# Patient Record
Sex: FEMALE | Race: BLACK OR AFRICAN AMERICAN | Employment: FULL TIME | URBAN - METROPOLITAN AREA
[De-identification: names, ages, dates, MRNs, and addresses within clinical notes are randomized per-mention and may not be internally consistent; named-entity substitution may affect disease eponyms.]

---

## 2017-01-06 ENCOUNTER — OFFICE VISIT (OUTPATIENT)
Dept: NEUROLOGY | Age: 27
End: 2017-01-06

## 2017-01-06 VITALS
OXYGEN SATURATION: 98 % | WEIGHT: 293 LBS | DIASTOLIC BLOOD PRESSURE: 80 MMHG | HEIGHT: 63 IN | HEART RATE: 94 BPM | BODY MASS INDEX: 51.91 KG/M2 | SYSTOLIC BLOOD PRESSURE: 120 MMHG

## 2017-01-06 DIAGNOSIS — R20.0 BILATERAL HAND NUMBNESS: Primary | ICD-10-CM

## 2017-01-06 DIAGNOSIS — Z72.0 TOBACCO USE: ICD-10-CM

## 2017-01-06 DIAGNOSIS — G43.009 MIGRAINE WITHOUT AURA AND WITHOUT STATUS MIGRAINOSUS, NOT INTRACTABLE: ICD-10-CM

## 2017-01-06 NOTE — LETTER
1/6/2017 9:40 AM 
 
Patient:    Dee Dee Covarrubias YOB: 1990 Date of Visit:    1/6/2017 Dear Kaye Anderson MD 
 
Thank you for referring Ms. Dee Dee Covarrubias to me for evaluation/treatment. Below are the relevant portions of my assessment and plan of care. NEUROLOGY NEW PATIENT CONSULATION  
 
REFERRED BY: 
Kaye Anderson MD 
 
01/06/17 Chief Complaint Patient presents with  New Patient Migraines 1-2  a month HISTORY OF PRESENT ILLNESS Dee Dee Covarrubias is a 27 y.o. female who presented to the neurology office for management of numbness and tingling in the hands and headaches. Regarding the numbness in the hand, it started around 9 months ago and it is bilateral.  The medial 2 fingers are involved in the right hand and all the fingers are involved in the left hand. It is gradually progressive and it has worsened in the last 6 months. She has been using splints at night for the last 3 weeks and that has not helped her. She does also have neck pain with radiation to the right shoulder. Her symptoms are off and on and it is more when she is working and improves when her hands and not moving. The numbness and tingling lasts for minutes but the pain around her wrist can be for a few days. She denies any weakness. Regarding the headaches, she has been having migraines for a long time and her headaches start of fifth pain in the left eye and left side of the face and it is 10 out of 10 in severity, associated with photophobia, phonophobia and nausea but she denies any vomiting. The headache can last from 3 days to week. It is throbbing in character. She takes Fioricet with codeine and that helps her. She takes it 1-2 tablets 1-2 times a month. Risk Factors for headaches Smoking: Half pack a day Coffee: Denies cups/day Tea: 0 cups/day Soda: 2 cans/day Water: 4 glasses/day Sleeps at 9 AM and wakes up at 2 PM to 3 PM. Medications tried Imitrex Current Outpatient Prescriptions Medication Sig  
 omeprazole (PRILOSEC) 40 mg capsule Take 1 Cap by mouth daily.  glipiZIDE (GLUCOTROL) 5 mg tablet Take 2 Tabs by mouth two (2) times a day.  SITagliptin-metFORMIN (JANUMET) 50-1,000 mg per tablet Take 1 Tab by mouth two (2) times daily (with meals).  gabapentin (NEURONTIN) 300 mg capsule Take 1 Cap by mouth three (3) times daily.  nortriptyline (PAMELOR) 25 mg capsule Take 1 Cap by mouth nightly.  metoprolol tartrate (LOPRESSOR) 100 mg IR tablet Take 1 Tab by mouth two (2) times a day.  spironolactone (ALDACTONE) 25 mg tablet Take 1 Tab by mouth two (2) times a day. Indications: ALDOSTERONISM, Hypertension  butalbital-acetaminophen-caffeine (FIORICET) -40 mg per tablet Take 0.5-1 Tabs by mouth every six (6) hours as needed for Headache. Max Daily Amount: 4 Tabs.  glucose blood VI test strips (ONETOUCH VERIO) strip Check sugars two times daily. E11.9  Blood-Glucose Meter (ONETOUCH VERIO IQ METER) misc Test blood sugar once daily  codeine-butalbital-aspirin-caffeine (BUTALBITAL COMPOUND-CODEINE) 63--40 mg per capsule Take 1 Cap by mouth every eight (8) hours as needed for Pain or Headache. Max Daily Amount: 3 Caps.  cetirizine (ZYRTEC) 10 mg tablet Take  by mouth daily.  glucose blood VI test strips (ONETOUCH VERIO) strip Check your blood sugar twice daily  albuterol (PROVENTIL HFA, VENTOLIN HFA) 90 mcg/actuation inhaler Take 1 Puff by inhalation every four (4) hours as needed for Wheezing.  albuterol (ACCUNEB) 1.25 mg/3 mL nebulizer solution Take 3 mL by inhalation every six (6) hours as needed for Wheezing.  acetaminophen (TYLENOL EXTRA STRENGTH) 500 mg tablet Take 1 Tab by mouth every six (6) hours as needed for Pain. No current facility-administered medications for this visit. Allergies Allergen Reactions  Latex Itching Past Medical History Diagnosis Date  Asthma  Craig hum 1/7/2015  Carpal tunnel syndrome of right wrist 12/20/2016  Diabetes ([facility] [address]  GERD (gastroesophageal reflux disease) 8/13/2014  
 HTN (hypertension) 7/25/2011  Hyperaldosteronism ([facility] 10/19/2015  Other ill-defined conditions(799.89) migraines Past Surgical History Procedure Laterality Date  Pr removal of tonsils,<9 y/o Family History Problem Relation Age of Onset  Arthritis-rheumatoid Mother  Hypertension Mother  Diabetes Mother Type II  
 Psychiatric Disorder Father  Drug Abuse Father  Cancer Other   
  prostate  Hypertension Sister  Thyroid Disease Sister \"I think\"  Attention Deficit Hyperactivity Disorder Brother  Hypertension Sister Social History Substance Use Topics  Smoking status: Current Every Day Smoker Packs/day: 0.50 Years: 5.00  Smokeless tobacco: Never Used  Alcohol use 0.0 oz/week 1 Standard drinks or equivalent per week Comment: socially, Monthly or less REVIEW OF SYSTEMS:  
A ten system review of constitutional, cardiovascular, respiratory, musculoskeletal, endocrine, skin, SHEENT, genitourinary, psychiatric and neurologic systems was obtained and is unremarkable with the exception of the following: Anxiety, fatigue, frequent headaches, joint pain, shortness of breath, snoring and weight change EXAMINATION:  
Visit Vitals  /80  Pulse 94  
 Ht 5' 3\" (1.6 m)  Wt (!) 357 lb 3.2 oz (162 kg)  LMP 12/15/2016  SpO2 98%  BMI 63.28 kg/m2 General:  
General appearance: Pt is in no acute distress Distal pulses are preserved Fundoscopic Exam: Attempted Neurological Examination:  
Mental Status: AAO x3. Speech is fluent. Follows commands, has normal fund of knowledge, attention, short term recall, comprehension and insight. Cranial Nerves: Visual fields are full.  PERRL, Extraocular movements are full. Facial sensation intact V1- V3. Facial movement intact, symmetric. Hearing intact to conversation. Palate elevates symmetrically. Shoulder shrug symmetric. Tongue midline. Motor: Strength is 5/5 in all 4 ext. No atrophy. Tone: Normal 
 
Sensation: Decreased pinprick sensation in the medial 2 fingers of the right hand and the lateral 3 fingers on the left hand Reflexes: DTRs trace throughout. Coordination/Cerebellar: Intact to finger-nose-finger Gait: Romberg is negative and casual gait is normal.  
 
Skin: No significant bruising or lacerations. Laboratory review:  
Results for orders placed or performed in visit on 12/20/16 MICROALBUMIN, UR, RAND W/ MICROALBUMIN/CREA RATIO Result Value Ref Range Creatinine, urine 100.2 Not Estab. mg/dL Microalbumin, urine 18.1 Not Estab. ug/mL Microalb/Creat ratio (ug/mg creat.) 18.1 0.0 - 30.0 mg/g creat CBC W/O DIFF Result Value Ref Range WBC 8.9 3.4 - 10.8 x10E3/uL  
 RBC 4.31 3.77 - 5.28 x10E6/uL HGB 11.6 11.1 - 15.9 g/dL HCT 37.6 34.0 - 46.6 % MCV 87 79 - 97 fL  
 MCH 26.9 26.6 - 33.0 pg  
 MCHC 30.9 (L) 31.5 - 35.7 g/dL  
 RDW 14.3 12.3 - 15.4 % PLATELET 566 (H) 432 - 379 x10E3/uL METABOLIC PANEL, COMPREHENSIVE Result Value Ref Range Glucose 352 (H) 65 - 99 mg/dL BUN 9 6 - 20 mg/dL Creatinine 0.64 0.57 - 1.00 mg/dL GFR est non- >59 mL/min/1.73 GFR est  >59 mL/min/1.73  
 BUN/Creatinine ratio 14 8 - 20 Sodium 137 134 - 144 mmol/L Potassium 5.3 (H) 3.5 - 5.2 mmol/L Chloride 97 96 - 106 mmol/L  
 CO2 25 18 - 29 mmol/L Calcium 9.5 8.7 - 10.2 mg/dL Protein, total 6.5 6.0 - 8.5 g/dL Albumin 3.8 3.5 - 5.5 g/dL GLOBULIN, TOTAL 2.7 1.5 - 4.5 g/dL A-G Ratio 1.4 1.1 - 2.5 Bilirubin, total 0.2 0.0 - 1.2 mg/dL Alk. phosphatase 82 39 - 117 IU/L  
 AST 25 0 - 40 IU/L  
 ALT 30 0 - 32 IU/L  
AMB POC HEMOGLOBIN A1C Result Value Ref Range Hemoglobin A1c (POC) 9.3 % Laboratory review: 
12/20/2016 Glucose 352, potassium 5.3, platelet 553, HVT5Y 9.3 Imaging review: 
7/27/2014 CT soft tissue neck with contrast  
No evidence of neck mass, abscess or lymphadenopathy Documentation review: 
The patient was seen by the primary care physician on 12/20/2016. The patient does have uncontrolled diabetes and the patient's last HbA1c was 8. 5. The patient is complaining of left wrist pain. She does a lot of typing and she does have a family history of carpal tunnel syndrome. The brace is not helping her at night. She is presently on amitriptyline at night. Assessment/Plan:  
Indy Galindo is a 27 y.o. female who presented to the neurology office for management of headaches and numbness in her hands. On my examination she does have decreased pinprick sensation in the medial 2 fingers of the right hand and the lateral 3 fingers of the left hand. I do suspect left ulnar neuropathy at the elbow and a right carpal tunnel syndrome. I will be performing an EMG/nerve conduction study to evaluate that further. Regarding her headaches, she does have migraines without aura and she can continue to take Fioricet as needed. If her headaches are getting worse she would need preventative therapy and in her case Topamax might be a good option. I have asked her to quit smoking and counseled her regarding that. Also, she should increase her hydration to 6-8 glasses of water today. The patient was counseled on the dangers of tobacco use, and was advised to quit. Reviewed strategies to maximize success, including removing cigarettes and smoking materials from environment and support of family/friends. >10 min counseling provided. Follow-up after the EMG/nerve conduction study 1. Bilateral hand numbness 
 
- EMG LIMITED; Future 2. Migraine without aura and without status migrainosus, not intractable 3. Tobacco use Thank you for allowing me to participate in the care of Ms. Neeru Pennington. Please feel free to contact me if you have any questions. Josette Kunz MD 
Neurologist and Clinical Neurophysiologist 
 
CC: Frances Jones MD 
Fax: [phone] This note will not be viewable in [address] If you have questions, please do not hesitate to call me. I look forward to following Ms. Neeru Pennington along with you. Sincerely, Josette Kunz MD

## 2017-01-06 NOTE — MR AVS SNAPSHOT
Visit Information Date & Time Provider Department Dept. Phone Encounter #  
 1/6/2017  9:00 AM Dwayne Enriquez MD Neurology Clinic at [facility] [phone] [id number] Your Appointments 1/17/2017  3:50 PM  
Follow Up with Jose Cruz Rodriguez MD  
[facility] [address]) Appt Note: f/u    dm                 3 months; f/u Diabetes [address] [id number] [address] [place] [address] Road  
  
    
 3/20/2017  3:30 PM  
ROUTINE CARE with Maurice Pérez MD  
[facility] OFFICE-ANNEX ([address]) Appt Note: 3 mnth fu  
 [address] [place] [address] [id number] [phone] [phone] [phone] Upcoming Health Maintenance Date Due  
 EYE EXAM RETINAL OR DILATED Q1 1/7/2016 PAP AKA CERVICAL CYTOLOGY 2/12/2017 LIPID PANEL Q1 3/16/2017 HEMOGLOBIN A1C Q6M 6/20/2017 FOOT EXAM Q1 9/26/2017 MICROALBUMIN Q1 12/20/2017 DTaP/Tdap/Td series (2 - Td) 8/27/2022 Allergies as of 1/6/2017  Review Complete On: 1/6/2017 By: Dwayne Enriquez MD  
  
 Severity Noted Reaction Type Reactions Latex  08/27/2014   Side Effect Itching Current Immunizations  Reviewed on 9/27/2013 Name Date Influenza Vaccine (Quad) PF 12/20/2016, 10/19/2015  8:35 AM  
 Influenza Vaccine PF 9/27/2013 Pneumococcal Polysaccharide (PPSV-23) 9/27/2013 TDAP Vaccine 8/27/2012 Not reviewed this visit You Were Diagnosed With   
  
 Codes Comments Bilateral hand numbness    -  Primary ICD-10-CM: R20.0 ICD-9-CM: 649. 0 Vitals BP Pulse Height(growth percentile) Weight(growth percentile) LMP SpO2  
 120/80 94 5' 3\" (1.6 m) (!) 357 lb 3.2 oz (162 kg) 12/15/2016 98% BMI OB Status Smoking Status 63.28 kg/m2 Having regular periods Current Every Day Smoker BMI and BSA Data Body Mass Index Body Surface Area  
 63.28 kg/m 2 2.68 m 2 Preferred Pharmacy Pharmacy Name Phone [id number]- [place], [place] - [address] [phone] Your Updated Medication List  
  
   
This list is accurate as of: 1/6/17  9:27 AM.  Always use your most recent med list.  
  
  
  
  
 acetaminophen 500 mg tablet Commonly known as:  Jr Aidee Wagner Se Take 1 Tab by mouth every six (6) hours as needed for Pain. * albuterol 1.25 mg/3 mL Nebu Commonly known as:  Veria Chandni Take 3 mL by inhalation every six (6) hours as needed for Wheezing. * albuterol 90 mcg/actuation inhaler Commonly known as:  PROVENTIL HFA, VENTOLIN HFA, PROAIR HFA Take 1 Puff by inhalation every four (4) hours as needed for Wheezing. Blood-Glucose Meter Misc Commonly known as:  Rc Cornejo IQ METER Test blood sugar once daily  
  
 butalbital-acetaminophen-caffeine -40 mg per tablet Commonly known as:  [employer] Take 0.5-1 Tabs by mouth every six (6) hours as needed for Headache. Max Daily Amount: 4 Tabs. codeine-butalbital-aspirin-caffeine 00--40 mg per capsule Commonly known as:  BUTALBITAL COMPOUND-CODEINE Take 1 Cap by mouth every eight (8) hours as needed for Pain or Headache. Max Daily Amount: 3 Caps.  
  
 gabapentin 300 mg capsule Commonly known as:  NEURONTIN Take 1 Cap by mouth three (3) times daily. glipiZIDE 5 mg tablet Commonly known as:  Ovid De Soto Take 2 Tabs by mouth two (2) times a day. * glucose blood VI test strips strip Commonly known as:  Rc Cornejo Check your blood sugar twice daily * glucose blood VI test strips strip Commonly known as:  Rc Cornejo Check sugars two times daily. E11.9  
  
 metoprolol tartrate 100 mg IR tablet Commonly known as:  LOPRESSOR  
 Take 1 Tab by mouth two (2) times a day. nortriptyline 25 mg capsule Commonly known as:  PAMELOR Take 1 Cap by mouth nightly. omeprazole 40 mg capsule Commonly known as:  PRILOSEC Take 1 Cap by mouth daily. SITagliptin-metFORMIN 50-1,000 mg per tablet Commonly known as:  Kolby Karely Take 1 Tab by mouth two (2) times daily (with meals). spironolactone 25 mg tablet Commonly known as:  ALDACTONE Take 1 Tab by mouth two (2) times a day. Indications: ALDOSTERONISM, Hypertension ZyrTEC 10 mg tablet Generic drug:  cetirizine Take  by mouth daily. * Notice: This list has 4 medication(s) that are the same as other medications prescribed for you. Read the directions carefully, and ask your doctor or other care provider to review them with you. To-Do List   
 01/06/2017 Neurology:  EMG LIMITED Patient Instructions 1. Emg/ncs 2. Fu after emg/ncs A Healthy Lifestyle: Care Instructions Your Care Instructions A healthy lifestyle can help you feel good, stay at a healthy weight, and have plenty of energy for both work and play. A healthy lifestyle is something you can share with your whole family. A healthy lifestyle also can lower your risk for serious health problems, such as high blood pressure, heart disease, and diabetes. You can follow a few steps listed below to improve your health and the health of your family. Follow-up care is a key part of your treatment and safety. Be sure to make and go to all appointments, and call your doctor if you are having problems. Its also a good idea to know your test results and keep a list of the medicines you take. How can you care for yourself at home? · Do not eat too much sugar, fat, or fast foods. You can still have dessert and treats now and then. The goal is moderation. · Start small to improve your eating habits.  Pay attention to portion sizes, drink less juice and soda pop, and eat more fruits and vegetables. ¨ Eat a healthy amount of food. A 3-ounce serving of meat, for example, is about the size of a deck of cards. Fill the rest of your plate with vegetables and whole grains. ¨ Limit the amount of soda and sports drinks you have every day. Drink more water when you are thirsty. ¨ Eat at least 5 servings of fruits and vegetables every day. It may seem like a lot, but it is not hard to reach this goal. A serving or helping is 1 piece of fruit, 1 cup of vegetables, or 2 cups of leafy, raw vegetables. Have an apple or some carrot sticks as an afternoon snack instead of a candy bar. Try to have fruits and/or vegetables at every meal. 
· Make exercise part of your daily routine. You may want to start with simple activities, such as walking, bicycling, or slow swimming. Try to be active 30 to 60 minutes every day. You do not need to do all 30 to 60 minutes all at once. For example, you can exercise 3 times a day for 10 or 20 minutes. Moderate exercise is safe for most people, but it is always a good idea to talk to your doctor before starting an exercise program. 
· Keep moving. Sheridan Bun the lawn, work in the garden, or "[employer]. Take the stairs instead of the elevator at work. · If you smoke, quit. People who smoke have an increased risk for heart attack, stroke, cancer, and other lung illnesses. Quitting is hard, but there are ways to boost your chance of quitting tobacco for good. ¨ Use nicotine gum, patches, or lozenges. ¨ Ask your doctor about stop-smoking programs and medicines. ¨ Keep trying. In addition to reducing your risk of diseases in the future, you will notice some benefits soon after you stop using tobacco. If you have shortness of breath or asthma symptoms, they will likely get better within a few weeks after you quit. · Limit how much alcohol you drink.  Moderate amounts of alcohol (up to 2 drinks a day for men, 1 drink a day for women) are okay. But drinking too much can lead to liver problems, high blood pressure, and other health problems. Family health If you have a family, there are many things you can do together to improve your health. · Eat meals together as a family as often as possible. · Eat healthy foods. This includes fruits, vegetables, lean meats and dairy, and whole grains. · Include your family in your fitness plan. Most people think of activities such as jogging or tennis as the way to fitness, but there are many ways you and your family can be more active. Anything that makes you breathe hard and gets your heart pumping is exercise. Here are some tips: 
¨ Walk to do errands or to take your child to school or the bus. ¨ Go for a family bike ride after dinner instead of watching TV. Where can you learn more? Go to http://donavonCortiliastacy.info/. Enter K078 in the search box to learn more about \"A Healthy Lifestyle: Care Instructions. \" Current as of: July 26, 2016 Content Version: 11.1 © 8137-1133 [employer]. Care instructions adapted under license by [employer] (which disclaims liability or warranty for this information). If you have questions about a medical condition or this instruction, always ask your healthcare professional. [address] any warranty or liability for your use of this information. A Healthy Lifestyle: Care Instructions Your Care Instructions A healthy lifestyle can help you feel good, stay at a healthy weight, and have plenty of energy for both work and play. A healthy lifestyle is something you can share with your whole family. A healthy lifestyle also can lower your risk for serious health problems, such as high blood pressure, heart disease, and diabetes. You can follow a few steps listed below to improve your health and the health of your family. Follow-up care is a key part of your treatment and safety. Be sure to make and go to all appointments, and call your doctor if you are having problems. Its also a good idea to know your test results and keep a list of the medicines you take. How can you care for yourself at home? · Do not eat too much sugar, fat, or fast foods. You can still have dessert and treats now and then. The goal is moderation. · Start small to improve your eating habits. Pay attention to portion sizes, drink less juice and soda pop, and eat more fruits and vegetables. ¨ Eat a healthy amount of food. A 3-ounce serving of meat, for example, is about the size of a deck of cards. Fill the rest of your plate with vegetables and whole grains. ¨ Limit the amount of soda and sports drinks you have every day. Drink more water when you are thirsty. ¨ Eat at least 5 servings of fruits and vegetables every day. It may seem like a lot, but it is not hard to reach this goal. A serving or helping is 1 piece of fruit, 1 cup of vegetables, or 2 cups of leafy, raw vegetables. Have an apple or some carrot sticks as an afternoon snack instead of a candy bar. Try to have fruits and/or vegetables at every meal. 
· Make exercise part of your daily routine. You may want to start with simple activities, such as walking, bicycling, or slow swimming. Try to be active 30 to 60 minutes every day. You do not need to do all 30 to 60 minutes all at once. For example, you can exercise 3 times a day for 10 or 20 minutes. Moderate exercise is safe for most people, but it is always a good idea to talk to your doctor before starting an exercise program. 
· Keep moving. Saurabh Goltz the lawn, work in the garden, or [employer]. Take the stairs instead of the elevator at work. · If you smoke, quit. People who smoke have an increased risk for heart attack, stroke, cancer, and other lung illnesses.  Quitting is hard, but there are ways to boost your chance of quitting tobacco for good. ¨ Use nicotine gum, patches, or lozenges. ¨ Ask your doctor about stop-smoking programs and medicines. ¨ Keep trying. In addition to reducing your risk of diseases in the future, you will notice some benefits soon after you stop using tobacco. If you have shortness of breath or asthma symptoms, they will likely get better within a few weeks after you quit. · Limit how much alcohol you drink. Moderate amounts of alcohol (up to 2 drinks a day for men, 1 drink a day for women) are okay. But drinking too much can lead to liver problems, high blood pressure, and other health problems. Family health If you have a family, there are many things you can do together to improve your health. · Eat meals together as a family as often as possible. · Eat healthy foods. This includes fruits, vegetables, lean meats and dairy, and whole grains. · Include your family in your fitness plan. Most people think of activities such as jogging or tennis as the way to fitness, but there are many ways you and your family can be more active. Anything that makes you breathe hard and gets your heart pumping is exercise. Here are some tips: 
¨ Walk to do errands or to take your child to school or the bus. ¨ Go for a family bike ride after dinner instead of watching TV. Where can you learn more? Go to http://donavon-stacy.info/. Enter W141 in the search box to learn more about \"A Healthy Lifestyle: Care Instructions. \" Current as of: July 26, 2016 Content Version: 11.1 © 0835-7144 [employer]. Care instructions adapted under license by [employer] (which disclaims liability or warranty for this information). If you have questions about a medical condition or this instruction, always ask your healthcare professional. [place] [address] any warranty or liability for your use of this information. Introducing [facility]! Dear Jeremiah Cartagena: Thank you for requesting a [employer] account. Our records indicate that you have previously registered for a [employer] account but its currently inactive. Please call our [employer] support line at [phone]. Additional Information If you have questions, please visit the Frequently Asked Questions section of the [employer] website at [email or web address] Remember, [employer] is NOT to be used for urgent needs. For medical emergencies, dial 911. Now available from your iPhone and Android! Please provide this summary of care documentation to your next provider. Your primary care clinician is listed as Kelia Palomino. If you have any questions after today's visit, please call [phone].

## 2017-01-06 NOTE — PROGRESS NOTES
NEUROLOGY NEW PATIENT CONSULATION     REFERRED BY:  Felicia George MD    01/06/17    Chief Complaint   Patient presents with    New Patient     Migraines 1-2  a month       HISTORY OF PRESENT ILLNESS  Sean Mobley is a 27 y.o. female who presented to the neurology office for management of numbness and tingling in the hands and headaches. Regarding the numbness in the hand, it started around 9 months ago and it is bilateral.  The medial 2 fingers are involved in the right hand and all the fingers are involved in the left hand. It is gradually progressive and it has worsened in the last 6 months. She has been using splints at night for the last 3 weeks and that has not helped her. She does also have neck pain with radiation to the right shoulder. Her symptoms are off and on and it is more when she is working and improves when her hands and not moving. The numbness and tingling lasts for minutes but the pain around her wrist can be for a few days. She denies any weakness. Regarding the headaches, she has been having migraines for a long time and her headaches start of fifth pain in the left eye and left side of the face and it is 10 out of 10 in severity, associated with photophobia, phonophobia and nausea but she denies any vomiting. The headache can last from 3 days to week. It is throbbing in character. She takes Fioricet with codeine and that helps her. She takes it 1-2 tablets 1-2 times a month. Risk Factors for headaches  Smoking: Half pack a day  Coffee: Denies cups/day  Tea: 0 cups/day  Soda: 2 cans/day  Water: 4 glasses/day  Sleeps at 9 AM and wakes up at 2 PM to 3 PM.     Medications tried  Imitrex          Current Outpatient Prescriptions   Medication Sig    omeprazole (PRILOSEC) 40 mg capsule Take 1 Cap by mouth daily.  glipiZIDE (GLUCOTROL) 5 mg tablet Take 2 Tabs by mouth two (2) times a day.     SITagliptin-metFORMIN (JANUMET) 50-1,000 mg per tablet Take 1 Tab by mouth two (2) times daily (with meals).  gabapentin (NEURONTIN) 300 mg capsule Take 1 Cap by mouth three (3) times daily.  nortriptyline (PAMELOR) 25 mg capsule Take 1 Cap by mouth nightly.  metoprolol tartrate (LOPRESSOR) 100 mg IR tablet Take 1 Tab by mouth two (2) times a day.  spironolactone (ALDACTONE) 25 mg tablet Take 1 Tab by mouth two (2) times a day. Indications: ALDOSTERONISM, Hypertension    butalbital-acetaminophen-caffeine (FIORICET) -40 mg per tablet Take 0.5-1 Tabs by mouth every six (6) hours as needed for Headache. Max Daily Amount: 4 Tabs.  glucose blood VI test strips (ONETOUCH VERIO) strip Check sugars two times daily. E11.9    Blood-Glucose Meter (ONETOUCH VERIO IQ METER) misc Test blood sugar once daily    codeine-butalbital-aspirin-caffeine (BUTALBITAL COMPOUND-CODEINE) 92--40 mg per capsule Take 1 Cap by mouth every eight (8) hours as needed for Pain or Headache. Max Daily Amount: 3 Caps.  cetirizine (ZYRTEC) 10 mg tablet Take  by mouth daily.  glucose blood VI test strips (ONETOUCH VERIO) strip Check your blood sugar twice daily    albuterol (PROVENTIL HFA, VENTOLIN HFA) 90 mcg/actuation inhaler Take 1 Puff by inhalation every four (4) hours as needed for Wheezing.  albuterol (ACCUNEB) 1.25 mg/3 mL nebulizer solution Take 3 mL by inhalation every six (6) hours as needed for Wheezing.  acetaminophen (TYLENOL EXTRA STRENGTH) 500 mg tablet Take 1 Tab by mouth every six (6) hours as needed for Pain. No current facility-administered medications for this visit.       Allergies   Allergen Reactions    Latex Itching     Past Medical History   Diagnosis Date    Asthma     [place] hump 1/7/2015    Carpal tunnel syndrome of right wrist 12/20/2016    Diabetes ([facility]     GERD (gastroesophageal reflux disease) 8/13/2014    HTN (hypertension) 7/25/2011    Hyperaldosteronism ([facility] [address] 10/19/2015    Other ill-defined conditions(799.89)      migraines     Past Surgical History   Procedure Laterality Date    Pr removal of tonsils,<9 y/o       Family History   Problem Relation Age of Onset   [facility] Arthritis-rheumatoid Mother     Hypertension Mother     Diabetes Mother      Type II    Psychiatric Disorder Father     Drug Abuse Father     Cancer Other      prostate    Hypertension Sister     Thyroid Disease Sister      \"I think\"    Attention Deficit Hyperactivity Disorder Brother     Hypertension Sister      Social History   Substance Use Topics    Smoking status: Current Every Day Smoker     Packs/day: 0.50     Years: 5.00    Smokeless tobacco: Never Used    Alcohol use 0.0 oz/week     1 Standard drinks or equivalent per week      Comment: socially, Monthly or less       REVIEW OF SYSTEMS:   A ten system review of constitutional, cardiovascular, respiratory, musculoskeletal, endocrine, skin, SHEENT, genitourinary, psychiatric and neurologic systems was obtained and is unremarkable with the exception of the following: Anxiety, fatigue, frequent headaches, joint pain, shortness of breath, snoring and weight change     EXAMINATION:   Visit Vitals    /80    Pulse 94    Ht 5' 3\" (1.6 m)    Wt (!) 357 lb 3.2 oz (162 kg)    LMP 12/15/2016    SpO2 98%    BMI 63.28 kg/m2        General:   General appearance: Pt is in no acute distress   Distal pulses are preserved  Fundoscopic Exam: Attempted    Neurological Examination:   Mental Status: AAO x3. Speech is fluent. Follows commands, has normal fund of knowledge, attention, short term recall, comprehension and insight. Cranial Nerves: Visual fields are full. PERRL, Extraocular movements are full. Facial sensation intact V1- V3. Facial movement intact, symmetric. Hearing intact to conversation. Palate elevates symmetrically. Shoulder shrug symmetric. Tongue midline. Motor: Strength is 5/5 in all 4 ext. No atrophy.      Tone: Normal    Sensation: Decreased pinprick sensation in the medial 2 fingers of the right hand and the lateral 3 fingers on the left hand    Reflexes: DTRs trace throughout. Coordination/Cerebellar: Intact to finger-nose-finger     Gait: Romberg is negative and casual gait is normal.     Skin: No significant bruising or lacerations. Laboratory review:   Results for orders placed or performed in visit on 12/20/16   MICROALBUMIN, UR, RAND W/ MICROALBUMIN/CREA RATIO   Result Value Ref Range    Creatinine, urine 100.2 Not Estab. mg/dL    Microalbumin, urine 18.1 Not Estab. ug/mL    Microalb/Creat ratio (ug/mg creat.) 18.1 0.0 - 30.0 mg/g creat   CBC W/O DIFF   Result Value Ref Range    WBC 8.9 3.4 - 10.8 x10E3/uL    RBC 4.31 3.77 - 5.28 x10E6/uL    HGB 11.6 11.1 - 15.9 g/dL    HCT 37.6 34.0 - 46.6 %    MCV 87 79 - 97 fL    MCH 26.9 26.6 - 33.0 pg    MCHC 30.9 (L) 31.5 - 35.7 g/dL    RDW 14.3 12.3 - 15.4 %    PLATELET 331 (H) 341 - 379 H94H3/NW   METABOLIC PANEL, COMPREHENSIVE   Result Value Ref Range    Glucose 352 (H) 65 - 99 mg/dL    BUN 9 6 - 20 mg/dL    Creatinine 0.64 0.57 - 1.00 mg/dL    GFR est non- >59 mL/min/1.73    GFR est  >59 mL/min/1.73    BUN/Creatinine ratio 14 8 - 20    Sodium 137 134 - 144 mmol/L    Potassium 5.3 (H) 3.5 - 5.2 mmol/L    Chloride 97 96 - 106 mmol/L    CO2 25 18 - 29 mmol/L    Calcium 9.5 8.7 - 10.2 mg/dL    Protein, total 6.5 6.0 - 8.5 g/dL    Albumin 3.8 3.5 - 5.5 g/dL    GLOBULIN, TOTAL 2.7 1.5 - 4.5 g/dL    A-G Ratio 1.4 1.1 - 2.5    Bilirubin, total 0.2 0.0 - 1.2 mg/dL    Alk. phosphatase 82 39 - 117 IU/L    AST 25 0 - 40 IU/L    ALT 30 0 - 32 IU/L   AMB POC HEMOGLOBIN A1C   Result Value Ref Range    Hemoglobin A1c (POC) 9.3 %     Laboratory review:  12/20/2016  Glucose 352, potassium 5.3, platelet 542, YII7X 9.3    Imaging review:  7/27/2014  CT soft tissue neck with contrast   No evidence of neck mass, abscess or lymphadenopathy    Documentation review:  The patient was seen by the primary care physician on 12/20/2016.   The patient does have uncontrolled diabetes and the patient's last HbA1c was 8. 5. The patient is complaining of left wrist pain. She does a lot of typing and she does have a family history of carpal tunnel syndrome. The brace is not helping her at night. She is presently on amitriptyline at night. Assessment/Plan:   Sheri Caldwell is a 27 y.o. female who presented to the neurology office for management of headaches and numbness in her hands. On my examination she does have decreased pinprick sensation in the medial 2 fingers of the right hand and the lateral 3 fingers of the left hand. I do suspect left ulnar neuropathy at the elbow and a right carpal tunnel syndrome. I will be performing an EMG/nerve conduction study to evaluate that further. Regarding her headaches, she does have migraines without aura and she can continue to take Fioricet as needed. If her headaches are getting worse she would need preventative therapy and in her case Topamax might be a good option. I have asked her to quit smoking and counseled her regarding that. Also, she should increase her hydration to 6-8 glasses of water today. The patient was counseled on the dangers of tobacco use, and was advised to quit. Reviewed strategies to maximize success, including removing cigarettes and smoking materials from environment and support of family/friends. >10 min counseling provided. Follow-up after the EMG/nerve conduction study    1. Bilateral hand numbness    - EMG LIMITED; Future    2. Migraine without aura and without status migrainosus, not intractable      3. Tobacco use      Thank you for allowing me to participate in the care of Ms. Neeru Pennington. Please feel free to contact me if you have any questions. Josette Kunz MD  Neurologist and Clinical Neurophysiologist    CC: Frances Jones MD  Fax: [phone]    This note will not be viewable in [address]

## 2017-01-06 NOTE — PATIENT INSTRUCTIONS
1. Emg/ncs  2. Fu after emg/ncs    A Healthy Lifestyle: Care Instructions  Your Care Instructions  A healthy lifestyle can help you feel good, stay at a healthy weight, and have plenty of energy for both work and play. A healthy lifestyle is something you can share with your whole family. A healthy lifestyle also can lower your risk for serious health problems, such as high blood pressure, heart disease, and diabetes. You can follow a few steps listed below to improve your health and the health of your family. Follow-up care is a key part of your treatment and safety. Be sure to make and go to all appointments, and call your doctor if you are having problems. Its also a good idea to know your test results and keep a list of the medicines you take. How can you care for yourself at home? · Do not eat too much sugar, fat, or fast foods. You can still have dessert and treats now and then. The goal is moderation. · Start small to improve your eating habits. Pay attention to portion sizes, drink less juice and soda pop, and eat more fruits and vegetables. ¨ Eat a healthy amount of food. A 3-ounce serving of meat, for example, is about the size of a deck of cards. Fill the rest of your plate with vegetables and whole grains. ¨ Limit the amount of soda and sports drinks you have every day. Drink more water when you are thirsty. ¨ Eat at least 5 servings of fruits and vegetables every day. It may seem like a lot, but it is not hard to reach this goal. A serving or helping is 1 piece of fruit, 1 cup of vegetables, or 2 cups of leafy, raw vegetables. Have an apple or some carrot sticks as an afternoon snack instead of a candy bar. Try to have fruits and/or vegetables at every meal.  · Make exercise part of your daily routine. You may want to start with simple activities, such as walking, bicycling, or slow swimming. Try to be active 30 to 60 minutes every day. You do not need to do all 30 to 60 minutes all at once. For example, you can exercise 3 times a day for 10 or 20 minutes. Moderate exercise is safe for most people, but it is always a good idea to talk to your doctor before starting an exercise program.  · Keep moving. Abhi Boothee the lawn, work in the garden, or [employer]. Take the stairs instead of the elevator at work. · If you smoke, quit. People who smoke have an increased risk for heart attack, stroke, cancer, and other lung illnesses. Quitting is hard, but there are ways to boost your chance of quitting tobacco for good. ¨ Use nicotine gum, patches, or lozenges. ¨ Ask your doctor about stop-smoking programs and medicines. ¨ Keep trying. In addition to reducing your risk of diseases in the future, you will notice some benefits soon after you stop using tobacco. If you have shortness of breath or asthma symptoms, they will likely get better within a few weeks after you quit. · Limit how much alcohol you drink. Moderate amounts of alcohol (up to 2 drinks a day for men, 1 drink a day for women) are okay. But drinking too much can lead to liver problems, high blood pressure, and other health problems. Family health  If you have a family, there are many things you can do together to improve your health. · Eat meals together as a family as often as possible. · Eat healthy foods. This includes fruits, vegetables, lean meats and dairy, and whole grains. · Include your family in your fitness plan. Most people think of activities such as jogging or tennis as the way to fitness, but there are many ways you and your family can be more active. Anything that makes you breathe hard and gets your heart pumping is exercise. Here are some tips:  ¨ Walk to do errands or to take your child to school or the bus. ¨ Go for a family bike ride after dinner instead of watching TV. Where can you learn more? Go to http://donavon-stacy.info/.   Enter D442 in the search box to learn more about \"A Healthy Lifestyle: Care Instructions. \"  Current as of: July 26, 2016  Content Version: 11.1  © 9031-8160 [employer]. Care instructions adapted under license by [employer] (which disclaims liability or warranty for this information). If you have questions about a medical condition or this instruction, always ask your healthcare professional. [address] any warranty or liability for your use of this information. A Healthy Lifestyle: Care Instructions  Your Care Instructions  A healthy lifestyle can help you feel good, stay at a healthy weight, and have plenty of energy for both work and play. A healthy lifestyle is something you can share with your whole family. A healthy lifestyle also can lower your risk for serious health problems, such as high blood pressure, heart disease, and diabetes. You can follow a few steps listed below to improve your health and the health of your family. Follow-up care is a key part of your treatment and safety. Be sure to make and go to all appointments, and call your doctor if you are having problems. Its also a good idea to know your test results and keep a list of the medicines you take. How can you care for yourself at home? · Do not eat too much sugar, fat, or fast foods. You can still have dessert and treats now and then. The goal is moderation. · Start small to improve your eating habits. Pay attention to portion sizes, drink less juice and soda pop, and eat more fruits and vegetables. ¨ Eat a healthy amount of food. A 3-ounce serving of meat, for example, is about the size of a deck of cards. Fill the rest of your plate with vegetables and whole grains. ¨ Limit the amount of soda and sports drinks you have every day. Drink more water when you are thirsty. ¨ Eat at least 5 servings of fruits and vegetables every day.  It may seem like a lot, but it is not hard to reach this goal. A serving or helping is 1 piece of fruit, 1 cup of vegetables, or 2 cups of leafy, raw vegetables. Have an apple or some carrot sticks as an afternoon snack instead of a candy bar. Try to have fruits and/or vegetables at every meal.  · Make exercise part of your daily routine. You may want to start with simple activities, such as walking, bicycling, or slow swimming. Try to be active 30 to 60 minutes every day. You do not need to do all 30 to 60 minutes all at once. For example, you can exercise 3 times a day for 10 or 20 minutes. Moderate exercise is safe for most people, but it is always a good idea to talk to your doctor before starting an exercise program.  · Keep moving. Sam Punter the lawn, work in the garden, or "[employer]. Take the stairs instead of the elevator at work. · If you smoke, quit. People who smoke have an increased risk for heart attack, stroke, cancer, and other lung illnesses. Quitting is hard, but there are ways to boost your chance of quitting tobacco for good. ¨ Use nicotine gum, patches, or lozenges. ¨ Ask your doctor about stop-smoking programs and medicines. ¨ Keep trying. In addition to reducing your risk of diseases in the future, you will notice some benefits soon after you stop using tobacco. If you have shortness of breath or asthma symptoms, they will likely get better within a few weeks after you quit. · Limit how much alcohol you drink. Moderate amounts of alcohol (up to 2 drinks a day for men, 1 drink a day for women) are okay. But drinking too much can lead to liver problems, high blood pressure, and other health problems. Family health  If you have a family, there are many things you can do together to improve your health. · Eat meals together as a family as often as possible. · Eat healthy foods. This includes fruits, vegetables, lean meats and dairy, and whole grains. · Include your family in your fitness plan.  Most people think of activities such as jogging or tennis as the way to fitness, but there are many ways you and your family can be more active. Anything that makes you breathe hard and gets your heart pumping is exercise. Here are some tips:  ¨ Walk to do errands or to take your child to school or the bus. ¨ Go for a family bike ride after dinner instead of watching TV. Where can you learn more? Go to http://donavon-stacy.info/. Enter Q623 in the search box to learn more about \"A Healthy Lifestyle: Care Instructions. \"  Current as of: July 26, 2016  Content Version: 11.1  © 1922-2507 [employer]. Care instructions adapted under license by [employer] (which disclaims liability or warranty for this information). If you have questions about a medical condition or this instruction, always ask your healthcare professional. [address] any warranty or liability for your use of this information.

## 2017-02-27 DIAGNOSIS — E26.9 HYPERALDOSTERONISM (HCC): ICD-10-CM

## 2017-03-01 RX ORDER — SPIRONOLACTONE 25 MG/1
25 TABLET ORAL 2 TIMES DAILY
Qty: 60 TAB | Refills: 1 | Status: SHIPPED | OUTPATIENT
Start: 2017-03-01 | End: 2017-08-17 | Stop reason: SDUPTHER

## 2017-03-16 ENCOUNTER — OFFICE VISIT (OUTPATIENT)
Dept: NEUROLOGY | Age: 27
End: 2017-03-16

## 2017-03-16 ENCOUNTER — DOCUMENTATION ONLY (OUTPATIENT)
Dept: NEUROLOGY | Age: 27
End: 2017-03-16

## 2017-03-16 VITALS — HEART RATE: 83 BPM | OXYGEN SATURATION: 97 % | SYSTOLIC BLOOD PRESSURE: 143 MMHG | DIASTOLIC BLOOD PRESSURE: 98 MMHG

## 2017-03-16 DIAGNOSIS — G56.21 ULNAR NEUROPATHY AT ELBOW, RIGHT: ICD-10-CM

## 2017-03-16 DIAGNOSIS — E11.9 DIABETES MELLITUS WITHOUT COMPLICATION (HCC): ICD-10-CM

## 2017-03-16 DIAGNOSIS — E11.9 TYPE 2 DIABETES MELLITUS WITHOUT COMPLICATION, WITHOUT LONG-TERM CURRENT USE OF INSULIN (HCC): ICD-10-CM

## 2017-03-16 DIAGNOSIS — G43.009 MIGRAINE WITHOUT AURA AND WITHOUT STATUS MIGRAINOSUS, NOT INTRACTABLE: Primary | ICD-10-CM

## 2017-03-16 DIAGNOSIS — R20.2 PARESTHESIA: ICD-10-CM

## 2017-03-16 DIAGNOSIS — Z72.0 TOBACCO USE: ICD-10-CM

## 2017-03-16 DIAGNOSIS — G56.01 CARPAL TUNNEL SYNDROME OF RIGHT WRIST: ICD-10-CM

## 2017-03-16 DIAGNOSIS — Z23 ENCOUNTER FOR IMMUNIZATION: ICD-10-CM

## 2017-03-16 RX ORDER — GABAPENTIN 300 MG/1
300 CAPSULE ORAL 3 TIMES DAILY
Qty: 90 CAP | Refills: 2 | Status: SHIPPED | OUTPATIENT
Start: 2017-03-16 | End: 2017-09-11 | Stop reason: SDUPTHER

## 2017-03-16 NOTE — PROCEDURES
[facility] at [address] [facility] [address] [facility], [address] [facility]  Tel [phone]     Fax [phone]  Electrodiagnostic Study Report    Patient: Kenneth Pemberton  Age: 27   [facility]#: [id number]  Ref Phys: Bobby Patricia   Sex: Female  Physician: Rondell Runner, MD   Height: ' \"      : 1990  DOS: 3/16/2017       Reason for study:  Kenneth Pemberton 27 y.o. female presents with bilateral hand numbness. Queryt CTS and right ulnar neuropathy.     Anti - Sensory Nerve Conduction Studies     Stim Site NR Peak (ms) P-T Amp (µV) Site1 Site2 Delta-P (ms) Dist (cm) Wilner (m/s)   Left Median Anti Sensory (2nd Digit)  33.8°C   Wrist    3.1 38.1 Wrist 2nd Digit 3.1 14.0 45   Right Median Anti Sensory (2nd Digit)  32.9°C   Wrist    3.3 26.4 Wrist 2nd Digit 3.3 14.0 42   Left Radial Anti Sensory (Base 1st Digit)  34.2°C   Wrist    1.9 23.1 Wrist Base 1st Digit 1.9 0.0    Right Radial Anti Sensory (Base 1st Digit)  33.9°C   Wrist    1.9 44.9 Wrist Base 1st Digit 1.9 0.0    Left Ulnar Anti Sensory (5th Digit)  33.3°C   Wrist    2.4 16.0 Wrist 5th Digit 2.4 14.0 58   Right Ulnar Anti Sensory (5th Digit)  32.5°C   Wrist    2.4 32.3 Wrist 5th Digit 2.4 14.0 58     Ortho - Sensory Nerve Conduction Studies     Stim Site NR Peak (ms) P-T Amp (µV) Site1 Site2 Delta-P (ms) Dist (cm) Wilner (m/s)   Right Ulnar Ortho Sensory (Wrist)   5th Digit    0.7 148.5 5th Digit Wrist 0.7 0.0      Sensory Comparison Studies     Stim Site NR Peak (ms) P-T Amp (µV) Site1 Site2 Delta-P (ms)   Left Median/Ulnar Palm Comparison (Wrist - 8cm)  33.2°C   Median Palm    2.1 51.5 Median Palm Ulnar Palm 0.3   Ulnar Palm    1.8 16.3      Right Median/Ulnar Palm Comparison (Wrist - 8cm)  33.7°C   Median Palm    2.1 79.5 Median Palm Ulnar Palm 0.3   Ulnar Palm    1.8 18.4                                 Motor Nerve Conduction Studies     Stim Site NR Onset (ms) O-P Amp (mV) Site1 Site2 Delta-0 (ms) Dist (cm) Wilner (m/s)   Left Median Motor (Abd Poll Brev)  32.5°C   Wrist    3.1 10.7 Elbow Wrist 4.4 24.0 55T   Elbow    7.5 10.2        Right Median Motor (Abd Poll Brev)  31.6°C   Wrist    3.0 11.5 Elbow Wrist 4.8 25.0 54T   Elbow    7.8 10.8        Left Ulnar Motor (Abd Dig Minimi)  33.3°C   Wrist    2.5 10.4 B Elbow Wrist 3.9 20.0 51   B Elbow    6.4 10.0 A Elbow B Elbow 2.0 10.0 50   A Elbow    8.4 8.9        Right Ulnar Motor (Abd Dig Minimi)   Wrist    2.3 10.1 B Elbow Wrist 3.8 20.5 54   B Elbow    6.1 9.6 A Elbow B Elbow 1.6 10.0 62   A Elbow    7.7 8.9 Wrist A Elbow 4.7 0.0    Right Ulnar Motor Run #2 (FDI)   Wrist    3.3 9.2 B Elbow Wrist 3.7 19.5 53   B Elbow    7.0 7.7 A Elbow B Elbow 1.9 10.0 53   A Elbow    8.9 6.9                       Electromyography     Side Muscle Nerve Root Ins Act Fibs Psw Amp Dur Poly Recrt Comment   Right  Right  Right  Right  Right 1stDorInt  Pronator teres  Biceps  Triceps  Deltoid      Nml  Normal   Normal   Normal   Normal  Nml  Nml  Nml  Nml  Nml Nml  Nml  Nml  Nml  Nml Nml  Nml  Nml  Nml  Nml Nml  Nml  Nml  Nml  Nml 0  0  0  0  0 Nml  Nml  Nml  Nml  Nml        Summary:  Nerve conduction studies of the bilateral upper extremities were unremarkable. Needle electrode examination of the right upper extremity was unremarkable. Impression: This is a normal study. There is no electrophysiological evidence of an ulnar neuropathy at the elbow or a median neuropathy at the wrist on either side. There is no electrophysiological evidence of right cervical radiculopathy.         __________________  Ldyia Uribe M.D.

## 2017-03-16 NOTE — MR AVS SNAPSHOT
Visit Information Date & Time Provider Department Dept. Phone Encounter #  
 3/16/2017  8:00 AM Eugenio Mendoza MD [facility] [phone] [id number] Your Appointments 3/20/2017  3:30 PM  
ROUTINE CARE with Debbie Pleitez MD  
[address] [facility] [facility] OFF[facility] ([address]) Appt Note: 3 mnth fu  
 [address] [place] [phone] [phone] [phone] Upcoming Health Maintenance Date Due  
 EYE EXAM RETINAL OR DILATED Q1 1/7/2016 PAP AKA CERVICAL CYTOLOGY 2/12/2017 LIPID PANEL Q1 3/16/2017 HEMOGLOBIN A1C Q6M 6/20/2017 FOOT EXAM Q1 9/26/2017 MICROALBUMIN Q1 12/20/2017 DTaP/Tdap/Td series (2 - Td) 8/27/2022 Allergies as of 3/16/2017  Review Complete On: 3/16/2017 By: Eugenio Mendoza MD  
  
 Severity Noted Reaction Type Reactions Latex  08/27/2014   Side Effect Itching Current Immunizations  Reviewed on 9/27/2013 Name Date Influenza Vaccine (Quad) PF 12/20/2016, 10/19/2015  8:35 AM  
 Influenza Vaccine PF 9/27/2013 Pneumococcal Polysaccharide (PPSV-23) 9/27/2013 TDAP Vaccine 8/27/2012 Not reviewed this visit You Were Diagnosed With   
  
 Codes Comments Migraine without aura and without status migrainosus, not intractable    -  Primary ICD-10-CM: G43.009 ICD-9-CM: 346.10 Ulnar neuropathy at elbow, right     ICD-10-CM: G56.21 ICD-9-CM: 354.2 Tobacco use     ICD-10-CM: Z72.0 ICD-9-CM: 305.1 Paresthesia     ICD-10-CM: R20.2 ICD-9-CM: 695. 0 Vitals OB Status Smoking Status Having regular periods Current Every Day Smoker Preferred Pharmacy Pharmacy Name Phone [id number], [place] - [address] [phone] Your Updated Medication List  
  
   
 This list is accurate as of: 3/16/17  9:39 AM.  Always use your most recent med list.  
  
  
  
  
 acetaminophen 500 mg tablet Commonly known as:  Lawrence Ancelmo Jak Jr Aidee Fernández Take 1 Tab by mouth every six (6) hours as needed for Pain. * albuterol 1.25 mg/3 mL Nebu Commonly known as:  Russell William Take 3 mL by inhalation every six (6) hours as needed for Wheezing. * albuterol 90 mcg/actuation inhaler Commonly known as:  PROVENTIL HFA, VENTOLIN HFA, PROAIR HFA Take 1 Puff by inhalation every four (4) hours as needed for Wheezing. Blood-Glucose Meter Misc Commonly known as:  Johnson Brooks IQ METER Test blood sugar once daily  
  
 butalbital-acetaminophen-caffeine -40 mg per tablet Commonly known as:  [employer] Take 0.5-1 Tabs by mouth every six (6) hours as needed for Headache. Max Daily Amount: 4 Tabs. codeine-butalbital-aspirin-caffeine 34--40 mg per capsule Commonly known as:  BUTALBITAL COMPOUND-CODEINE Take 1 Cap by mouth every eight (8) hours as needed for Pain or Headache. Max Daily Amount: 3 Caps.  
  
 gabapentin 300 mg capsule Commonly known as:  NEURONTIN Take 1 Cap by mouth three (3) times daily. glipiZIDE 5 mg tablet Commonly known as:  Kiana Petri Take 2 Tabs by mouth two (2) times a day. * glucose blood VI test strips strip Commonly known as:  Johnson Brooks Check your blood sugar twice daily * glucose blood VI test strips strip Commonly known as:  Johnson Brooks Check sugars two times daily. E11.9  
  
 metoprolol tartrate 100 mg IR tablet Commonly known as:  LOPRESSOR Take 1 Tab by mouth two (2) times a day. nortriptyline 25 mg capsule Commonly known as:  PAMELOR Take 1 Cap by mouth nightly. omeprazole 40 mg capsule Commonly known as:  PRILOSEC Take 1 Cap by mouth daily. SITagliptin-metFORMIN 50-1,000 mg per tablet Commonly known as:  Gilson Huertas  
 Take 1 Tab by mouth two (2) times daily (with meals). spironolactone 25 mg tablet Commonly known as:  ALDACTONE Take 1 Tab by mouth two (2) times a day. Indications: ALDOSTERONISM, hypertension ZyrTEC 10 mg tablet Generic drug:  cetirizine Take  by mouth daily. * Notice: This list has 4 medication(s) that are the same as other medications prescribed for you. Read the directions carefully, and ask your doctor or other care provider to review them with you. Patient Instructions 1. MRI of the brain and cervical spine 2. Right ulnar elbow splint 3. Follow-up in 3 months Introducing [facility]! Dear Alison Kline: Thank you for requesting a [employer] account. Our records indicate that you have previously registered for a [employer] account but its currently inactive. Please call our [employer] support line at [phone]. Additional Information If you have questions, please visit the Frequently Asked Questions section of the [employer] website at [email or web address] Remember, [employer] is NOT to be used for urgent needs. For medical emergencies, dial 911. Now available from your iPhone and Android! Please provide this summary of care documentation to your next provider. Your primary care clinician is listed as Joe Cornejo. If you have any questions after today's visit, please call [phone].

## 2017-03-16 NOTE — PROGRESS NOTES
Faxed to Janae Hawley progress note,referral for Ulnar Elbow splint-right,Insurance Image, Patient Regristration

## 2017-03-16 NOTE — PROGRESS NOTES
Neurology follow-up note     REFERRED BY:  Kaye Anderson MD    03/16/17    Chief Complaint   Patient presents with    Procedure       Subjective  Dee Dee Satish is a 27 y.o. female who presented to the neurology office for management of numbness and tingling in the hands and headaches. Regarding the numbness in the hand, it started around mid May 2016 and it is bilateral.  The medial 2 fingers are involved in the right hand and all the fingers are involved in the left hand. It is gradually progressive and it has worsened in the last 6 months. She has been using splints at night for the last 3 weeks and that has not helped her. She does also have neck pain with radiation to the right shoulder. Her symptoms are off and on and it is more when she is working and improves when her hands and not moving. The numbness and tingling lasts for minutes but the pain around her wrist can be for a few days. She denies any weakness. Since the last visit, she has been having intermittent numbness in her right hand. EMG/nerve conduction study has been normal.    Regarding the headaches, she has been having migraines for a long time and her headaches start of pain in the left eye and left side of the face and it is 10 out of 10 in severity, associated with photophobia, phonophobia and nausea but she denies any vomiting. The headache can last from 3 days to week. It is throbbing in character. She takes Fioricet with codeine and that helps her. She takes it 1-2 tablets 1-2 times a month. Since the last visit, the headaches are stable. Risk Factors for headaches  Smoking: Half pack a day  Coffee: Denies cups/day  Tea: 0 cups/day  Soda: 2 cans/day  Water: 4 glasses/day  Sleeps at 9 AM and wakes up at 2 PM to 3 PM.     Medications tried  Imitrex    Current Outpatient Prescriptions   Medication Sig    spironolactone (ALDACTONE) 25 mg tablet Take 1 Tab by mouth two (2) times a day.  Indications: ALDOSTERONISM, hypertension  omeprazole (PRILOSEC) 40 mg capsule Take 1 Cap by mouth daily.  glipiZIDE (GLUCOTROL) 5 mg tablet Take 2 Tabs by mouth two (2) times a day.  SITagliptin-metFORMIN (JANUMET) 50-1,000 mg per tablet Take 1 Tab by mouth two (2) times daily (with meals).  gabapentin (NEURONTIN) 300 mg capsule Take 1 Cap by mouth three (3) times daily.  nortriptyline (PAMELOR) 25 mg capsule Take 1 Cap by mouth nightly.  metoprolol tartrate (LOPRESSOR) 100 mg IR tablet Take 1 Tab by mouth two (2) times a day.  butalbital-acetaminophen-caffeine (FIORICET) -40 mg per tablet Take 0.5-1 Tabs by mouth every six (6) hours as needed for Headache. Max Daily Amount: 4 Tabs.  glucose blood VI test strips (ONETOUCH VERIO) strip Check sugars two times daily. E11.9    Blood-Glucose Meter (ONETOUCH VERIO IQ METER) misc Test blood sugar once daily    codeine-butalbital-aspirin-caffeine (BUTALBITAL COMPOUND-CODEINE) 67--40 mg per capsule Take 1 Cap by mouth every eight (8) hours as needed for Pain or Headache. Max Daily Amount: 3 Caps.  cetirizine (ZYRTEC) 10 mg tablet Take  by mouth daily.  glucose blood VI test strips (ONETOUCH VERIO) strip Check your blood sugar twice daily    albuterol (PROVENTIL HFA, VENTOLIN HFA) 90 mcg/actuation inhaler Take 1 Puff by inhalation every four (4) hours as needed for Wheezing.  albuterol (ACCUNEB) 1.25 mg/3 mL nebulizer solution Take 3 mL by inhalation every six (6) hours as needed for Wheezing.  acetaminophen (TYLENOL EXTRA STRENGTH) 500 mg tablet Take 1 Tab by mouth every six (6) hours as needed for Pain. No current facility-administered medications for this visit.       REVIEW OF SYSTEMS:   A ten system review of constitutional, cardiovascular, respiratory, musculoskeletal, endocrine, skin, SHEENT, genitourinary, psychiatric and neurologic systems was obtained and is unremarkable except as stated in HPI     EXAMINATION:   There were no vitals taken for this visit. General:   General appearance: Pt is in no acute distress   Distal pulses are preserved    Neurological Examination:   Mental Status: AAO x3. Speech is fluent. Follows commands, has normal fund of knowledge, attention, short term recall, comprehension and insight. Cranial Nerves: Visual fields are full. PERRL, Extraocular movements are full. Facial sensation intact V1- V3. Facial movement intact, symmetric. Hearing intact to conversation. Palate elevates symmetrically. Shoulder shrug symmetric. Tongue midline. Motor: Strength is 5/5 in all 4 ext. No atrophy. Tone: Normal    Sensation: Decreased pinprick sensation in the medial 2 fingers of the right hand and the lateral 3 fingers on the left hand    Coordination/Cerebellar: Intact to finger-nose-finger     Skin: No significant bruising or lacerations.     Laboratory review:   Results for orders placed or performed in visit on 12/20/16   MICROALBUMIN, UR, RAND W/ MICROALBUMIN/CREA RATIO   Result Value Ref Range    Creatinine, urine 100.2 Not Estab. mg/dL    Microalbumin, urine 18.1 Not Estab. ug/mL    Microalb/Creat ratio (ug/mg creat.) 18.1 0.0 - 30.0 mg/g creat   CBC W/O DIFF   Result Value Ref Range    WBC 8.9 3.4 - 10.8 x10E3/uL    RBC 4.31 3.77 - 5.28 x10E6/uL    HGB 11.6 11.1 - 15.9 g/dL    HCT 37.6 34.0 - 46.6 %    MCV 87 79 - 97 fL    MCH 26.9 26.6 - 33.0 pg    MCHC 30.9 (L) 31.5 - 35.7 g/dL    RDW 14.3 12.3 - 15.4 %    PLATELET 519 (H) 081 - 379 T37P0/WN   METABOLIC PANEL, COMPREHENSIVE   Result Value Ref Range    Glucose 352 (H) 65 - 99 mg/dL    BUN 9 6 - 20 mg/dL    Creatinine 0.64 0.57 - 1.00 mg/dL    GFR est non- >59 mL/min/1.73    GFR est  >59 mL/min/1.73    BUN/Creatinine ratio 14 8 - 20    Sodium 137 134 - 144 mmol/L    Potassium 5.3 (H) 3.5 - 5.2 mmol/L    Chloride 97 96 - 106 mmol/L    CO2 25 18 - 29 mmol/L    Calcium 9.5 8.7 - 10.2 mg/dL    Protein, total 6.5 6.0 - 8.5 g/dL    Albumin 3.8 3.5 - 5.5 g/dL    GLOBULIN, TOTAL 2.7 1.5 - 4.5 g/dL    A-G Ratio 1.4 1.1 - 2.5    Bilirubin, total 0.2 0.0 - 1.2 mg/dL    Alk. phosphatase 82 39 - 117 IU/L    AST (SGOT) 25 0 - 40 IU/L    ALT (SGPT) 30 0 - 32 IU/L   AMB POC HEMOGLOBIN A1C   Result Value Ref Range    Hemoglobin A1c (POC) 9.3 %     Laboratory review:  12/20/2016  Glucose 352, potassium 5.3, platelet 848, KIO9Y 9.3    Imaging review:  7/27/2014  CT soft tissue neck with contrast   No evidence of neck mass, abscess or lymphadenopathy    3/16/2017  EMG/nerve conduction study  Nerve conduction studies of the bilateral upper extremities were unremarkable. Needle electrode examination of the right upper extremity was unremarkable. This is a normal study. There is no electrophysiological evidence of median neuropathy at the wrist or and ulnar neuropathy at the elbow on either side. There is no electrophysiological evidence of right cervical radiculopathy. Documentation review:  The patient was seen by the primary care physician on 12/20/2016. The patient does have uncontrolled diabetes and the patient's last HbA1c was 8. 5. The patient is complaining of left wrist pain. She does a lot of typing and she does have a family history of carpal tunnel syndrome. The brace is not helping her at night. She is presently on amitriptyline at night. Assessment/Plan:   Sean Mobley is a 27 y.o. female who presented to the neurology office for management of headaches and numbness in her hands. On my examination she does have decreased pinprick sensation in the medial 2 fingers of the right hand and the lateral 3 fingers of the left hand. There is still a possibility of a right ulnar neuropathy at the elbow despite the EMG/nerve conduction study is normal since the sensitivity of an EMG/nerve conduction studies anywhere from 20-70%. I do plan to write a prescription for a right ulnar splint. I am going to refill her gabapentin as that is helping her for the paresthesia.     Since the symptoms are unexplained at this time, I will be getting MRI of the brain and cervical spine to rule out multiple sclerosis. Regarding her headaches, she does have migraines without aura and she can continue to take Fioricet as needed. If her headaches are getting worse she would need preventative therapy and in her case Topamax might be a good option. Also, she should increase her hydration to 6-8 glasses of water today. Thank you for allowing me to participate in the care of Ms. Duke Estrella. Please feel free to contact me if you have any questions. Rupal Harrison MD  Neurologist and Clinical Neurophysiologist    CC: Yaneth Grossman MD  Fax: [phone]    This note will not be viewable in [address]

## 2017-03-16 NOTE — LETTER
3/16/2017 9:52 AM 
 
Patient:    Danny Short YOB: 1990 Date of Visit:    3/16/2017 Dear Joe Cornjeo MD 
 
Thank you for referring Ms. Danny Short to me for evaluation/treatment. Below are the relevant portions of my assessment and plan of care. Neurology follow-up note REFERRED BY: 
Joe Cornejo MD 
 
03/16/17 Chief Complaint Patient presents with  Procedure Subjective Danny Short is a 27 y.o. female who presented to the neurology office for management of numbness and tingling in the hands and headaches. Regarding the numbness in the hand, it started around mid May 2016 and it is bilateral.  The medial 2 fingers are involved in the right hand and all the fingers are involved in the left hand. It is gradually progressive and it has worsened in the last 6 months. She has been using splints at night for the last 3 weeks and that has not helped her. She does also have neck pain with radiation to the right shoulder. Her symptoms are off and on and it is more when she is working and improves when her hands and not moving. The numbness and tingling lasts for minutes but the pain around her wrist can be for a few days. She denies any weakness. Since the last visit, she has been having intermittent numbness in her right hand. EMG/nerve conduction study has been normal. 
 
Regarding the headaches, she has been having migraines for a long time and her headaches start of pain in the left eye and left side of the face and it is 10 out of 10 in severity, associated with photophobia, phonophobia and nausea but she denies any vomiting. The headache can last from 3 days to week. It is throbbing in character. She takes Fioricet with codeine and that helps her. She takes it 1-2 tablets 1-2 times a month. Since the last visit, the headaches are stable. Risk Factors for headaches Smoking: Half pack a day Coffee: Denies cups/day Tea: 0 cups/day Soda: 2 cans/day Water: 4 glasses/day Sleeps at 9 AM and wakes up at 2 PM to 3 PM. Medications tried Imitrex Current Outpatient Prescriptions Medication Sig  
 spironolactone (ALDACTONE) 25 mg tablet Take 1 Tab by mouth two (2) times a day. Indications: ALDOSTERONISM, hypertension  omeprazole (PRILOSEC) 40 mg capsule Take 1 Cap by mouth daily.  glipiZIDE (GLUCOTROL) 5 mg tablet Take 2 Tabs by mouth two (2) times a day.  SITagliptin-metFORMIN (JANUMET) 50-1,000 mg per tablet Take 1 Tab by mouth two (2) times daily (with meals).  gabapentin (NEURONTIN) 300 mg capsule Take 1 Cap by mouth three (3) times daily.  nortriptyline (PAMELOR) 25 mg capsule Take 1 Cap by mouth nightly.  metoprolol tartrate (LOPRESSOR) 100 mg IR tablet Take 1 Tab by mouth two (2) times a day.  butalbital-acetaminophen-caffeine (FIORICET) -40 mg per tablet Take 0.5-1 Tabs by mouth every six (6) hours as needed for Headache. Max Daily Amount: 4 Tabs.  glucose blood VI test strips (ONETOUCH VERIO) strip Check sugars two times daily. E11.9  Blood-Glucose Meter (ONETOUCH VERIO IQ METER) misc Test blood sugar once daily  codeine-butalbital-aspirin-caffeine (BUTALBITAL COMPOUND-CODEINE) 58--40 mg per capsule Take 1 Cap by mouth every eight (8) hours as needed for Pain or Headache. Max Daily Amount: 3 Caps.  cetirizine (ZYRTEC) 10 mg tablet Take  by mouth daily.  glucose blood VI test strips (ONETOUCH VERIO) strip Check your blood sugar twice daily  albuterol (PROVENTIL HFA, VENTOLIN HFA) 90 mcg/actuation inhaler Take 1 Puff by inhalation every four (4) hours as needed for Wheezing.  albuterol (ACCUNEB) 1.25 mg/3 mL nebulizer solution Take 3 mL by inhalation every six (6) hours as needed for Wheezing.  acetaminophen (TYLENOL EXTRA STRENGTH) 500 mg tablet Take 1 Tab by mouth every six (6) hours as needed for Pain. No current facility-administered medications for this visit.    
 
REVIEW OF SYSTEMS:  
 A ten system review of constitutional, cardiovascular, respiratory, musculoskeletal, endocrine, skin, SHEENT, genitourinary, psychiatric and neurologic systems was obtained and is unremarkable except as stated in HPI EXAMINATION:  
There were no vitals taken for this visit. General:  
General appearance: Pt is in no acute distress Distal pulses are preserved Neurological Examination:  
Mental Status: AAO x3. Speech is fluent. Follows commands, has normal fund of knowledge, attention, short term recall, comprehension and insight. Cranial Nerves: Visual fields are full. PERRL, Extraocular movements are full. Facial sensation intact V1- V3. Facial movement intact, symmetric. Hearing intact to conversation. Palate elevates symmetrically. Shoulder shrug symmetric. Tongue midline. Motor: Strength is 5/5 in all 4 ext. No atrophy. Tone: Normal 
 
Sensation: Decreased pinprick sensation in the medial 2 fingers of the right hand and the lateral 3 fingers on the left hand Coordination/Cerebellar: Intact to finger-nose-finger Skin: No significant bruising or lacerations. Laboratory review:  
Results for orders placed or performed in visit on 12/20/16 MICROALBUMIN, UR, RAND W/ MICROALBUMIN/CREA RATIO Result Value Ref Range Creatinine, urine 100.2 Not Estab. mg/dL Microalbumin, urine 18.1 Not Estab. ug/mL Microalb/Creat ratio (ug/mg creat.) 18.1 0.0 - 30.0 mg/g creat CBC W/O DIFF Result Value Ref Range WBC 8.9 3.4 - 10.8 x10E3/uL  
 RBC 4.31 3.77 - 5.28 x10E6/uL HGB 11.6 11.1 - 15.9 g/dL HCT 37.6 34.0 - 46.6 % MCV 87 79 - 97 fL  
 MCH 26.9 26.6 - 33.0 pg  
 MCHC 30.9 (L) 31.5 - 35.7 g/dL  
 RDW 14.3 12.3 - 15.4 % PLATELET 701 (H) 198 - 379 x10E3/uL METABOLIC PANEL, COMPREHENSIVE Result Value Ref Range Glucose 352 (H) 65 - 99 mg/dL BUN 9 6 - 20 mg/dL Creatinine 0.64 0.57 - 1.00 mg/dL  GFR est non- >59 mL/min/1.73  
 GFR est  >59 mL/min/1.73  
 BUN/Creatinine ratio 14 8 - 20 Sodium 137 134 - 144 mmol/L Potassium 5.3 (H) 3.5 - 5.2 mmol/L Chloride 97 96 - 106 mmol/L  
 CO2 25 18 - 29 mmol/L Calcium 9.5 8.7 - 10.2 mg/dL Protein, total 6.5 6.0 - 8.5 g/dL Albumin 3.8 3.5 - 5.5 g/dL GLOBULIN, TOTAL 2.7 1.5 - 4.5 g/dL A-G Ratio 1.4 1.1 - 2.5 Bilirubin, total 0.2 0.0 - 1.2 mg/dL Alk. phosphatase 82 39 - 117 IU/L  
 AST (SGOT) 25 0 - 40 IU/L  
 ALT (SGPT) 30 0 - 32 IU/L  
AMB POC HEMOGLOBIN A1C Result Value Ref Range Hemoglobin A1c (POC) 9.3 % Laboratory review: 
12/20/2016 Glucose 352, potassium 5.3, platelet 263, YUO0R 9.3 Imaging review: 
7/27/2014 CT soft tissue neck with contrast  
No evidence of neck mass, abscess or lymphadenopathy 3/16/2017 EMG/nerve conduction study Nerve conduction studies of the bilateral upper extremities were unremarkable. Needle electrode examination of the right upper extremity was unremarkable. This is a normal study. There is no electrophysiological evidence of median neuropathy at the wrist or and ulnar neuropathy at the elbow on either side. There is no electrophysiological evidence of right cervical radiculopathy. Documentation review: 
The patient was seen by the primary care physician on 12/20/2016. The patient does have uncontrolled diabetes and the patient's last HbA1c was 8. 5. The patient is complaining of left wrist pain. She does a lot of typing and she does have a family history of carpal tunnel syndrome. The brace is not helping her at night. She is presently on amitriptyline at night. Assessment/Plan:  
Sravan Jackson is a 27 y.o. female who presented to the neurology office for management of headaches and numbness in her hands. On my examination she does have decreased pinprick sensation in the medial 2 fingers of the right hand and the lateral 3 fingers of the left hand.   There is still a possibility of a right ulnar neuropathy at the elbow despite the EMG/nerve conduction study is normal since the sensitivity of an EMG/nerve conduction studies anywhere from 20-70%. I do plan to write a prescription for a right ulnar splint. I am going to refill her gabapentin as that is helping her for the paresthesia. Since the symptoms are unexplained at this time, I will be getting MRI of the brain and cervical spine to rule out multiple sclerosis. Regarding her headaches, she does have migraines without aura and she can continue to take Fioricet as needed. If her headaches are getting worse she would need preventative therapy and in her case Topamax might be a good option. Also, she should increase her hydration to 6-8 glasses of water today. Thank you for allowing me to participate in the care of Ms. Neeru Pennington. Please feel free to contact me if you have any questions. Josette Kunz MD 
Neurologist and Clinical Neurophysiologist 
 
CC: Frances Jones MD 
Fax: [phone] This note will not be viewable in [address] If you have questions, please do not hesitate to call me. I look forward to following Ms. Neeru Pennington along with you. Sincerely, Josette Kunz MD 
  
 
[facility] at [address] [place] [address] [address] [place] [address] Tel [phone]     Fax [phone] Electrodiagnostic Study Report Patient: Sheri Caldwell  Age: 27 UV#: [id number]  Ref Phys: Sandro Mary Sex: Female  Physician: Chrystal Palencia MD  
Height: ' \"     
: 1990  DOS: 3/16/2017 Reason for study: 
Sheri Caldwell 27 y.o. female presents with bilateral hand numbness. Queryt CTS and right ulnar neuropathy. Anti - Sensory Nerve Conduction Studies Stim Site NR Peak (ms) P-T Amp (µV) Site1 Site2 Delta-P (ms) Dist (cm) Wilner (m/s) Left Median Anti Sensory (2nd Digit)  33.8°C  
 Wrist    3.1 38.1 Wrist 2nd Digit 3.1 14.0 45 Right Median Anti Sensory (2nd Digit)  32.9°C Wrist    3.3 26.4 Wrist 2nd Digit 3.3 14.0 42 Left Radial Anti Sensory (Base 1st Digit)  34.2°C Wrist    1.9 23.1 Wrist Base 1st Digit 1.9 0.0 Right Radial Anti Sensory (Base 1st Digit)  33.9°C Wrist    1.9 44.9 Wrist Base 1st Digit 1.9 0.0 Left Ulnar Anti Sensory (5th Digit)  33.3°C Wrist    2.4 16.0 Wrist 5th Digit 2.4 14.0 58 Right Ulnar Anti Sensory (5th Digit)  32.5°C Wrist    2.4 32.3 Wrist 5th Digit 2.4 14.0 58 Ortho - Sensory Nerve Conduction Studies Stim Site NR Peak (ms) P-T Amp (µV) Site1 Site2 Delta-P (ms) Dist (cm) Wilner (m/s) Right Ulnar Ortho Sensory (Wrist) 5th Digit    0.7 148.5 5th Digit Wrist 0.7 0.0 Sensory Comparison Studies Stim Site NR Peak (ms) P-T Amp (µV) Site1 Site2 Delta-P (ms) Left Median/Ulnar Palm Comparison (Wrist - 8cm)  33.2°C Median Palm    2.1 51.5 Median Palm Ulnar Palm 0.3 Ulnar Palm    1.8 16.3 Right Median/Ulnar Palm Comparison (Wrist - 8cm)  33.7°C Median Palm    2.1 79.5 Median Palm Ulnar Palm 0.3 Ulnar Palm    1.8 18.4 Motor Nerve Conduction Studies Stim Site NR Onset (ms) O-P Amp (mV) Site1 Site2 Delta-0 (ms) Dist (cm) Wilner (m/s) Left Median Motor (Abd Poll Brev)  32.5°C Wrist    3.1 10.7 Elbow Wrist 4.4 24.0 55T Elbow    7.5 10.2 Right Median Motor (Abd Poll Brev)  31.6°C Wrist    3.0 11.5 Elbow Wrist 4.8 25.0 54T Elbow    7.8 10.8 Left Ulnar Motor (Abd Dig Minimi)  33.3°C Wrist    2.5 10.4 B Elbow Wrist 3.9 20.0 51 B Elbow    6.4 10.0 A Elbow B Elbow 2.0 10.0 50 A Elbow    8.4 8.9 Right Ulnar Motor (Abd Dig Minimi) Wrist    2.3 10.1 B Elbow Wrist 3.8 20.5 54 B Elbow    6.1 9.6 A Elbow B Elbow 1.6 10.0 62 A Elbow    7.7 8.9 Wrist A Elbow 4.7 0.0 Right Ulnar Motor Run #2 (FDI) Wrist    3.3 9.2 B Elbow Wrist 3.7 19.5 53 B Elbow    7.0 7.7 A Elbow B Elbow 1.9 10.0 53 A Elbow    8.9 6.9 Electromyography Side Muscle Nerve Root Ins Act Fibs Psw Amp Dur Poly Recrt Comment Right Right Right Right Right 1stDorInt Pronator teres Biceps Triceps Deltoid      Nml 
Normal  
Normal  Nml 
Nml 
Nml Nml 
Nml 
Nml Nml 
Nml 
Nml Nml 
Nml 
Nml 0 
0 
0 Nml Nml 
Nml 
 
 
Summary: 
Nerve conduction studies of the bilateral upper extremities were unremarkable. Needle electrode examination of the right upper extremity was unremarkable. Impression: This is a normal study. There is no electrophysiological evidence of an ulnar neuropathy at the elbow or a median neuropathy at the wrist on either side. There is no electrophysiological evidence of right cervical radiculopathy. __________________ Mercy Roy M.D.

## 2017-03-20 ENCOUNTER — OFFICE VISIT (OUTPATIENT)
Dept: FAMILY MEDICINE CLINIC | Age: 27
End: 2017-03-20

## 2017-03-20 VITALS
BODY MASS INDEX: 51.91 KG/M2 | DIASTOLIC BLOOD PRESSURE: 76 MMHG | SYSTOLIC BLOOD PRESSURE: 143 MMHG | TEMPERATURE: 97.7 F | HEART RATE: 103 BPM | RESPIRATION RATE: 14 BRPM | WEIGHT: 293 LBS | OXYGEN SATURATION: 96 % | HEIGHT: 63 IN

## 2017-03-20 DIAGNOSIS — G47.30 SLEEP APNEA IN ADULT: ICD-10-CM

## 2017-03-20 DIAGNOSIS — R00.0 INCREASED PULSE RATE: ICD-10-CM

## 2017-03-20 DIAGNOSIS — E66.01 MORBID OBESITY, UNSPECIFIED OBESITY TYPE (HCC): ICD-10-CM

## 2017-03-20 DIAGNOSIS — E11.9 DIABETES MELLITUS WITHOUT COMPLICATION (HCC): Primary | ICD-10-CM

## 2017-03-20 LAB
BILIRUB UR QL STRIP: NEGATIVE
GLUCOSE UR-MCNC: NEGATIVE MG/DL
KETONES P FAST UR STRIP-MCNC: NEGATIVE MG/DL
PH UR STRIP: 5.5 [PH] (ref 4.6–8)
PROT UR QL STRIP: NORMAL MG/DL
SP GR UR STRIP: 1.03 (ref 1–1.03)
UA UROBILINOGEN AMB POC: NORMAL (ref 0.2–1)
URINALYSIS CLARITY POC: CLEAR
URINALYSIS COLOR POC: YELLOW
URINE BLOOD POC: NEGATIVE
URINE LEUKOCYTES POC: NEGATIVE
URINE NITRITES POC: NEGATIVE

## 2017-03-20 RX ORDER — AMITRIPTYLINE HYDROCHLORIDE 25 MG/1
TABLET, FILM COATED ORAL
Refills: 3 | COMMUNITY
Start: 2016-12-11 | End: 2017-06-19

## 2017-03-20 NOTE — PROGRESS NOTES
HISTORY OF PRESENT ILLNESS  Saige Santiago is a 27 y.o. female. HPI   DMtype II with history of tobacco abuse considering slowing down    Compliant w/ meds, having no diabetic diet, and not doing much of daily exercise, nohome glucose monitoring no Rf needed for today. Denies any tingling sensation, polyurea and polydipsia, last a1c was not at target >9 %%    . Last podiatry visit 2016   And last eye exam was 2016  Last urine microalbumin 2016 and was normal  . Feeling better since the last visit. Obesity  Specific concerns today for my pt is the wt concerning, the patient states that no self induced vomiting, and no binge eating,  has been thinking about the use of any laxative use for a better wt, pt also states that different available diets has been tried, does some daily exercises, tries to avoid fast food as much as possible. In addition, the patient states that eating too much is not the case and the portion are very well controlled being on the heavy side for a few yrs, very concerned about the huge wt, gives every body a bad body image,and finally stating that the best is to loose as much wt as possible. Patient states she has been snoring more with daily tiredness, she was told that she may need CPAP machine she has not used it for many years      Current Outpatient Prescriptions   Medication Sig Dispense Refill    Arm Brace misc Right ulnar splint for ulnar neuropathy at the elbow. 1 Each 2    gabapentin (NEURONTIN) 300 mg capsule Take 1 Cap by mouth three (3) times daily. 90 Cap 2    spironolactone (ALDACTONE) 25 mg tablet Take 1 Tab by mouth two (2) times a day. Indications: ALDOSTERONISM, hypertension 60 Tab 1    omeprazole (PRILOSEC) 40 mg capsule Take 1 Cap by mouth daily. 30 Cap 3    glipiZIDE (GLUCOTROL) 5 mg tablet Take 2 Tabs by mouth two (2) times a day. 120 Tab 3    SITagliptin-metFORMIN (JANUMET) 50-1,000 mg per tablet Take 1 Tab by mouth two (2) times daily (with meals).  60 Tab 6  nortriptyline (PAMELOR) 25 mg capsule Take 1 Cap by mouth nightly. 30 Cap 3    metoprolol tartrate (LOPRESSOR) 100 mg IR tablet Take 1 Tab by mouth two (2) times a day. 60 Tab 6    butalbital-acetaminophen-caffeine (FIORICET) -40 mg per tablet Take 0.5-1 Tabs by mouth every six (6) hours as needed for Headache. Max Daily Amount: 4 Tabs. 12 Tab 0    glucose blood VI test strips (ONETOUCH VERIO) strip Check sugars two times daily. E11.9 100 Strip 6    Blood-Glucose Meter (ONETOUCH VERIO IQ METER) misc Test blood sugar once daily 1 Each 3    codeine-butalbital-aspirin-caffeine (BUTALBITAL COMPOUND-CODEINE) 68--40 mg per capsule Take 1 Cap by mouth every eight (8) hours as needed for Pain or Headache. Max Daily Amount: 3 Caps. 60 Cap 0    albuterol (PROVENTIL HFA, VENTOLIN HFA) 90 mcg/actuation inhaler Take 1 Puff by inhalation every four (4) hours as needed for Wheezing. 1 Inhaler 1    albuterol (ACCUNEB) 1.25 mg/3 mL nebulizer solution Take 3 mL by inhalation every six (6) hours as needed for Wheezing. 1 Bottle 6    acetaminophen (TYLENOL EXTRA STRENGTH) 500 mg tablet Take 1 Tab by mouth every six (6) hours as needed for Pain. 30 Tab 1    amitriptyline (ELAVIL) 25 mg tablet TAKE 1 TAB BY MOUTH NIGHTLY. INDICATIONS: MIGRAINE PREVENTION  3    cetirizine (ZYRTEC) 10 mg tablet Take  by mouth daily.        Allergies   Allergen Reactions    Latex Itching     Past Medical History:   Diagnosis Date    Asthma     [place] hump 1/7/2015    Carpal tunnel syndrome of right wrist 12/20/2016    Diabetes ([facility]     GERD (gastroesophageal reflux disease) 8/13/2014    HTN (hypertension) 7/25/2011    Hyperaldosteronism ([facility] 10/19/2015    Other ill-defined conditions(799.89)     migraines     Past Surgical History:   Procedure Laterality Date    REMOVAL OF TONSILS,<12 [de-identified]       Family History   Problem Relation Age of Onset   [facility] Arthritis-rheumatoid Mother     Hypertension Mother     Diabetes Mother Type II    Psychiatric Disorder Father     Drug Abuse Father     Cancer Other      prostate    Hypertension Sister     Thyroid Disease Sister      \"I think\"    Attention Deficit Hyperactivity Disorder Brother     Hypertension Sister      Social History   Substance Use Topics    Smoking status: Current Every Day Smoker     Packs/day: 0.50     Years: 5.00    Smokeless tobacco: Never Used    Alcohol use 0.0 oz/week     1 Standard drinks or equivalent per week      Comment: socially, Monthly or less      Lab Results  Component Value Date/Time   WBC 8.9 12/20/2016 03:50 PM   HGB 11.6 12/20/2016 03:50 PM   HCT 37.6 12/20/2016 03:50 PM   PLATELET 978 07/48/3637 03:50 PM   MCV 87 12/20/2016 03:50 PM       Lab Results  Component Value Date/Time   Hemoglobin A1c 8.4 09/26/2016 12:00 AM   Hemoglobin A1c 8.6 08/13/2014 12:55 PM   Hemoglobin A1c 6.7 01/28/2014 08:44 AM   Glucose 352 12/20/2016 03:50 PM   Glucose (POC) 116 05/28/2016 08:34 PM   Microalb/Creat ratio (ug/mg creat.) 18.1 12/20/2016 03:50 PM   LDL, calculated 106 03/16/2016 11:16 AM   Creatinine 0.64 12/20/2016 03:50 PM      Lab Results  Component Value Date/Time   Cholesterol, total 163 03/16/2016 11:16 AM   HDL Cholesterol 32 03/16/2016 11:16 AM   LDL, calculated 106 03/16/2016 11:16 AM   Triglyceride 126 03/16/2016 11:16 AM       Lab Results  Component Value Date/Time   GFR est  12/20/2016 03:50 PM   GFR est non- 12/20/2016 03:50 PM   Creatinine 0.64 12/20/2016 03:50 PM   BUN 9 12/20/2016 03:50 PM   Sodium 137 12/20/2016 03:50 PM   Potassium 5.3 12/20/2016 03:50 PM   Chloride 97 12/20/2016 03:50 PM   CO2 25 12/20/2016 03:50 PM         Review of Systems   Constitutional: Negative for chills and fever. HENT: Negative for ear pain and nosebleeds. Eyes: Negative for blurred vision, pain and discharge. Respiratory: Negative for shortness of breath. Cardiovascular: Negative for chest pain and leg swelling.    Gastrointestinal: Negative for constipation, diarrhea, nausea and vomiting. Genitourinary: Negative for frequency. Musculoskeletal: Negative for joint pain. Skin: Negative for itching and rash. Neurological: Negative for headaches. Psychiatric/Behavioral: Negative for depression. The patient is not nervous/anxious. Physical Exam   Constitutional: She is oriented to person, place, and time. She appears well-developed and well-nourished. HENT:   Head: Normocephalic and atraumatic. Eyes: Conjunctivae and EOM are normal.   Neck: Normal range of motion. Neck supple. Cardiovascular: Normal rate, regular rhythm and normal heart sounds. No murmur heard. Pulmonary/Chest: Effort normal and breath sounds normal.   Abdominal: Soft. Bowel sounds are normal. She exhibits no distension. Musculoskeletal: Normal range of motion. She exhibits no edema. Neurological: She is alert and oriented to person, place, and time. Skin: No erythema. Psychiatric: Her behavior is normal.   Nursing note and vitals reviewed. ASSESSMENT and PLAN  Nancy was seen today for hypertension and diabetes.     Diagnoses and all orders for this visit:    Diabetes mellitus without complication ([facility]  -     REFERRAL TO OPTOMETRY  -     CBC W/O DIFF  -     AMB POC URINALYSIS DIP STICK AUTO W/O MICRO  -     TSH 3RD GENERATION  -     METABOLIC PANEL, COMPREHENSIVE  -     HEMOGLOBIN A1C WITH EAG  -     REFERRAL TO BARIATRIC SURGERY  -     REFERRAL TO SLEEP STUDIES  -     LIPID PANEL    Morbid obesity, unspecified obesity type ([facility]  -     CBC W/O DIFF  -     AMB POC URINALYSIS DIP STICK AUTO W/O MICRO  -     TSH 3RD GENERATION  -     METABOLIC PANEL, COMPREHENSIVE  -     HEMOGLOBIN A1C WITH EAG  -     REFERRAL TO BARIATRIC SURGERY  -     REFERRAL TO SLEEP STUDIES  -     LIPID PANEL    Increased pulse rate  -     CBC W/O DIFF  -     AMB POC URINALYSIS DIP STICK AUTO W/O MICRO  -     TSH 3RD GENERATION  -     METABOLIC PANEL, COMPREHENSIVE  -     LIPID PANEL    Sleep apnea in adult  -     CBC W/O DIFF  -     AMB POC URINALYSIS DIP STICK AUTO W/O MICRO  -     TSH 3RD GENERATION  -     METABOLIC PANEL, COMPREHENSIVE  -     REFERRAL TO SLEEP STUDIES    Other orders  -     Cancel: AMB POC HEMOGLOBIN A1C    Normal test results except for sugar level into the UNcontrolled diabetic state please be compliant with the following steps for improving diabetic care and outcome for further care to prevent further devastating complications of a uncontrolled diabetic state: increase Diabetic Education by more readings, have a great diabetic diet , low cholesterol diet, weight control and daily exercise 20- 30 min most days of the week, home glucose monitoring if possible, and daily foot care and yearly foot  Doctor  and  annual eye examinations at Ophthalmologist,  will cont with your average sugar reading check every 3months.   Pt agreed w/ recommendations

## 2017-03-20 NOTE — MR AVS SNAPSHOT
Visit Information Date & Time Provider Department Dept. Phone Encounter #  
 3/20/2017  3:30 PM Maikel Howard MD [facility] [phone] [id number] Follow-up Instructions Return in about 3 months (around 6/20/2017), or if symptoms worsen or fail to improve. Your Appointments 6/19/2017 10:40 AM  
Follow Up with Chanel Jimenez MD  
[facility] Appt Note: FU,Migraines, [address] [facility], 
[address] [place] [address], [address] [address]  
[address] , [address] [place] [address] Avenue, [address] [facility] [facility] [address] [address] Upcoming Health Maintenance Date Due  
 EYE EXAM RETINAL OR DILATED Q1 1/7/2016 PAP AKA CERVICAL CYTOLOGY 2/12/2017 LIPID PANEL Q1 3/16/2017 HEMOGLOBIN A1C Q6M 6/20/2017 FOOT EXAM Q1 9/26/2017 MICROALBUMIN Q1 12/20/2017 DTaP/Tdap/Td series (2 - Td) 8/27/2022 Allergies as of 3/20/2017  Review Complete On: 3/20/2017 By: Vitaliy Villaseñor LPN Severity Noted Reaction Type Reactions Latex  08/27/2014   Side Effect Itching Current Immunizations  Reviewed on 3/20/2017 Name Date Influenza Vaccine (Quad) PF 12/20/2016, 10/19/2015  8:35 AM  
 Influenza Vaccine PF 9/27/2013 Pneumococcal Polysaccharide (PPSV-23) 9/27/2013 TDAP Vaccine 8/27/2012 Reviewed by Maikel Howard MD on 3/20/2017 at  4:04 PM  
 Reviewed by Maikel Howard MD on 3/20/2017 at  4:04 PM  
 Reviewed by Maikel Howard MD on 3/20/2017 at  4:04 PM  
You Were Diagnosed With   
  
 Codes Comments Diabetes mellitus without complication ([facility]    -  Primary ICD-10-CM: E11.9 ICD-9-CM: 250.00 Morbid obesity, unspecified obesity type ([facility]     ICD-10-CM: E66.01 
ICD-9-CM: 278.01 Vitals BP Pulse Temp Resp Height(growth percentile) Weight(growth percentile)  143/76 (BP 1 Location: Left arm, BP Patient Position: At rest) (!) 103 97.7 °F (36.5 °C) (Oral) 14 5' 3\" (1.6 m) (!) 369 lb (167.4 kg) LMP SpO2 BMI OB Status Smoking Status 03/13/2017 96% 65.37 kg/m2 Having regular periods Current Every Day Smoker Vitals History BMI and BSA Data Body Mass Index Body Surface Area  
 65.37 kg/m 2 2.73 m 2 Preferred Pharmacy Pharmacy Name Phone [id number]- [place], [place] - [address] [phone] Your Updated Medication List  
  
   
This list is accurate as of: 3/20/17  4:06 PM.  Always use your most recent med list.  
  
  
  
  
 acetaminophen 500 mg tablet Commonly known as:  Jr Aidee Wagner Se Take 1 Tab by mouth every six (6) hours as needed for Pain. * albuterol 1.25 mg/3 mL Nebu Commonly known as:  Michalene Alma Take 3 mL by inhalation every six (6) hours as needed for Wheezing. * albuterol 90 mcg/actuation inhaler Commonly known as:  PROVENTIL HFA, VENTOLIN HFA, PROAIR HFA Take 1 Puff by inhalation every four (4) hours as needed for Wheezing. amitriptyline 25 mg tablet Commonly known as:  ELAVIL TAKE 1 TAB BY MOUTH NIGHTLY. INDICATIONS: MIGRAINE PREVENTION Arm Brace [facility] Right ulnar splint for ulnar neuropathy at the elbow. Blood-Glucose Meter Misc Commonly known as:  Ladarius Nieto IQ METER Test blood sugar once daily  
  
 butalbital-acetaminophen-caffeine -40 mg per tablet Commonly known as:  [employer] Take 0.5-1 Tabs by mouth every six (6) hours as needed for Headache. Max Daily Amount: 4 Tabs. codeine-butalbital-aspirin-caffeine 49--40 mg per capsule Commonly known as:  BUTALBITAL COMPOUND-CODEINE Take 1 Cap by mouth every eight (8) hours as needed for Pain or Headache. Max Daily Amount: 3 Caps.  
  
 gabapentin 300 mg capsule Commonly known as:  NEURONTIN Take 1 Cap by mouth three (3) times daily. glipiZIDE 5 mg tablet Commonly known as:  Saige Martinez Take 2 Tabs by mouth two (2) times a day. glucose blood VI test strips strip Commonly known as:  Georgia Romtony Check sugars two times daily. E11.9  
  
 metoprolol tartrate 100 mg IR tablet Commonly known as:  LOPRESSOR Take 1 Tab by mouth two (2) times a day. nortriptyline 25 mg capsule Commonly known as:  PAMELOR Take 1 Cap by mouth nightly. omeprazole 40 mg capsule Commonly known as:  PRILOSEC Take 1 Cap by mouth daily. SITagliptin-metFORMIN 50-1,000 mg per tablet Commonly known as:  Kolby Karely Take 1 Tab by mouth two (2) times daily (with meals). spironolactone 25 mg tablet Commonly known as:  ALDACTONE Take 1 Tab by mouth two (2) times a day. Indications: ALDOSTERONISM, hypertension ZyrTEC 10 mg tablet Generic drug:  cetirizine Take  by mouth daily. * Notice: This list has 2 medication(s) that are the same as other medications prescribed for you. Read the directions carefully, and ask your doctor or other care provider to review them with you. We Performed the Following AMB POC URINALYSIS DIP STICK AUTO W/O MICRO [27140 CPT(R)] CBC W/O DIFF [72288 CPT(R)] HEMOGLOBIN A1C WITH EAG [70621 CPT(R)] LIPID PANEL [97370 CPT(R)] METABOLIC PANEL, COMPREHENSIVE [01535 CPT(R)] REFERRAL TO BARIATRIC SURGERY [[id number] Custom] Comments:  
 Please evaluate patient for BMI 65.37 REFERRAL TO OPTOMETRY [id number] Custom] Comments:  
 Please evaluate patient for dm. REFERRAL TO SLEEP STUDIES [REF99 Custom] Comments:  
 Please evaluate patient for hx of sleep apnea on no CPAP  
 TSH 3RD GENERATION [85143 CPT(R)] Follow-up Instructions Return in about 3 months (around 6/20/2017), or if symptoms worsen or fail to improve. To-Do List   
 03/23/2017 7:45 PM  
  Appointment with [facility] [place] [facility] 2 at [facility] Department ([phone]) 1.  Please bring a list or a bag of your current medications to your appointment 2. Please be sure to remove ALL hair clips, pins, extensions, etc., prior to arriving for your MRI procedure. 3. Bring any non [facility] films or CDs pertaining to the area being imaged with you on the day of appointment. 4. A written order with a valid diagnosis and Physicians  signature is required for all scheduled tests. 5. Check in at registration 30min before your appointment time unless you were instructed to do otherwise.  
  
 03/23/2017 8:30 PM  
  Appointment with [facility] MRI 2 at [facility] MRI Department ([phone]) 1. Please bring a list or a bag of your current medications to your appointment 2. Please be sure to remove ALL hair clips, pins, extensions, etc., prior to arriving for your MRI procedure. 3. Bring any non [facility] films or CDs pertaining to the area being imaged with you on the day of appointment. 4. A written order with a valid diagnosis and Physicians  signature is required for all scheduled tests. 5. Check in at registration 30min before your appointment time unless you were instructed to do otherwise. Referral Information Referral ID Referred By Referred To  
  
 [id number] CANDIDA TAMEZ MD   
   [address]   
   Joan Liconaer, [address] [place] [address] Street Phone: [phone] Fax: [phone] Visits Status Start Date End Date 1 New Request 3/20/17 3/20/18 If your referral has a status of pending review or denied, additional information will be sent to support the outcome of this decision. Referral ID Referred By Referred To  
 [id number] Suzie Parra Not Available Visits Status Start Date End Date 1 New Request 3/20/17 3/20/18 If your referral has a status of pending review or denied, additional information will be sent to support the outcome of this decision. Referral ID Referred By Referred To  
 [id number] Suzie Parra Not Available Visits Status Start Date End Date 1 New Request 3/20/17 3/20/18 If your referral has a status of pending review or denied, additional information will be sent to support the outcome of this decision. Introducing [facility] SERVICES! Dear Lyndon Sees: Thank you for requesting a EDAN account. Our records indicate that you have previously registered for a EDAN account but its currently inactive. Please call our EDAN support line at [phone]. Additional Information If you have questions, please visit the Frequently Asked Questions section of the EDAN website at [email or web address] Remember, EDAN is NOT to be used for urgent needs. For medical emergencies, dial 911. Now available from your iPhone and Android! Please provide this summary of care documentation to your next provider. Your primary care clinician is listed as Janelle Alert. If you have any questions after today's visit, please call [phone].

## 2017-03-20 NOTE — PROGRESS NOTES
Kristel Tineo        Name and  verified        Chief Complaint   Patient presents with    Hypertension     3 month f/u    Diabetes     3 month f/u         Patient stated last PAP exam last year.

## 2017-03-21 LAB
ALBUMIN SERPL-MCNC: 3.7 G/DL (ref 3.5–5.5)
ALBUMIN/GLOB SERPL: 1.3 {RATIO} (ref 1.2–2.2)
ALP SERPL-CCNC: 89 IU/L (ref 39–117)
ALT SERPL-CCNC: 35 IU/L (ref 0–32)
AST SERPL-CCNC: 23 IU/L (ref 0–40)
BILIRUB SERPL-MCNC: 0.3 MG/DL (ref 0–1.2)
BUN SERPL-MCNC: 10 MG/DL (ref 6–20)
BUN/CREAT SERPL: 17 (ref 8–20)
CALCIUM SERPL-MCNC: 9.5 MG/DL (ref 8.7–10.2)
CHLORIDE SERPL-SCNC: 95 MMOL/L (ref 96–106)
CHOLEST SERPL-MCNC: 172 MG/DL (ref 100–199)
CO2 SERPL-SCNC: 24 MMOL/L (ref 18–29)
CREAT SERPL-MCNC: 0.6 MG/DL (ref 0.57–1)
ERYTHROCYTE [DISTWIDTH] IN BLOOD BY AUTOMATED COUNT: 14.4 % (ref 12.3–15.4)
EST. AVERAGE GLUCOSE BLD GHB EST-MCNC: 266 MG/DL
GLOBULIN SER CALC-MCNC: 2.8 G/DL (ref 1.5–4.5)
GLUCOSE SERPL-MCNC: 289 MG/DL (ref 65–99)
HBA1C MFR BLD: 10.9 % (ref 4.8–5.6)
HCT VFR BLD AUTO: 39.7 % (ref 34–46.6)
HDLC SERPL-MCNC: 30 MG/DL
HGB BLD-MCNC: 12.2 G/DL (ref 11.1–15.9)
LDLC SERPL CALC-MCNC: 96 MG/DL (ref 0–99)
MCH RBC QN AUTO: 26.6 PG (ref 26.6–33)
MCHC RBC AUTO-ENTMCNC: 30.7 G/DL (ref 31.5–35.7)
MCV RBC AUTO: 87 FL (ref 79–97)
PLATELET # BLD AUTO: 374 X10E3/UL (ref 150–379)
POTASSIUM SERPL-SCNC: 4.7 MMOL/L (ref 3.5–5.2)
PROT SERPL-MCNC: 6.5 G/DL (ref 6–8.5)
RBC # BLD AUTO: 4.58 X10E6/UL (ref 3.77–5.28)
SODIUM SERPL-SCNC: 135 MMOL/L (ref 134–144)
TRIGL SERPL-MCNC: 230 MG/DL (ref 0–149)
TSH SERPL DL<=0.005 MIU/L-ACNC: 1.81 UIU/ML (ref 0.45–4.5)
VLDLC SERPL CALC-MCNC: 46 MG/DL (ref 5–40)
WBC # BLD AUTO: 10.3 X10E3/UL (ref 3.4–10.8)

## 2017-04-04 ENCOUNTER — DOCUMENTATION ONLY (OUTPATIENT)
Dept: NEUROLOGY | Age: 27
End: 2017-04-04

## 2017-04-14 ENCOUNTER — HOSPITAL ENCOUNTER (OUTPATIENT)
Dept: MRI IMAGING | Age: 27
Discharge: HOME | End: 2017-04-14
Attending: PSYCHIATRY & NEUROLOGY
Payer: COMMERCIAL

## 2017-04-14 VITALS — WEIGHT: 293 LBS | BODY MASS INDEX: 63.77 KG/M2

## 2017-04-14 DIAGNOSIS — R20.2 PARESTHESIA: ICD-10-CM

## 2017-04-14 PROCEDURE — 72141 MRI NECK SPINE W/O DYE: CPT

## 2017-04-14 PROCEDURE — 70551 MRI BRAIN STEM W/O DYE: CPT

## 2017-04-24 ENCOUNTER — OFFICE VISIT (OUTPATIENT)
Dept: SLEEP MEDICINE | Age: 27
End: 2017-04-24

## 2017-04-24 VITALS
DIASTOLIC BLOOD PRESSURE: 83 MMHG | HEART RATE: 109 BPM | SYSTOLIC BLOOD PRESSURE: 136 MMHG | BODY MASS INDEX: 51.91 KG/M2 | WEIGHT: 293 LBS | TEMPERATURE: 98.7 F | OXYGEN SATURATION: 95 % | HEIGHT: 63 IN

## 2017-04-24 DIAGNOSIS — E66.01 OBESITY, MORBID, BMI 50 OR HIGHER (HCC): ICD-10-CM

## 2017-04-24 DIAGNOSIS — G47.33 OSA (OBSTRUCTIVE SLEEP APNEA): Primary | ICD-10-CM

## 2017-04-24 NOTE — PROGRESS NOTES
[address] [facility] [place], [address]  Tel.  [phone]  Fax. [address] [facility] [address]  [place], [address] [facility]  Tel.  [phone]  Fax. [phone] [address] [address] Mary Beth Khanna   Tel.  [phone]  Fax. [phone]     S>Nancy Amador January is a 27 y.o. female seen for a positive airway pressure follow-up. She reports maximal problems using the device. The following problems are identified:    Drowsiness yes Problems exhaling yes   Snoring no Forget to put on no   Mask Comfortable no Can't fall asleep no   Dry Mouth yes Mask falls off yes   Air Leaking yes Frequent awakenings yes         She admits that her sleep has not changed. Therapy Apnea Index averaged over PAP use: 0.6 /hr which reflects improved sleep breathing condition. Allergies   Allergen Reactions    Latex Itching       She has a current medication list which includes the following prescription(s): gabapentin, spironolactone, omeprazole, glipizide, sitagliptin-metformin, nortriptyline, metoprolol tartrate, amitriptyline, arm brace, butalbital-acetaminophen-caffeine, glucose blood vi test strips, blood-glucose meter, codeine-butalbital-aspirin-caffeine, cetirizine, albuterol, albuterol, and acetaminophen. .      She  has a past medical history of Asthma; [place] hump (1/7/2015); Carpal tunnel syndrome of right wrist (12/20/2016); Diabetes ([facility] GERD (gastroesophageal reflux disease) (8/13/2014); HTN (hypertension) (7/25/2011); Hyperaldosteronism ([facility] (10/19/2015); and Other ill-defined conditions. [place] Sleepiness Score: 11   and Modified F.O.S.Q. Score Total / 2: 13   which reflect worsened sleep quality over therapy time. O>    Visit Vitals    /83    Pulse (!) 109    Temp 98.7 °F (37.1 °C)    Ht 5' 3\" (1.6 m)    Wt (!) 364 lb (165.1 kg)    SpO2 95%    BMI 64.48 kg/m2    Neck circ.  in \"inches\": 19      General:   Alert, oriented, not in distress   Neck:   No JVD Chest/Lungs:  symetrical lung expansion , no accessory muscle use    Extremities:  no obvious rashes , negative edema    Neuro:  No focal deficits ; No obvious tremor    Psych:  Normal affect ,  Normal countenance ;           A>    ICD-10-CM ICD-9-CM    1. PETE (obstructive sleep apnea) G47.33 327.23    2. Obesity, morbid, BMI 50 or higher ([facility] E66.01 278.01      AHI = 13 (2011). On CPAP :  9 cmH2O. Compliant:      no    Therapeutic Response:  Negative    P>    Adherence to PAP therapy sub-optimal.  We'll request Sleep Educator assessment to improve adherence to PAP therapy - see tech notes    * Follow-up Disposition:  Return in about 6 weeks (around 6/5/2017) for CPAP Clinic. * She was asked to contact our office for any problems regarding PAP therapy. *Counseling was provided regarding the importance of regular PAP use and on proper sleep hygiene and safe driving. * Re-enforced proper and regular cleaning for the device. I have reviewed/discussed the above normal BMI with the patient. I have recommended the following interventions: dietary management education, guidance, and counseling . Barbara Lopez Thank you for allowing us to participate in your patient's medical care. Anastasia Brown M.D.  (electronically signed)  Diplomate in Sleep Medicine, [facility]    Office visit exceeded 25 minutes with counseling and direction of care taking up more than 50% of the allotted time.

## 2017-04-24 NOTE — PROGRESS NOTES
[address] [facility]. [place], [facility]  Tel.  [phone]  Fax. 100 [facility] 60  [place], [address] [facility]  Tel.  [phone]  Fax. [phone] [address] [facility] Mary Beth Khanna   Tel.  [phone]  Fax. [phone]       S>Nancy Mahajan is a 27 y.o. female seen for for a PAP desensitization session prior to Auto titration therapy. · Physician orders were reviewed. · Mask evaluation was performed. She was likewise informed on the use of her mask. · A DreamWear mask was fit and the patient demonstrated adequate comfort. · CPAP was initiated at [facility]. Patient was comfortable at this pressure. · The patient demonstrated good understanding of the positive airway pressure device. · Changed the pressure per DRs order. Changed from [address] [place] [address] to [facility].  · Patient will call us if she has any questions or concerns and will follow up with us in 6 weeks. O>    Visit Vitals    /83    Pulse (!) 109    Temp 98.7 °F (37.1 °C)    Ht 5' 3\" (1.6 m)    Wt (!) 364 lb (165.1 kg)    SpO2 95%    BMI 64.48 kg/m2    Neck circ. in \"inches\": 19      A>  1. PETE (obstructive sleep apnea)    2. Obesity, morbid, BMI 50 or higher (HCC)        P>    General information regarding operations and maintenance of the device was provided. She was provided information on sleep apnea including coresponding risk factors and the importance of proper treatment. Follow-up appointment was made with the physician to evaluate Auto titration results. She was asked to contact our office for any problems regarding her PAP therapy.

## 2017-04-24 NOTE — PATIENT INSTRUCTIONS
[address] [facility] [place], [address]  Tel.  [phone]  Fax. 100 [facility] 60  [place], [address] [facility]  Tel.  [phone]  Fax. [phone] [address]  Tel.  [phone]  Fax. [phone]     Learning About CPAP for Sleep Apnea  What is CPAP? CPAP is a small machine that you use at home every night while you sleep. It increases air pressure in your throat to keep your airway open. When you have sleep apnea, this can help you sleep better so you feel much better. CPAP stands for \"continuous positive airway pressure. \"  The CPAP machine will have one of the following:  · A mask that covers your nose and mouth  · Prongs that fit into your nose  · A mask that covers your nose only, the most common type. This type is called NCPAP. The N stands for \"nasal.\"  Why is it done? CPAP is usually the best treatment for obstructive sleep apnea. It is the first treatment choice and the most widely used. Your doctor may suggest CPAP if you have:  · Moderate to severe sleep apnea. · Sleep apnea and coronary artery disease (CAD) or heart failure. How does it help? · CPAP can help you have more normal sleep, so you feel less sleepy and more alert during the daytime. · CPAP may help keep heart failure or other heart problems from getting worse. · NCPAP may help lower your blood pressure. · If you use CPAP, your bed partner may also sleep better because you are not snoring or restless. What are the side effects? Some people who use CPAP have:  · A dry or stuffy nose and a sore throat. · Irritated skin on the face. · Sore eyes. · Bloating. If you have any of these problems, work with your doctor to fix them. Here are some things you can try:  · Be sure the mask or nasal prongs fit well. · See if your doctor can adjust the pressure of your CPAP. · If your nose is dry, try a humidifier.   · If your nose is runny or stuffy, try decongestant medicine or a steroid nasal spray. If these things do not help, you might try a different type of machine. Some machines have air pressure that adjusts on its own. Others have air pressures that are different when you breathe in than when you breathe out. This may reduce discomfort caused by too much pressure in your nose. Where can you learn more? Go to [employer]  Enter [employer] in the search box to learn more about \"Learning About CPAP for Sleep Apnea. \"   © 4379-9149 Healthwise, Incorporated. Care instructions adapted under license by [employer] (which disclaims liability or warranty for this information). This care instruction is for use with your licensed healthcare professional. If you have questions about a medical condition or this instruction, always ask your healthcare professional. [address] any warranty or liability for your use of this information. Content Version: 2.5.57636; Last Revised: January 11, 2010  PROPER SLEEP HYGIENE    What to avoid  · Do not have drinks with caffeine, such as coffee or black tea, for 8 hours before bed. · Do not smoke or use other types of tobacco near bedtime. Nicotine is a stimulant and can keep you awake. · Avoid drinking alcohol late in the evening, because it can cause you to wake in the middle of the night. · Do not eat a big meal close to bedtime. If you are hungry, eat a light snack. · Do not drink a lot of water close to bedtime, because the need to urinate may wake you up during the night. · Do not read or watch TV in bed. Use the bed only for sleeping and sexual activity. What to try  · Go to bed at the same time every night, and wake up at the same time every morning. Do not take naps during the day. · Keep your bedroom quiet, dark, and cool. · Get regular exercise, but not within 3 to 4 hours of your bedtime. .  · Sleep on a comfortable pillow and mattress.   · If watching the clock makes you anxious, turn it facing away from you so you cannot see the time. · If you worry when you lie down, start a worry book. Well before bedtime, write down your worries, and then set the book and your concerns aside. · Try meditation or other relaxation techniques before you go to bed. · If you cannot fall asleep, get up and go to another room until you feel sleepy. Do something relaxing. Repeat your bedtime routine before you go to bed again. · Make your house quiet and calm about an hour before bedtime. Turn down the lights, turn off the TV, log off the computer, and turn down the volume on music. This can help you relax after a busy day. Drowsy Driving: The [facility] cites drowsiness as a causing factor in more than 010,356 police reported crashes annually, resulting in 76,000 injuries and 1,500 deaths. Other surveys suggest 55% of people polled have driven while drowsy in the past year, 23% had fallen asleep but not crashed, 3% crashed, and 2% had and accident due to drowsy driving. Who is at risk? Young Drivers: One study of drowsy driving accidents states that 55% of the drivers were under 25 years. Of those, 75% were male. Shift Workers and Travelers: People who work overnight or travel across time zones frequently are at higher risk of experiencing Circadian Rhythm Disorders. They are trying to work and function when their body is programed to sleep. Sleep Deprived: Lack of sleep has a serious impact on your ability to pay attention or focus on a task. Consistently getting less than the average of 8 hours your body needs creates partial or cumulative sleep deprivation. Untreated Sleep Disorders: Sleep Apnea, Narcolepsy, R.L.S., and other sleep disorders (untreated) prevent a person from getting enough restful sleep. This leads to excessive daytime sleepiness and increases the risk for drowsy driving accidents by up to 7 times.   Medications / Alcohol: Even over the counter medications can cause drowsiness. Medications that impair a drivers attention should have a warning label. Alcohol naturally makes you sleepy and on its own can cause accidents. Combined with excessive drowsiness its effects are amplified. Signs of Drowsy Driving:   * You don't remember driving the last few miles   * You may drift out of your carmenza   * You are unable to focus and your thoughts wander   * You may yawn more often than normal   * You have difficulty keeping your eyes open / nodding off   * Missing traffic signs, speeding, or tailgating  Prevention-   Good sleep hygiene, lifestyle and behavioral choices have the most impact on drowsy driving. There is no substitute for sleep and the average person requires 8 hours nightly. If you find yourself driving drowsy, stop and sleep. Consider the sleep hygiene tips provided during your visit as well. Medication Refill Policy: Refills for all medications require 1 week advance notice. Please have your pharmacy fax a refill request. We are unable to fax, or call in \"controled substance\" medications and you will need to pick these prescriptions up from our office. [employer] Activation    Thank you for requesting access to [employer]. Please follow the instructions below to securely access and download your online medical record. [employer] allows you to send messages to your doctor, view your test results, renew your prescriptions, schedule appointments, and more. How Do I Sign Up? 1. In your internet browser, go to [email or web address]. 2. Click on the First Time User? Click Here link in the Sign In box. You will see the New Member Sign Up page. 3. Enter your [employer] Access Code exactly as it appears below. You will not need to use this code after youve completed the sign-up process. If you do not sign up before the expiration date, you must request a new code. [employer] Access Code:  Activation code not generated  Current [employer] Status: Active (This is the date your [employer] access code will )    4. Enter the last four digits of your Social Security Number (xxxx) and Date of Birth (mm/dd/yyyy) as indicated and click Submit. You will be taken to the next sign-up page. 5. Create a [employer] ID. This will be your [employer] login ID and cannot be changed, so think of one that is secure and easy to remember. 6. Create a [employer] password. You can change your password at any time. 7. Enter your Password Reset Question and Answer. This can be used at a later time if you forget your password. 8. Enter your e-mail address. You will receive e-mail notification when new information is available in [address] 9. Click Sign Up. You can now view and download portions of your medical record. 10. Click the Download Summary menu link to download a portable copy of your medical information. Additional Information    If you have questions, please call [phone]. Remember, [employer] is NOT to be used for urgent needs. For medical emergencies, dial 911. Starting a Weight Loss Plan: After Your Visit  Your Care Instructions  If you are thinking about losing weight, it can be hard to know where to start. Your doctor can help you set up a weight loss plan that best meets your needs. You may want to take a class on nutrition or exercise, or join a weight loss support group. If you have questions about how to make changes to your eating or exercise habits, ask your doctor about seeing a registered dietitian or an exercise specialist.  It can be a big challenge to lose weight. But you do not have to make huge changes at once. Make small changes, and stick with them. When those changes become habit, add a few more changes. If you do not think you are ready to make changes right now, try to pick a date in the future.  Make an appointment to see your doctor to discuss whether the time is right for you to start a plan.  Follow-up care is a key part of your treatment and safety. Be sure to make and go to all appointments, and call your doctor if you are having problems. It's also a good idea to know your test results and keep a list of the medicines you take. How can you care for yourself at home? · Set realistic goals. Many people expect to lose much more weight than is likely. A weight loss of 5% to 10% of your body weight may be enough to improve your health. · Get family and friends involved to provide support. Talk to them about why you are trying to lose weight, and ask them to help. They can help by participating in exercise and having meals with you, even if they may be eating something different. · Find what works best for you. If you do not have time or do not like to cook, a program that offers meal replacement bars or shakes may be better for you. Or if you like to prepare meals, finding a plan that includes daily menus and recipes may be best.  · Ask your doctor about other health professionals who can help you achieve your weight loss goals. ¨ A dietitian can help you make healthy changes in your diet. ¨ An exercise specialist or  can help you develop a safe and effective exercise program.  ¨ A counselor or psychiatrist can help you cope with issues such as depression, anxiety, or family problems that can make it hard to focus on weight loss. · Consider joining a support group for people who are trying to lose weight. Your doctor can suggest groups in your area. Where can you learn more? Go to [employer]  Enter U357 in the search box to learn more about \"Starting a Weight Loss Plan: After Your Visit. \"   © 9711-3895 Healthwise, Incorporated. Care instructions adapted under license by [facility] (which disclaims liability or warranty for this information).  This care instruction is for use with your licensed healthcare professional. If you have questions about a medical condition or this instruction, always ask your healthcare professional. [address] any warranty or liability for your use of this information.   Content Version: 9.8.18186; Last Revised: September 1, 2009

## 2017-06-14 DIAGNOSIS — G56.01 CARPAL TUNNEL SYNDROME OF RIGHT WRIST: ICD-10-CM

## 2017-06-14 DIAGNOSIS — E11.9 DIABETES MELLITUS WITHOUT COMPLICATION (HCC): ICD-10-CM

## 2017-06-14 DIAGNOSIS — Z23 ENCOUNTER FOR IMMUNIZATION: ICD-10-CM

## 2017-06-14 DIAGNOSIS — E11.9 TYPE 2 DIABETES MELLITUS WITHOUT COMPLICATION, WITHOUT LONG-TERM CURRENT USE OF INSULIN (HCC): ICD-10-CM

## 2017-06-15 RX ORDER — NORTRIPTYLINE HYDROCHLORIDE 25 MG/1
25 CAPSULE ORAL
Qty: 30 CAP | Refills: 3 | Status: SHIPPED | OUTPATIENT
Start: 2017-06-15 | End: 2017-06-19 | Stop reason: SDUPTHER

## 2017-06-19 ENCOUNTER — OFFICE VISIT (OUTPATIENT)
Dept: NEUROLOGY | Age: 27
End: 2017-06-19

## 2017-06-19 VITALS
WEIGHT: 293 LBS | HEIGHT: 63 IN | BODY MASS INDEX: 51.91 KG/M2 | SYSTOLIC BLOOD PRESSURE: 144 MMHG | HEART RATE: 113 BPM | DIASTOLIC BLOOD PRESSURE: 80 MMHG

## 2017-06-19 DIAGNOSIS — G56.21 ULNAR NEUROPATHY OF RIGHT UPPER EXTREMITY: ICD-10-CM

## 2017-06-19 DIAGNOSIS — G43.009 MIGRAINE WITHOUT AURA AND WITHOUT STATUS MIGRAINOSUS, NOT INTRACTABLE: Primary | ICD-10-CM

## 2017-06-19 DIAGNOSIS — G47.33 OSA (OBSTRUCTIVE SLEEP APNEA): ICD-10-CM

## 2017-06-19 DIAGNOSIS — Z72.0 TOBACCO USE: ICD-10-CM

## 2017-06-19 DIAGNOSIS — G56.01 CARPAL TUNNEL SYNDROME OF RIGHT WRIST: ICD-10-CM

## 2017-06-19 RX ORDER — NORTRIPTYLINE HYDROCHLORIDE 50 MG/1
50 CAPSULE ORAL
Qty: 30 CAP | Refills: 2 | Status: SHIPPED | OUTPATIENT
Start: 2017-06-19 | End: 2017-09-28 | Stop reason: SDUPTHER

## 2017-06-19 NOTE — PROGRESS NOTES
Neurology follow-up note     REFERRED BY:  Wendy Duff MD    06/19/17    Chief Complaint   Patient presents with    Follow-up     migraines better       Subjective  Jennifer Crowe is a 32 y.o. female who presented to the neurology office for management of numbness and tingling in the hands and headaches. Regarding the numbness in the hand, it started around mid May 2016 and it is bilateral.  The medial 2 fingers are involved in the right hand and all the fingers are involved in the left hand. It is gradually progressive and it has worsened in the last 6 months. She does also have neck pain with radiation to the right shoulder. Her symptoms are off and on and it is more when she is working and improves when her hands and not moving. The numbness and tingling lasts for minutes but the pain around her wrist can be for a few days. She denies any weakness. EMG/nerve conduction study has been normal.  She is taking gabapentin 300 mg p.o. twice daily and that has helped her. She uses right elbow splint intermittently. Regarding the headaches, she has been having migraines for a long time and her headaches start of pain in the left eye and left side of the face and it is 10 out of 10 in severity, associated with photophobia, phonophobia and nausea but she denies any vomiting. The headache can last from 3 days to week. It is throbbing in character. She takes Fioricet with codeine and that helps her. She takes it 1-2 tablets 1-2 times a month. Since the last visit, the headaches are stable.   She has had 3 headaches in the last 2 weeks    She does also have obstructive sleep apnea and is trying to use CPAP regularly    Risk Factors for headaches  Smoking: Half pack a day  Coffee: Denies cups/day  Tea: 0 cups/day  Soda: 2 cans/day  Water: 4 glasses/day  Sleeps at 9 AM and wakes up at 2 PM to 3 PM.     Medications tried  Imitrex  Amitriptyline  Nortriptyline    Current Outpatient Prescriptions   Medication Sig    nortriptyline (PAMELOR) 50 mg capsule Take 1 Cap by mouth nightly.  Arm Brace misc Right ulnar splint for ulnar neuropathy at the elbow.  gabapentin (NEURONTIN) 300 mg capsule Take 1 Cap by mouth three (3) times daily.  spironolactone (ALDACTONE) 25 mg tablet Take 1 Tab by mouth two (2) times a day. Indications: ALDOSTERONISM, hypertension    omeprazole (PRILOSEC) 40 mg capsule Take 1 Cap by mouth daily.  glipiZIDE (GLUCOTROL) 5 mg tablet Take 2 Tabs by mouth two (2) times a day.  SITagliptin-metFORMIN (JANUMET) 50-1,000 mg per tablet Take 1 Tab by mouth two (2) times daily (with meals).  metoprolol tartrate (LOPRESSOR) 100 mg IR tablet Take 1 Tab by mouth two (2) times a day.  butalbital-acetaminophen-caffeine (FIORICET) -40 mg per tablet Take 0.5-1 Tabs by mouth every six (6) hours as needed for Headache. Max Daily Amount: 4 Tabs.  glucose blood VI test strips (ONETOUCH VERIO) strip Check sugars two times daily. E11.9    Blood-Glucose Meter (ONETOUCH VERIO IQ METER) misc Test blood sugar once daily    codeine-butalbital-aspirin-caffeine (BUTALBITAL COMPOUND-CODEINE) 13--40 mg per capsule Take 1 Cap by mouth every eight (8) hours as needed for Pain or Headache. Max Daily Amount: 3 Caps.  cetirizine (ZYRTEC) 10 mg tablet Take  by mouth daily.  albuterol (PROVENTIL HFA, VENTOLIN HFA) 90 mcg/actuation inhaler Take 1 Puff by inhalation every four (4) hours as needed for Wheezing.  albuterol (ACCUNEB) 1.25 mg/3 mL nebulizer solution Take 3 mL by inhalation every six (6) hours as needed for Wheezing.  acetaminophen (TYLENOL EXTRA STRENGTH) 500 mg tablet Take 1 Tab by mouth every six (6) hours as needed for Pain. No current facility-administered medications for this visit.       REVIEW OF SYSTEMS:   A ten system review of constitutional, cardiovascular, respiratory, musculoskeletal, endocrine, skin, SHEENT, genitourinary, psychiatric and neurologic systems was obtained and is unremarkable except as stated in HPI     EXAMINATION:   Visit Vitals    /80    Pulse (!) 113    Ht 5' 3\" (1.6 m)    Wt (!) 357 lb 6.4 oz (162.1 kg)    LMP 06/17/2017    BMI 63.31 kg/m2        General:   General appearance: Pt is in no acute distress   Distal pulses are preserved    Neurological Examination:   Mental Status: AAO x3. Speech is fluent. Follows commands, has normal fund of knowledge, attention, short term recall, comprehension and insight. Cranial Nerves: Visual fields are full. PERRL, Extraocular movements are full. Facial sensation intact V1- V3. Facial movement intact, symmetric. Hearing intact to conversation. Palate elevates symmetrically. Shoulder shrug symmetric. Tongue midline. Motor: Strength is 5/5 in all 4 ext. No atrophy. Tone: Normal    Sensation: Decreased pinprick sensation in the medial 2 fingers of the right hand and the lateral 3 fingers on the left hand    Coordination/Cerebellar: Intact to finger-nose-finger     Skin: No significant bruising or lacerations.     Laboratory review:   Results for orders placed or performed in visit on 03/20/17   CBC W/O DIFF   Result Value Ref Range    WBC 10.3 3.4 - 10.8 x10E3/uL    RBC 4.58 3.77 - 5.28 x10E6/uL    HGB 12.2 11.1 - 15.9 g/dL    HCT 39.7 34.0 - 46.6 %    MCV 87 79 - 97 fL    MCH 26.6 26.6 - 33.0 pg    MCHC 30.7 (L) 31.5 - 35.7 g/dL    RDW 14.4 12.3 - 15.4 %    PLATELET 421 545 - 620 x10E3/uL   TSH 3RD GENERATION   Result Value Ref Range    TSH 1.810 0.450 - 1.605 uIU/mL   METABOLIC PANEL, COMPREHENSIVE   Result Value Ref Range    Glucose 289 (H) 65 - 99 mg/dL    BUN 10 6 - 20 mg/dL    Creatinine 0.60 0.57 - 1.00 mg/dL    GFR est non- >59 mL/min/1.73    GFR est  >59 mL/min/1.73    BUN/Creatinine ratio 17 8 - 20    Sodium 135 134 - 144 mmol/L    Potassium 4.7 3.5 - 5.2 mmol/L    Chloride 95 (L) 96 - 106 mmol/L    CO2 24 18 - 29 mmol/L    Calcium 9.5 8.7 - 10.2 mg/dL    Protein, total 6.5 6.0 - 8.5 g/dL    Albumin 3.7 3.5 - 5.5 g/dL    GLOBULIN, TOTAL 2.8 1.5 - 4.5 g/dL    A-G Ratio 1.3 1.2 - 2.2    Bilirubin, total 0.3 0.0 - 1.2 mg/dL    Alk. phosphatase 89 39 - 117 IU/L    AST (SGOT) 23 0 - 40 IU/L    ALT (SGPT) 35 (H) 0 - 32 IU/L   HEMOGLOBIN A1C WITH EAG   Result Value Ref Range    Hemoglobin A1c 10.9 (H) 4.8 - 5.6 %    Estimated average glucose 266 mg/dL   LIPID PANEL   Result Value Ref Range    Cholesterol, total 172 100 - 199 mg/dL    Triglyceride 230 (H) 0 - 149 mg/dL    HDL Cholesterol 30 (L) >39 mg/dL    VLDL, calculated 46 (H) 5 - 40 mg/dL    LDL, calculated 96 0 - 99 mg/dL   AMB POC URINALYSIS DIP STICK AUTO W/O MICRO   Result Value Ref Range    Color (UA POC) Yellow     Clarity (UA POC) Clear     Glucose (UA POC) Negative Negative    Bilirubin (UA POC) Negative Negative    Ketones (UA POC) Negative Negative    Specific gravity (UA POC) 1.030 1.001 - 1.035    Blood (UA POC) Negative Negative    pH (UA POC) 5.5 4.6 - 8.0    Protein (UA POC) 1+ Negative mg/dL    Urobilinogen (UA POC) 0.2 mg/dL 0.2 - 1    Nitrites (UA POC) Negative Negative    Leukocyte esterase (UA POC) Negative Negative     Laboratory review:  12/20/2016  Glucose 352, potassium 5.3, platelet 372, UFM1Q 9.3    Imaging review:  7/27/2014  CT soft tissue neck with contrast   No evidence of neck mass, abscess or lymphadenopathy    3/16/2017  EMG/nerve conduction study  Nerve conduction studies of the bilateral upper extremities were unremarkable. Needle electrode examination of the right upper extremity was unremarkable. This is a normal study. There is no electrophysiological evidence of median neuropathy at the wrist or and ulnar neuropathy at the elbow on either side. There is no electrophysiological evidence of right cervical radiculopathy.     4/14/2017  MRI of the brain without contrast  Normal    MRI cervical spine  Straightening of the usual cervical lordosis but otherwise normal study  I reviewed the images    Documentation review:  I reviewed the notes from Dr. Seth Santiago. The patient does have obstructive sleep apnea. AHI is 13. The patient is not compliant with CPAP and therapeutic response is negative. Recommend her to be compliant with CPAP and will request sleep educator assessment to improve adherence to CPAP. Assessment/Plan:   Angela Marlow is a 32 y.o. female who presented to the neurology office for management of migraines and clinically does have right ulnar neuropathy and left carpal tunnel syndrome. She does also have obstructive sleep apnea and does smoke. Geraldine Halt Her EMG/nerve conduction study has been normal.    She states that the gabapentin has helped her with the paresthesia in her hands. We will continue 300 mg p.o. twice daily. For her headaches she is taking nortriptyline 25 mg p.o. nightly and that helps her and she has had 3 headaches in the last 2 weeks. I do plan to increase nortriptyline to 50 mg p.o. nightly. She does also have obstructive sleep apnea and that may be contributing to her migraines. She is noncompliant with her CPAP at this time. I gave her within 3 minutes counseling regarding smoking cessation. ICD-10-CM ICD-9-CM    1. Migraine without aura and without status migrainosus, not intractable G43.009 346.10 Diclofenac Potassium (CAMBIA) 50 mg pwpk   2. Carpal tunnel syndrome of right wrist G56.01 354.0 nortriptyline (PAMELOR) 50 mg capsule   3. Ulnar neuropathy of right upper extremity G56.21 354.2    4. Tobacco use Z72.0 305.1    5. PETE (obstructive sleep apnea) G47.33 327.23        Thank you for allowing me to participate in the care of Ms. Jason Asencio. Please feel free to contact me if you have any questions. Karina Lara MD  Neurologist and Clinical Neurophysiologist    CC: King Holden MD  Fax: [phone]    This note will not be viewable in [address]

## 2017-06-19 NOTE — LETTER
6/19/2017 11:33 AM 
 
Patient:    Neri Rivera YOB: 1990 Date of Visit:    6/19/2017 Dear Luther Jay MD 
 
Thank you for referring Ms. Neri Rivera to me for evaluation/treatment. Below are the relevant portions of my assessment and plan of care. Neurology follow-up note REFERRED BY: 
Luther Jay MD 
 
06/19/17 Chief Complaint Patient presents with  Follow-up  
  migraines better Subjective Neri Rivera is a 32 y.o. female who presented to the neurology office for management of numbness and tingling in the hands and headaches. Regarding the numbness in the hand, it started around mid May 2016 and it is bilateral.  The medial 2 fingers are involved in the right hand and all the fingers are involved in the left hand. It is gradually progressive and it has worsened in the last 6 months. She does also have neck pain with radiation to the right shoulder. Her symptoms are off and on and it is more when she is working and improves when her hands and not moving. The numbness and tingling lasts for minutes but the pain around her wrist can be for a few days. She denies any weakness. EMG/nerve conduction study has been normal.  She is taking gabapentin 300 mg p.o. twice daily and that has helped her. She uses right elbow splint intermittently. Regarding the headaches, she has been having migraines for a long time and her headaches start of pain in the left eye and left side of the face and it is 10 out of 10 in severity, associated with photophobia, phonophobia and nausea but she denies any vomiting. The headache can last from 3 days to week. It is throbbing in character. She takes Fioricet with codeine and that helps her. She takes it 1-2 tablets 1-2 times a month. Since the last visit, the headaches are stable. She has had 3 headaches in the last 2 weeks She does also have obstructive sleep apnea and is trying to use CPAP regularly Risk Factors for headaches Smoking: Half pack a day Coffee: Denies cups/day Tea: 0 cups/day Soda: 2 cans/day Water: 4 glasses/day Sleeps at 9 AM and wakes up at 2 PM to 3 PM. Medications tried Imitrex Amitriptyline Nortriptyline Current Outpatient Prescriptions Medication Sig  
 nortriptyline (PAMELOR) 50 mg capsule Take 1 Cap by mouth nightly.  Arm Brace misc Right ulnar splint for ulnar neuropathy at the elbow.  gabapentin (NEURONTIN) 300 mg capsule Take 1 Cap by mouth three (3) times daily.  spironolactone (ALDACTONE) 25 mg tablet Take 1 Tab by mouth two (2) times a day. Indications: ALDOSTERONISM, hypertension  omeprazole (PRILOSEC) 40 mg capsule Take 1 Cap by mouth daily.  glipiZIDE (GLUCOTROL) 5 mg tablet Take 2 Tabs by mouth two (2) times a day.  SITagliptin-metFORMIN (JANUMET) 50-1,000 mg per tablet Take 1 Tab by mouth two (2) times daily (with meals).  metoprolol tartrate (LOPRESSOR) 100 mg IR tablet Take 1 Tab by mouth two (2) times a day.  butalbital-acetaminophen-caffeine (FIORICET) -40 mg per tablet Take 0.5-1 Tabs by mouth every six (6) hours as needed for Headache. Max Daily Amount: 4 Tabs.  glucose blood VI test strips (ONETOUCH VERIO) strip Check sugars two times daily. E11.9  Blood-Glucose Meter (ONETOUCH VERIO IQ METER) misc Test blood sugar once daily  codeine-butalbital-aspirin-caffeine (BUTALBITAL COMPOUND-CODEINE) 52--40 mg per capsule Take 1 Cap by mouth every eight (8) hours as needed for Pain or Headache. Max Daily Amount: 3 Caps.  cetirizine (ZYRTEC) 10 mg tablet Take  by mouth daily.  albuterol (PROVENTIL HFA, VENTOLIN HFA) 90 mcg/actuation inhaler Take 1 Puff by inhalation every four (4) hours as needed for Wheezing.  albuterol (ACCUNEB) 1.25 mg/3 mL nebulizer solution Take 3 mL by inhalation every six (6) hours as needed for Wheezing.   
 acetaminophen (TYLENOL EXTRA STRENGTH) 500 mg tablet Take 1 Tab by mouth every six (6) hours as needed for Pain. No current facility-administered medications for this visit. REVIEW OF SYSTEMS:  
A ten system review of constitutional, cardiovascular, respiratory, musculoskeletal, endocrine, skin, SHEENT, genitourinary, psychiatric and neurologic systems was obtained and is unremarkable except as stated in HPI EXAMINATION:  
Visit Vitals  /80  Pulse (!) 113  Ht 5' 3\" (1.6 m)  Wt (!) 357 lb 6.4 oz (162.1 kg)  LMP 06/17/2017  BMI 63.31 kg/m2 General:  
General appearance: Pt is in no acute distress Distal pulses are preserved Neurological Examination:  
Mental Status: AAO x3. Speech is fluent. Follows commands, has normal fund of knowledge, attention, short term recall, comprehension and insight. Cranial Nerves: Visual fields are full. PERRL, Extraocular movements are full. Facial sensation intact V1- V3. Facial movement intact, symmetric. Hearing intact to conversation. Palate elevates symmetrically. Shoulder shrug symmetric. Tongue midline. Motor: Strength is 5/5 in all 4 ext. No atrophy. Tone: Normal 
 
Sensation: Decreased pinprick sensation in the medial 2 fingers of the right hand and the lateral 3 fingers on the left hand Coordination/Cerebellar: Intact to finger-nose-finger Skin: No significant bruising or lacerations. Laboratory review:  
Results for orders placed or performed in visit on 03/20/17 CBC W/O DIFF Result Value Ref Range WBC 10.3 3.4 - 10.8 x10E3/uL  
 RBC 4.58 3.77 - 5.28 x10E6/uL HGB 12.2 11.1 - 15.9 g/dL HCT 39.7 34.0 - 46.6 % MCV 87 79 - 97 fL  
 MCH 26.6 26.6 - 33.0 pg  
 MCHC 30.7 (L) 31.5 - 35.7 g/dL  
 RDW 14.4 12.3 - 15.4 % PLATELET 419 385 - 033 x10E3/uL TSH 3RD GENERATION Result Value Ref Range TSH 1.810 0.450 - 4.500 uIU/mL METABOLIC PANEL, COMPREHENSIVE Result Value Ref Range Glucose 289 (H) 65 - 99 mg/dL BUN 10 6 - 20 mg/dL Creatinine 0.60 0.57 - 1.00 mg/dL GFR est non- >59 mL/min/1.73 GFR est  >59 mL/min/1.73  
 BUN/Creatinine ratio 17 8 - 20 Sodium 135 134 - 144 mmol/L Potassium 4.7 3.5 - 5.2 mmol/L Chloride 95 (L) 96 - 106 mmol/L  
 CO2 24 18 - 29 mmol/L Calcium 9.5 8.7 - 10.2 mg/dL Protein, total 6.5 6.0 - 8.5 g/dL Albumin 3.7 3.5 - 5.5 g/dL GLOBULIN, TOTAL 2.8 1.5 - 4.5 g/dL A-G Ratio 1.3 1.2 - 2.2 Bilirubin, total 0.3 0.0 - 1.2 mg/dL Alk. phosphatase 89 39 - 117 IU/L  
 AST (SGOT) 23 0 - 40 IU/L  
 ALT (SGPT) 35 (H) 0 - 32 IU/L HEMOGLOBIN A1C WITH EAG Result Value Ref Range Hemoglobin A1c 10.9 (H) 4.8 - 5.6 % Estimated average glucose 266 mg/dL LIPID PANEL Result Value Ref Range Cholesterol, total 172 100 - 199 mg/dL Triglyceride 230 (H) 0 - 149 mg/dL HDL Cholesterol 30 (L) >39 mg/dL VLDL, calculated 46 (H) 5 - 40 mg/dL LDL, calculated 96 0 - 99 mg/dL AMB POC URINALYSIS DIP STICK AUTO W/O MICRO Result Value Ref Range Color (UA POC) Yellow Clarity (UA POC) Clear Glucose (UA POC) Negative Negative Bilirubin (UA POC) Negative Negative Ketones (UA POC) Negative Negative Specific gravity (UA POC) 1.030 1.001 - 1.035 Blood (UA POC) Negative Negative pH (UA POC) 5.5 4.6 - 8.0 Protein (UA POC) 1+ Negative mg/dL Urobilinogen (UA POC) 0.2 mg/dL 0.2 - 1 Nitrites (UA POC) Negative Negative Leukocyte esterase (UA POC) Negative Negative Laboratory review: 
12/20/2016 Glucose 352, potassium 5.3, platelet 012, FSU8D 9.3 Imaging review: 
7/27/2014 CT soft tissue neck with contrast  
No evidence of neck mass, abscess or lymphadenopathy 3/16/2017 EMG/nerve conduction study Nerve conduction studies of the bilateral upper extremities were unremarkable. Needle electrode examination of the right upper extremity was unremarkable. This is a normal study.   There is no electrophysiological evidence of median neuropathy at the wrist or and ulnar neuropathy at the elbow on either side. There is no electrophysiological evidence of right cervical radiculopathy. 4/14/2017 MRI of the brain without contrast 
Normal 
 
MRI cervical spine Straightening of the usual cervical lordosis but otherwise normal study I reviewed the images Documentation review: I reviewed the notes from Dr. Ruben Macias. The patient does have obstructive sleep apnea. AHI is 13. The patient is not compliant with CPAP and therapeutic response is negative. Recommend her to be compliant with CPAP and will request sleep educator assessment to improve adherence to CPAP. Assessment/Plan:  
Eduardo Serrano is a 32 y.o. female who presented to the neurology office for management of migraines and clinically does have right ulnar neuropathy and left carpal tunnel syndrome. She does also have obstructive sleep apnea and does smoke. Umair Conn Her EMG/nerve conduction study has been normal. 
 
She states that the gabapentin has helped her with the paresthesia in her hands. We will continue 300 mg p.o. twice daily. For her headaches she is taking nortriptyline 25 mg p.o. nightly and that helps her and she has had 3 headaches in the last 2 weeks. I do plan to increase nortriptyline to 50 mg p.o. nightly. She does also have obstructive sleep apnea and that may be contributing to her migraines. She is noncompliant with her CPAP at this time. I gave her within 3 minutes counseling regarding smoking cessation. ICD-10-CM ICD-9-CM 1. Migraine without aura and without status migrainosus, not intractable G43.009 346.10 Diclofenac Potassium (CAMBIA) 50 mg pwpk 2. Carpal tunnel syndrome of right wrist G56.01 354.0 nortriptyline (PAMELOR) 50 mg capsule 3. Ulnar neuropathy of right upper extremity G56.21 354.2 4. Tobacco use Z72.0 305.1 5. PETE (obstructive sleep apnea) G47.33 327.23   
 
 
 Thank you for allowing me to participate in the care of Ms. Gomez. Please feel free to contact me if you have any questions. Thania Salas MD 
Neurologist and Clinical Neurophysiologist 
 
CC: Lizeth Calle MD 
Fax: [phone] This note will not be viewable in [address] If you have questions, please do not hesitate to call me. I look forward to following Ms. Gomez along with you. Sincerely, Thania Salas MD

## 2017-06-19 NOTE — PATIENT INSTRUCTIONS
1.  Increase nortriptyline to 50 mg p.o. nightly  2. Follow-up in 3 months     A Healthy Lifestyle: Care Instructions  Your Care Instructions  A healthy lifestyle can help you feel good, stay at a healthy weight, and have plenty of energy for both work and play. A healthy lifestyle is something you can share with your whole family. A healthy lifestyle also can lower your risk for serious health problems, such as high blood pressure, heart disease, and diabetes. You can follow a few steps listed below to improve your health and the health of your family. Follow-up care is a key part of your treatment and safety. Be sure to make and go to all appointments, and call your doctor if you are having problems. Its also a good idea to know your test results and keep a list of the medicines you take. How can you care for yourself at home? · Do not eat too much sugar, fat, or fast foods. You can still have dessert and treats now and then. The goal is moderation. · Start small to improve your eating habits. Pay attention to portion sizes, drink less juice and soda pop, and eat more fruits and vegetables. ¨ Eat a healthy amount of food. A 3-ounce serving of meat, for example, is about the size of a deck of cards. Fill the rest of your plate with vegetables and whole grains. ¨ Limit the amount of soda and sports drinks you have every day. Drink more water when you are thirsty. ¨ Eat at least 5 servings of fruits and vegetables every day. It may seem like a lot, but it is not hard to reach this goal. A serving or helping is 1 piece of fruit, 1 cup of vegetables, or 2 cups of leafy, raw vegetables. Have an apple or some carrot sticks as an afternoon snack instead of a candy bar. Try to have fruits and/or vegetables at every meal.  · Make exercise part of your daily routine. You may want to start with simple activities, such as walking, bicycling, or slow swimming. Try to be active 30 to 60 minutes every day.  You do not need to do all 30 to 60 minutes all at once. For example, you can exercise 3 times a day for 10 or 20 minutes. Moderate exercise is safe for most people, but it is always a good idea to talk to your doctor before starting an exercise program.  · Keep moving. [place]arlene [place] the lawn, work in the garden, or [employer]. Take the stairs instead of the elevator at work. · If you smoke, quit. People who smoke have an increased risk for heart attack, stroke, cancer, and other lung illnesses. Quitting is hard, but there are ways to boost your chance of quitting tobacco for good. ¨ Use nicotine gum, patches, or lozenges. ¨ Ask your doctor about stop-smoking programs and medicines. ¨ Keep trying. In addition to reducing your risk of diseases in the future, you will notice some benefits soon after you stop using tobacco. If you have shortness of breath or asthma symptoms, they will likely get better within a few weeks after you quit. · Limit how much alcohol you drink. Moderate amounts of alcohol (up to 2 drinks a day for men, 1 drink a day for women) are okay. But drinking too much can lead to liver problems, high blood pressure, and other health problems. Family health  If you have a family, there are many things you can do together to improve your health. · Eat meals together as a family as often as possible. · Eat healthy foods. This includes fruits, vegetables, lean meats and dairy, and whole grains. · Include your family in your fitness plan. Most people think of activities such as jogging or tennis as the way to fitness, but there are many ways you and your family can be more active. Anything that makes you breathe hard and gets your heart pumping is exercise. Here are some tips:  ¨ Walk to do errands or to take your child to school or the bus. ¨ Go for a family bike ride after dinner instead of watching TV. Where can you learn more? Go to http://donavon-stacy.info/.   Enter R359 in the search box to learn more about \"A Healthy Lifestyle: Care Instructions. \"  Current as of: July 26, 2016  Content Version: 11.2  © 3207-5522 [employer]. Care instructions adapted under license by [employer] (which disclaims liability or warranty for this information). If you have questions about a medical condition or this instruction, always ask your healthcare professional. [address] any warranty or liability for your use of this information.

## 2017-06-19 NOTE — MR AVS SNAPSHOT
Visit Information Date & Time Provider Department Dept. Phone Encounter #  
 6/19/2017 10:40 AM Fay Cornejo MD [facility] [phone] [id number] Your Appointments 6/20/2017  3:00 PM  
ROUTINE CARE with Troy Reyes MD  
[facility] ([facility] Appt Note: 3 mnth fu  
 [address] [facility] [address] [id number] [phone] [id number] [facility] [phone] 7/17/2017  2:00 PM  
Any with Abimael Giraldo MD  
[address] [place] [address] [facility] Appt Note: 6 week f/up- bring machine; 6 week f/up- bring machine [address] [facility], [address] [phone] [address] [place] [address], [address] [phone] Upcoming Health Maintenance Date Due  
 EYE EXAM RETINAL OR DILATED Q1 1/7/2016 PAP AKA CERVICAL CYTOLOGY 2/12/2017 INFLUENZA AGE 9 TO ADULT 8/1/2017 HEMOGLOBIN A1C Q6M 9/20/2017 FOOT EXAM Q1 9/26/2017 MICROALBUMIN Q1 12/20/2017 LIPID PANEL Q1 3/20/2018 DTaP/Tdap/Td series (2 - Td) 8/27/2022 Allergies as of 6/19/2017  Review Complete On: 6/19/2017 By: Oscar Allen Severity Noted Reaction Type Reactions Latex  08/27/2014   Side Effect Itching Current Immunizations  Reviewed on 3/20/2017 Name Date Influenza Vaccine (Quad) PF 12/20/2016, 10/19/2015  8:35 AM  
 Influenza Vaccine PF 9/27/2013 Pneumococcal Polysaccharide (PPSV-23) 9/27/2013 TDAP Vaccine 8/27/2012 Not reviewed this visit You Were Diagnosed With   
  
 Codes Comments Carpal tunnel syndrome of right wrist     ICD-10-CM: G56.01 
ICD-9-CM: 354.0 Type 2 diabetes mellitus without complication, without long-term current use of insulin (HCC)     ICD-10-CM: E11.9 ICD-9-CM: 250.00 Diabetes mellitus without complication ([address]     ASF-86-BQ: E11.9 ICD-9-CM: 250.00 Encounter for immunization     ICD-10-CM: N57 ICD-9-CM: V03.89 Vitals BP Pulse Height(growth percentile) Weight(growth percentile) LMP BMI  
 144/80 (!) 113 5' 3\" (1.6 m) (!) 357 lb 6.4 oz (162.1 kg) 06/17/2017 63.31 kg/m2 OB Status Smoking Status Having regular periods Current Every Day Smoker BMI and BSA Data Body Mass Index Body Surface Area  
 63.31 kg/m 2 2.68 m 2 Preferred Pharmacy Pharmacy Name Phone [id number] [place], [place] - [address] [phone] Your Updated Medication List  
  
   
This list is accurate as of: 6/19/17 11:22 AM.  Always use your most recent med list.  
  
  
  
  
 acetaminophen 500 mg tablet Commonly known as:  80 Jr Aidee Clayton Se Take 1 Tab by mouth every six (6) hours as needed for Pain. * albuterol 1.25 mg/3 mL Nebu Commonly known as:  Britni Mandes Take 3 mL by inhalation every six (6) hours as needed for Wheezing. * albuterol 90 mcg/actuation inhaler Commonly known as:  PROVENTIL HFA, VENTOLIN HFA, PROAIR HFA Take 1 Puff by inhalation every four (4) hours as needed for Wheezing. Arm Brace [facility] Right ulnar splint for ulnar neuropathy at the elbow. Blood-Glucose Meter Misc Commonly known as:  Tino Stable IQ METER Test blood sugar once daily  
  
 butalbital-acetaminophen-caffeine -40 mg per tablet Commonly known as:  [employer] Take 0.5-1 Tabs by mouth every six (6) hours as needed for Headache. Max Daily Amount: 4 Tabs. codeine-butalbital-aspirin-caffeine 00--40 mg per capsule Commonly known as:  BUTALBITAL COMPOUND-CODEINE Take 1 Cap by mouth every eight (8) hours as needed for Pain or Headache. Max Daily Amount: 3 Caps.  
  
 gabapentin 300 mg capsule Commonly known as:  NEURONTIN Take 1 Cap by mouth three (3) times daily. glipiZIDE 5 mg tablet Commonly known as:  Tyler Nimeshwesley Take 2 Tabs by mouth two (2) times a day. glucose blood VI test strips strip Commonly known as:  Cris Malhotra Check sugars two times daily. E11.9  
  
 metoprolol tartrate 100 mg IR tablet Commonly known as:  LOPRESSOR Take 1 Tab by mouth two (2) times a day. nortriptyline 50 mg capsule Commonly known as:  PAMELOR Take 1 Cap by mouth nightly. omeprazole 40 mg capsule Commonly known as:  PRILOSEC Take 1 Cap by mouth daily. SITagliptin-metFORMIN 50-1,000 mg per tablet Commonly known as:  Martinez Mola Take 1 Tab by mouth two (2) times daily (with meals). spironolactone 25 mg tablet Commonly known as:  ALDACTONE Take 1 Tab by mouth two (2) times a day. Indications: ALDOSTERONISM, hypertension ZyrTEC 10 mg tablet Generic drug:  cetirizine Take  by mouth daily. * Notice: This list has 2 medication(s) that are the same as other medications prescribed for you. Read the directions carefully, and ask your doctor or other care provider to review them with you. Prescriptions Sent to Pharmacy Refills  
 nortriptyline (PAMELOR) 50 mg capsule 2 Sig: Take 1 Cap by mouth nightly. Class: Normal  
 Pharmacy: [facility]/pharmacy [id number] [facility] [address]  #: [phone] Route: Oral  
  
Patient Instructions 1. Increase nortriptyline to 50 mg p.o. nightly 2. Follow-up in 3 months A Healthy Lifestyle: Care Instructions Your Care Instructions A healthy lifestyle can help you feel good, stay at a healthy weight, and have plenty of energy for both work and play. A healthy lifestyle is something you can share with your whole family. A healthy lifestyle also can lower your risk for serious health problems, such as high blood pressure, heart disease, and diabetes. You can follow a few steps listed below to improve your health and the health of your family. Follow-up care is a key part of your treatment and safety. Be sure to make and go to all appointments, and call your doctor if you are having problems. Its also a good idea to know your test results and keep a list of the medicines you take. How can you care for yourself at home? · Do not eat too much sugar, fat, or fast foods. You can still have dessert and treats now and then. The goal is moderation. · Start small to improve your eating habits. Pay attention to portion sizes, drink less juice and soda pop, and eat more fruits and vegetables. ¨ Eat a healthy amount of food. A 3-ounce serving of meat, for example, is about the size of a deck of cards. Fill the rest of your plate with vegetables and whole grains. ¨ Limit the amount of soda and sports drinks you have every day. Drink more water when you are thirsty. ¨ Eat at least 5 servings of fruits and vegetables every day. It may seem like a lot, but it is not hard to reach this goal. A serving or helping is 1 piece of fruit, 1 cup of vegetables, or 2 cups of leafy, raw vegetables. Have an apple or some carrot sticks as an afternoon snack instead of a candy bar. Try to have fruits and/or vegetables at every meal. 
· Make exercise part of your daily routine. You may want to start with simple activities, such as walking, bicycling, or slow swimming. Try to be active 30 to 60 minutes every day. You do not need to do all 30 to 60 minutes all at once. For example, you can exercise 3 times a day for 10 or 20 minutes. Moderate exercise is safe for most people, but it is always a good idea to talk to your doctor before starting an exercise program. 
· Keep moving. Tracey Gravely the lawn, work in the garden, or Handseeing Information. Take the stairs instead of the elevator at work. · If you smoke, quit. People who smoke have an increased risk for heart attack, stroke, cancer, and other lung illnesses.  Quitting is hard, but there are ways to boost your chance of quitting tobacco for good. ¨ Use nicotine gum, patches, or lozenges. ¨ Ask your doctor about stop-smoking programs and medicines. ¨ Keep trying. In addition to reducing your risk of diseases in the future, you will notice some benefits soon after you stop using tobacco. If you have shortness of breath or asthma symptoms, they will likely get better within a few weeks after you quit. · Limit how much alcohol you drink. Moderate amounts of alcohol (up to 2 drinks a day for men, 1 drink a day for women) are okay. But drinking too much can lead to liver problems, high blood pressure, and other health problems. Family health If you have a family, there are many things you can do together to improve your health. · Eat meals together as a family as often as possible. · Eat healthy foods. This includes fruits, vegetables, lean meats and dairy, and whole grains. · Include your family in your fitness plan. Most people think of activities such as jogging or tennis as the way to fitness, but there are many ways you and your family can be more active. Anything that makes you breathe hard and gets your heart pumping is exercise. Here are some tips: 
¨ Walk to do errands or to take your child to school or the bus. ¨ Go for a family bike ride after dinner instead of watching TV. Where can you learn more? Go to http://donavon-stacy.info/. Enter E147 in the search box to learn more about \"A Healthy Lifestyle: Care Instructions. \" Current as of: July 26, 2016 Content Version: 11.2 © 9966-1516 [employer]. Care instructions adapted under license by [employer] (which disclaims liability or warranty for this information). If you have questions about a medical condition or this instruction, always ask your healthcare professional. [place] [address] any warranty or liability for your use of this information. Introducing hospitals & HEALTH SERVICES! Dear Mack Hernandez: Thank you for requesting a [employer] account. Our records indicate that you have previously registered for a [employer] account but its currently inactive. Please call our [employer] support line at [phone]. Additional Information If you have questions, please visit the Frequently Asked Questions section of the [employer] website at [email or web address] Remember, [employer] is NOT to be used for urgent needs. For medical emergencies, dial 911. Now available from your iPhone and Android! Please provide this summary of care documentation to your next provider. Your primary care clinician is listed as Dior Loss. If you have any questions after today's visit, please call [phone].

## 2017-06-20 ENCOUNTER — OFFICE VISIT (OUTPATIENT)
Dept: FAMILY MEDICINE CLINIC | Age: 27
End: 2017-06-20

## 2017-06-20 VITALS
HEART RATE: 94 BPM | RESPIRATION RATE: 14 BRPM | TEMPERATURE: 97.3 F | WEIGHT: 293 LBS | OXYGEN SATURATION: 94 % | SYSTOLIC BLOOD PRESSURE: 113 MMHG | DIASTOLIC BLOOD PRESSURE: 62 MMHG | BODY MASS INDEX: 51.91 KG/M2 | HEIGHT: 63 IN

## 2017-06-20 DIAGNOSIS — E11.9 DIABETES MELLITUS WITHOUT COMPLICATION (HCC): Primary | ICD-10-CM

## 2017-06-20 LAB — HBA1C MFR BLD HPLC: 11.2 %

## 2017-06-20 NOTE — PROGRESS NOTES
HISTORY OF PRESENT ILLNESS  Danielito Franklin is a 32 y.o. female. HPI   DMtype II    Compliant w/ meds, currently on Janumet bid and glipizide 10mg bid >1y,, she is having diabetic diet, and not doing much of daily exercise,  pt does not obtains home glucose monitoring  + Rf needed for today. Pt today denies any tingling sensation, polyurea and polydipsia,   last a1c was not at target 10.5% and today is at 11.2%    . Last podiatry visit 2016   And last eye exam was 2016  Last urine microalbumin 2016 and was normal    Feeling better since the last visit. Current Outpatient Prescriptions   Medication Sig Dispense Refill    nortriptyline (PAMELOR) 50 mg capsule Take 1 Cap by mouth nightly. 30 Cap 2    Diclofenac Potassium (CAMBIA) 50 mg pwpk Take 1 Package by mouth daily as needed. 5 Packet 0    Arm Brace misc Right ulnar splint for ulnar neuropathy at the elbow. 1 Each 2    gabapentin (NEURONTIN) 300 mg capsule Take 1 Cap by mouth three (3) times daily. 90 Cap 2    spironolactone (ALDACTONE) 25 mg tablet Take 1 Tab by mouth two (2) times a day. Indications: ALDOSTERONISM, hypertension 60 Tab 1    omeprazole (PRILOSEC) 40 mg capsule Take 1 Cap by mouth daily. 30 Cap 3    glipiZIDE (GLUCOTROL) 5 mg tablet Take 2 Tabs by mouth two (2) times a day. 120 Tab 3    SITagliptin-metFORMIN (JANUMET) 50-1,000 mg per tablet Take 1 Tab by mouth two (2) times daily (with meals). 60 Tab 6    metoprolol tartrate (LOPRESSOR) 100 mg IR tablet Take 1 Tab by mouth two (2) times a day. 60 Tab 6    butalbital-acetaminophen-caffeine (FIORICET) -40 mg per tablet Take 0.5-1 Tabs by mouth every six (6) hours as needed for Headache. Max Daily Amount: 4 Tabs. 12 Tab 0    glucose blood VI test strips (ONETOUCH VERIO) strip Check sugars two times daily.  E11.9 100 Strip 6    Blood-Glucose Meter (ONETOUCH VERIO IQ METER) misc Test blood sugar once daily 1 Each 3    codeine-butalbital-aspirin-caffeine (BUTALBITAL COMPOUND-CODEINE) 42--40 mg per capsule Take 1 Cap by mouth every eight (8) hours as needed for Pain or Headache. Max Daily Amount: 3 Caps. 60 Cap 0    cetirizine (ZYRTEC) 10 mg tablet Take  by mouth daily.  albuterol (PROVENTIL HFA, VENTOLIN HFA) 90 mcg/actuation inhaler Take 1 Puff by inhalation every four (4) hours as needed for Wheezing. 1 Inhaler 1    albuterol (ACCUNEB) 1.25 mg/3 mL nebulizer solution Take 3 mL by inhalation every six (6) hours as needed for Wheezing. 1 Bottle 6    acetaminophen (TYLENOL EXTRA STRENGTH) 500 mg tablet Take 1 Tab by mouth every six (6) hours as needed for Pain.  30 Tab 1     Allergies   Allergen Reactions    Latex Itching     Past Medical History:   Diagnosis Date    Asthma     Gooding hump 1/7/2015    Carpal tunnel syndrome of right wrist 12/20/2016    Diabetes ([address]     GERD (gastroesophageal reflux disease) 8/13/2014    HTN (hypertension) 7/25/2011    Hyperaldosteronism ([address] 10/19/2015    Other ill-defined conditions     migraines     Past Surgical History:   Procedure Laterality Date    REMOVAL OF TONSILS,<11 Y/O       Family History   Problem Relation Age of Onset   [address] [place] [address] Arthritis-rheumatoid Mother     Hypertension Mother     Diabetes Mother      Type II    Psychiatric Disorder Father     Drug Abuse Father     Cancer Other      prostate    Hypertension Sister     Thyroid Disease Sister      \"I think\"    Attention Deficit Hyperactivity Disorder Brother     Hypertension Sister      Social History   Substance Use Topics    Smoking status: Current Every Day Smoker     Packs/day: 0.50     Years: 5.00    Smokeless tobacco: Never Used    Alcohol use 0.0 oz/week     1 Standard drinks or equivalent per week      Comment: socially, Monthly or less      Lab Results  Component Value Date/Time   WBC 10.3 03/20/2017 04:08 PM   HGB 12.2 03/20/2017 04:08 PM   HCT 39.7 03/20/2017 04:08 PM   PLATELET 120 38/14/0999 04:08 PM   MCV 87 03/20/2017 04:08 PM Lab Results  Component Value Date/Time   Hemoglobin A1c 10.9 03/20/2017 04:08 PM   Hemoglobin A1c 8.4 09/26/2016 12:00 AM   Hemoglobin A1c 8.6 08/13/2014 12:55 PM   Glucose 289 03/20/2017 04:08 PM   Glucose (POC) 116 05/28/2016 08:34 PM   Microalb/Creat ratio (ug/mg creat.) 18.1 12/20/2016 03:50 PM   LDL, calculated 96 03/20/2017 04:08 PM   Creatinine 0.60 03/20/2017 04:08 PM         Review of Systems   Constitutional: Negative for chills and fever. HENT: Negative for congestion and nosebleeds. Eyes: Negative for blurred vision and pain. Respiratory: Negative for cough, shortness of breath and wheezing. Cardiovascular: Negative for chest pain and leg swelling. Gastrointestinal: Negative for constipation, diarrhea, nausea and vomiting. Genitourinary: Negative for dysuria and frequency. Musculoskeletal: Negative for joint pain and myalgias. Skin: Negative for itching and rash. Neurological: Negative for dizziness, loss of consciousness and headaches. Psychiatric/Behavioral: Negative for depression. The patient is not nervous/anxious and does not have insomnia. Physical Exam   Constitutional: She is oriented to person, place, and time. She appears well-developed and well-nourished. HENT:   Head: Normocephalic and atraumatic. Eyes: Conjunctivae and EOM are normal.   Neck: Normal range of motion. Neck supple. Cardiovascular: Normal rate, regular rhythm and normal heart sounds. No murmur heard. Pulmonary/Chest: Effort normal and breath sounds normal.   Abdominal: Soft. Bowel sounds are normal. She exhibits no distension. Musculoskeletal: Normal range of motion. She exhibits no edema. Neurological: She is alert and oriented to person, place, and time. Skin: No erythema. Psychiatric: Her behavior is normal.   Nursing note and vitals reviewed. ASSESSMENT and PLAN  Nancy was seen today for diabetes and hypertension.     Diagnoses and all orders for this visit:    Diabetes mellitus without complication (HCC)  -     AMB POC HEMOGLOBIN A1C    Other orders  -     Cancel: CBC W/O DIFF  -     Cancel: TSH 3RD GENERATION  -     Cancel: METABOLIC PANEL, COMPREHENSIVE  -     insulin glargine (TOUJEO SOLOSTAR) 300 unit/mL (1.5 mL) inpn; 8 Units by SubCUTAneous route daily. Normal test results except for sugar level into the UNcontrolled diabetic state please be compliant with the following steps for improving diabetic care and outcome for further care to prevent further devastating complications of a uncontrolled diabetic state: increase Diabetic Education by more readings, have a great diabetic diet , low cholesterol diet, weight control and daily exercise 20- 30 min most days of the week, home glucose monitoring if possible, and daily foot care and yearly foot  Doctor  and  annual eye examinations at Ophthalmologist,  will cont with your average sugar reading check every 3months.

## 2017-06-20 NOTE — MR AVS SNAPSHOT
Visit Information Date & Time Provider Department Dept. Phone Encounter #  
 6/20/2017  3:00 PM Sharita Danielle MD [address] [facility] OFFICE-ANNEX [phone] [id number] Follow-up Instructions Return in about 3 months (around 9/20/2017), or if symptoms worsen or fail to improve. Your Appointments 7/17/2017  2:00 PM  
Any with Mandy Sims MD  
[address]) Appt Note: 6 week f/up- bring machine; 6 week f/up- bring machine [address] [facility], [address] [id number] 9[address] [facility], [address] [address]  
  
    
 9/28/2017  8:00 AM  
Follow Up with Priyanka Uribe MD  
[facility] [address] [facility]) Appt Note: Fu,Migraines,CP,$40,adt,6/19/2017  
 [address], [address] [phone]  
[address] [facility], [address] [place] [address], [address] [facility] [address] [phone] Upcoming Health Maintenance Date Due  
 EYE EXAM RETINAL OR DILATED Q1 1/7/2016 PAP AKA CERVICAL CYTOLOGY 2/12/2017 INFLUENZA AGE 9 TO ADULT 8/1/2017 HEMOGLOBIN A1C Q6M 9/20/2017 FOOT EXAM Q1 9/26/2017 MICROALBUMIN Q1 12/20/2017 LIPID PANEL Q1 3/20/2018 DTaP/Tdap/Td series (2 - Td) 8/27/2022 Allergies as of 6/20/2017  Review Complete On: 6/19/2017 By: Priyanka Uribe MD  
  
 Severity Noted Reaction Type Reactions Latex  08/27/2014   Side Effect Itching Current Immunizations  Reviewed on 3/20/2017 Name Date Influenza Vaccine (Quad) PF 12/20/2016, 10/19/2015  8:35 AM  
 Influenza Vaccine PF 9/27/2013 Pneumococcal Polysaccharide (PPSV-23) 9/27/2013 TDAP Vaccine 8/27/2012 Not reviewed this visit You Were Diagnosed With   
  
 Codes Comments Diabetes mellitus without complication [facility]    -  Primary ICD-10-CM: E11.9 ICD-9-CM: 250.00 Vitals BP Pulse Temp Resp Height(growth percentile) Weight(growth percentile) 113/62 (BP 1 Location: Left arm, BP Patient Position: At rest) 94 97.3 °F (36.3 °C) (Oral) 14 5' 3\" (1.6 m) (!) 360 lb 6.4 oz (163.5 kg) LMP SpO2 BMI OB Status Smoking Status 06/17/2017 94% 63.84 kg/m2 Having regular periods Current Every Day Smoker Vitals History BMI and BSA Data Body Mass Index Body Surface Area  
 63.84 kg/m 2 2.7 m 2 Preferred Pharmacy Pharmacy Name Phone [id number] [place], [place] - [address] [phone] Your Updated Medication List  
  
   
This list is accurate as of: 6/20/17  3:47 PM.  Always use your most recent med list.  
  
  
  
  
 acetaminophen 500 mg tablet Commonly known as:  80 Jr Aidee Clayton Se Take 1 Tab by mouth every six (6) hours as needed for Pain. * albuterol 1.25 mg/3 mL Nebu Commonly known as:  Sarahann Shaker Take 3 mL by inhalation every six (6) hours as needed for Wheezing. * albuterol 90 mcg/actuation inhaler Commonly known as:  PROVENTIL HFA, VENTOLIN HFA, PROAIR HFA Take 1 Puff by inhalation every four (4) hours as needed for Wheezing. Arm Brace [facility] Right ulnar splint for ulnar neuropathy at the elbow. Blood-Glucose Meter Misc Commonly known as:  Dawna Routeshaau IQ METER Test blood sugar once daily  
  
 butalbital-acetaminophen-caffeine -40 mg per tablet Commonly known as:  [employer] Take 0.5-1 Tabs by mouth every six (6) hours as needed for Headache. Max Daily Amount: 4 Tabs. codeine-butalbital-aspirin-caffeine 21--40 mg per capsule Commonly known as:  BUTALBITAL COMPOUND-CODEINE Take 1 Cap by mouth every eight (8) hours as needed for Pain or Headache. Max Daily Amount: 3 Caps. Diclofenac Potassium 50 mg Pwpk Commonly known as:  CAMBIA Take 1 Package by mouth daily as needed. gabapentin 300 mg capsule Commonly known as:  NEURONTIN Take 1 Cap by mouth three (3) times daily. glipiZIDE 5 mg tablet Commonly known as:  Stacy Radha Take 2 Tabs by mouth two (2) times a day. glucose blood VI test strips strip Commonly known as:  Ganesh Scott Check sugars two times daily. E11.9  
  
 insulin glargine 300 unit/mL (1.5 mL) Inpn Commonly known as:  TOUJEO SOLOSTAR  
8 Units by SubCUTAneous route daily. metoprolol tartrate 100 mg IR tablet Commonly known as:  LOPRESSOR Take 1 Tab by mouth two (2) times a day. nortriptyline 50 mg capsule Commonly known as:  PAMELOR Take 1 Cap by mouth nightly. omeprazole 40 mg capsule Commonly known as:  PRILOSEC Take 1 Cap by mouth daily. SITagliptin-metFORMIN 50-1,000 mg per tablet Commonly known as:  Darylene Nova Take 1 Tab by mouth two (2) times daily (with meals). spironolactone 25 mg tablet Commonly known as:  ALDACTONE Take 1 Tab by mouth two (2) times a day. Indications: ALDOSTERONISM, hypertension ZyrTEC 10 mg tablet Generic drug:  cetirizine Take  by mouth daily. * Notice: This list has 2 medication(s) that are the same as other medications prescribed for you. Read the directions carefully, and ask your doctor or other care provider to review them with you. Prescriptions Sent to Pharmacy Refills  
 insulin glargine (TOUJEO SOLOSTAR) 300 unit/mL (1.5 mL) inpn 5 Si Units by SubCUTAneous route daily. Class: Normal  
 Pharmacy: [facility]pharmacy [address] [place] [address] [address] Ph #: [phone] Route: SubCUTAneous We Performed the Following AMB POC HEMOGLOBIN A1C [46894 CPT(R)] Follow-up Instructions Return in about 3 months (around 2017), or if symptoms worsen or fail to improve. Patient Instructions Learning About Diabetes Food Guidelines Your Care Instructions Meal planning is important to manage diabetes. It helps keep your blood sugar at a target level (which you set with your doctor). You don't have to eat special foods. You can eat what your family eats, including sweets once in a while. But you do have to pay attention to how often you eat and how much you eat of certain foods. You may want to work with a dietitian or a certified diabetes educator (CDE) to help you plan meals and snacks. A dietitian or CDE can also help you lose weight if that is one of your goals. What should you know about eating carbs? Managing the amount of carbohydrate (carbs) you eat is an important part of healthy meals when you have diabetes. Carbohydrate is found in many foods. · Learn which foods have carbs. And learn the amounts of carbs in different foods. ¨ Bread, cereal, pasta, and rice have about 15 grams of carbs in a serving. A serving is 1 slice of bread (1 ounce), ½ cup of cooked cereal, or 1/3 cup of cooked pasta or rice. ¨ Fruits have 15 grams of carbs in a serving. A serving is 1 small fresh fruit, such as an apple or orange; ½ of a banana; ½ cup of cooked or canned fruit; ½ cup of fruit juice; 1 cup of melon or raspberries; or 2 tablespoons of dried fruit. ¨ Milk and no-sugar-added yogurt have 15 grams of carbs in a serving. A serving is 1 cup of milk or 2/3 cup of no-sugar-added yogurt. ¨ Starchy vegetables have 15 grams of carbs in a serving. A serving is ½ cup of mashed potatoes or sweet potato; 1 cup winter squash; ½ of a small baked potato; ½ cup of cooked beans; or ½ cup cooked corn or green peas. · Learn how much carbs to eat each day and at each meal. A dietitian or CDE can teach you how to keep track of the amount of carbs you eat. This is called carbohydrate counting. · If you are not sure how to count carbohydrate grams, use the Plate Method to plan meals.  It is a good, quick way to make sure that you have a balanced meal. It also helps you spread carbs throughout the day. ¨ Divide your plate by types of foods. Put non-starchy vegetables on half the plate, meat or other protein food on one-quarter of the plate, and a grain or starchy vegetable in the final quarter of the plate. To this you can add a small piece of fruit and 1 cup of milk or yogurt, depending on how many carbs you are supposed to eat at a meal. 
· Try to eat about the same amount of carbs at each meal. Do not \"save up\" your daily allowance of carbs to eat at one meal. 
· Proteins have very little or no carbs per serving. Examples of proteins are beef, chicken, turkey, fish, eggs, tofu, cheese, cottage cheese, and peanut butter. A serving size of meat is 3 ounces, which is about the size of a deck of cards. Examples of meat substitute serving sizes (equal to 1 ounce of meat) are 1/4 cup of cottage cheese, 1 egg, 1 tablespoon of peanut butter, and ½ cup of tofu. How can you eat out and still eat healthy? · Learn to estimate the serving sizes of foods that have carbohydrate. If you measure food at home, it will be easier to estimate the amount in a serving of restaurant food. · If the meal you order has too much carbohydrate (such as potatoes, corn, or baked beans), ask to have a low-carbohydrate food instead. Ask for a salad or green vegetables. · If you use insulin, check your blood sugar before and after eating out to help you plan how much to eat in the future. · If you eat more carbohydrate at a meal than you had planned, take a walk or do other exercise. This will help lower your blood sugar. What else should you know? · Limit saturated fat, such as the fat from meat and dairy products. This is a healthy choice because people who have diabetes are at higher risk of heart disease. So choose lean cuts of meat and nonfat or low-fat dairy products. Use olive or canola oil instead of butter or shortening when cooking. · Don't skip meals. Your blood sugar may drop too low if you skip meals and take insulin or certain medicines for diabetes. · Check with your doctor before you drink alcohol. Alcohol can cause your blood sugar to drop too low. Alcohol can also cause a bad reaction if you take certain diabetes medicines. Follow-up care is a key part of your treatment and safety. Be sure to make and go to all appointments, and call your doctor if you are having problems. It's also a good idea to know your test results and keep a list of the medicines you take. Where can you learn more? Go to http://donavon-stacy.info/. Enter U561 in the search box to learn more about \"Learning About Diabetes Food Guidelines. \" Current as of: March 13, 2017 Content Version: 11.3 © 0326-0344 [employer]. Care instructions adapted under license by [facility] (which disclaims liability or warranty for this information). If you have questions about a medical condition or this instruction, always ask your healthcare professional. [place] [address] any warranty or liability for your use of this information. Learning About Benefits From Quitting Smoking How does quitting smoking make you healthier? If you're thinking about quitting smoking, you may have a few reasons to be smoke-free. Your health may be one of them. · When you quit smoking, you lower your risks for cancer, lung disease, heart attack, stroke, blood vessel disease, and blindness from macular degeneration. · When you're smoke-free, you get sick less often, and you heal faster. You are less likely to get colds, flu, bronchitis, and pneumonia. · As a nonsmoker, you may find that your mood is better and you are less stressed. When and how will you feel healthier? Quitting has real health benefits that start from day 1 of being smoke-free.  And the longer you stay smoke-free, the healthier you get and the better you feel. The first hours · After just 20 minutes, your blood pressure and heart rate go down. That means there's less stress on your heart and blood vessels. · Within 12 hours, the level of carbon monoxide in your blood drops back to normal. That makes room for more oxygen. With more oxygen in your body, you may notice that you have more energy than when you smoked. After 2 weeks · Your lungs start to work better. · Your risk of heart attack starts to drop. After 1 month · When your lungs are clear, you cough less and breathe deeper, so it's easier to be active. · Your sense of taste and smell return. That means you can enjoy food more than you have since you started smoking. Over the years · After 1 year, your risk of heart disease is half what it would be if you kept smoking. · After 5 years, your risk of stroke starts to shrink. Within a few years after that, it's about the same as if you'd never smoked. · After 10 years, your risk of dying from lung cancer is cut by about half. And your risk for many other types of cancer is lower too. How would quitting help others in your life? When you quit smoking, you improve the health of everyone who now breathes in your smoke. · Their heart, lung, and cancer risks drop, much like yours. · They are sick less. For babies and small children, living smoke-free means they're less likely to have ear infections, pneumonia, and bronchitis. · If you're a woman who is or will be pregnant someday, quitting smoking means a healthier . · Children who are close to you are less likely to become adult smokers. Where can you learn more? Go to http://donavon-stacy.info/. Enter [id number] [id number] 72 11 in the search box to learn more about \"Learning About Benefits From Quitting Smoking. \" Current as of: 2017 Content Version: 11.3 © 7239-6451 [employer].  Care instructions adapted under license by [address] (which disclaims liability or warranty for this information). If you have questions about a medical condition or this instruction, always ask your healthcare professional. [address] any warranty or liability for your use of this information. Introducing [facility]! Dear Vahe Faulkner: Thank you for requesting a [employer] account. Our records indicate that you have previously registered for a [employer] account but its currently inactive. Please call our [employer] support line at [phone]. Additional Information If you have questions, please visit the Frequently Asked Questions section of the [employer] website at [email or web address] Remember, [employer] is NOT to be used for urgent needs. For medical emergencies, dial 911. Now available from your iPhone and Android! Please provide this summary of care documentation to your next provider. Your primary care clinician is listed as Leonor Szymanski. If you have any questions after today's visit, please call [phone].

## 2017-06-20 NOTE — PROGRESS NOTES
Vanessa Lackey          Name and  verified        Chief Complaint   Patient presents with    Diabetes     3 month f/u    Hypertension     3 month f/u         Health Maintenance reviewed-discussed with patient. Patient educated on the parts of a diabetes care plan  1. Meal plan  2. Plan for staying active  3. Diabetes medicine plan  4. Plan for checking blood sugars with goals as ordered per Dr Lexii Subramanian  5. Schedule for diabetic follow up appointments as recommended by provider      Patient received diabetic handout for education.

## 2017-06-20 NOTE — PATIENT INSTRUCTIONS
Learning About Diabetes Food Guidelines  Your Care Instructions  Meal planning is important to manage diabetes. It helps keep your blood sugar at a target level (which you set with your doctor). You don't have to eat special foods. You can eat what your family eats, including sweets once in a while. But you do have to pay attention to how often you eat and how much you eat of certain foods. You may want to work with a dietitian or a certified diabetes educator (CDE) to help you plan meals and snacks. A dietitian or CDE can also help you lose weight if that is one of your goals. What should you know about eating carbs? Managing the amount of carbohydrate (carbs) you eat is an important part of healthy meals when you have diabetes. Carbohydrate is found in many foods. · Learn which foods have carbs. And learn the amounts of carbs in different foods. ¨ Bread, cereal, pasta, and rice have about 15 grams of carbs in a serving. A serving is 1 slice of bread (1 ounce), ½ cup of cooked cereal, or 1/3 cup of cooked pasta or rice. ¨ Fruits have 15 grams of carbs in a serving. A serving is 1 small fresh fruit, such as an apple or orange; ½ of a banana; ½ cup of cooked or canned fruit; ½ cup of fruit juice; 1 cup of melon or raspberries; or 2 tablespoons of dried fruit. ¨ Milk and no-sugar-added yogurt have 15 grams of carbs in a serving. A serving is 1 cup of milk or 2/3 cup of no-sugar-added yogurt. ¨ Starchy vegetables have 15 grams of carbs in a serving. A serving is ½ cup of mashed potatoes or sweet potato; 1 cup winter squash; ½ of a small baked potato; ½ cup of cooked beans; or ½ cup cooked corn or green peas. · Learn how much carbs to eat each day and at each meal. A dietitian or CDE can teach you how to keep track of the amount of carbs you eat. This is called carbohydrate counting. · If you are not sure how to count carbohydrate grams, use the Plate Method to plan meals.  It is a good, quick way to make sure that you have a balanced meal. It also helps you spread carbs throughout the day. ¨ Divide your plate by types of foods. Put non-starchy vegetables on half the plate, meat or other protein food on one-quarter of the plate, and a grain or starchy vegetable in the final quarter of the plate. To this you can add a small piece of fruit and 1 cup of milk or yogurt, depending on how many carbs you are supposed to eat at a meal.  · Try to eat about the same amount of carbs at each meal. Do not \"save up\" your daily allowance of carbs to eat at one meal.  · Proteins have very little or no carbs per serving. Examples of proteins are beef, chicken, turkey, fish, eggs, tofu, cheese, cottage cheese, and peanut butter. A serving size of meat is 3 ounces, which is about the size of a deck of cards. Examples of meat substitute serving sizes (equal to 1 ounce of meat) are 1/4 cup of cottage cheese, 1 egg, 1 tablespoon of peanut butter, and ½ cup of tofu. How can you eat out and still eat healthy? · Learn to estimate the serving sizes of foods that have carbohydrate. If you measure food at home, it will be easier to estimate the amount in a serving of restaurant food. · If the meal you order has too much carbohydrate (such as potatoes, corn, or baked beans), ask to have a low-carbohydrate food instead. Ask for a salad or green vegetables. · If you use insulin, check your blood sugar before and after eating out to help you plan how much to eat in the future. · If you eat more carbohydrate at a meal than you had planned, take a walk or do other exercise. This will help lower your blood sugar. What else should you know? · Limit saturated fat, such as the fat from meat and dairy products. This is a healthy choice because people who have diabetes are at higher risk of heart disease. So choose lean cuts of meat and nonfat or low-fat dairy products. Use olive or canola oil instead of butter or shortening when cooking.   · Don't skip meals. Your blood sugar may drop too low if you skip meals and take insulin or certain medicines for diabetes. · Check with your doctor before you drink alcohol. Alcohol can cause your blood sugar to drop too low. Alcohol can also cause a bad reaction if you take certain diabetes medicines. Follow-up care is a key part of your treatment and safety. Be sure to make and go to all appointments, and call your doctor if you are having problems. It's also a good idea to know your test results and keep a list of the medicines you take. Where can you learn more? Go to http://donavonShopearstacy.info/. Enter B297 in the search box to learn more about \"Learning About Diabetes Food Guidelines. \"  Current as of: March 13, 2017  Content Version: 11.3  © 6430-4523 [employer]. Care instructions adapted under license by [employer] (which disclaims liability or warranty for this information). If you have questions about a medical condition or this instruction, always ask your healthcare professional. [place] [address] any warranty or liability for your use of this information. Learning About Benefits From Quitting Smoking  How does quitting smoking make you healthier? If you're thinking about quitting smoking, you may have a few reasons to be smoke-free. Your health may be one of them. · When you quit smoking, you lower your risks for cancer, lung disease, heart attack, stroke, blood vessel disease, and blindness from macular degeneration. · When you're smoke-free, you get sick less often, and you heal faster. You are less likely to get colds, flu, bronchitis, and pneumonia. · As a nonsmoker, you may find that your mood is better and you are less stressed. When and how will you feel healthier? Quitting has real health benefits that start from day 1 of being smoke-free. And the longer you stay smoke-free, the healthier you get and the better you feel.   The first hours  · After just 20 minutes, your blood pressure and heart rate go down. That means there's less stress on your heart and blood vessels. · Within 12 hours, the level of carbon monoxide in your blood drops back to normal. That makes room for more oxygen. With more oxygen in your body, you may notice that you have more energy than when you smoked. After 2 weeks  · Your lungs start to work better. · Your risk of heart attack starts to drop. After 1 month  · When your lungs are clear, you cough less and breathe deeper, so it's easier to be active. · Your sense of taste and smell return. That means you can enjoy food more than you have since you started smoking. Over the years  · After 1 year, your risk of heart disease is half what it would be if you kept smoking. · After 5 years, your risk of stroke starts to shrink. Within a few years after that, it's about the same as if you'd never smoked. · After 10 years, your risk of dying from lung cancer is cut by about half. And your risk for many other types of cancer is lower too. How would quitting help others in your life? When you quit smoking, you improve the health of everyone who now breathes in your smoke. · Their heart, lung, and cancer risks drop, much like yours. · They are sick less. For babies and small children, living smoke-free means they're less likely to have ear infections, pneumonia, and bronchitis. · If you're a woman who is or will be pregnant someday, quitting smoking means a healthier . · Children who are close to you are less likely to become adult smokers. Where can you learn more? Go to http://donavon-stacy.info/. Enter 052 [id number] 72 11 in the search box to learn more about \"Learning About Benefits From Quitting Smoking. \"  Current as of: 2017  Content Version: 11.3  © 8933-9717 [employer].  Care instructions adapted under license by [employer] (which disclaims liability or warranty for this information). If you have questions about a medical condition or this instruction, always ask your healthcare professional. [place] [address] any warranty or liability for your use of this information.

## 2017-08-16 DIAGNOSIS — E26.9 HYPERALDOSTERONISM (HCC): ICD-10-CM

## 2017-08-17 DIAGNOSIS — E26.9 HYPERALDOSTERONISM (HCC): ICD-10-CM

## 2017-08-17 RX ORDER — SPIRONOLACTONE 25 MG/1
25 TABLET ORAL 2 TIMES DAILY
Qty: 60 TAB | Refills: 0 | Status: SHIPPED | OUTPATIENT
Start: 2017-08-17 | End: 2018-01-25 | Stop reason: SDUPTHER

## 2017-08-17 NOTE — TELEPHONE ENCOUNTER
----- Message from [facility] sent at 8/17/2017 12:48 PM EDT -----  Regarding: Dr. Manjit Ross requesting an update on her RX refill for Spironalactone. [facility] ([place] [facility] and [place]) on file. Best contact is [phone].

## 2017-08-21 RX ORDER — SPIRONOLACTONE 25 MG/1
25 TABLET ORAL 2 TIMES DAILY
Qty: 60 TAB | Refills: 6 | Status: SHIPPED | OUTPATIENT
Start: 2017-08-21 | End: 2018-05-29 | Stop reason: SDUPTHER

## 2017-09-01 ENCOUNTER — OFFICE VISIT (OUTPATIENT)
Dept: SLEEP MEDICINE | Age: 27
End: 2017-09-01

## 2017-09-01 ENCOUNTER — DOCUMENTATION ONLY (OUTPATIENT)
Dept: SLEEP MEDICINE | Age: 27
End: 2017-09-01

## 2017-09-01 VITALS
HEIGHT: 63 IN | TEMPERATURE: 97.7 F | HEART RATE: 114 BPM | DIASTOLIC BLOOD PRESSURE: 87 MMHG | WEIGHT: 293 LBS | SYSTOLIC BLOOD PRESSURE: 145 MMHG | OXYGEN SATURATION: 97 % | BODY MASS INDEX: 51.91 KG/M2

## 2017-09-01 DIAGNOSIS — G47.33 OBSTRUCTIVE SLEEP APNEA (ADULT) (PEDIATRIC): Primary | ICD-10-CM

## 2017-09-01 NOTE — PROGRESS NOTES
[address] [facility] [place], [address]  Tel.  [phone]  Fax. 100 [facility] 60  [place], [address] [facility]  Tel.  [phone]  Fax. [phone] [address] Beth Vill[address]   Tel.  [phone]  Fax. [phone]     S>Nancy Brizuela is a 32 y.o. female seen for a positive airway pressure follow-up. She reports moderate problems using the device. The following problems are identified:    Drowsiness yes Problems exhaling no   Snoring no Forget to put on yes   Mask Comfortable yes Can't fall asleep no   Dry Mouth no Mask falls off yes   Air Leaking no Frequent awakenings no     She just switched to a nasal mask which she likes much better. Download reviewed. Over past 30 days, she has used it only 10 of the days. Usage about 2.5 hours on those nights. She feels that it improves her sleep even using a short time. She admits that her sleep has improved. Therapy Apnea Index averaged over PAP use: 4 /hr which reflects improved sleep breathing condition. She was recently started on insulin. Allergies   Allergen Reactions    Latex Itching       She has a current medication list which includes the following prescription(s): spironolactone, insulin glargine, nortriptyline, gabapentin, glipizide, sitagliptin-metformin, metoprolol tartrate, butalbital-acetaminophen-caffeine, glucose blood vi test strips, blood-glucose meter, cetirizine, albuterol, albuterol, acetaminophen, spironolactone, diclofenac potassium, arm brace, omeprazole, and codeine-butalbital-aspirin-caffeine. .      She  has a past medical history of Asthma; [place] hump (1/7/2015); Carpal tunnel syndrome of right wrist (12/20/2016); Diabetes ([facility] [address] GERD (gastroesophageal reflux disease) (8/13/2014); HTN (hypertension) (7/25/2011); Hyperaldosteronism ([facility] [address] (10/19/2015); and Other ill-defined conditions.     [place] Sleepiness Score: 14   and Modified F.O.S.Q. Score Total / 2: 17.5     O>    Visit Vitals    /87    Pulse (!) 114    Temp 97.7 °F (36.5 °C) (Oral)    Ht 5' 3\" (1.6 m)    Wt (!) 352 lb (159.7 kg)    SpO2 97%    BMI 62.35 kg/m2           General:   Alert, oriented, not in distress   Neck:   No JVD    Chest/Lungs:  symetrical lung expansion , no accessory muscle use    Extremities:  no obvious rashes , negative edema    Neuro:  No focal deficits ; No obvious tremor    Psych:  Normal affect ,  Normal countenance ;           A>    ICD-10-CM ICD-9-CM    1. Obstructive sleep apnea (adult) (pediatric) G47.33 327.23 AMB SUPPLY ORDER   2. Uncontrolled type 2 diabetes mellitus without complication, with long-term current use of insulin ([facility]) E11.65 250.02     Z79.4 V58.67      AHI = 11(2011). On CPAP :  7 cmH2O. Compliant:      no    Therapeutic Response:  Positive    P>      * Follow-up Disposition:  Return in about 3 months (around 12/1/2017). Treatment options for sleep apnea were reviewed. She wishes to continue using PAP. She will try using it nightly for a couple of months and will return in follow-up. If she is struggling to use it after using it nightly, we will consider an attended titration to address barriers to adherence. I have counseled the patient regarding the benefits of weight loss. she will continue on her current pressure settings. * She was asked to contact our office for any problems regarding PAP therapy. *Counseling was provided regarding the importance of regular PAP use and on proper sleep hygiene and safe driving. * Re-enforced proper and regular cleaning for the device. 2. Type II diabetes - she continues on her current regimen. I have reviewed the relationship between sleep disordered breathing as it relates to diabetes.     Yoan Hamilton MD  Diplomate in Sleep Medicine  [facility]

## 2017-09-01 NOTE — PATIENT INSTRUCTIONS
[address] [facility] [place], [address]  Tel.  [phone]  Fax. 100 [facility] 60  [place], [address] [facility]  Tel.  [phone]  Fax. [phone] [facility] [place] [address] [address] Khanna  Tel.  [phone]  Fax. [phone]     Learning About CPAP for Sleep Apnea  What is CPAP? CPAP is a small machine that you use at home every night while you sleep. It increases air pressure in your throat to keep your airway open. When you have sleep apnea, this can help you sleep better so you feel much better. CPAP stands for \"continuous positive airway pressure. \"  The CPAP machine will have one of the following:  · A mask that covers your nose and mouth  · Prongs that fit into your nose  · A mask that covers your nose only, the most common type. This type is called NCPAP. The N stands for \"nasal.\"  Why is it done? CPAP is usually the best treatment for obstructive sleep apnea. It is the first treatment choice and the most widely used. Your doctor may suggest CPAP if you have:  · Moderate to severe sleep apnea. · Sleep apnea and coronary artery disease (CAD) or heart failure. How does it help? · CPAP can help you have more normal sleep, so you feel less sleepy and more alert during the daytime. · CPAP may help keep heart failure or other heart problems from getting worse. · NCPAP may help lower your blood pressure. · If you use CPAP, your bed partner may also sleep better because you are not snoring or restless. What are the side effects? Some people who use CPAP have:  · A dry or stuffy nose and a sore throat. · Irritated skin on the face. · Sore eyes. · Bloating. If you have any of these problems, work with your doctor to fix them. Here are some things you can try:  · Be sure the mask or nasal prongs fit well. · See if your doctor can adjust the pressure of your CPAP. · If your nose is dry, try a humidifier.   · If your nose is runny or stuffy, try decongestant medicine or a steroid nasal spray. If these things do not help, you might try a different type of machine. Some machines have air pressure that adjusts on its own. Others have air pressures that are different when you breathe in than when you breathe out. This may reduce discomfort caused by too much pressure in your nose. Where can you learn more? Go to [employer]  Enter Samantha Kohler in the search box to learn more about \"Learning About CPAP for Sleep Apnea. \"   © 5461-2773 Healthwise, Incorporated. Care instructions adapted under license by [employer] (which disclaims liability or warranty for this information). This care instruction is for use with your licensed healthcare professional. If you have questions about a medical condition or this instruction, always ask your healthcare professional. [address] any warranty or liability for your use of this information. Content Version: 4.3.89930; Last Revised: January 11, 2010  PROPER SLEEP HYGIENE    What to avoid  · Do not have drinks with caffeine, such as coffee or black tea, for 8 hours before bed. · Do not smoke or use other types of tobacco near bedtime. Nicotine is a stimulant and can keep you awake. · Avoid drinking alcohol late in the evening, because it can cause you to wake in the middle of the night. · Do not eat a big meal close to bedtime. If you are hungry, eat a light snack. · Do not drink a lot of water close to bedtime, because the need to urinate may wake you up during the night. · Do not read or watch TV in bed. Use the bed only for sleeping and sexual activity. What to try  · Go to bed at the same time every night, and wake up at the same time every morning. Do not take naps during the day. · Keep your bedroom quiet, dark, and cool. · Get regular exercise, but not within 3 to 4 hours of your bedtime. .  · Sleep on a comfortable pillow and mattress.   · If watching the clock makes you anxious, turn it facing away from you so you cannot see the time. · If you worry when you lie down, start a worry book. Well before bedtime, write down your worries, and then set the book and your concerns aside. · Try meditation or other relaxation techniques before you go to bed. · If you cannot fall asleep, get up and go to another room until you feel sleepy. Do something relaxing. Repeat your bedtime routine before you go to bed again. · Make your house quiet and calm about an hour before bedtime. Turn down the lights, turn off the TV, log off the computer, and turn down the volume on music. This can help you relax after a busy day. Drowsy Driving: The [employer] cites drowsiness as a causing factor in more than 962,318 police reported crashes annually, resulting in 76,000 injuries and 1,500 deaths. Other surveys suggest 55% of people polled have driven while drowsy in the past year, 23% had fallen asleep but not crashed, 3% crashed, and 2% had and accident due to drowsy driving. Who is at risk? Young Drivers: One study of drowsy driving accidents states that 55% of the drivers were under 25 years. Of those, 75% were male. Shift Workers and Travelers: People who work overnight or travel across time zones frequently are at higher risk of experiencing Circadian Rhythm Disorders. They are trying to work and function when their body is programed to sleep. Sleep Deprived: Lack of sleep has a serious impact on your ability to pay attention or focus on a task. Consistently getting less than the average of 8 hours your body needs creates partial or cumulative sleep deprivation. Untreated Sleep Disorders: Sleep Apnea, Narcolepsy, R.L.S., and other sleep disorders (untreated) prevent a person from getting enough restful sleep. This leads to excessive daytime sleepiness and increases the risk for drowsy driving accidents by up to 7 times.   Medications / Alcohol: Even over the counter medications can cause drowsiness. Medications that impair a drivers attention should have a warning label. Alcohol naturally makes you sleepy and on its own can cause accidents. Combined with excessive drowsiness its effects are amplified. Signs of Drowsy Driving:   * You don't remember driving the last few miles   * You may drift out of your carmenza   * You are unable to focus and your thoughts wander   * You may yawn more often than normal   * You have difficulty keeping your eyes open / nodding off   * Missing traffic signs, speeding, or tailgating  Prevention-   Good sleep hygiene, lifestyle and behavioral choices have the most impact on drowsy driving. There is no substitute for sleep and the average person requires 8 hours nightly. If you find yourself driving drowsy, stop and sleep. Consider the sleep hygiene tips provided during your visit as well. Medication Refill Policy: Refills for all medications require 1 week advance notice. Please have your pharmacy fax a refill request. We are unable to fax, or call in \"controled substance\" medications and you will need to pick these prescriptions up from our office. [employer] Activation    Thank you for requesting access to [employer]. Please follow the instructions below to securely access and download your online medical record. [employer] allows you to send messages to your doctor, view your test results, renew your prescriptions, schedule appointments, and more. How Do I Sign Up? 1. In your internet browser, go to [email or web address]. 2. Click on the First Time User? Click Here link in the Sign In box. You will see the New Member Sign Up page. 3. Enter your [employer] Access Code exactly as it appears below. You will not need to use this code after youve completed the sign-up process. If you do not sign up before the expiration date, you must request a new code.     [employer] Access Code: [email or web address]  Expires: 2017 10:11 AM (This is the date your [employer] access code will )    4. Enter the last four digits of your Social Security Number (xxxx) and Date of Birth (mm/dd/yyyy) as indicated and click Submit. You will be taken to the next sign-up page. 5. Create a [employer] ID. This will be your [employer] login ID and cannot be changed, so think of one that is secure and easy to remember. 6. Create a [employer] password. You can change your password at any time. 7. Enter your Password Reset Question and Answer. This can be used at a later time if you forget your password. 8. Enter your e-mail address. You will receive e-mail notification when new information is available in [address] 9. Click Sign Up. You can now view and download portions of your medical record. 10. Click the Download Summary menu link to download a portable copy of your medical information. Additional Information    If you have questions, please call [phone]. Remember, [employer] is NOT to be used for urgent needs. For medical emergencies, dial 911.

## 2017-09-01 NOTE — PROGRESS NOTES
PAP supply order and clinical note were faxed to [facility] on 9/1/17

## 2017-09-11 DIAGNOSIS — R20.2 PARESTHESIA: ICD-10-CM

## 2017-09-11 DIAGNOSIS — G43.009 MIGRAINE WITHOUT AURA AND WITHOUT STATUS MIGRAINOSUS, NOT INTRACTABLE: ICD-10-CM

## 2017-09-11 RX ORDER — GABAPENTIN 300 MG/1
CAPSULE ORAL
Qty: 90 CAP | Refills: 2 | Status: SHIPPED | OUTPATIENT
Start: 2017-09-11 | End: 2018-03-09 | Stop reason: SDUPTHER

## 2017-09-27 DIAGNOSIS — E11.9 TYPE 2 DIABETES MELLITUS WITHOUT COMPLICATION, WITHOUT LONG-TERM CURRENT USE OF INSULIN (HCC): ICD-10-CM

## 2017-09-27 DIAGNOSIS — Z23 ENCOUNTER FOR IMMUNIZATION: ICD-10-CM

## 2017-09-27 DIAGNOSIS — E11.9 DIABETES MELLITUS WITHOUT COMPLICATION (HCC): ICD-10-CM

## 2017-09-27 DIAGNOSIS — I10 ESSENTIAL HYPERTENSION: ICD-10-CM

## 2017-09-27 DIAGNOSIS — G56.01 CARPAL TUNNEL SYNDROME OF RIGHT WRIST: ICD-10-CM

## 2017-09-28 ENCOUNTER — OFFICE VISIT (OUTPATIENT)
Dept: NEUROLOGY | Age: 27
End: 2017-09-28

## 2017-09-28 VITALS
DIASTOLIC BLOOD PRESSURE: 74 MMHG | WEIGHT: 293 LBS | BODY MASS INDEX: 51.91 KG/M2 | HEIGHT: 63 IN | HEART RATE: 101 BPM | SYSTOLIC BLOOD PRESSURE: 132 MMHG | OXYGEN SATURATION: 98 %

## 2017-09-28 DIAGNOSIS — G47.33 OSA (OBSTRUCTIVE SLEEP APNEA): ICD-10-CM

## 2017-09-28 DIAGNOSIS — G43.009 MIGRAINE WITHOUT AURA AND WITHOUT STATUS MIGRAINOSUS, NOT INTRACTABLE: Primary | ICD-10-CM

## 2017-09-28 DIAGNOSIS — G56.01 CARPAL TUNNEL SYNDROME OF RIGHT WRIST: ICD-10-CM

## 2017-09-28 DIAGNOSIS — G56.21 ULNAR NEUROPATHY AT ELBOW, RIGHT: ICD-10-CM

## 2017-09-28 DIAGNOSIS — Z72.0 TOBACCO USE: ICD-10-CM

## 2017-09-28 RX ORDER — NORTRIPTYLINE HYDROCHLORIDE 50 MG/1
50 CAPSULE ORAL
Qty: 90 CAP | Refills: 1 | Status: SHIPPED | OUTPATIENT
Start: 2017-09-28 | End: 2018-01-25 | Stop reason: ALTCHOICE

## 2017-09-28 RX ORDER — METOPROLOL TARTRATE 100 MG/1
100 TABLET ORAL 2 TIMES DAILY
Qty: 60 TAB | Refills: 6 | Status: SHIPPED | OUTPATIENT
Start: 2017-09-28 | End: 2018-05-29 | Stop reason: SDUPTHER

## 2017-09-28 RX ORDER — OMEPRAZOLE 40 MG/1
40 CAPSULE, DELAYED RELEASE ORAL DAILY
Qty: 30 CAP | Refills: 3 | Status: SHIPPED | OUTPATIENT
Start: 2017-09-28 | End: 2018-02-05 | Stop reason: SDUPTHER

## 2017-09-28 NOTE — MR AVS SNAPSHOT
Visit Information Date & Time Provider Department Dept. Phone Encounter #  
 9/28/2017  8:00 AM Dorothea Aguirre MD [facility] at [facility] [phone] [id number] Your Appointments 12/7/2017  9:40 AM  
Any with Rissa Bellamy MD  
[address] [facility] ([address]) Appt Note: 3 month pap f/up [address] [facility] [phone] [address] [facility] [address] [address]  
  
    
 3/28/2018  8:40 AM  
Follow Up with Dorothea Aguirre MD  
Neurology Clinic at [address]) Appt Note: [facility], 
[address], [address] [phone]  
[address], [address], [address] [phone] Upcoming Health Maintenance Date Due  
 EYE EXAM RETINAL OR DILATED Q1 1/7/2016 PAP AKA CERVICAL CYTOLOGY 2/12/2017 INFLUENZA AGE 9 TO ADULT 8/1/2017 FOOT EXAM Q1 9/26/2017 HEMOGLOBIN A1C Q6M 12/20/2017 MICROALBUMIN Q1 12/20/2017 LIPID PANEL Q1 3/20/2018 DTaP/Tdap/Td series (2 - Td) 8/27/2022 Allergies as of 9/28/2017  Review Complete On: 9/28/2017 By: Dorothea Aguirre MD  
  
 Severity Noted Reaction Type Reactions Latex  08/27/2014   Side Effect Itching Current Immunizations  Reviewed on 3/20/2017 Name Date Influenza Vaccine (Quad) PF 12/20/2016, 10/19/2015  8:35 AM  
 Influenza Vaccine PF 9/27/2013 Pneumococcal Polysaccharide (PPSV-23) 9/27/2013 TDAP Vaccine 8/27/2012 Not reviewed this visit You Were Diagnosed With   
  
 Codes Comments Migraine without aura and without status migrainosus, not intractable    -  Primary ICD-10-CM: G43.009 ICD-9-CM: 346.10 Carpal tunnel syndrome of right wrist     ICD-10-CM: G56.01 
ICD-9-CM: 354.0 Tobacco use     ICD-10-CM: Z72.0 ICD-9-CM: 305.1 PETE (obstructive sleep apnea)     ICD-10-CM: G47.33 
ICD-9-CM: 327.23 Ulnar neuropathy at elbow, right     ICD-10-CM: G56.21 ICD-9-CM: 936. 2 Vitals BP Pulse Height(growth percentile) Weight(growth percentile) SpO2 BMI  
 132/74 (!) 101 5' 3\" (1.6 m) 349 lb 12.8 oz (158.7 kg) 98% 61.96 kg/m2 OB Status Smoking Status Having regular periods Current Every Day Smoker BMI and BSA Data Body Mass Index Body Surface Area 61.96 kg/m 2 2.66 m 2 Preferred Pharmacy Pharmacy Name Phone [id number]- [place], [place] - [address] [phone] Your Updated Medication List  
  
   
This list is accurate as of: 9/28/17  8:27 AM.  Always use your most recent med list.  
  
  
  
  
 acetaminophen 500 mg tablet Commonly known as:  Jr Aidee Wagner Se Take 1 Tab by mouth every six (6) hours as needed for Pain. * albuterol 1.25 mg/3 mL Nebu Commonly known as:  Latia Shen Take 3 mL by inhalation every six (6) hours as needed for Wheezing. * albuterol 90 mcg/actuation inhaler Commonly known as:  PROVENTIL HFA, VENTOLIN HFA, PROAIR HFA Take 1 Puff by inhalation every four (4) hours as needed for Wheezing. Arm Brace Mis Right ulnar splint for ulnar neuropathy at the elbow. Blood-Glucose Meter Misc Commonly known as:  Mindy Olguin IQ METER Test blood sugar once daily  
  
 butalbital-acetaminophen-caffeine -40 mg per tablet Commonly known as:  [employer] Take 0.5-1 Tabs by mouth every six (6) hours as needed for Headache. Max Daily Amount: 4 Tabs. codeine-butalbital-aspirin-caffeine 94--40 mg per capsule Commonly known as:  BUTALBITAL COMPOUND-CODEINE Take 1 Cap by mouth every eight (8) hours as needed for Pain or Headache. Max Daily Amount: 3 Caps. Diclofenac Potassium 50 mg Pwpk Commonly known as:  CAMBIA Take 1 Package by mouth daily as needed. gabapentin 300 mg capsule Commonly known as:  NEURONTIN  
TAKE 1 CAP BY MOUTH THREE (3) TIMES DAILY. glipiZIDE 5 mg tablet Commonly known as:  Srinivasa Picking Take 2 Tabs by mouth two (2) times a day. glucose blood VI test strips strip Commonly known as:  Lindsay Guan Check sugars two times daily. E11.9  
  
 insulin glargine 300 unit/mL (1.5 mL) Inpn Commonly known as:  TOUJEO SOLOSTAR  
8 Units by SubCUTAneous route daily. metoprolol tartrate 100 mg IR tablet Commonly known as:  LOPRESSOR Take 1 Tab by mouth two (2) times a day. nortriptyline 50 mg capsule Commonly known as:  PAMELOR Take 1 Cap by mouth nightly for 180 days. omeprazole 40 mg capsule Commonly known as:  PRILOSEC Take 1 Cap by mouth daily. SITagliptin-metFORMIN 50-1,000 mg per tablet Commonly known as:  Leti Cowden Take 1 Tab by mouth two (2) times daily (with meals). * spironolactone 25 mg tablet Commonly known as:  ALDACTONE Take 1 Tab by mouth two (2) times a day. Indications: ALDOSTERONISM, hypertension * spironolactone 25 mg tablet Commonly known as:  ALDACTONE Take 1 Tab by mouth two (2) times a day. Indications: ALDOSTERONISM, hypertension ZyrTEC 10 mg tablet Generic drug:  cetirizine Take  by mouth daily. * Notice: This list has 4 medication(s) that are the same as other medications prescribed for you. Read the directions carefully, and ask your doctor or other care provider to review them with you. Prescriptions Sent to Pharmacy Refills  
 nortriptyline (PAMELOR) 50 mg capsule 1 Sig: Take 1 Cap by mouth nightly for 180 days. Class: Normal  
 Pharmacy: [facility]/pharmacy [address] [address]  #: [phone] Route: Oral  
  
Patient Instructions Follow-up in 6 months A Healthy Lifestyle: Care Instructions Your Care Instructions A healthy lifestyle can help you feel good, stay at a healthy weight, and have plenty of energy for both work and play. A healthy lifestyle is something you can share with your whole family. A healthy lifestyle also can lower your risk for serious health problems, such as high blood pressure, heart disease, and diabetes. You can follow a few steps listed below to improve your health and the health of your family. Follow-up care is a key part of your treatment and safety. Be sure to make and go to all appointments, and call your doctor if you are having problems. Its also a good idea to know your test results and keep a list of the medicines you take. How can you care for yourself at home? · Do not eat too much sugar, fat, or fast foods. You can still have dessert and treats now and then. The goal is moderation. · Start small to improve your eating habits. Pay attention to portion sizes, drink less juice and soda pop, and eat more fruits and vegetables. ¨ Eat a healthy amount of food. A 3-ounce serving of meat, for example, is about the size of a deck of cards. Fill the rest of your plate with vegetables and whole grains. ¨ Limit the amount of soda and sports drinks you have every day. Drink more water when you are thirsty. ¨ Eat at least 5 servings of fruits and vegetables every day. It may seem like a lot, but it is not hard to reach this goal. A serving or helping is 1 piece of fruit, 1 cup of vegetables, or 2 cups of leafy, raw vegetables. Have an apple or some carrot sticks as an afternoon snack instead of a candy bar. Try to have fruits and/or vegetables at every meal. 
· Make exercise part of your daily routine. You may want to start with simple activities, such as walking, bicycling, or slow swimming. Try to be active 30 to 60 minutes every day. You do not need to do all 30 to 60 minutes all at once.  For example, you can exercise 3 times a day for 10 or 20 minutes. Moderate exercise is safe for most people, but it is always a good idea to talk to your doctor before starting an exercise program. 
· Keep moving. Frank Curls the lawn, work in the garden, or [employer]edu. Take the stairs instead of the elevator at work. · If you smoke, quit. People who smoke have an increased risk for heart attack, stroke, cancer, and other lung illnesses. Quitting is hard, but there are ways to boost your chance of quitting tobacco for good. ¨ Use nicotine gum, patches, or lozenges. ¨ Ask your doctor about stop-smoking programs and medicines. ¨ Keep trying. In addition to reducing your risk of diseases in the future, you will notice some benefits soon after you stop using tobacco. If you have shortness of breath or asthma symptoms, they will likely get better within a few weeks after you quit. · Limit how much alcohol you drink. Moderate amounts of alcohol (up to 2 drinks a day for men, 1 drink a day for women) are okay. But drinking too much can lead to liver problems, high blood pressure, and other health problems. Family health If you have a family, there are many things you can do together to improve your health. · Eat meals together as a family as often as possible. · Eat healthy foods. This includes fruits, vegetables, lean meats and dairy, and whole grains. · Include your family in your fitness plan. Most people think of activities such as jogging or tennis as the way to fitness, but there are many ways you and your family can be more active. Anything that makes you breathe hard and gets your heart pumping is exercise. Here are some tips: 
¨ Walk to do errands or to take your child to school or the bus. ¨ Go for a family bike ride after dinner instead of watching TV. Where can you learn more? Go to http://donavon-stacy.info/. Enter W431 in the search box to learn more about \"A Healthy Lifestyle: Care Instructions. \" Current as of: July 26, 2016 Content Version: 11.3 © 7863-7321 [employer]. Care instructions adapted under license by [employer] (which disclaims liability or warranty for this information). If you have questions about a medical condition or this instruction, always ask your healthcare professional. [address] any warranty or liability for your use of this information. Introducing \[facility] [place] [facility]\"" & HEALTH SERVICES! [facility] introduces [employer] patient portal. Now you can access parts of your medical record, email your doctor's office, and request medication refills online. 1. In your internet browser, go to [email or web address] 2. Click on the First Time User? Click Here link in the Sign In box. You will see the New Member Sign Up page. 3. Enter your [employer] Access Code exactly as it appears below. You will not need to use this code after youve completed the sign-up process. If you do not sign up before the expiration date, you must request a new code. · [employer] Access Code: [facility] Expires: 11/30/2017 10:11 AM 
 
4. Enter the last four digits of your Social Security Number (xxxx) and Date of Birth (mm/dd/yyyy) as indicated and click Submit. You will be taken to the next sign-up page. 5. Create a [employer] ID. This will be your [employer] login ID and cannot be changed, so think of one that is secure and easy to remember. 6. Create a [employer] password. You can change your password at any time. 7. Enter your Password Reset Question and Answer. This can be used at a later time if you forget your password. 8. Enter your e-mail address. You will receive e-mail notification when new information is available in [address] 9. Click Sign Up. You can now view and download portions of your medical record. 10. Click the Download Summary menu link to download a portable copy of your medical information. If you have questions, please visit the Frequently Asked Questions section of the [employer] website. Remember, [employer] is NOT to be used for urgent needs. For medical emergencies, dial 911. Now available from your iPhone and Android! Please provide this summary of care documentation to your next provider. Your primary care clinician is listed as Cole Katz. If you have any questions after today's visit, please call [phone].

## 2017-09-28 NOTE — PATIENT INSTRUCTIONS
Follow-up in 6 months     A Healthy Lifestyle: Care Instructions  Your Care Instructions  A healthy lifestyle can help you feel good, stay at a healthy weight, and have plenty of energy for both work and play. A healthy lifestyle is something you can share with your whole family. A healthy lifestyle also can lower your risk for serious health problems, such as high blood pressure, heart disease, and diabetes. You can follow a few steps listed below to improve your health and the health of your family. Follow-up care is a key part of your treatment and safety. Be sure to make and go to all appointments, and call your doctor if you are having problems. Its also a good idea to know your test results and keep a list of the medicines you take. How can you care for yourself at home? · Do not eat too much sugar, fat, or fast foods. You can still have dessert and treats now and then. The goal is moderation. · Start small to improve your eating habits. Pay attention to portion sizes, drink less juice and soda pop, and eat more fruits and vegetables. ¨ Eat a healthy amount of food. A 3-ounce serving of meat, for example, is about the size of a deck of cards. Fill the rest of your plate with vegetables and whole grains. ¨ Limit the amount of soda and sports drinks you have every day. Drink more water when you are thirsty. ¨ Eat at least 5 servings of fruits and vegetables every day. It may seem like a lot, but it is not hard to reach this goal. A serving or helping is 1 piece of fruit, 1 cup of vegetables, or 2 cups of leafy, raw vegetables. Have an apple or some carrot sticks as an afternoon snack instead of a candy bar. Try to have fruits and/or vegetables at every meal.  · Make exercise part of your daily routine. You may want to start with simple activities, such as walking, bicycling, or slow swimming. Try to be active 30 to 60 minutes every day. You do not need to do all 30 to 60 minutes all at once.  For example, you can exercise 3 times a day for 10 or 20 minutes. Moderate exercise is safe for most people, but it is always a good idea to talk to your doctor before starting an exercise program.  · Keep moving. Ru Cronin the lawn, work in the garden, or [employer]. Take the stairs instead of the elevator at work. · If you smoke, quit. People who smoke have an increased risk for heart attack, stroke, cancer, and other lung illnesses. Quitting is hard, but there are ways to boost your chance of quitting tobacco for good. ¨ Use nicotine gum, patches, or lozenges. ¨ Ask your doctor about stop-smoking programs and medicines. ¨ Keep trying. In addition to reducing your risk of diseases in the future, you will notice some benefits soon after you stop using tobacco. If you have shortness of breath or asthma symptoms, they will likely get better within a few weeks after you quit. · Limit how much alcohol you drink. Moderate amounts of alcohol (up to 2 drinks a day for men, 1 drink a day for women) are okay. But drinking too much can lead to liver problems, high blood pressure, and other health problems. Family health  If you have a family, there are many things you can do together to improve your health. · Eat meals together as a family as often as possible. · Eat healthy foods. This includes fruits, vegetables, lean meats and dairy, and whole grains. · Include your family in your fitness plan. Most people think of activities such as jogging or tennis as the way to fitness, but there are many ways you and your family can be more active. Anything that makes you breathe hard and gets your heart pumping is exercise. Here are some tips:  ¨ Walk to do errands or to take your child to school or the bus. ¨ Go for a family bike ride after dinner instead of watching TV. Where can you learn more? Go to http://donavon-stacy.info/.   Enter T318 in the search box to learn more about \"A Healthy Lifestyle: Care Instructions. \"  Current as of: July 26, 2016  Content Version: 11.3  © 8589-9160 [employer]. Care instructions adapted under license by [employer] (which disclaims liability or warranty for this information). If you have questions about a medical condition or this instruction, always ask your healthcare professional. [place] [address] any warranty or liability for your use of this information.

## 2017-09-28 NOTE — PROGRESS NOTES
Neurology follow-up note     REFERRED BY:  Linda Gallego MD    17    Chief Complaint   Patient presents with    Follow-up     No migraines until   Mark Betancourt is a 32 y.o. female who presented to the neurology office for management of numbness and tingling in the hands and headaches. Regarding the numbness in the hand, it started around mid May 2016 and it is bilateral.  The medial 2 fingers are involved in the right hand and all the fingers are involved in the left hand. It is gradually progressive. She does also have neck pain with radiation to the right shoulder. Her symptoms are off and on and it is more when she is working and improves when her hands and not moving. The numbness and tingling lasts for minutes but the pain around her wrist can be for a few days. She denies any weakness. EMG/nerve conduction study has been normal.  She is taking gabapentin 300 mg p.o. twice daily and that has helped her. She uses right elbow splint and bilateral hand splints. Stable. Regarding the headaches, she has been having migraines for a long time and her headaches start of pain in the left eye and left side of the face and it is 10 out of 10 in severity, associated with photophobia, phonophobia and nausea but she denies any vomiting. The headache can last from 3 days to week. It is throbbing in character. Since the last visit, the headaches have improved and she did have 2 headaches in the last 3 months. She does also have obstructive sleep apnea and is using CPAP 50% of the time    Risk Factors for headaches  Smoking: Half pack a day  Coffee: Denies cups/day  Tea: 0 cups/day  Soda: 2 cans/day  Water: 4 glasses/day  Sleeps at 9 AM and wakes up at 2 PM to 3 PM.     Medications tried  Imitrex  Amitriptyline  Nortriptyline    Current Outpatient Prescriptions   Medication Sig    nortriptyline (PAMELOR) 50 mg capsule Take 1 Cap by mouth nightly for 180 days.     gabapentin (NEURONTIN) 300 mg capsule TAKE 1 CAP BY MOUTH THREE (3) TIMES DAILY.  spironolactone (ALDACTONE) 25 mg tablet Take 1 Tab by mouth two (2) times a day. Indications: ALDOSTERONISM, hypertension    spironolactone (ALDACTONE) 25 mg tablet Take 1 Tab by mouth two (2) times a day. Indications: ALDOSTERONISM, hypertension    insulin glargine (TOUJEO SOLOSTAR) 300 unit/mL (1.5 mL) inpn 8 Units by SubCUTAneous route daily.  Diclofenac Potassium (CAMBIA) 50 mg pwpk Take 1 Package by mouth daily as needed.  Arm Brace misc Right ulnar splint for ulnar neuropathy at the elbow.  omeprazole (PRILOSEC) 40 mg capsule Take 1 Cap by mouth daily.  glipiZIDE (GLUCOTROL) 5 mg tablet Take 2 Tabs by mouth two (2) times a day.  SITagliptin-metFORMIN (JANUMET) 50-1,000 mg per tablet Take 1 Tab by mouth two (2) times daily (with meals).  metoprolol tartrate (LOPRESSOR) 100 mg IR tablet Take 1 Tab by mouth two (2) times a day.  butalbital-acetaminophen-caffeine (FIORICET) -40 mg per tablet Take 0.5-1 Tabs by mouth every six (6) hours as needed for Headache. Max Daily Amount: 4 Tabs.  glucose blood VI test strips (ONETOUCH VERIO) strip Check sugars two times daily. E11.9    Blood-Glucose Meter (ONETOUCH VERIO IQ METER) misc Test blood sugar once daily    codeine-butalbital-aspirin-caffeine (BUTALBITAL COMPOUND-CODEINE) 09--40 mg per capsule Take 1 Cap by mouth every eight (8) hours as needed for Pain or Headache. Max Daily Amount: 3 Caps.  cetirizine (ZYRTEC) 10 mg tablet Take  by mouth daily.  albuterol (PROVENTIL HFA, VENTOLIN HFA) 90 mcg/actuation inhaler Take 1 Puff by inhalation every four (4) hours as needed for Wheezing.  albuterol (ACCUNEB) 1.25 mg/3 mL nebulizer solution Take 3 mL by inhalation every six (6) hours as needed for Wheezing.  acetaminophen (TYLENOL EXTRA STRENGTH) 500 mg tablet Take 1 Tab by mouth every six (6) hours as needed for Pain.      No current facility-administered medications for this visit. REVIEW OF SYSTEMS:   A ten system review of constitutional, cardiovascular, respiratory, musculoskeletal, endocrine, skin, SHEENT, genitourinary, psychiatric and neurologic systems was obtained and is unremarkable except as stated in HPI     EXAMINATION:   Visit Vitals    /74    Pulse (!) 101    Ht 5' 3\" (1.6 m)    Wt 349 lb 12.8 oz (158.7 kg)    SpO2 98%    BMI 61.96 kg/m2        General:   General appearance: Pt is in no acute distress   Distal pulses are preserved    Neurological Examination:   Mental Status: AAO x3. Speech is fluent. Follows commands, has normal fund of knowledge, attention, short term recall, comprehension and insight. Cranial Nerves: Visual fields are full. PERRL, Extraocular movements are full. Facial sensation intact V1- V3. Facial movement intact, symmetric. Hearing intact to conversation. Palate elevates symmetrically. Shoulder shrug symmetric. Tongue midline. Motor: Strength is 5/5 in all 4 ext. No atrophy. Tone: Normal    Sensation: Decreased pinprick sensation in the medial 2 fingers of the right hand and the lateral 3 fingers on the left hand    Coordination/Cerebellar: Intact to finger-nose-finger     Skin: No significant bruising or lacerations. Laboratory review:   Results for orders placed or performed in visit on 06/20/17   AMB POC HEMOGLOBIN A1C   Result Value Ref Range    Hemoglobin A1c (POC) 11.2 %     Laboratory review:  12/20/2016  Glucose 352, potassium 5.3, platelet 508, NGV1N 9.3    Imaging review:  7/27/2014  CT soft tissue neck with contrast   No evidence of neck mass, abscess or lymphadenopathy    3/16/2017  EMG/nerve conduction study  Nerve conduction studies of the bilateral upper extremities were unremarkable. Needle electrode examination of the right upper extremity was unremarkable. This is a normal study.   There is no electrophysiological evidence of median neuropathy at the wrist or and ulnar neuropathy at the elbow on either side. There is no electrophysiological evidence of right cervical radiculopathy. 4/14/2017  MRI of the brain without contrast  Normal    MRI cervical spine  Straightening of the usual cervical lordosis but otherwise normal study    Documentation review:  I reviewed the notes from Dr. Jess Weathers from 9/1/2017. The patient just switched to nasal mask which she likes much better. Download was reviewed. Over the past 30 days she has used it only for 10 days. Uses about 2.5 hours on Tuesdays. She states that she will try using it nightly for a couple of months and then return in follow-up. If she is struggling to use it after using it nightly, he will consider an attended titration to address barriers to adherence. Assessment/Plan:   Jia Garnett is a 32 y.o. female who presented to the neurology office for management of migraines and clinically does have right ulnar neuropathy and left carpal tunnel syndrome. She does also have obstructive sleep apnea and does smoke. Her EMG/nerve conduction study has been normal.    She states that the gabapentin has helped her with the paresthesia in her hands. We will continue 300 mg p.o. twice daily. She does also use right elbow splint and bilateral hand splints and will continue with them as well. For her headaches she is taking nortriptyline 50 mg p.o. nightly and that helps her. She has had 2 headaches in the last 3 months and will continue with nortriptyline. She does also have obstructive sleep apnea and that may be contributing to her migraines. She is noncompliant with her CPAP at this time. I gave her within 3 minutes counseling regarding smoking cessation. Follow-up in 6 months      ICD-10-CM ICD-9-CM    1. Migraine without aura and without status migrainosus, not intractable G43.009 346.10 nortriptyline (PAMELOR) 50 mg capsule   2. Carpal tunnel syndrome of right wrist G56.01 354.0    3.  Tobacco use Z72.0 305.1    4. PETE (obstructive sleep apnea) G47.33 327.23    5. Ulnar neuropathy at elbow, right G56.21 354. 2        Thank you for allowing me to participate in the care of Ms. Sasha Hagan. Please feel free to contact me if you have any questions. Loulou Elder MD  Neurologist and Clinical Neurophysiologist    CC: Jed Daniel MD  Fax: [phone]    This note will not be viewable in [address]

## 2017-09-28 NOTE — LETTER
9/28/2017 8:35 AM 
 
Patient:    Donald Hernandez YOB: 1990 Date of Visit:    9/28/2017 Dear Bisi Larsen MD 
 
Thank you for referring Ms. Donald Hernandez to me for evaluation/treatment. Below are the relevant portions of my assessment and plan of care. Neurology follow-up note REFERRED BY: 
Bisi Larsen MD 
 
09/28/17 Chief Complaint Patient presents with  Follow-up No migraines until August Subjective Donald Hernandez is a 32 y.o. female who presented to the neurology office for management of numbness and tingling in the hands and headaches. Regarding the numbness in the hand, it started around mid May 2016 and it is bilateral.  The medial 2 fingers are involved in the right hand and all the fingers are involved in the left hand. It is gradually progressive. She does also have neck pain with radiation to the right shoulder. Her symptoms are off and on and it is more when she is working and improves when her hands and not moving. The numbness and tingling lasts for minutes but the pain around her wrist can be for a few days. She denies any weakness. EMG/nerve conduction study has been normal.  She is taking gabapentin 300 mg p.o. twice daily and that has helped her. She uses right elbow splint and bilateral hand splints. Stable. Regarding the headaches, she has been having migraines for a long time and her headaches start of pain in the left eye and left side of the face and it is 10 out of 10 in severity, associated with photophobia, phonophobia and nausea but she denies any vomiting. The headache can last from 3 days to week. It is throbbing in character. Since the last visit, the headaches have improved and she did have 2 headaches in the last 3 months. She does also have obstructive sleep apnea and is using CPAP 50% of the time Risk Factors for headaches Smoking: Half pack a day Coffee: Denies cups/day Tea: 0 cups/day Soda: 2 cans/day Water: 4 glasses/day Sleeps at 9 AM and wakes up at 2 PM to 3 PM. Medications tried Imitrex Amitriptyline Nortriptyline Current Outpatient Prescriptions Medication Sig  
 nortriptyline (PAMELOR) 50 mg capsule Take 1 Cap by mouth nightly for 180 days.  gabapentin (NEURONTIN) 300 mg capsule TAKE 1 CAP BY MOUTH THREE (3) TIMES DAILY.  spironolactone (ALDACTONE) 25 mg tablet Take 1 Tab by mouth two (2) times a day. Indications: ALDOSTERONISM, hypertension  spironolactone (ALDACTONE) 25 mg tablet Take 1 Tab by mouth two (2) times a day. Indications: ALDOSTERONISM, hypertension  insulin glargine (TOUJEO SOLOSTAR) 300 unit/mL (1.5 mL) inpn 8 Units by SubCUTAneous route daily.  Diclofenac Potassium (CAMBIA) 50 mg pwpk Take 1 Package by mouth daily as needed.  Arm Brace misc Right ulnar splint for ulnar neuropathy at the elbow.  omeprazole (PRILOSEC) 40 mg capsule Take 1 Cap by mouth daily.  glipiZIDE (GLUCOTROL) 5 mg tablet Take 2 Tabs by mouth two (2) times a day.  SITagliptin-metFORMIN (JANUMET) 50-1,000 mg per tablet Take 1 Tab by mouth two (2) times daily (with meals).  metoprolol tartrate (LOPRESSOR) 100 mg IR tablet Take 1 Tab by mouth two (2) times a day.  butalbital-acetaminophen-caffeine (FIORICET) -40 mg per tablet Take 0.5-1 Tabs by mouth every six (6) hours as needed for Headache. Max Daily Amount: 4 Tabs.  glucose blood VI test strips (ONETOUCH VERIO) strip Check sugars two times daily. E11.9  Blood-Glucose Meter (ONETOUCH VERIO IQ METER) misc Test blood sugar once daily  codeine-butalbital-aspirin-caffeine (BUTALBITAL COMPOUND-CODEINE) 36--40 mg per capsule Take 1 Cap by mouth every eight (8) hours as needed for Pain or Headache. Max Daily Amount: 3 Caps.  cetirizine (ZYRTEC) 10 mg tablet Take  by mouth daily.   
 albuterol (PROVENTIL HFA, VENTOLIN HFA) 90 mcg/actuation inhaler Take 1 Puff by inhalation every four (4) hours as needed for Wheezing.  albuterol (ACCUNEB) 1.25 mg/3 mL nebulizer solution Take 3 mL by inhalation every six (6) hours as needed for Wheezing.  acetaminophen (TYLENOL EXTRA STRENGTH) 500 mg tablet Take 1 Tab by mouth every six (6) hours as needed for Pain. No current facility-administered medications for this visit. REVIEW OF SYSTEMS:  
A ten system review of constitutional, cardiovascular, respiratory, musculoskeletal, endocrine, skin, SHEENT, genitourinary, psychiatric and neurologic systems was obtained and is unremarkable except as stated in HPI EXAMINATION:  
Visit Vitals  /74  Pulse (!) 101  Ht 5' 3\" (1.6 m)  Wt 349 lb 12.8 oz (158.7 kg)  SpO2 98%  BMI 61.96 kg/m2 General:  
General appearance: Pt is in no acute distress Distal pulses are preserved Neurological Examination:  
Mental Status: AAO x3. Speech is fluent. Follows commands, has normal fund of knowledge, attention, short term recall, comprehension and insight. Cranial Nerves: Visual fields are full. PERRL, Extraocular movements are full. Facial sensation intact V1- V3. Facial movement intact, symmetric. Hearing intact to conversation. Palate elevates symmetrically. Shoulder shrug symmetric. Tongue midline. Motor: Strength is 5/5 in all 4 ext. No atrophy. Tone: Normal 
 
Sensation: Decreased pinprick sensation in the medial 2 fingers of the right hand and the lateral 3 fingers on the left hand Coordination/Cerebellar: Intact to finger-nose-finger Skin: No significant bruising or lacerations. Laboratory review:  
Results for orders placed or performed in visit on 06/20/17 AMB POC HEMOGLOBIN A1C Result Value Ref Range Hemoglobin A1c (POC) 11.2 % Laboratory review: 
12/20/2016 Glucose 352, potassium 5.3, platelet 141, AIO6S 9.3 Imaging review: 
7/27/2014 CT soft tissue neck with contrast  
 No evidence of neck mass, abscess or lymphadenopathy 3/16/2017 EMG/nerve conduction study Nerve conduction studies of the bilateral upper extremities were unremarkable. Needle electrode examination of the right upper extremity was unremarkable. This is a normal study. There is no electrophysiological evidence of median neuropathy at the wrist or and ulnar neuropathy at the elbow on either side. There is no electrophysiological evidence of right cervical radiculopathy. 4/14/2017 MRI of the brain without contrast 
Normal 
 
MRI cervical spine Straightening of the usual cervical lordosis but otherwise normal study Documentation review: I reviewed the notes from Dr. Bev Pedersen from 9/1/2017. The patient just switched to nasal mask which she likes much better. Download was reviewed. Over the past 30 days she has used it only for 10 days. Uses about 2.5 hours on Tuesdays. She states that she will try using it nightly for a couple of months and then return in follow-up. If she is struggling to use it after using it nightly, he will consider an attended titration to address barriers to adherence. Assessment/Plan:  
Donald Smallwood is a 32 y.o. female who presented to the neurology office for management of migraines and clinically does have right ulnar neuropathy and left carpal tunnel syndrome. She does also have obstructive sleep apnea and does smoke. Her EMG/nerve conduction study has been normal. 
 
She states that the gabapentin has helped her with the paresthesia in her hands. We will continue 300 mg p.o. twice daily. She does also use right elbow splint and bilateral hand splints and will continue with them as well. For her headaches she is taking nortriptyline 50 mg p.o. nightly and that helps her. She has had 2 headaches in the last 3 months and will continue with nortriptyline.  
 
She does also have obstructive sleep apnea and that may be contributing to her migraines. She is noncompliant with her CPAP at this time. I gave her within 3 minutes counseling regarding smoking cessation. Follow-up in 6 months ICD-10-CM ICD-9-CM 1. Migraine without aura and without status migrainosus, not intractable G43.009 346.10 nortriptyline (PAMELOR) 50 mg capsule 2. Carpal tunnel syndrome of right wrist G56.01 354.0 3. Tobacco use Z72.0 305.1 4. PETE (obstructive sleep apnea) G47.33 327.23   
5. Ulnar neuropathy at elbow, right G56.21 354. 2 Thank you for allowing me to participate in the care of Ms. Vanessa Saunders. Please feel free to contact me if you have any questions. Dorothea Aguirre MD 
Neurologist and Clinical Neurophysiologist 
 
CC: Ilir Stover MD 
Fax: [phone] This note will not be viewable in [address] If you have questions, please do not hesitate to call me. I look forward to following Ms. Vanessa Saunders along with you. Sincerely, Dorothea Aguirre MD

## 2017-10-04 ENCOUNTER — HOSPITAL ENCOUNTER (EMERGENCY)
Age: 27
Discharge: HOME | End: 2017-10-04
Attending: FAMILY MEDICINE

## 2017-10-04 VITALS
RESPIRATION RATE: 18 BRPM | WEIGHT: 293 LBS | DIASTOLIC BLOOD PRESSURE: 77 MMHG | SYSTOLIC BLOOD PRESSURE: 134 MMHG | TEMPERATURE: 97.7 F | BODY MASS INDEX: 51.91 KG/M2 | HEIGHT: 63 IN | HEART RATE: 87 BPM | OXYGEN SATURATION: 97 %

## 2017-10-04 DIAGNOSIS — M70.61 TROCHANTERIC BURSITIS, RIGHT HIP: Primary | ICD-10-CM

## 2017-10-04 RX ORDER — METHOCARBAMOL 750 MG/1
750 TABLET, FILM COATED ORAL
Qty: 20 TAB | Refills: 0 | Status: SHIPPED | OUTPATIENT
Start: 2017-10-04 | End: 2018-08-09

## 2017-10-04 RX ORDER — TRAMADOL HYDROCHLORIDE 50 MG/1
50 TABLET ORAL
Qty: 12 TAB | Refills: 0 | Status: SHIPPED
Start: 2017-10-04 | End: 2018-04-25 | Stop reason: ALTCHOICE

## 2017-10-04 RX ORDER — DICLOFENAC SODIUM 75 MG/1
75 TABLET, DELAYED RELEASE ORAL 2 TIMES DAILY
Qty: 30 TAB | Refills: 0 | Status: SHIPPED | OUTPATIENT
Start: 2017-10-04 | End: 2018-08-09

## 2017-10-04 NOTE — DISCHARGE INSTRUCTIONS
Hip Bursitis: Care Instructions  Your Care Instructions    Bursitis is inflammation of the bursa. A bursa is a small sac of fluid that cushions a joint and helps it move easily. A bursa sits between a bone in the hip and the muscles and tendons in the thigh and buttock. Injury or overuse of the hip can cause bursitis. Activities that can lead to bursitis include twisting and rapid joint movement. Bursitis can cause hip pain. Bursitis usually gets better if you avoid the activity that caused it. If pain lasts or gets worse despite home treatment, your doctor may draw fluid from the bursa through a needle. This may relieve your pain and help your doctor know if you have an infection. If so, your doctor will prescribe antibiotics. If you have inflammation only, you may get a corticosteroid shot to reduce swelling and pain. Sometimes surgery is needed to drain or remove the bursa. Follow-up care is a key part of your treatment and safety. Be sure to make and go to all appointments, and call your doctor if you are having problems. It's also a good idea to know your test results and keep a list of the medicines you take. How can you care for yourself at home? · Put ice or a cold pack on your hip for 10 to 20 minutes at a time. Put a thin cloth between the ice and your skin. · After 3 days of using ice, you may use heat on your hip. You can use a hot water bottle, a heating pad set on low, or a warm, moist towel. · Rest your hip. Stop any activities that cause pain. Switch to activities that do not stress your hip. · Take your medicines exactly as prescribed. Call your doctor if you think you are having a problem with your medicine. · Ask your doctor if you can take an over-the-counter pain medicine, such as acetaminophen (Tylenol), ibuprofen (Advil, Motrin), or naproxen (Aleve). Be safe with medicines. Read and follow all instructions on the label.   · To prevent stiffness, gently move the hip joint as much as you can without pain every day. As the pain gets better, keep doing range-of-motion exercises. Ask your doctor for exercises that will make the muscles around the hip joint stronger. Do these as directed. · You can slowly return to the activity that caused the pain, but do it with less effort until you can do it without pain or swelling. Be sure to warm up before and stretch after you do the activity. When should you call for help? Call your doctor now or seek immediate medical care if:  · You have a fever. · You have increased swelling or redness in your hip. · You cannot use your hip, or the pain in your hip gets worse. Watch closely for changes in your health, and be sure to contact your doctor if:  · You have pain for 2 weeks or longer despite home treatment. Where can you learn more? Go to http://donavon-stacy.info/. Enter D871 in the search box to learn more about \"Hip Bursitis: Care Instructions. \"  Current as of: March 21, 2017  Content Version: 11.3  © 5205-5084 [employer]. Care instructions adapted under license by [employer] (which disclaims liability or warranty for this information). If you have questions about a medical condition or this instruction, always ask your healthcare professional. [address] any warranty or liability for your use of this information. Trochanteric Bursitis: Exercises  Your Care Instructions  Here are some examples of typical rehabilitation exercises for your condition. Start each exercise slowly. Ease off the exercise if you start to have pain. Your doctor or physical therapist will tell you when you can start these exercises and which ones will work best for you. How to do the exercises  Hamstring wall stretch    1. Lie on your back in a doorway, with your good leg through the open door. 2. Slide your affected leg up the wall to straighten your knee.  You should feel a gentle stretch down the back of your leg. ¨ Do not arch your back. ¨ Do not bend either knee. ¨ Keep one heel touching the floor and the other heel touching the wall. Do not point your toes. 3. Hold the stretch for at least 1 minute to begin. Then try to lengthen the time you hold the stretch to as long as 6 minutes. 4. Repeat 2 to 4 times. If you do not have a place to do this exercise in a doorway, there is another way to do it:  1. Lie on your back, and bend the knee of your affected leg. 2. Loop a towel under the ball and toes of that foot, and hold the ends of the towel in your hands. 3. Straighten your knee, and slowly pull back on the towel. You should feel a gentle stretch down the back of your leg. 4. Hold the stretch for 15 to 30 seconds. Or even better, hold the stretch for 1 minute if you can. 5. Repeat 2 to 4 times. Straight-leg raises to the outside    1. Lie on your side, with your affected leg on top. 2. Tighten the front thigh muscles of your top leg to keep your knee straight. 3. Keep your hip and your leg straight in line with the rest of your body, and keep your knee pointing forward. Do not drop your hip back. 4. Lift your top leg straight up toward the ceiling, about 12 inches off the floor. Hold for about 6 seconds, then slowly lower your leg. 5. Repeat 8 to 12 times. Clamshell    1. Lie on your side, with your affected leg on top and your head propped on a pillow. Keep your feet and knees together and your knees bent. 2. Raise your top knee, but keep your feet together. Do not let your hips roll back. Your legs should open up like a clamshell. 3. Hold for 6 seconds. 4. Slowly lower your knee back down. Rest for 10 seconds. 5. Repeat 8 to 12 times. Standing quadriceps stretch    1. If you are not steady on your feet, hold on to a chair, counter, or wall. You can also lie on your stomach or your side to do this exercise.   2. Bend the knee of the leg you want to stretch, and reach behind you to grab the front of your foot or ankle with the hand on the same side. For example, if you are stretching your right leg, use your right hand. 3. Keeping your knees next to each other, pull your foot toward your buttock until you feel a gentle stretch across the front of your hip and down the front of your thigh. Your knee should be pointed directly to the ground, and not out to the side. 4. Hold the stretch for 15 to 30 seconds. 5. Repeat 2 to 4 times. Piriformis stretch    1. Lie on your back with your legs straight. 2. Lift your affected leg and bend your knee. With your opposite hand, reach across your body, and then gently pull your knee toward your opposite shoulder. 3. Hold the stretch for 15 to 30 seconds. 4. Repeat 2 to 4 times. Double knee-to-chest    1. Lie on your back with your knees bent and your feet flat on the floor. You can put a small pillow under your head and neck if it is more comfortable. 2. Bring both knees to your chest.  3. Keep your lower back pressed to the floor. Hold for 15 to 30 seconds. 4. Relax, and lower your knees to the starting position. 5. Repeat 2 to 4 times. Follow-up care is a key part of your treatment and safety. Be sure to make and go to all appointments, and call your doctor if you are having problems. It's also a good idea to know your test results and keep a list of the medicines you take. Where can you learn more? Go to http://donavon-stacy.info/. Enter F466 in the search box to learn more about \"Trochanteric Bursitis: Exercises. \"  Current as of: March 21, 2017  Content Version: 11.3  © 5214-5795 [employer]. Care instructions adapted under license by "[employer]" (which disclaims liability or warranty for this information).  If you have questions about a medical condition or this instruction, always ask your healthcare professional. [place] [address] any warranty or liability for your use of this information.

## 2017-10-11 NOTE — UC PROVIDER NOTE
Patient is a 32 y.o. female presenting with hip pain. Hip Injury    This is a new problem. The current episode started yesterday. The problem occurs constantly. The problem has not changed since onset. The pain is present in the left hip. The quality of the pain is described as aching. The pain is at a severity of 9/10. The pain is severe. Associated symptoms include limited range of motion (pain ) and stiffness. The symptoms are aggravated by activity, standing, movement and palpation. She has tried nothing for the symptoms. There has been no history of extremity trauma.         Past Medical History:   Diagnosis Date    Asthma     Cornish hump 1/7/2015    Carpal tunnel syndrome of right wrist 12/20/2016    Diabetes ([facility] [address]     GERD (gastroesophageal reflux disease) 8/13/2014    HTN (hypertension) 7/25/2011    Hyperaldosteronism ([facility] [address] 10/19/2015    Other ill-defined conditions(799.89)     migraines        Past Surgical History:   Procedure Laterality Date    REMOVAL OF TONSILS,<12 [de-identified]           Family History   Problem Relation Age of Onset   Jigna Walsh Arthritis-rheumatoid Mother     Hypertension Mother     Diabetes Mother      Type II    Psychiatric Disorder Father     Drug Abuse Father     Cancer Other      prostate    Hypertension Sister     Thyroid Disease Sister      \"I think\"    Attention Deficit Hyperactivity Disorder Brother     Hypertension Sister         Social History     Social History    Marital status: SINGLE     Spouse name: N/A    Number of children: N/A    Years of education: N/A     Occupational History    customer ser and student      [employer]     Social History Main Topics    Smoking status: Current Every Day Smoker     Packs/day: 0.50     Years: 5.00    Smokeless tobacco: Never Used    Alcohol use 0.0 oz/week     1 Standard drinks or equivalent per week      Comment: socially, Monthly or less    Drug use: No    Sexual activity: Not Currently     Partners: Male     Birth control/ protection: Condom     Other Topics Concern    Not on file     Social History Narrative                ALLERGIES: Latex    Review of Systems   Musculoskeletal: Positive for stiffness. All other systems reviewed and are negative. Vitals:    10/04/17 1858   BP: 134/77   Pulse: 87   Resp: 18   Temp: 97.7 °F (36.5 °C)   SpO2: 97%   Weight: 157.9 kg (348 lb)   Height: 5' 3\" (1.6 m)       Physical Exam   Musculoskeletal:        Left hip: She exhibits decreased range of motion and tenderness (on trochenteruic bursa). She exhibits no bony tenderness and no swelling. Left knee: Normal.        Lumbar back: Normal.   Nursing note and vitals reviewed. MDM     Differential Diagnosis; Clinical Impression; Plan:     CLINICAL IMPRESSION:  Trochanteric bursitis, right hip  (primary encounter diagnosis)      DDX    Plan:    voltarin- robaxin and tramadol as needed. Apply ICe and self exercise    Follow with ortho if not better   Amount and/or Complexity of Data Reviewed:    Review and summarize past medical records:  Yes  Risk of Significant Complications, Morbidity, and/or Mortality:   Presenting problems: Moderate  Diagnostic procedures:  High  Management options:   Moderate  Progress:   Patient progress:  Stable      Procedures

## 2017-10-26 DIAGNOSIS — G56.01 CARPAL TUNNEL SYNDROME OF RIGHT WRIST: ICD-10-CM

## 2017-10-26 DIAGNOSIS — E11.9 TYPE 2 DIABETES MELLITUS WITHOUT COMPLICATION, WITHOUT LONG-TERM CURRENT USE OF INSULIN (HCC): ICD-10-CM

## 2017-10-26 DIAGNOSIS — Z23 ENCOUNTER FOR IMMUNIZATION: ICD-10-CM

## 2017-10-26 DIAGNOSIS — E11.9 DIABETES MELLITUS WITHOUT COMPLICATION (HCC): ICD-10-CM

## 2017-11-16 DIAGNOSIS — Z23 ENCOUNTER FOR IMMUNIZATION: ICD-10-CM

## 2017-11-16 DIAGNOSIS — E11.9 DIABETES MELLITUS WITHOUT COMPLICATION (HCC): ICD-10-CM

## 2017-11-16 DIAGNOSIS — E11.9 TYPE 2 DIABETES MELLITUS WITHOUT COMPLICATION, WITHOUT LONG-TERM CURRENT USE OF INSULIN (HCC): ICD-10-CM

## 2017-11-16 DIAGNOSIS — G56.01 CARPAL TUNNEL SYNDROME OF RIGHT WRIST: ICD-10-CM

## 2017-11-21 RX ORDER — GLIPIZIDE 5 MG/1
TABLET ORAL
Qty: 120 TAB | Refills: 3 | Status: SHIPPED | OUTPATIENT
Start: 2017-11-21 | End: 2018-04-25 | Stop reason: SDUPTHER

## 2018-01-25 ENCOUNTER — OFFICE VISIT (OUTPATIENT)
Dept: FAMILY MEDICINE CLINIC | Age: 28
End: 2018-01-25

## 2018-01-25 VITALS
SYSTOLIC BLOOD PRESSURE: 147 MMHG | HEART RATE: 98 BPM | RESPIRATION RATE: 14 BRPM | DIASTOLIC BLOOD PRESSURE: 74 MMHG | TEMPERATURE: 98.5 F | WEIGHT: 293 LBS | OXYGEN SATURATION: 98 % | BODY MASS INDEX: 51.91 KG/M2 | HEIGHT: 63 IN

## 2018-01-25 DIAGNOSIS — Z79.4 TYPE 2 DIABETES MELLITUS WITHOUT COMPLICATION, WITH LONG-TERM CURRENT USE OF INSULIN (HCC): ICD-10-CM

## 2018-01-25 DIAGNOSIS — I10 ESSENTIAL HYPERTENSION: ICD-10-CM

## 2018-01-25 DIAGNOSIS — E66.01 OBESITY, MORBID (MORE THAN 100 LBS OVER IDEAL WEIGHT OR BMI > 40) (HCC): ICD-10-CM

## 2018-01-25 DIAGNOSIS — E26.9 HYPERALDOSTERONISM (HCC): Primary | ICD-10-CM

## 2018-01-25 DIAGNOSIS — E11.9 TYPE 2 DIABETES MELLITUS WITHOUT COMPLICATION, WITH LONG-TERM CURRENT USE OF INSULIN (HCC): ICD-10-CM

## 2018-01-25 LAB — HBA1C MFR BLD HPLC: 8.3 %

## 2018-01-25 RX ORDER — PHENTERMINE HYDROCHLORIDE 37.5 MG/1
37.5 TABLET ORAL
Qty: 30 TAB | Refills: 0 | Status: SHIPPED | OUTPATIENT
Start: 2018-02-25 | End: 2018-04-25 | Stop reason: ALTCHOICE

## 2018-01-25 RX ORDER — NORETHINDRONE 0.35 MG/1
TABLET ORAL
Refills: 2 | COMMUNITY
Start: 2018-01-18 | End: 2019-09-24

## 2018-01-25 RX ORDER — PHENTERMINE HYDROCHLORIDE 37.5 MG/1
37.5 TABLET ORAL
Qty: 30 TAB | Refills: 0 | Status: SHIPPED | OUTPATIENT
Start: 2018-01-25 | End: 2018-04-25 | Stop reason: ALTCHOICE

## 2018-01-25 RX ORDER — FLUCONAZOLE 150 MG/1
TABLET ORAL
Refills: 0 | COMMUNITY
Start: 2018-01-19 | End: 2018-01-25 | Stop reason: ALTCHOICE

## 2018-01-25 RX ORDER — NORTRIPTYLINE HYDROCHLORIDE 50 MG/1
CAPSULE ORAL
Refills: 1 | COMMUNITY
Start: 2018-01-04 | End: 2018-09-27 | Stop reason: SDUPTHER

## 2018-01-25 RX ORDER — NYSTATIN AND TRIAMCINOLONE ACETONIDE 100000; 1 [USP'U]/G; MG/G
OINTMENT TOPICAL
Refills: 0 | COMMUNITY
Start: 2018-01-05 | End: 2018-08-09

## 2018-01-25 RX ORDER — NITROFURANTOIN 25; 75 MG/1; MG/1
CAPSULE ORAL
Refills: 0 | COMMUNITY
Start: 2018-01-19 | End: 2018-04-25 | Stop reason: ALTCHOICE

## 2018-01-25 RX ORDER — ROSUVASTATIN CALCIUM 20 MG/1
20 TABLET, COATED ORAL
Qty: 30 TAB | Refills: 6 | Status: SHIPPED | OUTPATIENT
Start: 2018-01-25 | End: 2018-04-25 | Stop reason: ALTCHOICE

## 2018-01-25 NOTE — PROGRESS NOTES
Name and  verified        Chief Complaint   Patient presents with    Diabetes         Health Maintenance reviewed-discussed with patient. 1. Have you been to the ER, urgent care clinic since your last visit? Hospitalized since your last visit? No    2. Have you seen or consulted any other health care providers outside of the [address] [place] [address] since your last visit? Include any pap smears or colon screening. No     Patient stated last PAP exam  and last eye exam . Order placed for HGB A1C, per Verbal Order from Dr. Zaynab Fall on 2018 due to DM.

## 2018-01-25 NOTE — MR AVS SNAPSHOT
[facility] [facility] [id number] 
[phone] Patient: Daniella Melendez MRN: [id number] Visit Information Date & Time Provider Department Dept. Phone Encounter #  
 1/25/2018  2:30 PM Yeison Garcia MD [address] [place] [address] OFFICE-ANNEX [phone] [id number] Follow-up Instructions Return in about 3 months (around 4/25/2018), or if symptoms worsen or fail to improve. Follow-up and Disposition History Your Appointments 3/28/2018  8:40 AM  
Follow Up with Vinayak Curtis MD  
[facility]) [facility]: [facility], 
[address] [place] [address], [address] [phone]  
[address] [facility], [address] [place] [address], [address] [phone] Upcoming Health Maintenance Date Due  
 EYE EXAM RETINAL OR DILATED Q1 1/7/2016 PAP AKA CERVICAL CYTOLOGY 2/12/2017 FOOT EXAM Q1 9/26/2017 HEMOGLOBIN A1C Q6M 12/20/2017 MICROALBUMIN Q1 12/20/2017 LIPID PANEL Q1 3/20/2018 DTaP/Tdap/Td series (2 - Td) 8/27/2022 Allergies as of 1/25/2018  Review Complete On: 1/25/2018 By: Bonnie Cabral LPN Severity Noted Reaction Type Reactions Latex  08/27/2014   Side Effect Itching Current Immunizations  Reviewed on 3/20/2017 Name Date Influenza Vaccine (Quad) PF 12/20/2016, 10/19/2015  8:35 AM  
 Influenza Vaccine (Quadrivalent) 1/5/2018 Influenza Vaccine PF 9/27/2013 Pneumococcal Polysaccharide (PPSV-23) 9/27/2013 TDAP Vaccine 8/27/2012 Not reviewed this visit You Were Diagnosed With   
  
 Codes Comments Hyperaldosteronism ([facility] [place] [facility]    -  Primary ICD-10-CM: E26.9 ICD-9-CM: 255.10 Essential hypertension     ICD-10-CM: I10 
ICD-9-CM: 401.9  Obesity, morbid (more than 100 lbs over ideal weight or BMI > 40) (HCC)     ICD-10-CM: E66.01 
 ICD-9-CM: 278.01 Type 2 diabetes mellitus without complication, with long-term current use of insulin (HCC)     ICD-10-CM: E11.9, Z79.4 ICD-9-CM: 250.00, V58.67 Vitals BP Pulse Temp Resp Height(growth percentile) Weight(growth percentile) 147/74 (BP 1 Location: Left arm, BP Patient Position: At rest) 98 98.5 °F (36.9 °C) (Oral) 14 5' 3\" (1.6 m) (!) 361 lb (163.7 kg) LMP SpO2 BMI OB Status Smoking Status 09/30/2017 98% 63.95 kg/m2 Having regular periods Current Every Day Smoker Vitals History BMI and BSA Data Body Mass Index Body Surface Area 63.95 kg/m 2 2.7 m 2 Preferred Pharmacy Pharmacy Name Phone [id number] [place] [facility] [address] [phone] Your Updated Medication List  
  
   
This list is accurate as of: 1/25/18  3:26 PM.  Always use your most recent med list.  
  
  
  
  
 acetaminophen 500 mg tablet Commonly known as:  [address] [employer] ([employer]), [facility] Take 1 Tab by mouth every six (6) hours as needed for Pain. * albuterol 1.25 mg/3 mL Nebu Commonly known as:  Barry Proper Take 3 mL by inhalation every six (6) hours as needed for Wheezing. * albuterol 90 mcg/actuation inhaler Commonly known as:  PROVENTIL HFA, VENTOLIN HFA, PROAIR HFA Take 1 Puff by inhalation every four (4) hours as needed for Wheezing. Arm Brace [facility] Right ulnar splint for ulnar neuropathy at the elbow. Blood-Glucose Meter Misc Commonly known as:  Irma Barley IQ METER Test blood sugar once daily  
  
 butalbital-acetaminophen-caffeine -40 mg per tablet Commonly known as:  [employer] Take 0.5-1 Tabs by mouth every six (6) hours as needed for Headache. Max Daily Amount: 4 Tabs. HUNG 0.35 mg Tab Generic drug:  norethindrone TAKE 1 TABLET BY MOUTH EVERY DAY  
  
 diclofenac EC 75 mg EC tablet Commonly known as:  VOLTAREN Take 1 Tab by mouth two (2) times a day. Diclofenac Potassium 50 mg Pwpk Commonly known as:  CAMBIA Take 1 Package by mouth daily as needed. gabapentin 300 mg capsule Commonly known as:  NEURONTIN  
TAKE 1 CAP BY MOUTH THREE (3) TIMES DAILY. glipiZIDE 5 mg tablet Commonly known as:  GLUCOTROL  
TAKE 2 TABS BY MOUTH TWO (2) TIMES A DAY. glucose blood VI test strips strip Commonly known as:  Lynnette Navjot Check sugars two times daily. E11.9  
  
 insulin glargine 300 unit/mL (1.5 mL) Inpn Commonly known as:  TOUJEO SOLOSTAR  
10 Units by SubCUTAneous route daily. methocarbamol 750 mg tablet Commonly known as:  ROBAXIN Take 1 Tab by mouth two (2) times daily as needed for Pain.  
  
 metoprolol tartrate 100 mg IR tablet Commonly known as:  LOPRESSOR Take 1 Tab by mouth two (2) times a day. nitrofurantoin (macrocrystal-monohydrate) 100 mg capsule Commonly known as:  MACROBID  
TAKE ONE CAPSULE BY MOUTH TWICE A DAY  
  
 nortriptyline 50 mg capsule Commonly known as:  PAMELOR  
TAKE ONE CAPSULE BY MOUTH EVERY NIGHT  
  
 nystatin-triamcinolone 100,000-0.1 unit/gram-% ointment Commonly known as:  MYCOLOG  
APPLY EXTERNALLY TWICE DAILY FOR NO LONGER THAN 2 WEEKS  
  
 omeprazole 40 mg capsule Commonly known as:  PRILOSEC Take 1 Cap by mouth daily. * phentermine 37.5 mg tablet Commonly known as:  ADIPEX-P Take 1 Tab by mouth every morning. Max Daily Amount: 37.5 mg.  
  
 * phentermine 37.5 mg tablet Commonly known as:  ADIPEX-P Take 1 Tab by mouth every morning. Max Daily Amount: 37.5 mg.  
Start taking on:  2/25/2018  
  
 rosuvastatin 20 mg tablet Commonly known as:  CRESTOR Take 1 Tab by mouth nightly. SITagliptin-metFORMIN 50-1,000 mg per tablet Commonly known as:  Isaac Dear Take 1 Tab by mouth two (2) times daily (with meals). spironolactone 25 mg tablet Commonly known as:  ALDACTONE Take 1 Tab by mouth two (2) times a day.  Indications: ALDOSTERONISM, hypertension  
  
 traMADol 50 mg tablet Commonly known as:  ULTRAM  
Take 1 Tab by mouth every eight (8) hours as needed for Pain. Max Daily Amount: 150 mg.  
  
 ZyrTEC 10 mg tablet Generic drug:  cetirizine Take  by mouth daily. * Notice: This list has 4 medication(s) that are the same as other medications prescribed for you. Read the directions carefully, and ask your doctor or other care provider to review them with you. Prescriptions Printed Refills  
 phentermine (ADIPEX-P) 37.5 mg tablet 0 Sig: Take 1 Tab by mouth every morning. Max Daily Amount: 37.5 mg.  
 Class: Print Route: Oral  
 phentermine (ADIPEX-P) 37.5 mg tablet 0 Starting on: 2/25/2018 Sig: Take 1 Tab by mouth every morning. Max Daily Amount: 37.5 mg.  
 Class: Print Route: Oral  
  
Prescriptions Sent to Pharmacy Refills  
 rosuvastatin (CRESTOR) 20 mg tablet 6 Sig: Take 1 Tab by mouth nightly. Class: Normal  
 Pharmacy: [facility]pharmacy [address] [place] [address] [address]  #: [phone] Route: Oral  
  
We Performed the Following AMB POC HEMOGLOBIN A1C [58407 CPT(R)] MICROALBUMIN, UR, RAND W/ MICROALBUMIN/CREA RATIO [id number] CPT(R)] REFERRAL TO PODIATRY [REF90 Custom] Comments:  
 Please evaluate patient for DM. Follow-up Instructions Return in about 3 months (around 4/25/2018), or if symptoms worsen or fail to improve. Referral Information Referral ID Referred By Referred To  
  
 [id number] Nohemy TAMEZ [facility], [address] [place] [address] Phone: [phone] Visits Status Start Date End Date 1 New Request 1/25/18 1/25/19 If your referral has a status of pending review or denied, additional information will be sent to support the outcome of this decision. Patient Instructions Learning About Diabetes Food Guidelines Your Care Instructions Meal planning is important to manage diabetes. It helps keep your blood sugar at a target level (which you set with your doctor). You don't have to eat special foods. You can eat what your family eats, including sweets once in a while. But you do have to pay attention to how often you eat and how much you eat of certain foods. You may want to work with a dietitian or a certified diabetes educator (CDE) to help you plan meals and snacks. A dietitian or CDE can also help you lose weight if that is one of your goals. What should you know about eating carbs? Managing the amount of carbohydrate (carbs) you eat is an important part of healthy meals when you have diabetes. Carbohydrate is found in many foods. · Learn which foods have carbs. And learn the amounts of carbs in different foods. ¨ Bread, cereal, pasta, and rice have about 15 grams of carbs in a serving. A serving is 1 slice of bread (1 ounce), ½ cup of cooked cereal, or 1/3 cup of cooked pasta or rice. ¨ Fruits have 15 grams of carbs in a serving. A serving is 1 small fresh fruit, such as an apple or orange; ½ of a banana; ½ cup of cooked or canned fruit; ½ cup of fruit juice; 1 cup of melon or raspberries; or 2 tablespoons of dried fruit. ¨ Milk and no-sugar-added yogurt have 15 grams of carbs in a serving. A serving is 1 cup of milk or 2/3 cup of no-sugar-added yogurt. ¨ Starchy vegetables have 15 grams of carbs in a serving. A serving is ½ cup of mashed potatoes or sweet potato; 1 cup winter squash; ½ of a small baked potato; ½ cup of cooked beans; or ½ cup cooked corn or green peas. · Learn how much carbs to eat each day and at each meal. A dietitian or CDE can teach you how to keep track of the amount of carbs you eat. This is called carbohydrate counting. · If you are not sure how to count carbohydrate grams, use the Plate Method to plan meals.  It is a good, quick way to make sure that you have a balanced meal. It also helps you spread carbs throughout the day. ¨ Divide your plate by types of foods. Put non-starchy vegetables on half the plate, meat or other protein food on one-quarter of the plate, and a grain or starchy vegetable in the final quarter of the plate. To this you can add a small piece of fruit and 1 cup of milk or yogurt, depending on how many carbs you are supposed to eat at a meal. 
· Try to eat about the same amount of carbs at each meal. Do not \"save up\" your daily allowance of carbs to eat at one meal. 
· Proteins have very little or no carbs per serving. Examples of proteins are beef, chicken, turkey, fish, eggs, tofu, cheese, cottage cheese, and peanut butter. A serving size of meat is 3 ounces, which is about the size of a deck of cards. Examples of meat substitute serving sizes (equal to 1 ounce of meat) are 1/4 cup of cottage cheese, 1 egg, 1 tablespoon of peanut butter, and ½ cup of tofu. How can you eat out and still eat healthy? · Learn to estimate the serving sizes of foods that have carbohydrate. If you measure food at home, it will be easier to estimate the amount in a serving of restaurant food. · If the meal you order has too much carbohydrate (such as potatoes, corn, or baked beans), ask to have a low-carbohydrate food instead. Ask for a salad or green vegetables. · If you use insulin, check your blood sugar before and after eating out to help you plan how much to eat in the future. · If you eat more carbohydrate at a meal than you had planned, take a walk or do other exercise. This will help lower your blood sugar. What else should you know? · Limit saturated fat, such as the fat from meat and dairy products. This is a healthy choice because people who have diabetes are at higher risk of heart disease. So choose lean cuts of meat and nonfat or low-fat dairy products. Use olive or canola oil instead of butter or shortening when cooking. · Don't skip meals. Your blood sugar may drop too low if you skip meals and take insulin or certain medicines for diabetes. · Check with your doctor before you drink alcohol. Alcohol can cause your blood sugar to drop too low. Alcohol can also cause a bad reaction if you take certain diabetes medicines. Follow-up care is a key part of your treatment and safety. Be sure to make and go to all appointments, and call your doctor if you are having problems. It's also a good idea to know your test results and keep a list of the medicines you take. Where can you learn more? Go to http://donavon-stacy.info/. Enter Y046 in the search box to learn more about \"Learning About Diabetes Food Guidelines. \" Current as of: March 13, 2017 Content Version: 11.4 © 2832-7439 Sk[employer]heet. Care instructions adapted under license by [employer] (which disclaims liability or warranty for this information). If you have questions about a medical condition or this instruction, always ask your healthcare professional. [place] [address] any warranty or liability for your use of this information. Learning About Stopping Smoking Before Surgery How does smoking affect surgery risks? After a surgery, your body puts all of its energy into healing. Smoking makes this harder. It cuts the amount of oxygen available to your body to heal. And smoking makes infection and complications more of a risk. How does being smoke-free help you recover from surgery? When you quit smoking before surgery, you lower your risk of these things after surgery: 
· Heart problems · Breathing problems and pneumonia · Wound infection · Healing problems With every day, week, or month that you've been smoke-free before surgery, you improve your chances of having a healthy recovery. Quitting also improves your health.  Your risk of things like heart attack and stroke start to go down as soon as you quit. And the longer you're smoke-free, the lower your risk of things like cancer and lung disease. When you're smoke-free, you get sick less often. You are less likely to get colds, flu, bronchitis, and pneumonia. How can you be smoke-free before and after surgery? Your doctor will help you set up the plan that best meets your needs. There are medicines that may help. You may want to attend a smoking cessation program to help you quit smoking. When you choose a program, look for one that has proven success. Ask your doctor for ideas. Ask your doctor if you can try medicine. You will greatly increase your chances of success if you take medicine along with getting counseling or joining a cessation program. 
Here are some other things you can do: · Ask your family, friends, and coworkers for support. You have a better chance of quitting if you have help and support. · Join a support group, such as Nicotine Anonymous, for people who are trying to quit smoking. Or use an online program, such as www.smokefree. gov or the American Lung Association's [place] from Smoking program (www.lungusa. org). · Set a quit date. Pick your date carefully so that it is not right in the middle of a big deadline or stressful time. After you quit, don't even take a puff. Get rid of all ashtrays and lighters after your last cigarette. Clean your house, car, and clothes so that they don't smell like smoke. · Learn how to be a nonsmoker. Think about ways you can avoid those things that make you reach for a cigarette. ¨ Avoid situations that put you at greatest risk for smoking. For some people, it's hard to have a drink with friends without smoking. Other people might skip a coffee break with coworkers who smoke. ¨ Change your daily routine. Take a different route to work, or eat a meal in a different place. · Cut down on stress.  Calm yourself or release tension by doing an activity you enjoy, such as reading a book, taking a hot bath, or gardening. · Some people find hypnosis, acupuncture, and massage helpful for ending the smoking habit. · Eat a healthy diet and get regular exercise. Having healthy habits will help your body move past its craving for nicotine. · Be prepared to keep trying. Most people don't succeed the first few times they try to quit. Don't get mad at yourself if you smoke again. Make a list of things you learned. Then think about when you want to try again, such as next week, next month, or next year. Where can you learn more? Go to http://donavonI-DISPOstacy.info/. Enter K873 in the search box to learn more about \"Learning About Stopping Smoking Before Surgery. \" Current as of: March 20, 2017 Content Version: 11.4 © 6144-9670 IMASTE. Care instructions adapted under license by [employer] (which disclaims liability or warranty for this information). If you have questions about a medical condition or this instruction, always ask your healthcare professional. [address] any warranty or liability for your use of this information. Learning About Stopping Smoking Before Surgery How does smoking affect surgery risks? After a surgery, your body puts all of its energy into healing. Smoking makes this harder. It cuts the amount of oxygen available to your body to heal. And smoking makes infection and complications more of a risk. How does being smoke-free help you recover from surgery? When you quit smoking before surgery, you lower your risk of these things after surgery: 
· Heart problems · Breathing problems and pneumonia · Wound infection · Healing problems With every day, week, or month that you've been smoke-free before surgery, you improve your chances of having a healthy recovery. Quitting also improves your health.  Your risk of things like heart attack and stroke start to go down as soon as you quit. And the longer you're smoke-free, the lower your risk of things like cancer and lung disease. When you're smoke-free, you get sick less often. You are less likely to get colds, flu, bronchitis, and pneumonia. How can you be smoke-free before and after surgery? Your doctor will help you set up the plan that best meets your needs. There are medicines that may help. You may want to attend a smoking cessation program to help you quit smoking. When you choose a program, look for one that has proven success. Ask your doctor for ideas. Ask your doctor if you can try medicine. You will greatly increase your chances of success if you take medicine along with getting counseling or joining a cessation program. 
Here are some other things you can do: · Ask your family, friends, and coworkers for support. You have a better chance of quitting if you have help and support. · Join a support group, such as Nicotine Anonymous, for people who are trying to quit smoking. Or use an online program, such as www.smokefree. gov or the American Lung Association's [place] from Smoking program (www.lungusa. org). · Set a quit date. Pick your date carefully so that it is not right in the middle of a big deadline or stressful time. After you quit, don't even take a puff. Get rid of all ashtrays and lighters after your last cigarette. Clean your house, car, and clothes so that they don't smell like smoke. · Learn how to be a nonsmoker. Think about ways you can avoid those things that make you reach for a cigarette. ¨ Avoid situations that put you at greatest risk for smoking. For some people, it's hard to have a drink with friends without smoking. Other people might skip a coffee break with coworkers who smoke. ¨ Change your daily routine. Take a different route to work, or eat a meal in a different place. · Cut down on stress.  Calm yourself or release tension by doing an activity you enjoy, such as reading a book, taking a hot bath, or gardening. · Some people find hypnosis, acupuncture, and massage helpful for ending the smoking habit. · Eat a healthy diet and get regular exercise. Having healthy habits will help your body move past its craving for nicotine. · Be prepared to keep trying. Most people don't succeed the first few times they try to quit. Don't get mad at yourself if you smoke again. Make a list of things you learned. Then think about when you want to try again, such as next week, next month, or next year. Where can you learn more? Go to http://donavonGlobal Protein Solutionsstacy.info/. Enter K873 in the search box to learn more about \"Learning About Stopping Smoking Before Surgery. \" Current as of: March 20, 2017 Content Version: 11.4 © 9303-0128 agri.capital. Care instructions adapted under license by [employer] (which disclaims liability or warranty for this information). If you have questions about a medical condition or this instruction, always ask your healthcare professional. [address] any warranty or liability for your use of this information. Learning About Diabetes Food Guidelines Your Care Instructions Meal planning is important to manage diabetes. It helps keep your blood sugar at a target level (which you set with your doctor). You don't have to eat special foods. You can eat what your family eats, including sweets once in a while. But you do have to pay attention to how often you eat and how much you eat of certain foods. You may want to work with a dietitian or a certified diabetes educator (CDE) to help you plan meals and snacks. A dietitian or CDE can also help you lose weight if that is one of your goals. What should you know about eating carbs? Managing the amount of carbohydrate (carbs) you eat is an important part of healthy meals when you have diabetes.  Carbohydrate is found in many foods. 
· Learn which foods have carbs. And learn the amounts of carbs in different foods. ¨ Bread, cereal, pasta, and rice have about 15 grams of carbs in a serving. A serving is 1 slice of bread (1 ounce), ½ cup of cooked cereal, or 1/3 cup of cooked pasta or rice. ¨ Fruits have 15 grams of carbs in a serving. A serving is 1 small fresh fruit, such as an apple or orange; ½ of a banana; ½ cup of cooked or canned fruit; ½ cup of fruit juice; 1 cup of melon or raspberries; or 2 tablespoons of dried fruit. ¨ Milk and no-sugar-added yogurt have 15 grams of carbs in a serving. A serving is 1 cup of milk or 2/3 cup of no-sugar-added yogurt. ¨ Starchy vegetables have 15 grams of carbs in a serving. A serving is ½ cup of mashed potatoes or sweet potato; 1 cup winter squash; ½ of a small baked potato; ½ cup of cooked beans; or ½ cup cooked corn or green peas. · Learn how much carbs to eat each day and at each meal. A dietitian or CDE can teach you how to keep track of the amount of carbs you eat. This is called carbohydrate counting. · If you are not sure how to count carbohydrate grams, use the Plate Method to plan meals. It is a good, quick way to make sure that you have a balanced meal. It also helps you spread carbs throughout the day. ¨ Divide your plate by types of foods. Put non-starchy vegetables on half the plate, meat or other protein food on one-quarter of the plate, and a grain or starchy vegetable in the final quarter of the plate. To this you can add a small piece of fruit and 1 cup of milk or yogurt, depending on how many carbs you are supposed to eat at a meal. 
· Try to eat about the same amount of carbs at each meal. Do not \"save up\" your daily allowance of carbs to eat at one meal. 
· Proteins have very little or no carbs per serving.  Examples of proteins are beef, chicken, turkey, fish, eggs, tofu, cheese, cottage cheese, and peanut butter. A serving size of meat is 3 ounces, which is about the size of a deck of cards. Examples of meat substitute serving sizes (equal to 1 ounce of meat) are 1/4 cup of cottage cheese, 1 egg, 1 tablespoon of peanut butter, and ½ cup of tofu. How can you eat out and still eat healthy? · Learn to estimate the serving sizes of foods that have carbohydrate. If you measure food at home, it will be easier to estimate the amount in a serving of restaurant food. · If the meal you order has too much carbohydrate (such as potatoes, corn, or baked beans), ask to have a low-carbohydrate food instead. Ask for a salad or green vegetables. · If you use insulin, check your blood sugar before and after eating out to help you plan how much to eat in the future. · If you eat more carbohydrate at a meal than you had planned, take a walk or do other exercise. This will help lower your blood sugar. What else should you know? · Limit saturated fat, such as the fat from meat and dairy products. This is a healthy choice because people who have diabetes are at higher risk of heart disease. So choose lean cuts of meat and nonfat or low-fat dairy products. Use olive or canola oil instead of butter or shortening when cooking. · Don't skip meals. Your blood sugar may drop too low if you skip meals and take insulin or certain medicines for diabetes. · Check with your doctor before you drink alcohol. Alcohol can cause your blood sugar to drop too low. Alcohol can also cause a bad reaction if you take certain diabetes medicines. Follow-up care is a key part of your treatment and safety. Be sure to make and go to all appointments, and call your doctor if you are having problems. It's also a good idea to know your test results and keep a list of the medicines you take. Where can you learn more? Go to http://donavon-stacy.info/.  
Enter L286 in the search box to learn more about \"Learning About Diabetes Food Guidelines. \" Current as of: March 13, 2017 Content Version: 11.4 © 8346-2604 [employer]. Care instructions adapted under license by [employer] (which disclaims liability or warranty for this information). If you have questions about a medical condition or this instruction, always ask your healthcare professional. [address] any warranty or liability for your use of this information. Learning About Diabetes Food Guidelines Your Care Instructions Meal planning is important to manage diabetes. It helps keep your blood sugar at a target level (which you set with your doctor). You don't have to eat special foods. You can eat what your family eats, including sweets once in a while. But you do have to pay attention to how often you eat and how much you eat of certain foods. You may want to work with a dietitian or a certified diabetes educator (CDE) to help you plan meals and snacks. A dietitian or CDE can also help you lose weight if that is one of your goals. What should you know about eating carbs? Managing the amount of carbohydrate (carbs) you eat is an important part of healthy meals when you have diabetes. Carbohydrate is found in many foods. · Learn which foods have carbs. And learn the amounts of carbs in different foods. ¨ Bread, cereal, pasta, and rice have about 15 grams of carbs in a serving. A serving is 1 slice of bread (1 ounce), ½ cup of cooked cereal, or 1/3 cup of cooked pasta or rice. ¨ Fruits have 15 grams of carbs in a serving. A serving is 1 small fresh fruit, such as an apple or orange; ½ of a banana; ½ cup of cooked or canned fruit; ½ cup of fruit juice; 1 cup of melon or raspberries; or 2 tablespoons of dried fruit. ¨ Milk and no-sugar-added yogurt have 15 grams of carbs in a serving. A serving is 1 cup of milk or 2/3 cup of no-sugar-added yogurt. ¨ Starchy vegetables have 15 grams of carbs in a serving.  A serving is ½ cup of mashed potatoes or sweet potato; 1 cup winter squash; ½ of a small baked potato; ½ cup of cooked beans; or ½ cup cooked corn or green peas. · Learn how much carbs to eat each day and at each meal. A dietitian or CDE can teach you how to keep track of the amount of carbs you eat. This is called carbohydrate counting. · If you are not sure how to count carbohydrate grams, use the Plate Method to plan meals. It is a good, quick way to make sure that you have a balanced meal. It also helps you spread carbs throughout the day. ¨ Divide your plate by types of foods. Put non-starchy vegetables on half the plate, meat or other protein food on one-quarter of the plate, and a grain or starchy vegetable in the final quarter of the plate. To this you can add a small piece of fruit and 1 cup of milk or yogurt, depending on how many carbs you are supposed to eat at a meal. 
· Try to eat about the same amount of carbs at each meal. Do not \"save up\" your daily allowance of carbs to eat at one meal. 
· Proteins have very little or no carbs per serving. Examples of proteins are beef, chicken, turkey, fish, eggs, tofu, cheese, cottage cheese, and peanut butter. A serving size of meat is 3 ounces, which is about the size of a deck of cards. Examples of meat substitute serving sizes (equal to 1 ounce of meat) are 1/4 cup of cottage cheese, 1 egg, 1 tablespoon of peanut butter, and ½ cup of tofu. How can you eat out and still eat healthy? · Learn to estimate the serving sizes of foods that have carbohydrate. If you measure food at home, it will be easier to estimate the amount in a serving of restaurant food. · If the meal you order has too much carbohydrate (such as potatoes, corn, or baked beans), ask to have a low-carbohydrate food instead. Ask for a salad or green vegetables. · If you use insulin, check your blood sugar before and after eating out to help you plan how much to eat in the future. · If you eat more carbohydrate at a meal than you had planned, take a walk or do other exercise. This will help lower your blood sugar. What else should you know? · Limit saturated fat, such as the fat from meat and dairy products. This is a healthy choice because people who have diabetes are at higher risk of heart disease. So choose lean cuts of meat and nonfat or low-fat dairy products. Use olive or canola oil instead of butter or shortening when cooking. · Don't skip meals. Your blood sugar may drop too low if you skip meals and take insulin or certain medicines for diabetes. · Check with your doctor before you drink alcohol. Alcohol can cause your blood sugar to drop too low. Alcohol can also cause a bad reaction if you take certain diabetes medicines. Follow-up care is a key part of your treatment and safety. Be sure to make and go to all appointments, and call your doctor if you are having problems. It's also a good idea to know your test results and keep a list of the medicines you take. Where can you learn more? Go to http://donavon-stacy.info/. Enter S398 in the search box to learn more about \"Learning About Diabetes Food Guidelines. \" Current as of: March 13, 2017 Content Version: 11.4 © 7234-0679 [employer]. Care instructions adapted under license by [employer] (which disclaims liability or warranty for this information). If you have questions about a medical condition or this instruction, always ask your healthcare professional. [address] any warranty or liability for your use of this information. Learning About Diabetes Food Guidelines Your Care Instructions Meal planning is important to manage diabetes. It helps keep your blood sugar at a target level (which you set with your doctor). You don't have to eat special foods.  You can eat what your family eats, including sweets once in a while. But you do have to pay attention to how often you eat and how much you eat of certain foods. You may want to work with a dietitian or a certified diabetes educator (CDE) to help you plan meals and snacks. A dietitian or CDE can also help you lose weight if that is one of your goals. What should you know about eating carbs? Managing the amount of carbohydrate (carbs) you eat is an important part of healthy meals when you have diabetes. Carbohydrate is found in many foods. · Learn which foods have carbs. And learn the amounts of carbs in different foods. ¨ Bread, cereal, pasta, and rice have about 15 grams of carbs in a serving. A serving is 1 slice of bread (1 ounce), ½ cup of cooked cereal, or 1/3 cup of cooked pasta or rice. ¨ Fruits have 15 grams of carbs in a serving. A serving is 1 small fresh fruit, such as an apple or orange; ½ of a banana; ½ cup of cooked or canned fruit; ½ cup of fruit juice; 1 cup of melon or raspberries; or 2 tablespoons of dried fruit. ¨ Milk and no-sugar-added yogurt have 15 grams of carbs in a serving. A serving is 1 cup of milk or 2/3 cup of no-sugar-added yogurt. ¨ Starchy vegetables have 15 grams of carbs in a serving. A serving is ½ cup of mashed potatoes or sweet potato; 1 cup winter squash; ½ of a small baked potato; ½ cup of cooked beans; or ½ cup cooked corn or green peas. · Learn how much carbs to eat each day and at each meal. A dietitian or CDE can teach you how to keep track of the amount of carbs you eat. This is called carbohydrate counting. · If you are not sure how to count carbohydrate grams, use the Plate Method to plan meals. It is a good, quick way to make sure that you have a balanced meal. It also helps you spread carbs throughout the day. ¨ Divide your plate by types of foods.  Put non-starchy vegetables on half the plate, meat or other protein food on one-quarter of the plate, and a grain or starchy vegetable in the final quarter of the plate. To this you can add a small piece of fruit and 1 cup of milk or yogurt, depending on how many carbs you are supposed to eat at a meal. 
· Try to eat about the same amount of carbs at each meal. Do not \"save up\" your daily allowance of carbs to eat at one meal. 
· Proteins have very little or no carbs per serving. Examples of proteins are beef, chicken, turkey, fish, eggs, tofu, cheese, cottage cheese, and peanut butter. A serving size of meat is 3 ounces, which is about the size of a deck of cards. Examples of meat substitute serving sizes (equal to 1 ounce of meat) are 1/4 cup of cottage cheese, 1 egg, 1 tablespoon of peanut butter, and ½ cup of tofu. How can you eat out and still eat healthy? · Learn to estimate the serving sizes of foods that have carbohydrate. If you measure food at home, it will be easier to estimate the amount in a serving of restaurant food. · If the meal you order has too much carbohydrate (such as potatoes, corn, or baked beans), ask to have a low-carbohydrate food instead. Ask for a salad or green vegetables. · If you use insulin, check your blood sugar before and after eating out to help you plan how much to eat in the future. · If you eat more carbohydrate at a meal than you had planned, take a walk or do other exercise. This will help lower your blood sugar. What else should you know? · Limit saturated fat, such as the fat from meat and dairy products. This is a healthy choice because people who have diabetes are at higher risk of heart disease. So choose lean cuts of meat and nonfat or low-fat dairy products. Use olive or canola oil instead of butter or shortening when cooking. · Don't skip meals. Your blood sugar may drop too low if you skip meals and take insulin or certain medicines for diabetes. · Check with your doctor before you drink alcohol.  Alcohol can cause your blood sugar to drop too low. Alcohol can also cause a bad reaction if you take certain diabetes medicines. Follow-up care is a key part of your treatment and safety. Be sure to make and go to all appointments, and call your doctor if you are having problems. It's also a good idea to know your test results and keep a list of the medicines you take. Where can you learn more? Go to http://donavon-stacy.info/. Enter S053 in the search box to learn more about \"Learning About Diabetes Food Guidelines. \" Current as of: March 13, 2017 Content Version: 11.4 © 0870-8097 [employer]. Care instructions adapted under license by [employer] (which disclaims liability or warranty for this information). If you have questions about a medical condition or this instruction, always ask your healthcare professional. [address] any warranty or liability for your use of this information. Counting Carbohydrates When You Take Insulin: Care Instructions Your Care Instructions You don't have to eat special foods when you take insulin. You just have to be careful to eat healthy foods. And you have to spread throughout the day the carbohydrates you eat. Carbohydrates raise blood sugar higher and more quickly than any other nutrient. It is found in desserts, breads and cereals, and fruit. It's also found in starchy vegetables such as potatoes and corn, grains such as rice and pasta, and milk and yogurt. The more carbohydrates, or carbs, you eat at one time, the higher your blood sugar will rise. Spreading carbs throughout the day helps keep your blood sugar levels within your target range. Counting carbs is one of the best ways to keep your blood sugar under control when you use insulin.  It helps you match the right amount of insulin to the number of grams of carbohydrates in a meal. You need to test your blood sugar several times a day to learn how carbs affect you. Then you can change your diet and insulin dose as needed. A registered dietitian or certified diabetes educator can help you plan meals and snacks. Follow-up care is a key part of your treatment and safety. Be sure to make and go to all appointments, and call your doctor if you are having problems. It's also a good idea to know your test results and keep a list of the medicines you take. How can you care for yourself at home? Know your daily amount of carbohydrates Your daily amount depends on several things, including your weight, how active you are, which diabetes medicines you take, and what your goals are for your blood sugar levels. A registered dietitian or certified diabetes educator can help you plan how many carbohydrates to include in each meal and snack. For most adults, a guideline for the daily amount of carbohydrates is: · 45 to 60 grams at each meal. That's about the same as 3 to 4 carbohydrate servings. · 15 to 20 grams at each snack. That's about the same as 1 carbohydrate serving. Count carbs If you take insulin, you need to know how many grams of carbohydrates are in a meal. This lets you know how much rapid-acting insulin to take before you eat. If you use an insulin pump, you get a constant rate of insulin during the day. So the pump must be programmed at meals to give you extra insulin to cover the rise in blood sugar after meals. When you know how many carbohydrates you will eat, you can take the right amount of insulin. Or, if you always use the same amount of insulin, you need to make sure that you eat the same amount of carbs at meals. · Learn your own insulin-to-carbohydrates ratio. You and your diabetes health professional will figure out the ratio. You can do this by testing your blood sugar after meals. For example, you may need a certain amount of insulin for every 15 grams of carbohydrates. · Add up the carbohydrate grams in a meal. Then you can figure out how many units of insulin to take based on your insulin-to-carbohydrates ratio. · Look at labels on packaged foods. This can tell you how many carbohydrates are in a serving. You can also use guides from the American Diabetes Association. · Be aware of portions, or serving sizes. If a package has two servings and you eat the whole package, you need to double the number of grams of carbohydrates listed for one serving. · Protein, fat, and fiber do not raise blood sugar as much as carbs do. If you eat a lot of these nutrients in a meal, your blood sugar will rise more slowly than it would otherwise. · Exercise lowers blood sugar. You can use less insulin than you would if you were not doing exercise. Keep in mind that timing matters. If you exercise within 1 hour after a meal, your body may need less insulin for that meal than it would if you exercised 3 hours after the meal. Test your blood sugar to find out how exercise affects your need for insulin. Eat from all food groups · Eat at least three meals a day. · Plan meals to include food from all the food groups. ¨ Grains: 1 slice of bread (1 ounce), ½ cup of cooked cereal, and 1/3 cup of cooked pasta or rice. These have about 15 grams of carbohydrates in a serving. Choose whole grains. These include whole wheat bread or crackers, oatmeal, and brown rice. Have them more often than refined grains. ¨ Fruit: 1 small fresh fruit, such as an apple or orange; ½ of a banana; ½ cup of chopped, cooked, or canned fruit; ½ cup of fruit juice; 1 cup of melon or raspberries; and 2 tablespoons of dried fruit. These have about 15 grams of carbohydrates in a serving. ¨ Dairy: 1 cup of nonfat or low-fat milk and 2/3 cup of plain yogurt. These have about 15 grams of carbohydrates in a serving.  
¨ Protein foods: Beef, chicken, turkey, fish, eggs, tofu, cheese, cottage cheese, and peanut butter. A serving size of meat is 3 ounces. This is about the size of a deck of cards. Examples of meat substitute serving sizes (equal to 1 ounce of meat) are 1/4 cup of cottage cheese, 1 egg, 1 tablespoon of peanut butter, and ½ cup of tofu. These have very little or no carbohydrates in a serving. ¨ Vegetables: Starchy vegetables such as ½ cup of cooked beans, peas, potatoes, or corn have about 15 grams of carbohydrates. Nonstarchy vegetables have very little carbohydrates. These include 1 cup of raw leafy vegetables (such as spinach), ½ cup of other vegetables (cooked or chopped), and 3/4 cup of vegetable juice. · Talk to your dietitian or diabetes educator about ways to add limited amounts of sweets into your meal plan. · If you drink alcohol: ¨ Limit it to no more than 1 drink a day for women and 2 drinks a day for men. (One drink is 12 fl oz of beer, 5 fl oz of wine, or 1.5 fl oz liquor.) ¨ Make sure to count drink mixers that have sugar in your total carbohydrate count. These include cola, tonic water, jamison mix, and fruit juice. ¨ Eat a carbohydrate food along with your alcoholic drink. ¨ Check your blood sugar more often. This is because alcohol can lower your blood sugar too much. This may happen even hours later while you sleep. You may want to eat and adjust your insulin dose when you drink alcohol to prevent severe low blood sugar. ¨ Talk to your doctor. Alcohol may not be recommended when you are taking certain diabetes medicines. Where can you learn more? Go to http://donavon-stacy.info/. Enter X747 in the search box to learn more about \"Counting Carbohydrates When You Take Insulin: Care Instructions. \" Current as of: March 13, 2017 Content Version: 11.4 © 1866-4193 [employer].  Care instructions adapted under license by [employer] (which disclaims liability or warranty for this information). If you have questions about a medical condition or this instruction, always ask your healthcare professional. [address] any warranty or liability for your use of this information. Learning About Carbohydrates What are carbohydrates? Carbohydrates are an important nutrient you get from food. It's a great source of energy for your body and helps your brain and nervous system work properly. How does your body use carbohydrates? After you eat food with carbs in it, your body digests the carbohydrates and turns them into a kind of sugar that goes into your blood. The blood carries this sugar to the cells in your body. The cells use the sugar to give you energy. Extra sugar is stored in the cells for later use. If it isn't used, it turns into fat. Where do carbohydrates come from? The healthiest carbohydrate choices are breads, cereals, and pastas made with whole grains; brown rice; low-fat dairy products; vegetables; legumes such as peas, lentils, and beans; and fruits. Foods made from refined flour, including bread, pasta, doughnuts, cookies, and desserts, also contain carbohydrates. So do sweets such as candy and soda. How can you get the right kind and amount of carbs? Eating too much of anything can lead to weight gain. And that can lead to other health problems. Here are some tips to help you eat the right amount of the right kind of carbs so you have the nutrition and the energy you need: 
· Eat 3 to 8 servings of grains (breads, cereals, rice, pasta) each day. For example, a serving is 1 slice of bread, 1 cup of boxed cereal, or ½ cup of cooked rice, cooked pasta, or cooked cereal. Go to www. choosemyplate.gov to learn how many servings you need. ¨ Buy bread that lists whole wheat (or other whole grains), stone-ground wheat, or cracked wheat as the first ingredient. ¨ Eat brown rice, bulgur, or millet instead of white rice. ¨ Eat pasta and cereals made from whole grain flour instead of refined flour. · Eat several servings a day of fresh fruits and vegetables. These include raspberries, apples, figs, oranges, pears, prunes, broccoli, brussels sprouts, carrots, corn, peas, and beans. And there are lots of other fruits and vegetables to choose from. · Limit the amount of candy, desserts, and soda in your diet. Where can you learn more? Go to http://donavon-stacy.info/. Enter F199 in the search box to learn more about \"Learning About Carbohydrates. \" Current as of: May 12, 2017 Content Version: 11.4 © 0681-3076 [employer]. Care instructions adapted under license by [employer] (which disclaims liability or warranty for this information). If you have questions about a medical condition or this instruction, always ask your healthcare professional. [place] [address] any warranty or liability for your use of this information. Learning About Low-Carbohydrate Diets for Weight Loss What is a low-carbohydrate diet? Low-carb diets avoid foods that are high in carbohydrate. These high-carb foods include pasta, bread, rice, cereal, fruits, and starchy vegetables. Instead, these diets usually have you eat foods that are high in fat and protein. Many people lose weight quickly on a low-carb diet. But the early weight loss is water. People on this diet often gain the weight back after they start eating carbs again. Not all diet plans are safe or work well. A lot of the evidence shows that low-carb diets aren't healthy. That's because these diets often don't include healthy foods like fruits and vegetables. Losing weight safely means balancing protein, fat, and carbs with every meal and snack. And low-carb diets don't always provide the vitamins, minerals, and fiber you need.  
If you have a serious medical condition, talk to your doctor before you try any diet. These conditions include kidney disease, heart disease, type 2 diabetes, high cholesterol, and high blood pressure. If you are pregnant, it may not be safe for your baby if you are on a low-carb diet. How can you lose weight safely? You might have heard that a diet plan helped another person lose weight. But that doesn't mean that it will work for you. It is very hard to stay on a diet that includes lots of big changes in your eating habits. If you want to get to a healthy weight and stay there, making healthy lifestyle changes will often work better than dieting. These steps can help. · Make a plan for change. Work with your doctor to create a plan that is right for you. · See a dietitian. He or she can show you how to make healthy changes in your eating habits. · Manage stress. If you have a lot of stress in your life, it can be hard to focus on making healthy changes to your daily habits. · Track your food and activity. You are likely to do better at losing weight if you keep track of what you eat and what you do. Follow-up care is a key part of your treatment and safety. Be sure to make and go to all appointments, and call your doctor if you are having problems. It's also a good idea to know your test results and keep a list of the medicines you take. Where can you learn more? Go to http://donavon-stacy.info/. Enter A121 in the search box to learn more about \"Learning About Low-Carbohydrate Diets for Weight Loss. \" Current as of: May 12, 2017 Content Version: 11.4 © 2477-4364 [employer]. Care instructions adapted under license by [employer] (which disclaims liability or warranty for this information). If you have questions about a medical condition or this instruction, always ask your healthcare professional. [address] any warranty or liability for your use of this information. Counting Carbohydrates When You Take Insulin: Care Instructions Your Care Instructions You don't have to eat special foods when you take insulin. You just have to be careful to eat healthy foods. And you have to spread throughout the day the carbohydrates you eat. Carbohydrates raise blood sugar higher and more quickly than any other nutrient. It is found in desserts, breads and cereals, and fruit. It's also found in starchy vegetables such as potatoes and corn, grains such as rice and pasta, and milk and yogurt. The more carbohydrates, or carbs, you eat at one time, the higher your blood sugar will rise. Spreading carbs throughout the day helps keep your blood sugar levels within your target range. Counting carbs is one of the best ways to keep your blood sugar under control when you use insulin. It helps you match the right amount of insulin to the number of grams of carbohydrates in a meal. You need to test your blood sugar several times a day to learn how carbs affect you. Then you can change your diet and insulin dose as needed. A registered dietitian or certified diabetes educator can help you plan meals and snacks. Follow-up care is a key part of your treatment and safety. Be sure to make and go to all appointments, and call your doctor if you are having problems. It's also a good idea to know your test results and keep a list of the medicines you take. How can you care for yourself at home? Know your daily amount of carbohydrates Your daily amount depends on several things, including your weight, how active you are, which diabetes medicines you take, and what your goals are for your blood sugar levels. A registered dietitian or certified diabetes educator can help you plan how many carbohydrates to include in each meal and snack. For most adults, a guideline for the daily amount of carbohydrates is: · 45 to 60 grams at each meal. That's about the same as 3 to 4 carbohydrate servings. · 15 to 20 grams at each snack. That's about the same as 1 carbohydrate serving. Count carbs If you take insulin, you need to know how many grams of carbohydrates are in a meal. This lets you know how much rapid-acting insulin to take before you eat. If you use an insulin pump, you get a constant rate of insulin during the day. So the pump must be programmed at meals to give you extra insulin to cover the rise in blood sugar after meals. When you know how many carbohydrates you will eat, you can take the right amount of insulin. Or, if you always use the same amount of insulin, you need to make sure that you eat the same amount of carbs at meals. · Learn your own insulin-to-carbohydrates ratio. You and your diabetes health professional will figure out the ratio. You can do this by testing your blood sugar after meals. For example, you may need a certain amount of insulin for every 15 grams of carbohydrates. · Add up the carbohydrate grams in a meal. Then you can figure out how many units of insulin to take based on your insulin-to-carbohydrates ratio. · Look at labels on packaged foods. This can tell you how many carbohydrates are in a serving. You can also use guides from the [employer]. · Be aware of portions, or serving sizes. If a package has two servings and you eat the whole package, you need to double the number of grams of carbohydrates listed for one serving. · Protein, fat, and fiber do not raise blood sugar as much as carbs do. If you eat a lot of these nutrients in a meal, your blood sugar will rise more slowly than it would otherwise. · Exercise lowers blood sugar. You can use less insulin than you would if you were not doing exercise. Keep in mind that timing matters.  If you exercise within 1 hour after a meal, your body may need less insulin for that meal than it would if you exercised 3 hours after the meal. Test your blood sugar to find out how exercise affects your need for insulin. Eat from all food groups · Eat at least three meals a day. · Plan meals to include food from all the food groups. ¨ Grains: 1 slice of bread (1 ounce), ½ cup of cooked cereal, and 1/3 cup of cooked pasta or rice. These have about 15 grams of carbohydrates in a serving. Choose whole grains. These include whole wheat bread or crackers, oatmeal, and brown rice. Have them more often than refined grains. ¨ Fruit: 1 small fresh fruit, such as an apple or orange; ½ of a banana; ½ cup of chopped, cooked, or canned fruit; ½ cup of fruit juice; 1 cup of melon or raspberries; and 2 tablespoons of dried fruit. These have about 15 grams of carbohydrates in a serving. ¨ Dairy: 1 cup of nonfat or low-fat milk and 2/3 cup of plain yogurt. These have about 15 grams of carbohydrates in a serving. ¨ Protein foods: Beef, chicken, turkey, fish, eggs, tofu, cheese, cottage cheese, and peanut butter. A serving size of meat is 3 ounces. This is about the size of a deck of cards. Examples of meat substitute serving sizes (equal to 1 ounce of meat) are 1/4 cup of cottage cheese, 1 egg, 1 tablespoon of peanut butter, and ½ cup of tofu. These have very little or no carbohydrates in a serving. ¨ Vegetables: Starchy vegetables such as ½ cup of cooked beans, peas, potatoes, or corn have about 15 grams of carbohydrates. Nonstarchy vegetables have very little carbohydrates. These include 1 cup of raw leafy vegetables (such as spinach), ½ cup of other vegetables (cooked or chopped), and 3/4 cup of vegetable juice. · Talk to your dietitian or diabetes educator about ways to add limited amounts of sweets into your meal plan. · If you drink alcohol: ¨ Limit it to no more than 1 drink a day for women and 2 drinks a day for men. (One drink is 12 fl oz of beer, 5 fl oz of wine, or 1.5 fl oz liquor.) ¨ Make sure to count drink mixers that have sugar in your total carbohydrate count. These include cola, tonic water, jamison mix, and fruit juice. ¨ Eat a carbohydrate food along with your alcoholic drink. ¨ Check your blood sugar more often. This is because alcohol can lower your blood sugar too much. This may happen even hours later while you sleep. You may want to eat and adjust your insulin dose when you drink alcohol to prevent severe low blood sugar. ¨ Talk to your doctor. Alcohol may not be recommended when you are taking certain diabetes medicines. Where can you learn more? Go to http://donavon-stacy.info/. Enter H366 in the search box to learn more about \"Counting Carbohydrates When You Take Insulin: Care Instructions. \" Current as of: March 13, 2017 Content Version: 11.4 © 2419-4369 [employer]. Care instructions adapted under license by [employer] (which disclaims liability or warranty for this information). If you have questions about a medical condition or this instruction, always ask your healthcare professional. [place] [address] any warranty or liability for your use of this information. Patient Instructions History Introducing [facility]! Dear Amaury Jarvis: Thank you for requesting a e(ye)BRAIN account. Our records indicate that you already have an active e(ye)BRAIN account. You can access your account anytime at [email or web address] Did you know that you can access your hospital and ER discharge instructions at any time in e(ye)BRAIN? You can also review all of your test results from your hospital stay or ER visit. Additional Information If you have questions, please visit the Frequently Asked Questions section of the e(ye)BRAIN website at [email or web address] Remember, e(ye)BRAIN is NOT to be used for urgent needs. For medical emergencies, dial 911. Now available from your iPhone and Android! Please provide this summary of care documentation to your next provider. Your primary care clinician is listed as Vanessa Riddle. If you have any questions after today's visit, please call [phone].

## 2018-01-25 NOTE — PATIENT INSTRUCTIONS
Learning About Diabetes Food Guidelines  Your Care Instructions    Meal planning is important to manage diabetes. It helps keep your blood sugar at a target level (which you set with your doctor). You don't have to eat special foods. You can eat what your family eats, including sweets once in a while. But you do have to pay attention to how often you eat and how much you eat of certain foods. You may want to work with a dietitian or a certified diabetes educator (CDE) to help you plan meals and snacks. A dietitian or CDE can also help you lose weight if that is one of your goals. What should you know about eating carbs? Managing the amount of carbohydrate (carbs) you eat is an important part of healthy meals when you have diabetes. Carbohydrate is found in many foods. · Learn which foods have carbs. And learn the amounts of carbs in different foods. ¨ Bread, cereal, pasta, and rice have about 15 grams of carbs in a serving. A serving is 1 slice of bread (1 ounce), ½ cup of cooked cereal, or 1/3 cup of cooked pasta or rice. ¨ Fruits have 15 grams of carbs in a serving. A serving is 1 small fresh fruit, such as an apple or orange; ½ of a banana; ½ cup of cooked or canned fruit; ½ cup of fruit juice; 1 cup of melon or raspberries; or 2 tablespoons of dried fruit. ¨ Milk and no-sugar-added yogurt have 15 grams of carbs in a serving. A serving is 1 cup of milk or 2/3 cup of no-sugar-added yogurt. ¨ Starchy vegetables have 15 grams of carbs in a serving. A serving is ½ cup of mashed potatoes or sweet potato; 1 cup winter squash; ½ of a small baked potato; ½ cup of cooked beans; or ½ cup cooked corn or green peas. · Learn how much carbs to eat each day and at each meal. A dietitian or CDE can teach you how to keep track of the amount of carbs you eat. This is called carbohydrate counting. · If you are not sure how to count carbohydrate grams, use the Plate Method to plan meals.  It is a good, quick way to make sure that you have a balanced meal. It also helps you spread carbs throughout the day. ¨ Divide your plate by types of foods. Put non-starchy vegetables on half the plate, meat or other protein food on one-quarter of the plate, and a grain or starchy vegetable in the final quarter of the plate. To this you can add a small piece of fruit and 1 cup of milk or yogurt, depending on how many carbs you are supposed to eat at a meal.  · Try to eat about the same amount of carbs at each meal. Do not \"save up\" your daily allowance of carbs to eat at one meal.  · Proteins have very little or no carbs per serving. Examples of proteins are beef, chicken, turkey, fish, eggs, tofu, cheese, cottage cheese, and peanut butter. A serving size of meat is 3 ounces, which is about the size of a deck of cards. Examples of meat substitute serving sizes (equal to 1 ounce of meat) are 1/4 cup of cottage cheese, 1 egg, 1 tablespoon of peanut butter, and ½ cup of tofu. How can you eat out and still eat healthy? · Learn to estimate the serving sizes of foods that have carbohydrate. If you measure food at home, it will be easier to estimate the amount in a serving of restaurant food. · If the meal you order has too much carbohydrate (such as potatoes, corn, or baked beans), ask to have a low-carbohydrate food instead. Ask for a salad or green vegetables. · If you use insulin, check your blood sugar before and after eating out to help you plan how much to eat in the future. · If you eat more carbohydrate at a meal than you had planned, take a walk or do other exercise. This will help lower your blood sugar. What else should you know? · Limit saturated fat, such as the fat from meat and dairy products. This is a healthy choice because people who have diabetes are at higher risk of heart disease. So choose lean cuts of meat and nonfat or low-fat dairy products.  Use olive or canola oil instead of butter or shortening when cooking. · Don't skip meals. Your blood sugar may drop too low if you skip meals and take insulin or certain medicines for diabetes. · Check with your doctor before you drink alcohol. Alcohol can cause your blood sugar to drop too low. Alcohol can also cause a bad reaction if you take certain diabetes medicines. Follow-up care is a key part of your treatment and safety. Be sure to make and go to all appointments, and call your doctor if you are having problems. It's also a good idea to know your test results and keep a list of the medicines you take. Where can you learn more? Go to http://donavon-stacy.info/. Enter C479 in the search box to learn more about \"Learning About Diabetes Food Guidelines. \"  Current as of: March 13, 2017  Content Version: 11.4  © 4224-4397 [employer]. Care instructions adapted under license by [employer] (which disclaims liability or warranty for this information). If you have questions about a medical condition or this instruction, always ask your healthcare professional. [place] [address] any warranty or liability for your use of this information. Learning About Stopping Smoking Before Surgery  How does smoking affect surgery risks? After a surgery, your body puts all of its energy into healing. Smoking makes this harder. It cuts the amount of oxygen available to your body to heal. And smoking makes infection and complications more of a risk. How does being smoke-free help you recover from surgery? When you quit smoking before surgery, you lower your risk of these things after surgery:  · Heart problems  · Breathing problems and pneumonia  · Wound infection  · Healing problems  With every day, week, or month that you've been smoke-free before surgery, you improve your chances of having a healthy recovery. Quitting also improves your health.  Your risk of things like heart attack and stroke start to go down as soon as you quit. And the longer you're smoke-free, the lower your risk of things like cancer and lung disease. When you're smoke-free, you get sick less often. You are less likely to get colds, flu, bronchitis, and pneumonia. How can you be smoke-free before and after surgery? Your doctor will help you set up the plan that best meets your needs. There are medicines that may help. You may want to attend a smoking cessation program to help you quit smoking. When you choose a program, look for one that has proven success. Ask your doctor for ideas. Ask your doctor if you can try medicine. You will greatly increase your chances of success if you take medicine along with getting counseling or joining a cessation program.  Here are some other things you can do:  · Ask your family, friends, and coworkers for support. You have a better chance of quitting if you have help and support. · Join a support group, such as Nicotine Anonymous, for people who are trying to quit smoking. Or use an online program, such as www.smokefree. gov or the American Lung Association's [place] from Smoking program (www.lungusa. org). · Set a quit date. Pick your date carefully so that it is not right in the middle of a big deadline or stressful time. After you quit, don't even take a puff. Get rid of all ashtrays and lighters after your last cigarette. Clean your house, car, and clothes so that they don't smell like smoke. · Learn how to be a nonsmoker. Think about ways you can avoid those things that make you reach for a cigarette. ¨ Avoid situations that put you at greatest risk for smoking. For some people, it's hard to have a drink with friends without smoking. Other people might skip a coffee break with coworkers who smoke. ¨ Change your daily routine. Take a different route to work, or eat a meal in a different place. · Cut down on stress.  Calm yourself or release tension by doing an activity you enjoy, such as reading a book, taking a hot bath, or gardening. · Some people find hypnosis, acupuncture, and massage helpful for ending the smoking habit. · Eat a healthy diet and get regular exercise. Having healthy habits will help your body move past its craving for nicotine. · Be prepared to keep trying. Most people don't succeed the first few times they try to quit. Don't get mad at yourself if you smoke again. Make a list of things you learned. Then think about when you want to try again, such as next week, next month, or next year. Where can you learn more? Go to http://donavonValues of nstacy.info/. Enter K873 in the search box to learn more about \"Learning About Stopping Smoking Before Surgery. \"  Current as of: March 20, 2017  Content Version: 11.4  © 7961-5530 [employer]. Care instructions adapted under license by [employer] (which disclaims liability or warranty for this information). If you have questions about a medical condition or this instruction, always ask your healthcare professional. [place] [address] any warranty or liability for your use of this information. Learning About Stopping Smoking Before Surgery  How does smoking affect surgery risks? After a surgery, your body puts all of its energy into healing. Smoking makes this harder. It cuts the amount of oxygen available to your body to heal. And smoking makes infection and complications more of a risk. How does being smoke-free help you recover from surgery? When you quit smoking before surgery, you lower your risk of these things after surgery:  · Heart problems  · Breathing problems and pneumonia  · Wound infection  · Healing problems  With every day, week, or month that you've been smoke-free before surgery, you improve your chances of having a healthy recovery. Quitting also improves your health. Your risk of things like heart attack and stroke start to go down as soon as you quit.  And the longer you're smoke-free, the lower your risk of things like cancer and lung disease. When you're smoke-free, you get sick less often. You are less likely to get colds, flu, bronchitis, and pneumonia. How can you be smoke-free before and after surgery? Your doctor will help you set up the plan that best meets your needs. There are medicines that may help. You may want to attend a smoking cessation program to help you quit smoking. When you choose a program, look for one that has proven success. Ask your doctor for ideas. Ask your doctor if you can try medicine. You will greatly increase your chances of success if you take medicine along with getting counseling or joining a cessation program.  Here are some other things you can do:  · Ask your family, friends, and coworkers for support. You have a better chance of quitting if you have help and support. · Join a support group, such as Nicotine Anonymous, for people who are trying to quit smoking. Or use an online program, such as www.smokefree. gov or the American Lung Association's [place] from Smoking program (www.lungusa. org). · Set a quit date. Pick your date carefully so that it is not right in the middle of a big deadline or stressful time. After you quit, don't even take a puff. Get rid of all ashtrays and lighters after your last cigarette. Clean your house, car, and clothes so that they don't smell like smoke. · Learn how to be a nonsmoker. Think about ways you can avoid those things that make you reach for a cigarette. ¨ Avoid situations that put you at greatest risk for smoking. For some people, it's hard to have a drink with friends without smoking. Other people might skip a coffee break with coworkers who smoke. ¨ Change your daily routine. Take a different route to work, or eat a meal in a different place. · Cut down on stress. Calm yourself or release tension by doing an activity you enjoy, such as reading a book, taking a hot bath, or gardening.   · Some people find hypnosis, acupuncture, and massage helpful for ending the smoking habit. · Eat a healthy diet and get regular exercise. Having healthy habits will help your body move past its craving for nicotine. · Be prepared to keep trying. Most people don't succeed the first few times they try to quit. Don't get mad at yourself if you smoke again. Make a list of things you learned. Then think about when you want to try again, such as next week, next month, or next year. Where can you learn more? Go to http://donavon-stacy.info/. Enter K873 in the search box to learn more about \"Learning About Stopping Smoking Before Surgery. \"  Current as of: March 20, 2017  Content Version: 11.4  © 1026-3071 [employer]. Care instructions adapted under license by [employer] (which disclaims liability or warranty for this information). If you have questions about a medical condition or this instruction, always ask your healthcare professional. [place] [address] any warranty or liability for your use of this information. Learning About Diabetes Food Guidelines  Your Care Instructions    Meal planning is important to manage diabetes. It helps keep your blood sugar at a target level (which you set with your doctor). You don't have to eat special foods. You can eat what your family eats, including sweets once in a while. But you do have to pay attention to how often you eat and how much you eat of certain foods. You may want to work with a dietitian or a certified diabetes educator (CDE) to help you plan meals and snacks. A dietitian or CDE can also help you lose weight if that is one of your goals. What should you know about eating carbs? Managing the amount of carbohydrate (carbs) you eat is an important part of healthy meals when you have diabetes. Carbohydrate is found in many foods. · Learn which foods have carbs.  And learn the amounts of carbs in different foods.  ¨ Bread, cereal, pasta, and rice have about 15 grams of carbs in a serving. A serving is 1 slice of bread (1 ounce), ½ cup of cooked cereal, or 1/3 cup of cooked pasta or rice. ¨ Fruits have 15 grams of carbs in a serving. A serving is 1 small fresh fruit, such as an apple or orange; ½ of a banana; ½ cup of cooked or canned fruit; ½ cup of fruit juice; 1 cup of melon or raspberries; or 2 tablespoons of dried fruit. ¨ Milk and no-sugar-added yogurt have 15 grams of carbs in a serving. A serving is 1 cup of milk or 2/3 cup of no-sugar-added yogurt. ¨ Starchy vegetables have 15 grams of carbs in a serving. A serving is ½ cup of mashed potatoes or sweet potato; 1 cup winter squash; ½ of a small baked potato; ½ cup of cooked beans; or ½ cup cooked corn or green peas. · Learn how much carbs to eat each day and at each meal. A dietitian or CDE can teach you how to keep track of the amount of carbs you eat. This is called carbohydrate counting. · If you are not sure how to count carbohydrate grams, use the Plate Method to plan meals. It is a good, quick way to make sure that you have a balanced meal. It also helps you spread carbs throughout the day. ¨ Divide your plate by types of foods. Put non-starchy vegetables on half the plate, meat or other protein food on one-quarter of the plate, and a grain or starchy vegetable in the final quarter of the plate. To this you can add a small piece of fruit and 1 cup of milk or yogurt, depending on how many carbs you are supposed to eat at a meal.  · Try to eat about the same amount of carbs at each meal. Do not \"save up\" your daily allowance of carbs to eat at one meal.  · Proteins have very little or no carbs per serving. Examples of proteins are beef, chicken, turkey, fish, eggs, tofu, cheese, cottage cheese, and peanut butter. A serving size of meat is 3 ounces, which is about the size of a deck of cards.  Examples of meat substitute serving sizes (equal to 1 ounce of meat) are 1/4 cup of cottage cheese, 1 egg, 1 tablespoon of peanut butter, and ½ cup of tofu. How can you eat out and still eat healthy? · Learn to estimate the serving sizes of foods that have carbohydrate. If you measure food at home, it will be easier to estimate the amount in a serving of restaurant food. · If the meal you order has too much carbohydrate (such as potatoes, corn, or baked beans), ask to have a low-carbohydrate food instead. Ask for a salad or green vegetables. · If you use insulin, check your blood sugar before and after eating out to help you plan how much to eat in the future. · If you eat more carbohydrate at a meal than you had planned, take a walk or do other exercise. This will help lower your blood sugar. What else should you know? · Limit saturated fat, such as the fat from meat and dairy products. This is a healthy choice because people who have diabetes are at higher risk of heart disease. So choose lean cuts of meat and nonfat or low-fat dairy products. Use olive or canola oil instead of butter or shortening when cooking. · Don't skip meals. Your blood sugar may drop too low if you skip meals and take insulin or certain medicines for diabetes. · Check with your doctor before you drink alcohol. Alcohol can cause your blood sugar to drop too low. Alcohol can also cause a bad reaction if you take certain diabetes medicines. Follow-up care is a key part of your treatment and safety. Be sure to make and go to all appointments, and call your doctor if you are having problems. It's also a good idea to know your test results and keep a list of the medicines you take. Where can you learn more? Go to http://donavon-stacy.info/. Enter E775 in the search box to learn more about \"Learning About Diabetes Food Guidelines. \"  Current as of: March 13, 2017  Content Version: 11.4  © 4729-0902 [employer].  Care instructions adapted under license by [facility] (which disclaims liability or warranty for this information). If you have questions about a medical condition or this instruction, always ask your healthcare professional. [address] any warranty or liability for your use of this information. Learning About Diabetes Food Guidelines  Your Care Instructions    Meal planning is important to manage diabetes. It helps keep your blood sugar at a target level (which you set with your doctor). You don't have to eat special foods. You can eat what your family eats, including sweets once in a while. But you do have to pay attention to how often you eat and how much you eat of certain foods. You may want to work with a dietitian or a certified diabetes educator (CDE) to help you plan meals and snacks. A dietitian or CDE can also help you lose weight if that is one of your goals. What should you know about eating carbs? Managing the amount of carbohydrate (carbs) you eat is an important part of healthy meals when you have diabetes. Carbohydrate is found in many foods. · Learn which foods have carbs. And learn the amounts of carbs in different foods. ¨ Bread, cereal, pasta, and rice have about 15 grams of carbs in a serving. A serving is 1 slice of bread (1 ounce), ½ cup of cooked cereal, or 1/3 cup of cooked pasta or rice. ¨ Fruits have 15 grams of carbs in a serving. A serving is 1 small fresh fruit, such as an apple or orange; ½ of a banana; ½ cup of cooked or canned fruit; ½ cup of fruit juice; 1 cup of melon or raspberries; or 2 tablespoons of dried fruit. ¨ Milk and no-sugar-added yogurt have 15 grams of carbs in a serving. A serving is 1 cup of milk or 2/3 cup of no-sugar-added yogurt. ¨ Starchy vegetables have 15 grams of carbs in a serving. A serving is ½ cup of mashed potatoes or sweet potato; 1 cup winter squash; ½ of a small baked potato; ½ cup of cooked beans; or ½ cup cooked corn or green peas.   · Learn how much carbs to eat each day and at each meal. A dietitian or CDE can teach you how to keep track of the amount of carbs you eat. This is called carbohydrate counting. · If you are not sure how to count carbohydrate grams, use the Plate Method to plan meals. It is a good, quick way to make sure that you have a balanced meal. It also helps you spread carbs throughout the day. ¨ Divide your plate by types of foods. Put non-starchy vegetables on half the plate, meat or other protein food on one-quarter of the plate, and a grain or starchy vegetable in the final quarter of the plate. To this you can add a small piece of fruit and 1 cup of milk or yogurt, depending on how many carbs you are supposed to eat at a meal.  · Try to eat about the same amount of carbs at each meal. Do not \"save up\" your daily allowance of carbs to eat at one meal.  · Proteins have very little or no carbs per serving. Examples of proteins are beef, chicken, turkey, fish, eggs, tofu, cheese, cottage cheese, and peanut butter. A serving size of meat is 3 ounces, which is about the size of a deck of cards. Examples of meat substitute serving sizes (equal to 1 ounce of meat) are 1/4 cup of cottage cheese, 1 egg, 1 tablespoon of peanut butter, and ½ cup of tofu. How can you eat out and still eat healthy? · Learn to estimate the serving sizes of foods that have carbohydrate. If you measure food at home, it will be easier to estimate the amount in a serving of restaurant food. · If the meal you order has too much carbohydrate (such as potatoes, corn, or baked beans), ask to have a low-carbohydrate food instead. Ask for a salad or green vegetables. · If you use insulin, check your blood sugar before and after eating out to help you plan how much to eat in the future. · If you eat more carbohydrate at a meal than you had planned, take a walk or do other exercise. This will help lower your blood sugar. What else should you know?   · Limit saturated fat, such as the fat from meat and dairy products. This is a healthy choice because people who have diabetes are at higher risk of heart disease. So choose lean cuts of meat and nonfat or low-fat dairy products. Use olive or canola oil instead of butter or shortening when cooking. · Don't skip meals. Your blood sugar may drop too low if you skip meals and take insulin or certain medicines for diabetes. · Check with your doctor before you drink alcohol. Alcohol can cause your blood sugar to drop too low. Alcohol can also cause a bad reaction if you take certain diabetes medicines. Follow-up care is a key part of your treatment and safety. Be sure to make and go to all appointments, and call your doctor if you are having problems. It's also a good idea to know your test results and keep a list of the medicines you take. Where can you learn more? Go to http://donavonTotalHouseholdstacy.info/. Enter L060 in the search box to learn more about \"Learning About Diabetes Food Guidelines. \"  Current as of: March 13, 2017  Content Version: 11.4  © 5989-2567 [employer]. Care instructions adapted under license by [employer] (which disclaims liability or warranty for this information). If you have questions about a medical condition or this instruction, always ask your healthcare professional. [address] any warranty or liability for your use of this information. Learning About Diabetes Food Guidelines  Your Care Instructions    Meal planning is important to manage diabetes. It helps keep your blood sugar at a target level (which you set with your doctor). You don't have to eat special foods. You can eat what your family eats, including sweets once in a while. But you do have to pay attention to how often you eat and how much you eat of certain foods. You may want to work with a dietitian or a certified diabetes educator (CDE) to help you plan meals and snacks.  A dietitian or CDE can also help you lose weight if that is one of your goals. What should you know about eating carbs? Managing the amount of carbohydrate (carbs) you eat is an important part of healthy meals when you have diabetes. Carbohydrate is found in many foods. · Learn which foods have carbs. And learn the amounts of carbs in different foods. ¨ Bread, cereal, pasta, and rice have about 15 grams of carbs in a serving. A serving is 1 slice of bread (1 ounce), ½ cup of cooked cereal, or 1/3 cup of cooked pasta or rice. ¨ Fruits have 15 grams of carbs in a serving. A serving is 1 small fresh fruit, such as an apple or orange; ½ of a banana; ½ cup of cooked or canned fruit; ½ cup of fruit juice; 1 cup of melon or raspberries; or 2 tablespoons of dried fruit. ¨ Milk and no-sugar-added yogurt have 15 grams of carbs in a serving. A serving is 1 cup of milk or 2/3 cup of no-sugar-added yogurt. ¨ Starchy vegetables have 15 grams of carbs in a serving. A serving is ½ cup of mashed potatoes or sweet potato; 1 cup winter squash; ½ of a small baked potato; ½ cup of cooked beans; or ½ cup cooked corn or green peas. · Learn how much carbs to eat each day and at each meal. A dietitian or CDE can teach you how to keep track of the amount of carbs you eat. This is called carbohydrate counting. · If you are not sure how to count carbohydrate grams, use the Plate Method to plan meals. It is a good, quick way to make sure that you have a balanced meal. It also helps you spread carbs throughout the day. ¨ Divide your plate by types of foods. Put non-starchy vegetables on half the plate, meat or other protein food on one-quarter of the plate, and a grain or starchy vegetable in the final quarter of the plate.  To this you can add a small piece of fruit and 1 cup of milk or yogurt, depending on how many carbs you are supposed to eat at a meal.  · Try to eat about the same amount of carbs at each meal. Do not \"save up\" your daily allowance of carbs to eat at one meal.  · Proteins have very little or no carbs per serving. Examples of proteins are beef, chicken, turkey, fish, eggs, tofu, cheese, cottage cheese, and peanut butter. A serving size of meat is 3 ounces, which is about the size of a deck of cards. Examples of meat substitute serving sizes (equal to 1 ounce of meat) are 1/4 cup of cottage cheese, 1 egg, 1 tablespoon of peanut butter, and ½ cup of tofu. How can you eat out and still eat healthy? · Learn to estimate the serving sizes of foods that have carbohydrate. If you measure food at home, it will be easier to estimate the amount in a serving of restaurant food. · If the meal you order has too much carbohydrate (such as potatoes, corn, or baked beans), ask to have a low-carbohydrate food instead. Ask for a salad or green vegetables. · If you use insulin, check your blood sugar before and after eating out to help you plan how much to eat in the future. · If you eat more carbohydrate at a meal than you had planned, take a walk or do other exercise. This will help lower your blood sugar. What else should you know? · Limit saturated fat, such as the fat from meat and dairy products. This is a healthy choice because people who have diabetes are at higher risk of heart disease. So choose lean cuts of meat and nonfat or low-fat dairy products. Use olive or canola oil instead of butter or shortening when cooking. · Don't skip meals. Your blood sugar may drop too low if you skip meals and take insulin or certain medicines for diabetes. · Check with your doctor before you drink alcohol. Alcohol can cause your blood sugar to drop too low. Alcohol can also cause a bad reaction if you take certain diabetes medicines. Follow-up care is a key part of your treatment and safety. Be sure to make and go to all appointments, and call your doctor if you are having problems.  It's also a good idea to know your test results and keep a list of the medicines you take. Where can you learn more? Go to http://donavon-stacy.info/. Enter P094 in the search box to learn more about \"Learning About Diabetes Food Guidelines. \"  Current as of: March 13, 2017  Content Version: 11.4  © 0712-2843 [employer]. Care instructions adapted under license by [employer] (which disclaims liability or warranty for this information). If you have questions about a medical condition or this instruction, always ask your healthcare professional. [place] [address] any warranty or liability for your use of this information. Counting Carbohydrates When You Take Insulin: Care Instructions  Your Care Instructions    You don't have to eat special foods when you take insulin. You just have to be careful to eat healthy foods. And you have to spread throughout the day the carbohydrates you eat. Carbohydrates raise blood sugar higher and more quickly than any other nutrient. It is found in desserts, breads and cereals, and fruit. It's also found in starchy vegetables such as potatoes and corn, grains such as rice and pasta, and milk and yogurt. The more carbohydrates, or carbs, you eat at one time, the higher your blood sugar will rise. Spreading carbs throughout the day helps keep your blood sugar levels within your target range. Counting carbs is one of the best ways to keep your blood sugar under control when you use insulin. It helps you match the right amount of insulin to the number of grams of carbohydrates in a meal. You need to test your blood sugar several times a day to learn how carbs affect you. Then you can change your diet and insulin dose as needed. A registered dietitian or certified diabetes educator can help you plan meals and snacks. Follow-up care is a key part of your treatment and safety. Be sure to make and go to all appointments, and call your doctor if you are having problems.  It's also a good idea to know your test results and keep a list of the medicines you take. How can you care for yourself at home? Know your daily amount of carbohydrates  Your daily amount depends on several things, including your weight, how active you are, which diabetes medicines you take, and what your goals are for your blood sugar levels. A registered dietitian or certified diabetes educator can help you plan how many carbohydrates to include in each meal and snack. For most adults, a guideline for the daily amount of carbohydrates is:  · 45 to 60 grams at each meal. That's about the same as 3 to 4 carbohydrate servings. · 15 to 20 grams at each snack. That's about the same as 1 carbohydrate serving. Count carbs  If you take insulin, you need to know how many grams of carbohydrates are in a meal. This lets you know how much rapid-acting insulin to take before you eat. If you use an insulin pump, you get a constant rate of insulin during the day. So the pump must be programmed at meals to give you extra insulin to cover the rise in blood sugar after meals. When you know how many carbohydrates you will eat, you can take the right amount of insulin. Or, if you always use the same amount of insulin, you need to make sure that you eat the same amount of carbs at meals. · Learn your own insulin-to-carbohydrates ratio. You and your diabetes health professional will figure out the ratio. You can do this by testing your blood sugar after meals. For example, you may need a certain amount of insulin for every 15 grams of carbohydrates. · Add up the carbohydrate grams in a meal. Then you can figure out how many units of insulin to take based on your insulin-to-carbohydrates ratio. · Look at labels on packaged foods. This can tell you how many carbohydrates are in a serving. You can also use guides from the American Diabetes Association. · Be aware of portions, or serving sizes.  If a package has two servings and you eat the whole package, you need to double the number of grams of carbohydrates listed for one serving. · Protein, fat, and fiber do not raise blood sugar as much as carbs do. If you eat a lot of these nutrients in a meal, your blood sugar will rise more slowly than it would otherwise. · Exercise lowers blood sugar. You can use less insulin than you would if you were not doing exercise. Keep in mind that timing matters. If you exercise within 1 hour after a meal, your body may need less insulin for that meal than it would if you exercised 3 hours after the meal. Test your blood sugar to find out how exercise affects your need for insulin. Eat from all food groups  · Eat at least three meals a day. · Plan meals to include food from all the food groups. ¨ Grains: 1 slice of bread (1 ounce), ½ cup of cooked cereal, and 1/3 cup of cooked pasta or rice. These have about 15 grams of carbohydrates in a serving. Choose whole grains. These include whole wheat bread or crackers, oatmeal, and brown rice. Have them more often than refined grains. ¨ Fruit: 1 small fresh fruit, such as an apple or orange; ½ of a banana; ½ cup of chopped, cooked, or canned fruit; ½ cup of fruit juice; 1 cup of melon or raspberries; and 2 tablespoons of dried fruit. These have about 15 grams of carbohydrates in a serving. ¨ Dairy: 1 cup of nonfat or low-fat milk and 2/3 cup of plain yogurt. These have about 15 grams of carbohydrates in a serving. ¨ Protein foods: Beef, chicken, turkey, fish, eggs, tofu, cheese, cottage cheese, and peanut butter. A serving size of meat is 3 ounces. This is about the size of a deck of cards. Examples of meat substitute serving sizes (equal to 1 ounce of meat) are 1/4 cup of cottage cheese, 1 egg, 1 tablespoon of peanut butter, and ½ cup of tofu. These have very little or no carbohydrates in a serving.   ¨ Vegetables: Starchy vegetables such as ½ cup of cooked beans, peas, potatoes, or corn have about 15 grams of carbohydrates. Nonstarchy vegetables have very little carbohydrates. These include 1 cup of raw leafy vegetables (such as spinach), ½ cup of other vegetables (cooked or chopped), and 3/4 cup of vegetable juice. · Talk to your dietitian or diabetes educator about ways to add limited amounts of sweets into your meal plan. · If you drink alcohol:  ¨ Limit it to no more than 1 drink a day for women and 2 drinks a day for men. (One drink is 12 fl oz of beer, 5 fl oz of wine, or 1.5 fl oz liquor.)  ¨ Make sure to count drink mixers that have sugar in your total carbohydrate count. These include cola, tonic water, jamison mix, and fruit juice. ¨ Eat a carbohydrate food along with your alcoholic drink. ¨ Check your blood sugar more often. This is because alcohol can lower your blood sugar too much. This may happen even hours later while you sleep. You may want to eat and adjust your insulin dose when you drink alcohol to prevent severe low blood sugar. ¨ Talk to your doctor. Alcohol may not be recommended when you are taking certain diabetes medicines. Where can you learn more? Go to http://donavon-stacy.info/. Enter L259 in the search box to learn more about \"Counting Carbohydrates When You Take Insulin: Care Instructions. \"  Current as of: March 13, 2017  Content Version: 11.4  © 4215-0200 [employer]. Care instructions adapted under license by [employer] (which disclaims liability or warranty for this information). If you have questions about a medical condition or this instruction, always ask your healthcare professional. [place] [address] any warranty or liability for your use of this information. Learning About Carbohydrates  What are carbohydrates? Carbohydrates are an important nutrient you get from food. It's a great source of energy for your body and helps your brain and nervous system work properly.   How does your body use carbohydrates? After you eat food with carbs in it, your body digests the carbohydrates and turns them into a kind of sugar that goes into your blood. The blood carries this sugar to the cells in your body. The cells use the sugar to give you energy. Extra sugar is stored in the cells for later use. If it isn't used, it turns into fat. Where do carbohydrates come from? The healthiest carbohydrate choices are breads, cereals, and pastas made with whole grains; brown rice; low-fat dairy products; vegetables; legumes such as peas, lentils, and beans; and fruits. Foods made from refined flour, including bread, pasta, doughnuts, cookies, and desserts, also contain carbohydrates. So do sweets such as candy and soda. How can you get the right kind and amount of carbs? Eating too much of anything can lead to weight gain. And that can lead to other health problems. Here are some tips to help you eat the right amount of the right kind of carbs so you have the nutrition and the energy you need:  · Eat 3 to 8 servings of grains (breads, cereals, rice, pasta) each day. For example, a serving is 1 slice of bread, 1 cup of boxed cereal, or ½ cup of cooked rice, cooked pasta, or cooked cereal. Go to www. choosemyplate.gov to learn how many servings you need. ¨ Buy bread that lists whole wheat (or other whole grains), stone-ground wheat, or cracked wheat as the first ingredient. ¨ Eat brown rice, bulgur, or millet instead of white rice. ¨ Eat pasta and cereals made from whole grain flour instead of refined flour. · Eat several servings a day of fresh fruits and vegetables. These include raspberries, apples, figs, oranges, pears, prunes, broccoli, brussels sprouts, carrots, corn, peas, and beans. And there are lots of other fruits and vegetables to choose from. · Limit the amount of candy, desserts, and soda in your diet. Where can you learn more? Go to http://donavon-stacy.info/.   Enter F199 in the search box to learn more about \"Learning About Carbohydrates. \"  Current as of: May 12, 2017  Content Version: 11.4  © 4637-6534 [employer]. Care instructions adapted under license by [employer] (which disclaims liability or warranty for this information). If you have questions about a medical condition or this instruction, always ask your healthcare professional. [address] any warranty or liability for your use of this information. Learning About Low-Carbohydrate Diets for Weight Loss  What is a low-carbohydrate diet? Low-carb diets avoid foods that are high in carbohydrate. These high-carb foods include pasta, bread, rice, cereal, fruits, and starchy vegetables. Instead, these diets usually have you eat foods that are high in fat and protein. Many people lose weight quickly on a low-carb diet. But the early weight loss is water. People on this diet often gain the weight back after they start eating carbs again. Not all diet plans are safe or work well. A lot of the evidence shows that low-carb diets aren't healthy. That's because these diets often don't include healthy foods like fruits and vegetables. Losing weight safely means balancing protein, fat, and carbs with every meal and snack. And low-carb diets don't always provide the vitamins, minerals, and fiber you need. If you have a serious medical condition, talk to your doctor before you try any diet. These conditions include kidney disease, heart disease, type 2 diabetes, high cholesterol, and high blood pressure. If you are pregnant, it may not be safe for your baby if you are on a low-carb diet. How can you lose weight safely? You might have heard that a diet plan helped another person lose weight. But that doesn't mean that it will work for you. It is very hard to stay on a diet that includes lots of big changes in your eating habits.  If you want to get to a healthy weight and stay there, making healthy lifestyle changes will often work better than dieting. These steps can help. · Make a plan for change. Work with your doctor to create a plan that is right for you. · See a dietitian. He or she can show you how to make healthy changes in your eating habits. · Manage stress. If you have a lot of stress in your life, it can be hard to focus on making healthy changes to your daily habits. · Track your food and activity. You are likely to do better at losing weight if you keep track of what you eat and what you do. Follow-up care is a key part of your treatment and safety. Be sure to make and go to all appointments, and call your doctor if you are having problems. It's also a good idea to know your test results and keep a list of the medicines you take. Where can you learn more? Go to http://donavon-stacy.info/. Enter A121 in the search box to learn more about \"Learning About Low-Carbohydrate Diets for Weight Loss. \"  Current as of: May 12, 2017  Content Version: 11.4  © 6084-9929 [employer]. Care instructions adapted under license by [employer] (which disclaims liability or warranty for this information). If you have questions about a medical condition or this instruction, always ask your healthcare professional. [address] any warranty or liability for your use of this information. Counting Carbohydrates When You Take Insulin: Care Instructions  Your Care Instructions    You don't have to eat special foods when you take insulin. You just have to be careful to eat healthy foods. And you have to spread throughout the day the carbohydrates you eat. Carbohydrates raise blood sugar higher and more quickly than any other nutrient. It is found in desserts, breads and cereals, and fruit. It's also found in starchy vegetables such as potatoes and corn, grains such as rice and pasta, and milk and yogurt.   The more carbohydrates, or carbs, you eat at one time, the higher your blood sugar will rise. Spreading carbs throughout the day helps keep your blood sugar levels within your target range. Counting carbs is one of the best ways to keep your blood sugar under control when you use insulin. It helps you match the right amount of insulin to the number of grams of carbohydrates in a meal. You need to test your blood sugar several times a day to learn how carbs affect you. Then you can change your diet and insulin dose as needed. A registered dietitian or certified diabetes educator can help you plan meals and snacks. Follow-up care is a key part of your treatment and safety. Be sure to make and go to all appointments, and call your doctor if you are having problems. It's also a good idea to know your test results and keep a list of the medicines you take. How can you care for yourself at home? Know your daily amount of carbohydrates  Your daily amount depends on several things, including your weight, how active you are, which diabetes medicines you take, and what your goals are for your blood sugar levels. A registered dietitian or certified diabetes educator can help you plan how many carbohydrates to include in each meal and snack. For most adults, a guideline for the daily amount of carbohydrates is:  · 45 to 60 grams at each meal. That's about the same as 3 to 4 carbohydrate servings. · 15 to 20 grams at each snack. That's about the same as 1 carbohydrate serving. Count carbs  If you take insulin, you need to know how many grams of carbohydrates are in a meal. This lets you know how much rapid-acting insulin to take before you eat. If you use an insulin pump, you get a constant rate of insulin during the day. So the pump must be programmed at meals to give you extra insulin to cover the rise in blood sugar after meals. When you know how many carbohydrates you will eat, you can take the right amount of insulin.  Or, if you always use the same amount of insulin, you need to make sure that you eat the same amount of carbs at meals. · Learn your own insulin-to-carbohydrates ratio. You and your diabetes health professional will figure out the ratio. You can do this by testing your blood sugar after meals. For example, you may need a certain amount of insulin for every 15 grams of carbohydrates. · Add up the carbohydrate grams in a meal. Then you can figure out how many units of insulin to take based on your insulin-to-carbohydrates ratio. · Look at labels on packaged foods. This can tell you how many carbohydrates are in a serving. You can also use guides from the American Diabetes Association. · Be aware of portions, or serving sizes. If a package has two servings and you eat the whole package, you need to double the number of grams of carbohydrates listed for one serving. · Protein, fat, and fiber do not raise blood sugar as much as carbs do. If you eat a lot of these nutrients in a meal, your blood sugar will rise more slowly than it would otherwise. · Exercise lowers blood sugar. You can use less insulin than you would if you were not doing exercise. Keep in mind that timing matters. If you exercise within 1 hour after a meal, your body may need less insulin for that meal than it would if you exercised 3 hours after the meal. Test your blood sugar to find out how exercise affects your need for insulin. Eat from all food groups  · Eat at least three meals a day. · Plan meals to include food from all the food groups. ¨ Grains: 1 slice of bread (1 ounce), ½ cup of cooked cereal, and 1/3 cup of cooked pasta or rice. These have about 15 grams of carbohydrates in a serving. Choose whole grains. These include whole wheat bread or crackers, oatmeal, and brown rice. Have them more often than refined grains.   ¨ Fruit: 1 small fresh fruit, such as an apple or orange; ½ of a banana; ½ cup of chopped, cooked, or canned fruit; ½ cup of fruit juice; 1 cup of melon or raspberries; and 2 tablespoons of dried fruit. These have about 15 grams of carbohydrates in a serving. ¨ Dairy: 1 cup of nonfat or low-fat milk and 2/3 cup of plain yogurt. These have about 15 grams of carbohydrates in a serving. ¨ Protein foods: Beef, chicken, turkey, fish, eggs, tofu, cheese, cottage cheese, and peanut butter. A serving size of meat is 3 ounces. This is about the size of a deck of cards. Examples of meat substitute serving sizes (equal to 1 ounce of meat) are 1/4 cup of cottage cheese, 1 egg, 1 tablespoon of peanut butter, and ½ cup of tofu. These have very little or no carbohydrates in a serving. ¨ Vegetables: Starchy vegetables such as ½ cup of cooked beans, peas, potatoes, or corn have about 15 grams of carbohydrates. Nonstarchy vegetables have very little carbohydrates. These include 1 cup of raw leafy vegetables (such as spinach), ½ cup of other vegetables (cooked or chopped), and 3/4 cup of vegetable juice. · Talk to your dietitian or diabetes educator about ways to add limited amounts of sweets into your meal plan. · If you drink alcohol:  ¨ Limit it to no more than 1 drink a day for women and 2 drinks a day for men. (One drink is 12 fl oz of beer, 5 fl oz of wine, or 1.5 fl oz liquor.)  ¨ Make sure to count drink mixers that have sugar in your total carbohydrate count. These include cola, tonic water, jamison mix, and fruit juice. ¨ Eat a carbohydrate food along with your alcoholic drink. ¨ Check your blood sugar more often. This is because alcohol can lower your blood sugar too much. This may happen even hours later while you sleep. You may want to eat and adjust your insulin dose when you drink alcohol to prevent severe low blood sugar. ¨ Talk to your doctor. Alcohol may not be recommended when you are taking certain diabetes medicines. Where can you learn more? Go to http://yovani.info/.   Enter T553 in the search box to learn more about \"Counting Carbohydrates When You Take Insulin: Care Instructions. \"  Current as of: March 13, 2017  Content Version: 11.4  © 9017-9929 [employer]. Care instructions adapted under license by [employer] (which disclaims liability or warranty for this information). If you have questions about a medical condition or this instruction, always ask your healthcare professional. [address] any warranty or liability for your use of this information.

## 2018-01-25 NOTE — PROGRESS NOTES
HISTORY OF PRESENT ILLNESS  Latrice Munguia is a 32 y.o. female. HPI   Patient present current medical history of uncontrolled diabetic state controlled hypertension morbid obesity with body mass index of close to 64 migraine headache active washing the low salt diet concern about the weight last hemoglobin A1c at 11.2 percentile her medication was changed she has been compliant with medical advice she has been active having a diabetic diet go to the gym and exercise 3-4 times a week does a lot of daily walking denies any shortness of breath or chest pain no medication refill needed today revealing to lose as much as weight as possible stating that she is not eating too much always portions out her meal and eats more frequent unfortunately she is on an old try cyclic antidepressants amitriptyline 50 mg high dose for her migraine headache for which has been helping her greatly otherwise stable and does not have any other complaint any other concern     last a1c was not at target, in 2017, 10.5%, 11.2% todayimproved greatly    HTN  Today pt present for Bp check and the patient stating that so far there has been a Compliancy w/ the bp meds,  She is having had the low salt diet,  the patient has been active  She does do the daily walking,   pt states that patien does not obtain the bp at home   today the pt denies Chest Pain, has no legs swelling no lightheadedness,  the pat has not been feeling anxious, and  Has not been feeling stressed out, otherwise feeling better since the last visit    Current Outpatient Prescriptions   Medication Sig Dispense Refill    glipiZIDE (GLUCOTROL) 5 mg tablet TAKE 2 TABS BY MOUTH TWO (2) TIMES A DAY. 120 Tab 3    SITagliptin-metFORMIN (JANUMET) 50-1,000 mg per tablet Take 1 Tab by mouth two (2) times daily (with meals). 60 Tab 6    diclofenac EC (VOLTAREN) 75 mg EC tablet Take 1 Tab by mouth two (2) times a day.  30 Tab 0    methocarbamol (ROBAXIN) 750 mg tablet Take 1 Tab by mouth two (2) times daily as needed for Pain. 20 Tab 0    traMADol (ULTRAM) 50 mg tablet Take 1 Tab by mouth every eight (8) hours as needed for Pain. Max Daily Amount: 150 mg. 12 Tab 0    omeprazole (PRILOSEC) 40 mg capsule Take 1 Cap by mouth daily. 30 Cap 3    metoprolol tartrate (LOPRESSOR) 100 mg IR tablet Take 1 Tab by mouth two (2) times a day. 60 Tab 6    nortriptyline (PAMELOR) 50 mg capsule Take 1 Cap by mouth nightly for 180 days. 90 Cap 1    gabapentin (NEURONTIN) 300 mg capsule TAKE 1 CAP BY MOUTH THREE (3) TIMES DAILY. 90 Cap 2    spironolactone (ALDACTONE) 25 mg tablet Take 1 Tab by mouth two (2) times a day. Indications: ALDOSTERONISM, hypertension 60 Tab 6    spironolactone (ALDACTONE) 25 mg tablet Take 1 Tab by mouth two (2) times a day. Indications: ALDOSTERONISM, hypertension 60 Tab 0    insulin glargine (TOUJEO SOLOSTAR) 300 unit/mL (1.5 mL) inpn 8 Units by SubCUTAneous route daily. 1 Pen 5    Diclofenac Potassium (CAMBIA) 50 mg pwpk Take 1 Package by mouth daily as needed. 5 Packet 0    Arm Brace misc Right ulnar splint for ulnar neuropathy at the elbow. 1 Each 2    butalbital-acetaminophen-caffeine (FIORICET) -40 mg per tablet Take 0.5-1 Tabs by mouth every six (6) hours as needed for Headache. Max Daily Amount: 4 Tabs. 12 Tab 0    glucose blood VI test strips (ONETOUCH VERIO) strip Check sugars two times daily. E11.9 100 Strip 6    Blood-Glucose Meter (ONETOUCH VERIO IQ METER) misc Test blood sugar once daily 1 Each 3    codeine-butalbital-aspirin-caffeine (BUTALBITAL COMPOUND-CODEINE) 03--40 mg per capsule Take 1 Cap by mouth every eight (8) hours as needed for Pain or Headache. Max Daily Amount: 3 Caps. 60 Cap 0    cetirizine (ZYRTEC) 10 mg tablet Take  by mouth daily.  albuterol (PROVENTIL HFA, VENTOLIN HFA) 90 mcg/actuation inhaler Take 1 Puff by inhalation every four (4) hours as needed for Wheezing.  1 Inhaler 1    albuterol (ACCUNEB) 1.25 mg/3 mL nebulizer solution Take 3 mL by inhalation every six (6) hours as needed for Wheezing. 1 Bottle 6    acetaminophen (TYLENOL EXTRA STRENGTH) 500 mg tablet Take 1 Tab by mouth every six (6) hours as needed for Pain.  30 Tab 1     Allergies   Allergen Reactions    Latex Itching     Past Medical History:   Diagnosis Date    Asthma     [place] hump 1/7/2015    Carpal tunnel syndrome of right wrist 12/20/2016    Diabetes ([facility] [address]     GERD (gastroesophageal reflux disease) 8/13/2014    HTN (hypertension) 7/25/2011    Hyperaldosteronism ([address] 10/19/2015    Other ill-defined conditions(799.89)     migraines     Past Surgical History:   Procedure Laterality Date    REMOVAL OF TONSILS,<13 Y/O       Family History   Problem Relation Age of Onset   [facility] Arthritis-rheumatoid Mother     Hypertension Mother     Diabetes Mother      Type II    Psychiatric Disorder Father     Drug Abuse Father     Cancer Other      prostate    Hypertension Sister     Thyroid Disease Sister      \"I think\"    Attention Deficit Hyperactivity Disorder Brother     Hypertension Sister      Social History   Substance Use Topics    Smoking status: Current Every Day Smoker     Packs/day: 0.50     Years: 5.00    Smokeless tobacco: Never Used    Alcohol use 0.0 oz/week     1 Standard drinks or equivalent per week      Comment: socially, Monthly or less      Lab Results  Component Value Date/Time   Hemoglobin A1c 10.9 03/20/2017 04:08 PM   Hemoglobin A1c 8.4 09/26/2016 12:00 AM   Hemoglobin A1c 8.6 08/13/2014 12:55 PM   Glucose 289 03/20/2017 04:08 PM   Glucose (POC) 116 05/28/2016 08:34 PM   Microalb/Creat ratio (ug/mg creat.) 18.1 12/20/2016 03:50 PM   LDL, calculated 96 03/20/2017 04:08 PM   Creatinine 0.60 03/20/2017 04:08 PM      Lab Results  Component Value Date/Time   Cholesterol, total 172 03/20/2017 04:08 PM   HDL Cholesterol 30 03/20/2017 04:08 PM   LDL, calculated 96 03/20/2017 04:08 PM   Triglyceride 230 03/20/2017 04:08 PM     Lab Results  Component Value Date/Time   GFR est non- 03/20/2017 04:08 PM   GFR est  03/20/2017 04:08 PM   Creatinine 0.60 03/20/2017 04:08 PM   BUN 10 03/20/2017 04:08 PM   Sodium 135 03/20/2017 04:08 PM   Potassium 4.7 03/20/2017 04:08 PM   Chloride 95 03/20/2017 04:08 PM   CO2 24 03/20/2017 04:08 PM        Review of Systems   Constitutional: Negative for chills and fever. HENT: Negative for ear pain and nosebleeds. Eyes: Negative for blurred vision, pain and discharge. Respiratory: Negative for shortness of breath. Cardiovascular: Negative for chest pain and leg swelling. Gastrointestinal: Negative for constipation, diarrhea, nausea and vomiting. Genitourinary: Negative for frequency. Musculoskeletal: Negative for joint pain. Skin: Negative for itching and rash. Neurological: Negative for headaches. Psychiatric/Behavioral: Negative for depression. The patient is not nervous/anxious. Physical Exam   Constitutional: She is oriented to person, place, and time. She appears well-developed and well-nourished. HENT:   Head: Normocephalic and atraumatic. Eyes: Conjunctivae and EOM are normal.   Neck: Normal range of motion. Neck supple. Cardiovascular: Normal rate, regular rhythm and normal heart sounds. No murmur heard. Pulmonary/Chest: Effort normal and breath sounds normal.   Abdominal: Soft. Bowel sounds are normal. She exhibits no distension. Musculoskeletal: Normal range of motion. She exhibits no edema. Neurological: She is alert and oriented to person, place, and time. Skin: No erythema. Psychiatric: Her behavior is normal.   Nursing note and vitals reviewed. ASSESSMENT and PLAN  Diagnoses and all orders for this visit:    1. Hyperaldosteronism (HCC)  -     REFERRAL TO PODIATRY  -     MICROALBUMIN, UR, RAND W/ MICROALBUMIN/CREA RATIO  -     AMB POC HEMOGLOBIN A1C  -     rosuvastatin (CRESTOR) 20 mg tablet; Take 1 Tab by mouth nightly.   -     phentermine (ADIPEX-P) 37.5 mg tablet; Take 1 Tab by mouth every morning. Max Daily Amount: 37.5 mg.  -     phentermine (ADIPEX-P) 37.5 mg tablet; Take 1 Tab by mouth every morning. Max Daily Amount: 37.5 mg.  -     insulin glargine (TOUJEO SOLOSTAR) 300 unit/mL (1.5 mL) inpn; 10 Units by SubCUTAneous route daily. 2. Essential hypertension  -     REFERRAL TO PODIATRY  -     MICROALBUMIN, UR, RAND W/ MICROALBUMIN/CREA RATIO  -     AMB POC HEMOGLOBIN A1C  -     rosuvastatin (CRESTOR) 20 mg tablet; Take 1 Tab by mouth nightly. -     phentermine (ADIPEX-P) 37.5 mg tablet; Take 1 Tab by mouth every morning. Max Daily Amount: 37.5 mg.  -     phentermine (ADIPEX-P) 37.5 mg tablet; Take 1 Tab by mouth every morning. Max Daily Amount: 37.5 mg.  -     insulin glargine (TOUJEO SOLOSTAR) 300 unit/mL (1.5 mL) inpn; 10 Units by SubCUTAneous route daily. 3. Obesity, morbid (more than 100 lbs over ideal weight or BMI > 40) ([facility])  -     REFERRAL TO PODIATRY  -     MICROALBUMIN, UR, RAND W/ MICROALBUMIN/CREA RATIO  -     AMB POC HEMOGLOBIN A1C  -     rosuvastatin (CRESTOR) 20 mg tablet; Take 1 Tab by mouth nightly. -     phentermine (ADIPEX-P) 37.5 mg tablet; Take 1 Tab by mouth every morning. Max Daily Amount: 37.5 mg.  -     phentermine (ADIPEX-P) 37.5 mg tablet; Take 1 Tab by mouth every morning. Max Daily Amount: 37.5 mg.  -     insulin glargine (TOUJEO SOLOSTAR) 300 unit/mL (1.5 mL) inpn; 10 Units by SubCUTAneous route daily. 4. Type 2 diabetes mellitus without complication, with long-term current use of insulin ([facility])  -     REFERRAL TO PODIATRY  -     MICROALBUMIN, UR, RAND W/ MICROALBUMIN/CREA RATIO  -     AMB POC HEMOGLOBIN A1C  -     rosuvastatin (CRESTOR) 20 mg tablet; Take 1 Tab by mouth nightly. -     phentermine (ADIPEX-P) 37.5 mg tablet; Take 1 Tab by mouth every morning. Max Daily Amount: 37.5 mg.  -     phentermine (ADIPEX-P) 37.5 mg tablet; Take 1 Tab by mouth every morning.  Max Daily Amount: 37.5 mg.  -     insulin glargine (TOUJEO SOLOSTAR) 300 unit/mL (1.5 mL) inpn; 10 Units by SubCUTAneous route daily. Normal test results except for sugar level into the UNcontrolled diabetic state please be compliant with the following steps for improving diabetic care and outcome for further care to prevent further devastating complications of a uncontrolled diabetic state: increase Diabetic Education by more readings, have a great diabetic diet , low cholesterol diet, weight control and daily exercise 20- 30 min most days of the week, home glucose monitoring if possible, and daily foot care and yearly foot  Doctor  and  annual eye examinations at Ophthalmologist,  will cont with your average sugar reading check every 3months.     Pt agreed

## 2018-01-26 LAB
ALBUMIN/CREAT UR: 9.1 MG/G CREAT (ref 0–30)
CREAT UR-MCNC: 129.2 MG/DL
MICROALBUMIN UR-MCNC: 11.8 UG/ML

## 2018-01-29 ENCOUNTER — HOSPITAL ENCOUNTER (OUTPATIENT)
Dept: LAB | Age: 28
Discharge: HOME | End: 2018-01-29

## 2018-01-29 ENCOUNTER — HOSPITAL ENCOUNTER (EMERGENCY)
Age: 28
Discharge: HOME | End: 2018-01-29
Attending: FAMILY MEDICINE

## 2018-01-29 VITALS
HEART RATE: 106 BPM | OXYGEN SATURATION: 96 % | TEMPERATURE: 97.6 F | WEIGHT: 293 LBS | RESPIRATION RATE: 20 BRPM | HEIGHT: 63 IN | BODY MASS INDEX: 51.91 KG/M2 | DIASTOLIC BLOOD PRESSURE: 85 MMHG | SYSTOLIC BLOOD PRESSURE: 181 MMHG

## 2018-01-29 DIAGNOSIS — N39.0 ACUTE UTI: Primary | ICD-10-CM

## 2018-01-29 LAB
BILIRUB UR QL: NEGATIVE
GLUCOSE UR QL STRIP.AUTO: NEGATIVE MG/DL
HCG UR QL: NEGATIVE
KETONES UR-MCNC: NEGATIVE MG/DL
LEUKOCYTE ESTERASE UR QL STRIP: ABNORMAL
NITRITE UR QL: NEGATIVE
PH UR: 7 [PH] (ref 5–8)
PROT UR QL: 30 MG/DL
RBC # UR STRIP: ABNORMAL /UL
SP GR UR: 1.02 (ref 1–1.03)
UROBILINOGEN UR QL: 1 EU/DL (ref 0.2–1)

## 2018-01-29 PROCEDURE — 87086 URINE CULTURE/COLONY COUNT: CPT | Performed by: NURSE PRACTITIONER

## 2018-01-29 PROCEDURE — 87077 CULTURE AEROBIC IDENTIFY: CPT | Performed by: NURSE PRACTITIONER

## 2018-01-29 PROCEDURE — 87186 SC STD MICRODIL/AGAR DIL: CPT | Performed by: NURSE PRACTITIONER

## 2018-01-29 RX ORDER — CIPROFLOXACIN 500 MG/1
500 TABLET ORAL 2 TIMES DAILY
Qty: 14 TAB | Refills: 0 | Status: SHIPPED | OUTPATIENT
Start: 2018-01-29 | End: 2018-02-05

## 2018-01-29 NOTE — DISCHARGE INSTRUCTIONS

## 2018-01-31 LAB
BACTERIA SPEC CULT: ABNORMAL
BACTERIA SPEC CULT: ABNORMAL
CC UR VC: ABNORMAL
SERVICE CMNT-IMP: ABNORMAL

## 2018-02-02 NOTE — UC PROVIDER NOTE
HPI Comments:   Here for UTI symptoms continued. Stan was seen for online virtual doctors visit and put on bactrim. She finished a couple days ago has continued symptoms that are mildly worse. Symptoms include burning with urination and cloudy urine. Is drinking plenty of fluids. No fever, chills, back, flank or abdominal pain. Otherwise feels well. Hx of type II diabetes, HTN, asthma, GERD    Patient is a 32 y.o. female presenting with urinary tract infection. Bladder Infection    Pertinent negatives include no chills, no nausea, no vomiting, no hematuria and no flank pain.         Past Medical History:   Diagnosis Date    Asthma     [place] hump 1/7/2015    Carpal tunnel syndrome of right wrist 12/20/2016    Diabetes ([facility] [address]     GERD (gastroesophageal reflux disease) 8/13/2014    HTN (hypertension) 7/25/2011    Hyperaldosteronism ([facility] [address] 10/19/2015    Other ill-defined conditions(799.89)     migraines        Past Surgical History:   Procedure Laterality Date    REMOVAL OF TONSILS,<12 [de-identified]           Family History   Problem Relation Age of Onset   [facility] Arthritis-rheumatoid Mother     Hypertension Mother     Diabetes Mother      Type II    Psychiatric Disorder Father     Drug Abuse Father     Cancer Other      prostate    Hypertension Sister     Thyroid Disease Sister      \"I think\"    Attention Deficit Hyperactivity Disorder Brother     Hypertension Sister         Social History     Social History    Marital status: SINGLE     Spouse name: N/A    Number of children: N/A    Years of education: N/A     Occupational History    customer ser and student      [employer]     Social History Main Topics    Smoking status: Current Every Day Smoker     Packs/day: 0.50     Years: 5.00    Smokeless tobacco: Never Used    Alcohol use 0.0 oz/week     1 Standard drinks or equivalent per week      Comment: socially, Monthly or less    Drug use: No    Sexual activity: Not Currently     Partners: Male     Birth control/ protection: Condom     Other Topics Concern    Not on file     Social History Narrative                ALLERGIES: Latex    Review of Systems   Constitutional: Negative for chills, fatigue and fever. Gastrointestinal: Negative for nausea and vomiting. Genitourinary: Positive for dysuria. Negative for flank pain, hematuria, vaginal discharge and vaginal pain. Skin: Negative for pallor. Neurological: Negative for dizziness, weakness, light-headedness and headaches. All other systems reviewed and are negative. Vitals:    01/29/18 1458   BP: 181/85   Pulse: (!) 106   Resp: 20   Temp: 97.6 °F (36.4 °C)   SpO2: 96%   Weight: (!) 161.5 kg (356 lb)   Height: 5' 3\" (1.6 m)       Physical Exam   Constitutional: She is oriented to person, place, and time. No distress. Non-toxic appearing, well hydrated   HENT:   Mouth/Throat: Oropharynx is clear and moist. No oropharyngeal exudate. Eyes: EOM are normal. Pupils are equal, round, and reactive to light. No scleral icterus. Neck: Neck supple. Cardiovascular: Normal rate, regular rhythm and normal heart sounds. Exam reveals no gallop and no friction rub. No murmur heard. Pulmonary/Chest: Effort normal and breath sounds normal. No respiratory distress. She has no wheezes. She has no rales. Abdominal: Bowel sounds are normal. She exhibits no distension. There is no tenderness. There is no rebound and no guarding. Musculoskeletal:   No CVA tenderness   Neurological: She is alert and oriented to person, place, and time. No cranial nerve deficit. Skin: Skin is warm and dry. No rash noted. She is not diaphoretic. No erythema. No pallor. Psychiatric: She has a normal mood and affect. Her behavior is normal. Thought content normal.   Nursing note and vitals reviewed.       MDM     Differential Diagnosis; Clinical Impression; Plan:       CLINICAL IMPRESSION:  (N39.0) Acute UTI  (primary encounter diagnosis)    Orders Placed This Encounter CULTURE, URINE      POC URINE MACROSCOPIC      HCG URINE, QL. - POC  RX      ciprofloxacin HCl (CIPRO) 500 mg tablet    UA suggests UTI today. No evidence of pyelo. Will Rx Cipro and send off culture. Plan:  1. See above orders  2. Maintain adequate fluid intake  3. Call for urine culture results      We have reviewed concerning signs/symptoms, normal vs abnormal progression of medical condition and when to seek immediate medical attention. Schedule with PCP or Urgent Care immediately for worsening or new symptoms. See your PCP if there is not at least some improvement in symptoms within the next 48 hours  You should see your PCP for updates on your routine health maintenance.    Risk of Significant Complications, Morbidity, and/or Mortality:   Presenting problems:  Low  Diagnostic procedures:  Low  Management options:  Low  Progress:   Patient progress:  Stable      Procedures

## 2018-02-05 DIAGNOSIS — E11.9 TYPE 2 DIABETES MELLITUS WITHOUT COMPLICATION, WITHOUT LONG-TERM CURRENT USE OF INSULIN (HCC): ICD-10-CM

## 2018-02-05 DIAGNOSIS — E11.9 DIABETES MELLITUS WITHOUT COMPLICATION (HCC): ICD-10-CM

## 2018-02-05 DIAGNOSIS — G56.01 CARPAL TUNNEL SYNDROME OF RIGHT WRIST: ICD-10-CM

## 2018-02-05 DIAGNOSIS — Z23 ENCOUNTER FOR IMMUNIZATION: ICD-10-CM

## 2018-02-05 RX ORDER — OMEPRAZOLE 40 MG/1
40 CAPSULE, DELAYED RELEASE ORAL DAILY
Qty: 30 CAP | Refills: 3 | Status: SHIPPED | OUTPATIENT
Start: 2018-02-05 | End: 2018-08-07 | Stop reason: SDUPTHER

## 2018-02-28 DIAGNOSIS — I10 ESSENTIAL HYPERTENSION: ICD-10-CM

## 2018-02-28 DIAGNOSIS — Z79.4 TYPE 2 DIABETES MELLITUS WITHOUT COMPLICATION, WITH LONG-TERM CURRENT USE OF INSULIN (HCC): ICD-10-CM

## 2018-02-28 DIAGNOSIS — E26.9 HYPERALDOSTERONISM (HCC): ICD-10-CM

## 2018-02-28 DIAGNOSIS — E11.9 TYPE 2 DIABETES MELLITUS WITHOUT COMPLICATION, WITH LONG-TERM CURRENT USE OF INSULIN (HCC): ICD-10-CM

## 2018-02-28 DIAGNOSIS — E66.01 OBESITY, MORBID (MORE THAN 100 LBS OVER IDEAL WEIGHT OR BMI > 40) (HCC): ICD-10-CM

## 2018-02-28 RX ORDER — NORTRIPTYLINE HYDROCHLORIDE 50 MG/1
CAPSULE ORAL
Qty: 90 CAP | Refills: 1 | Status: SHIPPED | OUTPATIENT
Start: 2018-02-28 | End: 2018-04-25 | Stop reason: ALTCHOICE

## 2018-03-28 ENCOUNTER — OFFICE VISIT (OUTPATIENT)
Dept: NEUROLOGY | Age: 28
End: 2018-03-28

## 2018-03-28 VITALS
HEART RATE: 88 BPM | DIASTOLIC BLOOD PRESSURE: 80 MMHG | HEIGHT: 63 IN | SYSTOLIC BLOOD PRESSURE: 132 MMHG | WEIGHT: 293 LBS | BODY MASS INDEX: 51.91 KG/M2 | OXYGEN SATURATION: 98 %

## 2018-03-28 DIAGNOSIS — G56.21 ULNAR NEUROPATHY OF RIGHT UPPER EXTREMITY: ICD-10-CM

## 2018-03-28 DIAGNOSIS — Z72.0 TOBACCO USE: ICD-10-CM

## 2018-03-28 DIAGNOSIS — Z79.4 TYPE 2 DIABETES MELLITUS WITHOUT COMPLICATION, WITH LONG-TERM CURRENT USE OF INSULIN (HCC): ICD-10-CM

## 2018-03-28 DIAGNOSIS — R20.0 BILATERAL HAND NUMBNESS: ICD-10-CM

## 2018-03-28 DIAGNOSIS — G56.21 ULNAR NEUROPATHY AT ELBOW, RIGHT: ICD-10-CM

## 2018-03-28 DIAGNOSIS — G56.01 CARPAL TUNNEL SYNDROME OF RIGHT WRIST: ICD-10-CM

## 2018-03-28 DIAGNOSIS — E11.9 TYPE 2 DIABETES MELLITUS WITHOUT COMPLICATION, WITH LONG-TERM CURRENT USE OF INSULIN (HCC): ICD-10-CM

## 2018-03-28 DIAGNOSIS — G47.33 OSA (OBSTRUCTIVE SLEEP APNEA): ICD-10-CM

## 2018-03-28 DIAGNOSIS — G43.009 MIGRAINE WITHOUT AURA AND WITHOUT STATUS MIGRAINOSUS, NOT INTRACTABLE: Primary | ICD-10-CM

## 2018-03-28 NOTE — LETTER
3/28/2018 9:37 AM 
 
Patient:    Randle Kanner YOB: 1990 Date of Visit:    3/28/2018 Dear Anh Patel MD 
 
Thank you for referring Ms. Randle Kanner to me for evaluation/treatment. Below are the relevant portions of my assessment and plan of care. Neurology follow-up note REFERRED BY: 
Anh Patel MD 
 
03/28/18 Chief Complaint Patient presents with  Follow-up  
  mini headache from stress of school Subjective Randle Kanner is a 32 y.o. female who presented to the neurology office for management of numbness and tingling in the hands and headaches. Regarding the numbness in the hand, it started around mid May 2016 and it is bilateral.  The medial 2 fingers are involved in the right hand and all the fingers are involved in the left hand. It is gradually progressive. She does also have neck pain with radiation to the right shoulder. Her symptoms are off and on and it is more when she is working and improves when her hands and not moving. The numbness and tingling lasts for minutes but the pain around her wrist can be for a few days. She denies any weakness. EMG/nerve conduction study has been normal.  She is taking gabapentin 300 mg p.o. thrice daily and that has helped her. She uses right elbow splint and bilateral hand splints. Stable. Regarding the headaches, she has been having migraines for a long time and her headaches start of pain in the left eye and left side of the face and it is 10 out of 10 in severity, associated with photophobia, phonophobia and nausea but she denies any vomiting. The headache can last from 3 days to week. It is throbbing in character. Since the last visit, the headaches have improved and she has minimal headaches now. She does also have obstructive sleep apnea and has stopped using her CPAP Risk Factors for headaches Smoking: Half pack a day Coffee: Denies cups/day Tea: 0 cups/day Soda: 2 cans/day Water: 4 glasses/day Sleeps at 9 AM and wakes up at 2 PM to 3 PM. Medications tried Imitrex Amitriptyline Nortriptyline Current Outpatient Prescriptions Medication Sig  
 gabapentin (NEURONTIN) 300 mg capsule Take 1 Cap by mouth three (3) times daily.  nortriptyline (PAMELOR) 50 mg capsule TAKE ONE CAPSULE BY MOUTH EVERY NIGHT  omeprazole (PRILOSEC) 40 mg capsule Take 1 Cap by mouth daily.  nitrofurantoin, macrocrystal-monohydrate, (MACROBID) 100 mg capsule TAKE ONE CAPSULE BY MOUTH TWICE A DAY  HUNG 0.35 mg tab TAKE 1 TABLET BY MOUTH EVERY DAY  nystatin-triamcinolone (MYCOLOG) 100,000-0.1 unit/gram-% ointment APPLY EXTERNALLY TWICE DAILY FOR NO LONGER THAN 2 WEEKS  
 rosuvastatin (CRESTOR) 20 mg tablet Take 1 Tab by mouth nightly.  phentermine (ADIPEX-P) 37.5 mg tablet Take 1 Tab by mouth every morning. Max Daily Amount: 37.5 mg.  phentermine (ADIPEX-P) 37.5 mg tablet Take 1 Tab by mouth every morning. Max Daily Amount: 37.5 mg.  
 insulin glargine (TOUJEO SOLOSTAR) 300 unit/mL (1.5 mL) inpn 10 Units by SubCUTAneous route daily.  nortriptyline (PAMELOR) 50 mg capsule TAKE ONE CAPSULE BY MOUTH EVERY NIGHT  glipiZIDE (GLUCOTROL) 5 mg tablet TAKE 2 TABS BY MOUTH TWO (2) TIMES A DAY.  SITagliptin-metFORMIN (JANUMET) 50-1,000 mg per tablet Take 1 Tab by mouth two (2) times daily (with meals).  diclofenac EC (VOLTAREN) 75 mg EC tablet Take 1 Tab by mouth two (2) times a day.  methocarbamol (ROBAXIN) 750 mg tablet Take 1 Tab by mouth two (2) times daily as needed for Pain.  traMADol (ULTRAM) 50 mg tablet Take 1 Tab by mouth every eight (8) hours as needed for Pain. Max Daily Amount: 150 mg.  
 metoprolol tartrate (LOPRESSOR) 100 mg IR tablet Take 1 Tab by mouth two (2) times a day.  spironolactone (ALDACTONE) 25 mg tablet Take 1 Tab by mouth two (2) times a day. Indications: ALDOSTERONISM, hypertension  Diclofenac Potassium (CAMBIA) 50 mg pwpk Take 1 Package by mouth daily as needed.  Arm Brace [facility] [place] Right ulnar splint for ulnar neuropathy at the elbow.  butalbital-acetaminophen-caffeine (FIORICET) -40 mg per tablet Take 0.5-1 Tabs by mouth every six (6) hours as needed for Headache. Max Daily Amount: 4 Tabs.  glucose blood VI test strips (ONETOUCH VERIO) strip Check sugars two times daily. E11.9  Blood-Glucose Meter (ONETOUCH VERIO IQ METER) misc Test blood sugar once daily  cetirizine (ZYRTEC) 10 mg tablet Take  by mouth daily.  albuterol (PROVENTIL HFA, VENTOLIN HFA) 90 mcg/actuation inhaler Take 1 Puff by inhalation every four (4) hours as needed for Wheezing.  albuterol (ACCUNEB) 1.25 mg/3 mL nebulizer solution Take 3 mL by inhalation every six (6) hours as needed for Wheezing.  acetaminophen (TYLENOL EXTRA STRENGTH) 500 mg tablet Take 1 Tab by mouth every six (6) hours as needed for Pain. No current facility-administered medications for this visit. REVIEW OF SYSTEMS:  
A ten system review of constitutional, cardiovascular, respiratory, musculoskeletal, endocrine, skin, SHEENT, genitourinary, psychiatric and neurologic systems was obtained and is unremarkable except as stated in HPI EXAMINATION:  
Visit Vitals  /80  Pulse 88  Ht 5' 3\" (1.6 m)  Wt (!) 353 lb 6.4 oz (160.3 kg)  SpO2 98%  BMI 62.6 kg/m2 General:  
General appearance: Pt is in no acute distress Distal pulses are preserved Neurological Examination:  
Mental Status: AAO x3. Speech is fluent. Follows commands, has normal fund of knowledge, attention, short term recall, comprehension and insight. Cranial Nerves: Visual fields are full. PERRL, Extraocular movements are full. Facial sensation intact V1- V3. Facial movement intact, symmetric. Hearing intact to conversation. Palate elevates symmetrically. Shoulder shrug symmetric. Tongue midline. Motor: Strength is 5/5 in all 4 ext. No atrophy. Tone: Normal 
 
Sensation: Decreased pinprick sensation in the medial 2 fingers of the right hand and the lateral 3 fingers on the left hand Coordination/Cerebellar: Intact to finger-nose-finger Skin: No significant bruising or lacerations. Laboratory review:  
Results for orders placed or performed during the hospital encounter of 01/29/18 CULTURE, URINE Result Value Ref Range Special Requests: NO SPECIAL REQUESTS [place] Count >100,000 COLONIES/mL Culture result: ESCHERICHIA COLI (PREDOMINATING) (A) Culture result: MIXED UROGENITAL CHENG ISOLATED Susceptibility Escherichia coli - QUINTIN Amikacin ($) <=16 Susceptible ug/mL Ampicillin ($) >16 Resistant ug/mL Ampicillin/sulbactam ($) >16/8 Resistant ug/mL Aztreonam ($$$$) <=4 Susceptible ug/mL Cefazolin ($) <=8 Susceptible ug/mL Cefepime ($$) <=4 Susceptible ug/mL Cefotaxime <=2 Susceptible ug/mL Ceftazidime ($) <=1 Susceptible ug/mL Ceftriaxone ($) <=1 Susceptible ug/mL Cefuroxime ($) <=4 Susceptible ug/mL Ciprofloxacin ($) <=1 Susceptible ug/mL Gentamicin ($) >8 Resistant ug/mL Imipenem <=1 Susceptible ug/mL Levofloxacin ($) <=2 Susceptible ug/mL Meropenem ($$) <=1 Susceptible ug/mL Nitrofurantoin <=32 Susceptible ug/mL Piperacillin/Tazobac ($) <=16 Susceptible ug/mL Tobramycin ($) 8 Intermediate ug/mL Trimeth-Sulfamethoxa >2/38 Resistant ug/mL Laboratory review: 
12/20/2016 Glucose 352, potassium 5.3, platelet 262, CFR5M 9.3 Imaging review: 
7/27/2014 CT soft tissue neck with contrast  
No evidence of neck mass, abscess or lymphadenopathy 3/16/2017 EMG/nerve conduction study Nerve conduction studies of the bilateral upper extremities were unremarkable. Needle electrode examination of the right upper extremity was unremarkable. This is a normal study.   There is no electrophysiological evidence of median neuropathy at the wrist or and ulnar neuropathy at the elbow on either side. There is no electrophysiological evidence of right cervical radiculopathy. 4/14/2017 MRI of the brain without contrast 
Normal 
 
MRI cervical spine Straightening of the usual cervical lordosis but otherwise normal study Documentation review: I reviewed the notes from Dr. Ritchie Pagan from 9/1/2017. The patient just switched to nasal mask which she likes much better. Download was reviewed. Over the past 30 days she has used it only for 10 days. Uses about 2.5 hours on Tuesdays. She states that she will try using it nightly for a couple of months and then return in follow-up. If she is struggling to use it after using it nightly, he will consider an attended titration to address barriers to adherence. Assessment/Plan:  
Jono Miles is a 32 y.o. female who presented to the neurology office for management of migraines and clinically does have right ulnar neuropathy and left carpal tunnel syndrome. She does also have obstructive sleep apnea and does smoke. Her EMG/nerve conduction study has been normal. 
 
She states that the gabapentin has helped her with the paresthesia in her hands. She has increased her dosage of gabapentin to 300 mg p.o. 3 times daily. She does also use right elbow splint and bilateral hand splints and will continue with them as well. For her headaches, she is taking nortriptyline 50 mg p.o. nightly and that helps her. She has mild headaches here with that. No migraines. She does also have obstructive sleep apnea and that may be contributing to her migraines. She is noncompliant with her CPAP at this time. She does not use her CPAP anymore now. I gave her within 3 minutes counseling regarding smoking cessation. Follow-up in 6 months ICD-10-CM ICD-9-CM 1. Migraine without aura and without status migrainosus, not intractable G43.009 346.10 2. Type 2 diabetes mellitus without complication, with long-term current use of insulin (HCC) E11.9 250.00   
 Z79.4 V58.67   
3. Carpal tunnel syndrome of right wrist G56.01 354.0 4. Tobacco use Z72.0 305.1 5. PETE (obstructive sleep apnea) G47.33 327.23   
6. Ulnar neuropathy at elbow, right G56.21 354.2 7. Ulnar neuropathy of right upper extremity G56.21 354.2 8. Bilateral hand numbness R20.0 782.0 Thank you for allowing me to participate in the care of Ms. Adele Kennedy. Please feel free to contact me if you have any questions. Mayra Simpson MD 
Neurologist and Clinical Neurophysiologist 
 
CC: Negro Reese MD 
Fax: [phone] This note will not be viewable in [address] If you have questions, please do not hesitate to call me. I look forward to following Ms. Adele Kennedy along with you. Sincerely, Mayra Simpson MD

## 2018-03-28 NOTE — PATIENT INSTRUCTIONS
A Healthy Lifestyle: Care Instructions  Your Care Instructions    A healthy lifestyle can help you feel good, stay at a healthy weight, and have plenty of energy for both work and play. A healthy lifestyle is something you can share with your whole family. A healthy lifestyle also can lower your risk for serious health problems, such as high blood pressure, heart disease, and diabetes. You can follow a few steps listed below to improve your health and the health of your family. Follow-up care is a key part of your treatment and safety. Be sure to make and go to all appointments, and call your doctor if you are having problems. It's also a good idea to know your test results and keep a list of the medicines you take. How can you care for yourself at home? · Do not eat too much sugar, fat, or fast foods. You can still have dessert and treats now and then. The goal is moderation. · Start small to improve your eating habits. Pay attention to portion sizes, drink less juice and soda pop, and eat more fruits and vegetables. ¨ Eat a healthy amount of food. A 3-ounce serving of meat, for example, is about the size of a deck of cards. Fill the rest of your plate with vegetables and whole grains. ¨ Limit the amount of soda and sports drinks you have every day. Drink more water when you are thirsty. ¨ Eat at least 5 servings of fruits and vegetables every day. It may seem like a lot, but it is not hard to reach this goal. A serving or helping is 1 piece of fruit, 1 cup of vegetables, or 2 cups of leafy, raw vegetables. Have an apple or some carrot sticks as an afternoon snack instead of a candy bar. Try to have fruits and/or vegetables at every meal.  · Make exercise part of your daily routine. You may want to start with simple activities, such as walking, bicycling, or slow swimming. Try to be active 30 to 60 minutes every day. You do not need to do all 30 to 60 minutes all at once.  For example, you can exercise 3 times a day for 10 or 20 minutes. Moderate exercise is safe for most people, but it is always a good idea to talk to your doctor before starting an exercise program.  · Keep moving. Mahsa Moulding the lawn, work in the garden, or [employer]. Take the stairs instead of the elevator at work. · If you smoke, quit. People who smoke have an increased risk for heart attack, stroke, cancer, and other lung illnesses. Quitting is hard, but there are ways to boost your chance of quitting tobacco for good. ¨ Use nicotine gum, patches, or lozenges. ¨ Ask your doctor about stop-smoking programs and medicines. ¨ Keep trying. In addition to reducing your risk of diseases in the future, you will notice some benefits soon after you stop using tobacco. If you have shortness of breath or asthma symptoms, they will likely get better within a few weeks after you quit. · Limit how much alcohol you drink. Moderate amounts of alcohol (up to 2 drinks a day for men, 1 drink a day for women) are okay. But drinking too much can lead to liver problems, high blood pressure, and other health problems. Family health  If you have a family, there are many things you can do together to improve your health. · Eat meals together as a family as often as possible. · Eat healthy foods. This includes fruits, vegetables, lean meats and dairy, and whole grains. · Include your family in your fitness plan. Most people think of activities such as jogging or tennis as the way to fitness, but there are many ways you and your family can be more active. Anything that makes you breathe hard and gets your heart pumping is exercise. Here are some tips:  ¨ Walk to do errands or to take your child to school or the bus. ¨ Go for a family bike ride after dinner instead of watching TV. Where can you learn more? Go to http://donavon-stacy.info/. Enter C957 in the search box to learn more about \"A Healthy Lifestyle: Care Instructions. \"  Current as of: May 12, 2017  Content Version: 11.4  © 3471-6955 [employer], [employer]. Care instructions adapted under license by [employer] (which disclaims liability or warranty for this information). If you have questions about a medical condition or this instruction, always ask your healthcare professional. [address] any warranty or liability for your use of this information. Please be advised there is a $25 fee for all paperwork to be completed from our  providers. This is to be paid by the patient prior to picking up the completed forms.

## 2018-03-28 NOTE — PROGRESS NOTES
Neurology follow-up note     REFERRED BY:  Author MD Annia    03/28/18    Chief Complaint   Patient presents with    Follow-up     mini headache from [facility]       Jody Pedraza is a 32 y.o. female who presented to the neurology office for management of numbness and tingling in the hands and headaches. Regarding the numbness in the hand, it started around mid May 2016 and it is bilateral.  The medial 2 fingers are involved in the right hand and all the fingers are involved in the left hand. It is gradually progressive. She does also have neck pain with radiation to the right shoulder. Her symptoms are off and on and it is more when she is working and improves when her hands and not moving. The numbness and tingling lasts for minutes but the pain around her wrist can be for a few days. She denies any weakness. EMG/nerve conduction study has been normal.  She is taking gabapentin 300 mg p.o. thrice daily and that has helped her. She uses right elbow splint and bilateral hand splints. Stable. Regarding the headaches, she has been having migraines for a long time and her headaches start of pain in the left eye and left side of the face and it is 10 out of 10 in severity, associated with photophobia, phonophobia and nausea but she denies any vomiting. The headache can last from 3 days to week. It is throbbing in character. Since the last visit, the headaches have improved and she has minimal headaches now. She does also have obstructive sleep apnea and has stopped using her CPAP    Risk Factors for headaches  Smoking: Half pack a day  Coffee: Denies cups/day  Tea: 0 cups/day  Soda: 2 cans/day  Water: 4 glasses/day  Sleeps at 9 AM and wakes up at 2 PM to 3 PM.     Medications tried  Imitrex  Amitriptyline  Nortriptyline    Current Outpatient Prescriptions   Medication Sig    gabapentin (NEURONTIN) 300 mg capsule Take 1 Cap by mouth three (3) times daily.     nortriptyline (PAMELOR) 50 mg capsule TAKE ONE CAPSULE BY MOUTH EVERY NIGHT    omeprazole (PRILOSEC) 40 mg capsule Take 1 Cap by mouth daily.  nitrofurantoin, macrocrystal-monohydrate, (MACROBID) 100 mg capsule TAKE ONE CAPSULE BY MOUTH TWICE A DAY    HUNG 0.35 mg tab TAKE 1 TABLET BY MOUTH EVERY DAY    nystatin-triamcinolone (MYCOLOG) 100,000-0.1 unit/gram-% ointment APPLY EXTERNALLY TWICE DAILY FOR NO LONGER THAN 2 WEEKS    rosuvastatin (CRESTOR) 20 mg tablet Take 1 Tab by mouth nightly.  phentermine (ADIPEX-P) 37.5 mg tablet Take 1 Tab by mouth every morning. Max Daily Amount: 37.5 mg.  phentermine (ADIPEX-P) 37.5 mg tablet Take 1 Tab by mouth every morning. Max Daily Amount: 37.5 mg.    insulin glargine (TOUJEO SOLOSTAR) 300 unit/mL (1.5 mL) inpn 10 Units by SubCUTAneous route daily.  nortriptyline (PAMELOR) 50 mg capsule TAKE ONE CAPSULE BY MOUTH EVERY NIGHT    glipiZIDE (GLUCOTROL) 5 mg tablet TAKE 2 TABS BY MOUTH TWO (2) TIMES A DAY.  SITagliptin-metFORMIN (JANUMET) 50-1,000 mg per tablet Take 1 Tab by mouth two (2) times daily (with meals).  diclofenac EC (VOLTAREN) 75 mg EC tablet Take 1 Tab by mouth two (2) times a day.  methocarbamol (ROBAXIN) 750 mg tablet Take 1 Tab by mouth two (2) times daily as needed for Pain.  traMADol (ULTRAM) 50 mg tablet Take 1 Tab by mouth every eight (8) hours as needed for Pain. Max Daily Amount: 150 mg.    metoprolol tartrate (LOPRESSOR) 100 mg IR tablet Take 1 Tab by mouth two (2) times a day.  spironolactone (ALDACTONE) 25 mg tablet Take 1 Tab by mouth two (2) times a day. Indications: ALDOSTERONISM, hypertension    Diclofenac Potassium (CAMBIA) 50 mg pwpk Take 1 Package by mouth daily as needed.  Arm Brace misc Right ulnar splint for ulnar neuropathy at the elbow.  butalbital-acetaminophen-caffeine (FIORICET) -40 mg per tablet Take 0.5-1 Tabs by mouth every six (6) hours as needed for Headache. Max Daily Amount: 4 Tabs.     glucose blood VI test strips (ONETOUCH VERIO) strip Check sugars two times daily. E11.9    Blood-Glucose Meter (ONETOUCH VERIO IQ METER) misc Test blood sugar once daily    cetirizine (ZYRTEC) 10 mg tablet Take  by mouth daily.  albuterol (PROVENTIL HFA, VENTOLIN HFA) 90 mcg/actuation inhaler Take 1 Puff by inhalation every four (4) hours as needed for Wheezing.  albuterol (ACCUNEB) 1.25 mg/3 mL nebulizer solution Take 3 mL by inhalation every six (6) hours as needed for Wheezing.  acetaminophen (TYLENOL EXTRA STRENGTH) 500 mg tablet Take 1 Tab by mouth every six (6) hours as needed for Pain. No current facility-administered medications for this visit. REVIEW OF SYSTEMS:   A ten system review of constitutional, cardiovascular, respiratory, musculoskeletal, endocrine, skin, SHEENT, genitourinary, psychiatric and neurologic systems was obtained and is unremarkable except as stated in HPI     EXAMINATION:   Visit Vitals    /80    Pulse 88    Ht 5' 3\" (1.6 m)    Wt (!) 353 lb 6.4 oz (160.3 kg)    SpO2 98%    BMI 62.6 kg/m2        General:   General appearance: Pt is in no acute distress   Distal pulses are preserved    Neurological Examination:   Mental Status: AAO x3. Speech is fluent. Follows commands, has normal fund of knowledge, attention, short term recall, comprehension and insight. Cranial Nerves: Visual fields are full. PERRL, Extraocular movements are full. Facial sensation intact V1- V3. Facial movement intact, symmetric. Hearing intact to conversation. Palate elevates symmetrically. Shoulder shrug symmetric. Tongue midline. Motor: Strength is 5/5 in all 4 ext. No atrophy. Tone: Normal    Sensation: Decreased pinprick sensation in the medial 2 fingers of the right hand and the lateral 3 fingers on the left hand    Coordination/Cerebellar: Intact to finger-nose-finger     Skin: No significant bruising or lacerations.     Laboratory review:   Results for orders placed or performed during the hospital encounter of 01/29/18   CULTURE, URINE   Result Value Ref Range    Special Requests: NO SPECIAL REQUESTS      [place] Count >100,000  COLONIES/mL        Culture result: ESCHERICHIA COLI (PREDOMINATING) (A)      Culture result: MIXED UROGENITAL CHENG ISOLATED         Susceptibility    Escherichia coli - QUINTIN     Amikacin ($) <=16 Susceptible ug/mL     Ampicillin ($) >16 Resistant ug/mL     Ampicillin/sulbactam ($) >16/8 Resistant ug/mL     Aztreonam ($$$$) <=4 Susceptible ug/mL     Cefazolin ($) <=8 Susceptible ug/mL     Cefepime ($$) <=4 Susceptible ug/mL     Cefotaxime <=2 Susceptible ug/mL     Ceftazidime ($) <=1 Susceptible ug/mL     Ceftriaxone ($) <=1 Susceptible ug/mL     Cefuroxime ($) <=4 Susceptible ug/mL     Ciprofloxacin ($) <=1 Susceptible ug/mL     Gentamicin ($) >8 Resistant ug/mL     Imipenem <=1 Susceptible ug/mL     Levofloxacin ($) <=2 Susceptible ug/mL     Meropenem ($$) <=1 Susceptible ug/mL     Nitrofurantoin <=32 Susceptible ug/mL     Piperacillin/Tazobac ($) <=16 Susceptible ug/mL     Tobramycin ($) 8 Intermediate ug/mL     Trimeth-Sulfamethoxa >2/38 Resistant ug/mL     Laboratory review:  12/20/2016  Glucose 352, potassium 5.3, platelet 816, TGF1C 9.3    Imaging review:  7/27/2014  CT soft tissue neck with contrast   No evidence of neck mass, abscess or lymphadenopathy    3/16/2017  EMG/nerve conduction study  Nerve conduction studies of the bilateral upper extremities were unremarkable. Needle electrode examination of the right upper extremity was unremarkable. This is a normal study. There is no electrophysiological evidence of median neuropathy at the wrist or and ulnar neuropathy at the elbow on either side. There is no electrophysiological evidence of right cervical radiculopathy.     4/14/2017  MRI of the brain without contrast  Normal    MRI cervical spine  Straightening of the usual cervical lordosis but otherwise normal study    Documentation review:  I reviewed the notes from Dr. Meredith Cable from 9/1/2017. The patient just switched to nasal mask which she likes much better. Download was reviewed. Over the past 30 days she has used it only for 10 days. Uses about 2.5 hours on Tuesdays. She states that she will try using it nightly for a couple of months and then return in follow-up. If she is struggling to use it after using it nightly, he will consider an attended titration to address barriers to adherence. Assessment/Plan:   Rosemary Augustine is a 32 y.o. female who presented to the neurology office for management of migraines and clinically does have right ulnar neuropathy and left carpal tunnel syndrome. She does also have obstructive sleep apnea and does smoke. Her EMG/nerve conduction study has been normal.    She states that the gabapentin has helped her with the paresthesia in her hands. She has increased her dosage of gabapentin to 300 mg p.o. 3 times daily. She does also use right elbow splint and bilateral hand splints and will continue with them as well. For her headaches, she is taking nortriptyline 50 mg p.o. nightly and that helps her. She has mild headaches here with that. No migraines. She does also have obstructive sleep apnea and that may be contributing to her migraines. She is noncompliant with her CPAP at this time. She does not use her CPAP anymore now. I gave her within 3 minutes counseling regarding smoking cessation. Follow-up in 6 months      ICD-10-CM ICD-9-CM    1. Migraine without aura and without status migrainosus, not intractable G43.009 346.10    2. Type 2 diabetes mellitus without complication, with long-term current use of insulin (HCC) E11.9 250.00     Z79.4 V58.67    3. Carpal tunnel syndrome of right wrist G56.01 354.0    4. Tobacco use Z72.0 305.1    5. PETE (obstructive sleep apnea) G47.33 327.23    6. Ulnar neuropathy at elbow, right G56.21 354.2    7.  Ulnar neuropathy of right upper extremity G56.21 354.2    8. Bilateral hand numbness R20.0 782.0        Thank you for allowing me to participate in the care of Ms. Gomez. Please feel free to contact me if you have any questions. Ifeoma Ahumada MD  Neurologist and Clinical Neurophysiologist    CC: Rafi Cronin MD  Fax: [phone]    This note will not be viewable in [address]

## 2018-04-03 ENCOUNTER — TELEPHONE (OUTPATIENT)
Dept: FAMILY MEDICINE CLINIC | Age: 28
End: 2018-04-03

## 2018-04-04 ENCOUNTER — OFFICE VISIT (OUTPATIENT)
Dept: FAMILY MEDICINE CLINIC | Age: 28
End: 2018-04-04

## 2018-04-04 VITALS
DIASTOLIC BLOOD PRESSURE: 68 MMHG | HEIGHT: 63 IN | SYSTOLIC BLOOD PRESSURE: 136 MMHG | TEMPERATURE: 97.9 F | BODY MASS INDEX: 51.91 KG/M2 | OXYGEN SATURATION: 98 % | WEIGHT: 293 LBS | HEART RATE: 100 BPM | RESPIRATION RATE: 18 BRPM

## 2018-04-04 DIAGNOSIS — R00.0 TACHYCARDIA: ICD-10-CM

## 2018-04-04 DIAGNOSIS — F41.1 GAD (GENERALIZED ANXIETY DISORDER): Primary | ICD-10-CM

## 2018-04-04 DIAGNOSIS — R61 HYPERHIDROSIS: ICD-10-CM

## 2018-04-04 DIAGNOSIS — R00.2 PALPITATION: ICD-10-CM

## 2018-04-04 RX ORDER — ALPRAZOLAM 0.5 MG/1
0.5 TABLET ORAL
Qty: 30 TAB | Refills: 1 | Status: SHIPPED | OUTPATIENT
Start: 2018-04-04 | End: 2019-02-28 | Stop reason: SDUPTHER

## 2018-04-04 NOTE — PROGRESS NOTES
Rosamaria Lesch is a 32 y.o. female    Chief Complaint   Patient presents with    Anxiety     1. Have you been to the ER, urgent care clinic since your last visit? Hospitalized since your last visit? No    2. Have you seen or consulted any other health care providers outside of the [facility] since your last visit? Include any pap smears or colon screening.  No     Health Maintenance Due   Topic Date Due    EYE EXAM RETINAL OR DILATED Q1  01/07/2016    PAP AKA CERVICAL CYTOLOGY  02/12/2017    FOOT EXAM Q1  09/26/2017    LIPID PANEL Q1  03/20/2018       Patient states eye exam was done last year in 04/17 by   Foot exam was done 03/18 for by   Pap smear was done in 04/17 at [place] [facility] [place]

## 2018-04-04 NOTE — MR AVS SNAPSHOT
[address] [facility] [address] [address] 
[phone] Patient: Carlos A Pickard MRN: [id number] QHQ:4/31/[birth year]38 Visit Information Date & Time Provider Department Dept. Phone Encounter #  
 4/4/2018 12:15 PM MD Nga Rodrigez [facility] [phone] [id number] Follow-up Instructions Return if symptoms worsen or fail to improve. Your Appointments 4/25/2018  3:30 PM  
ROUTINE CARE with MD Nga Rodrigez [facility] ([address]) Appt Note: 3 m[address] fu  
 [address] [facility] [address] [address]  
[phone] [phone]  
  
    
 9/27/2018  2:00 PM  
Follow Up with Jacklynn Sicard, MD  
Neurology Clinic at [facility] [address] [facility]) Appt Note: [address], 
[address] [place] [address], [address] [phone]  
[address] [facility], [address] [place] [address], [address] [phone] Upcoming Health Maintenance Date Due  
 EYE EXAM RETINAL OR DILATED Q1 1/7/2016 PAP AKA CERVICAL CYTOLOGY 2/12/2017 FOOT EXAM Q1 9/26/2017 HEMOGLOBIN A1C Q6M 7/25/2018 MICROALBUMIN Q1 1/25/2019 LIPID PANEL Q1 4/4/2019 DTaP/Tdap/Td series (2 - Td) 8/27/2022 Allergies as of 4/4/2018  Review Complete On: 4/4/2018 By: Cristina Gaspar LPN Severity Noted Reaction Type Reactions Latex  08/27/2014   Side Effect Itching Current Immunizations  Reviewed on 3/20/2017 Name Date Influenza Vaccine (Quad) PF 12/20/2016, 10/19/2015  8:35 AM  
 Influenza Vaccine (Quadrivalent) 1/5/2018 Influenza Vaccine PF 9/27/2013 Pneumococcal Polysaccharide (PPSV-23) 9/27/2013 TDAP Vaccine 8/27/2012 Not reviewed this visit You Were Diagnosed With   
  
 Codes Comments ALIYA (generalized anxiety disorder)    -  Primary ICD-10-CM: F41.1 ICD-9-CM: 300.02 Hyperhidrosis     ICD-10-CM: L74.519 ICD-9-CM: 705.21 Palpitation     ICD-10-CM: R00.2 ICD-9-CM: 785.1 Tachycardia     ICD-10-CM: R00.0 ICD-9-CM: 317. 0 Vitals BP Pulse Temp Resp Height(growth percentile) Weight(growth percentile) 136/68 (BP 1 Location: Right arm, BP Patient Position: Sitting) 100 97.9 °F (36.6 °C) (Oral) 18 5' 3\" (1.6 m) (!) 361 lb 3.2 oz (163.8 kg) SpO2 BMI OB Status Smoking Status 98% 63.98 kg/m2 Medically Induced Current Every Day Smoker BMI and BSA Data Body Mass Index Body Surface Area 63.98 kg/m 2 2.7 m 2 Preferred Pharmacy Pharmacy Name Phone [id number]- [place], [place] - [address] [phone] Your Updated Medication List  
  
   
This list is accurate as of 4/4/18  1:05 PM.  Always use your most recent med list.  
  
  
  
  
 acetaminophen 500 mg tablet Commonly known as:  Lawrence Benedict [facility] Take 1 Tab by mouth every six (6) hours as needed for Pain. * albuterol 1.25 mg/3 mL Nebu Commonly known as:  Terri Huerta Take 3 mL by inhalation every six (6) hours as needed for Wheezing. * albuterol 90 mcg/actuation inhaler Commonly known as:  PROVENTIL HFA, VENTOLIN HFA, PROAIR HFA Take 1 Puff by inhalation every four (4) hours as needed for Wheezing. ALPRAZolam 0.5 mg tablet Commonly known as:  Yael Stachris Take 1 Tab by mouth nightly as needed for Anxiety or Sleep. Max Daily Amount: 0.5 mg. Indications: anxiety, Generalized Anxiety Disorder, Panic Disorder Arm Brace Misc Right ulnar splint for ulnar neuropathy at the elbow. Blood-Glucose Meter Misc Commonly known as:  Zee Caal IQ METER Test blood sugar once daily  
  
 butalbital-acetaminophen-caffeine -40 mg per tablet Commonly known as:  [employer] Take 0.5-1 Tabs by mouth every six (6) hours as needed for Headache. Max Daily Amount: 4 Tabs. HUNG 0.35 mg Tab Generic drug:  norethindrone TAKE 1 TABLET BY MOUTH EVERY DAY  
  
 diclofenac EC 75 mg EC tablet Commonly known as:  VOLTAREN Take 1 Tab by mouth two (2) times a day. Diclofenac Potassium 50 mg Pwpk Commonly known as:  CAMBIA Take 1 Package by mouth daily as needed. gabapentin 300 mg capsule Commonly known as:  NEURONTIN Take 1 Cap by mouth three (3) times daily. glipiZIDE 5 mg tablet Commonly known as:  GLUCOTROL  
TAKE 2 TABS BY MOUTH TWO (2) TIMES A DAY. glucose blood VI test strips strip Commonly known as:  Rupesh Joiner Check sugars two times daily. E11.9  
  
 insulin glargine 300 unit/mL (1.5 mL) Inpn Commonly known as:  TOUJEO SOLOSTAR U-300 INSULIN  
10 Units by SubCUTAneous route daily. methocarbamol 750 mg tablet Commonly known as:  ROBAXIN Take 1 Tab by mouth two (2) times daily as needed for Pain.  
  
 metoprolol tartrate 100 mg IR tablet Commonly known as:  LOPRESSOR Take 1 Tab by mouth two (2) times a day. nitrofurantoin (macrocrystal-monohydrate) 100 mg capsule Commonly known as:  MACROBID  
TAKE ONE CAPSULE BY MOUTH TWICE A DAY * nortriptyline 50 mg capsule Commonly known as:  PAMELOR  
TAKE ONE CAPSULE BY MOUTH EVERY NIGHT * nortriptyline 50 mg capsule Commonly known as:  PAMELOR  
TAKE ONE CAPSULE BY MOUTH EVERY NIGHT  
  
 nystatin-triamcinolone 100,000-0.1 unit/gram-% ointment Commonly known as:  MYCOLOG  
APPLY EXTERNALLY TWICE DAILY FOR NO LONGER THAN 2 WEEKS  
  
 omeprazole 40 mg capsule Commonly known as:  PRILOSEC Take 1 Cap by mouth daily. * phentermine 37.5 mg tablet Commonly known as:  ADIPEX-P Take 1 Tab by mouth every morning. Max Daily Amount: 37.5 mg.  
  
 * phentermine 37.5 mg tablet Commonly known as:  ADIPEX-P  
 Take 1 Tab by mouth every morning. Max Daily Amount: 37.5 mg.  
  
 rosuvastatin 20 mg tablet Commonly known as:  CRESTOR Take 1 Tab by mouth nightly. SITagliptin-metFORMIN 50-1,000 mg per tablet Commonly known as:  Caro Shaw Take 1 Tab by mouth two (2) times daily (with meals). spironolactone 25 mg tablet Commonly known as:  ALDACTONE Take 1 Tab by mouth two (2) times a day. Indications: ALDOSTERONISM, hypertension  
  
 traMADol 50 mg tablet Commonly known as:  ULTRAM  
Take 1 Tab by mouth every eight (8) hours as needed for Pain. Max Daily Amount: 150 mg.  
  
 ZyrTEC 10 mg tablet Generic drug:  cetirizine Take  by mouth daily. * Notice: This list has 6 medication(s) that are the same as other medications prescribed for you. Read the directions carefully, and ask your doctor or other care provider to review them with you. Prescriptions Printed Refills ALPRAZolam (XANAX) 0.5 mg tablet 1 Sig: Take 1 Tab by mouth nightly as needed for Anxiety or Sleep. Max Daily Amount: 0.5 mg. Indications: anxiety, Generalized Anxiety Disorder, Panic Disorder Class: Print Route: Oral  
  
We Performed the Following METANEPHRINES, PHEOCHROMOCYT [14661 CPT(R)] TSH 3RD GENERATION [66337 CPT(R)] Follow-up Instructions Return if symptoms worsen or fail to improve. Patient Instructions Panic Attacks: Care Instructions Your Care Instructions During a panic attack, you may have a feeling of intense fear or terror, trouble breathing, chest pain or tightness, heartbeat changes, dizziness, sweating, and shaking. A panic attack starts suddenly and usually lasts from 5 to 20 minutes but may last even longer. You have the most anxiety about 10 minutes after the attack starts. An attack can begin with a stressful event, or it can happen without a cause.  
Although panic attacks can cause scary symptoms, you can learn to manage them with self-care, counseling, and medicine. Follow-up care is a key part of your treatment and safety. Be sure to make and go to all appointments, and call your doctor if you are having problems. It's also a good idea to know your test results and keep a list of the medicines you take. How can you care for yourself at home? · Take your medicine exactly as directed. Call your doctor if you think you are having a problem with your medicine. · Go to your counseling sessions and follow-up appointments. · Recognize and accept your anxiety. Then, when you are in a situation that makes you anxious, say to yourself, \"This is not an emergency. I feel uncomfortable, but I am not in danger. I can keep going even if I feel anxious. \" · Be kind to your body: ¨ Relieve tension with exercise or a massage. ¨ Get enough rest. 
¨ Avoid alcohol, caffeine, nicotine, and illegal drugs. They can increase your anxiety level, cause sleep problems, or trigger a panic attack. ¨ Learn and do relaxation techniques. See below for more about these techniques. · Engage your mind. Get out and do something you enjoy. Go to a funny movie, or take a walk or hike. Plan your day. Having too much or too little to do can make you anxious. · Keep a record of your symptoms. Discuss your fears with a good friend or family member, or join a support group for people with similar problems. Talking to others sometimes relieves stress. · Get involved in social groups, or volunteer to help others. Being alone sometimes makes things seem worse than they are. · Get at least 30 minutes of exercise on most days of the week to relieve stress. Walking is a good choice. You also may want to do other activities, such as running, swimming, cycling, or playing tennis or team sports. Relaxation techniques Do relaxation exercises for 10 to 20 minutes a day. You can play soothing, relaxing music while you do them, if you wish. · Tell others in your house that you are going to do your relaxation exercises. Ask them not to disturb you. · Find a comfortable place, away from all distractions and noise. · Lie down on your back, or sit with your back straight. · Focus on your breathing. Make it slow and steady. · Breathe in through your nose. Breathe out through either your nose or mouth. · Breathe deeply, filling up the area between your navel and your rib cage. Breathe so that your belly goes up and down. · Do not hold your breath. · Breathe like this for 5 to 10 minutes. Notice the feeling of calmness throughout your whole body. As you continue to breathe slowly and deeply, relax by doing the following for another 5 to 10 minutes: · Tighten and relax each muscle group in your body. You can begin at your toes and work your way up to your head. · Imagine your muscle groups relaxing and becoming heavy. · Empty your mind of all thoughts. · Let yourself relax more and more deeply. · Become aware of the state of calmness that surrounds you. · When your relaxation time is over, you can bring yourself back to alertness by moving your fingers and toes and then your hands and feet and then stretching and moving your entire body. Sometimes people fall asleep during relaxation, but they usually wake up shortly afterward. · Always give yourself time to return to full alertness before you drive a car or do anything that might cause an accident if you are not fully alert. Never play a relaxation tape while driving a car. When should you call for help? Call 911 anytime you think you may need emergency care. For example, call if: 
? · You feel you cannot stop from hurting yourself or someone else. ? Watch closely for changes in your health, and be sure to contact your doctor if: 
? · Your panic attacks get worse. ? · You have new or different anxiety. ? · You are not getting better as expected. Where can you learn more? Go to http://donavon-stacy.info/. Enter H601 in the search box to learn more about \"Panic Attacks: Care Instructions. \" Current as of: May 12, 2017 Content Version: 11.4 © 9555-9741 [employer]. Care instructions adapted under license by [employer] (which disclaims liability or warranty for this information). If you have questions about a medical condition or this instruction, always ask your healthcare professional. [address] any warranty or liability for your use of this information. Introducing [facility] HEALTH SERVICES! Dear Ted Goes: Thank you for requesting a [employer] account. Our records indicate that you already have an active [employer] account. You can access your account anytime at [email or web address] Did you know that you can access your hospital and ER discharge instructions at any time in [employer]? You can also review all of your test results from your hospital stay or ER visit. Additional Information If you have questions, please visit the Frequently Asked Questions section of the [employer] website at [email or web address] Remember, [employer] is NOT to be used for urgent needs. For medical emergencies, dial 911. Now available from your iPhone and Android! Please provide this summary of care documentation to your next provider. Your primary care clinician is listed as Cole Katz. If you have any questions after today's visit, please call [phone].

## 2018-04-04 NOTE — PATIENT INSTRUCTIONS
Panic Attacks: Care Instructions  Your Care Instructions    During a panic attack, you may have a feeling of intense fear or terror, trouble breathing, chest pain or tightness, heartbeat changes, dizziness, sweating, and shaking. A panic attack starts suddenly and usually lasts from 5 to 20 minutes but may last even longer. You have the most anxiety about 10 minutes after the attack starts. An attack can begin with a stressful event, or it can happen without a cause. Although panic attacks can cause scary symptoms, you can learn to manage them with self-care, counseling, and medicine. Follow-up care is a key part of your treatment and safety. Be sure to make and go to all appointments, and call your doctor if you are having problems. It's also a good idea to know your test results and keep a list of the medicines you take. How can you care for yourself at home? · Take your medicine exactly as directed. Call your doctor if you think you are having a problem with your medicine. · Go to your counseling sessions and follow-up appointments. · Recognize and accept your anxiety. Then, when you are in a situation that makes you anxious, say to yourself, \"This is not an emergency. I feel uncomfortable, but I am not in danger. I can keep going even if I feel anxious. \"  · Be kind to your body:  ¨ Relieve tension with exercise or a massage. ¨ Get enough rest.  ¨ Avoid alcohol, caffeine, nicotine, and illegal drugs. They can increase your anxiety level, cause sleep problems, or trigger a panic attack. ¨ Learn and do relaxation techniques. See below for more about these techniques. · Engage your mind. Get out and do something you enjoy. Go to a funny movie, or take a walk or hike. Plan your day. Having too much or too little to do can make you anxious. · Keep a record of your symptoms. Discuss your fears with a good friend or family member, or join a support group for people with similar problems.  Talking to others sometimes relieves stress. · Get involved in social groups, or volunteer to help others. Being alone sometimes makes things seem worse than they are. · Get at least 30 minutes of exercise on most days of the week to relieve stress. Walking is a good choice. You also may want to do other activities, such as running, swimming, cycling, or playing tennis or team sports. Relaxation techniques  Do relaxation exercises for 10 to 20 minutes a day. You can play soothing, relaxing music while you do them, if you wish. · Tell others in your house that you are going to do your relaxation exercises. Ask them not to disturb you. · Find a comfortable place, away from all distractions and noise. · Lie down on your back, or sit with your back straight. · Focus on your breathing. Make it slow and steady. · Breathe in through your nose. Breathe out through either your nose or mouth. · Breathe deeply, filling up the area between your navel and your rib cage. Breathe so that your belly goes up and down. · Do not hold your breath. · Breathe like this for 5 to 10 minutes. Notice the feeling of calmness throughout your whole body. As you continue to breathe slowly and deeply, relax by doing the following for another 5 to 10 minutes:  · Tighten and relax each muscle group in your body. You can begin at your toes and work your way up to your head. · Imagine your muscle groups relaxing and becoming heavy. · Empty your mind of all thoughts. · Let yourself relax more and more deeply. · Become aware of the state of calmness that surrounds you. · When your relaxation time is over, you can bring yourself back to alertness by moving your fingers and toes and then your hands and feet and then stretching and moving your entire body. Sometimes people fall asleep during relaxation, but they usually wake up shortly afterward.   · Always give yourself time to return to full alertness before you drive a car or do anything that might cause an accident if you are not fully alert. Never play a relaxation tape while driving a car. When should you call for help? Call 911 anytime you think you may need emergency care. For example, call if:  ? · You feel you cannot stop from hurting yourself or someone else. ? Watch closely for changes in your health, and be sure to contact your doctor if:  ? · Your panic attacks get worse. ? · You have new or different anxiety. ? · You are not getting better as expected. Where can you learn more? Go to http://donavon-stacy.info/. Enter H601 in the search box to learn more about \"Panic Attacks: Care Instructions. \"  Current as of: May 12, 2017  Content Version: 11.4  © 5125-8469 [employer]. Care instructions adapted under license by [employer] (which disclaims liability or warranty for this information). If you have questions about a medical condition or this instruction, always ask your healthcare professional. [place] [address] any warranty or liability for your use of this information.

## 2018-04-04 NOTE — PROGRESS NOTES
HISTORY OF PRESENT ILLNESS  Damaris Mckeon is a 32 y.o. female. HPI     Panic attacks with Anxiety state    Patient state that thins are not getting better: pt states that  unable to sleep, ++sweating and palpitations,   unable to concentrate at work and at home at this time, +feeling anxius, no guilty feeling, has nl interest to do things, not depressed, nl appetite,  No Hoplessness, not wanted to heart self or others, no weight gain or loss,     In addition has excessive worry about a number of events and activities out of proportion, these have been presented for at least couple of wks  the pt's anxiety not the results of unfamiliar person exposure or if the pt's been scrutinized by other, and is not related to social or perfomramce issues as she states and she states this is not the first time, pt states that she was given ssri and she comitted suicide on it and thereafter she was given benzo for which was able to get rid of her problems, stating that so far that is the only med helping her state,     In addition pt states that there is no etoh or illicit drug use, not the first time,  no hx of physical nor of mental abuse, and currently is feeling safe in general.      Current Outpatient Prescriptions   Medication Sig Dispense Refill    ALPRAZolam (XANAX) 0.5 mg tablet Take 1 Tab by mouth nightly as needed for Anxiety or Sleep. Max Daily Amount: 0.5 mg. Indications: anxiety, Generalized Anxiety Disorder, Panic Disorder 30 Tab 1    gabapentin (NEURONTIN) 300 mg capsule Take 1 Cap by mouth three (3) times daily. (Patient taking differently: Take 300 mg by mouth two (2) times a day.) 90 Cap 2    omeprazole (PRILOSEC) 40 mg capsule Take 1 Cap by mouth daily. 30 Cap 3    rosuvastatin (CRESTOR) 20 mg tablet Take 1 Tab by mouth nightly. 30 Tab 6    phentermine (ADIPEX-P) 37.5 mg tablet Take 1 Tab by mouth every morning.  Max Daily Amount: 37.5 mg. 30 Tab 0    insulin glargine (TOUJEO SOLOSTAR) 300 unit/mL (1.5 mL) inpn 10 Units by SubCUTAneous route daily. 1 Pen 5    glipiZIDE (GLUCOTROL) 5 mg tablet TAKE 2 TABS BY MOUTH TWO (2) TIMES A DAY. 120 Tab 3    SITagliptin-metFORMIN (JANUMET) 50-1,000 mg per tablet Take 1 Tab by mouth two (2) times daily (with meals). 60 Tab 6    metoprolol tartrate (LOPRESSOR) 100 mg IR tablet Take 1 Tab by mouth two (2) times a day. 60 Tab 6    spironolactone (ALDACTONE) 25 mg tablet Take 1 Tab by mouth two (2) times a day. Indications: ALDOSTERONISM, hypertension 60 Tab 6    Diclofenac Potassium (CAMBIA) 50 mg pwpk Take 1 Package by mouth daily as needed. 5 Packet 0    Arm Brace misc Right ulnar splint for ulnar neuropathy at the elbow. 1 Each 2    butalbital-acetaminophen-caffeine (FIORICET) -40 mg per tablet Take 0.5-1 Tabs by mouth every six (6) hours as needed for Headache. Max Daily Amount: 4 Tabs. 12 Tab 0    albuterol (PROVENTIL HFA, VENTOLIN HFA) 90 mcg/actuation inhaler Take 1 Puff by inhalation every four (4) hours as needed for Wheezing. 1 Inhaler 1    albuterol (ACCUNEB) 1.25 mg/3 mL nebulizer solution Take 3 mL by inhalation every six (6) hours as needed for Wheezing. 1 Bottle 6    acetaminophen (TYLENOL EXTRA STRENGTH) 500 mg tablet Take 1 Tab by mouth every six (6) hours as needed for Pain. 30 Tab 1    nortriptyline (PAMELOR) 50 mg capsule TAKE ONE CAPSULE BY MOUTH EVERY NIGHT 90 Cap 1    nitrofurantoin, macrocrystal-monohydrate, (MACROBID) 100 mg capsule TAKE ONE CAPSULE BY MOUTH TWICE A DAY  0    HUNG 0.35 mg tab TAKE 1 TABLET BY MOUTH EVERY DAY  2    nystatin-triamcinolone (MYCOLOG) 100,000-0.1 unit/gram-% ointment APPLY EXTERNALLY TWICE DAILY FOR NO LONGER THAN 2 WEEKS  0    phentermine (ADIPEX-P) 37.5 mg tablet Take 1 Tab by mouth every morning. Max Daily Amount: 37.5 mg. 30 Tab 0    nortriptyline (PAMELOR) 50 mg capsule TAKE ONE CAPSULE BY MOUTH EVERY NIGHT  1    diclofenac EC (VOLTAREN) 75 mg EC tablet Take 1 Tab by mouth two (2) times a day.  30 Tab 0    methocarbamol (ROBAXIN) 750 mg tablet Take 1 Tab by mouth two (2) times daily as needed for Pain. 20 Tab 0    traMADol (ULTRAM) 50 mg tablet Take 1 Tab by mouth every eight (8) hours as needed for Pain. Max Daily Amount: 150 mg. 12 Tab 0    glucose blood VI test strips (ONETOUCH VERIO) strip Check sugars two times daily. E11.9 100 Strip 6    Blood-Glucose Meter (ONETOUCH VERIO IQ METER) misc Test blood sugar once daily 1 Each 3    cetirizine (ZYRTEC) 10 mg tablet Take  by mouth daily.        Allergies   Allergen Reactions    Latex Itching     Past Medical History:   Diagnosis Date    Asthma     [place] hump 1/7/2015    Carpal tunnel syndrome of right wrist 12/20/2016    Diabetes ([address]     ALIYA (generalized anxiety disorder) 4/4/2018    GERD (gastroesophageal reflux disease) 8/13/2014    HTN (hypertension) 7/25/2011    Hyperaldosteronism ([address] 10/19/2015    Hyperhidrosis 4/4/2018    Other ill-defined conditions(799.89)     migraines    Palpitation 4/4/2018    Tachycardia 4/4/2018     Past Surgical History:   Procedure Laterality Date    REMOVAL OF TONSILS,<13 Y/O       Family History   Problem Relation Age of Onset   [facility] Arthritis-rheumatoid Mother     Hypertension Mother     Diabetes Mother      Type II    Psychiatric Disorder Father     Drug Abuse Father     Cancer Other      prostate    Hypertension Sister     Thyroid Disease Sister      \"I think\"    Attention Deficit Hyperactivity Disorder Brother     Hypertension Sister      Social History   Substance Use Topics    Smoking status: Current Every Day Smoker     Packs/day: 0.50     Years: 5.00    Smokeless tobacco: Never Used    Alcohol use 0.0 oz/week     1 Standard drinks or equivalent per week      Comment: socially, Monthly or less      Lab Results  Component Value Date/Time   WBC 10.3 03/20/2017 04:08 PM   HGB 12.2 03/20/2017 04:08 PM   HCT 39.7 03/20/2017 04:08 PM   PLATELET 896 84/04/5476 04:08 PM   MCV 87 03/20/2017 04:08 PM Lab Results  Component Value Date/Time   GFR est non- 03/20/2017 04:08 PM   GFR est  03/20/2017 04:08 PM   Creatinine 0.60 03/20/2017 04:08 PM   BUN 10 03/20/2017 04:08 PM   Sodium 135 03/20/2017 04:08 PM   Potassium 4.7 03/20/2017 04:08 PM   Chloride 95 (L) 03/20/2017 04:08 PM   CO2 24 03/20/2017 04:08 PM        Review of Systems   Constitutional: Negative for chills, fever and malaise/fatigue. HENT: Negative for congestion and nosebleeds. Eyes: Negative for blurred vision and pain. Respiratory: Negative for shortness of breath and wheezing. Cardiovascular: Negative for chest pain, palpitations and leg swelling. Gastrointestinal: Negative for constipation, diarrhea, nausea and vomiting. Genitourinary: Negative for dysuria and frequency. Musculoskeletal: Negative for joint pain and myalgias. Skin: Negative for itching and rash. Neurological: Negative for dizziness, loss of consciousness and headaches. Endo/Heme/Allergies: Does not bruise/bleed easily. Psychiatric/Behavioral: Negative for depression. The patient is not nervous/anxious and does not have insomnia. All other systems reviewed and are negative. Physical Exam   Constitutional: She is oriented to person, place, and time. She appears well-developed and well-nourished. HENT:   Head: Normocephalic and atraumatic. Eyes: Conjunctivae and EOM are normal. Pupils are equal, round, and reactive to light. Neck: No JVD present. No thyromegaly present. Cardiovascular: Normal rate, regular rhythm, normal heart sounds and intact distal pulses. Exam reveals no gallop and no friction rub. No murmur heard. Pulmonary/Chest: Effort normal and breath sounds normal. No stridor. No respiratory distress. She has no wheezes. She has no rales. Abdominal: Soft. Bowel sounds are normal. She exhibits no distension and no mass. There is no tenderness. Musculoskeletal: Normal range of motion.  She exhibits no edema or tenderness. Lymphadenopathy:     She has no cervical adenopathy. Neurological: She is alert and oriented to person, place, and time. She has normal reflexes. No cranial nerve deficit. Skin: No rash noted. No erythema. Psychiatric: She has a normal mood and affect. Her behavior is normal.   Nursing note and vitals reviewed. ASSESSMENT and PLAN  Diagnoses and all orders for this visit:    1. ALIYA (generalized anxiety disorder)  -     TSH 3RD GENERATION  -     ALPRAZolam (XANAX) 0.5 mg tablet; Take 1 Tab by mouth nightly as needed for Anxiety or Sleep. Max Daily Amount: 0.5 mg. Indications: anxiety, Generalized Anxiety Disorder, Panic Disorder  -     METANEPHRINES, PLASMA    2. Hyperhidrosis  -     TSH 3RD GENERATION  -     ALPRAZolam (XANAX) 0.5 mg tablet; Take 1 Tab by mouth nightly as needed for Anxiety or Sleep. Max Daily Amount: 0.5 mg. Indications: anxiety, Generalized Anxiety Disorder, Panic Disorder  -     METANEPHRINES, PLASMA    3. Palpitation  -     TSH 3RD GENERATION  -     ALPRAZolam (XANAX) 0.5 mg tablet; Take 1 Tab by mouth nightly as needed for Anxiety or Sleep. Max Daily Amount: 0.5 mg. Indications: anxiety, Generalized Anxiety Disorder, Panic Disorder  -     METANEPHRINES, PLASMA    4. Tachycardia  -     TSH 3RD GENERATION  -     ALPRAZolam (XANAX) 0.5 mg tablet; Take 1 Tab by mouth nightly as needed for Anxiety or Sleep. Max Daily Amount: 0.5 mg.  Indications: anxiety, Generalized Anxiety Disorder, Panic Disorder  -     METANEPHRINES, PLASMA

## 2018-04-07 LAB
METANEPH FREE SERPL-MCNC: 12 PG/ML (ref 0–62)
NORMETANEPHRINE SERPL-MCNC: 102 PG/ML (ref 0–145)
TSH SERPL DL<=0.005 MIU/L-ACNC: 1.73 UIU/ML (ref 0.45–4.5)

## 2018-04-25 ENCOUNTER — OFFICE VISIT (OUTPATIENT)
Dept: FAMILY MEDICINE CLINIC | Age: 28
End: 2018-04-25

## 2018-04-25 VITALS
BODY MASS INDEX: 51.91 KG/M2 | OXYGEN SATURATION: 98 % | TEMPERATURE: 98.3 F | RESPIRATION RATE: 16 BRPM | HEIGHT: 63 IN | DIASTOLIC BLOOD PRESSURE: 65 MMHG | HEART RATE: 100 BPM | WEIGHT: 293 LBS | SYSTOLIC BLOOD PRESSURE: 121 MMHG

## 2018-04-25 DIAGNOSIS — Z79.4 TYPE 2 DIABETES MELLITUS WITH DIABETIC NEUROPATHY, WITH LONG-TERM CURRENT USE OF INSULIN (HCC): ICD-10-CM

## 2018-04-25 DIAGNOSIS — G56.01 CARPAL TUNNEL SYNDROME OF RIGHT WRIST: ICD-10-CM

## 2018-04-25 DIAGNOSIS — E66.01 OBESITY, MORBID (MORE THAN 100 LBS OVER IDEAL WEIGHT OR BMI > 40) (HCC): ICD-10-CM

## 2018-04-25 DIAGNOSIS — Z79.4 TYPE 2 DIABETES MELLITUS WITHOUT COMPLICATION, WITH LONG-TERM CURRENT USE OF INSULIN (HCC): ICD-10-CM

## 2018-04-25 DIAGNOSIS — E11.9 TYPE 2 DIABETES MELLITUS WITHOUT COMPLICATION, WITHOUT LONG-TERM CURRENT USE OF INSULIN (HCC): ICD-10-CM

## 2018-04-25 DIAGNOSIS — I10 ESSENTIAL HYPERTENSION: ICD-10-CM

## 2018-04-25 DIAGNOSIS — Z23 ENCOUNTER FOR IMMUNIZATION: ICD-10-CM

## 2018-04-25 DIAGNOSIS — E11.40 TYPE 2 DIABETES MELLITUS WITH DIABETIC NEUROPATHY, WITH LONG-TERM CURRENT USE OF INSULIN (HCC): ICD-10-CM

## 2018-04-25 DIAGNOSIS — E11.9 TYPE 2 DIABETES MELLITUS WITHOUT COMPLICATION, WITH LONG-TERM CURRENT USE OF INSULIN (HCC): ICD-10-CM

## 2018-04-25 DIAGNOSIS — E26.9 HYPERALDOSTERONISM (HCC): ICD-10-CM

## 2018-04-25 DIAGNOSIS — E11.9 DIABETES MELLITUS WITHOUT COMPLICATION (HCC): Primary | ICD-10-CM

## 2018-04-25 LAB — HBA1C MFR BLD HPLC: 9.7 %

## 2018-04-25 RX ORDER — ATORVASTATIN CALCIUM 40 MG/1
40 TABLET, FILM COATED ORAL DAILY
Qty: 30 TAB | Refills: 6 | Status: SHIPPED | OUTPATIENT
Start: 2018-04-25 | End: 2019-02-01 | Stop reason: SDUPTHER

## 2018-04-25 RX ORDER — PHENTERMINE HYDROCHLORIDE 37.5 MG/1
TABLET ORAL
Refills: 0 | COMMUNITY
Start: 2018-03-17 | End: 2018-08-09

## 2018-04-25 RX ORDER — GLIPIZIDE 5 MG/1
TABLET ORAL
Qty: 120 TAB | Refills: 3 | Status: SHIPPED | OUTPATIENT
Start: 2018-04-25 | End: 2018-09-13 | Stop reason: SDUPTHER

## 2018-04-25 RX ORDER — ROSUVASTATIN CALCIUM 20 MG/1
TABLET, COATED ORAL
Refills: 6 | COMMUNITY
Start: 2018-03-14 | End: 2018-08-09

## 2018-04-25 NOTE — PROGRESS NOTES
Order placed for routine labs, per Verbal Order from Dr. Francheska Harrison on 2018 due to DM. Name and  verified      Chief Complaint   Patient presents with    Diabetes     f/u       Health Maintenance reviewed-discussed with patient. 1. Have you been to the ER, urgent care clinic since your last visit? Hospitalized since your last visit? No    2. Have you seen or consulted any other health care providers outside of the [facility] since your last visit? Include any pap smears or colon screening. No      Patient educated on the parts of a diabetes care plan  1. Meal plan  2. Plan for staying active  3. Diabetes medicine plan  4. Plan for checking blood sugar as ordered by Dr. Francheska Harrison  5.  Schedule for diabetes follow up appt as recommended by provider

## 2018-04-25 NOTE — MR AVS SNAPSHOT
[address] [address] 
[phone] Patient: Lanie Castellanos MRN: [id number] TIGRE:[id number][birth year][id number] Visit Information Date & Time Provider Department Dept. Phone Encounter #  
 4/25/2018  3:30 PM Arun Frankel MD [facility] [phone] [id number] Follow-up Instructions Return in about 3 months (around 7/25/2018), or if symptoms worsen or fail to improve. Follow-up and Disposition History Your Appointments 9/27/2018  2:00 PM  
Follow Up with Chantal Joyce MD  
[facility] at [facility]) Appt Note: [address] [facility], 
[address] [place] [address], [address] [phone]  
[address] [facility], [address] [place] [address], [address] [phone] Upcoming Health Maintenance Date Due  
 EYE EXAM RETINAL OR DILATED Q1 1/7/2016 PAP AKA CERVICAL CYTOLOGY 2/12/2017 FOOT EXAM Q1 9/26/2017 HEMOGLOBIN A1C Q6M 7/25/2018 MICROALBUMIN Q1 1/25/2019 LIPID PANEL Q1 4/4/2019 DTaP/Tdap/Td series (2 - Td) 8/27/2022 Allergies as of 4/25/2018  Review Complete On: 4/4/2018 By: Arun Frankel MD  
  
 Severity Noted Reaction Type Reactions Latex  08/27/2014   Side Effect Itching Current Immunizations  Reviewed on 3/20/2017 Name Date Influenza Vaccine (Quad) PF 12/20/2016, 10/19/2015  8:35 AM  
 Influenza Vaccine (Quadrivalent) 1/5/2018 Influenza Vaccine PF 9/27/2013 Pneumococcal Polysaccharide (PPSV-23) 9/27/2013 TDAP Vaccine 8/27/2012 Not reviewed this visit You Were Diagnosed With   
  
 Codes Comments Diabetes mellitus without [facility]    -  Primary ICD-10-CM: E11.9 ICD-9-CM: 250.00 Type 2 diabetes mellitus with diabetic neuropathy, with long-term current use of insulin (HCC)     ICD-10-CM: E11.40, Z79.4 ICD-9-CM: 250.60, 357.2, V58.67   
 Type 2 diabetes mellitus without complication, without long-term current use of insulin (HCC)     ICD-10-CM: E11.9 ICD-9-CM: 250.00 Encounter for immunization     ICD-10-CM: O28 ICD-9-CM: V03.89 Carpal tunnel syndrome of right wrist     ICD-10-CM: G56.01 
ICD-9-CM: 354.0 Essential hypertension     ICD-10-CM: I10 
ICD-9-CM: 401.9 Obesity, morbid (more than 100 lbs over ideal weight or BMI > 40) (HCC)     ICD-10-CM: E66.01 
ICD-9-CM: 278.01 Hyperaldosteronism ([facility] [address] [facility]     ICD-10-CM: E26.9 ICD-9-CM: 255.10 Type 2 diabetes mellitus without complication, with long-term current use of insulin (HCC)     ICD-10-CM: E11.9, Z79.4 ICD-9-CM: 250.00, V58.67 Vitals BP Pulse Temp Resp Height(growth percentile) Weight(growth percentile) 121/65 (BP 1 Location: Left arm, BP Patient Position: At rest) 100 98.3 °F (36.8 °C) (Oral) 16 5' 3\" (1.6 m) (!) 357 lb 9.6 oz (162.2 kg) SpO2 BMI OB Status Smoking Status 98% 63.35 kg/m2 Medically Induced Current Every Day Smoker Vitals History BMI and BSA Data Body Mass Index Body Surface Area  
 63.35 kg/m 2 2.69 m 2 Preferred Pharmacy Pharmacy Name Phone [id number]- [place], [place] - [address] [phone] Your Updated Medication List  
  
   
This list is accurate as of 4/25/18  4:17 PM.  Always use your most recent med list.  
  
  
  
  
 acetaminophen 500 mg tablet Commonly known as:  Jr Aidee Wagner Se Take 1 Tab by mouth every six (6) hours as needed for Pain. * albuterol 1.25 mg/3 mL Nebu Commonly known as:  Renetta Sutton Take 3 mL by inhalation every six (6) hours as needed for Wheezing. * albuterol 90 mcg/actuation inhaler Commonly known as:  PROVENTIL HFA, VENTOLIN HFA, PROAIR HFA Take 1 Puff by inhalation every four (4) hours as needed for Wheezing. ALPRAZolam 0.5 mg tablet Commonly known as:  Marty Kaur Take 1 Tab by mouth nightly as needed for Anxiety or Sleep. Max Daily Amount: 0.5 mg. Indications: anxiety, Generalized Anxiety Disorder, Panic Disorder Arm Brace Misc Right ulnar splint for ulnar neuropathy at the elbow. atorvastatin 40 mg tablet Commonly known as:  LIPITOR Take 1 Tab by mouth daily. Blood-Glucose Meter Misc Commonly known as:  Annamae Button IQ METER Test blood sugar once daily  
  
 butalbital-acetaminophen-caffeine -40 mg per tablet Commonly known as:  [employer] Take 0.5-1 Tabs by mouth every six (6) hours as needed for Headache. Max Daily Amount: 4 Tabs. HUNG 0.35 mg Tab Generic drug:  norethindrone TAKE 1 TABLET BY MOUTH EVERY DAY  
  
 diclofenac EC 75 mg EC tablet Commonly known as:  VOLTAREN Take 1 Tab by mouth two (2) times a day. Diclofenac Potassium 50 mg Pwpk Commonly known as:  CAMBIA Take 1 Package by mouth daily as needed. gabapentin 300 mg capsule Commonly known as:  NEURONTIN Take 1 Cap by mouth three (3) times daily. glipiZIDE 5 mg tablet Commonly known as:  GLUCOTROL  
TAKE 2 TABS BY MOUTH TWO (2) TIMES A DAY. glucose blood VI test strips strip Commonly known as:  Annamae Button Check sugars two times daily. E11.9  
  
 insulin glargine 300 unit/mL (1.5 mL) Inpn Commonly known as:  TOUJEO SOLOSTAR U-300 INSULIN  
16 Units by SubCUTAneous route daily. methocarbamol 750 mg tablet Commonly known as:  ROBAXIN Take 1 Tab by mouth two (2) times daily as needed for Pain.  
  
 metoprolol tartrate 100 mg IR tablet Commonly known as:  LOPRESSOR Take 1 Tab by mouth two (2) times a day. * naltrexone-buPROPion 8-90 mg Tber ER tablet Commonly known as:  Jorge Altamirano First Month:   Week 1 : 1 Tab AM Week 2 : 1 Tab AM, 1 Tab PM Week 3 : 2 Tab AM, 1 Tab PM Week 4 : 2 Tab AM, 2 Tab PM  
  
 * naltrexone-buPROPion 8-90 mg Tber ER tablet Commonly known as:  Jorge Altamirano Week 5 and Beyond: Contrave 8mg/90mg #120  2 Tab AM, 2 Tab PM  
Start taking on:  5/25/2018  
  
 nortriptyline 50 mg capsule Commonly known as:  PAMELOR  
TAKE ONE CAPSULE BY MOUTH EVERY NIGHT  
  
 nystatin-triamcinolone 100,000-0.1 unit/gram-% ointment Commonly known as:  MYCOLOG  
APPLY EXTERNALLY TWICE DAILY FOR NO LONGER THAN 2 WEEKS  
  
 omeprazole 40 mg capsule Commonly known as:  PRILOSEC Take 1 Cap by mouth daily. phentermine 37.5 mg tablet Commonly known as:  ADIPEX-P  
TAKE 1 TABLET BY MOUTH EVERY MORNING **MAX 1 TABLET A DAY**  
  
 rosuvastatin 20 mg tablet Commonly known as:  CRESTOR  
TAKE 1 TAB BY MOUTH NIGHTLY. SITagliptin-metFORMIN 50-1,000 mg per tablet Commonly known as:  Posey Nehemias Take 1 Tab by mouth two (2) times daily (with meals). spironolactone 25 mg tablet Commonly known as:  ALDACTONE Take 1 Tab by mouth two (2) times a day. Indications: ALDOSTERONISM, hypertension ZyrTEC 10 mg tablet Generic drug:  cetirizine Take  by mouth daily. * Notice: This list has 4 medication(s) that are the same as other medications prescribed for you. Read the directions carefully, and ask your doctor or other care provider to review them with you. Prescriptions Printed Refills  
 naltrexone-buPROPion (CONTRAVE) 8-90 mg TbER ER tablet 0 Sig: First Month:   
Week 1 : 1 Tab AM 
Week 2 : 1 Tab AM, 1 Tab PM 
Week 3 : 2 Tab AM, 1 Tab PM 
Week 4 : 2 Tab AM, 2 Tab PM  
 Class: Print  
 naltrexone-buPROPion (CONTRAVE) 8-90 mg TbER ER tablet 2 Starting on: 5/25/2018 Sig: Week 5 and Beyond: Contrave 8mg/90mg #120 
 2 Tab AM, 2 Tab PM  
 Class: Print Prescriptions Sent to Pharmacy Refills SITagliptin-metFORMIN (JANUMET) 50-1,000 mg per tablet 6 Sig: Take 1 Tab by mouth two (2) times daily (with meals). Class: Normal  
 Pharmacy: [facility]/pharmacy [address] [place], [place] [address]Mary Grace Arnold AT [address] Ph #: [phone] Route: Oral  
 glipiZIDE (GLUCOTROL) 5 mg tablet 3 Sig: TAKE 2 TABS BY MOUTH TWO (2) TIMES A DAY. Class: Normal  
 Pharmacy: [facility]/pharmacy [address] [place] [address] S[address] Ph #: [phone]  
 insulin glargine (TOUJEO SOLOSTAR U-300 INSULIN) 300 unit/mL (1.5 mL) inpn 5 Si Units by SubCUTAneous route daily. Class: Normal  
 Pharmacy: [facility]/pharmacy [address] [place] [address] [address] Ph #: [phone] Route: SubCUTAneous  
 atorvastatin (LIPITOR) 40 mg tablet 6 Sig: Take 1 Tab by mouth daily. Class: Normal  
 Pharmacy: [facility]/pharmacy [address] [place] [address] [address] Ph #: [phone] Route: Oral  
  
We Performed the Following AMB POC HEMOGLOBIN A1C [76027 CPT(R)] REFERRAL TO PODIATRY [REF90 Custom] Comments:  
 Please evaluate patient for DM. Follow-up Instructions Return in about 3 months (around 2018), or if symptoms worsen or fail to improve. Referral Information Referral ID Referred By Referred To  
  
 [id number] Nohemy TAMEZ [address], LEELA Chao [address] [place] [address] Phone: [phone] Visits Status Start Date End Date 1 New Request 18 If your referral has a status of pending review or denied, additional information will be sent to support the outcome of this decision. Patient Instructions Learning About Diabetes Food Guidelines Your Care Instructions Meal planning is important to manage diabetes. It helps keep your blood sugar at a target level (which you set with your doctor). You don't have to eat special foods. You can eat what your family eats, including sweets once in a while. But you do have to pay attention to how often you eat and how much you eat of certain foods. You may want to work with a dietitian or a certified diabetes educator (CDE) to help you plan meals and snacks. A dietitian or CDE can also help you lose weight if that is one of your goals. What should you know about eating carbs? Managing the amount of carbohydrate (carbs) you eat is an important part of healthy meals when you have diabetes. Carbohydrate is found in many foods. · Learn which foods have carbs. And learn the amounts of carbs in different foods. ¨ Bread, cereal, pasta, and rice have about 15 grams of carbs in a serving. A serving is 1 slice of bread (1 ounce), ½ cup of cooked cereal, or 1/3 cup of cooked pasta or rice. ¨ Fruits have 15 grams of carbs in a serving. A serving is 1 small fresh fruit, such as an apple or orange; ½ of a banana; ½ cup of cooked or canned fruit; ½ cup of fruit juice; 1 cup of melon or raspberries; or 2 tablespoons of dried fruit. ¨ Milk and no-sugar-added yogurt have 15 grams of carbs in a serving. A serving is 1 cup of milk or 2/3 cup of no-sugar-added yogurt. ¨ Starchy vegetables have 15 grams of carbs in a serving. A serving is ½ cup of mashed potatoes or sweet potato; 1 cup winter squash; ½ of a small baked potato; ½ cup of cooked beans; or ½ cup cooked corn or green peas. · Learn how much carbs to eat each day and at each meal. A dietitian or CDE can teach you how to keep track of the amount of carbs you eat. This is called carbohydrate counting. · If you are not sure how to count carbohydrate grams, use the Plate Method to plan meals. It is a good, quick way to make sure that you have a balanced meal. It also helps you spread carbs throughout the day. ¨ Divide your plate by types of foods. Put non-starchy vegetables on half the plate, meat or other protein food on one-quarter of the plate, and a grain or starchy vegetable in the final quarter of the plate.  To this you can add a small piece of fruit and 1 cup of milk or yogurt, depending on how many carbs you are supposed to eat at a meal. 
· Try to eat about the same amount of carbs at each meal. Do not \"save up\" your daily allowance of carbs to eat at one meal. 
· Proteins have very little or no carbs per serving. Examples of proteins are beef, chicken, turkey, fish, eggs, tofu, cheese, cottage cheese, and peanut butter. A serving size of meat is 3 ounces, which is about the size of a deck of cards. Examples of meat substitute serving sizes (equal to 1 ounce of meat) are 1/4 cup of cottage cheese, 1 egg, 1 tablespoon of peanut butter, and ½ cup of tofu. How can you eat out and still eat healthy? · Learn to estimate the serving sizes of foods that have carbohydrate. If you measure food at home, it will be easier to estimate the amount in a serving of restaurant food. · If the meal you order has too much carbohydrate (such as potatoes, corn, or baked beans), ask to have a low-carbohydrate food instead. Ask for a salad or green vegetables. · If you use insulin, check your blood sugar before and after eating out to help you plan how much to eat in the future. · If you eat more carbohydrate at a meal than you had planned, take a walk or do other exercise. This will help lower your blood sugar. What else should you know? · Limit saturated fat, such as the fat from meat and dairy products. This is a healthy choice because people who have diabetes are at higher risk of heart disease. So choose lean cuts of meat and nonfat or low-fat dairy products. Use olive or canola oil instead of butter or shortening when cooking. · Don't skip meals. Your blood sugar may drop too low if you skip meals and take insulin or certain medicines for diabetes. · Check with your doctor before you drink alcohol. Alcohol can cause your blood sugar to drop too low. Alcohol can also cause a bad reaction if you take certain diabetes medicines. Follow-up care is a key part of your treatment and safety.  Be sure to make and go to all appointments, and call your doctor if you are having problems. It's also a good idea to know your test results and keep a list of the medicines you take. Where can you learn more? Go to http://donavon-stacy.info/. Enter Z026 in the search box to learn more about \"Learning About Diabetes Food Guidelines. \" Current as of: March 13, 2017 Content Version: 11.4 Learning About Low-Carbohydrate Diets for Weight Loss What is a low-carbohydrate diet? Low-carb diets avoid foods that are high in carbohydrate. These high-carb foods include pasta, bread, rice, cereal, fruits, and starchy vegetables. Instead, these diets usually have you eat foods that are high in fat and protein. Many people lose weight quickly on a low-carb diet. But the early weight loss is water. People on this diet often gain the weight back after they start eating carbs again. Not all diet plans are safe or work well. A lot of the evidence shows that low-carb diets aren't healthy. That's because these diets often don't include healthy foods like fruits and vegetables. Losing weight safely means balancing protein, fat, and carbs with every meal and snack. And low-carb diets don't always provide the vitamins, minerals, and fiber you need. If you have a serious medical condition, talk to your doctor before you try any diet. These conditions include kidney disease, heart disease, type 2 diabetes, high cholesterol, and high blood pressure. If you are pregnant, it may not be safe for your baby if you are on a low-carb diet. How can you lose weight safely? You might have heard that a diet plan helped another person lose weight. But that doesn't mean that it will work for you. It is very hard to stay on a diet that includes lots of big changes in your eating habits. If you want to get to a healthy weight and stay there, making healthy lifestyle changes will often work better than dieting. These steps can help. · Make a plan for change. Work with your doctor to create a plan that is right for you. · See a dietitian. He or she can show you how to make healthy changes in your eating habits. · Manage stress. If you have a lot of stress in your life, it can be hard to focus on making healthy changes to your daily habits. · Track your food and activity. You are likely to do better at losing weight if you keep track of what you eat and what you do. Follow-up care is a key part of your treatment and safety. Be sure to make and go to all appointments, and call your doctor if you are having problems. It's also a good idea to know your test results and keep a list of the medicines you take. Where can you learn more? Go to http://donavon-stacy.info/. Enter A121 in the search box to learn more about \"Learning About Low-Carbohydrate Diets for Weight Loss. \" Current as of: May 12, 2017 Content Version: 11.4 © 7283-3429 [employer]. Care instructions adapted under license by [employer] (which disclaims liability or warranty for this information). If you have questions about a medical condition or this instruction, always ask your healthcare professional. [address]  any warranty or liability for your use of this information. © 3090-7586 [employer]. Care instructions adapted under license by [employer] (which disclaims liability or warranty for this information). If you have questions about a medical condition or this instruction, always ask your healthcare professional. [address] any warranty or liability for your use of this information. Patient Instructions History Introducing [facility]! Dear Steve Taveras: Thank you for requesting a [email or web address] account. Our records indicate that you already have an active [email or web address] account. You can access your account anytime at [email or web address] Did you know that you can access your hospital and ER discharge instructions at any time in [employer]? You can also review all of your test results from your hospital stay or ER visit. Additional Information If you have questions, please visit the Frequently Asked Questions section of the [employer] website at [email or web address]. [employer] [place]/[employer]/. Remember, [employer] is NOT to be used for urgent needs. For medical emergencies, dial 911. Now available from your iPhone and Android! Please provide this summary of care documentation to your next provider. Your primary care clinician is listed as Radha Caceres. If you have any questions after today's visit, please call [phone].

## 2018-04-25 NOTE — PATIENT INSTRUCTIONS
Learning About Diabetes Food Guidelines  Your Care Instructions    Meal planning is important to manage diabetes. It helps keep your blood sugar at a target level (which you set with your doctor). You don't have to eat special foods. You can eat what your family eats, including sweets once in a while. But you do have to pay attention to how often you eat and how much you eat of certain foods. You may want to work with a dietitian or a certified diabetes educator (CDE) to help you plan meals and snacks. A dietitian or CDE can also help you lose weight if that is one of your goals. What should you know about eating carbs? Managing the amount of carbohydrate (carbs) you eat is an important part of healthy meals when you have diabetes. Carbohydrate is found in many foods. · Learn which foods have carbs. And learn the amounts of carbs in different foods. ¨ Bread, cereal, pasta, and rice have about 15 grams of carbs in a serving. A serving is 1 slice of bread (1 ounce), ½ cup of cooked cereal, or 1/3 cup of cooked pasta or rice. ¨ Fruits have 15 grams of carbs in a serving. A serving is 1 small fresh fruit, such as an apple or orange; ½ of a banana; ½ cup of cooked or canned fruit; ½ cup of fruit juice; 1 cup of melon or raspberries; or 2 tablespoons of dried fruit. ¨ Milk and no-sugar-added yogurt have 15 grams of carbs in a serving. A serving is 1 cup of milk or 2/3 cup of no-sugar-added yogurt. ¨ Starchy vegetables have 15 grams of carbs in a serving. A serving is ½ cup of mashed potatoes or sweet potato; 1 cup winter squash; ½ of a small baked potato; ½ cup of cooked beans; or ½ cup cooked corn or green peas. · Learn how much carbs to eat each day and at each meal. A dietitian or CDE can teach you how to keep track of the amount of carbs you eat. This is called carbohydrate counting. · If you are not sure how to count carbohydrate grams, use the Plate Method to plan meals.  It is a good, quick way to make sure that you have a balanced meal. It also helps you spread carbs throughout the day. ¨ Divide your plate by types of foods. Put non-starchy vegetables on half the plate, meat or other protein food on one-quarter of the plate, and a grain or starchy vegetable in the final quarter of the plate. To this you can add a small piece of fruit and 1 cup of milk or yogurt, depending on how many carbs you are supposed to eat at a meal.  · Try to eat about the same amount of carbs at each meal. Do not \"save up\" your daily allowance of carbs to eat at one meal.  · Proteins have very little or no carbs per serving. Examples of proteins are beef, chicken, turkey, fish, eggs, tofu, cheese, cottage cheese, and peanut butter. A serving size of meat is 3 ounces, which is about the size of a deck of cards. Examples of meat substitute serving sizes (equal to 1 ounce of meat) are 1/4 cup of cottage cheese, 1 egg, 1 tablespoon of peanut butter, and ½ cup of tofu. How can you eat out and still eat healthy? · Learn to estimate the serving sizes of foods that have carbohydrate. If you measure food at home, it will be easier to estimate the amount in a serving of restaurant food. · If the meal you order has too much carbohydrate (such as potatoes, corn, or baked beans), ask to have a low-carbohydrate food instead. Ask for a salad or green vegetables. · If you use insulin, check your blood sugar before and after eating out to help you plan how much to eat in the future. · If you eat more carbohydrate at a meal than you had planned, take a walk or do other exercise. This will help lower your blood sugar. What else should you know? · Limit saturated fat, such as the fat from meat and dairy products. This is a healthy choice because people who have diabetes are at higher risk of heart disease. So choose lean cuts of meat and nonfat or low-fat dairy products.  Use olive or canola oil instead of butter or shortening when cooking. · Don't skip meals. Your blood sugar may drop too low if you skip meals and take insulin or certain medicines for diabetes. · Check with your doctor before you drink alcohol. Alcohol can cause your blood sugar to drop too low. Alcohol can also cause a bad reaction if you take certain diabetes medicines. Follow-up care is a key part of your treatment and safety. Be sure to make and go to all appointments, and call your doctor if you are having problems. It's also a good idea to know your test results and keep a list of the medicines you take. Where can you learn more? Go to http://donavon-stacy.info/. Enter D379 in the search box to learn more about \"Learning About Diabetes Food Guidelines. \"  Current as of: March 13, 2017  Content Version: 11.4     Learning About Low-Carbohydrate Diets for Weight Loss  What is a low-carbohydrate diet? Low-carb diets avoid foods that are high in carbohydrate. These high-carb foods include pasta, bread, rice, cereal, fruits, and starchy vegetables. Instead, these diets usually have you eat foods that are high in fat and protein. Many people lose weight quickly on a low-carb diet. But the early weight loss is water. People on this diet often gain the weight back after they start eating carbs again. Not all diet plans are safe or work well. A lot of the evidence shows that low-carb diets aren't healthy. That's because these diets often don't include healthy foods like fruits and vegetables. Losing weight safely means balancing protein, fat, and carbs with every meal and snack. And low-carb diets don't always provide the vitamins, minerals, and fiber you need. If you have a serious medical condition, talk to your doctor before you try any diet. These conditions include kidney disease, heart disease, type 2 diabetes, high cholesterol, and high blood pressure. If you are pregnant, it may not be safe for your baby if you are on a low-carb diet.   How can you lose weight safely? You might have heard that a diet plan helped another person lose weight. But that doesn't mean that it will work for you. It is very hard to stay on a diet that includes lots of big changes in your eating habits. If you want to get to a healthy weight and stay there, making healthy lifestyle changes will often work better than dieting. These steps can help. · Make a plan for change. Work with your doctor to create a plan that is right for you. · See a dietitian. He or she can show you how to make healthy changes in your eating habits. · Manage stress. If you have a lot of stress in your life, it can be hard to focus on making healthy changes to your daily habits. · Track your food and activity. You are likely to do better at losing weight if you keep track of what you eat and what you do. Follow-up care is a key part of your treatment and safety. Be sure to make and go to all appointments, and call your doctor if you are having problems. It's also a good idea to know your test results and keep a list of the medicines you take. Where can you learn more? Go to http://donavon-stacy.info/. Enter A121 in the search box to learn more about \"Learning About Low-Carbohydrate Diets for Weight Loss. \"  Current as of: May 12, 2017  Content Version: 11.4  © 5507-5962 [employer]. Care instructions adapted under license by [employer] (which disclaims liability or warranty for this information). If you have questions about a medical condition or this instruction, always ask your healthcare professional. [place] [address] any warranty or liability for your use of this information. © 4453-9467 [employer]. Care instructions adapted under license by [employer] (which disclaims liability or warranty for this information).  If you have questions about a medical condition or this instruction, always ask your healthcare professional. [address] any warranty or liability for your use of this information.

## 2018-04-25 NOTE — PROGRESS NOTES
HISTORY OF PRESENT ILLNESS  Soni Kim is a 32 y.o. female. HPI   DMtype II    Compliant w/ meds, glipizide 10mg bid, tujeo 10un once daily and janumet 1 tab bid, was given Crestor was charged 40$ , cannot effort it, will ing to try other meds, she is having no diabetic diet, eating a lot of junks as she states, and not doing much of daily exercise, no home glucose monitoring ,++Rf needed for today. Denies any tingling sensation, polyurea and polydipsia, last a1c was not at target 8.3%then today is at 9.7% pt states that she just got a big steroid shot from foot doc for the foot pain    . Last podiatry visit 2018   And last eye exam was 2018  Last urine microalbumin 2018 and was normal  .   Hypercholestremia  Patient has no personal no family history of family history of early heart disease with the patient's parents and siblings,  aware of patient's last lipoprotein profile, blood sugar, TSH, liver and renal function tests,    Obesity  Specific concerns today for my pt is the wt concerning, the patient states that no self induced vomiting, and no binge eating,  has been thinking about the use of any laxative use for a better wt, pt also states that different available diets has been tried, does some daily exercises, tries to avoid fast food as much as possible. In addition, the patient states that eating too much is not the case and the portion are very well controlled being on the heavy side for a few yrs, very concerned about the huge wt, gives every body a bad body image,and finally stating that the best is to loose as much wt as possible. Feeling better since the last visit. Current Outpatient Prescriptions   Medication Sig Dispense Refill    ALPRAZolam (XANAX) 0.5 mg tablet Take 1 Tab by mouth nightly as needed for Anxiety or Sleep. Max Daily Amount: 0.5 mg.  Indications: anxiety, Generalized Anxiety Disorder, Panic Disorder 30 Tab 1    gabapentin (NEURONTIN) 300 mg capsule Take 1 Cap by mouth three (3) times daily. (Patient taking differently: Take 300 mg by mouth two (2) times a day.) 90 Cap 2    nortriptyline (PAMELOR) 50 mg capsule TAKE ONE CAPSULE BY MOUTH EVERY NIGHT 90 Cap 1    omeprazole (PRILOSEC) 40 mg capsule Take 1 Cap by mouth daily. 30 Cap 3    nitrofurantoin, macrocrystal-monohydrate, (MACROBID) 100 mg capsule TAKE ONE CAPSULE BY MOUTH TWICE A DAY  0    HUNG 0.35 mg tab TAKE 1 TABLET BY MOUTH EVERY DAY  2    nystatin-triamcinolone (MYCOLOG) 100,000-0.1 unit/gram-% ointment APPLY EXTERNALLY TWICE DAILY FOR NO LONGER THAN 2 WEEKS  0    rosuvastatin (CRESTOR) 20 mg tablet Take 1 Tab by mouth nightly. 30 Tab 6    phentermine (ADIPEX-P) 37.5 mg tablet Take 1 Tab by mouth every morning. Max Daily Amount: 37.5 mg. 30 Tab 0    phentermine (ADIPEX-P) 37.5 mg tablet Take 1 Tab by mouth every morning. Max Daily Amount: 37.5 mg. 30 Tab 0    insulin glargine (TOUJEO SOLOSTAR) 300 unit/mL (1.5 mL) inpn 10 Units by SubCUTAneous route daily. 1 Pen 5    nortriptyline (PAMELOR) 50 mg capsule TAKE ONE CAPSULE BY MOUTH EVERY NIGHT  1    glipiZIDE (GLUCOTROL) 5 mg tablet TAKE 2 TABS BY MOUTH TWO (2) TIMES A DAY. 120 Tab 3    SITagliptin-metFORMIN (JANUMET) 50-1,000 mg per tablet Take 1 Tab by mouth two (2) times daily (with meals). 60 Tab 6    diclofenac EC (VOLTAREN) 75 mg EC tablet Take 1 Tab by mouth two (2) times a day. 30 Tab 0    methocarbamol (ROBAXIN) 750 mg tablet Take 1 Tab by mouth two (2) times daily as needed for Pain. 20 Tab 0    traMADol (ULTRAM) 50 mg tablet Take 1 Tab by mouth every eight (8) hours as needed for Pain. Max Daily Amount: 150 mg. 12 Tab 0    metoprolol tartrate (LOPRESSOR) 100 mg IR tablet Take 1 Tab by mouth two (2) times a day. 60 Tab 6    spironolactone (ALDACTONE) 25 mg tablet Take 1 Tab by mouth two (2) times a day. Indications: ALDOSTERONISM, hypertension 60 Tab 6    Diclofenac Potassium (CAMBIA) 50 mg pwpk Take 1 Package by mouth daily as needed.  5 Packet 0    Arm Brace misc Right ulnar splint for ulnar neuropathy at the elbow. 1 Each 2    butalbital-acetaminophen-caffeine (FIORICET) -40 mg per tablet Take 0.5-1 Tabs by mouth every six (6) hours as needed for Headache. Max Daily Amount: 4 Tabs. 12 Tab 0    glucose blood VI test strips (ONETOUCH VERIO) strip Check sugars two times daily. E11.9 100 Strip 6    Blood-Glucose Meter (ONETOUCH VERIO IQ METER) misc Test blood sugar once daily 1 Each 3    cetirizine (ZYRTEC) 10 mg tablet Take  by mouth daily.  albuterol (PROVENTIL HFA, VENTOLIN HFA) 90 mcg/actuation inhaler Take 1 Puff by inhalation every four (4) hours as needed for Wheezing. 1 Inhaler 1    albuterol (ACCUNEB) 1.25 mg/3 mL nebulizer solution Take 3 mL by inhalation every six (6) hours as needed for Wheezing. 1 Bottle 6    acetaminophen (TYLENOL EXTRA STRENGTH) 500 mg tablet Take 1 Tab by mouth every six (6) hours as needed for Pain.  30 Tab 1     Allergies   Allergen Reactions    Latex Itching     Past Medical History:   Diagnosis Date    Asthma     Edmunds hump 1/7/2015    Carpal tunnel syndrome of right wrist 12/20/2016    Diabetes ([address]     ALIYA (generalized anxiety disorder) 4/4/2018    GERD (gastroesophageal reflux disease) 8/13/2014    HTN (hypertension) 7/25/2011    Hyperaldosteronism ([address] 10/19/2015    Hyperhidrosis 4/4/2018    Other ill-defined conditions(799.89)     migraines    Palpitation 4/4/2018    Tachycardia 4/4/2018     Past Surgical History:   Procedure Laterality Date    REMOVAL OF TONSILS,<12 [de-identified]       Family History   Problem Relation Age of Onset   Michel Reyes Arthritis-rheumatoid Mother     Hypertension Mother     Diabetes Mother      Type II    Psychiatric Disorder Father     Drug Abuse Father     Cancer Other      prostate    Hypertension Sister     Thyroid Disease Sister      \"I think\"    Attention Deficit Hyperactivity Disorder Brother     Hypertension Sister      Social History   Substance Use Topics    Smoking status: Current Every Day Smoker     Packs/day: 0.50     Years: 5.00    Smokeless tobacco: Never Used    Alcohol use 0.0 oz/week     1 Standard drinks or equivalent per week      Comment: socially, Monthly or less      Lab Results  Component Value Date/Time   WBC 10.3 03/20/2017 04:08 PM   HGB 12.2 03/20/2017 04:08 PM   HCT 39.7 03/20/2017 04:08 PM   PLATELET 678 03/52/3677 04:08 PM   MCV 87 03/20/2017 04:08 PM     Lab Results  Component Value Date/Time   GFR est non- 03/20/2017 04:08 PM   GFR est  03/20/2017 04:08 PM   Creatinine 0.60 03/20/2017 04:08 PM   BUN 10 03/20/2017 04:08 PM   Sodium 135 03/20/2017 04:08 PM   Potassium 4.7 03/20/2017 04:08 PM   Chloride 95 (L) 03/20/2017 04:08 PM   CO2 24 03/20/2017 04:08 PM        Review of Systems   Constitutional: Negative for chills, fever and malaise/fatigue. HENT: Negative for congestion and nosebleeds. Eyes: Negative for blurred vision and pain. Respiratory: Negative for shortness of breath and wheezing. Cardiovascular: Negative for chest pain, palpitations and leg swelling. Gastrointestinal: Negative for constipation, diarrhea, nausea and vomiting. Genitourinary: Negative for dysuria and frequency. Musculoskeletal: Negative for joint pain and myalgias. Skin: Negative for itching and rash. Neurological: Negative for dizziness, loss of consciousness and headaches. Endo/Heme/Allergies: Does not bruise/bleed easily. Psychiatric/Behavioral: Negative for depression. The patient is not nervous/anxious and does not have insomnia. All other systems reviewed and are negative. Physical Exam   Constitutional: She is oriented to person, place, and time. She appears well-developed and well-nourished. HENT:   Head: Normocephalic and atraumatic. Eyes: Conjunctivae and EOM are normal.   Neck: Normal range of motion. Neck supple. Cardiovascular: Normal rate, regular rhythm and normal heart sounds.     No murmur heard.  Pulmonary/Chest: Effort normal and breath sounds normal. No stridor. Abdominal: Soft. Bowel sounds are normal. She exhibits no distension and no mass. There is no tenderness. Musculoskeletal: Normal range of motion. She exhibits no edema. Nl pulses, nl visual inspection nl monoF, +dystrophy and elongated Nails   Lymphadenopathy:     She has no cervical adenopathy. Neurological: She is alert and oriented to person, place, and time. Skin: No erythema. Psychiatric: Her behavior is normal.   Nursing note and vitals reviewed. ASSESSMENT and PLAN  Diagnoses and all orders for this visit:    1. Diabetes mellitus without complication (HCC)  -     AMB POC HEMOGLOBIN A1C  -     REFERRAL TO PODIATRY  -     SITagliptin-metFORMIN (JANUMET) 50-1,000 mg per tablet; Take 1 Tab by mouth two (2) times daily (with meals). -     glipiZIDE (GLUCOTROL) 5 mg tablet; TAKE 2 TABS BY MOUTH TWO (2) TIMES A DAY. 2. Type 2 diabetes mellitus with diabetic neuropathy, with long-term current use of insulin ([facility]    3. Type 2 diabetes mellitus without complication, without long-term current use of insulin ([facility])  -     SITagliptin-metFORMIN (JANUMET) 50-1,000 mg per tablet; Take 1 Tab by mouth two (2) times daily (with meals). -     glipiZIDE (GLUCOTROL) 5 mg tablet; TAKE 2 TABS BY MOUTH TWO (2) TIMES A DAY. 4. Encounter for immunization  -     SITagliptin-metFORMIN (JANUMET) 50-1,000 mg per tablet; Take 1 Tab by mouth two (2) times daily (with meals). -     glipiZIDE (GLUCOTROL) 5 mg tablet; TAKE 2 TABS BY MOUTH TWO (2) TIMES A DAY. 5. Carpal tunnel syndrome of right wrist  -     SITagliptin-metFORMIN (JANUMET) 50-1,000 mg per tablet; Take 1 Tab by mouth two (2) times daily (with meals). -     glipiZIDE (GLUCOTROL) 5 mg tablet; TAKE 2 TABS BY MOUTH TWO (2) TIMES A DAY.     6. Essential hypertension  -     insulin glargine (TOUJEO SOLOSTAR U-300 INSULIN) 300 unit/mL (1.5 mL) inpn; 16 Units by SubCUTAneous route daily.    7. Obesity, morbid (more than 100 lbs over ideal weight or BMI > 40) (HCC)  -     insulin glargine (TOUJEO SOLOSTAR U-300 INSULIN) 300 unit/mL (1.5 mL) inpn; 16 Units by SubCUTAneous route daily. 8. Hyperaldosteronism (HCC)  -     insulin glargine (TOUJEO SOLOSTAR U-300 INSULIN) 300 unit/mL (1.5 mL) inpn; 16 Units by SubCUTAneous route daily. 9. Type 2 diabetes mellitus without complication, with long-term current use of insulin (HCC)  -     insulin glargine (TOUJEO SOLOSTAR U-300 INSULIN) 300 unit/mL (1.5 mL) inpn; 16 Units by SubCUTAneous route daily. Other orders  -     atorvastatin (LIPITOR) 40 mg tablet; Take 1 Tab by mouth daily. -     naltrexone-buPROPion (CONTRAVE) 8-90 mg TbER ER tablet;  First Month:    Week 1 : 1 Tab AM  Week 2 : 1 Tab AM, 1 Tab PM  Week 3 : 2 Tab AM, 1 Tab PM  Week 4 : 2 Tab AM, 2 Tab PM  -     naltrexone-buPROPion (CONTRAVE) 8-90 mg TbER ER tablet; Week 5 and Beyond: Contrave 8mg/90mg #120   2 Tab AM, 2 Tab PM      At this time patient was told to lose weight, so that the current body mass index would get into a close to 25 or between 20-25, patient was told that the patient can achieve this by starting an active life style modifications, working on the diet, increase physical activity, limit alcohol consumption, stop secondhand tobacco exposure,    for which includes creating a an interesting delightful to do list,  such as start of a light physical activity with a brisk daily walking 30 minutes most days of the week, most likely to total of 150 minutes per week, then the patient was told to try to avoid fatty fast foods, have a low-fat low-cholesterol diet, include seafood such as adding fatty fish such as [place], Mackerel, [place] to the diet, increase vegetables and fruits, nuts 3-4 times per week and finally have a low-salt and K rich food intake for a good 4-6 months possibly for ever for the best outcome, patient was told that either a DASH diet or Mediterranean diet but satisfies the need     Routine labs ordered, and the needed abnormal labs will be discussed soon and they can be repeated in 3-6 months. In addition relevant handouts were given to the patient for a better understanding,    patient was told to call if any problems. Patient acknowledged understanding and  agreed with today's recommendations.

## 2018-05-01 NOTE — PROGRESS NOTES
Order placed for levemir 16 untis sq daily , per Verbal Order from Dr. Karyle Lema on 5/1/2018 due to insurance does not cover toujeo.   Pt notified

## 2018-05-29 DIAGNOSIS — E26.9 HYPERALDOSTERONISM (HCC): ICD-10-CM

## 2018-05-29 DIAGNOSIS — I10 ESSENTIAL HYPERTENSION: ICD-10-CM

## 2018-05-29 RX ORDER — METOPROLOL TARTRATE 100 MG/1
TABLET ORAL
Qty: 60 TAB | Refills: 6 | Status: SHIPPED | OUTPATIENT
Start: 2018-05-29 | End: 2019-02-28 | Stop reason: SDUPTHER

## 2018-05-29 NOTE — TELEPHONE ENCOUNTER
From: Nicole Munguia  To: Jim Gao MD  Sent: 5/29/2018 7:55 AM EDT  Subject: Medication Renewal Request    Original authorizing provider: MD Nicole Balbuena would like a refill of the following medications:  spironolactone (ALDACTONE) 25 mg tablet Jim Gao MD]  metoprolol tartrate (LOPRESSOR) 100 mg IR tablet Jim Gao MD]    Preferred pharmacy: [facility], [address] [place] [address] ROAD    Comment:

## 2018-05-31 RX ORDER — METOPROLOL TARTRATE 100 MG/1
100 TABLET ORAL 2 TIMES DAILY
Qty: 60 TAB | Refills: 6 | Status: SHIPPED | OUTPATIENT
Start: 2018-05-31 | End: 2018-08-09 | Stop reason: SDUPTHER

## 2018-05-31 RX ORDER — SPIRONOLACTONE 25 MG/1
25 TABLET ORAL 2 TIMES DAILY
Qty: 60 TAB | Refills: 6 | Status: SHIPPED | OUTPATIENT
Start: 2018-05-31 | End: 2019-02-28 | Stop reason: SDUPTHER

## 2018-06-22 NOTE — TELEPHONE ENCOUNTER
Patient phoned requesting provider change Janumet to [facility] stating she has a new insurance plan which the Janumet is $300.00 and the [facility] is ($40.00). Under her old insurance plan the [facility] was very expensive.  Message sent to provider with refill request.

## 2018-06-25 RX ORDER — ALOGLIPTIN AND METFORMIN HYDROCHLORIDE 12.5; 1 MG/1; MG/1
1 TABLET, FILM COATED ORAL 2 TIMES DAILY WITH MEALS
Qty: 60 TAB | Refills: 2 | Status: SHIPPED | OUTPATIENT
Start: 2018-06-25 | End: 2018-08-31

## 2018-08-07 DIAGNOSIS — G56.01 CARPAL TUNNEL SYNDROME OF RIGHT WRIST: ICD-10-CM

## 2018-08-07 DIAGNOSIS — Z23 ENCOUNTER FOR IMMUNIZATION: ICD-10-CM

## 2018-08-07 DIAGNOSIS — E11.9 DIABETES MELLITUS WITHOUT COMPLICATION (HCC): ICD-10-CM

## 2018-08-07 DIAGNOSIS — E11.9 TYPE 2 DIABETES MELLITUS WITHOUT COMPLICATION, WITHOUT LONG-TERM CURRENT USE OF INSULIN (HCC): ICD-10-CM

## 2018-08-08 NOTE — TELEPHONE ENCOUNTER
From: Ramandeep Walsh  To: Evan Glez MD  Sent: 8/7/2018 6:18 PM EDT  Subject: Medication Renewal Request    Original authorizing provider: MD Ramandeep Hawkins would like a refill of the following medications:  omeprazole (PRILOSEC) 40 mg capsule Evan Glez MD]    Preferred pharmacy: [facility] [address]    Comment:

## 2018-08-08 NOTE — TELEPHONE ENCOUNTER
Last Visit: 4/25-Sukhjinder  Next Appt: 8/13-Sukhjinder  Previous Refill Encounter: 2/5-30+3    Requested Prescriptions     Pending Prescriptions Disp Refills    omeprazole (PRILOSEC) 40 mg capsule 30 Cap 3     Sig: Take 1 Cap by mouth daily.

## 2018-08-09 ENCOUNTER — OFFICE VISIT (OUTPATIENT)
Dept: ENDOCRINOLOGY | Age: 28
End: 2018-08-09

## 2018-08-09 VITALS
BODY MASS INDEX: 51.91 KG/M2 | SYSTOLIC BLOOD PRESSURE: 126 MMHG | WEIGHT: 293 LBS | HEART RATE: 104 BPM | HEIGHT: 63 IN | DIASTOLIC BLOOD PRESSURE: 75 MMHG

## 2018-08-09 DIAGNOSIS — Z79.4 TYPE 2 DIABETES MELLITUS WITHOUT COMPLICATION, WITH LONG-TERM CURRENT USE OF INSULIN (HCC): Primary | ICD-10-CM

## 2018-08-09 DIAGNOSIS — E66.01 CLASS 3 SEVERE OBESITY DUE TO EXCESS CALORIES WITH SERIOUS COMORBIDITY AND BODY MASS INDEX (BMI) OF 60.0 TO 69.9 IN ADULT (HCC): ICD-10-CM

## 2018-08-09 DIAGNOSIS — E11.9 TYPE 2 DIABETES MELLITUS WITHOUT COMPLICATION, WITH LONG-TERM CURRENT USE OF INSULIN (HCC): Primary | ICD-10-CM

## 2018-08-09 DIAGNOSIS — I10 ESSENTIAL HYPERTENSION: ICD-10-CM

## 2018-08-09 LAB — HBA1C MFR BLD HPLC: 12.1 %

## 2018-08-09 RX ORDER — OMEPRAZOLE 40 MG/1
CAPSULE, DELAYED RELEASE ORAL
Qty: 30 CAP | Refills: 3 | Status: SHIPPED | OUTPATIENT
Start: 2018-08-09 | End: 2019-05-22 | Stop reason: SDUPTHER

## 2018-08-09 RX ORDER — OMEPRAZOLE 40 MG/1
40 CAPSULE, DELAYED RELEASE ORAL DAILY
Qty: 30 CAP | Refills: 3 | Status: SHIPPED | OUTPATIENT
Start: 2018-08-09 | End: 2018-10-25 | Stop reason: SDUPTHER

## 2018-08-09 NOTE — PROGRESS NOTES
Chief Complaint   Patient presents with    Diabetes     pcp and pharmacy verified. Eye exam over a year ago   Records since last visit reviewed. History of Present Illness: Gila Barkley is a 25 y.o. female here for follow up of diabetes. Was diagnosed with diabetes \"when I was in high school\". Pt has never been treated with insulin. Because pt has features concerning for cushing's her PCP checked a 24 hour urine cortisol which was not elevated (42). She also had a TSH drawn which was 2.10. In March 2016 we tested pt for hyperaldosteronism, which was negative. Pt has not been back to see me since September 2016. At that time her A1C had improved from 10.5 down to 8.4%, she was on Glipizide 10mg BID and Alogliptin-Metformin 12.5/1000mg BID. I tested her for evidence of PCOS, her Testosterone was 14, with DHEA-S 109, her 17-OH Prog was not elevated and an overnight dexamethasone suppression test did suppress her ACTH and cortisol. In April 2018 her A1C was 9.7%. Per her last note from PCP in April 2018 she was taking Toujeo 10 units daily, Janumet and Glipizide 10mg BID. She had recently received a steroid injection in her foot and that caused her BGs to increase. Pt is currently taking Pt is currently taking Toujeo 16 units, Glipizide 10mg BID and Alogliptin-Metformin 12.5/1000mg BID. Pt was recently on Janumet but had to switch to the Alogliptin-Metformin for formulary reasons. Her A1C today was 12.1%. Her weight today was 351 pounds (BMI 62.25)    She does not check her blood sugars. Pt wakes up around 9PM.  She does not eat anything until Midnight. She was off so she was asleep Midnight last night. She does not eat anything between MN and 11AM  Her second meal of the day it around 11A-2P after she gets out of class. 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   If she works at the hospital, then she will eat at work, if she is not working at the hospital, will eat after she gets out of class around 11AM.    She takes her Toujeo around 10PM. She take her Glipizide and Alogilptin-Metformin around 730AM and 9PM.  She denies issues of hypoglycemia. She notes that she does not have a set eating schedule and that can be very erratic. Exercise consists of a \"[employer] Camp\" which she did for a year. She was working out 3 days per week. She had lost 3 pant sizes. She is very frustrated that she has not lost more weight. She was perscibed contrave, but it was too expensive so she never took it. She tried phentermine for a month and she notes it did not help and she stopped taking it. No history of vascular disease. No history of neuropathy, or nephropathy. Last eye exam was April 2017, no retinopathy, will request these records. She had a steroid injection in early 2018 for plantar faucitis, but nothing recently. No recent infections, sick exposures, injuries. She denies issues of CP, SOB, N/V abdominal pain     Current Outpatient Prescriptions   Medication Sig    insulin glargine (TOUJEO SOLOSTAR U-300 INSULIN) 300 unit/mL (1.5 mL) inpn 16 Units by SubCUTAneous route daily.  semaglutide (OZEMPIC) 0.25 mg/0.2 mL (2 mg/1.5 mL) sub-q pen 0.5 mg by SubCUTAneous route every seven (7) days.  semaglutide (OZEMPIC) 0.25 mg/0.2 mL (2 mg/1.5 mL) sub-q pen 0.5 mg by SubCUTAneous route every seven (7) days.  alogliptin-metFORMIN (KAZANO) 12.5-1,000 mg per tablet Take 1 Tab by mouth two (2) times daily (with meals).  spironolactone (ALDACTONE) 25 mg tablet Take 1 Tab by mouth two (2) times a day. Indications: Aldosteronism, hypertension    metoprolol tartrate (LOPRESSOR) 100 mg IR tablet TAKE 1 TAB BY MOUTH TWO (2) TIMES A DAY.  glipiZIDE (GLUCOTROL) 5 mg tablet TAKE 2 TABS BY MOUTH TWO (2) TIMES A DAY.  atorvastatin (LIPITOR) 40 mg tablet Take 1 Tab by mouth daily.     ALPRAZolam (XANAX) 0.5 mg tablet Take 1 Tab by mouth nightly as needed for Anxiety or Sleep. Max Daily Amount: 0.5 mg. Indications: anxiety, Generalized Anxiety Disorder, Panic Disorder    gabapentin (NEURONTIN) 300 mg capsule Take 1 Cap by mouth three (3) times daily. (Patient taking differently: Take 300 mg by mouth two (2) times a day.)    omeprazole (PRILOSEC) 40 mg capsule Take 1 Cap by mouth daily.  HUNG 0.35 mg tab TAKE 1 TABLET BY MOUTH EVERY DAY    nortriptyline (PAMELOR) 50 mg capsule TAKE ONE CAPSULE BY MOUTH EVERY NIGHT    butalbital-acetaminophen-caffeine (FIORICET) -40 mg per tablet Take 0.5-1 Tabs by mouth every six (6) hours as needed for Headache. Max Daily Amount: 4 Tabs.  glucose blood VI test strips (ONETOUCH VERIO) strip Check sugars two times daily. E11.9    Blood-Glucose Meter (ONETOUCH VERIO IQ METER) misc Test blood sugar once daily    cetirizine (ZYRTEC) 10 mg tablet Take  by mouth daily.  albuterol (PROVENTIL HFA, VENTOLIN HFA) 90 mcg/actuation inhaler Take 1 Puff by inhalation every four (4) hours as needed for Wheezing.  albuterol (ACCUNEB) 1.25 mg/3 mL nebulizer solution Take 3 mL by inhalation every six (6) hours as needed for Wheezing.  acetaminophen (TYLENOL EXTRA STRENGTH) 500 mg tablet Take 1 Tab by mouth every six (6) hours as needed for Pain.  Arm Brace misc Right ulnar splint for ulnar neuropathy at the elbow. No current facility-administered medications for this visit. Allergies   Allergen Reactions    Latex Itching     Review of Systems:  - Eyes: no blurry vision or double vision  - Cardiovascular: no chest pain  - Respiratory: no shortness of breath  - Musculoskeletal: no myalgias  - Neurological: no numbness/tingling in extremities    Physical Examination:  Blood pressure 126/75, pulse (!) 104, height 5' 3\" (1.6 m), weight (!) 351 lb 6.4 oz (159.4 kg).   - General: pleasant, no distress, good eye contact   - Neck: no carotid bruits  - Cardiovascular: regular, normal rate, nl s1 and s2, no m/r/g, 2+ DP pulses   - Respiratory: clear bilaterally  - Integumentary: no edema, no foot ulcers, sensation to monofilament and vibration intact bilaterally, + Acanthosis  - Psychiatric: normal mood and affect    Data Reviewed:   Component      Latest Ref Rng & Units 4/25/2018 4/4/2018 4/4/2018           3:39 PM  1:17 PM  1:17 PM   Normetanephrine, free      0 - 145 pg/mL  102    Metanephrine, free      0 - 62 pg/mL  12    TSH      0.450 - 4.500 uIU/mL   1.730   Hemoglobin A1c (POC)      % 9.7         Assessment/Plan:   1) DM > Her A1C today was 12.1%. Pt's diet is very erratic and she denies frequent snacking or binge eating. She works swing shift and night shifts, which have statictically been shown to have higher rates of obesity and DM. For now pt to continue the Alogliptin-Metformin 12.5/1000mg BID, Glipizide 10mg BID and Toujeo 16 units daily. Will start pt on Ozempic weekly to help address insulin insensitivity, improved BGs and help with weight loss. We discussed the mechanism of action of the GLP-1 agents and the risk vs benefits, including Nausea, pancreatitis and hypoglycemia. Pt given written information about Ozempic. 2) Obesity > Pt was not able to afford Contrave, the Phentermine did not help. She did lose about 15 pounds with an intense physical \"boot camp\". She lost from approx. 365 to 350. The Ozempic will help with weight loss and if her BGs improve we can work on getting her off the glipizide, which will help further with weight loss. 3) HTN > BP at goal on her current regimen    RTC 3 months    Pt voices understanding and agreement with the plan. We spent 40 minutes of face to face time together and > 50% of the time was spent in counseling on the above issues. Patient Instructions   1) Ozempic 0.25mg every week for 4 weeks, then increase to 0.5mg        Follow-up Disposition:  Return in about 3 months (around 11/9/2018). Copy sent to:  Mansi Slaughter and [facility]

## 2018-08-09 NOTE — LETTER
NOTIFICATION RETURN TO WORK / SCHOOL 
 
8/9/2018 3:23 PM 
 
Ms. Iglesia Pike [place] [id number] 
[address] [facility] To Whom It May Concern: 
 
Iglesia Pike is currently under the care of [address] [place] [address]. She will return to work/school on: 8/10/18 If there are questions or concerns please have the patient contact our office. Sincerely, Boyd Kim MD

## 2018-08-09 NOTE — MR AVS SNAPSHOT
[address] [facility] [address] [facility] [address] [address] [phone] Patient: Justo Tobar MRN: [id number] Visit Information Date & Time Provider Department Dept. Phone Encounter #  
 8/9/2018  2:30 PM Chanel Rizzo, [address] [place] [address] Diabetes and Endocrinology  Follow-up Instructions Return in about 3 months (around 11/9/2018). Your Appointments 8/13/2018  3:30 PM  
ROUTINE CARE with Peter Leger MD  
[address] [facility] Appt Note: 3 month follow up; r/s F/U on DM  
 [address] [facility] [id number] [phone] [phone]  
  
    
 9/27/2018  2:00 PM  
Follow Up with Tala Richardson MD  
Neurology [facility] at [facility] Appt Note: [address], 
[address] [facility], [address] [phone]  
[address] [facility], [address] [facility], [address] [address] Upcoming Health Maintenance Date Due  
 EYE EXAM RETINAL OR DILATED Q1 1/7/2016 PAP AKA CERVICAL CYTOLOGY 2/12/2017 FOOT EXAM Q1 9/26/2017 Influenza Age 5 to Adult 8/1/2018 HEMOGLOBIN A1C Q6M 10/25/2018 MICROALBUMIN Q1 1/25/2019 LIPID PANEL Q1 4/4/2019 DTaP/Tdap/Td series (2 - Td) 8/27/2022 Allergies as of 8/9/2018  Review Complete On: 8/9/2018 By: Chanel Rizzo MD  
  
 Severity Noted Reaction Type Reactions Latex  08/27/2014   Side Effect Itching Current Immunizations  Reviewed on 3/20/2017 Name Date Influenza Vaccine (Quad) PF 12/20/2016, 10/19/2015  8:35 AM  
 Influenza Vaccine (Quadrivalent) 1/5/2018 Influenza Vaccine PF 9/27/2013 Pneumococcal Polysaccharide (PPSV-23) 9/27/2013 TDAP Vaccine 8/27/2012 Not reviewed this visit You Were Diagnosed With   
  
 Codes Comments Type 2 diabetes mellitus without complication, with long-term current use of insulin (HCC)    -  Primary ICD-10-CM: E11.9, Z79.4 ICD-9-CM: 250.00, V58.67 Essential hypertension     ICD-10-CM: I10 
ICD-9-CM: 401.9 Class 3 severe obesity due to excess calories with serious comorbidity and body mass index (BMI) of 60.0 to 69.9 in adult [facility])     ICD-10-CM: E66.01, Z68.44 
ICD-9-CM: 278.01, V85.44 Vitals BP Pulse Height(growth percentile) Weight(growth percentile) BMI OB Status 126/75 (!) 104 5' 3\" (1.6 m) (!) 351 lb 6.4 oz (159.4 kg) 62.25 kg/m2 Medically Induced Smoking Status Light Tobacco Smoker Vitals History BMI and BSA Data Body Mass Index Body Surface Area  
 62.25 kg/m 2 2.66 m 2 Preferred Pharmacy Pharmacy Name Phone [id number]- [place], [place] - [address] [phone] Your Updated Medication List  
  
   
This list is accurate as of 8/9/18  3:17 PM.  Always use your most recent med list.  
  
  
  
  
 acetaminophen 500 mg tablet Commonly known as:  Jr Aidee Wagner Se Take 1 Tab by mouth every six (6) hours as needed for Pain. * albuterol 1.25 mg/3 mL Nebu Commonly known as:  Amaury Warrenrk Take 3 mL by inhalation every six (6) hours as needed for Wheezing. * albuterol 90 mcg/actuation inhaler Commonly known as:  PROVENTIL HFA, VENTOLIN HFA, PROAIR HFA Take 1 Puff by inhalation every four (4) hours as needed for Wheezing. alogliptin-metFORMIN 12.5-1,000 mg per tablet Commonly known as:  Russella Jamila Take 1 Tab by mouth two (2) times daily (with meals). ALPRAZolam 0.5 mg tablet Commonly known as:  Neelima Furnace Take 1 Tab by mouth nightly as needed for Anxiety or Sleep. Max Daily Amount: 0.5 mg. Indications: anxiety, Generalized Anxiety Disorder, Panic Disorder Arm Brace [facility] Right ulnar splint for ulnar neuropathy at the elbow. atorvastatin 40 mg tablet Commonly known as:  LIPITOR Take 1 Tab by mouth daily. Blood-Glucose Meter Misc Commonly known as:  Kathy Locket IQ METER Test blood sugar once daily  
  
 butalbital-acetaminophen-caffeine -40 mg per tablet Commonly known as:  [employer] Take 0.5-1 Tabs by mouth every six (6) hours as needed for Headache. Max Daily Amount: 4 Tabs. HUNG 0.35 mg Tab Generic drug:  norethindrone TAKE 1 TABLET BY MOUTH EVERY DAY  
  
 gabapentin 300 mg capsule Commonly known as:  NEURONTIN Take 1 Cap by mouth three (3) times daily. glipiZIDE 5 mg tablet Commonly known as:  GLUCOTROL  
TAKE 2 TABS BY MOUTH TWO (2) TIMES A DAY. glucose blood VI test strips strip Commonly known as:  Kathy Locket Check sugars two times daily. E11.9  
  
 metoprolol tartrate 100 mg IR tablet Commonly known as:  LOPRESSOR  
TAKE 1 TAB BY MOUTH TWO (2) TIMES A DAY. nortriptyline 50 mg capsule Commonly known as:  PAMELOR  
TAKE ONE CAPSULE BY MOUTH EVERY NIGHT  
  
 omeprazole 40 mg capsule Commonly known as:  PRILOSEC Take 1 Cap by mouth daily. spironolactone 25 mg tablet Commonly known as:  ALDACTONE Take 1 Tab by mouth two (2) times a day. Indications: Aldosteronism, hypertension TOUJEO SOLOSTAR U-300 INSULIN 300 unit/mL (1.5 mL) Inpn Generic drug:  insulin glargine 16 Units by SubCUTAneous route daily. ZyrTEC 10 mg tablet Generic drug:  cetirizine Take  by mouth daily. * Notice: This list has 2 medication(s) that are the same as other medications prescribed for you. Read the directions carefully, and ask your doctor or other care provider to review them with you. We Performed the Following HEMOGLOBIN A1C WITH EAG [66495 CPT(R)]  DIABETES FOOT EXAM [HM7 Custom] Follow-up Instructions Return in about 3 months (around 11/9/2018). Patient Instructions 1) Ozempic 0.25mg every week for 4 weeks, then increase to 0.5mg Introducing [facility] SERVICES! Dear Peyton Ash: Thank you for requesting a [employer] account. Our records indicate that you already have an active [employer]dc[employer] account. You can access your account anytime at [email or web address] Did you know that you can access your hospital and ER discharge instructions at any time in [employer]? You can also review all of your test results from your hospital stay or ER visit. Additional Information If you have questions, please visit the Frequently Asked Questions section of the Crowdc[employer] website at [email or web address] Remember, Crowdcube is NOT to be used for urgent needs. For medical emergencies, dial 911. Now available from your iPhone and Android! Please provide this summary of care documentation to your next provider. Your primary care clinician is listed as Jaswant Guadalupe. If you have any questions after today's visit, please call [phone].

## 2018-08-16 ENCOUNTER — TELEPHONE (OUTPATIENT)
Dept: ENDOCRINOLOGY | Age: 28
End: 2018-08-16

## 2018-08-16 NOTE — TELEPHONE ENCOUNTER
----- Message from Alysa Prabhakar sent at 8/16/2018 11:28 AM EDT -----  Regarding: Lucille/zohra   Pt is returning a call from today and she did not say what it was in regard too. Pts number is [phone].

## 2018-08-16 NOTE — TELEPHONE ENCOUNTER
Patient states that she has not gone to pharmacy yet. Advised to take the savings card to see if Ozempic may be covered. Patient agreed.

## 2018-08-21 ENCOUNTER — DOCUMENTATION ONLY (OUTPATIENT)
Dept: ENDOCRINOLOGY | Age: 28
End: 2018-08-21

## 2018-08-21 NOTE — PROGRESS NOTES
Per [id number] [facility] has been approved until 8/21/19. See the notification for more information regarding coverage. [facility] has been notified.

## 2018-08-27 RX ORDER — INSULIN GLARGINE 100 [IU]/ML
INJECTION, SOLUTION SUBCUTANEOUS
Qty: 15 ML | Refills: 3 | Status: SHIPPED | OUTPATIENT
Start: 2018-08-27 | End: 2019-04-25 | Stop reason: SDUPTHER

## 2018-08-31 RX ORDER — METFORMIN HYDROCHLORIDE 500 MG/1
TABLET, EXTENDED RELEASE ORAL
Qty: 120 TAB | Refills: 11 | Status: SHIPPED | OUTPATIENT
Start: 2018-08-31 | End: 2019-09-03 | Stop reason: SDUPTHER

## 2018-09-13 DIAGNOSIS — E11.9 TYPE 2 DIABETES MELLITUS WITHOUT COMPLICATION, WITHOUT LONG-TERM CURRENT USE OF INSULIN (HCC): ICD-10-CM

## 2018-09-13 DIAGNOSIS — Z23 ENCOUNTER FOR IMMUNIZATION: ICD-10-CM

## 2018-09-13 DIAGNOSIS — E11.9 DIABETES MELLITUS WITHOUT COMPLICATION (HCC): ICD-10-CM

## 2018-09-13 DIAGNOSIS — G56.01 CARPAL TUNNEL SYNDROME OF RIGHT WRIST: ICD-10-CM

## 2018-09-13 NOTE — TELEPHONE ENCOUNTER
Last Visit: 4/25-Sukhjinder  Next Appt: None  Previous Refill Encounter: 4/+3    Requested Prescriptions     Pending Prescriptions Disp Refills    glipiZIDE (GLUCOTROL) 5 mg tablet 360 Tab 1     Sig: TAKE 2 TABS BY MOUTH TWO (2) TIMES A DAY.

## 2018-09-17 RX ORDER — GLIPIZIDE 5 MG/1
TABLET ORAL
Qty: 360 TAB | Refills: 1 | Status: SHIPPED | OUTPATIENT
Start: 2018-09-17 | End: 2019-03-25 | Stop reason: SDUPTHER

## 2018-09-27 ENCOUNTER — OFFICE VISIT (OUTPATIENT)
Dept: NEUROLOGY | Age: 28
End: 2018-09-27

## 2018-09-27 VITALS
HEIGHT: 63 IN | BODY MASS INDEX: 51.91 KG/M2 | OXYGEN SATURATION: 97 % | SYSTOLIC BLOOD PRESSURE: 138 MMHG | HEART RATE: 110 BPM | DIASTOLIC BLOOD PRESSURE: 90 MMHG | WEIGHT: 293 LBS

## 2018-09-27 DIAGNOSIS — G56.21 ULNAR NEUROPATHY AT ELBOW, RIGHT: ICD-10-CM

## 2018-09-27 DIAGNOSIS — G47.33 OSA (OBSTRUCTIVE SLEEP APNEA): ICD-10-CM

## 2018-09-27 DIAGNOSIS — G43.009 MIGRAINE WITHOUT AURA AND WITHOUT STATUS MIGRAINOSUS, NOT INTRACTABLE: Primary | ICD-10-CM

## 2018-09-27 DIAGNOSIS — G56.01 CARPAL TUNNEL SYNDROME OF RIGHT WRIST: ICD-10-CM

## 2018-09-27 DIAGNOSIS — Z72.0 TOBACCO USE: ICD-10-CM

## 2018-09-27 RX ORDER — NORTRIPTYLINE HYDROCHLORIDE 75 MG/1
75 CAPSULE ORAL
Qty: 30 CAP | Refills: 2 | Status: SHIPPED | OUTPATIENT
Start: 2018-09-27 | End: 2019-03-12

## 2018-09-27 NOTE — PROGRESS NOTES
Neurology follow-up note       Chief Complaint   Patient presents with    Follow-up     migraine       Katlin Mckeon is a 25 y.o. female who presented to the neurology office for management of numbness and tingling in the hands and headaches. Regarding the numbness in the hand, it started around mid May 2016 and it is bilateral.  The medial 2 fingers are involved in the right hand and all the fingers are involved in the left hand. It is gradually progressive. She does also have neck pain with radiation to the right shoulder. Her symptoms are off and on and it is more when she is working and improves when her hands and not moving. The numbness and tingling lasts for minutes but the pain around her wrist can be for a few days. She denies any weakness. EMG/nerve conduction study has been normal.  She is taking gabapentin 300 mg p.o. thrice daily and that has helped her. She uses right elbow splint and bilateral hand splints. Stable. Regarding the headaches, she has been having migraines for a long time and her headaches start of pain in the left eye and left side of the face and it is 10 out of 10 in severity, associated with photophobia, phonophobia and nausea but she denies any vomiting. The headache can last from 3 days to week. It is throbbing in character. She is presently taking nortriptyline 50 mg p.o. nightly and that helps some but recently she has been noticing worsening of her symptoms. She does also have obstructive sleep apnea and has stopped using her CPAP    Risk Factors for headaches  Smoking: Half pack a day  Coffee: Denies cups/day  Tea: 0 cups/day  Soda: 2 cans/day  Water: 4 glasses/day  Sleeps at 9 AM and wakes up at 2 PM to 3 PM.     Medications tried  Imitrex  Amitriptyline  Nortriptyline    Current Outpatient Prescriptions   Medication Sig    glipiZIDE (GLUCOTROL) 5 mg tablet TAKE 2 TABS BY MOUTH TWO (2) TIMES A DAY.     metFORMIN ER (GLUCOPHAGE XR) 500 mg tablet 2 pills with breakfast and two pills with dinner    gabapentin (NEURONTIN) 300 mg capsule TAKE 1 CAP BY MOUTH THREE (3) TIMES DAILY.  insulin glargine (BASAGLAR KWIKPEN U-100 INSULIN) 100 unit/mL (3 mL) inpn 16 units daily    omeprazole (PRILOSEC) 40 mg capsule TAKE 1 CAP BY MOUTH DAILY.  omeprazole (PRILOSEC) 40 mg capsule Take 1 Cap by mouth daily.  semaglutide (OZEMPIC) 0.25 mg/0.2 mL (2 mg/1.5 mL) sub-q pen 0.5 mg by SubCUTAneous route every seven (7) days.  semaglutide (OZEMPIC) 0.25 mg/0.2 mL (2 mg/1.5 mL) sub-q pen 0.5 mg by SubCUTAneous route every seven (7) days.  spironolactone (ALDACTONE) 25 mg tablet Take 1 Tab by mouth two (2) times a day. Indications: Aldosteronism, hypertension    metoprolol tartrate (LOPRESSOR) 100 mg IR tablet TAKE 1 TAB BY MOUTH TWO (2) TIMES A DAY.  atorvastatin (LIPITOR) 40 mg tablet Take 1 Tab by mouth daily.  ALPRAZolam (XANAX) 0.5 mg tablet Take 1 Tab by mouth nightly as needed for Anxiety or Sleep. Max Daily Amount: 0.5 mg. Indications: anxiety, Generalized Anxiety Disorder, Panic Disorder    HUNG 0.35 mg tab TAKE 1 TABLET BY MOUTH EVERY DAY    nortriptyline (PAMELOR) 50 mg capsule TAKE ONE CAPSULE BY MOUTH EVERY NIGHT    Arm Brace misc Right ulnar splint for ulnar neuropathy at the elbow.  butalbital-acetaminophen-caffeine (FIORICET) -40 mg per tablet Take 0.5-1 Tabs by mouth every six (6) hours as needed for Headache. Max Daily Amount: 4 Tabs.  glucose blood VI test strips (ONETOUCH VERIO) strip Check sugars two times daily. E11.9    Blood-Glucose Meter (ONETOUCH VERIO IQ METER) misc Test blood sugar once daily    cetirizine (ZYRTEC) 10 mg tablet Take  by mouth daily.  albuterol (PROVENTIL HFA, VENTOLIN HFA) 90 mcg/actuation inhaler Take 1 Puff by inhalation every four (4) hours as needed for Wheezing.     albuterol (ACCUNEB) 1.25 mg/3 mL nebulizer solution Take 3 mL by inhalation every six (6) hours as needed for Wheezing.  acetaminophen (TYLENOL EXTRA STRENGTH) 500 mg tablet Take 1 Tab by mouth every six (6) hours as needed for Pain. No current facility-administered medications for this visit. REVIEW OF SYSTEMS:   A ten system review of constitutional, cardiovascular, respiratory, musculoskeletal, endocrine, skin, SHEENT, genitourinary, psychiatric and neurologic systems was obtained and is unremarkable except as stated in HPI     EXAMINATION:   Visit Vitals    /90    Pulse (!) 110    Ht 5' 3\" (1.6 m)    Wt 350 lb (158.8 kg)    SpO2 97%    BMI 62 kg/m2        General:   General appearance: Pt is in no acute distress   Distal pulses are preserved    Neurological Examination:   Mental Status: AAO x3. Speech is fluent. Follows commands, has normal fund of knowledge, attention, short term recall, comprehension and insight. Cranial Nerves: Visual fields are full. PERRL, Extraocular movements are full. Facial sensation intact V1- V3. Facial movement intact, symmetric. Hearing intact to conversation. Palate elevates symmetrically. Shoulder shrug symmetric. Tongue midline. Motor: Strength is 5/5 in all 4 ext. No atrophy. Tone: Normal    Sensation: Decreased pinprick sensation in the medial 2 fingers of the right hand and the lateral 3 fingers on the left hand    Coordination/Cerebellar: Intact to finger-nose-finger     Skin: No significant bruising or lacerations. Laboratory review:   Results for orders placed or performed in visit on 08/09/18   AMB POC HEMOGLOBIN A1C   Result Value Ref Range    Hemoglobin A1c (POC) 12.1 %     Laboratory review:  12/20/2016  Glucose 352, potassium 5.3, platelet 191, MSS7S 9.3    Imaging review:  7/27/2014  CT soft tissue neck with contrast   No evidence of neck mass, abscess or lymphadenopathy    3/16/2017  EMG/nerve conduction study  Nerve conduction studies of the bilateral upper extremities were unremarkable.   Needle electrode examination of the right upper extremity was unremarkable. This is a normal study. There is no electrophysiological evidence of median neuropathy at the wrist or and ulnar neuropathy at the elbow on either side. There is no electrophysiological evidence of right cervical radiculopathy. 4/14/2017  MRI of the brain without contrast  Normal    MRI cervical spine  Straightening of the usual cervical lordosis but otherwise normal study    Documentation review:  I reviewed the notes from Dr. Moustapha Jones from 9/1/2017. The patient just switched to nasal mask which she likes much better. Download was reviewed. Over the past 30 days she has used it only for 10 days. Uses about 2.5 hours on Tuesdays. She states that she will try using it nightly for a couple of months and then return in follow-up. If she is struggling to use it after using it nightly, he will consider an attended titration to address barriers to adherence. Assessment/Plan:   Dawna York is a 25 y.o. female who presented to the neurology office for management of migraines and clinically does have right ulnar neuropathy and left carpal tunnel syndrome. She does also have obstructive sleep apnea and does smoke. Her EMG/nerve conduction study has been normal.    She states that the gabapentin has helped her with the paresthesia in her hands. She is taking gabapentin 300 mg p.o. 3 times daily. .   She does also use right elbow splint and bilateral hand splints and will continue with them as well. For her headaches, she is taking nortriptyline 50 mg p.o. nightly and that helps her. Her headaches are getting worse. We will increase her nortriptyline to 75 mg p.o. nightly    She does also have obstructive sleep apnea and that may be contributing to her migraines. She is noncompliant with her CPAP at this time. I gave her within 3 minutes counseling regarding smoking cessation. Follow-up in 3 months      ICD-10-CM ICD-9-CM    1.  Migraine without aura and without status migrainosus, not intractable G43.009 346.10    2. Carpal tunnel syndrome of right wrist G56.01 354.0    3. Ulnar neuropathy at elbow, right G56.21 354.2    4. Tobacco use Z72.0 305.1    5. PETE (obstructive sleep apnea) G47.33 327.23        Thank you for allowing me to participate in the care of Ms. Regine Prasad. Please feel free to contact me if you have any questions. Eugenio Mendoza MD  Neurologist and Clinical Neurophysiologist    CC: Rasta Castillo MD  Fax: [phone]    This note will not be viewable in [address]

## 2018-09-27 NOTE — LETTER
9/27/2018 12:21 PM 
 
Patient:    Natalee Ramesh YOB: 1990 Date of Visit:    9/27/2018 Dear Sam Urias MD 
 
Thank you for referring Ms. Natalee Ramesh to me for evaluation/treatment. Below are the relevant portions of my assessment and plan of care. Neurology follow-up note Chief Complaint Patient presents with  Follow-up  
  migraine Subjective Natalee Ramesh is a 25 y.o. female who presented to the neurology office for management of numbness and tingling in the hands and headaches. Regarding the numbness in the hand, it started around mid May 2016 and it is bilateral.  The medial 2 fingers are involved in the right hand and all the fingers are involved in the left hand. It is gradually progressive. She does also have neck pain with radiation to the right shoulder. Her symptoms are off and on and it is more when she is working and improves when her hands and not moving. The numbness and tingling lasts for minutes but the pain around her wrist can be for a few days. She denies any weakness. EMG/nerve conduction study has been normal.  She is taking gabapentin 300 mg p.o. thrice daily and that has helped her. She uses right elbow splint and bilateral hand splints. Stable. Regarding the headaches, she has been having migraines for a long time and her headaches start of pain in the left eye and left side of the face and it is 10 out of 10 in severity, associated with photophobia, phonophobia and nausea but she denies any vomiting. The headache can last from 3 days to week. It is throbbing in character. She is presently taking nortriptyline 50 mg p.o. nightly and that helps some but recently she has been noticing worsening of her symptoms. She does also have obstructive sleep apnea and has stopped using her CPAP Risk Factors for headaches Smoking: Half pack a day Coffee: Denies cups/day Tea: 0 cups/day Soda: 2 cans/day Water: 4 glasses/day Sleeps at 9 AM and wakes up at 2 PM to 3 PM. Medications tried Imitrex Amitriptyline Nortriptyline Current Outpatient Prescriptions Medication Sig  
 glipiZIDE (GLUCOTROL) 5 mg tablet TAKE 2 TABS BY MOUTH TWO (2) TIMES A DAY.  metFORMIN ER (GLUCOPHAGE XR) 500 mg tablet 2 pills with breakfast and two pills with dinner  gabapentin (NEURONTIN) 300 mg capsule TAKE 1 CAP BY MOUTH THREE (3) TIMES DAILY.  insulin glargine (BASAGLAR KWIKPEN U-100 INSULIN) 100 unit/mL (3 mL) inpn 16 units daily  omeprazole (PRILOSEC) 40 mg capsule TAKE 1 CAP BY MOUTH DAILY.  omeprazole (PRILOSEC) 40 mg capsule Take 1 Cap by mouth daily.  semaglutide (OZEMPIC) 0.25 mg/0.2 mL (2 mg/1.5 mL) sub-q pen 0.5 mg by SubCUTAneous route every seven (7) days.  semaglutide (OZEMPIC) 0.25 mg/0.2 mL (2 mg/1.5 mL) sub-q pen 0.5 mg by SubCUTAneous route every seven (7) days.  spironolactone (ALDACTONE) 25 mg tablet Take 1 Tab by mouth two (2) times a day. Indications: Aldosteronism, hypertension  metoprolol tartrate (LOPRESSOR) 100 mg IR tablet TAKE 1 TAB BY MOUTH TWO (2) TIMES A DAY.  atorvastatin (LIPITOR) 40 mg tablet Take 1 Tab by mouth daily.  ALPRAZolam (XANAX) 0.5 mg tablet Take 1 Tab by mouth nightly as needed for Anxiety or Sleep. Max Daily Amount: 0.5 mg. Indications: anxiety, Generalized Anxiety Disorder, Panic Disorder  HUNG 0.35 mg tab TAKE 1 TABLET BY MOUTH EVERY DAY  nortriptyline (PAMELOR) 50 mg capsule TAKE ONE CAPSULE BY MOUTH EVERY NIGHT  Arm Brace mis Right ulnar splint for ulnar neuropathy at the elbow.  butalbital-acetaminophen-caffeine (FIORICET) -40 mg per tablet Take 0.5-1 Tabs by mouth every six (6) hours as needed for Headache. Max Daily Amount: 4 Tabs.  glucose blood VI test strips (ONETOUCH VERIO) strip Check sugars two times daily. E11.9  Blood-Glucose Meter (ONETOUCH VERIO IQ METER) misc Test blood sugar once daily  cetirizine (ZYRTEC) 10 mg tablet Take  by mouth daily.  albuterol (PROVENTIL HFA, VENTOLIN HFA) 90 mcg/actuation inhaler Take 1 Puff by inhalation every four (4) hours as needed for Wheezing.  albuterol (ACCUNEB) 1.25 mg/3 mL nebulizer solution Take 3 mL by inhalation every six (6) hours as needed for Wheezing.  acetaminophen (TYLENOL EXTRA STRENGTH) 500 mg tablet Take 1 Tab by mouth every six (6) hours as needed for Pain. No current facility-administered medications for this visit. REVIEW OF SYSTEMS:  
A ten system review of constitutional, cardiovascular, respiratory, musculoskeletal, endocrine, skin, SHEENT, genitourinary, psychiatric and neurologic systems was obtained and is unremarkable except as stated in HPI EXAMINATION:  
Visit Vitals  /90  Pulse (!) 110  
 Ht 5' 3\" (1.6 m)  Wt 350 lb (158.8 kg)  SpO2 97%  BMI 62 kg/m2 General:  
General appearance: Pt is in no acute distress Distal pulses are preserved Neurological Examination:  
Mental Status: AAO x3. Speech is fluent. Follows commands, has normal fund of knowledge, attention, short term recall, comprehension and insight. Cranial Nerves: Visual fields are full. PERRL, Extraocular movements are full. Facial sensation intact V1- V3. Facial movement intact, symmetric. Hearing intact to conversation. Palate elevates symmetrically. Shoulder shrug symmetric. Tongue midline. Motor: Strength is 5/5 in all 4 ext. No atrophy. Tone: Normal 
 
Sensation: Decreased pinprick sensation in the medial 2 fingers of the right hand and the lateral 3 fingers on the left hand Coordination/Cerebellar: Intact to finger-nose-finger Skin: No significant bruising or lacerations. Laboratory review:  
Results for orders placed or performed in visit on 08/09/18 AMB POC HEMOGLOBIN A1C Result Value Ref Range Hemoglobin A1c (POC) 12.1 % Laboratory review: 
12/20/2016 Glucose 352, potassium 5.3, platelet 955, PWC7W 9.3 Imaging review: 
7/27/2014 CT soft tissue neck with contrast  
No evidence of neck mass, abscess or lymphadenopathy 3/16/2017 EMG/nerve conduction study Nerve conduction studies of the bilateral upper extremities were unremarkable. Needle electrode examination of the right upper extremity was unremarkable. This is a normal study. There is no electrophysiological evidence of median neuropathy at the wrist or and ulnar neuropathy at the elbow on either side. There is no electrophysiological evidence of right cervical radiculopathy. 4/14/2017 MRI of the brain without contrast 
Normal 
 
MRI cervical spine Straightening of the usual cervical lordosis but otherwise normal study Documentation review: I reviewed the notes from Dr. Moustapha Jones from 9/1/2017. The patient just switched to nasal mask which she likes much better. Download was reviewed. Over the past 30 days she has used it only for 10 days. Uses about 2.5 hours on Tuesdays. She states that she will try using it nightly for a couple of months and then return in follow-up. If she is struggling to use it after using it nightly, he will consider an attended titration to address barriers to adherence. Assessment/Plan:  
Dawna York is a 25 y.o. female who presented to the neurology office for management of migraines and clinically does have right ulnar neuropathy and left carpal tunnel syndrome. She does also have obstructive sleep apnea and does smoke. Her EMG/nerve conduction study has been normal. 
 
She states that the gabapentin has helped her with the paresthesia in her hands. She is taking gabapentin 300 mg p.o. 3 times daily. .   She does also use right elbow splint and bilateral hand splints and will continue with them as well. For her headaches, she is taking nortriptyline 50 mg p.o. nightly and that helps her. Her headaches are getting worse.   We will increase her nortriptyline to 75 mg p.o. nightly She does also have obstructive sleep apnea and that may be contributing to her migraines. She is noncompliant with her CPAP at this time. I gave her within 3 minutes counseling regarding smoking cessation. Follow-up in 3 months ICD-10-CM ICD-9-CM 1. Migraine without aura and without status migrainosus, not intractable G43.009 346.10   
2. Carpal tunnel syndrome of right wrist G56.01 354.0 3. Ulnar neuropathy at elbow, right G56.21 354.2 4. Tobacco use Z72.0 305.1 5. PETE (obstructive sleep apnea) G47.33 327.23 Thank you for allowing me to participate in the care of Ms. Sonia Fofana. Please feel free to contact me if you have any questions. Jose Henry MD 
Neurologist and Clinical Neurophysiologist 
 
CC: Deni Goode MD 
Fax: [phone] This note will not be viewable in [address] If you have questions, please do not hesitate to call me. I look forward to following Ms. Sonia Fofana along with you. Sincerely, Jose Henry MD

## 2018-09-27 NOTE — PATIENT INSTRUCTIONS

## 2018-09-27 NOTE — MR AVS SNAPSHOT
[address] [facility], 
[id number], [address] [facility] 
[phone] Patient: Terrance Cantrell MRN: [id number] OCI:1[birth year]/9432 Visit Information Date & Time Provider Department Dept. Phone Encounter #  
 9/27/2018 11:20 AM Oziel Barton MD [facility] [phone] [id number] Your Appointments 11/21/2018  2:30 PM  
Follow Up with Taylor Powell MD  
[facility] [address] [facility]) Appt Note: f/u             dm                  3 month  
 [address] [facility] [address] [id number] [address] [facility] Upcoming Health Maintenance Date Due  
 EYE EXAM RETINAL OR DILATED Q1 1/7/2016 PAP AKA CERVICAL CYTOLOGY 2/12/2017 Influenza Age 5 to Adult 8/1/2018 MICROALBUMIN Q1 1/25/2019 HEMOGLOBIN A1C Q6M 2/9/2019 LIPID PANEL Q1 4/4/2019 FOOT EXAM Q1 8/9/2019 DTaP/Tdap/Td series (2 - Td) 8/27/2022 Allergies as of 9/27/2018  Review Complete On: 8/9/2018 By: Taylor Powell MD  
  
 Severity Noted Reaction Type Reactions Latex  08/27/2014   Side Effect Itching Current Immunizations  Reviewed on 3/20/2017 Name Date Influenza Vaccine (Quad) PF 12/20/2016, 10/19/2015  8:35 AM  
 Influenza Vaccine (Quadrivalent) 1/5/2018 Influenza Vaccine PF 9/27/2013 Pneumococcal Polysaccharide (PPSV-23) 9/27/2013 TDAP Vaccine 8/27/2012 Not reviewed this visit Vitals BP Pulse Height(growth percentile) Weight(growth percentile) SpO2 BMI  
 138/90 (!) 110 5' 3\" (1.6 m) 350 lb (158.8 kg) 97% 62 kg/m2 OB Status Smoking Status Medically Induced Light Tobacco Smoker Vitals History BMI and BSA Data Body Mass Index Body Surface Area 62 kg/m 2 2.66 m 2 Preferred Pharmacy Pharmacy Name Phone [id number]- [place], [place] - [address] [phone] Your Updated Medication List  
  
   
This list is accurate as of 9/27/18 11:56 AM.  Always use your most recent med list.  
  
  
  
  
 acetaminophen 500 mg tablet Commonly known as:  [address] Take 1 Tab by mouth every six (6) hours as needed for Pain. * albuterol 1.25 mg/3 mL Nebu Commonly known as:  Caroline Salines Take 3 mL by inhalation every six (6) hours as needed for Wheezing. * albuterol 90 mcg/actuation inhaler Commonly known as:  PROVENTIL HFA, VENTOLIN HFA, PROAIR HFA Take 1 Puff by inhalation every four (4) hours as needed for Wheezing. ALPRAZolam 0.5 mg tablet Commonly known as:  Star Carp Take 1 Tab by mouth nightly as needed for Anxiety or Sleep. Max Daily Amount: 0.5 mg. Indications: anxiety, Generalized Anxiety Disorder, Panic Disorder Arm Brace Misc Right ulnar splint for ulnar neuropathy at the elbow. atorvastatin 40 mg tablet Commonly known as:  LIPITOR Take 1 Tab by mouth daily. Blood-Glucose Meter Misc Commonly known as:  Carmel Tucker IQ METER Test blood sugar once daily  
  
 butalbital-acetaminophen-caffeine -40 mg per tablet Commonly known as:  [employer] Take 0.5-1 Tabs by mouth every six (6) hours as needed for Headache. Max Daily Amount: 4 Tabs. HUNG 0.35 mg Tab Generic drug:  norethindrone TAKE 1 TABLET BY MOUTH EVERY DAY  
  
 gabapentin 300 mg capsule Commonly known as:  NEURONTIN  
TAKE 1 CAP BY MOUTH THREE (3) TIMES DAILY. glipiZIDE 5 mg tablet Commonly known as:  GLUCOTROL  
TAKE 2 TABS BY MOUTH TWO (2) TIMES A DAY. glucose blood VI test strips strip Commonly known as:  Carmel Tucker Check sugars two times daily. E11.9  
  
 insulin glargine 100 unit/mL (3 mL) Inpn Commonly known as:  BASAGLLEIGHANN ISAACSPEN U-100 INSULIN  
16 units daily  
  
 metFORMIN  mg tablet Commonly known as:  GLUCOPHAGE XR  
2 pills with breakfast and two pills with dinner  
  
 metoprolol tartrate 100 mg IR tablet Commonly known as:  LOPRESSOR  
TAKE 1 TAB BY MOUTH TWO (2) TIMES A DAY. nortriptyline 50 mg capsule Commonly known as:  PAMELOR  
TAKE ONE CAPSULE BY MOUTH EVERY NIGHT * omeprazole 40 mg capsule Commonly known as:  PRILOSEC  
TAKE 1 CAP BY MOUTH DAILY. * omeprazole 40 mg capsule Commonly known as:  PRILOSEC Take 1 Cap by mouth daily. * semaglutide 0.25 mg/0.2 mL (2 mg/1.5 mL) sub-q pen Commonly known as:  OZEMPIC  
0.5 mg by SubCUTAneous route every seven (7) days. * semaglutide 0.25 mg/0.2 mL (2 mg/1.5 mL) sub-q pen Commonly known as:  OZEMPIC  
0.5 mg by SubCUTAneous route every seven (7) days. spironolactone 25 mg tablet Commonly known as:  ALDACTONE Take 1 Tab by mouth two (2) times a day. Indications: Aldosteronism, hypertension ZyrTEC 10 mg tablet Generic drug:  cetirizine Take  by mouth daily. * Notice: This list has 6 medication(s) that are the same as other medications prescribed for you. Read the directions carefully, and ask your doctor or other care provider to review them with you. Patient Instructions Office Policies · Phone calls/patient messages: Please allow up to 24 hours for someone in the office to contact you about your call or message. Be mindful your provider may be out of the office or your message may require further review. We encourage you to use [employer] for your messages as this is a faster, more efficient way to communicate with our office · Medication Refills: 
Prescription medications require up to 48 business hours to process. We encourage you to use [employer] for your refills. For controlled medications: Please allow up to 72 business hours to process. Certain medications may require you to  a written prescription at our office. NO narcotic/controlled medications will be prescribed after 4pm Monday through Friday or on weekends · Form/Paperwork Completion: 
Please note there is a $25 fee for all paperwork completed by our providers. We ask that you allow 7-14 business days. Pre-payment is due prior to picking up/faxing the completed form. You may also download your forms to [employer] to have your doctor print off. Introducing [facility]! Dear Olivia Pack: Thank you for requesting a [employer] account. Our records indicate that you already have an active [employer] account. You can access your account anytime at [email or web address] Did you know that you can access your hospital and ER discharge instructions at any time in [employer]? You can also review all of your test results from your hospital stay or ER visit. Additional Information If you have questions, please visit the Frequently Asked Questions section of the [employer] website at [email or web address] Remember, [employer] is NOT to be used for urgent needs. For medical emergencies, dial 911. Now available from your iPhone and Android! Please provide this summary of care documentation to your next provider. Your primary care clinician is listed as Debbie Pleitez. If you have any questions after today's visit, please call [phone].

## 2018-09-30 ENCOUNTER — HOSPITAL ENCOUNTER (EMERGENCY)
Age: 28
Discharge: HOME | End: 2018-09-30
Attending: EMERGENCY MEDICINE | Admitting: EMERGENCY MEDICINE
Payer: COMMERCIAL

## 2018-09-30 VITALS
TEMPERATURE: 98.5 F | SYSTOLIC BLOOD PRESSURE: 145 MMHG | WEIGHT: 293 LBS | HEIGHT: 63 IN | HEART RATE: 88 BPM | OXYGEN SATURATION: 97 % | DIASTOLIC BLOOD PRESSURE: 88 MMHG | BODY MASS INDEX: 51.91 KG/M2 | RESPIRATION RATE: 20 BRPM

## 2018-09-30 DIAGNOSIS — G43.909 MIGRAINE WITHOUT STATUS MIGRAINOSUS, NOT INTRACTABLE, UNSPECIFIED MIGRAINE TYPE: Primary | ICD-10-CM

## 2018-09-30 LAB — HCG UR QL: NEGATIVE

## 2018-09-30 PROCEDURE — 96376 TX/PRO/DX INJ SAME DRUG ADON: CPT

## 2018-09-30 PROCEDURE — 74011250636 HC RX REV CODE- 250/636: Performed by: PHYSICIAN ASSISTANT

## 2018-09-30 PROCEDURE — 81025 URINE PREGNANCY TEST: CPT

## 2018-09-30 PROCEDURE — 74011250637 HC RX REV CODE- 250/637: Performed by: PHYSICIAN ASSISTANT

## 2018-09-30 PROCEDURE — 99284 EMERGENCY DEPT VISIT MOD MDM: CPT

## 2018-09-30 PROCEDURE — 96374 THER/PROPH/DIAG INJ IV PUSH: CPT

## 2018-09-30 PROCEDURE — 96361 HYDRATE IV INFUSION ADD-ON: CPT

## 2018-09-30 PROCEDURE — 96375 TX/PRO/DX INJ NEW DRUG ADDON: CPT

## 2018-09-30 RX ORDER — SODIUM CHLORIDE 0.9 % (FLUSH) 0.9 %
5-10 SYRINGE (ML) INJECTION AS NEEDED
Status: DISCONTINUED | OUTPATIENT
Start: 2018-09-30 | End: 2018-10-01 | Stop reason: HOSPADM

## 2018-09-30 RX ORDER — ONDANSETRON 2 MG/ML
4 INJECTION INTRAMUSCULAR; INTRAVENOUS
Status: COMPLETED | OUTPATIENT
Start: 2018-09-30 | End: 2018-09-30

## 2018-09-30 RX ORDER — KETOROLAC TROMETHAMINE 30 MG/ML
30 INJECTION, SOLUTION INTRAMUSCULAR; INTRAVENOUS
Status: COMPLETED | OUTPATIENT
Start: 2018-09-30 | End: 2018-09-30

## 2018-09-30 RX ORDER — DIPHENHYDRAMINE HYDROCHLORIDE 50 MG/ML
50 INJECTION, SOLUTION INTRAMUSCULAR; INTRAVENOUS
Status: COMPLETED | OUTPATIENT
Start: 2018-09-30 | End: 2018-09-30

## 2018-09-30 RX ORDER — SODIUM CHLORIDE 0.9 % (FLUSH) 0.9 %
5-10 SYRINGE (ML) INJECTION EVERY 8 HOURS
Status: DISCONTINUED | OUTPATIENT
Start: 2018-09-30 | End: 2018-10-01 | Stop reason: HOSPADM

## 2018-09-30 RX ORDER — BUTALBITAL, ACETAMINOPHEN AND CAFFEINE 50; 325; 40 MG/1; MG/1; MG/1
1 TABLET ORAL
Status: COMPLETED | OUTPATIENT
Start: 2018-09-30 | End: 2018-09-30

## 2018-09-30 RX ORDER — ONDANSETRON 4 MG/1
4 TABLET, FILM COATED ORAL
Qty: 30 TAB | Refills: 0 | Status: SHIPPED | OUTPATIENT
Start: 2018-09-30 | End: 2018-10-25 | Stop reason: ALTCHOICE

## 2018-09-30 RX ADMIN — ONDANSETRON 4 MG: 2 INJECTION, SOLUTION INTRAMUSCULAR; INTRAVENOUS at 19:13

## 2018-09-30 RX ADMIN — Medication 10 ML: at 19:13

## 2018-09-30 RX ADMIN — BUTALBITAL, ACETAMINOPHEN, AND CAFFEINE 1 TABLET: 50; 325; 40 TABLET ORAL at 19:05

## 2018-09-30 RX ADMIN — DIPHENHYDRAMINE HYDROCHLORIDE 50 MG: 50 INJECTION INTRAMUSCULAR; INTRAVENOUS at 19:13

## 2018-09-30 RX ADMIN — ONDANSETRON 4 MG: 2 INJECTION, SOLUTION INTRAMUSCULAR; INTRAVENOUS at 21:14

## 2018-09-30 RX ADMIN — SODIUM CHLORIDE 1000 ML: 900 INJECTION, SOLUTION INTRAVENOUS at 19:13

## 2018-09-30 RX ADMIN — KETOROLAC TROMETHAMINE 30 MG: 30 INJECTION INTRAMUSCULAR; INTRAVENOUS at 19:13

## 2018-09-30 NOTE — ED NOTES
Bedside and Verbal shift change report given to SWETHA Toro (oncoming nurse) by Jeri Bearden (offgoing nurse). Report included the following information SBAR and ED Summary.

## 2018-09-30 NOTE — LETTER
[facility] EMERGENCY DEPT 
[address] [place] [address] 
[phone] Work/School Note Date: 9/30/2018 To Whom It May concern: 
 
Jovan Harris was seen and treated today in the emergency room by the following provider(s): 
Attending Provider: Nirmala Hoff MD 
Physician Assistant: LINK Perez. Jovan Harris may return to work on 10/3/18. Sincerely, LINK Perez

## 2018-09-30 NOTE — ED NOTES
Emergency Department Nursing Plan of Care The Nursing Plan of Care is developed from the Nursing assessment and Emergency Department Attending provider initial evaluation. The plan of care may be reviewed in the ED Provider note. The Plan of Care was developed with the following considerations:  
Patient / Family readiness to learn indicated by:verbalized understanding Persons(s) to be included in education: patient Barriers to Learning/Limitations:No 
 
Signed Randi Strong 9/30/2018   6:54 PM 
 
See nursing assessment Patient is alert and oriented x 4 and in no acute distress at this time. Respirations are at a regular rate, depth, and pattern. Patient updated on plan of care and has no questions or concerns at this time.

## 2018-10-01 NOTE — ED PROVIDER NOTES
EMERGENCY DEPARTMENT HISTORY AND PHYSICAL EXAM 
 
Date: 9/30/2018 Patient Name: Tyrese Coreas History of Presenting Illness Chief Complaint Patient presents with  Migraine Pt c/o migraine HA and med's are not effective. + vomiting today related to HA History Provided By: Patient HPI: Tyrese Coreas is a 25 y.o. female with a PMH of diabetes, hypertension, obesity and migraines who presents with cc of left sided  migraine starting last night, early this am. Pt states this episode is the same as her previous migraines. Denies any fevers,chills, falls/trauma, abdominal pain, ams, weakness, thunderclap headache or worst headache of her life. Pt admits to hectic sleeping schedule and states she believes that triggered her migraine. Pt states she felt nauseated and threw up her meds so they were not able to work for her headache. All other ROS negative at this time Pt is in no acute distress and is speaking in full sentences PCP: Félix Baeza MD 
 
Current Facility-Administered Medications Medication Dose Route Frequency Provider Last Rate Last Dose  sodium chloride (NS) flush 5-10 mL  5-10 mL IntraVENous Q8H LINK Beauchamp      
 sodium chloride (NS) flush 5-10 mL  5-10 mL IntraVENous PRN Shelia Abbi, PA   10 mL at 09/30/18 1913  ondansetron (ZOFRAN) injection 4 mg  4 mg IntraVENous NOW LINK Beauchamp Current Outpatient Prescriptions Medication Sig Dispense Refill  ondansetron hcl (ZOFRAN) 4 mg tablet Take 1 Tab by mouth every eight (8) hours as needed for Nausea. 30 Tab 0  
 nortriptyline (PAMELOR) 75 mg capsule Take 1 Cap by mouth nightly. 30 Cap 2  
 glipiZIDE (GLUCOTROL) 5 mg tablet TAKE 2 TABS BY MOUTH TWO (2) TIMES A DAY. 360 Tab 1  
 metFORMIN ER (GLUCOPHAGE XR) 500 mg tablet 2 pills with breakfast and two pills with dinner 120 Tab 11  
 gabapentin (NEURONTIN) 300 mg capsule TAKE 1 CAP BY MOUTH THREE (3) TIMES DAILY.  90 Cap 3  
  insulin glargine (BASAGLAR KWIKPEN U-100 INSULIN) 100 unit/mL (3 mL) inpn 16 units daily 15 mL 3  
 omeprazole (PRILOSEC) 40 mg capsule TAKE 1 CAP BY MOUTH DAILY. 30 Cap 3  
 omeprazole (PRILOSEC) 40 mg capsule Take 1 Cap by mouth daily. 30 Cap 3  
 semaglutide (OZEMPIC) 0.25 mg/0.2 mL (2 mg/1.5 mL) sub-q pen 0.5 mg by SubCUTAneous route every seven (7) days. 1 Box 0  
 semaglutide (OZEMPIC) 0.25 mg/0.2 mL (2 mg/1.5 mL) sub-q pen 0.5 mg by SubCUTAneous route every seven (7) days. 1 Box 3  
 spironolactone (ALDACTONE) 25 mg tablet Take 1 Tab by mouth two (2) times a day. Indications: Aldosteronism, hypertension 60 Tab 6  
 metoprolol tartrate (LOPRESSOR) 100 mg IR tablet TAKE 1 TAB BY MOUTH TWO (2) TIMES A DAY. 60 Tab 6  
 atorvastatin (LIPITOR) 40 mg tablet Take 1 Tab by mouth daily. 30 Tab 6  ALPRAZolam (XANAX) 0.5 mg tablet Take 1 Tab by mouth nightly as needed for Anxiety or Sleep. Max Daily Amount: 0.5 mg. Indications: anxiety, Generalized Anxiety Disorder, Panic Disorder 30 Tab 1  
 HUNG 0.35 mg tab TAKE 1 TABLET BY MOUTH EVERY DAY  2  
 Arm Brace misc Right ulnar splint for ulnar neuropathy at the elbow. 1 Each 2  
 butalbital-acetaminophen-caffeine (FIORICET) -40 mg per tablet Take 0.5-1 Tabs by mouth every six (6) hours as needed for Headache. Max Daily Amount: 4 Tabs. 12 Tab 0  
 glucose blood VI test strips (ONETOUCH VERIO) strip Check sugars two times daily. E11.9 100 Strip 6  Blood-Glucose Meter (ONETOUCH VERIO IQ METER) misc Test blood sugar once daily 1 Each 3  
 cetirizine (ZYRTEC) 10 mg tablet Take  by mouth daily.  albuterol (PROVENTIL HFA, VENTOLIN HFA) 90 mcg/actuation inhaler Take 1 Puff by inhalation every four (4) hours as needed for Wheezing. 1 Inhaler 1  
 albuterol (ACCUNEB) 1.25 mg/3 mL nebulizer solution Take 3 mL by inhalation every six (6) hours as needed for Wheezing.  1 Bottle 6  
  acetaminophen (TYLENOL EXTRA STRENGTH) 500 mg tablet Take 1 Tab by mouth every six (6) hours as needed for Pain. 30 Tab 1 Past History Past Medical History: 
Past Medical History:  
Diagnosis Date  Asthma  [place] hump 1/7/2015  Carpal tunnel syndrome of right wrist 12/20/2016  Diabetes ([facility] [address]  ALIYA (generalized anxiety disorder) 4/4/2018  GERD (gastroesophageal reflux disease) 8/13/2014  
 HTN (hypertension) 7/25/2011  Hyperaldosteronism ([facility] [address] 10/19/2015  Hyperhidrosis 4/4/2018  Other ill-defined conditions(799.89) migraines  Palpitation 4/4/2018  Tachycardia 4/4/2018 Past Surgical History: 
Past Surgical History:  
Procedure Laterality Date  REMOVAL OF TONSILS,<13 Y/O Family History: 
Family History Problem Relation Age of Onset  Arthritis-rheumatoid Mother  Hypertension Mother  Diabetes Mother Type II  
 Psychiatric Disorder Father  Drug Abuse Father  Cancer Other   
  prostate  Hypertension Sister  Thyroid Disease Sister \"I think\"  Attention Deficit Hyperactivity Disorder Brother  Hypertension Sister Social History: 
Social History Substance Use Topics  Smoking status: Light Tobacco Smoker Packs/day: 0.50 Years: 5.00  Smokeless tobacco: Never Used  Alcohol use 0.0 oz/week 1 Standard drinks or equivalent per week Comment: socially, Monthly or less Allergies: Allergies Allergen Reactions  Latex Itching Review of Systems Review of Systems Constitutional: Negative. Negative for chills and fever. HENT: Negative. Eyes: Negative. Respiratory: Negative. Negative for shortness of breath. Cardiovascular: Negative. Negative for chest pain. Gastrointestinal: Positive for nausea and vomiting. Negative for abdominal pain and diarrhea. Endocrine: Negative. Genitourinary: Negative.   Negative for decreased urine volume, dysuria, urgency, vaginal bleeding and vaginal discharge. Musculoskeletal: Negative. Skin: Negative. Allergic/Immunologic: Negative. Neurological: Positive for headaches. Negative for dizziness, tremors, seizures, syncope, facial asymmetry, speech difficulty, weakness, light-headedness and numbness. Hematological: Negative. Psychiatric/Behavioral: Negative. All other systems reviewed and are negative. Physical Exam  
 
Vitals:  
 09/30/18 1845 09/30/18 1851 09/30/18 2053 BP: 145/88 Pulse: (!) 130 (!) 126 88 Resp: 20  20 Temp: 98.5 °F (36.9 °C) SpO2: 97% Weight: 155.6 kg (343 lb) Height: 5' 3\" (1.6 m) Physical Exam  
Constitutional: She is oriented to person, place, and time. She appears well-developed and well-nourished. No distress. HENT:  
Head: Normocephalic and atraumatic. Right Ear: External ear normal.  
Left Ear: External ear normal.  
Nose: Nose normal.  
Mouth/Throat: Oropharynx is clear and moist.  
Eyes: Conjunctivae and EOM are normal. Pupils are equal, round, and reactive to light. Neck: Normal range of motion. Neck supple. No tracheal deviation present. Cardiovascular: Normal rate, regular rhythm and normal heart sounds. Pulmonary/Chest: Effort normal and breath sounds normal. No respiratory distress. She has no wheezes. Abdominal: Soft. Bowel sounds are normal. She exhibits no distension. There is no tenderness. There is no rebound, no CVA tenderness, no tenderness at McBurney's point and negative Nolan's sign. Musculoskeletal: Normal range of motion. She exhibits no edema, tenderness or deformity. Lymphadenopathy:  
  She has no cervical adenopathy. Neurological: She is oriented to person, place, and time. She has normal strength and normal reflexes. She is unresponsive. She is not disoriented. She displays no atrophy, no tremor and normal reflexes. No cranial nerve deficit or sensory deficit. She exhibits normal muscle tone.  She displays a negative Romberg sign. She displays no seizure activity. Coordination and gait normal. GCS eye subscore is 4. GCS verbal subscore is 5. GCS motor subscore is 6. Skin: Skin is warm and dry. She is not diaphoretic. No pallor. Psychiatric: She has a normal mood and affect. Her behavior is normal. Judgment and thought content normal.  
Nursing note and vitals reviewed. Diagnostic Study Results Labs - Recent Results (from the past 12 hour(s)) HCG URINE, QL. - POC Collection Time: 09/30/18  7:03 PM  
Result Value Ref Range Pregnancy test,urine (POC) NEGATIVE  NEG Radiologic Studies - No orders to display CT Results  (Last 48 hours) None CXR Results  (Last 48 hours) None Medical Decision Making I am the first provider for this patient. I reviewed the vital signs, available nursing notes, past medical history, past surgical history, family history and social history. Vital Signs-Reviewed the patient's vital signs. Records Reviewed: Nursing Notes, Old Medical Records, Previous Radiology Studies and Previous Laboratory Studies ED Course:  
 
Disposition: 
discharge DISCHARGE NOTE:  
Care plan outlined and precautions discussed. Patient has no new complaints, changes, or physical findings. Results of visit were reviewed with the patient. All medications were reviewed with the patient; will d/c home. All of pt's questions and concerns were addressed. Patient was instructed and agrees to follow up with pcp, as well as to return to the ED upon further deterioration. Patient is ready to go home. Follow-up Information Follow up With Details Comments Contact Thomas Denis MD Schedule an appointment as soon as possible for a visit in 3 days If symptoms worsen [address] [place] [id number] [phone] Current Discharge Medication List  
  
START taking these medications Details ondansetron hcl (ZOFRAN) 4 mg tablet Take 1 Tab by mouth every eight (8) hours as needed for Nausea. Qty: 30 Tab, Refills: 0 Provider Notes (Medical Decision Making):  
pts migraine is not different from her previous migraines Will d/c with zofran for nausea No neuro deficits on exam  
Pt denies worst headache of her life Pain has decreased and is improving Worsening si/sxs discussed extensively Follow up with PCP or RTC if symptoms/signs worsen Side effects of medication discussed Education materials provided at discharge Pt verbalizes agreement with plan 
 
Procedures: 
Procedures Diagnosis Clinical Impression: 1. Migraine without status migrainosus, not intractable, unspecified migraine type

## 2018-10-01 NOTE — ED NOTES
Discharge instructions were given to the patient by LINK Curtis. The patient left the Emergency Department ambulatory, alert and oriented and in no acute distress with 1 prescription. The patient was encouraged to call or return to the ED for worsening issues or problems and was encouraged to schedule a follow up appointment for continuing care. The patient verbalized understanding of discharge instructions and prescriptions, all questions were answered. The patient has no further concerns at this time.

## 2018-10-01 NOTE — ED NOTES
Patient has been instructed that they have been given *Benadryl which contains opioids, benzodiazepines, or other sedating drugs. Patient is aware that they  will need to refrain from driving or operating heavy machinery after taking this medication. Patient also instructed that they need to avoid drinking alcohol and using other products containing opioids, benzodiazepines, or other sedating drugs. Patient verbalized understanding.

## 2018-10-01 NOTE — DISCHARGE INSTRUCTIONS
Migraine Headache: Care Instructions  Your Care Instructions  Migraines are painful, throbbing headaches that often start on one side of the head. They may cause nausea and vomiting and make you sensitive to light, sound, or smell. Without treatment, migraines can last from 4 hours to a few days. Medicines can help prevent migraines or stop them after they have started. Your doctor can help you find which ones work best for you. Follow-up care is a key part of your treatment and safety. Be sure to make and go to all appointments, and call your doctor if you are having problems. It's also a good idea to know your test results and keep a list of the medicines you take. How can you care for yourself at home? · Do not drive if you have taken a prescription pain medicine. · Rest in a quiet, dark room until your headache is gone. Close your eyes, and try to relax or go to sleep. Don't watch TV or read. · Put a cold, moist cloth or cold pack on the painful area for 10 to 20 minutes at a time. Put a thin cloth between the cold pack and your skin. · Use a warm, moist towel or a heating pad set on low to relax tight shoulder and neck muscles. · Have someone gently massage your neck and shoulders. · Take your medicines exactly as prescribed. Call your doctor if you think you are having a problem with your medicine. You will get more details on the specific medicines your doctor prescribes. · Be careful not to take pain medicine more often than the instructions allow. You could get worse or more frequent headaches when the medicine wears off. To prevent migraines  · Keep a headache diary so you can figure out what triggers your headaches. Avoiding triggers may help you prevent headaches. Record when each headache began, how long it lasted, and what the pain was like.  (Was it throbbing, aching, stabbing, or dull?) Write down any other symptoms you had with the headache, such as nausea, flashing lights or dark spots, or sensitivity to bright light or loud noise. Note if the headache occurred near your period. List anything that might have triggered the headache. Triggers may include certain foods (chocolate, cheese, wine) or odors, smoke, bright light, stress, or lack of sleep. · If your doctor has prescribed medicine for your migraines, take it as directed. You may have medicine that you take only when you get a migraine and medicine that you take all the time to help prevent migraines. ¨ If your doctor has prescribed medicine for when you get a headache, take it at the first sign of a migraine, unless your doctor has given you other instructions. ¨ If your doctor has prescribed medicine to prevent migraines, take it exactly as prescribed. Call your doctor if you think you are having a problem with your medicine. · Find healthy ways to deal with stress. Migraines are most common during or right after stressful times. Take time to relax before and after you do something that has caused a migraine in the past.  · Try to keep your muscles relaxed by keeping good posture. Check your jaw, face, neck, and shoulder muscles for tension. Try to relax them. When you sit at a desk, change positions often. And make sure to stretch for 30 seconds each hour. · Get plenty of sleep and exercise. · Eat meals on a regular schedule. Avoid foods and drinks that often trigger migraines. These include chocolate, alcohol (especially red wine and port), aspartame, monosodium glutamate (MSG), and some additives found in foods (such as hot dogs, quiles, cold cuts, aged cheeses, and pickled foods). · Limit caffeine. Don't drink too much coffee, tea, or soda. But don't quit caffeine suddenly. That can also give you migraines. · Do not smoke or allow others to smoke around you. If you need help quitting, talk to your doctor about stop-smoking programs and medicines. These can increase your chances of quitting for good.   · If you are taking birth control pills or hormone therapy, talk to your doctor about whether they are triggering your migraines. When should you call for help? Call 911 anytime you think you may need emergency care. For example, call if:    · You have signs of a stroke. These may include:  ¨ Sudden numbness, paralysis, or weakness in your face, arm, or leg, especially on only one side of your body. ¨ Sudden vision changes. ¨ Sudden trouble speaking. ¨ Sudden confusion or trouble understanding simple statements. ¨ Sudden problems with walking or balance. ¨ A sudden, severe headache that is different from past headaches.    Call your doctor now or seek immediate medical care if:    · You have new or worse nausea and vomiting.     · You have a new or higher fever.     · Your headache gets much worse.    Watch closely for changes in your health, and be sure to contact your doctor if:    · You are not getting better after 2 days (48 hours). Where can you learn more? Go to http://donavon-stacy.info/. Enter C954 in the search box to learn more about \"Migraine Headache: Care Instructions. \"  Current as of: October 9, 2017  Content Version: 11.7  © 3962-8649 [employer]. Care instructions adapted under license by [employer] (which disclaims liability or warranty for this information). If you have questions about a medical condition or this instruction, always ask your healthcare professional. [address] any warranty or liability for your use of this information.

## 2018-10-25 ENCOUNTER — OFFICE VISIT (OUTPATIENT)
Dept: FAMILY MEDICINE CLINIC | Age: 28
End: 2018-10-25

## 2018-10-25 VITALS
WEIGHT: 293 LBS | DIASTOLIC BLOOD PRESSURE: 65 MMHG | OXYGEN SATURATION: 97 % | HEIGHT: 63 IN | HEART RATE: 94 BPM | TEMPERATURE: 96.9 F | RESPIRATION RATE: 18 BRPM | BODY MASS INDEX: 51.91 KG/M2 | SYSTOLIC BLOOD PRESSURE: 126 MMHG

## 2018-10-25 DIAGNOSIS — Z23 ENCOUNTER FOR IMMUNIZATION: ICD-10-CM

## 2018-10-25 DIAGNOSIS — Z00.00 WELL WOMAN EXAM (NO GYNECOLOGICAL EXAM): ICD-10-CM

## 2018-10-25 RX ORDER — METHYLPREDNISOLONE 4 MG/1
TABLET ORAL
Refills: 0 | COMMUNITY
Start: 2018-10-08 | End: 2018-10-25 | Stop reason: ALTCHOICE

## 2018-10-25 RX ORDER — MELOXICAM 15 MG/1
TABLET ORAL
Refills: 1 | COMMUNITY
Start: 2018-10-08 | End: 2018-11-21

## 2018-10-25 RX ORDER — NORTRIPTYLINE HYDROCHLORIDE 50 MG/1
CAPSULE ORAL
Refills: 1 | COMMUNITY
Start: 2018-09-13 | End: 2018-11-21

## 2018-10-25 RX ORDER — FLUCONAZOLE 150 MG/1
TABLET ORAL
Refills: 0 | COMMUNITY
Start: 2018-09-15 | End: 2018-10-25 | Stop reason: ALTCHOICE

## 2018-10-25 NOTE — PROGRESS NOTES
Subjective:  
25 y.o. Pleasant female for Well Woman Check. Her gyne and breast care is done elsewhere by her Ob-Gyne physician. uptodate with all the vacs, has from few pages for school, with stable medical consition, denies c/o today, happy that starting a new process in her life,  
 
Current Outpatient Medications Medication Sig Dispense Refill  nortriptyline (PAMELOR) 75 mg capsule Take 1 Cap by mouth nightly. 30 Cap 2  
 glipiZIDE (GLUCOTROL) 5 mg tablet TAKE 2 TABS BY MOUTH TWO (2) TIMES A DAY. 360 Tab 1  
 metFORMIN ER (GLUCOPHAGE XR) 500 mg tablet 2 pills with breakfast and two pills with dinner 120 Tab 11  
 gabapentin (NEURONTIN) 300 mg capsule TAKE 1 CAP BY MOUTH THREE (3) TIMES DAILY. 90 Cap 3  
 insulin glargine (BASAGLAR KWIKPEN U-100 INSULIN) 100 unit/mL (3 mL) inpn 16 units daily 15 mL 3  
 omeprazole (PRILOSEC) 40 mg capsule TAKE 1 CAP BY MOUTH DAILY. 30 Cap 3  
 semaglutide (OZEMPIC) 0.25 mg/0.2 mL (2 mg/1.5 mL) sub-q pen 0.5 mg by SubCUTAneous route every seven (7) days. 1 Box 0  
 semaglutide (OZEMPIC) 0.25 mg/0.2 mL (2 mg/1.5 mL) sub-q pen 0.5 mg by SubCUTAneous route every seven (7) days. 1 Box 3  
 spironolactone (ALDACTONE) 25 mg tablet Take 1 Tab by mouth two (2) times a day. Indications: Aldosteronism, hypertension 60 Tab 6  
 metoprolol tartrate (LOPRESSOR) 100 mg IR tablet TAKE 1 TAB BY MOUTH TWO (2) TIMES A DAY. 60 Tab 6  
 atorvastatin (LIPITOR) 40 mg tablet Take 1 Tab by mouth daily. 30 Tab 6  ALPRAZolam (XANAX) 0.5 mg tablet Take 1 Tab by mouth nightly as needed for Anxiety or Sleep. Max Daily Amount: 0.5 mg. Indications: anxiety, Generalized Anxiety Disorder, Panic Disorder 30 Tab 1  
 HUNG 0.35 mg tab TAKE 1 TABLET BY MOUTH EVERY DAY  2  
 Arm Brace misc Right ulnar splint for ulnar neuropathy at the elbow.  1 Each 2  
 butalbital-acetaminophen-caffeine (FIORICET) -40 mg per tablet Take 0.5-1 Tabs by mouth every six (6) hours as needed for Headache. Max Daily Amount: 4 Tabs. 12 Tab 0  
 glucose blood VI test strips (ONETOUCH VERIO) strip Check sugars two times daily. E11.9 100 Strip 6  Blood-Glucose Meter (ONETOUCH VERIO IQ METER) misc Test blood sugar once daily 1 Each 3  
 cetirizine (ZYRTEC) 10 mg tablet Take  by mouth daily.  albuterol (PROVENTIL HFA, VENTOLIN HFA) 90 mcg/actuation inhaler Take 1 Puff by inhalation every four (4) hours as needed for Wheezing. (Patient taking differently: Take 1 Puff by inhalation every four (4) hours as needed for Wheezing (PRN). ) 1 Inhaler 1  
 albuterol (ACCUNEB) 1.25 mg/3 mL nebulizer solution Take 3 mL by inhalation every six (6) hours as needed for Wheezing. 1 Bottle 6  
 acetaminophen (TYLENOL EXTRA STRENGTH) 500 mg tablet Take 1 Tab by mouth every six (6) hours as needed for Pain. 30 Tab 1  
 meloxicam (MOBIC) 15 mg tablet TAKE 1 TABLET BY MOUTH DAILY. Patient stated will start 10/26/2018  1  
 nortriptyline (PAMELOR) 50 mg capsule TAKE ONE CAPSULE BY MOUTH EVERY NIGHT  1 Allergies Allergen Reactions  Latex Itching Past Medical History:  
Diagnosis Date  Asthma  [place] hump 1/7/2015  Carpal tunnel syndrome of right wrist 12/20/2016  Diabetes ([facility] [address]  ALIYA (generalized anxiety disorder) 4/4/2018  GERD (gastroesophageal reflux disease) 8/13/2014  
 HTN (hypertension) 7/25/2011  Hyperaldosteronism ([address] 10/19/2015  Hyperhidrosis 4/4/2018  Other ill-defined conditions(799.89) migraines  Palpitation 4/4/2018  Tachycardia 4/4/2018 Past Surgical History:  
Procedure Laterality Date  REMOVAL OF TONSILS,<11 Y/O Family History Problem Relation Age of Onset  Arthritis-rheumatoid Mother  Hypertension Mother  Diabetes Mother Type II  
 Psychiatric Disorder Father  Drug Abuse Father  Cancer Other   
     prostate  Hypertension Sister  Thyroid Disease Sister \"I think\"  Attention Deficit Hyperactivity Disorder Brother  Hypertension Sister Social History Tobacco Use  Smoking status: Light Tobacco Smoker Packs/day: 0.50 Years: 5.00 Pack years: 2.50  Smokeless tobacco: Never Used Substance Use Topics  Alcohol use: Yes Alcohol/week: 0.0 oz Types: 1 Standard drinks or equivalent per week Comment: socially, Monthly or less Lab Results Component Value Date/Time WBC 10.3 03/20/2017 04:08 PM  
 HGB 12.2 03/20/2017 04:08 PM  
 HCT 39.7 03/20/2017 04:08 PM  
 PLATELET 268 85/94/3878 04:08 PM  
 MCV 87 03/20/2017 04:08 PM  
 
Lab Results Component Value Date/Time GFR est non- 03/20/2017 04:08 PM  
 GFR est  03/20/2017 04:08 PM  
 Creatinine 0.60 03/20/2017 04:08 PM  
 BUN 10 03/20/2017 04:08 PM  
 Sodium 135 03/20/2017 04:08 PM  
 Potassium 4.7 03/20/2017 04:08 PM  
 Chloride 95 (L) 03/20/2017 04:08 PM  
 CO2 24 03/20/2017 04:08 PM  
  
 
ROS: Feeling generally well. No TIA's or unusual headaches, no dysphagia. No prolonged cough. No dyspnea or chest pain on exertion. No abdominal pain, change in bowel habits, black or bloody stools. No urinary tract symptoms. No new or unusual musculoskeletal symptoms. Specific concerns today:   
 
Objective: The patient appears well, alert, oriented x 3, in no distress. Visit Vitals /65 (BP 1 Location: Left arm, BP Patient Position: At rest) Pulse 94 Temp 96.9 °F (36.1 °C) (Oral) Resp 18 Ht 5' 3\" (1.6 m) Wt 349 lb (158.3 kg) SpO2 97% BMI 61.82 kg/m² ENT normal.  Neck supple. No adenopathy or thyromegaly. BAYRON. Lungs are clear, good air entry, no wheezes, rhonchi or rales. S1 and S2 normal, no murmurs, regular rate and rhythm. Abdomen soft without tenderness, guarding, mass or organomegaly. Extremities show no edema, normal peripheral pulses. Neurological is normal, no focal findings. Breast and Pelvic exams are deferred. Assessment/Plan:  
Well Woman Diagnoses and all orders for this visit: 
 
1. Well woman exam (no gynecological exam) Comments: 
[V70.0] Orders: 
-     HEPATITIS B VACCINE, ADULT DOSAGE, IM 2. Encounter for immunization 
-     HEPATITIS B VACCINE, ADULT DOSAGE, IM 
 
lose weight, increase physical activity, follow low fat diet, follow low salt diet, routine labs ordered, forms completed keep a copy in the chart

## 2018-10-25 NOTE — PATIENT INSTRUCTIONS
Intrauterine Device (IUD) Insertion: Care Instructions Your Care Instructions The intrauterine device (IUD) is a very effective method of birth control. It is a small, plastic, T-shaped device that contains copper or hormones. The doctor inserts the IUD into your uterus. A plastic string tied to the end of the IUD hangs down through the cervix into the vagina. There are two types of IUDs. The copper IUD is effective for up to 10 years. The hormonal IUD is effective for either 3 years or 5 years, depending on which IUD is used. The hormonal IUD also reduces menstrual bleeding and cramping. Both types of IUD damage or kill the man's sperm. This means that the woman's egg does not join with the sperm. IUDs also change the lining of the uterus so that the egg does not lodge there. The IUD is most likely to work well for women who have been pregnant before. Some women who have never been pregnant have more trouble keeping the IUD in the uterus. They also may have more pain and cramping after insertion. Follow-up care is a key part of your treatment and safety. Be sure to make and go to all appointments, and call your doctor if you are having problems. It's also a good idea to know your test results and keep a list of the medicines you take. How can you care for yourself at home? · You may experience some mild cramping and light bleeding (spotting) for 1 or 2 days. Use a hot water bottle or a heating pad set on low on your belly for pain. · Take an over-the-counter pain medicine, such as acetaminophen (Tylenol), ibuprofen (Advil, Motrin), and naproxen (Aleve) if needed. Read and follow all instructions on the label. · Do not take two or more pain medicines at the same time unless the doctor told you to. Many pain medicines have acetaminophen, which is Tylenol. Too much acetaminophen (Tylenol) can be harmful. · Check the string of your IUD after every period.  To do this, insert a finger into your vagina and feel for the cervix, which is at the top of the vagina and feels harder than the rest of your vagina. You should be able to feel the thin, plastic string coming out of the opening of your cervix. If you cannot feel the string, use another form of birth control and make an appointment with your doctor to have the string checked. · If the IUD comes out, save it and call your doctor. Be sure to use another form of birth control while the IUD is out. · Use latex condoms to protect against sexually transmitted infections (STIs), such as gonorrhea and chlamydia. An IUD does not protect you from STIs. Having one sex partner (who does not have STIs and does not have sex with anyone else) is a good way to avoid STIs. When should you call for help? Call 911 anytime you think you may need emergency care. For example, call if: 
  · You passed out (lost consciousness).  
  · You have sudden, severe pain in your belly or pelvis.  
 Call your doctor now or seek immediate medical care if: 
  · You have new belly or pelvic pain.  
  · You have severe vaginal bleeding. This means that you are soaking through your usual pads or tampons each hour for 2 or more hours.  
  · You are dizzy or lightheaded, or you feel like you may faint.  
  · You have a fever and pelvic pain or vaginal discharge.  
  · You have pelvic pain that is getting worse.  
 Watch closely for changes in your health, and be sure to contact your doctor if: 
  · You cannot feel the string, or the IUD comes out.  
  · You feel sick to your stomach, or you vomit.  
  · You think you may be pregnant. Where can you learn more? Go to http://donavon-stacy.info/. Enter P593 in the search box to learn more about \"Intrauterine Device (IUD) Insertion: Care Instructions. \" Current as of: November 21, 2017 Content Version: 11.8 © 6179-9663 Healthwise, Incorporated.  Care instructions adapted under license by [address] (which disclaims liability or warranty for this information). If you have questions about a medical condition or this instruction, always ask your healthcare professional. [address] any warranty or liability for your use of this information. Learning About Cervical Cancer Screening What is a cervical cancer screening test? 
 
Cervical cancer screening tests check for cancer of the cervix. The cervix is the lower part of the uterus that opens into the vagina. The test can help your doctor find early changes in the cells that could lead to cancer. Two tests can be used to screen for cervical cancer. They may be used alone or together. A Pap test.  
This test looks for changes in the cells of the cervix. Abnormal cells may be a sign of cancer. A human papillomavirus (HPV) test.  
This test looks for the HPV virus. Some types of HPV can cause cancer. During either test, the doctor or nurse will insert a tool called a speculum into your vagina. The speculum gently spreads apart the vaginal walls. It allows your doctor to see inside the vagina and the cervix. He or she uses a small swab or brush to collect cell samples from your cervix. Try to schedule the test when you're not having your period. To get ready for the test, avoid douches, tampons, vaginal medicines, sprays, and powders for at least a day before you have the test. 
When should you have a screening test? 
These guidelines apply to women who are at average risk of cervical cancer. If you don't know your risk, talk with your doctor. Women 21 to 34 
· You can start having a Pap test at age 24. It is done every 3 years. · You can start having the primary HPV test at age 22. It is done every 3 years. Women 30 to 59 · If you had both a Pap test and an HPV test last time and both were normal, you can have a Pap plus an HPV test every 5 years. · If you had only a Pap test last time, as long as your results are normal, you can have a Pap test every 3 years. · If you had only an HPV test last time, as long as your results are normal, you can have an HPV test every 3 years. Women 72 and older · If you are age 72 or older, talk with your doctor about what's right for you. Women ages 72 and older may no longer need to be screened for cervical cancer. When to stop having screening tests depends on your medical history, your overall health, and your risk of cervical cell changes or cervical cancer. What happens after the test? 
The sample of cells taken during your test will be sent to a lab so that an expert can look at the cells. It usually takes a week or two to get the results back. Pap tests · A normal result means that the test didn't find any abnormal cells in the sample. · An abnormal result can mean many things. Most of these aren't cancer. The results of your test may be abnormal because: ? You have an infection of the vagina or cervix, such as a yeast infection. ? You have an IUD (intrauterine device for birth control). ? You have low estrogen levels after menopause that are causing the cells to change. ? You have cell changes that may be a sign of precancer or cancer. The results are ranked based on how serious the changes might be. HPV tests · A normal result means that the test didn't find any high-risk HPV in the sample. · An abnormal HPV test doesn't mean that you have cervical cancer. It may mean that you are infected with one or more high-risk types of HPV. This increases your chance of having cell changes that may be a sign of precancer or cancer. Follow-up care is a key part of your treatment and safety. Be sure to make and go to all appointments, and call your doctor if you are having problems. It's also a good idea to know your test results and keep a list of the medicines you take. Where can you learn more? Go to http://donavon-stacy.info/. Enter P919 in the search box to learn more about \"Learning About Cervical Cancer Screening. \" Current as of: March 28, 2018 Content Version: 11.8 © 1065-8710 Healthwise, Incorporated. Care instructions adapted under license by [employer] (which disclaims liability or warranty for this information). If you have questions about a medical condition or this instruction, always ask your healthcare professional. [address] any warranty or liability for your use of this information.

## 2018-10-25 NOTE — PROGRESS NOTES
Name and  verified Chief Complaint Patient presents with  [facility] reviewed-discussed with patient. Patient stated received Flu Vaccine at workplace. 1. Have you been to the ER, urgent care clinic since your last visit? Hospitalized since your last visit? No 
 
2. Have you seen or consulted any other health care providers outside of the [address] [place] [address] since your last visit? Include any pap smears or colon screening.  No

## 2018-10-25 NOTE — PROGRESS NOTES
After obtaining consent, and per orders of , hep B vaccine  given to  Left deltoid IM . Patient instructed to remain in clinic for 15 minutes afterwards, and to report any adverse reaction to me immediately. Patient did not have any adverse reactions during this office visit

## 2018-11-21 ENCOUNTER — OFFICE VISIT (OUTPATIENT)
Dept: ENDOCRINOLOGY | Age: 28
End: 2018-11-21

## 2018-11-21 VITALS
HEIGHT: 63 IN | SYSTOLIC BLOOD PRESSURE: 130 MMHG | WEIGHT: 293 LBS | BODY MASS INDEX: 51.91 KG/M2 | HEART RATE: 120 BPM | DIASTOLIC BLOOD PRESSURE: 80 MMHG

## 2018-11-21 DIAGNOSIS — E11.9 TYPE 2 DIABETES MELLITUS WITHOUT COMPLICATION, WITH LONG-TERM CURRENT USE OF INSULIN (HCC): Primary | ICD-10-CM

## 2018-11-21 DIAGNOSIS — Z79.4 TYPE 2 DIABETES MELLITUS WITHOUT COMPLICATION, WITH LONG-TERM CURRENT USE OF INSULIN (HCC): Primary | ICD-10-CM

## 2018-11-21 DIAGNOSIS — I10 ESSENTIAL HYPERTENSION: ICD-10-CM

## 2018-11-21 DIAGNOSIS — E66.01 CLASS 3 SEVERE OBESITY DUE TO EXCESS CALORIES WITH SERIOUS COMORBIDITY AND BODY MASS INDEX (BMI) OF 60.0 TO 69.9 IN ADULT (HCC): ICD-10-CM

## 2018-11-21 LAB — HBA1C MFR BLD HPLC: 11.3 %

## 2018-11-21 NOTE — PROGRESS NOTES
Chief Complaint Patient presents with  Diabetes  
  pcp and pharmacy verified. Eye exam due. Records since last visit reviewed. History of Present Illness: Nat Rodriguez is a 25 y.o. female here for follow up of diabetes. She was diagnosed with diabetes \"when I was in high school\". Because pt has features concerning for cushing's her PCP checked a 24 hour urine cortisol which was not elevated (42). She also had a TSH drawn which was 2.10. In March 2016 we tested pt for hyperaldosteronism, which was negative. I tested her for evidence of PCOS, her Testosterone was 14, with DHEA-S 109, her 17-OH Prog was not elevated and an overnight dexamethasone suppression test did suppress her ACTH and cortisol. At our last visit in August 2018 her A1C was 12.1%, she reported issues of binge eating and frequent snacking. We agreed to start pt on Ozempic to help with BGs, binge eating and weight loss. Her A1C today was 11.3%. She notes her insurance \"was charging her a double co-pay\" for the Ozempic ($175) and she could not afford it so she never took it. Pt notes \"I am almost finished with school, I am doing my clinicals right now\". She graduates on December 7th. She notes that when she was on the sample Ozempic she noted she was not eating as much. Pt is currently taking Pt is currently taking Basaglar 16 units, Glipizide 10mg BID and Metformin 1000mg BID. She does not check her blood sugars. Pt wakes up around 9PM. She does not eat anything until Midnight. Last night she had nachos with cheese and a bottle of water. Her second meal of the day it around 9AM this AM she had two doughnut holes. Pt notes she is not eating full meals she is snacking. Her third meal of the day is around [place], today she had BBQ chicken leg, mac and cheese, broccoli and water. 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 If she works at the hospital, then she will eat at work, if she is not working at the hospital, will eat after she gets out of class around 11AM. She takes her Basaglar at night before she goes to work. She takes her Glipizide and Metformin around 730AM and 9PM. She denies issues of hypoglycemia. She has stopped the [employer]" since she started her clinicals. She was perscibed contrave, but it was too expensive so she never took it. She tried phentermine for a month and she notes it did not help and she stopped taking it. No history of vascular disease. No history of neuropathy, or nephropathy. Last eye exam was April 2017, no retinopathy. She had a steroid injection about two months ago for plantar faucitis. She was on a prednisone dose pack at the same time. No recent infections, sick exposures, injuries. She denies issues of CP, SOB, N/V abdominal pain Current Outpatient Medications Medication Sig  
 flash glucose sensor (FREESTYLE RUBEN 14 DAY SENSOR) kit Change sensor every 14 days  flash glucose scanning reader (FREESTYLE RUBEN 14 DAY READER) misc Dispense one 14 day reader  nortriptyline (PAMELOR) 75 mg capsule Take 1 Cap by mouth nightly.  glipiZIDE (GLUCOTROL) 5 mg tablet TAKE 2 TABS BY MOUTH TWO (2) TIMES A DAY.  metFORMIN ER (GLUCOPHAGE XR) 500 mg tablet 2 pills with breakfast and two pills with dinner  gabapentin (NEURONTIN) 300 mg capsule TAKE 1 CAP BY MOUTH THREE (3) TIMES DAILY.  insulin glargine (BASAGLAR KWIKPEN U-100 INSULIN) 100 unit/mL (3 mL) inpn 16 units daily  omeprazole (PRILOSEC) 40 mg capsule TAKE 1 CAP BY MOUTH DAILY.  spironolactone (ALDACTONE) 25 mg tablet Take 1 Tab by mouth two (2) times a day. Indications: Aldosteronism, hypertension  metoprolol tartrate (LOPRESSOR) 100 mg IR tablet TAKE 1 TAB BY MOUTH TWO (2) TIMES A DAY.  atorvastatin (LIPITOR) 40 mg tablet Take 1 Tab by mouth daily.  ALPRAZolam (XANAX) 0.5 mg tablet Take 1 Tab by mouth nightly as needed for Anxiety or Sleep. Max Daily Amount: 0.5 mg. Indications: anxiety, Generalized Anxiety Disorder, Panic Disorder  HUNG 0.35 mg tab TAKE 1 TABLET BY MOUTH EVERY DAY  butalbital-acetaminophen-caffeine (FIORICET) -40 mg per tablet Take 0.5-1 Tabs by mouth every six (6) hours as needed for Headache. Max Daily Amount: 4 Tabs.  glucose blood VI test strips (ONETOUCH VERIO) strip Check sugars two times daily. E11.9  Blood-Glucose Meter (ONETOUCH VERIO IQ METER) misc Test blood sugar once daily  cetirizine (ZYRTEC) 10 mg tablet Take  by mouth daily.  albuterol (PROVENTIL HFA, VENTOLIN HFA) 90 mcg/actuation inhaler Take 1 Puff by inhalation every four (4) hours as needed for Wheezing. (Patient taking differently: Take 1 Puff by inhalation every four (4) hours as needed for Wheezing (PRN). )  
 albuterol (ACCUNEB) 1.25 mg/3 mL nebulizer solution Take 3 mL by inhalation every six (6) hours as needed for Wheezing.  acetaminophen (TYLENOL EXTRA STRENGTH) 500 mg tablet Take 1 Tab by mouth every six (6) hours as needed for Pain. No current facility-administered medications for this visit. Allergies Allergen Reactions  Latex Itching Review of Systems: - Eyes: no blurry vision or double vision - Cardiovascular: no chest pain - Respiratory: no shortness of breath - Musculoskeletal: no myalgias - Neurological: no numbness/tingling in extremities Physical Examination: 
Blood pressure 130/80, pulse (!) 120, height 5' 3\" (1.6 m), weight (!) 353 lb (160.1 kg). - General: pleasant, no distress, good eye contact  
- Neck: no carotid bruits - Cardiovascular: regular, normal rate, nl s1 and s2, no m/r/g, 2+ DP pulses - Respiratory: clear bilaterally - Integumentary: 1+ edema, no foot ulcers, + Acanthosis - Psychiatric: normal mood and affect Diabetic foot exam:  
 
Left Foot: 
 Visual Exam: normal  
 Pulse DP: 2+ (normal) Filament test: normal sensation Vibratory sensation: normal 
   
Right Foot: 
 Visual Exam: normal  
 Pulse DP: 2+ (normal) Filament test: normal sensation Vibratory sensation: normal 
 
 
 
Data Reviewed:  
Her A1C today was 11.3%. Assessment/Plan:  
1) DM > Her A1C today was 11.3%. Will increase her Basaglar to 24 units daily and continue the Metformin and Glipizide BID. Will give pt Rx for the LIN TV. Pt to send me BG readings in 2-3 weeks. Once pt has graduated and her financial situation has improved she may be able to afford the Ozempic in the future. 2) Obesity > Pt was not able to afford Contrave or Ozempic and Phentermine did not help. She did lose about 15 pounds with an intense physical \"boot camp\". She lost from approx. 365 to 350. 
 
3) HTN > BP at goal on her current regimen RTC 3 months Pt voices understanding and agreement with the plan. Patient Instructions 1) Increase your Basaglar from 16 units to 24 units. 2) Continue the Metformin and Glipizide twice per day. Follow-up Disposition: 
Return in about 3 months (around 2/21/2019). Copy sent to: 
Mansi Almanza

## 2018-11-21 NOTE — PATIENT INSTRUCTIONS
1) Increase your Basaglar from 16 units to 24 units. 2) Continue the Metformin and Glipizide twice per day.

## 2018-11-22 LAB
ALBUMIN/CREAT UR: 36.1 MG/G CREAT (ref 0–30)
CREAT UR-MCNC: 189.9 MG/DL
MICROALBUMIN UR-MCNC: 68.6 UG/ML

## 2019-01-13 ENCOUNTER — OFFICE VISIT (OUTPATIENT)
Dept: URGENT CARE | Age: 29
End: 2019-01-13

## 2019-01-13 VITALS
RESPIRATION RATE: 18 BRPM | OXYGEN SATURATION: 98 % | BODY MASS INDEX: 51.91 KG/M2 | TEMPERATURE: 97.1 F | SYSTOLIC BLOOD PRESSURE: 136 MMHG | WEIGHT: 293 LBS | HEIGHT: 63 IN | DIASTOLIC BLOOD PRESSURE: 78 MMHG | HEART RATE: 100 BPM

## 2019-01-13 DIAGNOSIS — J06.9 VIRAL UPPER RESPIRATORY TRACT INFECTION: Primary | ICD-10-CM

## 2019-01-13 PROBLEM — E11.21 TYPE 2 DIABETES WITH NEPHROPATHY (HCC): Status: ACTIVE | Noted: 2019-01-13

## 2019-01-13 RX ORDER — FLUTICASONE PROPIONATE 50 MCG
2 SPRAY, SUSPENSION (ML) NASAL DAILY
Qty: 1 BOTTLE | Refills: 0 | Status: SHIPPED | OUTPATIENT
Start: 2019-01-13 | End: 2020-08-12 | Stop reason: ALTCHOICE

## 2019-01-13 NOTE — PATIENT INSTRUCTIONS

## 2019-01-13 NOTE — PROGRESS NOTES
Cold Symptoms   The history is provided by the patient. This is a new problem. The current episode started 2 days ago. The problem occurs every few minutes. The problem has not changed since onset. The cough is non-productive. There has been no fever. Associated symptoms include rhinorrhea and sore throat. Pertinent negatives include no chills, no shortness of breath and no wheezing. She has tried nothing for the symptoms. She is not a smoker. Her past medical history is significant for bronchitis. Her past medical history does not include asthma.         Past Medical History:   Diagnosis Date    Asthma     [place] hump 1/7/2015    Carpal tunnel syndrome of right wrist 12/20/2016    Diabetes [facility] [address]     ALIYA (generalized anxiety disorder) 4/4/2018    GERD (gastroesophageal reflux disease) 8/13/2014    HTN (hypertension) 7/25/2011    Hyperaldosteronism ([facility] [address] 10/19/2015    Hyperhidrosis 4/4/2018    Other ill-defined conditions(799.89)     migraines    Palpitation 4/4/2018    Tachycardia 4/4/2018        Past Surgical History:   Procedure Laterality Date    REMOVAL OF TONSILS,<12 [de-identified]           Family History   Problem Relation Age of Onset   [address] [facility] Arthritis-rheumatoid Mother     Hypertension Mother     Diabetes Mother         Type II    Psychiatric Disorder Father     Drug Abuse Father     Cancer Other         prostate    Hypertension Sister     Thyroid Disease Sister         \"I think\"    Attention Deficit Hyperactivity Disorder Brother     Hypertension Sister         Social History     Socioeconomic History    Marital status: SINGLE     Spouse name: Not on file    Number of children: Not on file    Years of education: Not on file    Highest education level: Not on file   Social Needs    Financial resource strain: Not on file    Food insecurity - worry: Not on file    Food insecurity - inability: Not on file   [employer] needs - medical: Not on file   [employer] needs - non-medical: Not on file   Occupational History    Occupation: customer ser and student     Comment: [employer]   Tobacco Use    Smoking status: Light Tobacco Smoker     Packs/day: 0.50     Years: 5.00     Pack years: 2.50    Smokeless tobacco: Never Used   Substance and Sexual Activity    Alcohol use: Yes     Alcohol/week: 0.0 oz     Types: 1 Standard drinks or equivalent per week     Comment: socially, Monthly or less    Drug use: No    Sexual activity: Not Currently     Partners: Male     Birth control/protection: Condom   Other Topics Concern    Not on file   Social History Narrative    Not on file                ALLERGIES: Latex    Review of Systems   Constitutional: Negative for chills. HENT: Positive for congestion, rhinorrhea and sore throat. Respiratory: Negative for shortness of breath and wheezing. All other systems reviewed and are negative. Vitals:    19 1133   BP: 136/78   Pulse: 100   Resp: 18   Temp: 97.1 °F (36.2 °C)   SpO2: 98%   Weight: 350 lb (158.8 kg)   Height: 5' 3\" (1.6 m)       Physical Exam   Constitutional: No distress. HENT:   Right Ear: Tympanic membrane and ear canal normal.   Left Ear: Tympanic membrane and ear canal normal.   Nose: Nose normal.   Mouth/Throat: No oropharyngeal exudate, posterior oropharyngeal edema or posterior oropharyngeal erythema. Eyes: Conjunctivae are normal. Right eye exhibits no discharge. Left eye exhibits no discharge. Neck: Neck supple. Pulmonary/Chest: Effort normal and breath sounds normal. No respiratory distress. She has no wheezes. She has no rales. Lymphadenopathy:     She has no cervical adenopathy. Skin: No rash noted. Nursing note and vitals reviewed.       MDM    Procedures    ICD-10-CM ICD-9-CM    1. Viral upper respiratory tract infection J06.9 465.9      Use mucinex fast max and zyrtec daily  Saline sinus rinse  Medications Ordered Today   Medications    fluticasone (FLONASE) 50 mcg/actuation nasal spray     Si Sprays by Both Nostrils route daily. Dispense:  1 Bottle     Refill:  0     No results found for any visits on 01/13/19. The patients condition was discussed with the patient and they understand. The patient is to follow up with primary care doctor. If signs and symptoms become worse the pt is to go to the ER. The patient is to take medications as prescribed.

## 2019-02-01 NOTE — TELEPHONE ENCOUNTER
Last Visit: 10/25  Next Appt: 3/12  Previous Refill Encounter: 4/25-30+6    Requested Prescriptions     Pending Prescriptions Disp Refills    atorvastatin (LIPITOR) 40 mg tablet 90 Tab 2     Sig: Take 1 Tab by mouth daily.

## 2019-02-04 RX ORDER — ATORVASTATIN CALCIUM 40 MG/1
40 TABLET, FILM COATED ORAL DAILY
Qty: 90 TAB | Refills: 2 | Status: SHIPPED | OUTPATIENT
Start: 2019-02-04 | End: 2019-11-01 | Stop reason: SDUPTHER

## 2019-02-28 DIAGNOSIS — E26.9 HYPERALDOSTERONISM (HCC): ICD-10-CM

## 2019-02-28 DIAGNOSIS — F41.1 GAD (GENERALIZED ANXIETY DISORDER): ICD-10-CM

## 2019-02-28 DIAGNOSIS — R00.2 PALPITATION: ICD-10-CM

## 2019-02-28 DIAGNOSIS — R00.0 TACHYCARDIA: ICD-10-CM

## 2019-02-28 DIAGNOSIS — I10 ESSENTIAL HYPERTENSION: ICD-10-CM

## 2019-02-28 DIAGNOSIS — R61 HYPERHIDROSIS: ICD-10-CM

## 2019-03-01 NOTE — TELEPHONE ENCOUNTER
Last Visit: 10/25/18  Next Appt: 3/12/19  Previous Refill Encounter: Xanax-4/4/18-30+1  Lopressor-5/29-60+6  Aldactone-5/31-60+6      Requested Prescriptions     Pending Prescriptions Disp Refills    ALPRAZolam (XANAX) 0.5 mg tablet 30 Tab 1     Sig: Take 1 Tab by mouth nightly as needed for Anxiety or Sleep. Max Daily Amount: 0.5 mg.    metoprolol tartrate (LOPRESSOR) 100 mg IR tablet 60 Tab 6    spironolactone (ALDACTONE) 25 mg tablet 60 Tab 6     Sig: Take 1 Tab by mouth two (2) times a day.

## 2019-03-04 RX ORDER — METOPROLOL TARTRATE 100 MG/1
100 TABLET ORAL 2 TIMES DAILY
Qty: 60 TAB | Refills: 6 | Status: SHIPPED | OUTPATIENT
Start: 2019-03-04 | End: 2019-11-01 | Stop reason: SDUPTHER

## 2019-03-04 RX ORDER — ALPRAZOLAM 0.5 MG/1
0.5 TABLET ORAL
Qty: 30 TAB | Refills: 1 | OUTPATIENT
Start: 2019-03-04 | End: 2020-05-14 | Stop reason: SDUPTHER

## 2019-03-04 RX ORDER — SPIRONOLACTONE 25 MG/1
25 TABLET ORAL 2 TIMES DAILY
Qty: 60 TAB | Refills: 6 | Status: SHIPPED | OUTPATIENT
Start: 2019-03-04 | End: 2019-11-01 | Stop reason: SDUPTHER

## 2019-03-12 ENCOUNTER — OFFICE VISIT (OUTPATIENT)
Dept: ENDOCRINOLOGY | Age: 29
End: 2019-03-12

## 2019-03-12 VITALS
BODY MASS INDEX: 51.91 KG/M2 | DIASTOLIC BLOOD PRESSURE: 79 MMHG | SYSTOLIC BLOOD PRESSURE: 131 MMHG | HEIGHT: 63 IN | WEIGHT: 293 LBS | HEART RATE: 120 BPM

## 2019-03-12 DIAGNOSIS — Z79.4 TYPE 2 DIABETES MELLITUS WITHOUT COMPLICATION, WITH LONG-TERM CURRENT USE OF INSULIN (HCC): Primary | ICD-10-CM

## 2019-03-12 DIAGNOSIS — E11.9 TYPE 2 DIABETES MELLITUS WITHOUT COMPLICATION, WITH LONG-TERM CURRENT USE OF INSULIN (HCC): Primary | ICD-10-CM

## 2019-03-12 DIAGNOSIS — I10 ESSENTIAL HYPERTENSION: ICD-10-CM

## 2019-03-12 DIAGNOSIS — E66.01 CLASS 3 SEVERE OBESITY DUE TO EXCESS CALORIES WITH SERIOUS COMORBIDITY AND BODY MASS INDEX (BMI) OF 60.0 TO 69.9 IN ADULT (HCC): ICD-10-CM

## 2019-03-12 LAB — HBA1C MFR BLD HPLC: 12.2 %

## 2019-03-12 NOTE — PATIENT INSTRUCTIONS
1) Ozemic 0.25mg once per week for 4 weeks, then increase to 0.5mg every week. 2) Increase your Basaglar to 30 units every day.

## 2019-03-12 NOTE — PROGRESS NOTES
Chief Complaint   Patient presents with    Diabetes     pcp and pharmacy verified. Eye exam due. Records since last visit reviewed. History of Present Illness: Dhara Majano is a 25 y.o. female here for follow up of diabetes. She was diagnosed with diabetes \"when I was in high school\". Because pt has features concerning for cushing's her PCP checked a 24 hour urine cortisol which was not elevated (42). She also had a TSH drawn which was 2.10. In March 2016 we tested pt for hyperaldosteronism, which was negative. I tested her for evidence of PCOS, her Testosterone was 14, with DHEA-S 109, her 17-OH Prog was not elevated and an overnight dexamethasone suppression test did suppress her ACTH and cortisol. At our last visit in November 2018 her A1C was 11.3% on Basaglar 16 units daily, Glipizide 10mg BID and Metformin 1000mg BID. She was not able to pay the co-pay on the Ozempic currently, so we increased her Basaglar to 24 units daily and continue the Glipizide 10mg BID and Metformin 1000mg BID. Her A1C today was 12.2%. Pt is currently taking Pt is currently taking Basaglar 24 units, Glipizide 10mg BID and Metformin 1000mg BID. She does not check her blood sugars. She is now working day shift. She wakes around 630AM.  She has breakfast around 7AM, this AM she did not eat breakfast. She does not tend to eat breakfast in the morning. She is now working 8AM-5PM and a part-time from [employer].  She notes that she will snack on PB crackers during the day. She has lunch around Noon this afternoon she had a sub, chips and water. She has diner around 6-7PM, before her evening job. Last night she had chili, chicken nuggets (4) and regular soda  She will snack on chips or sunflower seeds during her evening shift. She is going to bed around 1130PM-MN. She denies any HS snacking. She takes her Basaglar at night before she goes to bed.  She takes her Glipizide and Metformin when she wakes up and before she goes to bed. She denies issues of hypoglycemia. She has completed her clinicals and she just restarted the \"Boot Camp\" last week. She was perscibed contrave, but it was too expensive so she never took it. She tried phentermine for a month and she notes it did not help and she stopped taking it. No history of vascular disease. No history of neuropathy, or nephropathy. Last eye exam was April 2017, no retinopathy. No recent steroids or prednisone. No recent illnesses, injuries or hospitalizations. Current Outpatient Medications   Medication Sig    semaglutide (OZEMPIC) 0.25 mg/0.2 mL (2 mg/1.5 mL) sub-q pen 0.5 mg by SubCUTAneous route every seven (7) days.  semaglutide (OZEMPIC) 0.25 mg/0.2 mL (2 mg/1.5 mL) sub-q pen 0.5 mg by SubCUTAneous route every seven (7) days. AD    ALPRAZolam (XANAX) 0.5 mg tablet Take 1 Tab by mouth nightly as needed for Anxiety or Sleep. Max Daily Amount: 0.5 mg.    metoprolol tartrate (LOPRESSOR) 100 mg IR tablet Take 1 Tab by mouth two (2) times a day.  spironolactone (ALDACTONE) 25 mg tablet Take 1 Tab by mouth two (2) times a day.  atorvastatin (LIPITOR) 40 mg tablet Take 1 Tab by mouth daily.  fluticasone (FLONASE) 50 mcg/actuation nasal spray 2 Sprays by Both Nostrils route daily.  glipiZIDE (GLUCOTROL) 5 mg tablet TAKE 2 TABS BY MOUTH TWO (2) TIMES A DAY.  metFORMIN ER (GLUCOPHAGE XR) 500 mg tablet 2 pills with breakfast and two pills with dinner    gabapentin (NEURONTIN) 300 mg capsule TAKE 1 CAP BY MOUTH THREE (3) TIMES DAILY.  insulin glargine (BASAGLAR KWIKPEN U-100 INSULIN) 100 unit/mL (3 mL) inpn 16 units daily (Patient taking differently: 24 units daily at bedtime)    omeprazole (PRILOSEC) 40 mg capsule TAKE 1 CAP BY MOUTH DAILY.     HUNG 0.35 mg tab TAKE 1 TABLET BY MOUTH EVERY DAY    butalbital-acetaminophen-caffeine (FIORICET) -40 mg per tablet Take 0.5-1 Tabs by mouth every six (6) hours as needed for Headache. Max Daily Amount: 4 Tabs.  glucose blood VI test strips (ONETOUCH VERIO) strip Check sugars two times daily. E11.9    Blood-Glucose Meter (ONETOUCH VERIO IQ METER) misc Test blood sugar once daily    cetirizine (ZYRTEC) 10 mg tablet Take  by mouth daily.  albuterol (PROVENTIL HFA, VENTOLIN HFA) 90 mcg/actuation inhaler Take 1 Puff by inhalation every four (4) hours as needed for Wheezing. (Patient taking differently: Take 1 Puff by inhalation every four (4) hours as needed for Wheezing (PRN). )    albuterol (ACCUNEB) 1.25 mg/3 mL nebulizer solution Take 3 mL by inhalation every six (6) hours as needed for Wheezing.  acetaminophen (TYLENOL EXTRA STRENGTH) 500 mg tablet Take 1 Tab by mouth every six (6) hours as needed for Pain.  flash glucose sensor ([employer] Systems[employer]YLE RUBEN 14 DAY SENSOR) kit Change sensor every 14 days    flash glucose scanning reader ([employer] SystemsSTYLE RUBEN 14 DAY READER) misc Dispense one 14 day reader     No current facility-administered medications for this visit. Allergies   Allergen Reactions    Latex Itching     Review of Systems:  - Eyes: no blurry vision or double vision  - Cardiovascular: no chest pain  - Respiratory: no shortness of breath  - Musculoskeletal: no myalgias  - Neurological: no numbness/tingling in extremities    Physical Examination:  Blood pressure 131/79, pulse (!) 120, height 5' 3\" (1.6 m), weight 346 lb 6.4 oz (157.1 kg).   - General: pleasant, no distress, good eye contact   - Neck: no carotid bruits  - Cardiovascular: regular, normal rate, nl s1 and s2, no m/r/g, 2+ DP pulses   - Respiratory: clear bilaterally  - Integumentary: 1+ edema, no foot ulcers, + Acanthosis  - Psychiatric: normal mood and affect    Diabetic foot exam:     Left Foot:   Visual Exam: normal    Pulse DP: 2+ (normal)   Filament test: normal sensation    Vibratory sensation: normal      Right Foot:   Visual Exam: normal    Pulse DP: 2+ (normal)   Filament test: normal sensation Vibratory sensation: normal        Data Reviewed:   Her A1C today was 12.2%. Assessment/Plan:   1) DM > Her A1C today was 12.2%. Will increase her Basaglar to 30 units daily and continue the Metformin and Glipizide BID. Now that pt has new insurance she is ready to try the Ozempic again. Will start at the 0.5mg weekly. Will give pt Rx for the [employer]. Pt to send me BG readings in 2-3 weeks. 2) Obesity > Pt has restarted the \"boot camp\" and with the new insurance we are going to restart the ozempic. 3) HTN > BP at goal on her current regimen    RTC 3 months    Pt voices understanding and agreement with the plan. Patient Instructions   1) Ozemic 0.25mg once per week for 4 weeks, then increase to 0.5mg every week. 2) Increase your Basaglar to 30 units every day. Follow-up Disposition:  Return in about 3 months (around 6/12/2019). Copy sent to:  Mansi Rosa

## 2019-03-20 ENCOUNTER — OFFICE VISIT (OUTPATIENT)
Dept: URGENT CARE | Age: 29
End: 2019-03-20

## 2019-03-20 VITALS
HEART RATE: 96 BPM | DIASTOLIC BLOOD PRESSURE: 68 MMHG | WEIGHT: 293 LBS | RESPIRATION RATE: 18 BRPM | SYSTOLIC BLOOD PRESSURE: 131 MMHG | HEIGHT: 63 IN | OXYGEN SATURATION: 97 % | BODY MASS INDEX: 51.91 KG/M2 | TEMPERATURE: 98.5 F

## 2019-03-20 DIAGNOSIS — R52 BODY ACHES: ICD-10-CM

## 2019-03-20 DIAGNOSIS — J02.9 SORE THROAT: Primary | ICD-10-CM

## 2019-03-20 LAB
FLUAV+FLUBV AG NOSE QL IA.RAPID: NEGATIVE POS/NEG
FLUAV+FLUBV AG NOSE QL IA.RAPID: NEGATIVE POS/NEG
S PYO AG THROAT QL: NEGATIVE
VALID INTERNAL CONTROL?: YES
VALID INTERNAL CONTROL?: YES

## 2019-03-20 NOTE — LETTER
NOTIFICATION RETURN TO WORK / SCHOOL 
 
3/20/2019 12:27 PM 
 
Ms. [place]uia [place] [address] 
[facility] To Whom It May Concern: 
 
Nicole Munguia is currently under the care of [facility]. She will return to work/school on: 3/22/19. If there are questions or concerns please have the patient contact our office. Sincerely, E PROVIDER

## 2019-03-20 NOTE — PATIENT INSTRUCTIONS
Fluids/ gargles  Claritin/ allegra   Tylenol cold-sinus - max strength 1-2 tab 4 times/ day    with Advil as needed  Use flonase  Sinus rinse       Sore Throat: Care Instructions  Your Care Instructions    Infection by bacteria or a virus causes most sore throats. Cigarette smoke, dry air, air pollution, allergies, and yelling can also cause a sore throat. Sore throats can be painful and annoying. Fortunately, most sore throats go away on their own. If you have a bacterial infection, your doctor may prescribe antibiotics. Follow-up care is a key part of your treatment and safety. Be sure to make and go to all appointments, and call your doctor if you are having problems. It's also a good idea to know your test results and keep a list of the medicines you take. How can you care for yourself at home? · If your doctor prescribed antibiotics, take them as directed. Do not stop taking them just because you feel better. You need to take the full course of antibiotics. · Gargle with warm salt water once an hour to help reduce swelling and relieve discomfort. Use 1 teaspoon of salt mixed in 1 cup of warm water. · Take an over-the-counter pain medicine, such as acetaminophen (Tylenol), ibuprofen (Advil, Motrin), or naproxen (Aleve). Read and follow all instructions on the label. · Be careful when taking over-the-counter cold or flu medicines and Tylenol at the same time. Many of these medicines have acetaminophen, which is Tylenol. Read the labels to make sure that you are not taking more than the recommended dose. Too much acetaminophen (Tylenol) can be harmful. · Drink plenty of fluids. Fluids may help soothe an irritated throat. Hot fluids, such as tea or soup, may help decrease throat pain. · Use over-the-counter throat lozenges to soothe pain. Regular cough drops or hard candy may also help. These should not be given to young children because of the risk of choking.   · Do not smoke or allow others to smoke around you. If you need help quitting, talk to your doctor about stop-smoking programs and medicines. These can increase your chances of quitting for good. · Use a vaporizer or humidifier to add moisture to your bedroom. Follow the directions for cleaning the machine. When should you call for help? Call your doctor now or seek immediate medical care if:    · You have new or worse trouble swallowing.     · Your sore throat gets much worse on one side.    Watch closely for changes in your health, and be sure to contact your doctor if you do not get better as expected. Where can you learn more? Go to http://donavon-stacy.info/. Enter [phone] [id number] in the search box to learn more about \"Sore Throat: Care Instructions. \"  Current as of: March 27, 2018  Content Version: 11.9  © 0021-9067 [employer]. Care instructions adapted under license by [employer] (which disclaims liability or warranty for this information). If you have questions about a medical condition or this instruction, always ask your healthcare professional. [place] [address] any warranty or liability for your use of this information.

## 2019-03-20 NOTE — PROGRESS NOTES
Cold Symptoms   The history is provided by the patient. This is a new problem. The current episode started 2 days ago. The problem occurs constantly. The cough is non-productive. There has been no fever. Associated symptoms include chills, ear congestion, rhinorrhea, sore throat and myalgias. Pertinent negatives include no shortness of breath. She has tried nothing for the symptoms. She is not a smoker. Her past medical history does not include asthma.         Past Medical History:   Diagnosis Date    Asthma     [place] hump 1/7/2015    Carpal tunnel syndrome of right wrist 12/20/2016    Diabetes [facility] [address] [facility]     ALIYA (generalized anxiety disorder) 4/4/2018    GERD (gastroesophageal reflux disease) 8/13/2014    HTN (hypertension) 7/25/2011    Hyperaldosteronism ([facility] [address] 10/19/2015    Hyperhidrosis 4/4/2018    Other ill-defined conditions(799.89)     migraines    Palpitation 4/4/2018    Tachycardia 4/4/2018        Past Surgical History:   Procedure Laterality Date    REMOVAL OF TONSILS,<12 [de-identified]           Family History   Problem Relation Age of Onset   [employer] Arthritis-rheumatoid Mother     Hypertension Mother     Diabetes Mother         Type II    Psychiatric Disorder Father     Drug Abuse Father     Cancer Other         prostate    Hypertension Sister     Thyroid Disease Sister         \"I think\"    Attention Deficit Hyperactivity Disorder Brother     Hypertension Sister         Social History     Socioeconomic History    Marital status: SINGLE     Spouse name: Not on file    Number of children: Not on file    Years of education: Not on file    Highest education level: Not on file   Occupational History    Occupation: customer ser and student     Comment: [employer]   Social Needs    Financial resource strain: Not on file    Food insecurity:     Worry: Not on file     Inability: Not on file    Transportation needs:     Medical: Not on file     Non-medical: Not on file   Tobacco Use    Smoking status: Light Tobacco Smoker     Packs/day: 0.50     Years: 5.00     Pack years: 2.50    Smokeless tobacco: Never Used   Substance and Sexual Activity    Alcohol use: Yes     Alcohol/week: 0.0 oz     Types: 1 Standard drinks or equivalent per week     Comment: socially, Monthly or less    Drug use: No    Sexual activity: Not Currently     Partners: Male     Birth control/protection: Condom   Lifestyle    Physical activity:     Days per week: Not on file     Minutes per session: Not on file    Stress: Not on file   Relationships    Social connections:     Talks on phone: Not on file     Gets together: Not on file     Attends Anabaptism service: Not on file     Active member of club or organization: Not on file     Attends meetings of clubs or organizations: Not on file     Relationship status: Not on file    Intimate partner violence:     Fear of current or ex partner: Not on file     Emotionally abused: Not on file     Physically abused: Not on file     Forced sexual activity: Not on file   Other Topics Concern    Not on file   Social History Narrative    Not on file                ALLERGIES: Latex    Review of Systems   Constitutional: Positive for chills. HENT: Positive for rhinorrhea and sore throat. Respiratory: Negative for shortness of breath. Musculoskeletal: Positive for myalgias. All other systems reviewed and are negative. Vitals:    03/20/19 1202   BP: 131/68   Pulse: 96   Resp: 18   Temp: 98.5 °F (36.9 °C)   SpO2: 97%   Weight: 347 lb (157.4 kg)   Height: 5' 3\" (1.6 m)       Physical Exam   Constitutional: No distress. HENT:   Right Ear: Tympanic membrane and ear canal normal.   Left Ear: Tympanic membrane and ear canal normal.   Nose: Nose normal.   Mouth/Throat: No oropharyngeal exudate, posterior oropharyngeal edema or posterior oropharyngeal erythema. Eyes: Conjunctivae are normal. Right eye exhibits no discharge. Left eye exhibits no discharge. Neck: Neck supple. Pulmonary/Chest: Effort normal and breath sounds normal. No respiratory distress. She has no wheezes. She has no rales. Lymphadenopathy:     She has no cervical adenopathy. Skin: No rash noted. Nursing note and vitals reviewed. MDM    Procedures      ICD-10-CM ICD-9-CM    1. Sore throat J02.9 462 AMB POC RAPID STREP A    Rapid strep- negative   2. Body aches R52 780.96 AMB POC NADEGE INFLUENZA A/B TEST    Rapid flu- negative   Fluids/ gargles  Claritin/ allegra   Tylenol cold-sinus - max strength 1-2 tab 4 times/ day    with Advil as needed            No orders of the defined types were placed in this encounter. Results for orders placed or performed in visit on 03/20/19   AMB POC RAPID STREP A   Result Value Ref Range    VALID INTERNAL CONTROL POC Yes     Group A Strep Ag Negative Negative   AMB POC NADEGE INFLUENZA A/B TEST   Result Value Ref Range    VALID INTERNAL CONTROL POC Yes     Influenza A Ag POC Negative Negative Pos/Neg    Influenza B Ag POC Negative Negative Pos/Neg     The patients condition was discussed with the patient and they understand. The patient is to follow up with primary care doctor. If signs and symptoms become worse the pt is to go to the ER. The patient is to take medications as prescribed.

## 2019-03-25 DIAGNOSIS — E11.9 TYPE 2 DIABETES MELLITUS WITHOUT COMPLICATION, WITHOUT LONG-TERM CURRENT USE OF INSULIN (HCC): ICD-10-CM

## 2019-03-25 DIAGNOSIS — G56.01 CARPAL TUNNEL SYNDROME OF RIGHT WRIST: ICD-10-CM

## 2019-03-25 DIAGNOSIS — E11.9 DIABETES MELLITUS WITHOUT COMPLICATION (HCC): ICD-10-CM

## 2019-03-25 DIAGNOSIS — Z23 ENCOUNTER FOR IMMUNIZATION: ICD-10-CM

## 2019-03-25 RX ORDER — GLIPIZIDE 5 MG/1
TABLET ORAL
Qty: 360 TAB | Refills: 1 | Status: SHIPPED | OUTPATIENT
Start: 2019-03-25 | End: 2019-10-10 | Stop reason: SDUPTHER

## 2019-04-06 DIAGNOSIS — R20.2 PARESTHESIA: ICD-10-CM

## 2019-04-06 DIAGNOSIS — G43.009 MIGRAINE WITHOUT AURA AND WITHOUT STATUS MIGRAINOSUS, NOT INTRACTABLE: ICD-10-CM

## 2019-04-06 RX ORDER — GABAPENTIN 300 MG/1
CAPSULE ORAL
Qty: 90 CAP | Refills: 3 | Status: SHIPPED | OUTPATIENT
Start: 2019-04-06 | End: 2019-07-06 | Stop reason: SDUPTHER

## 2019-05-15 ENCOUNTER — OFFICE VISIT (OUTPATIENT)
Dept: SURGERY | Age: 29
End: 2019-05-15

## 2019-05-15 VITALS
HEIGHT: 63 IN | SYSTOLIC BLOOD PRESSURE: 134 MMHG | HEART RATE: 112 BPM | WEIGHT: 293 LBS | BODY MASS INDEX: 51.91 KG/M2 | RESPIRATION RATE: 18 BRPM | DIASTOLIC BLOOD PRESSURE: 79 MMHG | TEMPERATURE: 98.1 F | OXYGEN SATURATION: 95 %

## 2019-05-15 DIAGNOSIS — E66.01 MORBID OBESITY WITH BMI OF 60.0-69.9, ADULT (HCC): ICD-10-CM

## 2019-05-15 DIAGNOSIS — K21.9 GASTROESOPHAGEAL REFLUX DISEASE, ESOPHAGITIS PRESENCE NOT SPECIFIED: Primary | ICD-10-CM

## 2019-05-15 NOTE — LETTER
5/15/19 Patient: Jamia Murphy YOB: 1990 Date of Visit: 5/15/2019 Lilian Menon MD 
[address] [place] [id number] VIA In Basket Dear Lilian Menon MD, Thank you for referring Ms. Jamia Murphy to [facility] for evaluation. My notes for this consultation are attached. If you have questions, please do not hesitate to call me. I look forward to following your patient along with you. Sincerely, Divya Arizmendi MD

## 2019-05-15 NOTE — PROGRESS NOTES
1. Have you been to the ER, urgent care clinic since your last visit? Hospitalized since your last visit? No    2. Have you seen or consulted any other health care providers outside of the [address] [place] [address] since your last visit? Include any pap smears or colon screening.  No

## 2019-05-16 DIAGNOSIS — E11.9 TYPE 2 DIABETES MELLITUS WITHOUT COMPLICATION, WITH LONG-TERM CURRENT USE OF INSULIN (HCC): Primary | ICD-10-CM

## 2019-05-16 DIAGNOSIS — Z79.4 TYPE 2 DIABETES MELLITUS WITHOUT COMPLICATION, WITH LONG-TERM CURRENT USE OF INSULIN (HCC): Primary | ICD-10-CM

## 2019-05-16 NOTE — PROGRESS NOTES
Bariatric Surgery Consult    Kristel Tineo is a [address] [place] y.o. female with a history of morbid obesity. Her Height: 5' 3\" (160 cm), Weight: 343 lb (155.6 kg). Body mass index is 60.76 kg/m². She reports that she has been trying to lose weight for 10 years. Her maximum weight was 390 pounds. She has not attended our bariatric surgery information seminar. Jen Louis wants to consider laparoscopic sleeve gastrectomy. Pt is referred by:  Jameson Subramanian MD.    Dietary History:   The patient says that in the past, physician supervised, behavior modification, unsupervised diets and Weight Watchers have not resulted in real success. When asked why she was not able to achieve or maintain significant weight loss she replied, \"She has been able to lose about 50 lbs with working out and diet but has not been able to get down more weight to get healthy\". Number of meals per day: 3  Portion size: large  Snacks: a few daily   She does eat a lot of sweets, minimal soda    Comorbidities:     Bariatric comorbidities present: hypertension, insulin dependent diabetes, GERD, chronic back problems and obstructive sleep apnea    Ambulatory status: independent    The patient's reported level of exercise: moderately active.       Patient Active Problem List    Diagnosis Date Noted    Type 2 diabetes with nephropathy ([address] 01/13/2019    Class 3 severe obesity due to excess calories with serious comorbidity and body mass index (BMI) of 60.0 to 69.9 in adult [facility]) 08/09/2018    Type 2 diabetes mellitus with diabetic neuropathy ([address] 04/25/2018    ALIYA (generalized anxiety disorder) 04/04/2018    Hyperhidrosis 04/04/2018    Palpitation 04/04/2018    Tachycardia 04/04/2018    Sleep apnea in adult 03/20/2017    Carpal tunnel syndrome of right wrist 12/20/2016    Acute pharyngitis 06/15/2016    Type 2 diabetes mellitus without complication ([address] 11/65/8098    [facility] 01/07/2015    GERD (gastroesophageal reflux disease) 08/13/2014    Migraine headache 01/28/2014    Viral pneumonia, unspecified 09/22/2013    Obesity 09/20/2013    Increased pulse rate 09/20/2013    Acute laryngitis 09/20/2013    Vitamin D deficiency 09/28/2012    AR (allergic rhinitis) 09/28/2012    Fatigue 08/27/2012    HTN (hypertension) 07/25/2011    Obesity, morbid (more than 100 lbs over ideal weight or BMI > 40) ([facility] 07/25/2011     Past Medical History:   Diagnosis Date    Asthma     Wilson hump 1/7/2015    Carpal tunnel syndrome of right wrist 12/20/2016    Diabetes ([facility]     ALIYA (generalized anxiety disorder) 4/4/2018    GERD (gastroesophageal reflux disease) 8/13/2014    HTN (hypertension) 7/25/2011    Hyperaldosteronism ([facility] 10/19/2015    Hyperhidrosis 4/4/2018    Other ill-defined conditions(799.89)     migraines    Palpitation 4/4/2018    Tachycardia 4/4/2018      Past Surgical History:   Procedure Laterality Date    REMOVAL OF TONSILS,<13 Y/O        Social History     Tobacco Use    Smoking status: Light Tobacco Smoker     Packs/day: 0.50     Years: 5.00     Pack years: 2.50    Smokeless tobacco: Never Used   Substance Use Topics    Alcohol use: Yes     Alcohol/week: 0.0 oz     Types: 1 Standard drinks or equivalent per week     Comment: socially, Monthly or less      Family History   Problem Relation Age of Onset   Mcgill Arthritis-rheumatoid Mother     Hypertension Mother     Diabetes Mother         Type II    Psychiatric Disorder Father     Drug Abuse Father     Cancer Other         prostate    Hypertension Sister     Thyroid Disease Sister         \"I think\"    Attention Deficit Hyperactivity Disorder Brother     Hypertension Sister       . Current Outpatient Medications   Medication Sig    insulin glargine (BASAGLAR KWIKPEN U-100 INSULIN) 100 unit/mL (3 mL) inpn 30 Units by SubCUTAneous route daily.  gabapentin (NEURONTIN) 300 mg capsule TAKE 1 CAP BY MOUTH THREE (3) TIMES DAILY.     glipiZIDE (GLUCOTROL) 5 mg tablet TAKE 2 TABS BY MOUTH TWO (2) TIMES A DAY.  semaglutide (OZEMPIC) 0.25 mg/0.2 mL (2 mg/1.5 mL) sub-q pen 0.5 mg by SubCUTAneous route every seven (7) days.  semaglutide (OZEMPIC) 0.25 mg/0.2 mL (2 mg/1.5 mL) sub-q pen 0.5 mg by SubCUTAneous route every seven (7) days. AD    ALPRAZolam (XANAX) 0.5 mg tablet Take 1 Tab by mouth nightly as needed for Anxiety or Sleep. Max Daily Amount: 0.5 mg.    metoprolol tartrate (LOPRESSOR) 100 mg IR tablet Take 1 Tab by mouth two (2) times a day.  spironolactone (ALDACTONE) 25 mg tablet Take 1 Tab by mouth two (2) times a day.  atorvastatin (LIPITOR) 40 mg tablet Take 1 Tab by mouth daily.  fluticasone (FLONASE) 50 mcg/actuation nasal spray 2 Sprays by Both Nostrils route daily.  flash glucose sensor ([employer] RUBEN 14 DAY SENSOR) kit Change sensor every 14 days    flash glucose scanning reader ([employer] RUBEN 14 DAY READER) misc Dispense one 14 day reader    metFORMIN ER (GLUCOPHAGE XR) 500 mg tablet 2 pills with breakfast and two pills with dinner    omeprazole (PRILOSEC) 40 mg capsule TAKE 1 CAP BY MOUTH DAILY.  HUNG 0.35 mg tab TAKE 1 TABLET BY MOUTH EVERY DAY    butalbital-acetaminophen-caffeine (FIORICET) -40 mg per tablet Take 0.5-1 Tabs by mouth every six (6) hours as needed for Headache. Max Daily Amount: 4 Tabs.  glucose blood VI test strips (ONETOUCH VERIO) strip Check sugars two times daily. E11.9    Blood-Glucose Meter (ONETOUCH VERIO IQ METER) misc Test blood sugar once daily    cetirizine (ZYRTEC) 10 mg tablet Take  by mouth daily.  albuterol (PROVENTIL HFA, VENTOLIN HFA) 90 mcg/actuation inhaler Take 1 Puff by inhalation every four (4) hours as needed for Wheezing. (Patient taking differently: Take 1 Puff by inhalation every four (4) hours as needed for Wheezing (PRN). )    albuterol (ACCUNEB) 1.25 mg/3 mL nebulizer solution Take 3 mL by inhalation every six (6) hours as needed for Wheezing.     acetaminophen (TYLENOL EXTRA STRENGTH) 500 mg tablet Take 1 Tab by mouth every six (6) hours as needed for Pain. No current facility-administered medications for this visit. Allergies   Allergen Reactions    Latex Itching         Review of Systems:    Constitutional: negative  Ears, Nose, Mouth, Throat, and Face: negative  Respiratory: negative  Cardiovascular: negative  Gastrointestinal: positive for reflux symptoms  Genitourinary:negative  Integument/Breast: negative  Hematologic/Lymphatic: negative  Musculoskeletal:negative  Neurological: negative  Behavioral/Psychiatric: negative  Endocrine: negative  Allergic/Immunologic: negative    Objective:     Visit Vitals  /79 (BP 1 Location: Left arm, BP Patient Position: Sitting)   Pulse (!) 112   Temp 98.1 °F (36.7 °C) (Oral)   Resp 18   Ht 5' 3\" (1.6 m)   Wt 343 lb (155.6 kg)   SpO2 95%   BMI 60.76 kg/m²        Physical Exam:    General:  alert, no distress, morbidly obese   Eyes:  conjunctivae and sclerae normal, pupils equal, round, reactive to light, extraocular movements intact without nystagmus   Throat & Neck: no erythema or exudates noted and neck supple and symmetrical; no palpable masses   Lungs:   clear to auscultation bilaterally   Heart:  Regular rate and rhythm   Abdomen:   obese, soft, nontender, nondistended, no masses or organomegaly,    Extremities: no edema,  no gait disturbances   Skin: Normal.       Assessment:     1. Morbid obesity (Body mass index is 60.76 kg/m².) with multiple comorbidities. The patient meets criteria established by the NIH for weight loss surgery candidates. Without weight reduction, co-morbidities will escalate as well as increase risk of early mortality. Our recommendation is the patient could be served with laparoscopic sleeve gastrectomy.  I explained to the patient differences between laparoscopic gastric bypass, laparoscopic adjustable gastric banding, and laparoscopic vertical sleeve gastrectomy with respect to expected weight loss, resolution of comorbidities and risks. Ms. Regine Prasad has attended one our informational meetings and has seen our educational materials. She has requested Dr. Delroy Cabrera to perform her procedure. I reviewed the role for this procedure as a tool to help her achieve her weight loss goals. I reminded her that effective weight loss comes from lifelong adherence to changes in dietary choices, eating habits and exercise. Recommendation: We will request approval for laparoscopic sleeve gastrectomy. We recommend that the patient undergo the following evaluations prior to considering surgery:    Cardiology: no  Dietician: yes  Gastroenterology: yes  Psychiatry/Psychology: yes  Pulmonology: no  Sleep Medicine: no    She is motivated to do well with surgery. She does not want gastric bypass at all and is only interested in sleeve despite the higher BMI and DM. She will need EGD prior to surgery to evaluate her GERD which is controlled with nexium. Signed By: Shoaib Craven MD     May 15, 2019       Greater than half of the time: 40 minutes was used in counciling the patient about bariatric surgery and the steps she needs to take to move forward with her surgery. Ms. Regine Prasad has a reminder for a \"due or due soon\" health maintenance. I have asked that she contact her primary care provider for follow-up on this health maintenance.

## 2019-05-22 ENCOUNTER — OFFICE VISIT (OUTPATIENT)
Dept: FAMILY MEDICINE CLINIC | Age: 29
End: 2019-05-22

## 2019-05-22 VITALS
RESPIRATION RATE: 20 BRPM | OXYGEN SATURATION: 98 % | DIASTOLIC BLOOD PRESSURE: 64 MMHG | HEIGHT: 63 IN | SYSTOLIC BLOOD PRESSURE: 121 MMHG | HEART RATE: 100 BPM | WEIGHT: 293 LBS | TEMPERATURE: 97.5 F | BODY MASS INDEX: 51.91 KG/M2

## 2019-05-22 DIAGNOSIS — Z72.0 TOBACCO ABUSE DISORDER: ICD-10-CM

## 2019-05-22 DIAGNOSIS — E66.01 OBESITY, MORBID (MORE THAN 100 LBS OVER IDEAL WEIGHT OR BMI > 40) (HCC): ICD-10-CM

## 2019-05-22 DIAGNOSIS — Z02.89 ENCOUNTER FOR COMPLETION OF FORM WITH PATIENT: ICD-10-CM

## 2019-05-22 DIAGNOSIS — E11.40 TYPE 2 DIABETES MELLITUS WITH DIABETIC NEUROPATHY, WITH LONG-TERM CURRENT USE OF INSULIN (HCC): ICD-10-CM

## 2019-05-22 DIAGNOSIS — Z01.818 PREOPERATIVE CLEARANCE: ICD-10-CM

## 2019-05-22 DIAGNOSIS — Z79.4 TYPE 2 DIABETES MELLITUS WITH DIABETIC NEUROPATHY, WITH LONG-TERM CURRENT USE OF INSULIN (HCC): ICD-10-CM

## 2019-05-22 DIAGNOSIS — E11.21 TYPE 2 DIABETES WITH NEPHROPATHY (HCC): Primary | ICD-10-CM

## 2019-05-22 RX ORDER — VARENICLINE TARTRATE 25 MG
KIT ORAL
Qty: 1 DOSE PACK | Refills: 0 | Status: SHIPPED | OUTPATIENT
Start: 2019-05-22 | End: 2019-06-21

## 2019-05-22 RX ORDER — VARENICLINE TARTRATE 1 MG/1
TABLET, FILM COATED ORAL
Qty: 30 TAB | Refills: 6 | Status: SHIPPED | OUTPATIENT
Start: 2019-06-22 | End: 2019-09-24

## 2019-05-22 RX ORDER — OMEPRAZOLE 40 MG/1
CAPSULE, DELAYED RELEASE ORAL
Qty: 30 CAP | Refills: 3 | Status: SHIPPED | OUTPATIENT
Start: 2019-05-22 | End: 2019-10-17 | Stop reason: SDUPTHER

## 2019-05-22 NOTE — PROGRESS NOTES
Name and  verified      Chief Complaint   Patient presents with    Documentation     Bariatric Surgery    Nicotine Dependence    Anxiety         Health Maintenance reviewed-discussed with patient. 1. Have you been to the ER, urgent care clinic since your last visit? Hospitalized since your last visit? Yes, Bariatric Surgery last week per patient stated. 2. Have you seen or consulted any other health care providers outside of the [address] [place] [address] since your last visit? Include any pap smears or colon screening.  no

## 2019-05-22 NOTE — PATIENT INSTRUCTIONS
Learning About Diabetes and Exercise Can you exercise if you have diabetes? When you have diabetes, it's important to get regular exercise. This helps control your blood sugar level. You can still play sports, run, ride a bike, go swimming, and do other activities when you have diabetes. How can exercise help you manage diabetes? Your body turns the food you eat into glucose, a type of sugar. You need this sugar for energy. When you have diabetes, the sugar builds up in your blood. But when you exercise, your body uses sugar. This helps keep it from building up in your blood and results in lower blood sugar and better control of diabetes. Exercise may help you in other ways too. It can help you reach and stay at a healthy weight. It also helps improve blood pressure and cholesterol, which can reduce the risk of heart disease. Exercise can make you feel stronger and happier. It can help you relax and sleep better, and give you confidence in other things you do. How can you exercise safely? Before you start a new exercise program, talk to your doctor about how and when to exercise. You may need to have a medical exam and tests before you begin. Some types of exercise can be harmful if your diabetes is causing other problems, such as problems with your feet. Your doctor can tell you what types of exercise are good choices for you. These tips can help you exercise safely when you have diabetes. If your diabetes is controlled by diet or medicine that doesn't lower your blood sugar, you don't need to eat a snack before you exercise. · Check your blood sugar before you exercise. And be careful about what you eat. ? If your blood sugar is less than 100, eat a carbohydrate snack before you exercise. ? Be careful when you exercise if your blood sugar is over 300. High blood sugar can make you dehydrated.  And that makes your blood sugar levels go even higher. If you have ketones in your blood or urine and your blood sugar is over 300, do not exercise. · Don't try to do too much at first. Build up your exercise program bit by bit. Try to get at least 30 minutes of exercise on most days of the week. Walking is a good choice. You also may want to do other activities, such as riding a bike or swimming. You might try running or gardening. Try to include muscle-strengthening exercises at least 2 times a week. These exercises include push-ups and weight training. You can also use rubber tubing or stretch bands. You stretch or pull the tubing or band to build muscle strength. If you want to exercise more, slowly increase how hard or long you exercise. · You may get symptoms of low blood sugar during exercise or up to 24 hours later. Some symptoms of low blood sugar, such as sweating, a fast heartbeat, or feeling tired, can be confused with what can happen anytime you exercise. Other symptoms may include feeling anxious, dizzy, weak, or shaky. So it's a good idea to check your blood sugar again. · You can treat low blood sugar by eating or drinking something that has 15 grams of carbohydrate. These should be quick-sugar foods. [place] foods such as fruit juice, regular (not diet) soda, glucose tablets, hard candy, or raisins can help raise blood sugar. Check your blood sugar level again 15 minutes after having a quick-sugar food to make sure your level is getting back to your target range. · Drink plenty of water before, during, and after you exercise. · Wear medical alert jewelry that says you have diabetes. You can buy this at most [email or web address]. · Pay attention to your body. If you are used to exercise and notice that you can't do as much as usual, talk to your doctor. Follow-up care is a key part of your treatment and safety.  Be sure to make and go to all appointments, and call your doctor if you are having problems. It's also a good idea to know your test results and keep a list of the medicines you take. Where can you learn more? Go to http://donavon-stacy.info/. Enter A442 in the search box to learn more about \"Learning About Diabetes and Exercise. \" Current as of: July 25, 2018 Content Version: 11.9 © 8015-0668 [employer]. Care instructions adapted under license by [employer] (which disclaims liability or warranty for this information). If you have questions about a medical condition or this instruction, always ask your healthcare professional. [place] [address] any warranty or liability for your use of this information. Learning About Bariatric Surgery What is bariatric surgery? Bariatric surgery is surgery to help you lose weight. This type of surgery is only used for people who are very overweight and have not been able to lose weight with diet and exercise. This surgery makes the stomach smaller. Some types of surgery also change the connection between your stomach and intestines. How is bariatric surgery done? Bariatric surgery may be either \"open\" or \"laparoscopic. \" Open surgery is done through a large cut (incision) in the belly. Laparoscopic surgery is done through several small cuts. The doctor puts a lighted tube, or scope, and other surgical tools through small cuts in your belly. The doctor is able to see your organs with the scope. There are different types of bariatric surgery. Gastric sleeve surgery The surgery is usually done through several small incisions in the belly. The doctor removes more than half of your stomach. This leaves a thin sleeve, or tube, that is about the size of a banana. Because part of your stomach has been removed, this can't be reversed. Jt-en-Y gastric bypass surgery Jt-en-Y (say \"carly-en-why\") surgery changes the connection between the stomach and the intestines. The doctor separates a section of your stomach from the rest of your stomach. This makes a small pouch. The new pouch will hold the food you eat. The doctor connects the stomach pouch to the middle part of the small intestine. Gastric banding surgery The surgery is usually done through several small incisions in the belly. The doctor wraps a band around the upper part of the stomach. This creates a small pouch. The small size of the pouch means that you will get full after you eat just a small amount of food. The doctor can inflate or deflate the band to adjust the size. This lets the doctor adjust how quickly food passes from the new pouch into the stomach. It does not change the connection between the stomach and the intestines. What can you expect after the surgery? You may stay in the hospital for one or more days after the surgery. How long you stay depends on the type of surgery you had. Most people need 2 to 4 weeks before they are ready to get back to their usual routine. For the first 2 to 6 weeks after surgery, you probably will need to follow a liquid or soft diet. Bit by bit, you will be able to eat more solid foods. Your doctor may advise you to work with a dietitian. This way you'll be sure to get enough protein, vitamins, and minerals while you are losing weight. Even with a healthy diet, you may need to take vitamin and mineral supplements. After surgery, you will not be able to eat very much at one time. You will get full quickly. Try not to eat too much at one time or eat foods that are high in fat or sugar. If you do, you may vomit, get stomach pain, or have diarrhea. You probably will lose weight very quickly in the first few months after surgery. As time goes on, your weight loss will slow down. You will have regular doctor visits to check how you are doing. Think of bariatric surgery as a tool to help you lose weight.  It isn't an instant fix. You will still need to eat a healthy diet and get regular exercise. This will help you reach your weight goal and avoid regaining the weight you lose. Follow-up care is a key part of your treatment and safety. Be sure to make and go to all appointments, and call your doctor if you are having problems. It's also a good idea to know your test results and keep a list of the medicines you take. Where can you learn more? Go to http://donavon-stacy.info/. Enter G469 in the search box to learn more about \"Learning About Bariatric Surgery. \" Current as of: June 25, 2018 Content Version: 11.9 © 9378-6149 [employer]. Care instructions adapted under license by [employer] (which disclaims liability or warranty for this information). If you have questions about a medical condition or this instruction, always ask your healthcare professional. [address] any warranty or liability for your use of this information. Starting a Weight Loss Plan: Care Instructions Your Care Instructions If you are thinking about losing weight, it can be hard to know where to start. Your doctor can help you set up a weight loss plan that best meets your needs. You may want to take a class on nutrition or exercise, or join a weight loss support group. If you have questions about how to make changes to your eating or exercise habits, ask your doctor about seeing a registered dietitian or an exercise specialist. 
It can be a big challenge to lose weight. But you do not have to make huge changes at once. Make small changes, and stick with them. When those changes become habit, add a few more changes. If you do not think you are ready to make changes right now, try to pick a date in the future. Make an appointment to see your doctor to discuss whether the time is right for you to start a plan. Follow-up care is a key part of your treatment and safety. Be sure to make and go to all appointments, and call your doctor if you are having problems. It's also a good idea to know your test results and keep a list of the medicines you take. How can you care for yourself at home? · Set realistic goals. Many people expect to lose much more weight than is likely. A weight loss of 5% to 10% of your body weight may be enough to improve your health. · Get family and friends involved to provide support. Talk to them about why you are trying to lose weight, and ask them to help. They can help by participating in exercise and having meals with you, even if they may be eating something different. · Find what works best for you. If you do not have time or do not like to cook, a program that offers meal replacement bars or shakes may be better for you. Or if you like to prepare meals, finding a plan that includes daily menus and recipes may be best. 
· Ask your doctor about other health professionals who can help you achieve your weight loss goals. ? A dietitian can help you make healthy changes in your diet. ? An exercise specialist or  can help you develop a safe and effective exercise program. 
? A counselor or psychiatrist can help you cope with issues such as depression, anxiety, or family problems that can make it hard to focus on weight loss. · Consider joining a support group for people who are trying to lose weight. Your doctor can suggest groups in your area. Where can you learn more? Go to http://donavon-stacy.info/. Enter B277 in the search box to learn more about \"Starting a Weight Loss Plan: Care Instructions. \" Current as of: June 25, 2018 Content Version: 11.9 © 5153-2169 [employer].  Care instructions adapted under license by [employer] (which disclaims liability or warranty for this information). If you have questions about a medical condition or this instruction, always ask your healthcare professional. [address] any warranty or liability for your use of this information. Stopping Smokeless Tobacco Use: Care Instructions Your Care Instructions Smokeless tobacco comes in many forms, such as snuff and chewing tobacco: · Snuff is finely ground tobacco sold in cans or pouches. Most of the time, snuff is used by putting a \"pinch\" or \"dip\" between the lower lip or cheek and the gum. · Chewing tobacco is sold as loose leaves, plugs, or twists. It is chewed or placed between the cheek and the gum or teeth. There are plenty of reasons to stop using smokeless tobacco. These products are harmful. They are not risk-free alternatives to smoking. Smokeless tobacco contains nicotine, which is addicting. Though using smokeless tobacco is less harmful than smoking cigarettes, it can cause serious health problems, such as: 
· White patches or red sores in your mouth that can turn into mouth cancer involving the lip, tongue, or cheek. · Tooth loss and other dental problems. · Gum disease. Your gums may pull away from your teeth and not grow back. People who use smokeless tobacco crave the nicotine in it. Giving up smokeless tobacco is much harder than simply changing a habit. Your body has to stop craving the nicotine. It is hard to quit, but you can do it. Many tools are available for people who want to quit using smokeless tobacco. You may find that combining tools works best for you. There are several steps to quitting. First you get ready to quit. Then you get support to help you. After that, you learn new skills and behaviors to quit. For many people, a necessary step is getting and using medicine. Your doctor will help you set up the plan that best meets your needs.  You may want to attend a tobacco cessation program. When you choose a program, look for one that has proven success. Ask your doctor for ideas. You will greatly increase your chances of success if you take medicine as well as get counseling or join a cessation program. 
Some of the changes you feel when you first quit smokeless tobacco are uncomfortable. Your body will miss the nicotine at first, and you may feel short-tempered and grumpy. You may have trouble sleeping or concentrating. Medicine can help you deal with these symptoms. You may struggle with changing your habits and rituals. The last step is the tricky one: Be prepared for the urge to use smokeless tobacco to continue for a time. This is a lot to deal with, but keep at it. You will feel better. Follow-up care is a key part of your treatment and safety. Be sure to make and go to all appointments, and call your doctor if you are having problems. It's also a good idea to know your test results and keep a list of the medicines you take. How can you care for yourself at home? · Ask your family, friends, and coworkers for support. You have a better chance of quitting if you have help and support. · Join a support group for people who are trying to quit using smokeless tobacco. 
· Set a quit date. Pick your date carefully so that it is not right in the middle of a big deadline or stressful time. After you quit, do not use smokeless tobacco even once. Get rid of all spit cups, cans, and pouches after your last use. Clean your house and your clothes so that they do not smell of tobacco. 
· Learn how to be a non-user. Think about ways you can avoid those things that make you reach for tobacco. 
? Learn some ways to deal with cravings, like calling a friend or going for a walk. Cravings often pass. ? Avoid situations that put you at greatest risk for using smokeless tobacco. For some people, it is hard to spend time with friends without dipping or chewing.  For others, they might skip a coffee break with coworkers who smoke or use smokeless tobacco. 
? Change your daily routine. Take a different route to work, or eat a meal in a different place. · Cut down on stress. Calm yourself or release tension by doing an activity you enjoy, such as reading a book, taking a hot bath, or gardening. · Talk to your doctor or pharmacist about nicotine replacement therapy. You still get nicotine, but you do not use tobacco. Nicotine replacement products help you slowly reduce the amount of nicotine you need. Many of these products are available over the counter. They include nicotine patches, gum, lozenges, and inhalers. · Ask your doctor about bupropion (Wellbutrin) or varenicline (Chantix), which are prescription medicines. They do not contain nicotine. They help you by reducing withdrawal symptoms, such as stress and anxiety. · Get regular exercise. Having healthy habits will help your body move past its craving for nicotine. · Be prepared to keep trying. Most people are not successful the first few times they try to quit. Do not get mad at yourself if you use tobacco again. Make a list of things you learned, and think about when you want to try again, such as next week, next month, or next year. Where can you learn more? Go to http://donavon-stacy.info/. Enter N643 in the search box to learn more about \"Stopping Smokeless Tobacco Use: Care Instructions. \" Current as of: September 26, 2018 Content Version: 11.9 © 2866-0370 [employer]. Care instructions adapted under license by [employer] (which disclaims liability or warranty for this information). If you have questions about a medical condition or this instruction, always ask your healthcare professional. [place] [address] any warranty or liability for your use of this information. Body Mass Index: Care Instructions Your Care Instructions Body mass index (BMI) can help you see if your weight is raising your risk for health problems. It uses a formula to compare how much you weigh with how tall you are. · A BMI lower than 18.5 is considered underweight. · A BMI between 18.5 and 24.9 is considered healthy. · A BMI between 25 and 29.9 is considered overweight. A BMI of 30 or higher is considered obese. If your BMI is in the normal range, it means that you have a lower risk for weight-related health problems. If your BMI is in the overweight or obese range, you may be at increased risk for weight-related health problems, such as high blood pressure, heart disease, stroke, arthritis or joint pain, and diabetes. If your BMI is in the underweight range, you may be at increased risk for health problems such as fatigue, lower protection (immunity) against illness, muscle loss, bone loss, hair loss, and hormone problems. BMI is just one measure of your risk for weight-related health problems. You may be at higher risk for health problems if you are not active, you eat an unhealthy diet, or you drink too much alcohol or use tobacco products. Follow-up care is a key part of your treatment and safety. Be sure to make and go to all appointments, and call your doctor if you are having problems. It's also a good idea to know your test results and keep a list of the medicines you take. How can you care for yourself at home? · Practice healthy eating habits. This includes eating plenty of fruits, vegetables, whole grains, lean protein, and low-fat dairy. · If your doctor recommends it, get more exercise. Walking is a good choice. Bit by bit, increase the amount you walk every day. Try for at least 30 minutes on most days of the week. · Do not smoke. Smoking can increase your risk for health problems. If you need help quitting, talk to your doctor about stop-smoking programs and medicines. These can increase your chances of quitting for good. · Limit alcohol to 2 drinks a day for men and 1 drink a day for women. Too much alcohol can cause health problems. If you have a BMI higher than 25 · Your doctor may do other tests to check your risk for weight-related health problems. This may include measuring the distance around your waist. A waist measurement of more than 40 inches in men or 35 inches in women can increase the risk of weight-related health problems. · Talk with your doctor about steps you can take to stay healthy or improve your health. You may need to make lifestyle changes to lose weight and stay healthy, such as changing your diet and getting regular exercise. If you have a BMI lower than 18.5 · Your doctor may do other tests to check your risk for health problems. · Talk with your doctor about steps you can take to stay healthy or improve your health. You may need to make lifestyle changes to gain or maintain weight and stay healthy, such as getting more healthy foods in your diet and doing exercises to build muscle. Where can you learn more? Go to http://donavon-stacy.info/. Enter S176 in the search box to learn more about \"Body Mass Index: Care Instructions. \" Current as of: October 13, 2016 Content Version: 11.4 © 0696-1424 [employer]. Care instructions adapted under license by [employer] (which disclaims liability or warranty for this information). If you have questions about a medical condition or this instruction, always ask your healthcare professional. [place] [address] any warranty or liability for your use of this information. Surgery: What to Expect at [facility] Your Recovery This care sheet gives you a general idea about how long it will take for you to recover. But each person recovers at a different pace. Follow the steps below to get better as quickly as possible. How can you care for yourself at home? Activity   · Allow your body to heal. Don't move quickly or lift anything heavy until you are feeling better.  
  · Rest when you feel tired.  
  · Your doctor may give you specific instructions on when you can do your normal activities again, such as driving and going back to work.  
  · Be active. Walking is a good choice. Diet 
  · You can eat your normal diet when you feel well. If your stomach is upset, try bland, low-fat foods like plain rice, broiled chicken, toast, and yogurt.  
  · If your bowel movements are not regular right after surgery, try to avoid constipation and straining. Drink plenty of water. Your doctor may suggest fiber, a stool softener, or a mild laxative. Medicines 
  · Your doctor will tell you if and when you can restart your medicines. He or she will also give you instructions about taking any new medicines.  
  · If you take blood thinners, such as warfarin (Coumadin), clopidogrel (Plavix), or aspirin, be sure to talk to your doctor. He or she will tell you if and when to start taking those medicines again. Make sure that you understand exactly what your doctor wants you to do.  
  · Be safe with medicines. Read and follow all instructions on the label. ? If the doctor gave you a prescription medicine for pain, take it as prescribed. ? If you are not taking a prescription pain medicine, ask your doctor if you can take an over-the-counter medicine. Incision care 
 If your doctor told you how to care for your cut (incision), follow your doctor's instructions. If you did not get instructions, follow this general advice: 
  · You will have a dressing over the cut. A dressing helps the incision heal and protects it. Your doctor will tell you how to take care of this.  
  · If you have strips of tape on the cut the doctor made, leave the tape on for a week or until it falls off.  
  · If you had stitches or staples, your doctor will tell you when to come back to have them removed.   · If you have skin adhesive on the cut, leave it on until it falls off. Skin adhesive is also called liquid stitches.  
  · Change the bandage every day.  
  · Wash the area daily with warm water, and pat it dry. Don't use hydrogen peroxide or alcohol. They can slow healing.  
  · You may cover the area with a gauze bandage if it oozes fluid or rubs against clothing.  
  · You may shower 24 to 48 hours after surgery. Pat the incision dry. Don't swim or take a bath for the first 2 weeks, or until your doctor tells you it is okay. Follow-up care is a key part of your treatment and safety. Be sure to make and go to all appointments, and call your doctor if you are having problems. It's also a good idea to know your test results and keep a list of the medicines you take. When should you call for help? Call 911 anytime you think you may need emergency care. For example, call if: 
  · You passed out (lost consciousness).  
  · You are short of breath.  
 Call your doctor now or seek immediate medical care if: 
  · You have pain that does not get better after you take pain medicine.  
  · You cannot pass stool or gas.  
  · You are sick to your stomach and cannot drink fluids.  
  · You have loose stitches, or your incision comes open.  
  · You have signs of a blood clot in your leg (called a deep vein thrombosis), such as: 
? Pain in your calf, back of the knee, thigh, or groin. ? Redness and swelling in your leg or groin.  
  · You have signs of infection, such as: 
? Increased pain, swelling, warmth, or redness. ? Red streaks leading from the incision. ? Pus draining from the incision. ? A fever.  
  · Bright red blood has soaked through your bandage.  
 Watch closely for any changes in your health, and be sure to contact your doctor if you have any problems. Where can you learn more? Go to http://donavon-stacy.info/. Enter G793 in the search box to learn more about \"Surgery: What to Expect at Home. \" Current as of: March 28, 2018 Content Version: 11.9 © 0116-2927 [employer]. Care instructions adapted under license by [employer] (which disclaims liability or warranty for this information). If you have questions about a medical condition or this instruction, always ask your healthcare professional. [place] [address] any warranty or liability for your use of this information.

## 2019-05-22 NOTE — PROGRESS NOTES
HISTORY OF PRESENT ILLNESS  Terrance Cantrell is a 25 y.o. female. HPI   DMtype II  Patient also present for diabetic check,  the patient has been compliancy w/ meds, patient also states that the pt is trying to have a diabetic diet, and eat less of carbohydrates, since last visit patient has been more active with daily walking, patient also states that there is a home monitoring glucose device  usually averaging around 130 , +++ Rf needed for today. This time patient denies  tingling sensation, has no polyurea and polydipsia, last a1c was not at target of 12% %     Last urine microalbumin 2018 and was abnormal, patient currently on no ACE inhibitor, patient diabetic care has been managed by endocrinologist stating that she was recently given a different medication for better outcome. Obesity  Specific concerns today for my pt is the wt concerning, the patient states that no self induced vomiting, and no binge eating,  has been thinking about the use of any laxative use for a better wt, pt also states that different available diets has been tried, does some daily exercises, tries to avoid fast food as much as possible. In addition, the patient states that eating too much is not the case and the portion are very well controlled being on the heavy side for a few yrs, very concerned about the huge wt, gives every body a bad body image,and finally stating that the best is to loose as much wt as possible. Unfortunately  with tobacco abuse stating that she wants medication to help her she gets stressed out wkend does want to slowed down,and ready to quit at this time,  Preop form to complet for her bariatric surgery    Feeling better since the last visit. Current Outpatient Medications   Medication Sig Dispense Refill    insulin glargine (BASAGLAR KWIKPEN U-100 INSULIN) 100 unit/mL (3 mL) inpn 30 Units by SubCUTAneous route daily.  15 mL 6    gabapentin (NEURONTIN) 300 mg capsule TAKE 1 CAP BY MOUTH THREE (3) TIMES DAILY. 90 Cap 3    glipiZIDE (GLUCOTROL) 5 mg tablet TAKE 2 TABS BY MOUTH TWO (2) TIMES A DAY. 360 Tab 1    semaglutide (OZEMPIC) 0.25 mg/0.2 mL (2 mg/1.5 mL) sub-q pen 0.5 mg by SubCUTAneous route every seven (7) days. 1 Box 3    semaglutide (OZEMPIC) 0.25 mg/0.2 mL (2 mg/1.5 mL) sub-q pen 0.5 mg by SubCUTAneous route every seven (7) days. AD 1 Box 0    ALPRAZolam (XANAX) 0.5 mg tablet Take 1 Tab by mouth nightly as needed for Anxiety or Sleep. Max Daily Amount: 0.5 mg. 30 Tab 1    metoprolol tartrate (LOPRESSOR) 100 mg IR tablet Take 1 Tab by mouth two (2) times a day. 60 Tab 6    spironolactone (ALDACTONE) 25 mg tablet Take 1 Tab by mouth two (2) times a day. 60 Tab 6    atorvastatin (LIPITOR) 40 mg tablet Take 1 Tab by mouth daily. 90 Tab 2    fluticasone (FLONASE) 50 mcg/actuation nasal spray 2 Sprays by Both Nostrils route daily. 1 Bottle 0    metFORMIN ER (GLUCOPHAGE XR) 500 mg tablet 2 pills with breakfast and two pills with dinner 120 Tab 11    omeprazole (PRILOSEC) 40 mg capsule TAKE 1 CAP BY MOUTH DAILY. 30 Cap 3    HUNG 0.35 mg tab TAKE 1 TABLET BY MOUTH EVERY DAY  2    butalbital-acetaminophen-caffeine (FIORICET) -40 mg per tablet Take 0.5-1 Tabs by mouth every six (6) hours as needed for Headache. Max Daily Amount: 4 Tabs. 12 Tab 0    glucose blood VI test strips (ONETOUCH VERIO) strip Check sugars two times daily. E11.9 100 Strip 6    Blood-Glucose Meter (ONETOUCH VERIO IQ METER) misc Test blood sugar once daily 1 Each 3    cetirizine (ZYRTEC) 10 mg tablet Take  by mouth daily as needed.  albuterol (PROVENTIL HFA, VENTOLIN HFA) 90 mcg/actuation inhaler Take 1 Puff by inhalation every four (4) hours as needed for Wheezing. (Patient taking differently: Take 1 Puff by inhalation every four (4) hours as needed for Wheezing (PRN). ) 1 Inhaler 1    albuterol (ACCUNEB) 1.25 mg/3 mL nebulizer solution Take 3 mL by inhalation every six (6) hours as needed for Wheezing.  1 Bottle 6    acetaminophen (TYLENOL EXTRA STRENGTH) 500 mg tablet Take 1 Tab by mouth every six (6) hours as needed for Pain. 30 Tab 1    flash glucose sensor (FREESTYLE RUBEN 14 DAY SENSOR) kit Change sensor every 14 days 1 Kit 5    flash glucose scanning reader (FREESTYLE RUBEN 14 DAY READER) misc Dispense one 14 day reader 1 Each 0     Allergies   Allergen Reactions    Latex Itching     Past Medical History:   Diagnosis Date    Asthma     Moran hump 1/7/2015    Carpal tunnel syndrome of right wrist 12/20/2016    [facility] [address]     ALIYA (generalized anxiety disorder) 4/4/2018    GERD (gastroesophageal reflux disease) 8/13/2014    HTN (hypertension) 7/25/2011    Hyperaldosteronism ([address] 10/19/2015    Hyperhidrosis 4/4/2018    Other ill-defined conditions(799.89)     migraines    Palpitation 4/4/2018    Tachycardia 4/4/2018     Past Surgical History:   Procedure Laterality Date    REMOVAL OF TONSILS,<12 [de-identified]       Family History   Problem Relation Age of Onset   [address] [place] [address] Arthritis-rheumatoid Mother     Hypertension Mother     Diabetes Mother         Type II    Psychiatric Disorder Father     Drug Abuse Father     Cancer Other         prostate    Hypertension Sister     Thyroid Disease Sister         \"I think\"    Attention Deficit Hyperactivity Disorder Brother     Hypertension Sister      Social History     Tobacco Use    Smoking status: Light Tobacco Smoker     Packs/day: 0.50     Years: 5.00     Pack years: 2.50    Smokeless tobacco: Never Used   Substance Use Topics    Alcohol use:  Yes     Alcohol/week: 0.0 oz     Types: 1 Standard drinks or equivalent per week     Comment: socially, Monthly or less      Lab Results   Component Value Date/Time    WBC 10.3 03/20/2017 04:08 PM    HGB 12.2 03/20/2017 04:08 PM    HCT 39.7 03/20/2017 04:08 PM    PLATELET 817 77/63/4116 04:08 PM    MCV 87 03/20/2017 04:08 PM     Lab Results   Component Value Date/Time    TSH 1.730 04/04/2018 01:17 PM         Review of Systems   Constitutional: Negative for chills and fever. HENT: Negative for ear pain and nosebleeds. Eyes: Negative for blurred vision, pain and discharge. Respiratory: Negative for shortness of breath. Cardiovascular: Negative for chest pain and leg swelling. Gastrointestinal: Negative for constipation, diarrhea, nausea and vomiting. Genitourinary: Negative for frequency. Musculoskeletal: Negative for joint pain. Skin: Negative for itching and rash. Neurological: Negative for headaches. Psychiatric/Behavioral: Negative for depression. The patient is not nervous/anxious. Physical Exam   Constitutional: She is oriented to person, place, and time. She appears well-developed and well-nourished. HENT:   Head: Normocephalic and atraumatic. Eyes: Conjunctivae and EOM are normal.   Neck: Normal range of motion. Neck supple. Cardiovascular: Normal rate, regular rhythm and normal heart sounds. No murmur heard. Pulmonary/Chest: Effort normal and breath sounds normal. No stridor. Abdominal: Soft. Bowel sounds are normal. She exhibits no distension and no mass. There is no tenderness. Musculoskeletal: Normal range of motion. She exhibits no edema. Nl pulses, nl visual inspection nl monoF, +dystrophy and elongated Nails   Lymphadenopathy:     She has no cervical adenopathy. Neurological: She is alert and oriented to person, place, and time. Skin: No erythema. Psychiatric: Her behavior is normal.   Nursing note and vitals reviewed. ASSESSMENT and PLAN  Diagnoses and all orders for this visit:    1. Type 2 diabetes with nephropathy ([facility] [address]    2. Type 2 diabetes mellitus with diabetic neuropathy, with long-term current use of insulin ([facility])    3. Obesity, morbid (more than 100 lbs over ideal weight or BMI > 40) ([facility])  -     REFERRAL TO PSYCHIATRY    4. Tobacco abuse disorder  -     omeprazole (PRILOSEC) 40 mg capsule; TAKE 1 CAP BY MOUTH DAILY.   -     varenicline (CHANTIX STARTER LORETTA) 0.5 mg (11)- 1 mg (42) DsPk; As directed  -     varenicline (CHANTIX CONTINUING MONTH BOX) 1 mg tablet; As directed    5. Type II diabetes mellitus with complication, uncontrolled ([facility]    6. Encounter for completion of form with patient    7. Preoperative clearance      Discussed the patient's BMI with her. The BMI follow up plan is as follows:     dietary management education, guidance, and counseling  encourage exercise  monitor weight  prescribed dietary intake    An After Visit Summary was printed and given to the patient. Preoperative Evaluation    Date of Exam: 2019    Jamia Murphy is a 25 y.o. female (:1990) who presents for preoperative evaluation. Latex Allergy: yes    Allergies: Allergies   Allergen Reactions    Latex Itching      Medications:     Current Outpatient Medications   Medication Sig    omeprazole (PRILOSEC) 40 mg capsule TAKE 1 CAP BY MOUTH DAILY.  varenicline (CHANTIX STARTER LORETTA) 0.5 mg (11)- 1 mg (42) DsPk As directed    [START ON 2019] varenicline (CHANTIX CONTINUING MONTH BOX) 1 mg tablet As directed    insulin glargine (BASAGLAR KWIKPEN U-100 INSULIN) 100 unit/mL (3 mL) inpn 30 Units by SubCUTAneous route daily.  gabapentin (NEURONTIN) 300 mg capsule TAKE 1 CAP BY MOUTH THREE (3) TIMES DAILY.  glipiZIDE (GLUCOTROL) 5 mg tablet TAKE 2 TABS BY MOUTH TWO (2) TIMES A DAY.  semaglutide (OZEMPIC) 0.25 mg/0.2 mL (2 mg/1.5 mL) sub-q pen 0.5 mg by SubCUTAneous route every seven (7) days.  semaglutide (OZEMPIC) 0.25 mg/0.2 mL (2 mg/1.5 mL) sub-q pen 0.5 mg by SubCUTAneous route every seven (7) days. AD    ALPRAZolam (XANAX) 0.5 mg tablet Take 1 Tab by mouth nightly as needed for Anxiety or Sleep. Max Daily Amount: 0.5 mg.    metoprolol tartrate (LOPRESSOR) 100 mg IR tablet Take 1 Tab by mouth two (2) times a day.  spironolactone (ALDACTONE) 25 mg tablet Take 1 Tab by mouth two (2) times a day.     atorvastatin (LIPITOR) 40 mg tablet Take 1 Tab by mouth daily.  fluticasone (FLONASE) 50 mcg/actuation nasal spray 2 Sprays by Both Nostrils route daily.  metFORMIN ER (GLUCOPHAGE XR) 500 mg tablet 2 pills with breakfast and two pills with dinner    HUNG 0.35 mg tab TAKE 1 TABLET BY MOUTH EVERY DAY    butalbital-acetaminophen-caffeine (FIORICET) -40 mg per tablet Take 0.5-1 Tabs by mouth every six (6) hours as needed for Headache. Max Daily Amount: 4 Tabs.  glucose blood VI test strips (ONETOUCH VERIO) strip Check sugars two times daily. E11.9    Blood-Glucose Meter (ONETOUCH VERIO IQ METER) misc Test blood sugar once daily    cetirizine (ZYRTEC) 10 mg tablet Take  by mouth daily as needed.  albuterol (PROVENTIL HFA, VENTOLIN HFA) 90 mcg/actuation inhaler Take 1 Puff by inhalation every four (4) hours as needed for Wheezing. (Patient taking differently: Take 1 Puff by inhalation every four (4) hours as needed for Wheezing (PRN). )    albuterol (ACCUNEB) 1.25 mg/3 mL nebulizer solution Take 3 mL by inhalation every six (6) hours as needed for Wheezing.  acetaminophen (TYLENOL EXTRA STRENGTH) 500 mg tablet Take 1 Tab by mouth every six (6) hours as needed for Pain.  flash glucose sensor (FREESTYLE RUBEN 14 DAY SENSOR) kit Change sensor every 14 days    flash glucose scanning reader (FREESTYLE RUBEN 14 DAY READER) misc Dispense one 14 day reader     No current facility-administered medications for this visit.       Surgical History:     Past Surgical History:   Procedure Laterality Date    REMOVAL OF TONSILS,<11 Y/O       Social History:     Social History     Socioeconomic History    Marital status: SINGLE     Spouse name: Not on file    Number of children: Not on file    Years of education: Not on file    Highest education level: Not on file   Occupational History    Occupation: customer ser and student     Comment: [employer]   Tobacco Use    Smoking status: Light Tobacco Smoker     Packs/day: 0.50     Years: 5.00     Pack years: 2.50    Smokeless tobacco: Never Used   Substance and Sexual Activity    Alcohol use: Yes     Alcohol/week: 0.0 oz     Types: 1 Standard drinks or equivalent per week     Comment: socially, Monthly or less    Drug use: No    Sexual activity: Not Currently     Partners: Male     Birth control/protection: Condom       Anesthesia Complications: None  History of abnormal bleeding : None  History of Blood Transfusions: no  Health Care Directive or Living Will: no    Objective:       Review of Systems    Constitutional: Negative for chills and fever,     HENT: no ear head pain and nosebleeds. Eyes: No blurred vision, pain and discharge. Respiratory: noshortness of breath, wheezing cough sore throat. Cardiovascular: Has no chest pain and leg swelling, racing heart . Gastrointestinal: No constipation, diarrhea, nausea and vomiting. Genitourinary: No frequency. Musculoskeletal: Negative for joint pain. Skin: no itching, pimples or acne rash. Neurological: Negative for headaches. Psychiatric/Behavioral: Negative for depression has normal interest to do things and not depressed the patient is not nervous/anxious. DIAGNOSTICS:   1. EKG: Not indicated at this time  2. CXR: Not indicated at this time  3.  Labs:   Lab Results   Component Value Date/Time    WBC 10.3 03/20/2017 04:08 PM    HGB 12.2 03/20/2017 04:08 PM    HCT 39.7 03/20/2017 04:08 PM    PLATELET 982 92/57/0759 04:08 PM    MCV 87 03/20/2017 04:08 PM     Lab Results   Component Value Date/Time    Hemoglobin A1c 10.9 (H) 03/20/2017 04:08 PM    Hemoglobin A1c 8.4 (H) 09/26/2016 12:00 AM    Hemoglobin A1c 8.6 (H) 08/13/2014 12:55 PM    Glucose 289 (H) 03/20/2017 04:08 PM    Glucose (POC) 116 (H) 05/28/2016 08:34 PM    Microalb/Creat ratio (ug/mg creat.) 36.1 (H) 11/21/2018 02:59 PM    LDL, calculated 96 03/20/2017 04:08 PM    Creatinine 0.60 03/20/2017 04:08 PM      Lab Results   Component Value Date/Time Cholesterol, total 172 03/20/2017 04:08 PM    HDL Cholesterol 30 (L) 03/20/2017 04:08 PM    LDL, calculated 96 03/20/2017 04:08 PM    Triglyceride 230 (H) 03/20/2017 04:08 PM       IMPRESSION:   Low risk for planned surgery  No contraindications to planned surgery  Surgery should be delayed until A1c becomes less than 7.5 percentile, hopefully in 2 to 4 months  Astrid Gonzalez MD   5/22/2019

## 2019-06-21 ENCOUNTER — OFFICE VISIT (OUTPATIENT)
Dept: ENDOCRINOLOGY | Age: 29
End: 2019-06-21

## 2019-06-21 ENCOUNTER — CLINICAL SUPPORT (OUTPATIENT)
Dept: SURGERY | Age: 29
End: 2019-06-21

## 2019-06-21 VITALS
HEIGHT: 63 IN | HEART RATE: 89 BPM | BODY MASS INDEX: 51.91 KG/M2 | SYSTOLIC BLOOD PRESSURE: 130 MMHG | DIASTOLIC BLOOD PRESSURE: 87 MMHG | WEIGHT: 293 LBS

## 2019-06-21 VITALS — BODY MASS INDEX: 60.41 KG/M2 | WEIGHT: 293 LBS

## 2019-06-21 DIAGNOSIS — E66.01 CLASS 3 SEVERE OBESITY DUE TO EXCESS CALORIES WITH SERIOUS COMORBIDITY AND BODY MASS INDEX (BMI) OF 60.0 TO 69.9 IN ADULT (HCC): ICD-10-CM

## 2019-06-21 DIAGNOSIS — I10 ESSENTIAL HYPERTENSION: ICD-10-CM

## 2019-06-21 DIAGNOSIS — Z79.4 TYPE 2 DIABETES MELLITUS WITHOUT COMPLICATION, WITH LONG-TERM CURRENT USE OF INSULIN (HCC): Primary | ICD-10-CM

## 2019-06-21 DIAGNOSIS — E11.9 TYPE 2 DIABETES MELLITUS WITHOUT COMPLICATION, WITH LONG-TERM CURRENT USE OF INSULIN (HCC): Primary | ICD-10-CM

## 2019-06-21 DIAGNOSIS — E66.01 MORBID OBESITY WITH BMI OF 60.0-69.9, ADULT (HCC): Primary | ICD-10-CM

## 2019-06-21 LAB — HBA1C MFR BLD HPLC: 9.9 %

## 2019-06-21 NOTE — PROGRESS NOTES
Pre-operative Bariatric Nutrition Evaluation ()     Date: 2019   Don Santos M.D. Name: Lazarus Low  :  1990  Age:  30  Gender: Female   Type of Surgery: []           Gastric Bypass  []           LAGB  [x]           Sleeve Gastrectomy    ASSESSMENT:    Past Medical History:HTN, Type II DM, sleep apnea, anxiety, GERD; currently smoking 1/2 ppd with a planned quit date of 19     Medications/Supplements:   Prior to Admission medications    Medication Sig Start Date End Date Taking? Authorizing Provider   omeprazole (PRILOSEC) 40 mg capsule TAKE 1 CAP BY MOUTH DAILY. 19   Corine Willingham MD   varenicline (CHANTIX CONTINUING MONTH BOX) 1 mg tablet As directed 19   Corine Willingham MD   insulin glargine (BASAGLAR KWIKPEN U-100 INSULIN) 100 unit/mL (3 mL) inpn 30 Units by SubCUTAneous route daily. 19   Bernice Lunsford MD   gabapentin (NEURONTIN) 300 mg capsule TAKE 1 CAP BY MOUTH THREE (3) TIMES DAILY. 19   Marta Fernandez MD   glipiZIDE (GLUCOTROL) 5 mg tablet TAKE 2 TABS BY MOUTH TWO (2) TIMES A DAY. 3/25/19   Corine Willingham MD   semaglutide (OZEMPIC) 0.25 mg/0.2 mL (2 mg/1.5 mL) sub-q pen 0.5 mg by SubCUTAneous route every seven (7) days. 3/12/19   Devaughn Cardoza MD   ALPRAZolam Anahi Ar) 0.5 mg tablet Take 1 Tab by mouth nightly as needed for Anxiety or Sleep. Max Daily Amount: 0.5 mg. 3/4/19   Corine Willingham MD   metoprolol tartrate (LOPRESSOR) 100 mg IR tablet Take 1 Tab by mouth two (2) times a day. 3/4/19   Corine Willingham MD   spironolactone (ALDACTONE) 25 mg tablet Take 1 Tab by mouth two (2) times a day. 3/4/19   Corine Willingham MD   atorvastatin (LIPITOR) 40 mg tablet Take 1 Tab by mouth daily. 19   Corine Willingham MD   fluticasone (FLONASE) 50 mcg/actuation nasal spray 2 Sprays by Both Nostrils route daily. Patient taking differently: 2 Sprays by Both Nostrils route daily.  PRN 19   Paulette Mckeon MD   flash glucose sensor (FREESTYLE RUBEN 14 DAY SENSOR) kit Change sensor every 14 days 11/21/18   Ann Oquendo MD   flash glucose scanning reader (FREESTYLE RUBEN 14 DAY READER) misc Dispense one 14 day reader 11/21/18   Ann Oquendo MD   metFORMIN ER (GLUCOPHAGE XR) 500 mg tablet 2 pills with breakfast and two pills with dinner 8/31/18   Ann Oquendo MD   HUNG 0.35 mg tab TAKE 1 TABLET BY MOUTH EVERY DAY 1/18/18   Provider, Historical   butalbital-acetaminophen-caffeine (FIORICET) -40 mg per tablet Take 0.5-1 Tabs by mouth every six (6) hours as needed for Headache. Max Daily Amount: 4 Tabs. 5/28/16   Susan Bui MD   cetirizine (ZYRTEC) 10 mg tablet Take  by mouth daily as needed. Provider, Historical   albuterol (PROVENTIL HFA, VENTOLIN HFA) 90 mcg/actuation inhaler Take 1 Puff by inhalation every four (4) hours as needed for Wheezing. Patient taking differently: Take 1 Puff by inhalation every four (4) hours as needed for Wheezing (PRN). 9/27/13   Claudio Hilliard MD   albuterol (ACCUNEB) 1.25 mg/3 mL nebulizer solution Take 3 mL by inhalation every six (6) hours as needed for Wheezing. 9/24/13   Pj Banda MD   acetaminophen (TYLENOL EXTRA STRENGTH) 500 mg tablet Take 1 Tab by mouth every six (6) hours as needed for Pain. 9/20/13   Jumana Ramos MD       Food Allergies/Intolerances:none    Anthropometrics:    Ht:63\"   Wt: 341#    IBW: 115#    %IBW: 296%     BMI:60    Category: obesity III     Reported wt history: Pt presents today for pre-op nutrition evaluation for wt loss surgery. Reports lowest adult BW of 337# and highest adult BW of 370# in 2016. Attributes wt gain over the years r/t emotional eating, physical inactivity and work schedule. Has attempted wt loss through various methods with most successful wt loss of 40-50#. Has been unable to maintain long term or significant wt loss and is now seeking approval for weight loss surgery.  Pt will need to complete 6 months of supervised weight loss for insurance requirements. Exercise/Physical Activity:none at present; has a membership to a group boot camp class that she has attended in the past and enjoyed and lost wt    Reported Diet History:Weight Watchers, diet pills, exercise    24 Hour Diet Recall  Breakfast  Water or juice, eggs, quiles, sausage, cheese, yogurt with fruit   Lunch  [place], chips, fruit, salad    Dinner  Steak/ham/turkey/pork chops/liver, gravy, veggies, potatoes   Snacks  Chips, sunflower seeds, candy   Beverages  Water, soda (trying to limit to 1 per day)      A pre-op nutrition checklist was reviewed. Patient checked off 4 of 15 items. Environment/Psychosocial/Support:pt lists her mother, sisters and best friend as main support system and rates level of support a 10 out of 10. Pt lives with her mother. States her mother does most of the cooking and is supportive of healthier methods of cooking. 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 NUTRITION DIAGNOSIS:  1. Excessive energy intake r/t food and beverage choices and portion sizes evidenced by diet recall. Pt reports tendency to eat large portions and 2nd helpings. Prefers high fat methods of cooking. Heavy reliance on eating out which is daily. 2. Physical inactivity r/t work schedule that limits activity evidenced by pt with no current exercise regimen. Was previously attending a group boot camp class several times per week when she lost 50#. Has not been attending recently d/t work schedules. NUTRITION INTERVENTION:  Pt educated on nutrition recommendations for weight loss surgery, specifically sleeve gastrectomy. Instructed on consuming 3 meals per day starting now. Use the balanced plate method to plan meals, include 3 oz of lean source of protein, 1/2 cup whole grains, unlimited non-starchy vegetables, 1/2 cup fruit and 1 serving of low fat dairy.  Utilize handouts listing healthy snack and meal ideas to limit restaurant meals. After surgery measure all meals to 1/2 cup. Each meal will contain a 1/4 cup lean protein and 1/4 cup fruit, non-starchy vegetable or starch (limiting to once per day). Aim for 60 g protein per day. Sip on 48-64 oz of sugar free, calorie free, non-carbonated beverages each day. Do not use a straw. Do not consume beverages 30 minutes before, during or 30 minutes after meals. Read all nutrition labels. Demonstrated and emphasized identifying serving size, total fat, sugar and protein content. Defined low fat as </= 3 g per serving. Discussed lean and extra lean sources of protein. Provided list of low fat cooking methods. Avoid foods with sugar listed in the first 3 ingredients and >/15 g sugar per serving. Excess sugar/fat intake may lead to dumping syndrome. Discussed signs and symptoms of dumping syndrome. Practice mindful eating habits; take small bites, chew thoroughly, avoid distractions, utilize hunger/fullness scale. Consume meals over 20-30 minutes. Attend Bariatric Support Group and increase physical activity (approved per MD) for long term weight maintenance. NUTRITION MONITORING AND EVALUATION:    The following goals were established with patient;  1. Continue to improve food choices and implement healthier methods of cooking. 2. Decrease reliance on restaurant meals. Eventually limit to no more than 2-3 meals per week. 3. Continue to decrease intake of soda. Eventually completely eliminate prior to surgery. 4. Portion control with balanced plate method. 5. Follow through with plans to resume exercise/boot camp classes. Pt considering weekends as most realistic days of the week. We also discussed fitting in short segments of walking (10-15 minutes) in her work day/work week. 6. Review nutrition guidelines provided today. Follow up next month for continued nutrition education and supervised weight loss. Specific tips and techniques to facilitate compliance with above recommendations were provided and discussed. Nutrition evaluation reveals significant lifestyle changes are indicated. Goals set and recommendations made. Will continue to assess as pt works to complete supervised weight loss requirements. If further details are desired please feel free to contact me at [phone]. This phone number was also provided to the patient for any further questions or concerns.            Todd Khalil RD

## 2019-06-21 NOTE — PROGRESS NOTES
Chief Complaint   Patient presents with    Diabetes     pcp and pharmacy verified. Release signed for eye exam   Records since last visit reviewed. History of Present Illness: Sean Mobley is a 30 y.o. female here for follow up of diabetes. She was diagnosed with diabetes \"when I was in high school\". Because pt has features concerning for cushing's her PCP checked a 24 hour urine cortisol which was not elevated (42). She also had a TSH drawn which was 2.10. In March 2016 we tested pt for hyperaldosteronism, which was negative. I tested her for evidence of PCOS, her Testosterone was 14, with DHEA-S 109, her 17-OH Prog was not elevated and an overnight dexamethasone suppression test did suppress her ACTH and cortisol. At our last visit in March 2019 her A1C was 12.2%. I instructed her to increase her Basaglar to 30 units daily, continue the Glipizide 10mg BID and Metformin 1000mg BID. I started her on Ozempic 0.5mg weekly. Her A1C today was 9.9%. Her weight today was 337 pounds, which is down 10 pounds from March 2019. Pt just got back from vacation in [place]. Pt is currently taking Pt is currently taking Basaglar 30 units, Glipizide 10mg BID and Metformin 1000mg BID and Ozempic 0.5mg weekly. Pt has just gotten a free style akosua to help monitor her BGs. She spoke with Dr. Donald Quezada of bariatric surgery to discuss gastric sleeve. She is now working day shift and working part time at night. She wakes around 630AM.  She has breakfast around 7AM, this AM she did not eat breakfast. She does not tend to eat breakfast in the morning. She is now working 8AM-5PM and a part-time from [employer].  She notes that she will snack on PB crackers during the day. She has lunch around [place], yesterday she had 3 chicken tenders, green beans, mac and cheese and regular soda. She has diner around 6-7PM, before her evening job. Last night she had chicken wings and fries and water.    She will snack on chips or sunflower seeds during her evening shift. She is going to bed around 1130PM-[place]. She denies any HS snacking. 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    She takes her Basaglar at night before she goes to bed. She takes her Glipizide and Metformin when she wakes up and before she goes to bed. She takes her Ozempic Thursday night with her Basaglar. She was perscibed contrave, but it was too expensive so she never took it. She tried phentermine for a month and she notes it did not help and she stopped taking it. No history of vascular disease. No history of neuropathy, or nephropathy. Last eye exam was March 2019, no retinopathy. Will request these records. No recent steroids or prednisone. No recent illnesses, injuries or hospitalizations. Current Outpatient Medications   Medication Sig    omeprazole (PRILOSEC) 40 mg capsule TAKE 1 CAP BY MOUTH DAILY.  [START ON 6/22/2019] varenicline (CHANTIX CONTINUING MONTH BOX) 1 mg tablet As directed    insulin glargine (BASAGLAR KWIKPEN U-100 INSULIN) 100 unit/mL (3 mL) inpn 30 Units by SubCUTAneous route daily.  gabapentin (NEURONTIN) 300 mg capsule TAKE 1 CAP BY MOUTH THREE (3) TIMES DAILY.  glipiZIDE (GLUCOTROL) 5 mg tablet TAKE 2 TABS BY MOUTH TWO (2) TIMES A DAY.  semaglutide (OZEMPIC) 0.25 mg/0.2 mL (2 mg/1.5 mL) sub-q pen 0.5 mg by SubCUTAneous route every seven (7) days.  ALPRAZolam (XANAX) 0.5 mg tablet Take 1 Tab by mouth nightly as needed for Anxiety or Sleep. Max Daily Amount: 0.5 mg.    metoprolol tartrate (LOPRESSOR) 100 mg IR tablet Take 1 Tab by mouth two (2) times a day.  spironolactone (ALDACTONE) 25 mg tablet Take 1 Tab by mouth two (2) times a day.  atorvastatin (LIPITOR) 40 mg tablet Take 1 Tab by mouth daily.  fluticasone (FLONASE) 50 mcg/actuation nasal spray 2 Sprays by Both Nostrils route daily.  (Patient taking differently: 2 Sprays by Both Nostrils route daily. PRN)    flash glucose sensor (FREESTYLE RUBEN 14 DAY SENSOR) kit Change sensor every 14 days    flash glucose scanning reader (FREESTYLE RUBEN 14 DAY READER) misc Dispense one 14 day reader    metFORMIN ER (GLUCOPHAGE XR) 500 mg tablet 2 pills with breakfast and two pills with dinner    HUNG 0.35 mg tab TAKE 1 TABLET BY MOUTH EVERY DAY    butalbital-acetaminophen-caffeine (FIORICET) -40 mg per tablet Take 0.5-1 Tabs by mouth every six (6) hours as needed for Headache. Max Daily Amount: 4 Tabs.  cetirizine (ZYRTEC) 10 mg tablet Take  by mouth daily as needed.  albuterol (PROVENTIL HFA, VENTOLIN HFA) 90 mcg/actuation inhaler Take 1 Puff by inhalation every four (4) hours as needed for Wheezing. (Patient taking differently: Take 1 Puff by inhalation every four (4) hours as needed for Wheezing (PRN). )    albuterol (ACCUNEB) 1.25 mg/3 mL nebulizer solution Take 3 mL by inhalation every six (6) hours as needed for Wheezing.  acetaminophen (TYLENOL EXTRA STRENGTH) 500 mg tablet Take 1 Tab by mouth every six (6) hours as needed for Pain. No current facility-administered medications for this visit. Allergies   Allergen Reactions    Latex Itching     Review of Systems:  - Eyes: no blurry vision or double vision  - Cardiovascular: no chest pain  - Respiratory: no shortness of breath  - Musculoskeletal: no myalgias  - Neurological: no numbness/tingling in extremities    Physical Examination:  Blood pressure 130/87, pulse 89, height 5' 3\" (1.6 m), weight 337 lb (152.9 kg).   - General: pleasant, no distress, good eye contact   - Neck: no carotid bruits  - Cardiovascular: regular, normal rate, nl s1 and s2, no m/r/g, 2+ DP pulses   - Respiratory: clear bilaterally  - Integumentary: 1+ edema, no foot ulcers, + Acanthosis  - Psychiatric: normal mood and affect    Diabetic foot exam:     Left Foot:   Visual Exam: normal    Pulse DP: 2+ (normal)   Filament test: normal sensation    Vibratory sensation: normal      Right Foot:   Visual Exam: normal    Pulse DP: 2+ (normal)   Filament test: normal sensation    Vibratory sensation: normal        Data Reviewed:   Her A1C today was 9.9%. Assessment/Plan:   1) DM > Her A1C today was 9.9%. She has had weight loss, her BGs are improving. I encouraged pt to keep up the good work. For now will continue the Basaglar to 30 units daily, Metformin 1000mg BID, Glipizide 10mg BID and Ozempic 0.5mg weekly. Pt to send me her BG readings in 6 weeks. If in six weeks her BGs are not continuing to improve, I will increase her Ozempic to 1.0mg weekly. 2) Obesity > Pt is trying to arrange her schedule so that she can restart the \"boot camp\". The Ozempic will help with the continued weight loss. Pt has met with a bariatric surgeon and discussed gastric sleeve. 3) HTN > BP at goal on her current regimen    RTC 3 months    Pt voices understanding and agreement with the plan. Follow-up and Dispositions    · Return in about 3 months (around 9/21/2019). Copy sent to:  Mansi Welsh

## 2019-06-22 LAB
ALBUMIN/CREAT UR: 16 MG/G CREAT (ref 0–30)
CREAT UR-MCNC: 202.3 MG/DL
MICROALBUMIN UR-MCNC: 32.3 UG/ML

## 2019-07-08 RX ORDER — INSULIN GLARGINE 100 [IU]/ML
INJECTION, SOLUTION SUBCUTANEOUS
Qty: 15 ML | Refills: 6 | Status: SHIPPED | OUTPATIENT
Start: 2019-07-08 | End: 2020-01-15 | Stop reason: SDUPTHER

## 2019-07-09 ENCOUNTER — DOCUMENTATION ONLY (OUTPATIENT)
Dept: NEUROLOGY | Age: 29
End: 2019-07-09

## 2019-07-23 ENCOUNTER — CLINICAL SUPPORT (OUTPATIENT)
Dept: SURGERY | Age: 29
End: 2019-07-23

## 2019-07-23 DIAGNOSIS — E66.01 MORBID OBESITY WITH BMI OF 60.0-69.9, ADULT (HCC): Primary | ICD-10-CM

## 2019-07-24 ENCOUNTER — TELEPHONE (OUTPATIENT)
Dept: ENDOCRINOLOGY | Age: 29
End: 2019-07-24

## 2019-07-24 VITALS — BODY MASS INDEX: 60.41 KG/M2 | WEIGHT: 293 LBS

## 2019-07-24 NOTE — TELEPHONE ENCOUNTER
Received page from patient this afternoon around 6 PM.     Patient reports having received a steroid injection yesterday. Since then her sugars were mostly 400's, today mostly 300's. Reports she has not been feeling well with her sugars this high. Patient reports taking:   Lantus 30 units each morning  Glipizide 10mg with breakfast and dinner  Metformin 1000mg twice daily    Review of chart reveals A1c of approx 10% last month, 12.2% in March, and 11.3% back in November of last year. Generally insulin resistant. With this in mind, I have advised her of the following plan:   1. Tall glass of water  2. Minimize dinner time carbs, not yet consumed  3. Take 10mg glipizide as planned with dinner   4. Take evening metformin as planned  5. Inject 20 units additional lantus at dinner time    She takes her lantus in the AM and this AM she has taken 40 units, and so will take the extra 20 with her dinner. I have advised her that if her sugars look much better in the AM, to resume her routine 30 units daily, however if her AM sugars are still high, to stay at her 40 units in the AM. I recommended she update Dr. Tiarra Stevens tomorrow with her progress, and to see if she will benefit from continuing to increase her insulin. Anneliese Villegas.  [address] Endocrinology  [address] [place] [address]

## 2019-07-24 NOTE — PROGRESS NOTES
[id number] [facility] Surgery at [facility]  Supervised Weight Loss     Date:   2019    Patient's Name: Justyna Weinstein  : 1990    Insurance:  [employer]          Session:   Surgery: Sleeve Gastrectomy  Surgeon:  Jose Freitas M.D. Height: 63\"  Weight:    341      Lbs. BMI: 60   Pounds Lost since last month: 0               Pounds Gained since last month: 0    Starting Weight: 341#   Previous Months Weight: 341#  Overall Pounds Lost: 0  Overall Pounds Gained: 0    Other Pertinent Information: n/a     Smoking Status:  Not reported  Alcohol Intake: 3-4 drinks, once per month     I have reviewed with pt the guidelines of the supervised wt loss program.  Pt understands the expectations of some wt loss during the program and that wt gain could delay the process. I have also explained that classes need to be consecutive. Missing a class may result in starting over. Pt has received this information in writing. Changes that patient has made since last month include:  Drinking more water. Eating Habits and Behaviors  A nutrition lesson specific to vitamins was provided. We discussed the various reasons for needing vitamins and different types and doses. General healthy eating guidelines were also discussed. Pts were instructed that their plate should be made up 1/2 plate coming from non-starchy vegetables, 1/4 coming from lean meat, and 1/4 of their plate coming from carbohydrates, including fruits, starches, or milk. We discussed measuring meals to 1/2 cup total per meal after surgery. Drinking only calorie-free, sugar-free and non-carbonated beverages. We discussed the importance of drinking 64 ounces of fluid per day to prevent dehydration post-operatively. Patient's current diet habits include: eating 2-3 meals per day. Snacking on fruit, nutrition bars, pretzels and popsicles. Eating chips and pretzels 1-2 times per week.  Does not report intake of sweets/desserts. Eating a mixture of baked, grilled, broiled and some fried foods. Eating out is 1-3 times per week. Drinking  oz water, 8-12 oz soda, 8-12 oz caffeine. Sometimes emotional eating with chips. Is not packing meals when away from home. Eating most meals at a table and takes 15-20 minutes to finish the meal. Reports grazing, night eating, portion sizes and lack of activity are biggest barriers to wt loss. Physical Activity/Exercise  We talked about the importance of increasing daily physical activity and beginning to develop an exercise regimen/routine. We talked about exercise as being an important part of long term weight loss after surgery. Comments:  During class, I discussed with patient the importance of getting into an exercise routine. Pt is currently not exercising d/t \"working\". Pt has been encouraged to make time for exercise and incorporate a routine exercise regimen. Behavior Modification       We talked about how to eat more mindfully and identify emotional eating triggers. Tips and recommendations for how to make these changes were provided. Pt was encouraged to keep a food journal and record what they were taking in daily. Overall Assessment: Patient demonstrates some small lifestyle changes in preparation for wt loss surgery evidenced by reported changes and weight maintenance. Continued changes are indicated. Will continue to assess. Patient-Set Goals:   1. Nutrition - stop unhealthy snacking, eat breakfast   2. Exercise - start going to bootcamp  3.  Behavior -stop overeating     Jake Zhang, JIMMY  7/24/2019

## 2019-08-20 ENCOUNTER — CLINICAL SUPPORT (OUTPATIENT)
Dept: SURGERY | Age: 29
End: 2019-08-20

## 2019-08-20 DIAGNOSIS — E66.01 MORBID OBESITY WITH BMI OF 60.0-69.9, ADULT (HCC): Primary | ICD-10-CM

## 2019-08-21 VITALS — WEIGHT: 293 LBS | BODY MASS INDEX: 60.94 KG/M2

## 2019-08-21 NOTE — PROGRESS NOTES
[id number] [facility] Surgery at [facility]  Supervised Weight Loss     Date:   2019    Patient's Name: Cleophas Cushing  : 1990    Insurance:  [employer]          Session: 3 of  6  Surgery: Sleeve Gastrectomy  Surgeon:  Mallory Peterson M.D. Height: 63\"  Weight:    344      Lbs. BMI: 60   Pounds Lost since last month: 0               Pounds Gained since last month: 3#    Starting Weight: 341#   Previous Months Weight: 341#  Overall Pounds Lost: 0  Overall Pounds Gained: 3#    Other Pertinent Information: n/a     Smoking Status:  yes  Alcohol Intake: 3-4 drinks \"socially\"    I have reviewed with pt the guidelines of the supervised wt loss program.  Pt understands the expectations of some wt loss during the program and that wt gain could delay the process. I have also explained that classes need to be consecutive. Missing a class may result in starting over. Pt has received this information in writing. Changes that patient has made since last month include:  More water, more fruit, less pasta. Eating Habits and Behaviors  General healthy eating guidelines were also discussed. Pts were instructed that their plate should be made up 1/2 plate coming from non-starchy vegetables, 1/4 coming from lean meat, and 1/4 of their plate coming from carbohydrates, including fruits, starches, or milk. We discussed measuring meals to 1/2 cup total per meal after surgery. Drinking only calorie-free, sugar-free and non-carbonated beverages. We discussed the importance of drinking 64 ounces of fluid per day to prevent dehydration post-operatively. Patient's current diet habits include: eating 2-3 meals per day. Snacking on chips, candy, popcorn. Eating chips, pasta, rice 3-4 times per week. Denies intake of sweets/desserts. Eating mixture of baked, grilled, broiled and fried foods. Eating out is 4-6 times per week. 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 Drinking 50 oz water, 8-12 oz soda, 8-12 oz caffeine. Sometimes emotional eating and uses \"sleeping and smoking\" as non-food coping mechanisms. Eating most meals at a table or while watching television and takes 15-20 minutes to finish the meal. Reports grazing, night eating, food choices, lack of activity and snacking are biggest barriers to wt loss. Physical Activity/Exercise  We talked about the importance of increasing daily physical activity and beginning to develop an exercise regimen/routine. We talked about exercise as being an important part of long term weight loss after surgery. Comments:  During class, I discussed with patient the importance of getting into an exercise routine. Pt is currently not exercising stating \"lack of time\" for activity. Pt has been encouraged to make time and consider short segments of walking spaced throughout the day. Behavior Modification       A behavior modification lesson was provided with an emphasis on developing mindful eating behaviors. We talked about how to eat more mindfully and identify emotional eating triggers. Tips and recommendations for how to make these changes were provided. Pt was encouraged to keep a food journal and record what they were taking in daily. Overall Assessment: Patient demonstrates some small lifestyle changes evidenced mostly by reported changes. Continued changes are indicated. Goals set and recommendations made. Will continue to assesss. Patient-Set Goals:   1. Nutrition - eat more veggies and fruit   2.  Exercise - walk around the office after lunch   3. Behavior -don't eat because I see it    Alis Moura, RD  8/21/2019

## 2019-08-26 ENCOUNTER — OFFICE VISIT (OUTPATIENT)
Dept: URGENT CARE | Age: 29
End: 2019-08-26

## 2019-08-26 VITALS
TEMPERATURE: 97.7 F | BODY MASS INDEX: 51.91 KG/M2 | SYSTOLIC BLOOD PRESSURE: 138 MMHG | HEART RATE: 100 BPM | RESPIRATION RATE: 16 BRPM | HEIGHT: 63 IN | OXYGEN SATURATION: 100 % | WEIGHT: 293 LBS | DIASTOLIC BLOOD PRESSURE: 66 MMHG

## 2019-08-26 DIAGNOSIS — M25.512 ACUTE PAIN OF LEFT SHOULDER: Primary | ICD-10-CM

## 2019-08-26 RX ORDER — METHOCARBAMOL 750 MG/1
750 TABLET, FILM COATED ORAL
Qty: 20 TAB | Refills: 0 | Status: SHIPPED | OUTPATIENT
Start: 2019-08-26 | End: 2019-10-10

## 2019-08-26 RX ORDER — NAPROXEN 500 MG/1
500 TABLET ORAL 2 TIMES DAILY WITH MEALS
Qty: 20 TAB | Refills: 0 | Status: SHIPPED | OUTPATIENT
Start: 2019-08-26 | End: 2019-09-24

## 2019-08-26 NOTE — LETTER
[address] [address] 
[phone] Work/School Note Date: 8/26/2019 To Whom It May concern: 
 
Jossue Williamson was seen and treated today in the urgent care center by the following provider: Dr. Cesar Galvan. Haley Mabry. Sincerely, Ya Meza RN

## 2019-08-27 NOTE — PATIENT INSTRUCTIONS
Rotator Cuff: Exercises  Introduction  Here are some examples of exercises for you to try. The exercises may be suggested for a condition or for rehabilitation. Start each exercise slowly. Ease off the exercises if you start to have pain. You will be told when to start these exercises and which ones will work best for you. How to do the exercises  Pendulum swing    1. Hold on to a table or the back of a chair with your good arm. Then bend forward a little and let your sore arm hang straight down. This exercise does not use the arm muscles. Rather, use your legs and your hips to create movement that makes your arm swing freely. 2. Use the movement from your hips and legs to guide the slightly swinging arm back and forth like a pendulum (or elephant trunk). Then guide it in circles that start small (about the size of a dinner plate). Make the circles a bit larger each day, as your pain allows. 3. Do this exercise for 5 minutes, 5 to 7 times each day. 4. As you have less pain, try bending over a little farther to do this exercise. This will increase the amount of movement at your shoulder. Posterior stretching exercise    1. Hold the elbow of your injured arm with your other hand. 2. Use your hand to pull your injured arm gently up and across your body. You will feel a gentle stretch across the back of your injured shoulder. 3. Hold for at least 15 to 30 seconds. Then slowly lower your arm. 4. Repeat 2 to 4 times. Up-the-back stretch    1. Put your hand in your back pocket. Let it rest there to stretch your shoulder. 2. With your other hand, hold your injured arm (palm outward) behind your back by the wrist. Pull your arm up gently to stretch your shoulder. 3. Next, put a towel over your other shoulder. Put the hand of your injured arm behind your back. Now hold the back end of the towel. With the other hand, hold the front end of the towel in front of your body.  Pull gently on the front end of the towel. This will bring your hand farther up your back to stretch your shoulder. Overhead stretch    1. Standing about an arm's length away, grasp onto a solid surface. You could use a countertop, a doorknob, or the back of a sturdy chair. 2. With your knees slightly bent, bend forward with your arms straight. Lower your upper body, and let your shoulders stretch. 3. As your shoulders are able to stretch farther, you may need to take a step or two backward. 4. Hold for at least 15 to 30 seconds. Then stand up and relax. If you had stepped back during your stretch, step forward so you can keep your hands on the solid surface. 5. Repeat 2 to 4 times. Shoulder flexion (lying down)    1. Lie on your back, holding a wand with both hands. Your palms should face down as you hold the wand. 2. Keeping your elbows straight, slowly raise your arms over your head. Raise them until you feel a stretch in your shoulders, upper back, and chest.  3. Hold for 15 to 30 seconds. 4. Repeat 2 to 4 times. Shoulder rotation (lying down)    1. Lie on your back. Hold a wand with both hands with your elbows bent and palms up. 2. Keep your elbows close to your body, and move the wand across your body toward the sore arm. 3. Hold for 8 to 12 seconds. 4. Repeat 2 to 4 times. Wall climbing (to the side)    1. Stand with your side to a wall so that your fingers can just touch it at an angle about 30 degrees toward the front of your body. 2. Walk the fingers of your injured arm up the wall as high as pain permits. Try not to shrug your shoulder up toward your ear as you move your arm up. 3. Hold that position for a count of at least 15 to 20.  4. Walk your fingers back down to the starting position. 5. Repeat at least 2 to 4 times. Try to reach higher each time. Wall climbing (to the front)    1. Face a wall, and stand so your fingers can just touch it.   2. Keeping your shoulder down, walk the fingers of your injured arm up the wall as high as pain permits. (Don't shrug your shoulder up toward your ear.)  3. Hold your arm in that position for at least 15 to 30 seconds. 4. Slowly walk your fingers back down to where you started. 5. Repeat at least 2 to 4 times. Try to reach higher each time. Shoulder blade squeeze    1. Stand with your arms at your sides, and squeeze your shoulder blades together. Do not raise your shoulders up as you squeeze. 2. Hold 6 seconds. 3. Repeat 8 to 12 times. Scapular exercise: Arm reach    1. Lie flat on your back. This exercise is a very slight motion that starts with your arms raised (elbows straight, arms straight). 2. From this position, reach higher toward the gab or ceiling. Keep your elbows straight. All motion should be from your shoulder blade only. 3. Relax your arms back to where you started. 4. Repeat 8 to 12 times. Arm raise to the side    1. Slowly raise your injured arm to the side, with your thumb facing up. Raise your arm 60 degrees at the most (shoulder level is 90 degrees). 2. Hold the position for 3 to 5 seconds. Then lower your arm back to your side. If you need to, bring your \"good\" arm across your body and place it under the elbow as you lower your injured arm. Use your good arm to keep your injured arm from dropping down too fast.  3. Repeat 8 to 12 times. 4. When you first start out, don't hold any extra weight in your hand. As you get stronger, you may use a 1-pound to 2-pound dumbbell or a small can of food. Shoulder flexor and extensor exercise    1. Push forward (flex): Stand facing a wall or doorjamb, about 6 inches or less back. Hold your injured arm against your body. Make a closed fist with your thumb on top. Then gently push your hand forward into the wall with about 25% to 50% of your strength. Don't let your body move backward as you push. Hold for about 6 seconds. Relax for a few seconds. Repeat 8 to 12 times.   2. Push backward (extend): Stand with your back flat against a wall. Your upper arm should be against the wall, with your elbow bent 90 degrees (your hand straight ahead). Push your elbow gently back against the wall with about 25% to 50% of your strength. Don't let your body move forward as you push. Hold for about 6 seconds. Relax for a few seconds. Repeat 8 to 12 times. Scapular exercise: Wall push-ups    1. Stand facing a wall, about 12 inches to 18 inches away. 2. Place your hands on the wall at shoulder height. 3. Slowly bend your elbows and bring your face to the wall. Keep your back and hips straight. 4. Push back to where you started. 5. Repeat 8 to 12 times. 6. When you can do this exercise against a wall comfortably, you can try it against a counter. You can then slowly progress to the end of a couch, then to a sturdy chair, and finally to the floor. Scapular exercise: Retraction    1. Put the band around a solid object at about waist level. (A bedpost will work well.) Each hand should hold an end of the band. 2. With your elbows at your sides and bent to 90 degrees, pull the band back. Your shoulder blades should move toward each other. Then move your arms back where you started. 3. Repeat 8 to 12 times. 4. If you have good range of motion in your shoulders, try this exercise with your arms lifted out to the sides. Keep your elbows at a 90-degree angle. Raise the elastic band up to about shoulder level. Pull the band back to move your shoulder blades toward each other. Then move your arms back where you started. Internal rotator strengthening exercise    1. Start by tying a piece of elastic exercise material to a doorknob. You can use surgical tubing or Thera-Band. 2. Stand or sit with your shoulder relaxed and your elbow bent 90 degrees. Your upper arm should rest comfortably against your side. Squeeze a rolled towel between your elbow and your body for comfort. This will help keep your arm at your side.   3. Hold one end of the elastic band in the hand of the painful arm. 4. Slowly rotate your forearm toward your body until it touches your belly. Slowly move it back to where you started. 5. Keep your elbow and upper arm firmly tucked against the towel roll or at your side. 6. Repeat 8 to 12 times. External rotator strengthening exercise    1. Start by tying a piece of elastic exercise material to a doorknob. You can use surgical tubing or Thera-Band. (You may also hold one end of the band in each hand.)  2. Stand or sit with your shoulder relaxed and your elbow bent 90 degrees. Your upper arm should rest comfortably against your side. Squeeze a rolled towel between your elbow and your body for comfort. This will help keep your arm at your side. 3. Hold one end of the elastic band with the hand of the painful arm. 4. Start with your forearm across your belly. Slowly rotate the forearm out away from your body. Keep your elbow and upper arm tucked against the towel roll or the side of your body until you begin to feel tightness in your shoulder. Slowly move your arm back to where you started. 5. Repeat 8 to 12 times. Follow-up care is a key part of your treatment and safety. Be sure to make and go to all appointments, and call your doctor if you are having problems. It's also a good idea to know your test results and keep a list of the medicines you take. Where can you learn more? Go to http://donavon-stacy.info/. Enter Chelsea Villafana in the search box to learn more about \"Rotator Cuff: Exercises. \"  Current as of: September 20, 2018  Content Version: 12.1  © 4101-0299 [employer]. Care instructions adapted under license by [employer] (which disclaims liability or warranty for this information).  If you have questions about a medical condition or this instruction, always ask your healthcare professional. [address] any warranty or liability for your use of this information. Shoulder Stretches: Exercises  Your Care Instructions  Here are some examples of exercises for your shoulder. Start each exercise slowly. Ease off the exercise if you start to have pain. Your doctor or physical therapist will tell you when you can start these exercises and which ones will work best for you. How to do the exercises  Shoulder stretch    1.  a doorway and place one arm against the door frame. Your elbow should be a little higher than your shoulder. 2. Relax your shoulders as you lean forward, allowing your chest and shoulder muscles to stretch. You can also turn your body slightly away from your arm to stretch the muscles even more. 3. Hold for 15 to 30 seconds. 4. Repeat 2 to 4 times with each arm. Shoulder and chest stretch    1. Shoulder and chest stretch  2. While sitting, relax your upper body so you slump slightly in your chair. 3. As you breathe in, straighten your back and open your arms out to the sides. 4. Gently pull your shoulder blades back and downward. 5. Hold for 15 to 30 seconds as your breathe normally. 6. Repeat 2 to 4 times. Overhead stretch    1. Reach up over your head with both arms. 2. Hold for 15 to 30 seconds. 3. Repeat 2 to 4 times. Follow-up care is a key part of your treatment and safety. Be sure to make and go to all appointments, and call your doctor if you are having problems. It's also a good idea to know your test results and keep a list of the medicines you take. Where can you learn more? Go to http://donavon-stacy.info/. Enter S254 in the search box to learn more about \"Shoulder Stretches: Exercises. \"  Current as of: September 20, 2018  Content Version: 12.1  © 8869-5663 [employer]. Care instructions adapted under license by [employer] (which disclaims liability or warranty for this information).  If you have questions about a medical condition or this instruction, always ask your healthcare professional. [place] [address] any warranty or liability for your use of this information.

## 2019-08-27 NOTE — PROGRESS NOTES
Neck Pain    The history is provided by the patient. This is a new problem. The current episode started yesterday. The problem occurs constantly. The problem has not changed since onset. The pain is associated with a fall (h/o falling backward with out stretched left arm ). There has been no fever. Pain location: anterior base of neck  The quality of the pain is described as aching. The pain radiates to the left shoulder. The pain is at a severity of 7/10. The pain is moderate. The symptoms are aggravated by bending and certain positions. She has tried nothing for the symptoms. Shoulder Pain   This is a new problem. The current episode started yesterday. The problem occurs constantly. The problem has not changed since onset. Associated symptoms comments: Diffuse shoulder pain- mild  Pain with movements. The symptoms are aggravated by bending and twisting.         Past Medical History:   Diagnosis Date    Asthma     Chappells hump 1/7/2015    Carpal tunnel syndrome of right wrist 12/20/2016    Diabetes ([facility] [address]     ALIYA (generalized anxiety disorder) 4/4/2018    GERD (gastroesophageal reflux disease) 8/13/2014    HTN (hypertension) 7/25/2011    Hyperaldosteronism ([facility] [address] 10/19/2015    Hyperhidrosis 4/4/2018    Other ill-defined conditions(799.89)     migraines    Palpitation 4/4/2018    Tachycardia 4/4/2018        Past Surgical History:   Procedure Laterality Date    REMOVAL OF TONSILS,<12 [de-identified]           Family History   Problem Relation Age of Onset   22 [facility] Arthritis-rheumatoid Mother     Hypertension Mother     Diabetes Mother         Type II    Psychiatric Disorder Father     Drug Abuse Father     Cancer Other         prostate    Hypertension Sister     Thyroid Disease Sister         \"I think\"    Attention Deficit Hyperactivity Disorder Brother     Hypertension Sister         Social History     Socioeconomic History    Marital status: SINGLE     Spouse name: Not on file    Number of children: Not on file    Years of education: Not on file    Highest education level: Not on file   Occupational History    Occupation: customer ser and student     Comment: [employer]   Social Needs    Financial resource strain: Not on file    Food insecurity:     Worry: Not on file     Inability: Not on file    Transportation needs:     Medical: Not on file     Non-medical: Not on file   Tobacco Use    Smoking status: Light Tobacco Smoker     Packs/day: 0.50     Years: 5.00     Pack years: 2.50    Smokeless tobacco: Never Used   Substance and Sexual Activity    Alcohol use: Yes     Alcohol/week: 0.0 standard drinks     Types: 1 Standard drinks or equivalent per week     Comment: socially, Monthly or less    Drug use: No    Sexual activity: Not Currently     Partners: Male     Birth control/protection: Condom   Lifestyle    Physical activity:     Days per week: Not on file     Minutes per session: Not on file    Stress: Not on file   Relationships    Social connections:     Talks on phone: Not on file     Gets together: Not on file     Attends Yarsanism service: Not on file     Active member of club or organization: Not on file     Attends meetings of clubs or organizations: Not on file     Relationship status: Not on file    Intimate partner violence:     Fear of current or ex partner: Not on file     Emotionally abused: Not on file     Physically abused: Not on file     Forced sexual activity: Not on file   Other Topics Concern    Not on file   Social History Narrative    Not on file                ALLERGIES: Latex    Review of Systems   Musculoskeletal: Positive for neck pain. All other systems reviewed and are negative. Vitals:    08/26/19 1955   BP: 138/66   Pulse: 100   Resp: 16   Temp: 97.7 °F (36.5 °C)   SpO2: 100%   Weight: 344 lb (156 kg)   Height: 5' 3\" (1.6 m)       Physical Exam   Neck: Neck supple.    Musculoskeletal:        Left shoulder: She exhibits decreased range of motion, tenderness (on clavicle and anterior shoulder ), swelling, pain, spasm and decreased strength. Left elbow: Normal.        Left wrist: Normal.        Cervical back: She exhibits decreased range of motion (rotaton on rt side ). She exhibits no tenderness, no bony tenderness and no swelling. Arms:  Nursing note and vitals reviewed. MDM    Procedures      ICD-10-CM ICD-9-CM    1. Acute pain of left shoulder M25.512 719.41 XR CLAVICLE LT      SLINGS     Medications Ordered Today   Medications    naproxen (NAPROSYN) 500 mg tablet     Sig: Take 1 Tab by mouth two (2) times daily (with meals). Dispense:  20 Tab     Refill:  0    methocarbamol (ROBAXIN) 750 mg tablet     Sig: Take 1 Tab by mouth two (2) times daily as needed for Pain. Dispense:  20 Tab     Refill:  0     Results for orders placed or performed in visit on 08/26/19   XR CLAVICLE LT    Narrative    EXAM: XR CLAVICLE LT    INDICATION: shoulder pain after falling yesterday. COMPARISON: None. FINDINGS: Two views of the left clavicle demonstrate no fracture. Impression    IMPRESSION: No acute abnormality. The patients condition was discussed with the patient and they understand. The patient is to follow up with primary care doctor. If signs and symptoms become worse the pt is to go to the ER. The patient is to take medications as prescribed.

## 2019-09-03 RX ORDER — METFORMIN HYDROCHLORIDE 500 MG/1
TABLET, EXTENDED RELEASE ORAL
Qty: 120 TAB | Refills: 11 | Status: SHIPPED | OUTPATIENT
Start: 2019-09-03 | End: 2020-03-26 | Stop reason: SDUPTHER

## 2019-09-24 ENCOUNTER — OFFICE VISIT (OUTPATIENT)
Dept: ENDOCRINOLOGY | Age: 29
End: 2019-09-24

## 2019-09-24 ENCOUNTER — CLINICAL SUPPORT (OUTPATIENT)
Dept: SURGERY | Age: 29
End: 2019-09-24

## 2019-09-24 VITALS
HEIGHT: 63 IN | WEIGHT: 293 LBS | HEART RATE: 93 BPM | BODY MASS INDEX: 51.91 KG/M2 | SYSTOLIC BLOOD PRESSURE: 123 MMHG | DIASTOLIC BLOOD PRESSURE: 76 MMHG

## 2019-09-24 VITALS — BODY MASS INDEX: 61.11 KG/M2 | WEIGHT: 293 LBS

## 2019-09-24 DIAGNOSIS — E11.9 TYPE 2 DIABETES MELLITUS WITHOUT COMPLICATION, WITH LONG-TERM CURRENT USE OF INSULIN (HCC): Primary | ICD-10-CM

## 2019-09-24 DIAGNOSIS — Z79.4 TYPE 2 DIABETES MELLITUS WITHOUT COMPLICATION, WITH LONG-TERM CURRENT USE OF INSULIN (HCC): Primary | ICD-10-CM

## 2019-09-24 DIAGNOSIS — E66.01 MORBID OBESITY WITH BMI OF 60.0-69.9, ADULT (HCC): Primary | ICD-10-CM

## 2019-09-24 DIAGNOSIS — E66.01 CLASS 3 SEVERE OBESITY DUE TO EXCESS CALORIES WITH SERIOUS COMORBIDITY AND BODY MASS INDEX (BMI) OF 60.0 TO 69.9 IN ADULT (HCC): ICD-10-CM

## 2019-09-24 DIAGNOSIS — I10 ESSENTIAL HYPERTENSION: ICD-10-CM

## 2019-09-24 LAB — HBA1C MFR BLD HPLC: 8.9 %

## 2019-09-24 NOTE — PATIENT INSTRUCTIONS
1) Stop your Glipizide 10mg in the morning, continue the 10mg with dinner. 2) Continue the Ozempic, Metformin and Basaglar. 3) Send my your sugar readings in a month.

## 2019-09-24 NOTE — PROGRESS NOTES
on [address] [facility] at [facility]  Supervised Weight Loss     Date:   2019    Patient's Name: La Gaspar  : 1990    Insurance:  Braulio Perez          Session:   Surgery: Sleeve Gastrectomy  Surgeon:  Kandace Hays M.D. Height: 63\"  Weight:    345      Lbs. BMI: 61   Pounds Lost since last month: 0               Pounds Gained since last month: 1#    Starting Weight: 341#   Previous Months Weight: 344#  Overall Pounds Lost: 0  Overall Pounds Gained: 4#    Other Pertinent Information: n/a     Smoking Status:  none  Alcohol Intake: none    I have reviewed with pt the guidelines of the supervised wt loss program.  Pt understands the expectations of some wt loss during the program and that wt gain could delay the process. I have also explained that classes need to be consecutive. Missing a class may result in starting over. Pt has received this information in writing. Changes that patient has made since last month include:  Drinking more water, walking 3 times per week during lunch break, portion control. Eating Habits and Behaviors  General healthy eating guidelines were discussed. A nutrition lesson was presented on portion control. Patients were instructed implement portion control now using the balanced plate method (1/2 plate non-starchy vegetables, 1/4 plate lean meat, and 1/4 plate whole grains and to include fruit and/or milk at meals or snack). We discussed measuring meals to 1/2 cup total per meal after surgery and appropriate portion progression long term. Patient's current diet habits include: eating 2-3 meals per day. Snacking on chips, crackers, nutrient bars, mini chocolate candy. Eating chips, crackers and pasta 3-4 times per week. Eating baked, grilled, broiled and fried foods. Eating out is 1-3 times per week. Drinking 36 oz water, 12 oz soda, 12 oz caffeine. Sometimes emotional eating.  reports grazing, night eating, food choices and lack of activity are biggest barriers to wt loss. Physical Activity/Exercise  We talked about the importance of increasing daily physical activity and beginning to develop an exercise regimen/routine. We talked about exercise as being an important part of long term weight loss after surgery. Comments:  During class, I discussed with patient the importance of getting into an exercise routine. Pt is currently walking during lunch break for activity. Pt has been encouraged to make time for exercise and consider short segments of activity spaced throughout the day. Behavior Modification       We talked about how to eat more mindfully. Tips and recommendations for how to make these changes were provided. Pt was encouraged to keep a food journal and record what they were taking in daily. Overall Assessment: Patient demonstrates some small/appropriate lifestyle changes in preparation for bariatric surgery evidenced by reported changes. Will continue to assess. Patient-Set Goals:   1. Nutrition - eat more fruits and veggies   2.  Exercise - walking with co-workers during lunch   3. Behavior -stop going back for seconds     Jake Zhang, JIMMY  9/24/2019

## 2019-09-24 NOTE — PROGRESS NOTES
Chief Complaint   Patient presents with    Diabetes     pcp and pharmacy verified   Records since last visit reviewed. History of Present Illness: Iman Quintero is a 30 y.o. female here for follow up of diabetes. She was diagnosed with diabetes \"when I was in high school\". Because pt has features concerning for cushing's her PCP checked a 24 hour urine cortisol which was not elevated (42). She also had a TSH drawn which was 2.10. In March 2016 we tested pt for hyperaldosteronism, which was negative. I tested her for evidence of PCOS, her Testosterone was 14, with DHEA-S 109, her 17-OH Prog was not elevated and an overnight dexamethasone suppression test did suppress her ACTH and cortisol. At her last visit in June 2019 her A1C had improved from 12.2% down to 9.9% and she had weight loss. We initially continued her regimen, but when her BGs did not continue to improve by late August I increased her Ozempic to 1.0mg weekly. Her A1C today was down from 9.9% to 8.9%. Her weight today was 344 pounds. Pt is currently taking Basaglar 30 units, Glipizide 10mg BID and Metformin 1000mg BID and Ozempic 1.0mg weekly. Pt notes that she has had BGs under 60's and has had BGs in the 80's at work and she will get lightheaded and will eat. She spoke with Dr. Delroy Pedersen of bariatric surgery to discuss gastric sleeve. She is now seeing a nutritionist as part of the bariatric program.    She is now working day shift and working part time at night. She wakes around 630AM.  She has breakfast around 7AM, this AM she did not eat breakfast. She has been trying to eat two boiled eggs in the morning. She is now working 8AM-5PM and a part-time from [employer].  She notes that she will snack on PB crackers or \"mini candies\" if her BGs get low. She has lunch around [place], yesterday she had a mixed green salad with balsamic vinaigrette, 3 meat balls, mac and cheese and lemonade.    She has diner around 6-7PM, before her evening job. Last night she had oodles of noodles and orange soda and water. She will snack on chips or sunflower seeds during her evening shift. She is going to bed around 1130PM-[place]. She denies any HS snacking. 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  She is taking a 15 minute walk after lunch with her co-wokers. She was perscibed contrave, but it was too expensive so she never took it. She tried phentermine for a month and she notes it did not help and she stopped taking it. No history of vascular disease. No history of neuropathy, or nephropathy. Last eye exam was March 2019, no retinopathy. Will request these records. No recent steroids or prednisone. No recent illnesses, injuries or hospitalizations. Current Outpatient Medications   Medication Sig    metFORMIN ER (GLUCOPHAGE XR) 500 mg tablet TAKE 2 TABLETS BY MOUTH WITH BREAKFAST AND TAKE 2 TABLETS WITH DINNER    gabapentin (NEURONTIN) 300 mg capsule Take 1 Cap by mouth three (3) times daily. Max Daily Amount: 900 mg.    insulin glargine (LANTUS SOLOSTAR U-100 INSULIN) 100 unit/mL (3 mL) inpn 30 units daily    omeprazole (PRILOSEC) 40 mg capsule TAKE 1 CAP BY MOUTH DAILY.  glipiZIDE (GLUCOTROL) 5 mg tablet TAKE 2 TABS BY MOUTH TWO (2) TIMES A DAY.  semaglutide (OZEMPIC) 0.25 mg/0.2 mL (2 mg/1.5 mL) sub-q pen 0.5 mg by SubCUTAneous route every seven (7) days.  ALPRAZolam (XANAX) 0.5 mg tablet Take 1 Tab by mouth nightly as needed for Anxiety or Sleep. Max Daily Amount: 0.5 mg.    metoprolol tartrate (LOPRESSOR) 100 mg IR tablet Take 1 Tab by mouth two (2) times a day.  spironolactone (ALDACTONE) 25 mg tablet Take 1 Tab by mouth two (2) times a day.  atorvastatin (LIPITOR) 40 mg tablet Take 1 Tab by mouth daily.  fluticasone (FLONASE) 50 mcg/actuation nasal spray 2 Sprays by Both Nostrils route daily.  (Patient taking differently: 2 Sprays by Both Nostrils route daily. PRN)    flash glucose sensor (FREESTYLE RUBEN 14 DAY SENSOR) kit Change sensor every 14 days    flash glucose scanning reader (FREESTYLE RUBEN 14 DAY READER) misc Dispense one 14 day reader    butalbital-acetaminophen-caffeine (FIORICET) -40 mg per tablet Take 0.5-1 Tabs by mouth every six (6) hours as needed for Headache. Max Daily Amount: 4 Tabs.  cetirizine (ZYRTEC) 10 mg tablet Take  by mouth daily as needed.  albuterol (PROVENTIL HFA, VENTOLIN HFA) 90 mcg/actuation inhaler Take 1 Puff by inhalation every four (4) hours as needed for Wheezing. (Patient taking differently: Take 1 Puff by inhalation every four (4) hours as needed for Wheezing (PRN). )    albuterol (ACCUNEB) 1.25 mg/3 mL nebulizer solution Take 3 mL by inhalation every six (6) hours as needed for Wheezing.  acetaminophen (TYLENOL EXTRA STRENGTH) 500 mg tablet Take 1 Tab by mouth every six (6) hours as needed for Pain.  methocarbamol (ROBAXIN) 750 mg tablet Take 1 Tab by mouth two (2) times daily as needed for Pain. No current facility-administered medications for this visit. Allergies   Allergen Reactions    Latex Itching     Review of Systems:  - Eyes: no blurry vision or double vision  - Cardiovascular: no chest pain  - Respiratory: no shortness of breath  - Musculoskeletal: no myalgias  - Neurological: no numbness/tingling in extremities    Physical Examination:  Blood pressure 123/76, pulse 93, height 5' 3\" (1.6 m), weight 344 lb 9.6 oz (156.3 kg).   - General: pleasant, no distress, good eye contact   - Neck: no carotid bruits  - Cardiovascular: regular, normal rate, nl s1 and s2, no m/r/g, 2+ DP pulses   - Respiratory: clear bilaterally  - Integumentary: 1+ edema, no foot ulcers, + Acanthosis  - Psychiatric: normal mood and affect    Diabetic foot exam:     Left Foot:   Visual Exam: normal    Pulse DP: 2+ (normal)   Filament test: normal sensation    Vibratory sensation: normal Right Foot:   Visual Exam: normal    Pulse DP: 2+ (normal)   Filament test: normal sensation    Vibratory sensation: normal        Data Reviewed:   Her A1C today was 8.9%. Assessment/Plan:   1) DM > Her A1C today was 8.9%. She has had low BGs, mostly in the afternoon, but she has had some low BGs in the early morning. Will have pt stop the AM Glipizide, continue the Basaglar to 30 units daily, Metformin 1000mg BID, Glipizide 10mg with dinner and Ozempic 1.0mg weekly  Pt to send me her BG readings in 4 weeks. 2) Obesity > Pt is trying to arrange her schedule so that she can restart the \"boot camp\". The Ozempic will help with the continued weight loss. Pt has met with a bariatric surgeon and discussed gastric sleeve. 3) HTN > BP at goal on her current regimen    RTC 3 months    Pt voices understanding and agreement with the plan. Copy sent to:  Mansi Hooker

## 2019-10-02 ENCOUNTER — OFFICE VISIT (OUTPATIENT)
Dept: FAMILY MEDICINE CLINIC | Age: 29
End: 2019-10-02

## 2019-10-02 VITALS
RESPIRATION RATE: 20 BRPM | WEIGHT: 293 LBS | OXYGEN SATURATION: 97 % | SYSTOLIC BLOOD PRESSURE: 134 MMHG | HEIGHT: 63 IN | TEMPERATURE: 98 F | HEART RATE: 89 BPM | BODY MASS INDEX: 51.91 KG/M2 | DIASTOLIC BLOOD PRESSURE: 86 MMHG

## 2019-10-02 DIAGNOSIS — F41.1 GAD (GENERALIZED ANXIETY DISORDER): ICD-10-CM

## 2019-10-02 DIAGNOSIS — J20.8 ACUTE BRONCHITIS DUE TO OTHER SPECIFIED ORGANISMS: ICD-10-CM

## 2019-10-02 DIAGNOSIS — F32.A FATIGUE DUE TO DEPRESSION: ICD-10-CM

## 2019-10-02 DIAGNOSIS — G89.29 CHRONIC LEFT SHOULDER PAIN: Primary | ICD-10-CM

## 2019-10-02 DIAGNOSIS — R53.83 FATIGUE DUE TO DEPRESSION: ICD-10-CM

## 2019-10-02 DIAGNOSIS — M25.512 CHRONIC LEFT SHOULDER PAIN: Primary | ICD-10-CM

## 2019-10-02 RX ORDER — DICLOFENAC SODIUM 10 MG/G
4 GEL TOPICAL 4 TIMES DAILY
Qty: 100 G | Refills: 0
Start: 2019-10-02 | End: 2020-01-02 | Stop reason: ALTCHOICE

## 2019-10-02 RX ORDER — AZITHROMYCIN 250 MG/1
TABLET, FILM COATED ORAL
Qty: 6 TAB | Refills: 0 | Status: SHIPPED | OUTPATIENT
Start: 2019-10-02 | End: 2019-10-10 | Stop reason: ALTCHOICE

## 2019-10-02 RX ORDER — DICLOFENAC SODIUM 75 MG/1
75 TABLET, DELAYED RELEASE ORAL
Qty: 20 TAB | Refills: 0 | Status: SHIPPED | OUTPATIENT
Start: 2019-10-02 | End: 2020-04-06 | Stop reason: ALTCHOICE

## 2019-10-02 RX ORDER — FLUCONAZOLE 150 MG/1
150 TABLET ORAL DAILY
Qty: 1 TAB | Refills: 0 | Status: SHIPPED | OUTPATIENT
Start: 2019-10-02 | End: 2019-10-03

## 2019-10-02 RX ORDER — GUAIFENESIN AND DEXTROMETHORPHAN HYDROBROMIDE 1200; 60 MG/1; MG/1
1 TABLET, EXTENDED RELEASE ORAL
Qty: 30 TAB | Refills: 0 | Status: SHIPPED | OUTPATIENT
Start: 2019-10-02 | End: 2020-05-18

## 2019-10-02 RX ORDER — VILAZODONE HYDROCHLORIDE 10 MG/1
10 TABLET ORAL DAILY
Qty: 30 TAB | Refills: 1 | Status: SHIPPED | OUTPATIENT
Start: 2019-10-02 | End: 2019-11-15 | Stop reason: DRUGHIGH

## 2019-10-02 NOTE — PROGRESS NOTES
Name and  verified      Chief Complaint   Patient presents with    Shoulder Pain     Left    Nasal Congestion    Cough         Health Maintenance reviewed-discussed with patient. 1. Have you been to the ER, urgent care clinic since your last visit? Hospitalized since your last visit? no    2. Have you seen or consulted any other health care providers outside of the [address] [place] [address] since your last visit? Include any pap smears or colon screening.  no

## 2019-10-02 NOTE — PATIENT INSTRUCTIONS
Rotator Cuff: Exercises Introduction Here are some examples of exercises for you to try. The exercises may be suggested for a condition or for rehabilitation. Start each exercise slowly. Ease off the exercises if you start to have pain. You will be told when to start these exercises and which ones will work best for you. How to do the exercises Pendulum swing 1. Hold on to a table or the back of a chair with your good arm. Then bend forward a little and let your sore arm hang straight down. This exercise does not use the arm muscles. Rather, use your legs and your hips to create movement that makes your arm swing freely. 2. Use the movement from your hips and legs to guide the slightly swinging arm back and forth like a pendulum (or elephant trunk). Then guide it in circles that start small (about the size of a dinner plate). Make the circles a bit larger each day, as your pain allows. 3. Do this exercise for 5 minutes, 5 to 7 times each day. 4. As you have less pain, try bending over a little farther to do this exercise. This will increase the amount of movement at your shoulder. Posterior stretching exercise 1. Hold the elbow of your injured arm with your other hand. 2. Use your hand to pull your injured arm gently up and across your body. You will feel a gentle stretch across the back of your injured shoulder. 3. Hold for at least 15 to 30 seconds. Then slowly lower your arm. 4. Repeat 2 to 4 times. Up-the-back stretch 1. Put your hand in your back pocket. Let it rest there to stretch your shoulder. 2. With your other hand, hold your injured arm (palm outward) behind your back by the wrist. Pull your arm up gently to stretch your shoulder. 3. Next, put a towel over your other shoulder. Put the hand of your injured arm behind your back. Now hold the back end of the towel. With the other hand, hold the front end of the towel in front of your body.  Pull gently on the front end of the towel. This will bring your hand farther up your back to stretch your shoulder. Overhead stretch 1. Standing about an arm's length away, grasp onto a solid surface. You could use a countertop, a doorknob, or the back of a sturdy chair. 2. With your knees slightly bent, bend forward with your arms straight. Lower your upper body, and let your shoulders stretch. 3. As your shoulders are able to stretch farther, you may need to take a step or two backward. 4. Hold for at least 15 to 30 seconds. Then stand up and relax. If you had stepped back during your stretch, step forward so you can keep your hands on the solid surface. 5. Repeat 2 to 4 times. Shoulder flexion (lying down) 1. Lie on your back, holding a wand with both hands. Your palms should face down as you hold the wand. 2. Keeping your elbows straight, slowly raise your arms over your head. Raise them until you feel a stretch in your shoulders, upper back, and chest. 
3. Hold for 15 to 30 seconds. 4. Repeat 2 to 4 times. Shoulder rotation (lying down) 1. Lie on your back. Hold a wand with both hands with your elbows bent and palms up. 2. Keep your elbows close to your body, and move the wand across your body toward the sore arm. 3. Hold for 8 to 12 seconds. 4. Repeat 2 to 4 times. Wall climbing (to the side) 1. Stand with your side to a wall so that your fingers can just touch it at an angle about 30 degrees toward the front of your body. 2. Walk the fingers of your injured arm up the wall as high as pain permits. Try not to shrug your shoulder up toward your ear as you move your arm up. 3. Hold that position for a count of at least 15 to 20. 
4. Walk your fingers back down to the starting position. 5. Repeat at least 2 to 4 times. Try to reach higher each time. Wall climbing (to the front) 1. Face a wall, and stand so your fingers can just touch it. 2. Keeping your shoulder down, walk the fingers of your injured arm up the wall as high as pain permits. (Don't shrug your shoulder up toward your ear.) 3. Hold your arm in that position for at least 15 to 30 seconds. 4. Slowly walk your fingers back down to where you started. 5. Repeat at least 2 to 4 times. Try to reach higher each time. Shoulder blade squeeze 1. Stand with your arms at your sides, and squeeze your shoulder blades together. Do not raise your shoulders up as you squeeze. 2. Hold 6 seconds. 3. Repeat 8 to 12 times. Scapular exercise: Arm reach 1. Lie flat on your back. This exercise is a very slight motion that starts with your arms raised (elbows straight, arms straight). 2. From this position, reach higher toward the gab or ceiling. Keep your elbows straight. All motion should be from your shoulder blade only. 3. Relax your arms back to where you started. 4. Repeat 8 to 12 times. Arm raise to the side 1. Slowly raise your injured arm to the side, with your thumb facing up. Raise your arm 60 degrees at the most (shoulder level is 90 degrees). 2. Hold the position for 3 to 5 seconds. Then lower your arm back to your side. If you need to, bring your \"good\" arm across your body and place it under the elbow as you lower your injured arm. Use your good arm to keep your injured arm from dropping down too fast. 
3. Repeat 8 to 12 times. 4. When you first start out, don't hold any extra weight in your hand. As you get stronger, you may use a 1-pound to 2-pound dumbbell or a small can of food. Shoulder flexor and extensor exercise 1. Push forward (flex): Stand facing a wall or doorjamb, about 6 inches or less back. Hold your injured arm against your body. Make a closed fist with your thumb on top. Then gently push your hand forward into the wall with about 25% to 50% of your strength.  Don't let your body move backward as you push. Hold for about 6 seconds. Relax for a few seconds. Repeat 8 to 12 times. 2. Push backward (extend): Stand with your back flat against a wall. Your upper arm should be against the wall, with your elbow bent 90 degrees (your hand straight ahead). Push your elbow gently back against the wall with about 25% to 50% of your strength. Don't let your body move forward as you push. Hold for about 6 seconds. Relax for a few seconds. Repeat 8 to 12 times. Scapular exercise: Wall push-ups 1. Stand facing a wall, about 12 inches to 18 inches away. 2. Place your hands on the wall at shoulder height. 3. Slowly bend your elbows and bring your face to the wall. Keep your back and hips straight. 4. Push back to where you started. 5. Repeat 8 to 12 times. 6. When you can do this exercise against a wall comfortably, you can try it against a counter. You can then slowly progress to the end of a couch, then to a sturdy chair, and finally to the floor. Scapular exercise: Retraction 1. Put the band around a solid object at about waist level. (A bedpost will work well.) Each hand should hold an end of the band. 2. With your elbows at your sides and bent to 90 degrees, pull the band back. Your shoulder blades should move toward each other. Then move your arms back where you started. 3. Repeat 8 to 12 times. 4. If you have good range of motion in your shoulders, try this exercise with your arms lifted out to the sides. Keep your elbows at a 90-degree angle. Raise the elastic band up to about shoulder level. Pull the band back to move your shoulder blades toward each other. Then move your arms back where you started. Internal rotator strengthening exercise 1. Start by tying a piece of elastic exercise material to a doorknob. You can use surgical tubing or Thera-Band. 2. Stand or sit with your shoulder relaxed and your elbow bent 90 degrees. Your upper arm should rest comfortably against your side. Squeeze a rolled towel between your elbow and your body for comfort. This will help keep your arm at your side. 3. Hold one end of the elastic band in the hand of the painful arm. 4. Slowly rotate your forearm toward your body until it touches your belly. Slowly move it back to where you started. 5. Keep your elbow and upper arm firmly tucked against the towel roll or at your side. 6. Repeat 8 to 12 times. External rotator strengthening exercise 1. Start by tying a piece of elastic exercise material to a doorknob. You can use surgical tubing or Thera-Band. (You may also hold one end of the band in each hand.) 2. Stand or sit with your shoulder relaxed and your elbow bent 90 degrees. Your upper arm should rest comfortably against your side. Squeeze a rolled towel between your elbow and your body for comfort. This will help keep your arm at your side. 3. Hold one end of the elastic band with the hand of the painful arm. 4. Start with your forearm across your belly. Slowly rotate the forearm out away from your body. Keep your elbow and upper arm tucked against the towel roll or the side of your body until you begin to feel tightness in your shoulder. Slowly move your arm back to where you started. 5. Repeat 8 to 12 times. Follow-up care is a key part of your treatment and safety. Be sure to make and go to all appointments, and call your doctor if you are having problems. It's also a good idea to know your test results and keep a list of the medicines you take. Where can you learn more? Go to http://donavon-stacy.info/. Enter Janee Hamman in the search box to learn more about \"Rotator Cuff: Exercises. \" Current as of: June 26, 2019 Content Version: 12.2 © 4174-4502 [employer].  Care instructions adapted under license by [employer] (which disclaims liability or warranty for this information). If you have questions about a medical condition or this instruction, always ask your healthcare professional. [place] [address] any warranty or liability for your use of this information.

## 2019-10-02 NOTE — LETTER
NOTIFICATION RETURN TO WORK / SCHOOL 
 
10/2/2019 12:45 PM 
 
Ms. Tracey Yap [facility] [address] [facility] To Whom It May Concern: 
 
Tracey Yap is currently under the care of [address] [facility] She will return to work/school on: 10/3/2019 If there are questions or concerns please have the patient contact our office.  
 
 
 
Sincerely, 
 
 
Flora Fischer MD

## 2019-10-02 NOTE — PROGRESS NOTES
HISTORY OF PRESENT ILLNESS  Naseem Brooks is a 30 y.o. female. HPI   Upper respiratory problem    Started >9 days ago not better,   otc not helping, have a very bad Sore throat, with a lot of painful Cough which are Productive yellowish , no hx of asthma but maybe of COPD, there has been a lot of decrease in the patient's sleep pattern, in addition there has been some muscle ache responding to OTC , no diarhea, no ear ache,also there has been a decrease in the appetite, has been had an exposure to sick person, fortunately an XXX smoker    Shoulder Pain   The history is provided by the patient. This is a acute problem. Episode onset: few days ago, not obese, patient has a lot of difficulty with overhead activity, raising arm is very painful and the pain  awakens the patient at night,  The problem occurs constantly. The problem has not changed since onset. The pain is present in the lt shoulder. The quality of the pain is described as dull. The pain is at a severity of 8/10. Associated symptoms include limited range of motion. Pertinent negatives include no numbness, ++stiffness, no tingling, no itching, no back pain and + neck pain. The symptoms are aggravated by movement and palpation.  There has been + history of extremity trauma s/p fall in the bathtub at home, was taken to ER xrayed nl result,      Depression with the anxiety and panic states of mind  patient presents with history of chronic depression no history of bipolar state for follow-up stating that previous given medications was not successful in treating patient's condition so therefore currently not on medication stating that the only medication that it was the benzodiazepine which helps the tremor, and calm the patient at this visit patient requests refill, patient stated that in the past medication was taken as needed for panic attack,  aware of its side effects and dependency, stopped all meds they are not helping,   Current condition is not controlled with the current meds, not able to tolerate the medication that has been prescribed for the patient so far patient state that unfortunately the patient relies on marijuana patient state that it is not getting better: pt states and reports of feeling anxius, some guilty feeling,  + Hoplessness, at this time patient has ns/nh,ni,nh, + trouble with weight gain ,   Current Outpatient Medications   Medication Sig Dispense Refill    metFORMIN ER (GLUCOPHAGE XR) 500 mg tablet TAKE 2 TABLETS BY MOUTH WITH BREAKFAST AND TAKE 2 TABLETS WITH DINNER 120 Tab 11    methocarbamol (ROBAXIN) 750 mg tablet Take 1 Tab by mouth two (2) times daily as needed for Pain. 20 Tab 0    gabapentin (NEURONTIN) 300 mg capsule Take 1 Cap by mouth three (3) times daily. Max Daily Amount: 900 mg. 90 Cap 5    insulin glargine (LANTUS SOLOSTAR U-100 INSULIN) 100 unit/mL (3 mL) inpn 30 units daily 15 mL 6    omeprazole (PRILOSEC) 40 mg capsule TAKE 1 CAP BY MOUTH DAILY. 30 Cap 3    glipiZIDE (GLUCOTROL) 5 mg tablet TAKE 2 TABS BY MOUTH TWO (2) TIMES A DAY. 360 Tab 1    semaglutide (OZEMPIC) 0.25 mg/0.2 mL (2 mg/1.5 mL) sub-q pen 0.5 mg by SubCUTAneous route every seven (7) days. 1 Box 3    ALPRAZolam (XANAX) 0.5 mg tablet Take 1 Tab by mouth nightly as needed for Anxiety or Sleep. Max Daily Amount: 0.5 mg. 30 Tab 1    metoprolol tartrate (LOPRESSOR) 100 mg IR tablet Take 1 Tab by mouth two (2) times a day. 60 Tab 6    spironolactone (ALDACTONE) 25 mg tablet Take 1 Tab by mouth two (2) times a day. 60 Tab 6    atorvastatin (LIPITOR) 40 mg tablet Take 1 Tab by mouth daily. 90 Tab 2    fluticasone (FLONASE) 50 mcg/actuation nasal spray 2 Sprays by Both Nostrils route daily. (Patient taking differently: 2 Sprays by Both Nostrils route daily.  PRN) 1 Bottle 0    flash glucose sensor (FREESTYLE RUBEN 14 DAY SENSOR) kit Change sensor every 14 days 1 Kit 5    flash glucose scanning reader (FREESTYLE RUBEN 14 DAY READER) misc Dispense one 14 day reader 1 Each 0    butalbital-acetaminophen-caffeine (FIORICET) -40 mg per tablet Take 0.5-1 Tabs by mouth every six (6) hours as needed for Headache. Max Daily Amount: 4 Tabs. 12 Tab 0    cetirizine (ZYRTEC) 10 mg tablet Take  by mouth daily as needed.  albuterol (PROVENTIL HFA, VENTOLIN HFA) 90 mcg/actuation inhaler Take 1 Puff by inhalation every four (4) hours as needed for Wheezing. (Patient taking differently: Take 1 Puff by inhalation every four (4) hours as needed for Wheezing (PRN). ) 1 Inhaler 1    albuterol (ACCUNEB) 1.25 mg/3 mL nebulizer solution Take 3 mL by inhalation every six (6) hours as needed for Wheezing. 1 Bottle 6    acetaminophen (TYLENOL EXTRA STRENGTH) 500 mg tablet Take 1 Tab by mouth every six (6) hours as needed for Pain.  30 Tab 1     Allergies   Allergen Reactions    Latex Itching     Past Medical History:   Diagnosis Date    Asthma     [place] hump 1/7/2015    Carpal tunnel syndrome of right wrist 12/20/2016    Diabetes ([facility] [address]     ALIYA (generalized anxiety disorder) 4/4/2018    GERD (gastroesophageal reflux disease) 8/13/2014    HTN (hypertension) 7/25/2011    Hyperaldosteronism ([facility] [address] 10/19/2015    Hyperhidrosis 4/4/2018    Other ill-defined conditions(799.89)     migraines    Palpitation 4/4/2018    Tachycardia 4/4/2018     Past Surgical History:   Procedure Laterality Date    REMOVAL OF TONSILS,<12 [de-identified]       Family History   Problem Relation Age of Onset   24 [facility] Arthritis-rheumatoid Mother     Hypertension Mother     Diabetes Mother         Type II    Psychiatric Disorder Father     Drug Abuse Father     Cancer Other         prostate    Hypertension Sister     Thyroid Disease Sister         \"I think\"    Attention Deficit Hyperactivity Disorder Brother     Hypertension Sister      Social History     Tobacco Use    Smoking status: Light Tobacco Smoker     Packs/day: 0.50     Years: 5.00     Pack years: 2.50    Smokeless tobacco: Never Used Substance Use Topics    Alcohol use: Yes     Alcohol/week: 0.0 standard drinks     Types: 1 Standard drinks or equivalent per week     Comment: socially, Monthly or less      Lab Results   Component Value Date/Time    Hemoglobin A1c 10.9 (H) 03/20/2017 04:08 PM    Hemoglobin A1c 8.4 (H) 09/26/2016 12:00 AM    Hemoglobin A1c 8.6 (H) 08/13/2014 12:55 PM    Glucose 289 (H) 03/20/2017 04:08 PM    Glucose (POC) 116 (H) 05/28/2016 08:34 PM    Microalb/Creat ratio (ug/mg creat.) 16.0 06/21/2019 12:09 PM    LDL, calculated 96 03/20/2017 04:08 PM    Creatinine 0.60 03/20/2017 04:08 PM      Lab Results   Component Value Date/Time    Cholesterol, total 172 03/20/2017 04:08 PM    HDL Cholesterol 30 (L) 03/20/2017 04:08 PM    LDL, calculated 96 03/20/2017 04:08 PM    Triglyceride 230 (H) 03/20/2017 04:08 PM        Review of Systems   Constitutional: Positive for chills. Negative for fever. HENT: Positive for congestion and sore throat. Negative for nosebleeds. Eyes: Negative for pain. Respiratory: Positive for cough. Negative for wheezing. Cardiovascular: Negative for chest pain and leg swelling. Gastrointestinal: Negative for constipation, diarrhea and nausea. Genitourinary: Negative for frequency. Musculoskeletal: Negative for myalgias. Skin: Negative for rash. Neurological: Positive for headaches. Negative for loss of consciousness. Endo/Heme/Allergies: Does not bruise/bleed easily. Psychiatric/Behavioral: Negative for depression. The patient is not nervous/anxious and does not have insomnia. All other systems reviewed and are negative. Physical Exam   Constitutional: She is oriented to person, place, and time. She appears well-developed and well-nourished. HENT:   Head: Normocephalic and atraumatic. Eyes: Conjunctivae and EOM are normal.   Neck: Normal range of motion. Neck supple. Cardiovascular: Normal rate, regular rhythm and normal heart sounds.    No murmur heard.  Pulmonary/Chest: Effort normal and breath sounds normal.   Abdominal: Soft. Bowel sounds are normal. She exhibits no distension. Musculoskeletal: Normal range of motion. She exhibits tenderness. She exhibits no edema. Lymphadenopathy:     She has cervical adenopathy. Neurological: She is alert and oriented to person, place, and time. Skin: No erythema. Psychiatric: Her behavior is normal.   Nursing note and vitals reviewed. ASSESSMENT and PLAN  Diagnoses and all orders for this visit:    1. Chronic left shoulder pain  -     diclofenac EC (VOLTAREN) 75 mg EC tablet; Take 1 Tab by mouth two (2) times daily as needed for Pain.  -     diclofenac (VOLTAREN) 1 % gel; Apply 4 g to affected area four (4) times daily. 2. Fatigue due to depression  -     vilazodone (VIIBRYD) 10 mg tab tablet; Take 1 Tab by mouth daily. 3. ALIYA (generalized anxiety disorder)  -     vilazodone (VIIBRYD) 10 mg tab tablet; Take 1 Tab by mouth daily. 4. Acute bronchitis due to other specified organisms  -     diclofenac EC (VOLTAREN) 75 mg EC tablet; Take 1 Tab by mouth two (2) times daily as needed for Pain.  -     diclofenac (VOLTAREN) 1 % gel; Apply 4 g to affected area four (4) times daily. -     dextromethorphan-guaiFENesin (MUCINEX DM) 60-1,200 mg Tb12; Take 1 Tab by mouth two (2) times daily as needed for Cough. Indications: Cold Symptoms, cough  -     fluconazole (DIFLUCAN) 150 mg tablet; Take 1 Tab by mouth daily for 1 day. FDA advises cautious prescribing of oral fluconazole in pregnancy.       Patient was told to work on her current body mass index increase physical activity decrease calorie intake do range of motion strengthening for the left shoulder take the current medication call if not better will refer patient to orthopedic surgeon and physical therapy patient acknowledged understanding and agreed with today's recommendations

## 2019-10-05 ENCOUNTER — OFFICE VISIT (OUTPATIENT)
Dept: URGENT CARE | Age: 29
End: 2019-10-05

## 2019-10-05 VITALS
HEART RATE: 92 BPM | BODY MASS INDEX: 51.91 KG/M2 | DIASTOLIC BLOOD PRESSURE: 57 MMHG | HEIGHT: 63 IN | WEIGHT: 293 LBS | OXYGEN SATURATION: 97 % | SYSTOLIC BLOOD PRESSURE: 123 MMHG | TEMPERATURE: 98.4 F | RESPIRATION RATE: 18 BRPM

## 2019-10-05 DIAGNOSIS — J01.90 ACUTE SINUSITIS, RECURRENCE NOT SPECIFIED, UNSPECIFIED LOCATION: Primary | ICD-10-CM

## 2019-10-05 DIAGNOSIS — J20.9 ACUTE WHEEZY BRONCHITIS: ICD-10-CM

## 2019-10-05 RX ORDER — ALBUTEROL SULFATE 90 UG/1
2 AEROSOL, METERED RESPIRATORY (INHALATION)
Qty: 1 INHALER | Refills: 0 | Status: SHIPPED | OUTPATIENT
Start: 2019-10-05 | End: 2019-10-10 | Stop reason: SDUPTHER

## 2019-10-05 RX ORDER — AMOXICILLIN AND CLAVULANATE POTASSIUM 875; 125 MG/1; MG/1
1 TABLET, FILM COATED ORAL 2 TIMES DAILY
Qty: 14 TAB | Refills: 0 | Status: SHIPPED | OUTPATIENT
Start: 2019-10-05 | End: 2019-10-12

## 2019-10-05 NOTE — PROGRESS NOTES
Seen by PCP 3 days ago rx'd azithromycin for bronchiolitis  Has taken as prescribed and denies any improvement  Some worsening today with wheezing  Cough dry to occasionally productive  Denies SOB, fever, chills, n/v, dizziness  Associated sinus pressure  Hx of childhood asthma. Non smoker. Past Medical History:   Diagnosis Date    Asthma     [place] hump 1/7/2015    Carpal tunnel syndrome of right wrist 12/20/2016    Diabetes ([facility] [address]     ALIYA (generalized anxiety disorder) 4/4/2018    GERD (gastroesophageal reflux disease) 8/13/2014    HTN (hypertension) 7/25/2011    Hyperaldosteronism ([facility] [address] 10/19/2015    Hyperhidrosis 4/4/2018    Other ill-defined conditions(799.89)     migraines    Palpitation 4/4/2018    Tachycardia 4/4/2018        Past Surgical History:   Procedure Laterality Date    REMOVAL OF TONSILS,<12 [de-identified]           Family History   Problem Relation Age of Onset   [place] Arthritis-rheumatoid Mother     Hypertension Mother     Diabetes Mother         Type II    Psychiatric Disorder Father     Drug Abuse Father     Cancer Other         prostate    Hypertension Sister     Thyroid Disease Sister         \"I think\"    Attention Deficit Hyperactivity Disorder Brother     Hypertension Sister         Social History     Socioeconomic History    Marital status: SINGLE     Spouse name: Not on file    Number of children: Not on file    Years of education: Not on file    Highest education level: Not on file   Occupational History    Occupation: customer ser and student     Comment: [employer]   Social Needs    Financial resource strain: Not on file    Food insecurity:     Worry: Not on file     Inability: Not on file    Transportation needs:     Medical: Not on file     Non-medical: Not on file   Tobacco Use    Smoking status: Light Tobacco Smoker     Packs/day: 0.50     Years: 5.00     Pack years: 2.50    Smokeless tobacco: Never Used   Substance and Sexual Activity    Alcohol use:  Yes Alcohol/week: 0.0 standard drinks     Types: 1 Standard drinks or equivalent per week     Comment: socially, Monthly or less    Drug use: No    Sexual activity: Not Currently     Partners: Male     Birth control/protection: Condom   Lifestyle    Physical activity:     Days per week: Not on file     Minutes per session: Not on file    Stress: Not on file   Relationships    Social connections:     Talks on phone: Not on file     Gets together: Not on file     Attends Anabaptism service: Not on file     Active member of club or organization: Not on file     Attends meetings of clubs or organizations: Not on file     Relationship status: Not on file    Intimate partner violence:     Fear of current or ex partner: Not on file     Emotionally abused: Not on file     Physically abused: Not on file     Forced sexual activity: Not on file   Other Topics Concern    Not on file   Social History Narrative    Not on file                ALLERGIES: Latex    Review of Systems   Constitutional: Negative for chills, fatigue and fever. Neurological: Negative for dizziness. All other systems reviewed and are negative. Vitals:    10/05/19 1839 10/05/19 1920   BP: 123/57    Pulse: 100 92   Resp: 18    Temp: 98.4 °F (36.9 °C)    SpO2: 97%    Weight: 344 lb (156 kg)    Height: 5' 3\" (1.6 m)        Physical Exam   Constitutional: She is oriented to person, place, and time. No distress. Non-toxic appearing, well hydrated   HENT:   Swollen nasal turbinates with thick yellowish mucus in left nasal passage. Otherwise OP clear and moist. TMs normal bilat. Eyes: Pupils are equal, round, and reactive to light. EOM are normal. No scleral icterus. Neck: Normal range of motion. Neck supple. Cardiovascular: Normal rate, regular rhythm and normal heart sounds. Exam reveals no gallop and no friction rub. No murmur heard. Pulmonary/Chest: Effort normal. No respiratory distress. She has no rales.    Soft scattered wheeze bilat  No diminishments. Normal effort. Lymphadenopathy:     She has no cervical adenopathy. Neurological: She is alert and oriented to person, place, and time. No cranial nerve deficit. Skin: Skin is warm and dry. No rash noted. She is not diaphoretic. No erythema. No pallor. Psychiatric: She has a normal mood and affect. Her behavior is normal. Thought content normal.   Nursing note and vitals reviewed. MDM     Differential Diagnosis; Clinical Impression; Plan:       CLINICAL IMPRESSION:  (J01.90) Acute sinusitis, recurrence not specified, unspecified location  (primary encounter diagnosis)  (J20.9) Acute wheezy bronchitis    Orders Placed This Encounter      amoxicillin-clavulanate (AUGMENTIN) 875-125 mg per tablet          Sig: Take 1 Tab by mouth two (2) times a day for 7 days. Dispense:  14 Tab          Refill:  0      albuterol (PROVENTIL HFA, VENTOLIN HFA, PROAIR HFA) 90 mcg/actuation inhaler           Sig: Take 2 Puffs by inhalation every four (4) hours as needed for Wheezing. Dispense:  1 Inhaler          Refill:  0      Plan:  Finish out azithromycin  Start augmentin  Albuterol for cough  VS stable, no indication for CXR today; consider for worsening or non improvement over next week  Review handouts    We have reviewed concerning signs/symptoms, normal vs abnormal progression of medical condition and when to seek immediate medical attention. Schedule with PCP or Urgent Care immediately for worsening or new symptoms. See your PCP if there is not at least some improvement in symptoms within the next 1 week  You should see your PCP for updates on your routine health maintenance.              Procedures

## 2019-10-05 NOTE — PATIENT INSTRUCTIONS
Follow up with PCP without improvement over next 5-7 days sooner/immediately for new or worsening         Bronchitis: Care Instructions  Your Care Instructions    Bronchitis is inflammation of the bronchial tubes, which carry air to the lungs. The tubes swell and produce mucus, or phlegm. The mucus and inflamed bronchial tubes make you cough. You may have trouble breathing. Most cases of bronchitis are caused by viruses like those that cause colds. Antibiotics usually do not help and they may be harmful. Bronchitis usually develops rapidly and lasts about 2 to 3 weeks in otherwise healthy people. Follow-up care is a key part of your treatment and safety. Be sure to make and go to all appointments, and call your doctor if you are having problems. It's also a good idea to know your test results and keep a list of the medicines you take. How can you care for yourself at home? · Take all medicines exactly as prescribed. Call your doctor if you think you are having a problem with your medicine. · Get some extra rest.  · Take an over-the-counter pain medicine, such as acetaminophen (Tylenol), ibuprofen (Advil, Motrin), or naproxen (Aleve) to reduce fever and relieve body aches. Read and follow all instructions on the label. · Do not take two or more pain medicines at the same time unless the doctor told you to. Many pain medicines have acetaminophen, which is Tylenol. Too much acetaminophen (Tylenol) can be harmful. · Take an over-the-counter cough medicine that contains dextromethorphan to help quiet a dry, hacking cough so that you can sleep. Avoid cough medicines that have more than one active ingredient. Read and follow all instructions on the label. · Breathe moist air from a humidifier, hot shower, or sink filled with hot water. The heat and moisture will thin mucus so you can cough it out. · Do not smoke. Smoking can make bronchitis worse.  If you need help quitting, talk to your doctor about stop-smoking programs and medicines. These can increase your chances of quitting for good. When should you call for help? Call 911 anytime you think you may need emergency care. For example, call if:    · You have severe trouble breathing.    Call your doctor now or seek immediate medical care if:    · You have new or worse trouble breathing.     · You cough up dark brown or bloody mucus (sputum).     · You have a new or higher fever.     · You have a new rash.    Watch closely for changes in your health, and be sure to contact your doctor if:    · You cough more deeply or more often, especially if you notice more mucus or a change in the color of your mucus.     · You are not getting better as expected. Where can you learn more? Go to http://donavon-stacy.info/. Enter H333 in the search box to learn more about \"Bronchitis: Care Instructions. \"  Current as of: June 9, 2019  Content Version: 12.2  © 0061-9403 [employer]. Care instructions adapted under license by [employer] (which disclaims liability or warranty for this information). If you have questions about a medical condition or this instruction, always ask your healthcare professional. [address] any warranty or liability for your use of this information.

## 2019-10-10 ENCOUNTER — OFFICE VISIT (OUTPATIENT)
Dept: PRIMARY CARE CLINIC | Age: 29
End: 2019-10-10

## 2019-10-10 VITALS
RESPIRATION RATE: 15 BRPM | SYSTOLIC BLOOD PRESSURE: 122 MMHG | WEIGHT: 293 LBS | HEIGHT: 63 IN | DIASTOLIC BLOOD PRESSURE: 77 MMHG | OXYGEN SATURATION: 96 % | TEMPERATURE: 97.8 F | BODY MASS INDEX: 51.91 KG/M2 | HEART RATE: 79 BPM

## 2019-10-10 DIAGNOSIS — E11.9 DIABETES MELLITUS WITHOUT COMPLICATION (HCC): ICD-10-CM

## 2019-10-10 DIAGNOSIS — G56.01 CARPAL TUNNEL SYNDROME OF RIGHT WRIST: ICD-10-CM

## 2019-10-10 DIAGNOSIS — Z23 ENCOUNTER FOR IMMUNIZATION: ICD-10-CM

## 2019-10-10 DIAGNOSIS — E11.9 TYPE 2 DIABETES MELLITUS WITHOUT COMPLICATION, WITHOUT LONG-TERM CURRENT USE OF INSULIN (HCC): ICD-10-CM

## 2019-10-10 DIAGNOSIS — J40 BRONCHITIS: Primary | ICD-10-CM

## 2019-10-10 DIAGNOSIS — J01.00 ACUTE MAXILLARY SINUSITIS, RECURRENCE NOT SPECIFIED: ICD-10-CM

## 2019-10-10 RX ORDER — PREDNISONE 10 MG/1
TABLET ORAL
Qty: 30 TAB | Refills: 0 | Status: SHIPPED | OUTPATIENT
Start: 2019-10-10 | End: 2019-12-24

## 2019-10-10 RX ORDER — GLIPIZIDE 5 MG/1
TABLET ORAL
Qty: 360 TAB | Refills: 1 | Status: SHIPPED | OUTPATIENT
Start: 2019-10-10 | End: 2019-12-05 | Stop reason: SDUPTHER

## 2019-10-10 RX ORDER — IPRATROPIUM BROMIDE 0.5 MG/2.5ML
0.5 SOLUTION RESPIRATORY (INHALATION)
Qty: 2.5 ML | Refills: 0 | Status: SHIPPED | OUTPATIENT
Start: 2019-10-10 | End: 2019-10-10 | Stop reason: CLARIF

## 2019-10-10 RX ORDER — IPRATROPIUM BROMIDE AND ALBUTEROL SULFATE 2.5; .5 MG/3ML; MG/3ML
3 SOLUTION RESPIRATORY (INHALATION)
Qty: 1 NEBULE | Refills: 0 | Status: SHIPPED | COMMUNITY
Start: 2019-10-10 | End: 2019-10-10

## 2019-10-10 NOTE — PROGRESS NOTES
Subjective:   Naseem Brooks is a 30 y.o. female who complains of continued congestion, sinus pressure, cough, wheezing, and SOB for several days, unchanged since that time. Pt seen by PCP 10/2 for bronchitis and bronchiolitis, treated with Zpack. No improvement. Pt seen at Urgent care on 10/5 as continued to not feel well. Treated with Augmentin for sinusitis and bronchitis. On day 5/7 but still not feeling well. Continues to c/o head congestion, coughing, wheezing, and SOB which is worse at night. She also c/o diarrhea. Denies any fevers, N/V. Hx pna requiring hospitalization in 2013. Patient does smoke cigarettes. (Currently stressed and knows she needs to quit)  Relevant PMH:   Past Medical History:   Diagnosis Date    Asthma     [place] hump 1/7/2015    Carpal tunnel syndrome of right wrist 12/20/2016    Diabetes ([facility] [address]     ALIYA (generalized anxiety disorder) 4/4/2018    GERD (gastroesophageal reflux disease) 8/13/2014    HTN (hypertension) 7/25/2011    Hyperaldosteronism ([facility] [address] 10/19/2015    Hyperhidrosis 4/4/2018    Other ill-defined conditions(799.89)     migraines    Palpitation 4/4/2018    Tachycardia 4/4/2018     Past Surgical History:   Procedure Laterality Date    REMOVAL OF TONSILS,<13 Y/O       Allergies   Allergen Reactions    Latex Itching         Review of Systems  Pertinent items are noted in HPI.     Objective:     Visit Vitals  /77 (BP 1 Location: Left arm)   Pulse 79   Temp 97.8 °F (36.6 °C) (Oral)   Resp 15   Ht 5' 3\" (1.6 m)   Wt 344 lb 12.8 oz (156.4 kg)   LMP 09/27/2019 (Exact Date)   SpO2 96%   BMI 61.08 kg/m²     General:  alert, cooperative, no distress   Eyes: negative   Ears: normal TM's and external ear canals AU   Sinuses: tenderness over bilateral maxillary and frontal   Mouth:  Lips, mucosa, and tongue normal. Teeth and gums normal and normal findings: oropharynx pink & moist without lesions or evidence of thrush   Neck: supple, symmetrical, trachea midline and no adenopathy. Heart: S1 and S2 normal, no murmurs noted. Lungs: Rhonchi RLL and wheezing throughout bilaterally        Status: Final result (Exam End: 10/10/2019 09:37) Provider Status: Reviewed   Study Result     Exam:  2 view chest     Indication: Cough.     COMPARISON: 9/22/2013     PA and lateral views demonstrate normal heart size. There is no acute process in  the lung fields. No pneumonia. No adenopathy or pleural effusions. The main  pulmonary artery segment is somewhat high riding and prominent suggesting  pulmonary hypertension. Clinical correlation is suggested. The osseous  structures are unremarkable.     IMPRESSION  Impression:  1. No pneumonia  2. Main pulmonary segment is high riding and prominent suggesting the  possibility of pulmonary hypertension. Assessment/Plan:       ICD-10-CM ICD-9-CM    1. Bronchitis J40 490 XR CHEST PA LAT      IPRATROPIUM BROMIDE NON-COMP      ALBUTEROL IPRATROP NON-COMP      [place] PRESSURIZED/NONPRESSURIZED INHALATION TREATMENT      DISCONTINUED: ipratropium (ATROVENT) 0.02 % soln      CANCELED: [place] PRESSURIZED/NONPRESSURIZED INHALATION TREATMENT   2. Acute maxillary sinusitis, recurrence not specified J01.00 461.0        Orders Placed This Encounter    XR CHEST PA LAT    IPRATROPIUM BROMIDE NON-COMP    ALBUTEROL IPRATROP NON-COMP    [place] PRESSURIZED/NONPRESSURIZED INHALATION TREATMENT    predniSONE (DELTASONE) 10 mg tablet    DISCONTD: ipratropium (ATROVENT) 0.02 % soln    albuterol-ipratropium (DUO-NEB) 2.5 mg-0.5 mg/3 ml nebu     Duoneb treatment x 1 in office and pt reported significant improvement in her symptoms. Faint expiratory wheeze RLL post-neb tx, clear otherwise. Continue augmentin, fluids, rest.  Has albuterol inh on hand at home, use 1-2 puffs q4h prn. Discussed home neb tx, pt declines at this time. Call if desires. CXR findings were reviewed w/ pt while in office and copy of results provided to pt.   No pna but concern for pulmonary HTN. Pt was advised to follow-up with her PCP regarding this. Follow-up if no improvement and prn. Wiliam Brown NP  This note will not be viewable in [address]

## 2019-10-10 NOTE — PROGRESS NOTES
Alton Amado is a 30 y.o. female    Room:4    Chief Complaint   Patient presents with    Sinus Pain     Pt States \" i have a sinus infection and bronchitis, productive cough, on second dose of antibotics and not helping,, diarrhea, head pain\". Visit Vitals  /77 (BP 1 Location: Left arm)   Pulse 79   Temp 97.8 °F (36.6 °C) (Oral)   Resp 15   Ht 5' 3\" (1.6 m)   Wt 344 lb 12.8 oz (156.4 kg)   SpO2 96%   BMI 61.08 kg/m²       Pain Scale: 6/10    1. Have you been to the ER, urgent care clinic since your last visit? Hospitalized since your last visit? Yes [facility] on [address] on 10/05/19    2. Have you seen or consulted any other health care providers outside of the [address] [place] [address] since your last visit? Include any pap smears or colon screening.  No

## 2019-10-10 NOTE — PATIENT INSTRUCTIONS
Bronchitis: Care Instructions Your Care Instructions Bronchitis is inflammation of the bronchial tubes, which carry air to the lungs. The tubes swell and produce mucus, or phlegm. The mucus and inflamed bronchial tubes make you cough. You may have trouble breathing. Most cases of bronchitis are caused by viruses like those that cause colds. Antibiotics usually do not help and they may be harmful. Bronchitis usually develops rapidly and lasts about 2 to 3 weeks in otherwise healthy people. Follow-up care is a key part of your treatment and safety. Be sure to make and go to all appointments, and call your doctor if you are having problems. It's also a good idea to know your test results and keep a list of the medicines you take. How can you care for yourself at home? · Take all medicines exactly as prescribed. Call your doctor if you think you are having a problem with your medicine. · Get some extra rest. 
· Take an over-the-counter pain medicine, such as acetaminophen (Tylenol), ibuprofen (Advil, Motrin), or naproxen (Aleve) to reduce fever and relieve body aches. Read and follow all instructions on the label. · Do not take two or more pain medicines at the same time unless the doctor told you to. Many pain medicines have acetaminophen, which is Tylenol. Too much acetaminophen (Tylenol) can be harmful. · Take an over-the-counter cough medicine that contains dextromethorphan to help quiet a dry, hacking cough so that you can sleep. Avoid cough medicines that have more than one active ingredient. Read and follow all instructions on the label. · Breathe moist air from a humidifier, hot shower, or sink filled with hot water. The heat and moisture will thin mucus so you can cough it out. · Do not smoke. Smoking can make bronchitis worse. If you need help quitting, talk to your doctor about stop-smoking programs and medicines. These can increase your chances of quitting for good. When should you call for help? Call 911 anytime you think you may need emergency care. For example, call if: 
  · You have severe trouble breathing.  
 Call your doctor now or seek immediate medical care if: 
  · You have new or worse trouble breathing.  
  · You cough up dark brown or bloody mucus (sputum).  
  · You have a new or higher fever.  
  · You have a new rash.  
 Watch closely for changes in your health, and be sure to contact your doctor if: 
  · You cough more deeply or more often, especially if you notice more mucus or a change in the color of your mucus.  
  · You are not getting better as expected. Where can you learn more? Go to http://donavon-stacy.info/. Enter H333 in the search box to learn more about \"Bronchitis: Care Instructions. \" Current as of: June 9, 2019 Content Version: 12.2 © 3042-8063 [employer]. Care instructions adapted under license by [employer] (which disclaims liability or warranty for this information). If you have questions about a medical condition or this instruction, always ask your healthcare professional. [address] any warranty or liability for your use of this information.

## 2019-10-17 DIAGNOSIS — Z72.0 TOBACCO ABUSE DISORDER: ICD-10-CM

## 2019-10-17 NOTE — TELEPHONE ENCOUNTER
Last OV 9/24/19: continue the Basaglar to 30 units daily, Metformin 1000mg BID, Glipizide 10mg with dinner and Ozempic 1.0mg weekly

## 2019-10-18 DIAGNOSIS — Z72.0 TOBACCO ABUSE DISORDER: ICD-10-CM

## 2019-10-18 RX ORDER — OMEPRAZOLE 40 MG/1
CAPSULE, DELAYED RELEASE ORAL
Qty: 30 CAP | Refills: 3 | Status: SHIPPED | OUTPATIENT
Start: 2019-10-18 | End: 2019-12-27 | Stop reason: SDUPTHER

## 2019-10-18 RX ORDER — OMEPRAZOLE 40 MG/1
CAPSULE, DELAYED RELEASE ORAL
Qty: 30 CAP | Refills: 3 | Status: SHIPPED | OUTPATIENT
Start: 2019-10-18 | End: 2020-04-09 | Stop reason: SDUPTHER

## 2019-10-22 ENCOUNTER — CLINICAL SUPPORT (OUTPATIENT)
Dept: SURGERY | Age: 29
End: 2019-10-22

## 2019-10-22 DIAGNOSIS — E66.01 MORBID OBESITY WITH BMI OF 50.0-59.9, ADULT (HCC): Primary | ICD-10-CM

## 2019-10-25 VITALS — BODY MASS INDEX: 59.87 KG/M2 | WEIGHT: 293 LBS

## 2019-10-25 NOTE — PROGRESS NOTES
[id number] [facility] at [facility]  Supervised Weight Loss     Date:   10/22/2019    Patient's Name: Jose Oates  : 1990    Insurance:  Indio Dangelo          Session:   Surgery: Sleeve Gastrectomy  Surgeon:  Marisol Nazario M.D. Height: 63\"  Weight:    338      Lbs. BMI: 59   Pounds Lost since last month: 7#               Pounds Gained since last month: 0    Starting Weight: 341#   Previous Months Weight: 345#  Overall Pounds Lost: 3#  Overall Pounds Gained: 0    Other Pertinent Information: n/a     Smoking Status:  none  Alcohol Intake: none    I have reviewed with pt the guidelines of the supervised wt loss program.  Pt understands the expectations of some wt loss during the program and that wt gain could delay the process. I have also explained that classes need to be consecutive. Missing a class may result in starting over. Pt has received this information in writing. Changes that patient has made since last month include:  Walking with co-workers, tracking steps, eating to feel satisfied (not full). Eating Habits and Behaviors  General healthy eating guidelines were also discussed. Pts were instructed that their plate should be made up 1/2 plate coming from non-starchy vegetables, 1/4 coming from lean meat, and 1/4 of their plate coming from carbohydrates, including fruits, starches, or milk. We discussed measuring meals to 1/2 cup total per meal after surgery. Drinking only calorie-free, sugar-free and non-carbonated beverages. We discussed the importance of drinking 64 ounces of fluid per day to prevent dehydration post-operatively. Patient's current diet habits include: eating 2-3 meals per day. Snacking on popcorn, chips, snack bars and nuts. Eating chips daily and pasta 1-2 times per month. Eating no sweets/desserts. Eating mixture of baked, grilled, broiled and some fried foods. Eating out is 4-6 times per week (catered lunch at work daily). Drinking 64 oz water, 12 oz soda, 12 oz caffeine. Sometimes emotional eating and reports reading, watching a movie or going for a drive as non-food coping mechanisms. Reports overeating, grazing, night eating, food choices, portion sizes and lack of activity are all barriers to wt loss. Physical Activity/Exercise  An exercise presentation was provided including information about exercise programs available both before and after surgery. We talked about the importance of increasing daily physical activity and beginning to develop an exercise regimen/routine. We talked about exercise as being an important part of long term weight loss after surgery. Comments:  During class, I discussed with patient the importance of getting into an exercise routine. Pt is currently walking 15 minutes per day for activity. Pt has been encouraged to add a 2nd 15 minute walk for a total of 30 minutes walking daily. Behavior Modification       We talked about how to eat more mindfully. Tips and recommendations for how to make these changes were provided. Pt was encouraged to keep a food journal and record what they were taking in daily. Overall Assessment: Pt demonstrates appropriate lifestyle changes evidenced by reported changes and overall wt loss. Continued changes are still indicated. Will continue to assess. Patient-Set Goals:   1. Nutrition - eat until full not stuffed   2. Exercise - increase walking time   3.  Behavior -reduce snacking, portion control     Lavern Brandon, RD  10/25/2019

## 2019-11-01 DIAGNOSIS — E26.9 HYPERALDOSTERONISM (HCC): ICD-10-CM

## 2019-11-01 DIAGNOSIS — I10 ESSENTIAL HYPERTENSION: ICD-10-CM

## 2019-11-01 RX ORDER — ATORVASTATIN CALCIUM 40 MG/1
TABLET, FILM COATED ORAL
Qty: 90 TAB | Refills: 2 | Status: SHIPPED | OUTPATIENT
Start: 2019-11-01 | End: 2020-03-26 | Stop reason: SDUPTHER

## 2019-11-01 RX ORDER — METOPROLOL TARTRATE 100 MG/1
TABLET ORAL
Qty: 60 TAB | Refills: 6 | Status: SHIPPED | OUTPATIENT
Start: 2019-11-01 | End: 2020-04-09 | Stop reason: SDUPTHER

## 2019-11-01 RX ORDER — SPIRONOLACTONE 25 MG/1
TABLET ORAL
Qty: 60 TAB | Refills: 6 | Status: SHIPPED | OUTPATIENT
Start: 2019-11-01 | End: 2020-04-13 | Stop reason: SDUPTHER

## 2019-11-15 RX ORDER — VILAZODONE HYDROCHLORIDE 20 MG/1
20 TABLET ORAL DAILY
Qty: 30 TAB | Refills: 2 | Status: SHIPPED | OUTPATIENT
Start: 2019-11-15 | End: 2020-01-16 | Stop reason: SDUPTHER

## 2019-11-19 ENCOUNTER — CLINICAL SUPPORT (OUTPATIENT)
Dept: SURGERY | Age: 29
End: 2019-11-19

## 2019-11-19 VITALS — WEIGHT: 293 LBS | BODY MASS INDEX: 60.05 KG/M2

## 2019-11-19 DIAGNOSIS — E66.01 MORBID OBESITY WITH BMI OF 60.0-69.9, ADULT (HCC): Primary | ICD-10-CM

## 2019-11-19 NOTE — PROGRESS NOTES
[employer] [facility]  Supervised Weight Loss     Date:   2019    Patient's Name: Naseem Brooks  : 1990    Insurance:  Jose Peralta          Session: 6 of  6 (needs one more visit to make 180 days)  Surgery: Sleeve Gastrectomy  Surgeon:  Jf Ramos M.D. Height: 63\"  Weight:    339      Lbs. BMI: 60   Pounds Lost since last month: 0               Pounds Gained since last month: 1#    Starting Weight: 341#   Previous Months Weight: 338#  Overall Pounds Lost: 2#  Overall Pounds Gained: 0    Other Pertinent Information: n/a     Smoking Status:  none  Alcohol Intake: none    I have reviewed with pt the guidelines of the supervised wt loss program.  Pt understands the expectations of some wt loss during the program and that wt gain could delay the process. I have also explained that classes need to be consecutive. Missing a class may result in starting over. Pt has received this information in writing. Changes that patient has made since last month include:  Walking daily, drinking more water, eating veggies with every meal.      Eating Habits and Behaviors  A nutrition lesson was presented on label reading with specific guidelines provided for limiting added sugars. This information will help increase healthy food choices, promote weight loss and prevent dumping syndrome after gastric bypass. We also reviewed the general nutrition guidelines for bariatric surgery. Patient's current diet habits include: does not report how many meals per day she is eating. snacking on popcorn, chips, fruit, veggies. Eating chips and pasta 3-4 times per week. Eating no sweets/desserts. Eating out is 1-3 times per week. Drinking  oz water, 8 oz soda 3 times per week, 8 oz caffeine. Denies emotional eating. Reports grazing, night eating, food choices and lack of activity are biggest barriers to wt loss.          Physical Activity/Exercise  We talked about the importance of increasing daily physical activity and beginning to develop an exercise regimen/routine. We talked about exercise as being an important part of long term weight loss after surgery. Comments:  During class, I discussed with patient the importance of getting into an exercise routine. Pt is currently walking 15 minutes daily with co-workers for activity. Pt has been encouraged to maintain and increase as tolerated. Behavior Modification       We talked about how to eat more mindfully. Tips and recommendations for how to make these changes were provided. Pt was encouraged to keep a food journal and record what they were taking in daily. Overall Assessment: Pt demonstrates appropriate lifestyle changes in preparation for wt loss surgery evidenced by reported changes and wt loss. Will continue to assess. Patient-Set Goals:   1. Nutrition - eat more from home   2. Exercise - walking  3.  Behavior -not to overeat    Gabriel Priest, RD  11/19/2019

## 2019-11-25 DIAGNOSIS — F41.1 GAD (GENERALIZED ANXIETY DISORDER): ICD-10-CM

## 2019-11-25 DIAGNOSIS — F32.A FATIGUE DUE TO DEPRESSION: ICD-10-CM

## 2019-11-25 DIAGNOSIS — R53.83 FATIGUE DUE TO DEPRESSION: ICD-10-CM

## 2019-11-25 RX ORDER — VILAZODONE HYDROCHLORIDE 10 MG/1
TABLET ORAL
Qty: 30 TAB | Refills: 1 | Status: SHIPPED | OUTPATIENT
Start: 2019-11-25 | End: 2020-01-16 | Stop reason: ALTCHOICE

## 2019-12-05 DIAGNOSIS — Z23 ENCOUNTER FOR IMMUNIZATION: ICD-10-CM

## 2019-12-05 DIAGNOSIS — E11.9 TYPE 2 DIABETES MELLITUS WITHOUT COMPLICATION, WITHOUT LONG-TERM CURRENT USE OF INSULIN (HCC): ICD-10-CM

## 2019-12-05 DIAGNOSIS — E11.9 DIABETES MELLITUS WITHOUT COMPLICATION (HCC): ICD-10-CM

## 2019-12-05 DIAGNOSIS — G56.01 CARPAL TUNNEL SYNDROME OF RIGHT WRIST: ICD-10-CM

## 2019-12-05 RX ORDER — GLIPIZIDE 5 MG/1
TABLET ORAL
Qty: 180 TAB | Refills: 1 | Status: SHIPPED | OUTPATIENT
Start: 2019-12-05 | End: 2020-03-26 | Stop reason: SDUPTHER

## 2019-12-05 NOTE — TELEPHONE ENCOUNTER
Per OV: 9/24/19: 1) DM > Her A1C today was 8.9%. She has had low BGs, mostly in the afternoon, but she has had some low BGs in the early morning.    Will have pt stop the AM Glipizide, continue the Basaglar to 30 units daily, Metformin 1000mg BID, Glipizide 10mg with dinner and Ozempic 1.0mg weekly

## 2019-12-05 NOTE — TELEPHONE ENCOUNTER
----- Message from Carla Kate sent at 12/5/2019  7:27 AM EST -----  Regarding: Prescription Question  Contact: [phone]  I'm running out of my glipizide 5mg. Can you take over and refill it for me please under your new instructions? It takes my pcp forever with a refill request. If you can, can you send it to the [facility] on file on [address].      Thank you,    Carla Kate

## 2019-12-12 ENCOUNTER — CLINICAL SUPPORT (OUTPATIENT)
Dept: SURGERY | Age: 29
End: 2019-12-12

## 2019-12-12 VITALS — WEIGHT: 293 LBS | BODY MASS INDEX: 59.17 KG/M2

## 2019-12-12 DIAGNOSIS — E66.01 MORBID OBESITY WITH BMI OF 50.0-59.9, ADULT (HCC): Primary | ICD-10-CM

## 2019-12-12 NOTE — PROGRESS NOTES
[facility] at [address]  Supervised Weight Loss     Date:   2019    Patient's Name: Janeth Nuñez  : 1990    Insurance:  Donel Back          Session: 7 of  6 (180 days completed)   Surgery: Sleeve Gastrectomy  Surgeon:  Farzad Shaw M.D. Height: 63\"  Weight:    334      Lbs. BMI: 59   Pounds Lost since last month: 5#               Pounds Gained since last month: 0    Starting Weight: 341#   Previous Months Weight: 339#  Overall Pounds Lost: 7#  Overall Pounds Gained: 0    Other Pertinent Information: n/a     Smoking Status:  none  Alcohol Intake: none    I have reviewed with pt the guidelines of the supervised wt loss program.  Pt understands the expectations of some wt loss during the program and that wt gain could delay the process. I have also explained that classes need to be consecutive. Missing a class may result in starting over. Pt has received this information in writing. Changes that patient has made since last month include:  Stopped eating when full, eating more homemade foods, walking everyday. Eating Habits and Behaviors  A nutrition lesson specific to vitamins was provided. We discussed the various reasons for needing vitamins and different types and doses. General healthy eating guidelines were also discussed. Pts were instructed that their plate should be made up 1/2 plate coming from non-starchy vegetables, 1/4 coming from lean meat, and 1/4 of their plate coming from carbohydrates, including fruits, starches, or milk. We discussed measuring meals to 1/2 cup total per meal after surgery. Drinking only calorie-free, sugar-free and non-carbonated beverages. We discussed the importance of drinking 64 ounces of fluid per day to prevent dehydration post-operatively. Patient's current diet habits include: eating 2-3 meals per day. Snacking on almonds, popcorn and candy. Eating chips, pasta and rice 1-2 times pre month. Eating no sweets/desserts. Eating out is 1-3 times per week. Drinking  oz water, 4-8 oz soda, 4-8 oz caffeine. Denies emotional eating. Reports food choices, portion sizes, overeating and grazing are biggest barriers to wt loss. Physical Activity/Exercise  We talked about the importance of increasing daily physical activity and beginning to develop an exercise regimen/routine. We talked about exercise as being an important part of long term weight loss after surgery. Comments:  During class, I discussed with patient the importance of getting into an exercise routine. Pt is currently walking everyday for activity. Pt has been encouraged to maintain and increase as tolerated to promote continued wt loss prior to surgery. Behavior Modification       We talked about how to eat more mindfully and identify emotional eating triggers. Tips and recommendations for how to make these changes were provided. Pt was encouraged to keep a food journal and record what they were taking in daily. Overall Assessment: Pt demonstrates appropriate lifestyle change in preparation for bariatric surgery evidenced by reported changes and weight loss. Demonstrates good understanding of basic nutrition guidelines and appears to be an appropriate candidate for surgery at this time. Patient-Set Goals:   1. Nutrition - eat more protein   2. Exercise - continue walking  3.  Behavior -stop eating when full     Amrita Calderon, JIMMY  12/12/2019

## 2019-12-23 RX ORDER — FLASH GLUCOSE SENSOR
KIT MISCELLANEOUS
Qty: 2 KIT | Refills: 5 | Status: SHIPPED | OUTPATIENT
Start: 2019-12-23 | End: 2020-03-26 | Stop reason: SDUPTHER

## 2019-12-24 ENCOUNTER — OFFICE VISIT (OUTPATIENT)
Dept: ENDOCRINOLOGY | Age: 29
End: 2019-12-24

## 2019-12-24 VITALS
BODY MASS INDEX: 51.91 KG/M2 | HEIGHT: 63 IN | WEIGHT: 293 LBS | SYSTOLIC BLOOD PRESSURE: 102 MMHG | HEART RATE: 99 BPM | DIASTOLIC BLOOD PRESSURE: 58 MMHG

## 2019-12-24 DIAGNOSIS — E11.9 TYPE 2 DIABETES MELLITUS WITHOUT COMPLICATION, WITHOUT LONG-TERM CURRENT USE OF INSULIN (HCC): Primary | ICD-10-CM

## 2019-12-24 DIAGNOSIS — I10 ESSENTIAL HYPERTENSION: ICD-10-CM

## 2019-12-24 DIAGNOSIS — E66.01 CLASS 3 SEVERE OBESITY DUE TO EXCESS CALORIES WITH SERIOUS COMORBIDITY AND BODY MASS INDEX (BMI) OF 60.0 TO 69.9 IN ADULT (HCC): ICD-10-CM

## 2019-12-24 LAB — HBA1C MFR BLD HPLC: 8.4 %

## 2019-12-24 NOTE — PATIENT INSTRUCTIONS
1) Increase your Ozempic from 0.5mg weekly to 1.0mg weekly. 2) Decrease your dinner Glipizide to 5mg. If you have a large dinner, take the 10mg. 3) Continue the Basaglar 30 units at night and the Metformin 1000mg twice per day. Send me sugar readings in 2 weeks. Call me if you have any more blood sugars less than 70.

## 2019-12-24 NOTE — PROGRESS NOTES
Chief Complaint   Patient presents with    Diabetes   Records since last visit reviewed. History of Present Illness: Bernabe Panda is a 30 y.o. female here for follow up of diabetes. She was diagnosed with diabetes \"when I was in high school\". Because pt has features concerning for cushing's her PCP checked a 24 hour urine cortisol which was not elevated (42). She also had a TSH drawn which was 2.10. In March 2016 we tested pt for hyperaldosteronism, which was negative. I tested her for evidence of PCOS, her Testosterone was 14, with DHEA-S 109, her 17-OH Prog was not elevated and an overnight dexamethasone suppression test did suppress her ACTH and cortisol. At her last visit in September 2019 her A1C had improved from 9.9% to 8.9% and her weight was down to 344 pounds. She was having issues of lows in the afternoon so I instructed her to stop the AM Glipizide, continue the Basaglar to 30 units daily, Metformin 1000mg BID, Glipizide 10mg with dinner and Ozempic 0.5mg weekly. Her A1C today was 8.4%. Pt notes that she has been having issues of low BGs in the morning causing her fatigue and lightheadedness. She notes that she has had these on occasions. She has not noted any changes on the days prior to the low BGs. Her BGs have been in the ' with a couple of BGs in the 200's in the evening. Pt had an episode of bronchitis and and an asthma exacerbation, requiring prednisone. Basaglar to 30 units daily, Metformin 1000mg BID, Glipizide 10mg with dinner and Ozempic 0.5mg weekly. She notes that she is only taking the Basaglar 3 days per week, as she is concerned about having the low BGs. Her weight today was 341 pounds. She spoke with Dr. Andreina Rios of bariatric surgery to discuss gastric sleeve. She has completed working with the nutritionist and the next step is she will reach out to the coordinator. She is now working day shift and working part time at night.   She wakes around 630AM.  She has breakfast around 7AM, this AM she did not eat breakfast. She has been trying to eat two boiled eggs in the morning. She is now working 8AM-5PM and a part-time from [employer].  She notes that she will snack on PB crackers or \"mini candies\" if her BGs get low. She has lunch around [place], yesterday she had a mixed green salad with balsamic vinaigrette, 3 meat balls, mac and cheese and lemonade. She has diner around 6-7PM, before her evening job. Last night she had oodles of noodles and orange soda and water. She will snack on chips or sunflower seeds during her evening shift. She is going to bed around 1130PM-MN. She denies any HS snacking. 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  She is taking a 15 minute walk after lunch with her co-wokers. She was perscibed contrave, but it was too expensive so she never took it. She tried phentermine for a month and she notes it did not help and she stopped taking it. No history of vascular disease. No history of neuropathy, or nephropathy. Last eye exam was March 2019, no retinopathy. Current Outpatient Medications   Medication Sig    FREESTYLE RUBEN 14 DAY SENSOR kit CHANGE SENSOR EVERY 14 DAYS    glipiZIDE (GLUCOTROL) 5 mg tablet Take 2 tabs with dinner.  vilazodone (VIIBRYD) 20 mg tab tablet Take 1 Tab by mouth daily.  atorvastatin (LIPITOR) 40 mg tablet TAKE 1 TABLET BY MOUTH EVERY DAY    metoprolol tartrate (LOPRESSOR) 100 mg IR tablet TAKE 1 TABLET BY MOUTH TWICE A DAY    semaglutide (OZEMPIC) 0.25 mg/0.2 mL (2 mg/1.5 mL) sub-q pen 0.5 mg by SubCUTAneous route every seven (7) days.  omeprazole (PRILOSEC) 40 mg capsule TAKE 1 CAP BY MOUTH DAILY.  omeprazole (PRILOSEC) 40 mg capsule TAKE 1 CAPSULE BY MOUTH EVERY DAY    diclofenac EC (VOLTAREN) 75 mg EC tablet Take 1 Tab by mouth two (2) times daily as needed for Pain.     metFORMIN ER (GLUCOPHAGE XR) 500 mg tablet TAKE 2 TABLETS BY MOUTH WITH BREAKFAST AND TAKE 2 TABLETS WITH DINNER    gabapentin (NEURONTIN) 300 mg capsule Take 1 Cap by mouth three (3) times daily. Max Daily Amount: 900 mg.    ALPRAZolam (XANAX) 0.5 mg tablet Take 1 Tab by mouth nightly as needed for Anxiety or Sleep. Max Daily Amount: 0.5 mg.    fluticasone (FLONASE) 50 mcg/actuation nasal spray 2 Sprays by Both Nostrils route daily. (Patient taking differently: 2 Sprays by Both Nostrils route daily. PRN)    flash glucose scanning reader ([employer] RUBEN 14 DAY READER) misc Dispense one 14 day reader    butalbital-acetaminophen-caffeine (FIORICET) -40 mg per tablet Take 0.5-1 Tabs by mouth every six (6) hours as needed for Headache. Max Daily Amount: 4 Tabs.  cetirizine (ZYRTEC) 10 mg tablet Take  by mouth daily as needed.  albuterol (PROVENTIL HFA, VENTOLIN HFA) 90 mcg/actuation inhaler Take 1 Puff by inhalation every four (4) hours as needed for Wheezing. (Patient taking differently: Take 1 Puff by inhalation every four (4) hours as needed for Wheezing (PRN). )    albuterol (ACCUNEB) 1.25 mg/3 mL nebulizer solution Take 3 mL by inhalation every six (6) hours as needed for Wheezing.  VIIBRYD 10 mg tab tablet TAKE 1 TABLET BY MOUTH EVERY DAY    spironolactone (ALDACTONE) 25 mg tablet TAKE 1 TABLET BY MOUTH TWICE A DAY    diclofenac (VOLTAREN) 1 % gel Apply 4 g to affected area four (4) times daily.  dextromethorphan-guaiFENesin (MUCINEX DM) 60-1,200 mg Tb12 Take 1 Tab by mouth two (2) times daily as needed for Cough. Indications: Cold Symptoms, cough    insulin glargine (LANTUS SOLOSTAR U-100 INSULIN) 100 unit/mL (3 mL) inpn 30 units daily     No current facility-administered medications for this visit.       Allergies   Allergen Reactions    Latex Itching     Review of Systems:  - Eyes: no blurry vision or double vision  - Cardiovascular: no chest pain  - Respiratory: no shortness of breath  - Musculoskeletal: no myalgias  - Neurological: no numbness/tingling in extremities    Physical Examination:  Blood pressure 102/58, pulse 99, height 5' 3\" (1.6 m), weight 341 lb (154.7 kg). - General: pleasant, no distress, good eye contact   - Neck: no carotid bruits  - Cardiovascular: regular, normal rate, nl s1 and s2, no m/r/g, 2+ DP pulses   - Respiratory: clear bilaterally  - Integumentary: 1+ edema, no foot ulcers, + Acanthosis  - Psychiatric: normal mood and affect    Diabetic foot exam:     Left Foot:   Visual Exam: normal    Pulse DP: 2+ (normal)   Filament test: normal sensation    Vibratory sensation: normal      Right Foot:   Visual Exam: normal    Pulse DP: 2+ (normal)   Filament test: normal sensation    Vibratory sensation: normal        Data Reviewed:   Her A1C today was 8.4%. Assessment/Plan:   1) DM > Her A1C today was 8.4%. Will have pt decrease her PM glipizide to 5mg, will increase her ozempic to 1.0mg weekly and continue the Metformin 1000mg BID and Basaglar 30 units daily. Pt to send me her BG readings in 4 weeks. 2) Obesity > Pt is trying to arrange her schedule so that she can restart the \"boot camp\". The Ozempic will help with the continued weight loss. Pt has met with a bariatric surgeon and discussed gastric sleeve. 3) HTN > BP at goal on her current regimen    RTC 3 months    Pt voices understanding and agreement with the plan. Follow-up and Dispositions    · Return in about 3 months (around 3/24/2020). Copy sent to:  Mansi Sanchez

## 2019-12-27 ENCOUNTER — OFFICE VISIT (OUTPATIENT)
Dept: PRIMARY CARE CLINIC | Age: 29
End: 2019-12-27

## 2019-12-27 VITALS
DIASTOLIC BLOOD PRESSURE: 70 MMHG | SYSTOLIC BLOOD PRESSURE: 114 MMHG | WEIGHT: 293 LBS | BODY MASS INDEX: 51.91 KG/M2 | TEMPERATURE: 97.7 F | HEIGHT: 63 IN | OXYGEN SATURATION: 97 % | HEART RATE: 91 BPM | RESPIRATION RATE: 17 BRPM

## 2019-12-27 DIAGNOSIS — J01.00 ACUTE MAXILLARY SINUSITIS, RECURRENCE NOT SPECIFIED: Primary | ICD-10-CM

## 2019-12-27 DIAGNOSIS — B37.31 VAGINAL CANDIDA: ICD-10-CM

## 2019-12-27 RX ORDER — DOXYCYCLINE 100 MG/1
100 TABLET ORAL 2 TIMES DAILY
Qty: 20 TAB | Refills: 0 | Status: SHIPPED | OUTPATIENT
Start: 2019-12-27 | End: 2020-01-06

## 2019-12-27 RX ORDER — FLUCONAZOLE 150 MG/1
150 TABLET ORAL DAILY
Qty: 1 TAB | Refills: 0 | Status: SHIPPED | OUTPATIENT
Start: 2019-12-27 | End: 2019-12-28

## 2019-12-27 NOTE — PROGRESS NOTES
Braluio Vanegas is a 30 y.o. female    Room:4    Chief Complaint   Patient presents with    Generalized Body Aches     Pt States\" stuffy nose, body aches, cough, fatique, started on koffi lucas, taken mucinex and afrin spray\". Visit Vitals  /70 (BP 1 Location: Left arm, BP Patient Position: Sitting)   Pulse 91   Temp 97.7 °F (36.5 °C) (Oral)   Resp 17   Ht 5' 3\" (1.6 m)   Wt 338 lb 3.2 oz (153.4 kg)   SpO2 97%   BMI 59.91 kg/m²       Pain Scale: 6/10    1. Have you been to the ER, urgent care clinic since your last visit? Hospitalized since your last visit? [facility] [address]    2. Have you seen or consulted any other health care providers outside of the [address] [place] [address] since your last visit? Include any pap smears or colon screening.  No

## 2019-12-27 NOTE — LETTER
NOTIFICATION RETURN TO WORK / SCHOOL 
 
12/27/2019 10:50 AM 
 
Ms. Dimas Hamm [address] 
[address] [facility] To Whom It May Concern: 
 
Dimas Diaz is currently under the care of [address] [place] [address]. She will return to work/school on: 12/28/19 If there are questions or concerns please have the patient contact our office.  
 
 
 
Sincerely, 
 
 
Kristian Lopez NP

## 2019-12-28 NOTE — PROGRESS NOTES
Subjective:   Brenda Wells is a 30 y.o. female who complains of congestion, sore throat, nasal blockage, post nasal drip and dry cough for 7 days, gradually worsening since that time. She denies a history of shortness of breath and wheezing. Evaluation to date: none. Treatment to date: OTC products. Patient does not smoke cigarettes. Relevant PMH: No pertinent additional PMH.     Patient Active Problem List   Diagnosis Code    HTN (hypertension) I10    Obesity, morbid (more than 100 lbs over ideal weight or BMI > 40) (formerly [facility]) E66.01    Fatigue R53.83    Vitamin D deficiency E55.9    AR (allergic rhinitis) J30.9    Obesity E66.9    Increased pulse rate R00.0    Acute laryngitis J04.0    Viral pneumonia, unspecified J12.9    Migraine headache G43.909    GERD (gastroesophageal reflux disease) K21.9    [place] hump E65    Type 2 diabetes mellitus without complication (formerly [facility]) D99.4    Acute pharyngitis J02.9    Carpal tunnel syndrome of right wrist G56.01    Sleep apnea in adult G47.30    ALIYA (generalized anxiety disorder) F41.1    Hyperhidrosis R61    Palpitation R00.2    Tachycardia R00.0    Type 2 diabetes mellitus with diabetic neuropathy (formerly [facility]) E11.40    Class 3 severe obesity due to excess calories with serious comorbidity and body mass index (BMI) of 60.0 to 69.9 in adult ([facility] E66.01, Z68.44    Type 2 diabetes with nephropathy (formerly [facility]) E11.21     Patient Active Problem List    Diagnosis Date Noted    Type 2 diabetes with nephropathy ([facility] 01/13/2019    Class 3 severe obesity due to excess calories with serious comorbidity and body mass index (BMI) of 60.0 to 69.9 in adult ([facility] 08/09/2018    Type 2 diabetes mellitus with diabetic neuropathy ([facility] 04/25/2018    ALIYA (generalized anxiety disorder) 04/04/2018    Hyperhidrosis 04/04/2018    Palpitation 04/04/2018    Tachycardia 04/04/2018    Sleep apnea in adult 03/20/2017    Carpal tunnel syndrome of right wrist 12/20/2016    Acute pharyngitis 06/15/2016    Type 2 diabetes mellitus without complication ([facility] 06/20/7939    [facility] 01/07/2015    GERD (gastroesophageal reflux disease) 08/13/2014    Migraine headache 01/28/2014    Viral pneumonia, unspecified 09/22/2013    Obesity 09/20/2013    Increased pulse rate 09/20/2013    Acute laryngitis 09/20/2013    Vitamin D deficiency 09/28/2012    AR (allergic rhinitis) 09/28/2012    Fatigue 08/27/2012    HTN (hypertension) 07/25/2011    Obesity, morbid (more than 100 lbs over ideal weight or BMI > 40) ([facility] 07/25/2011     Current Outpatient Medications   Medication Sig Dispense Refill    oxymetazoline HCl (AFRIN NASAL SPRAY NA) by Nasal route.  doxycycline (ADOXA) 100 mg tablet Take 1 Tab by mouth two (2) times a day for 10 days. 20 Tab 0    fluconazole (DIFLUCAN) 150 mg tablet Take 1 Tab by mouth daily for 1 day. FDA advises cautious prescribing of oral fluconazole in pregnancy. 1 Tab 0    semaglutide (OZEMPIC) 1 mg/dose (2 mg/1.5 mL) sub-q pen 1 mg by SubCUTAneous route every seven (7) days. 1 Box 3    FREESTYLE RUBEN 14 DAY SENSOR kit CHANGE SENSOR EVERY 14 DAYS 2 Kit 5    glipiZIDE (GLUCOTROL) 5 mg tablet Take 2 tabs with dinner. 180 Tab 1    vilazodone (VIIBRYD) 20 mg tab tablet Take 1 Tab by mouth daily. 30 Tab 2    atorvastatin (LIPITOR) 40 mg tablet TAKE 1 TABLET BY MOUTH EVERY DAY 90 Tab 2    metoprolol tartrate (LOPRESSOR) 100 mg IR tablet TAKE 1 TABLET BY MOUTH TWICE A DAY 60 Tab 6    spironolactone (ALDACTONE) 25 mg tablet TAKE 1 TABLET BY MOUTH TWICE A DAY 60 Tab 6    omeprazole (PRILOSEC) 40 mg capsule TAKE 1 CAP BY MOUTH DAILY. 30 Cap 3    diclofenac EC (VOLTAREN) 75 mg EC tablet Take 1 Tab by mouth two (2) times daily as needed for Pain.  20 Tab 0    metFORMIN ER (GLUCOPHAGE XR) 500 mg tablet TAKE 2 TABLETS BY MOUTH WITH BREAKFAST AND TAKE 2 TABLETS WITH DINNER 120 Tab 11    gabapentin (NEURONTIN) 300 mg capsule Take 1 Cap by mouth three (3) times daily. Max Daily Amount: 900 mg. 90 Cap 5    insulin glargine (LANTUS SOLOSTAR U-100 INSULIN) 100 unit/mL (3 mL) inpn 30 units daily 15 mL 6    ALPRAZolam (XANAX) 0.5 mg tablet Take 1 Tab by mouth nightly as needed for Anxiety or Sleep. Max Daily Amount: 0.5 mg. 30 Tab 1    flash glucose scanning reader ([employer].com[employer]YLE RUBEN 14 DAY READER) misc Dispense one 14 day reader 1 Each 0    butalbital-acetaminophen-caffeine (FIORICET) -40 mg per tablet Take 0.5-1 Tabs by mouth every six (6) hours as needed for Headache. Max Daily Amount: 4 Tabs. 12 Tab 0    cetirizine (ZYRTEC) 10 mg tablet Take  by mouth daily as needed.  albuterol (PROVENTIL HFA, VENTOLIN HFA) 90 mcg/actuation inhaler Take 1 Puff by inhalation every four (4) hours as needed for Wheezing. (Patient taking differently: Take 1 Puff by inhalation every four (4) hours as needed for Wheezing (PRN). ) 1 Inhaler 1    albuterol (ACCUNEB) 1.25 mg/3 mL nebulizer solution Take 3 mL by inhalation every six (6) hours as needed for Wheezing. 1 Bottle 6    VIIBRYD 10 mg tab tablet TAKE 1 TABLET BY MOUTH EVERY DAY 30 Tab 1    diclofenac (VOLTAREN) 1 % gel Apply 4 g to affected area four (4) times daily. 100 g 0    dextromethorphan-guaiFENesin (MUCINEX DM) 60-1,200 mg Tb12 Take 1 Tab by mouth two (2) times daily as needed for Cough. Indications: Cold Symptoms, cough 30 Tab 0    fluticasone (FLONASE) 50 mcg/actuation nasal spray 2 Sprays by Both Nostrils route daily. (Patient taking differently: 2 Sprays by Both Nostrils route daily.  PRN) 1 Bottle 0     Allergies   Allergen Reactions    Latex Itching     Past Medical History:   Diagnosis Date    Asthma     [place] hump 1/7/2015    Carpal tunnel syndrome of right wrist 12/20/2016    [facility]     ALIYA (generalized anxiety disorder) 4/4/2018    GERD (gastroesophageal reflux disease) 8/13/2014    HTN (hypertension) 7/25/2011    Hyperaldosteronism ([facility] 10/19/2015    Hyperhidrosis 4/4/2018    Other ill-defined conditions(799.89)     migraines    Palpitation 2018    Tachycardia 2018     Past Surgical History:   Procedure Laterality Date    REMOVAL OF TONSILS,<13 Y/O       Family History   Problem Relation Age of Onset   Dorian Zhang Arthritis-rheumatoid Mother     Hypertension Mother     Diabetes Mother         Type II    Psychiatric Disorder Father     Drug Abuse Father     Cancer Other         prostate    Hypertension Sister     Thyroid Disease Sister         \"I think\"    Attention Deficit Hyperactivity Disorder Brother     Hypertension Sister      Social History     Tobacco Use    Smoking status: Former Smoker     Packs/day: 0.50     Years: 5.00     Pack years: 2.50     Last attempt to quit: 2019     Years since quittin.1    Smokeless tobacco: Never Used   Substance Use Topics    Alcohol use: Yes     Alcohol/week: 0.0 standard drinks     Types: 1 Standard drinks or equivalent per week     Comment: socially, Monthly or less        Review of Systems  Pertinent items are noted in HPI. Objective:     Visit Vitals  /70 (BP 1 Location: Left arm, BP Patient Position: Sitting)   Pulse 91   Temp 97.7 °F (36.5 °C) (Oral)   Resp 17   Ht 5' 3\" (1.6 m)   Wt 338 lb 3.2 oz (153.4 kg)   LMP 12/15/2019 (Approximate)   SpO2 97%   BMI 59.91 kg/m²     General:  alert, cooperative, no distress   Eyes: negative   Ears: normal TM's and external ear canals AU   Sinuses:  paranasal sinuses with tenderness   Mouth:  Lips, mucosa, and tongue normal. Teeth and gums normal   Neck: supple, symmetrical, trachea midline and no adenopathy. Heart: S1 and S2 normal, no murmurs noted. Lungs: clear to auscultation bilaterally   Abdomen: soft, non-tender. Bowel sounds normal. No masses,  no organomegaly        Assessment/Plan:   sinusitis  Discussed the dx and tx of sinusitis. Suggested symptomatic OTC remedies. Antibiotics per orders. RTC prn. ICD-10-CM ICD-9-CM    1.  Acute maxillary sinusitis, recurrence not specified J01.00 461.0 doxycycline (ADOXA) 100 mg tablet   2. Vaginal candida B37.3 112.1 fluconazole (DIFLUCAN) 150 mg tablet   .

## 2019-12-28 NOTE — PATIENT INSTRUCTIONS
Saline Nasal Washes: Care Instructions  Your Care Instructions  Saline nasal washes help keep the nasal passages open by washing out thick or dried mucus. This simple remedy can help relieve symptoms of allergies, sinusitis, and colds. It also can make the nose feel more comfortable by keeping the mucous membranes moist. You may notice a little burning sensation in your nose the first few times you use the solution, but this usually gets better in a few days. Follow-up care is a key part of your treatment and safety. Be sure to make and go to all appointments, and call your doctor if you are having problems. It's also a good idea to know your test results and keep a list of the medicines you take. How can you care for yourself at home? · You can buy premixed saline solution in a squeeze bottle or other sinus rinse products at a drugstore. Read and follow the instructions on the label. · You also can make your own saline solution by adding 1 teaspoon of salt and 1 teaspoon of baking soda to 2 cups of distilled water. · If you use a homemade solution, pour a small amount into a clean bowl. Using a rubber bulb syringe, squeeze the syringe and place the tip in the salt water. Pull a small amount of the salt water into the syringe by relaxing your hand. · Sit down with your head tilted slightly back. Do not lie down. Put the tip of the bulb syringe or the squeeze bottle a little way into one of your nostrils. Gently drip or squirt a few drops into the nostril. Repeat with the other nostril. Some sneezing and gagging are normal at first.  · Gently blow your nose. · Wipe the syringe or bottle tip clean after each use. · Repeat this 2 or 3 times a day. · Use nasal washes gently if you have nosebleeds often. When should you call for help? Watch closely for changes in your health, and be sure to contact your doctor if:    · You often get nosebleeds.     · You have problems doing the nasal washes.    Where can you learn more? Go to http://donavon-stacy.info/. Enter [id number] [id number] [phone] in the search box to learn more about \"Saline Nasal Washes: Care Instructions. \"  Current as of: October 21, 2018  Content Version: 12.2  © 0092-3127 [employer]. Care instructions adapted under license by [employer] (which disclaims liability or warranty for this information). If you have questions about a medical condition or this instruction, always ask your healthcare professional. [address] any warranty or liability for your use of this information.

## 2019-12-31 NOTE — PROGRESS NOTES
Pt still complaining of left ear pain. Was started on doxycycline Friday at [facility]. No f/c, but lots of sinus congestion. Left ear canal is erythematous. Ear drum looks ok.   rx sent in for steroid ear drops to [facility].

## 2020-01-02 ENCOUNTER — OFFICE VISIT (OUTPATIENT)
Dept: NEUROLOGY | Age: 30
End: 2020-01-02

## 2020-01-02 ENCOUNTER — DOCUMENTATION ONLY (OUTPATIENT)
Dept: NEUROLOGY | Age: 30
End: 2020-01-02

## 2020-01-02 VITALS
BODY MASS INDEX: 60.23 KG/M2 | OXYGEN SATURATION: 96 % | SYSTOLIC BLOOD PRESSURE: 110 MMHG | HEART RATE: 86 BPM | WEIGHT: 293 LBS | DIASTOLIC BLOOD PRESSURE: 62 MMHG

## 2020-01-02 DIAGNOSIS — G56.21 ULNAR NEUROPATHY AT ELBOW, RIGHT: ICD-10-CM

## 2020-01-02 DIAGNOSIS — G56.01 CARPAL TUNNEL SYNDROME OF RIGHT WRIST: ICD-10-CM

## 2020-01-02 DIAGNOSIS — Z72.0 TOBACCO USE: ICD-10-CM

## 2020-01-02 DIAGNOSIS — G47.33 OSA (OBSTRUCTIVE SLEEP APNEA): ICD-10-CM

## 2020-01-02 DIAGNOSIS — G43.719 INTRACTABLE CHRONIC MIGRAINE WITHOUT AURA AND WITHOUT STATUS MIGRAINOSUS: ICD-10-CM

## 2020-01-02 DIAGNOSIS — G43.009 MIGRAINE WITHOUT AURA AND WITHOUT STATUS MIGRAINOSUS, NOT INTRACTABLE: Primary | ICD-10-CM

## 2020-01-02 NOTE — PROGRESS NOTES
Neurology follow-up note       Chief Complaint   Patient presents with    Follow-up     headache       Subjective  Justo Tobar is a 30 y.o. female who presented to the neurology office for management of numbness and tingling in the hands and headaches. Regarding the numbness in the hand, it started around mid May 2016 and it is bilateral.  The medial 2 fingers are involved in the right hand and all the fingers are involved in the left hand. It is gradually progressive. She does also have neck pain with radiation to the right shoulder. Her symptoms are off and on and it is more when she is working and improves when her hands and not moving. The numbness and tingling lasts for minutes but the pain around her wrist can be for a few days. She denies any weakness. EMG/nerve conduction study has been normal.  She is taking gabapentin 300 mg p.o. thrice daily and that has helped her. She uses right elbow splint and bilateral hand splints. She uses the splint intermittently. She states that the numbness in the hands are worsening. She does also noticed some weakness in her right hand. .    Regarding the headaches, she has been having migraines for a long time and her headaches starts with pain in the left eye and left side of the face and it is 10 out of 10 in severity, associated with photophobia, phonophobia and nausea but she denies any vomiting. The headache can last from 3 days to week. It is throbbing in character. She could not get it refilled and stopped taking nortritpyline. Her headaches have worsened. She does also have obstructive sleep apnea and she uses CPAP around 5 days in a week.     Headache frequency now: Daily    Risk Factors for headaches  Smoking: Half pack a day  Coffee: Denies cups/day  Tea: 0 cups/day  Soda: 2 cans/day  Water: 4 glasses/day  Sleeps at 9 AM and wakes up at 2 PM to 3 PM.     Medications tried  Abortive  Imitrex    Preventitive  Amitriptyline  Nortriptyline  Metoprolol  Gabapentin    Current Outpatient Medications   Medication Sig    ciprofloxacin-hydrocortisone (CIPRO HC OTIC) otic suspension Administer 3 Drops in left ear two (2) times a day.  oxymetazoline HCl (AFRIN NASAL SPRAY NA) by Nasal route.  doxycycline (ADOXA) 100 mg tablet Take 1 Tab by mouth two (2) times a day for 10 days.  semaglutide (OZEMPIC) 1 mg/dose (2 mg/1.5 mL) sub-q pen 1 mg by SubCUTAneous route every seven (7) days.  FREESTYLE RUBEN 14 DAY SENSOR kit CHANGE SENSOR EVERY 14 DAYS    glipiZIDE (GLUCOTROL) 5 mg tablet Take 2 tabs with dinner.  VIIBRYD 10 mg tab tablet TAKE 1 TABLET BY MOUTH EVERY DAY    vilazodone (VIIBRYD) 20 mg tab tablet Take 1 Tab by mouth daily.  atorvastatin (LIPITOR) 40 mg tablet TAKE 1 TABLET BY MOUTH EVERY DAY    metoprolol tartrate (LOPRESSOR) 100 mg IR tablet TAKE 1 TABLET BY MOUTH TWICE A DAY    spironolactone (ALDACTONE) 25 mg tablet TAKE 1 TABLET BY MOUTH TWICE A DAY    omeprazole (PRILOSEC) 40 mg capsule TAKE 1 CAP BY MOUTH DAILY.  diclofenac EC (VOLTAREN) 75 mg EC tablet Take 1 Tab by mouth two (2) times daily as needed for Pain.  dextromethorphan-guaiFENesin (MUCINEX DM) 60-1,200 mg Tb12 Take 1 Tab by mouth two (2) times daily as needed for Cough. Indications: Cold Symptoms, cough    metFORMIN ER (GLUCOPHAGE XR) 500 mg tablet TAKE 2 TABLETS BY MOUTH WITH BREAKFAST AND TAKE 2 TABLETS WITH DINNER    gabapentin (NEURONTIN) 300 mg capsule Take 1 Cap by mouth three (3) times daily. Max Daily Amount: 900 mg.    insulin glargine (LANTUS SOLOSTAR U-100 INSULIN) 100 unit/mL (3 mL) inpn 30 units daily    ALPRAZolam (XANAX) 0.5 mg tablet Take 1 Tab by mouth nightly as needed for Anxiety or Sleep. Max Daily Amount: 0.5 mg.    fluticasone (FLONASE) 50 mcg/actuation nasal spray 2 Sprays by Both Nostrils route daily.  (Patient taking differently: 2 Sprays by Both Nostrils route daily. PRN)    flash glucose scanning reader (MedcurrentSTYLE RUBEN 14 DAY READER) misc Dispense one 14 day reader    butalbital-acetaminophen-caffeine (FIORICET) -40 mg per tablet Take 0.5-1 Tabs by mouth every six (6) hours as needed for Headache. Max Daily Amount: 4 Tabs.  cetirizine (ZYRTEC) 10 mg tablet Take  by mouth daily as needed.  albuterol (PROVENTIL HFA, VENTOLIN HFA) 90 mcg/actuation inhaler Take 1 Puff by inhalation every four (4) hours as needed for Wheezing. (Patient taking differently: Take 1 Puff by inhalation every four (4) hours as needed for Wheezing (PRN). )    albuterol (ACCUNEB) 1.25 mg/3 mL nebulizer solution Take 3 mL by inhalation every six (6) hours as needed for Wheezing. No current facility-administered medications for this visit. REVIEW OF SYSTEMS:   A ten system review of constitutional, cardiovascular, respiratory, musculoskeletal, endocrine, skin, SHEENT, genitourinary, psychiatric and neurologic systems was obtained and is unremarkable except as stated in HPI     EXAMINATION:   Visit Vitals  /62   Pulse 86   Wt 340 lb (154.2 kg)   LMP 12/15/2019 (Approximate)   SpO2 96%   BMI 60.23 kg/m²        General:   General appearance: Pt is in no acute distress   Distal pulses are preserved    Neurological Examination:   Mental Status: AAO x3. Speech is fluent. Follows commands, has normal fund of knowledge, attention, short term recall, comprehension and insight. Cranial Nerves: Visual fields are full. PERRL, Extraocular movements are full. Facial sensation intact V1- V3. Facial movement intact, symmetric. Hearing intact to conversation. Palate elevates symmetrically. Shoulder shrug symmetric. Tongue midline. Motor: Strength is 5/5 in all 4 ext. No atrophy.      Tone: Normal    Sensation: Decreased pinprick sensation in the medial 2 fingers of the right hand and the lateral 3 fingers on the left hand    Coordination/Cerebellar: Intact to finger-nose-finger Skin: No significant bruising or lacerations. Laboratory review:   Results for orders placed or performed in visit on 12/24/19   AMB POC HEMOGLOBIN A1C   Result Value Ref Range    Hemoglobin A1c (POC) 8.4 %     Laboratory review:  12/20/2016  Glucose 352, potassium 5.3, platelet 157, URH0T 9.3    Imaging review:  7/27/2014  CT soft tissue neck with contrast   No evidence of neck mass, abscess or lymphadenopathy    3/16/2017  EMG/nerve conduction study  Nerve conduction studies of the bilateral upper extremities were unremarkable. Needle electrode examination of the right upper extremity was unremarkable. This is a normal study. There is no electrophysiological evidence of median neuropathy at the wrist or and ulnar neuropathy at the elbow on either side. There is no electrophysiological evidence of right cervical radiculopathy. 4/14/2017  MRI of the brain without contrast  Normal    MRI cervical spine  Straightening of the usual cervical lordosis but otherwise normal study    Documentation review:  None    Assessment/Plan:   Saige Santiago is a 30 y.o. female who presented to the neurology office for management of migraines and clinically does have right ulnar neuropathy and left carpal tunnel syndrome. She does also have obstructive sleep apnea and does smoke. Her EMG/nerve conduction study has been normal. She states that the numbness is worsening. She clinically does have right ulnar neuropathy and left carpal tunnel syndrome. She does also use right elbow splint and bilateral hand splints intermittently. Recommend that she uses it every day for 3 months. She states that the gabapentin has helped her with the paresthesia in her hands. She is taking gabapentin 300 mg p.o. 2-3 times daily. .       For her headaches, she has tried amitriptyline, nortriptyline, metoprolol and gabapentin. She has failed them and will be starting her on Emgality. Loading dosage sample was given to the patient. Prescription for Emgality along with the savings coupon was given to the patient as well. She does also have obstructive sleep apnea. She is compliant with her CPAP for around 5 days in a week. Follow-up in 3 months      ICD-10-CM ICD-9-CM    1. Migraine without aura and without status migrainosus, not intractable G43.009 346.10    2. Carpal tunnel syndrome of right wrist G56.01 354.0    3. Ulnar neuropathy at elbow, right G56.21 354.2    4. Tobacco use Z72.0 305.1    5. PETE (obstructive sleep apnea) G47.33 327.23        Thank you for allowing me to participate in the care of Ms. Nancy Wall. Please feel free to contact me if you have any questions. Zina Eldridge MD  Neurologist and Clinical Neurophysiologist    CC: Kulwant Lowe MD  Fax: [phone]    This note will not be viewable in [address]

## 2020-01-14 RX ORDER — INSULIN GLARGINE 100 [IU]/ML
INJECTION, SOLUTION SUBCUTANEOUS
Qty: 15 ML | Refills: 2 | Status: SHIPPED | OUTPATIENT
Start: 2020-01-14 | End: 2020-01-16 | Stop reason: ALTCHOICE

## 2020-01-15 RX ORDER — INSULIN GLARGINE 100 [IU]/ML
INJECTION, SOLUTION SUBCUTANEOUS
Qty: 15 ML | Refills: 3 | Status: SHIPPED | OUTPATIENT
Start: 2020-01-15 | End: 2020-03-26 | Stop reason: SDUPTHER

## 2020-01-15 NOTE — TELEPHONE ENCOUNTER
[facility] faxed stating that Lantus IS covered, not Basaglar. Requests a rx for Lantus.

## 2020-01-16 ENCOUNTER — OFFICE VISIT (OUTPATIENT)
Dept: BEHAVIORAL/MENTAL HEALTH CLINIC | Age: 30
End: 2020-01-16

## 2020-01-16 VITALS
WEIGHT: 293 LBS | HEIGHT: 63 IN | SYSTOLIC BLOOD PRESSURE: 84 MMHG | HEART RATE: 82 BPM | DIASTOLIC BLOOD PRESSURE: 50 MMHG | BODY MASS INDEX: 51.91 KG/M2

## 2020-01-16 DIAGNOSIS — F41.0 PANIC ATTACKS: ICD-10-CM

## 2020-01-16 DIAGNOSIS — F43.10 PTSD (POST-TRAUMATIC STRESS DISORDER): ICD-10-CM

## 2020-01-16 DIAGNOSIS — F32.1 MODERATE MAJOR DEPRESSION (HCC): Primary | ICD-10-CM

## 2020-01-16 DIAGNOSIS — F41.9 ANXIETY: ICD-10-CM

## 2020-01-16 DIAGNOSIS — F40.10 SOCIAL ANXIETY DISORDER: ICD-10-CM

## 2020-01-16 RX ORDER — VILAZODONE HYDROCHLORIDE 40 MG/1
40 TABLET ORAL DAILY
Qty: 30 TAB | Refills: 2 | Status: SHIPPED | OUTPATIENT
Start: 2020-01-16 | End: 2020-04-09 | Stop reason: SDUPTHER
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
## 2020-01-28 DIAGNOSIS — R20.2 PARESTHESIA: ICD-10-CM

## 2020-01-28 DIAGNOSIS — G43.009 MIGRAINE WITHOUT AURA AND WITHOUT STATUS MIGRAINOSUS, NOT INTRACTABLE: ICD-10-CM

## 2020-01-29 ENCOUNTER — TELEPHONE (OUTPATIENT)
Dept: NEUROLOGY | Age: 30
End: 2020-01-29

## 2020-01-29 NOTE — TELEPHONE ENCOUNTER
Luiza MAZA    Called in clinicals to [email or web address] [phone]    Marked urgent -decision within 24 hours. Case # [id number]    [facility] is in network w/ her plan - will fax to [facility] once approved.

## 2020-01-30 ENCOUNTER — TELEPHONE (OUTPATIENT)
Dept: NEUROLOGY | Age: 30
End: 2020-01-30

## 2020-01-30 NOTE — TELEPHONE ENCOUNTER
Called pt, advised of [employer] approval and discussed the [employer] savings card - she will register online and give to her pharmacy.

## 2020-01-31 NOTE — TELEPHONE ENCOUNTER
Received a refill request for    Requested Prescriptions     Pending Prescriptions Disp Refills    gabapentin (NEURONTIN) 300 mg capsule 90 Cap 5     Sig: Take 1 Cap by mouth three (3) times daily. Max Daily Amount: 900 mg.      Future Appointments   Date Time Provider Mateo Diegoi   3/26/2020  2:30 PM Ann Oquendo MD RDE [facility]   4/9/2020  2:00 PM Claudio Hilliard MD [address] [place] [address]   4/21/2020  3:30 PM Chiquis Ventura MD [address] [place] [address]   5/5/2020  3:20 PM Idania Carter MD [facility] Appointment My Department:  1/2/2020     Please review

## 2020-02-03 RX ORDER — GABAPENTIN 300 MG/1
300 CAPSULE ORAL 3 TIMES DAILY
Qty: 90 CAP | Refills: 5 | Status: SHIPPED | OUTPATIENT
Start: 2020-02-03 | End: 2020-05-05 | Stop reason: SDUPTHER

## 2020-02-04 ENCOUNTER — DOCUMENTATION ONLY (OUTPATIENT)
Dept: NEUROLOGY | Age: 30
End: 2020-02-04

## 2020-02-04 RX ORDER — PEN NEEDLE, DIABETIC 31 GX3/16"
NEEDLE, DISPOSABLE MISCELLANEOUS
Qty: 100 PEN NEEDLE | Refills: 3 | Status: SHIPPED | OUTPATIENT
Start: 2020-02-04 | End: 2020-03-26 | Stop reason: SDUPTHER

## 2020-02-12 ENCOUNTER — TELEPHONE (OUTPATIENT)
Dept: INTERNAL MEDICINE CLINIC | Age: 30
End: 2020-02-12

## 2020-02-12 DIAGNOSIS — J01.00 ACUTE NON-RECURRENT MAXILLARY SINUSITIS: Primary | ICD-10-CM

## 2020-02-12 RX ORDER — DOXYCYCLINE 100 MG/1
100 TABLET ORAL 2 TIMES DAILY
Qty: 14 TAB | Refills: 0 | Status: SHIPPED | OUTPATIENT
Start: 2020-02-12 | End: 2020-02-19

## 2020-02-12 NOTE — TELEPHONE ENCOUNTER
Patient c/o earache, and sore throat and sinus pressure. Patient would like to have an antibiotic and 2 diflucan's sent to her pharmacy on file.

## 2020-02-14 RX ORDER — FLUCONAZOLE 150 MG/1
150 TABLET ORAL DAILY
Qty: 2 TAB | Refills: 0 | Status: SHIPPED | OUTPATIENT
Start: 2020-02-14 | End: 2020-02-15

## 2020-02-14 NOTE — TELEPHONE ENCOUNTER
Requested Prescriptions     Pending Prescriptions Disp Refills    fluconazole (DIFLUCAN) 150 mg tablet 2 Tab 0     Sig: Take 1 Tab by mouth daily for 1 day. FDA advises cautious prescribing of oral fluconazole in pregnancy. PCP: Jf Middleton MD    Last appt: Visit date not found  Future Appointments   Date Time Provider Mateo Anahi   3/26/2020  2:30 PM Pedro Hurd MD RDE [facility]   4/9/2020  2:00 PM Jf Middleton MD [address] [facility]   4/21/2020  3:30 PM Chikis Ramos  [facility]   5/5/2020  3:20 PM Kateryna Morgan  [facility]       Requested Prescriptions     Pending Prescriptions Disp Refills    fluconazole (DIFLUCAN) 150 mg tablet 2 Tab 0     Sig: Take 1 Tab by mouth daily for 1 day. FDA advises cautious prescribing of oral fluconazole in pregnancy.

## 2020-02-20 ENCOUNTER — PATIENT MESSAGE (OUTPATIENT)
Dept: PHARMACY | Age: 30
End: 2020-02-20

## 2020-02-20 ENCOUNTER — DOCUMENTATION ONLY (OUTPATIENT)
Dept: PHARMACY | Age: 30
End: 2020-02-20

## 2020-02-20 NOTE — PROGRESS NOTES
Pharmacy Pop Care Documentation:   Patient is missing the following requirements: Lipid Panel. If completed by 3/1520 patient will be eligible for enrollment in the DM Program on 4/01/20. Application Received: 1/58/61  Application scanned. Letter and Fixed - Parking Ticketshart message sent to patient.     Clau Harris, [employer] [facility]   Department, toll free: [phone], option 7

## 2020-02-20 NOTE — LETTER
Boris [address] [facility] Jeovanny [address] Phone: toll free [phone] option 7 Ms. Sheri Hamm [address] 
[address] [place] [address] Thanks so much for taking the first step towards better health.  
   
This message is to inform you that we have received your enrollment form for the [facility] Diabetes Management Program but you are missing the following requirements or documentation:  
   
Lipid Panel drawn yearly within the last 12 months  
   
To qualify for this program the above requirements must be met by 3/15/20.   Results or visits obtained outside of [facility] will need to be provided by fax: [phone] or email: [email or web address] before the deadline in order to qualify for the program.  
   
If requirements are not met by the date listed above you will be not be enrolled in the program.  
 
You will be able to apply for the program for the following enrollments dates:  
Enrollment dates for the [facility] Diabetes Management Program are as follows:  
January 1st - deadline to submit Application and Requirements by December 1st April 1st - deadline to submit Application and Requirements by March 1st July 1st - deadline to submit Application and Requirements by June 1st October 1st - deadline to submit Application and Requirements by September 1st  
 
Boris 2 Phone: toll free [phone], option 7 Email: [email or web address] Fax Number: [phone]

## 2020-02-20 NOTE — LETTER
Boris [address] [facility], [address] Phone: toll free [phone] option 7 Ms. Angelito Hamm [address]aja[address] 
[address] [facility] Congratulations! You have successfully enrolled in the Be Well With Diabetes program for 2020. What you receive Beginning April 1, you will begin receiving up to $600 in waived copays for specific medications and pharmacy-related supplies purchased through our home delivery pharmacy, [facility]. A list of eligible medications and pharmacy supplies can be found at [employer] under Be Well With Diabetes. In addition, youll receive advice and help from our pharmacists, associate care management team and diabetes educators. (And, if you also participate in the Be Well program, you can earn points and Lifestyle Management or Health Management program credit, if applicable.) What you need to do To maintain your benefit this year and remain eligible next year, complete the following requirements: 
 
 
 
 
Remember, program requirements must be completed by deadlines shown. If not, your benefit may be terminated, and you will not be eligible to participate again until the following year. To keep you on track, well review your [employer] account and send reminders for action. (If you dont have a [address], submit documentation to [email or web address] or by fax to [phone].) Congratulations and thank you for taking steps to improve your health and to Be Well With Diabetes. [address] [facility] Phone: [phone], option 7 Email: [email or web address] Fax: [phone]

## 2020-02-24 NOTE — TELEPHONE ENCOUNTER
----- Message from Marleen Khanna sent at 2/22/2020  7:17 PM EST -----  Regarding: Prescription Question  Contact: [phone]  Hi Dr. Elias Gregory,    I was talking to the Alicia garcia at work and said with a coupon, I can get a 90 day supply for $25 instead of just one. Can you send a 90 day supply to the [facility] on file please. Thank you!     Marleen Khanna

## 2020-02-25 DIAGNOSIS — E11.9 TYPE 2 DIABETES MELLITUS WITHOUT COMPLICATION, WITHOUT LONG-TERM CURRENT USE OF INSULIN (HCC): Primary | ICD-10-CM

## 2020-02-25 DIAGNOSIS — I10 ESSENTIAL HYPERTENSION: ICD-10-CM

## 2020-02-25 DIAGNOSIS — E66.01 CLASS 3 SEVERE OBESITY DUE TO EXCESS CALORIES WITH SERIOUS COMORBIDITY AND BODY MASS INDEX (BMI) OF 60.0 TO 69.9 IN ADULT (HCC): ICD-10-CM

## 2020-03-09 LAB
ALBUMIN SERPL-MCNC: 3.6 G/DL (ref 3.8–4.8)
ALBUMIN/CREAT UR: 16 MG/G CREAT (ref 0–29)
ALBUMIN/GLOB SERPL: 1.2 {RATIO} (ref 1.2–2.2)
ALP SERPL-CCNC: 98 IU/L (ref 39–117)
ALT SERPL-CCNC: 16 IU/L (ref 0–32)
AST SERPL-CCNC: 9 IU/L (ref 0–40)
BILIRUB SERPL-MCNC: 0.2 MG/DL (ref 0–1.2)
BUN SERPL-MCNC: 10 MG/DL (ref 6–20)
BUN/CREAT SERPL: 14 (ref 9–23)
CALCIUM SERPL-MCNC: 9.3 MG/DL (ref 8.7–10.2)
CHLORIDE SERPL-SCNC: 103 MMOL/L (ref 96–106)
CHOLEST SERPL-MCNC: 93 MG/DL (ref 100–199)
CO2 SERPL-SCNC: 22 MMOL/L (ref 20–29)
CREAT SERPL-MCNC: 0.71 MG/DL (ref 0.57–1)
CREAT UR-MCNC: 358.2 MG/DL
EST. AVERAGE GLUCOSE BLD GHB EST-MCNC: 186 MG/DL
GLOBULIN SER CALC-MCNC: 2.9 G/DL (ref 1.5–4.5)
GLUCOSE SERPL-MCNC: 186 MG/DL (ref 65–99)
HBA1C MFR BLD: 8.1 % (ref 4.8–5.6)
HDLC SERPL-MCNC: 31 MG/DL
INTERPRETATION, 910389: NORMAL
LDLC SERPL CALC-MCNC: 48 MG/DL (ref 0–99)
Lab: NORMAL
MICROALBUMIN UR-MCNC: 57.1 UG/ML
POTASSIUM SERPL-SCNC: 4.8 MMOL/L (ref 3.5–5.2)
PROT SERPL-MCNC: 6.5 G/DL (ref 6–8.5)
SODIUM SERPL-SCNC: 139 MMOL/L (ref 134–144)
TRIGL SERPL-MCNC: 70 MG/DL (ref 0–149)
VLDLC SERPL CALC-MCNC: 14 MG/DL (ref 5–40)

## 2020-03-26 ENCOUNTER — VIRTUAL VISIT (OUTPATIENT)
Dept: ENDOCRINOLOGY | Age: 30
End: 2020-03-26

## 2020-03-26 VITALS — WEIGHT: 293 LBS | BODY MASS INDEX: 58.46 KG/M2

## 2020-03-26 DIAGNOSIS — E11.9 TYPE 2 DIABETES MELLITUS WITHOUT COMPLICATION, WITHOUT LONG-TERM CURRENT USE OF INSULIN (HCC): ICD-10-CM

## 2020-03-26 DIAGNOSIS — E66.01 CLASS 3 SEVERE OBESITY DUE TO EXCESS CALORIES WITH SERIOUS COMORBIDITY AND BODY MASS INDEX (BMI) OF 60.0 TO 69.9 IN ADULT (HCC): ICD-10-CM

## 2020-03-26 DIAGNOSIS — I10 ESSENTIAL HYPERTENSION: ICD-10-CM

## 2020-03-26 DIAGNOSIS — E11.9 TYPE 2 DIABETES MELLITUS WITHOUT COMPLICATION, WITH LONG-TERM CURRENT USE OF INSULIN (HCC): Primary | ICD-10-CM

## 2020-03-26 DIAGNOSIS — Z79.4 TYPE 2 DIABETES MELLITUS WITHOUT COMPLICATION, WITH LONG-TERM CURRENT USE OF INSULIN (HCC): Primary | ICD-10-CM

## 2020-03-26 RX ORDER — ATORVASTATIN CALCIUM 40 MG/1
TABLET, FILM COATED ORAL
Qty: 90 TAB | Refills: 3 | Status: SHIPPED | OUTPATIENT
Start: 2020-03-26 | End: 2020-04-09 | Stop reason: SDUPTHER

## 2020-03-26 RX ORDER — METFORMIN HYDROCHLORIDE 500 MG/1
TABLET, EXTENDED RELEASE ORAL
Qty: 360 TAB | Refills: 3 | Status: SHIPPED | OUTPATIENT
Start: 2020-03-26 | End: 2020-06-17 | Stop reason: SDUPTHER

## 2020-03-26 RX ORDER — PEN NEEDLE, DIABETIC 31 GX3/16"
NEEDLE, DISPOSABLE MISCELLANEOUS
Qty: 100 PEN NEEDLE | Refills: 3 | Status: SHIPPED | OUTPATIENT
Start: 2020-03-26 | End: 2020-09-22

## 2020-03-26 RX ORDER — FLASH GLUCOSE SENSOR
KIT MISCELLANEOUS
Qty: 6 KIT | Refills: 3 | Status: SHIPPED | OUTPATIENT
Start: 2020-03-26 | End: 2020-07-21

## 2020-03-26 RX ORDER — GLIPIZIDE 5 MG/1
TABLET ORAL
Qty: 90 TAB | Refills: 3 | Status: SHIPPED | OUTPATIENT
Start: 2020-03-26 | End: 2020-06-22

## 2020-03-26 RX ORDER — INSULIN GLARGINE 100 [IU]/ML
INJECTION, SOLUTION SUBCUTANEOUS
Qty: 45 ML | Refills: 3 | Status: SHIPPED | OUTPATIENT
Start: 2020-03-26 | End: 2020-07-21

## 2020-03-26 NOTE — PROGRESS NOTES
Chief Complaint   Patient presents with    Diabetes     pcp and pharmacy verified. TELEMEDICINE [facility] SERVICES   Records since last visit reviewed. History of Present Illness: Nano Frazier is a 30 y.o. female here for follow up of diabetes. She was diagnosed with diabetes \"when I was in high school\". Because pt has features concerning for cushing's her PCP checked a 24 hour urine cortisol which was not elevated (42). She also had a TSH drawn which was 2.10. In March 2016 we tested pt for hyperaldosteronism, which was negative. I tested her for evidence of PCOS, her Testosterone was 14, with DHEA-S 109, her 17-OH Prog was not elevated and an overnight dexamethasone suppression test did suppress her ACTH and cortisol. At our last visit in December 2019 I instructed her to  decrease her PM glipizide to 5mg, will increase her ozempic to 1.0mg weekly and continue the Metformin 1000mg BID and Basaglar 30 units daily. She is currently taking Basaglar 30 units daily, Ozempic 1.0mg weekly, Metformin 1000mg BID and Glipizide 5mg with dinner. Her A1C has shown some improvement down to 8.1%. She has not been checking her BGs. She denies issues of hypoglycemia. She spoke with Dr. Raul Box of bariatric surgery to discuss gastric sleeve. She has completed working with the nutritionist and the next step is she will reach out to the coordinator. She was scheduled for an EDG next week, but it is now rescheduled. She is now working day shift and working part time at night. She is working from home for her night job. She wakes around 630AM.  She has breakfast around 7-8AM, this AM she had a chicken biscuit and regular sprite. She is now working 8AM-5PM and a part-time from [employer].  She has lunch around [place], today she had ground turkey with vegetables and water. She has diner around 6-7PM, before her evening job. Last night she did not have dinner, but snacked on some popcorn last evening.     She will snack on chips or sunflower seeds during her evening shift. She is going to bed around 1130PM-[place]. She denies any HS snacking. She just started working from home last week and the \"bootcamp\" classes closed. Her weight today was down to 330 pounds, which is down 8-10 pounds from December 2019. She is taking a 15 minute walk after lunch with her co-wokers. She was perscibed contrave, but it was too expensive so she never took it. She tried phentermine for a month and she notes it did not help and she stopped taking it. No history of vascular disease. No history of neuropathy, or nephropathy. Last eye exam was March 2019, no retinopathy. Current Outpatient Medications   Medication Sig    semaglutide (OZEMPIC) 1 mg/dose (2 mg/1.5 mL) sub-q pen 1 mg by SubCUTAneous route every seven (7) days.  atorvastatin (LIPITOR) 40 mg tablet TAKE 1 TABLET BY MOUTH EVERY DAY    flash glucose sensor (FreeStyle Maureen 14 Day Sensor) kit CHANGE SENSOR EVERY 14 DAYS    glipiZIDE (GLUCOTROL) 5 mg tablet Take 1 tabs with dinner.  insulin glargine (Lantus Solostar U-100 Insulin) 100 unit/mL (3 mL) inpn 30 units daily    Insulin Needles, Disposable, (Violet Pen Needle) 32 gauge x 5/32\" ndle One shot daily    metFORMIN ER (GLUCOPHAGE XR) 500 mg tablet TAKE 2 TABLETS BY MOUTH WITH BREAKFAST AND TAKE 2 TABLETS WITH DINNER    gabapentin (NEURONTIN) 300 mg capsule Take 1 Cap by mouth three (3) times daily. Max Daily Amount: 900 mg.    vilazodone (VIIBRYD) 40 mg tab tablet Take 1 Tab by mouth daily.  galcanezumab-gnlm (EMGALITY PEN) 120 mg/mL injection 1 mL by SubCUTAneous route every thirty (30) days.  oxymetazoline HCl (AFRIN NASAL SPRAY NA) by Nasal route.  metoprolol tartrate (LOPRESSOR) 100 mg IR tablet TAKE 1 TABLET BY MOUTH TWICE A DAY    spironolactone (ALDACTONE) 25 mg tablet TAKE 1 TABLET BY MOUTH TWICE A DAY    omeprazole (PRILOSEC) 40 mg capsule TAKE 1 CAP BY MOUTH DAILY.     diclofenac EC (VOLTAREN) 75 mg EC tablet Take 1 Tab by mouth two (2) times daily as needed for Pain.  dextromethorphan-guaiFENesin (MUCINEX DM) 60-1,200 mg Tb12 Take 1 Tab by mouth two (2) times daily as needed for Cough. Indications: Cold Symptoms, cough    ALPRAZolam (XANAX) 0.5 mg tablet Take 1 Tab by mouth nightly as needed for Anxiety or Sleep. Max Daily Amount: 0.5 mg.    fluticasone (FLONASE) 50 mcg/actuation nasal spray 2 Sprays by Both Nostrils route daily.  flash glucose scanning reader (FREESTYLE RUBEN 14 DAY READER) misc Dispense one 14 day reader    butalbital-acetaminophen-caffeine (FIORICET) -40 mg per tablet Take 0.5-1 Tabs by mouth every six (6) hours as needed for Headache. Max Daily Amount: 4 Tabs.  cetirizine (ZYRTEC) 10 mg tablet Take  by mouth daily as needed.  albuterol (PROVENTIL HFA, VENTOLIN HFA) 90 mcg/actuation inhaler Take 1 Puff by inhalation every four (4) hours as needed for Wheezing.  albuterol (ACCUNEB) 1.25 mg/3 mL nebulizer solution Take 3 mL by inhalation every six (6) hours as needed for Wheezing. No current facility-administered medications for this visit. Allergies   Allergen Reactions    Latex Itching     Review of Systems:  - Eyes: no blurry vision or double vision  - Cardiovascular: no chest pain  - Respiratory: no shortness of breath  - Musculoskeletal: no myalgias  - Neurological: no numbness/tingling in extremities    Physical Examination:    Brent Blackwell is a 30 y.o. female who was seen by synchronous (real-time) audio-video technology on 3/26/2020. Consent:  She and/or her healthcare decision maker is aware that this patient-initiated Telehealth encounter is a billable service, with coverage as determined by her insurance carrier. She is aware that she may receive a bill and has provided verbal consent to proceed: Yes    I was in the office while conducting this encounter. Assessment & Plan:   Diagnoses and all orders for this visit:    1. Type 2 diabetes mellitus without complication, with long-term current use of insulin ([facility] [address]    2. Essential hypertension    3. Class 3 severe obesity due to excess calories with serious comorbidity and body mass index (BMI) of 60.0 to 69.9 in adult ([facility] [address]    4. Type 2 diabetes mellitus without complication, without long-term current use of insulin (HCC)  -     glipiZIDE (GLUCOTROL) 5 mg tablet; Take 1 tabs with dinner. Other orders  -     semaglutide (OZEMPIC) 1 mg/dose (2 mg/1.5 mL) sub-q pen; 1 mg by SubCUTAneous route every seven (7) days. -     atorvastatin (LIPITOR) 40 mg tablet; TAKE 1 TABLET BY MOUTH EVERY DAY  -     flash glucose sensor (FreeStyle Maureen 14 Day Sensor) kit; CHANGE SENSOR EVERY 14 DAYS  -     insulin glargine (Lantus Solostar U-100 Insulin) 100 unit/mL (3 mL) inpn; 30 units daily  -     Insulin Needles, Disposable, (Violet Pen Needle) 32 gauge x 5/32\" ndle; One shot daily  -     metFORMIN ER (GLUCOPHAGE XR) 500 mg tablet; TAKE 2 TABLETS BY MOUTH WITH BREAKFAST AND TAKE 2 TABLETS WITH DINNER    Assessment/Plan:   1) DM > Her A1C last week did show some improvement to 8.1% and she has had 10 pounds of weight loss. For now we agreed to continue her current regimen, but pt to work on cutting out sodas, reducing her carb intake and increasing her physical activity. Pt to send me her BG readings in 4 weeks. 2) Obesity > Pt is trying to arrange her schedule so that she can restart the \"boot camp\". The Ozempic will help with the continued weight loss. Pt has met with a bariatric surgeon and discussed gastric sleeve. Her weight today was 330 pounds. 3) HTN > Unable to take BP today    RTC 4 months    Pt voices understanding and agreement with the plan. Follow-up and Dispositions    · Return in about 4 months (around 7/26/2020). Subjective:   Ramandeep Walsh was seen for Diabetes (pcp and pharmacy verified.  TELEMEDICINE [facility]note 3])      Prior to Admission medications    Medication Sig Start Date End Date Taking? Authorizing Provider   semaglutide (OZEMPIC) 1 mg/dose (2 mg/1.5 mL) sub-q pen 1 mg by SubCUTAneous route every seven (7) days. 3/26/20  Yes Sami Lares MD   atorvastatin (LIPITOR) 40 mg tablet TAKE 1 TABLET BY MOUTH EVERY DAY 3/26/20  Yes Sami Lares MD   flash glucose sensor (FreeStyle Maureen 14 Day Sensor) kit CHANGE SENSOR EVERY 14 DAYS 3/26/20  Yes Sami Lares MD   glipiZIDE (GLUCOTROL) 5 mg tablet Take 1 tabs with dinner. 3/26/20  Yes Sami Lares MD   insulin glargine (Lantus Solostar U-100 Insulin) 100 unit/mL (3 mL) inpn 30 units daily 3/26/20  Yes Sami Lares MD   Insulin Needles, Disposable, (Violet Pen Needle) 32 gauge x 5/32\" ndle One shot daily 3/26/20  Yes Sami Lares MD   metFORMIN ER (GLUCOPHAGE XR) 500 mg tablet TAKE 2 TABLETS BY MOUTH WITH BREAKFAST AND TAKE 2 TABLETS WITH DINNER 3/26/20  Yes Sami Lares MD   gabapentin (NEURONTIN) 300 mg capsule Take 1 Cap by mouth three (3) times daily. Max Daily Amount: 900 mg. 2/3/20  Yes Vitaly Nelson MD   vilazodone (VIIBRYD) 40 mg tab tablet Take 1 Tab by mouth daily. 1/16/20  Yes Leela Garcias, MARTÍNEZ   galcanezumab-gnlm (EMGALITY PEN) 120 mg/mL injection 1 mL by SubCUTAneous route every thirty (30) days. 1/2/20  Yes Vitaly Nelson MD   oxymetazoline HCl (AFRIN NASAL SPRAY NA) by Nasal route. Yes Provider, Historical   metoprolol tartrate (LOPRESSOR) 100 mg IR tablet TAKE 1 TABLET BY MOUTH TWICE A DAY 11/1/19  Yes Maximino Paz MD   spironolactone (ALDACTONE) 25 mg tablet TAKE 1 TABLET BY MOUTH TWICE A DAY 11/1/19  Yes Maximino Paz MD   omeprazole (PRILOSEC) 40 mg capsule TAKE 1 CAP BY MOUTH DAILY. 10/18/19  Yes Maximino Paz MD   diclofenac EC (VOLTAREN) 75 mg EC tablet Take 1 Tab by mouth two (2) times daily as needed for Pain.  10/2/19  Yes Maximino Paz MD   dextromethorphan-guaiFENesin [facility] DM) 60-1,200 mg Tb12 Take 1 Tab by mouth two (2) times daily as needed for Cough. Indications: Cold Symptoms, cough 10/2/19  Yes Zeinab Fitzgerald MD   ALPRAZolam Marjean Pepper) 0.5 mg tablet Take 1 Tab by mouth nightly as needed for Anxiety or Sleep. Max Daily Amount: 0.5 mg. 3/4/19  Yes Zeinab Fitzgerald MD   fluticasone (FLONASE) 50 mcg/actuation nasal spray 2 Sprays by Both Nostrils route daily. 1/13/19  Yes Victoria Tracy MD   flash glucose scanning reader ([employer] RUBEN 14 DAY READER) misc Dispense one 14 day reader 11/21/18  Yes Mary Jane Ayoub MD   butalbital-acetaminophen-caffeine (FIORICET) -40 mg per tablet Take 0.5-1 Tabs by mouth every six (6) hours as needed for Headache. Max Daily Amount: 4 Tabs. 5/28/16  Yes Victoria Trcay MD   cetirizine (ZYRTEC) 10 mg tablet Take  by mouth daily as needed.    Yes Provider, Historical   albuterol (PROVENTIL HFA, VENTOLIN HFA) 90 mcg/actuation inhaler Take 1 Puff by inhalation every four (4) hours as needed for Wheezing. 9/27/13  Yes Zeinab Fitzgerald MD   albuterol (ACCUNEB) 1.25 mg/3 mL nebulizer solution Take 3 mL by inhalation every six (6) hours as needed for Wheezing. 9/24/13  Yes Asmita Torres MD     Allergies   Allergen Reactions    Latex Itching                PHYSICAL EXAMINATION:  [ INSTRUCTIONS:  \"[x]\" Indicates a positive item  \"[]\" Indicates a negative item  -- DELETE ALL ITEMS NOT EXAMINED]  Vital Signs: (As obtained by patient/caregiver at home)  Visit Vitals  Wt 330 lb (149.7 kg)   BMI 58.46 kg/m²        Constitutional: [x] Appears well-developed and well-nourished [x] No apparent distress      [] Abnormal -     Mental status: [x] Alert and awake  [x] Oriented to person/place/time [x] Able to follow commands    [] Abnormal -     Eyes:   EOM    [x]  Normal    [] Abnormal -   Sclera  [x]  Normal    [] Abnormal -          Discharge [x]  None visible   [] Abnormal -     HENT: [x] Normocephalic, atraumatic  [] Abnormal -   [x] Mouth/Throat: Mucous membranes are moist    External Ears [x] Normal  [] Abnormal -    Neck: [x] No visualized mass [] Abnormal -     Pulmonary/Chest: [x] Respiratory effort normal   [x] No visualized signs of difficulty breathing or respiratory distress        [] Abnormal -      Musculoskeletal:   [x] Normal gait with no signs of ataxia         [x] Normal range of motion of neck        [] Abnormal -     Neurological:        [x] No Facial Asymmetry (Cranial nerve 7 motor function) (limited exam due to video visit)          [x] No gaze palsy        [] Abnormal -          Skin:        [x] No significant exanthematous lesions or discoloration noted on facial skin         [] Abnormal -            Psychiatric:       [x] Normal Affect [] Abnormal -        [x] No Hallucinations    Other pertinent observable physical exam findings:-        We discussed the expected course, resolution and complications of the diagnosis(es) in detail. Medication risks, benefits, costs, interactions, and alternatives were discussed as indicated. I advised her to contact the office if her condition worsens, changes or fails to improve as anticipated. She expressed understanding with the diagnosis(es) and plan. Pursuant to the emergency declaration under the [address] [place] [address], Counts include 234 beds at the [facility] waiver authority and the [employer] Act, this Virtual  Visit was conducted, with patient's consent, to reduce the patient's risk of exposure to COVID-19 and provide continuity of care for an established patient. Services were provided through a video synchronous discussion virtually to substitute for in-person clinic visit.     Yari Hummel MD          Data Reviewed:   Component      Latest Ref Rng & Units 3/7/2020 3/7/2020 3/7/2020 3/7/2020           9:44 AM  9:44 AM  9:44 AM  9:44 AM   Glucose      65 - 99 mg/dL 186 (H)      BUN      6 - 20 mg/dL 10      Creatinine      0.57 - 1.00 mg/dL 0.71      GFR est non-AA      >59 mL/min/1.73 114      GFR est AA      >59 mL/min/1.73 131      BUN/Creatinine ratio      9 - 23 14      Sodium      134 - 144 mmol/L 139      Potassium      3.5 - 5.2 mmol/L 4.8      Chloride      96 - 106 mmol/L 103      CO2      20 - 29 mmol/L 22      Calcium      8.7 - 10.2 mg/dL 9.3      Protein, total      6.0 - 8.5 g/dL 6.5      Albumin      3.8 - 4.8 g/dL 3.6 (L)      GLOBULIN, TOTAL      1.5 - 4.5 g/dL 2.9      A-G Ratio      1.2 - 2.2 1.2      Bilirubin, total      0.0 - 1.2 mg/dL 0.2      Alk. phosphatase      39 - 117 IU/L 98      AST      0 - 40 IU/L 9      ALT (SGPT)      0 - 32 IU/L 16      Cholesterol, total      100 - 199 mg/dL  93 (L)     Triglyceride      0 - 149 mg/dL  70     HDL Cholesterol      >39 mg/dL  31 (L)     VLDL, calculated      5 - 40 mg/dL  14     LDL, calculated      0 - 99 mg/dL  48     Creatinine, urine      Not Estab. mg/dL   358.2    Microalbumin, urine      Not Estab. ug/mL   57.1    Microalbumin/Creat. Ratio      0 - 29 mg/g creat   16    Hemoglobin A1c, (calculated)      4.8 - 5.6 %    8.1 (H)   Estimated average glucose      mg/dL    186             Follow-up and Dispositions    · Return in about 4 months (around 7/26/2020). Copy sent to:  Mansi Best

## 2020-03-30 DIAGNOSIS — E11.9 TYPE 2 DIABETES MELLITUS WITHOUT COMPLICATION, WITHOUT LONG-TERM CURRENT USE OF INSULIN (HCC): ICD-10-CM

## 2020-03-30 NOTE — PROGRESS NOTES
Lipid Panel completed on 3/07/20. Pharmacy Pop Care Documentation:   Patient has completed all the requirements and therefore will be enrolled in the DM Program on 4/01/20. Letter mailed to patient.     Birgit Langley, Via "[employer] [facility]   Department, toll free: [phone], option 7

## 2020-04-02 ENCOUNTER — TELEPHONE (OUTPATIENT)
Dept: PHARMACY | Age: 30
End: 2020-04-02

## 2020-04-02 NOTE — TELEPHONE ENCOUNTER
Called patient to schedule 2020 yearly pharmacist appointment to discuss medications for Diabetes Management Program.    Spoke to patient and appointment scheduled for 4/07/20 at [address] [facility], [address] [facility]   Department, toll free: [phone], option 7

## 2020-04-06 ENCOUNTER — TELEPHONE (OUTPATIENT)
Dept: PHARMACY | Age: 30
End: 2020-04-06

## 2020-04-06 NOTE — TELEPHONE ENCOUNTER
[address] [place] [address] Diabetes Management Program  =================================================================  Shira Rubi is a 30 y.o. female enrolled in the [address] [place] [address] Employee Diabetes Management Program. Patient provided Alonso Velez with verbal consent to remain in the program for this year. Medications:  Current Outpatient Medications   Medication Sig Dispense Refill    semaglutide (OZEMPIC) 1 mg/dose (2 mg/1.5 mL) sub-q pen 1 mg by SubCUTAneous route every seven (7) days. 9 Box 3    atorvastatin (LIPITOR) 40 mg tablet TAKE 1 TABLET BY MOUTH EVERY DAY 90 Tab 3    flash glucose sensor (FreeStyle Maureen 14 Day Sensor) kit CHANGE SENSOR EVERY 14 DAYS 6 Kit 3    glipiZIDE (GLUCOTROL) 5 mg tablet Take 1 tabs with dinner. 90 Tab 3    insulin glargine (Lantus Solostar U-100 Insulin) 100 unit/mL (3 mL) inpn 30 units daily 45 mL 3    Insulin Needles, Disposable, (Violet Pen Needle) 32 gauge x 5/32\" ndle One shot daily 100 Pen Needle 3    metFORMIN ER (GLUCOPHAGE XR) 500 mg tablet TAKE 2 TABLETS BY MOUTH WITH BREAKFAST AND TAKE 2 TABLETS WITH DINNER 360 Tab 3    gabapentin (NEURONTIN) 300 mg capsule Take 1 Cap by mouth three (3) times daily. Max Daily Amount: 900 mg. 90 Cap 5    vilazodone (VIIBRYD) 40 mg tab tablet Take 1 Tab by mouth daily. 30 Tab 2    galcanezumab-gnlm (EMGALITY PEN) 120 mg/mL injection 1 mL by SubCUTAneous route every thirty (30) days. 1 mL 11    oxymetazoline HCl (AFRIN NASAL SPRAY NA) by Nasal route.  metoprolol tartrate (LOPRESSOR) 100 mg IR tablet TAKE 1 TABLET BY MOUTH TWICE A DAY 60 Tab 6    spironolactone (ALDACTONE) 25 mg tablet TAKE 1 TABLET BY MOUTH TWICE A DAY 60 Tab 6    omeprazole (PRILOSEC) 40 mg capsule TAKE 1 CAP BY MOUTH DAILY. 30 Cap 3    diclofenac EC (VOLTAREN) 75 mg EC tablet Take 1 Tab by mouth two (2) times daily as needed for Pain.  20 Tab 0    dextromethorphan-guaiFENesin (MUCINEX DM) 60-1,200 mg Tb12 Take 1 Tab by mouth two (2) times daily as needed for Cough. Indications: Cold Symptoms, cough 30 Tab 0    ALPRAZolam (XANAX) 0.5 mg tablet Take 1 Tab by mouth nightly as needed for Anxiety or Sleep. Max Daily Amount: 0.5 mg. 30 Tab 1    fluticasone (FLONASE) 50 mcg/actuation nasal spray 2 Sprays by Both Nostrils route daily. 1 Bottle 0    flash glucose scanning reader (Aggregate Knowledge RUBEN 14 DAY READER) misc Dispense one 14 day reader 1 Each 0    butalbital-acetaminophen-caffeine (FIORICET) -40 mg per tablet Take 0.5-1 Tabs by mouth every six (6) hours as needed for Headache. Max Daily Amount: 4 Tabs. 12 Tab 0    cetirizine (ZYRTEC) 10 mg tablet Take  by mouth daily as needed.  albuterol (PROVENTIL HFA, VENTOLIN HFA) 90 mcg/actuation inhaler Take 1 Puff by inhalation every four (4) hours as needed for Wheezing. 1 Inhaler 1    albuterol (ACCUNEB) 1.25 mg/3 mL nebulizer solution Take 3 mL by inhalation every six (6) hours as needed for Wheezing. 1 Bottle 6      Current Pharmacy: [facility]  Current testing supplies/frequency: Kevin Greenberg wants to stay with  Pen needles/syringes: Generic    Allergies:   Allergies   Allergen Reactions    Latex Itching      Vitals/Labs:  BP Readings from Last 3 Encounters:   01/16/20 (!) 84/50   01/02/20 110/62   12/27/19 114/70     Lab Results   Component Value Date/Time    Microalb/Creat ratio (ug/mg creat.) 16 03/07/2020 09:44 AM     Lab Results   Component Value Date/Time    Hemoglobin A1c 8.1 (H) 03/07/2020 09:44 AM    Hemoglobin A1c 10.9 (H) 03/20/2017 04:08 PM    Hemoglobin A1c 8.4 (H) 09/26/2016 12:00 AM    Hemoglobin A1c (POC) 8.4 12/24/2019 02:20 PM    Hemoglobin A1c (POC) 8.9 09/24/2019 08:53 AM    Hemoglobin A1c (POC) 9.9 06/21/2019 11:45 AM     Lab Results   Component Value Date/Time    Cholesterol, total 93 (L) 03/07/2020 09:44 AM    HDL Cholesterol 31 (L) 03/07/2020 09:44 AM    LDL, calculated 48 03/07/2020 09:44 AM    VLDL, calculated 14 03/07/2020 09:44 AM    Triglyceride 70 03/07/2020 09:44 AM     ALT (SGPT)   Date Value Ref Range Status   2020 16 0 - 32 IU/L Final     The ASCVD Risk score (Katy Cotton, et al., 2013) failed to calculate for the following reasons:    ASCVD risk score not calculated     Lab Results   Component Value Date/Time    Creatinine 0.71 2020 09:44 AM    GFR est  2020 09:44 AM    GFR est non- 2020 09:44 AM     Estimated Creatinine Clearance: 165.5 mL/min (by C-G formula based on SCr of 0.71 mg/dL). Immunizations:  Immunization History   Administered Date(s) Administered    Hep B Vaccine (Adult) 10/25/2018    Influenza Vaccine (Quad) PF 10/19/2015, 2016    Influenza Vaccine (Quadrivalent) 2018    Influenza Vaccine PF 2013    Pneumococcal Polysaccharide (PPSV-23) 2013    TDAP Vaccine 2012      Social History:  Social History     Tobacco Use    Smoking status: Former Smoker     Packs/day: 0.50     Years: 5.00     Pack years: 2.50     Last attempt to quit: 2019     Years since quittin.4    Smokeless tobacco: Never Used   Substance Use Topics    Alcohol use: Yes     Alcohol/week: 0.0 standard drinks     Types: 1 Standard drinks or equivalent per week     Comment: socially, Monthly or less     ASSESSMENT:  Initial Program Requirements (Y indicates has completed for the year, N indicates needs to be completed by 2020):  Yes - OV with provider for DM (1st)  Yes - A1c (1st)     Ongoing Program Requirements (Y indicates has completed for the year, N indicates needs to be completed by 2020):   No - OV with provider for DM (2nd)  No - ACC/diabetes educator visit (if A1c over 8%)  No - A1c (2nd)  Yes - Lipid panel  Yes - Urine microalbumin  Yes - Pneumococcal vaccination: [id number]   No - Influenza vaccination for   No - Medication adherence over 70%  Yes - On statin or contraindication(s)   Yes - On ACEi/ARB or contraindication(s) Normal blood pressure, urinary albumin-to-creatinine ratio, and eGFR      Current medications eligible for copay waiver, up to $600, through [facility]:  - Ozempic, atorvastatin, glipizide, Lantus, metformin ER  - Generic ([facility] pharmacy-stocked) insulin syringes and pen needles  - Agamatrix or Prodigy meter and supplies  - Dexcom G6 supplies     Diabetes Care:   - Glycemic Goal: <7.0%. Is not at blood glucose goal but is on Ozempic, Lantus, glipizide, and metformin therapy. - Appropriateness of Insulin Therapy: can titrate as needed  - Adherent to ACEi/ARB and/or statin: yes  Type 2 DM under inadequate control as evidenced by A1c 8.1%.  - Current symptoms/problems include none  - Home blood sugar records:  fasting range: , postprandial range: 120-140 and patient tests 8 time(s) per day  - Any episodes of hypoglycemia? no  - Known diabetic complications: none  - Eye exam current (within one year): yes  - Foot exam current (within one year): yes  - Daily Aspirin? No  - Medication compliance: compliant all of the time  - Diet compliance: was doing good with diet, started to slip due to virus but is working to control diet again  - Current exercise: exercise every couple hours at work (lunges, line dance, etc)  - What might prevent you from meeting your goal?: none    Other Considerations:  - Blood Pressure Goal: BP less than 140/90 mmHg due to history of DM: Is at blood pressure goal.   - Lipids: Patient is prescribed high-intensity statin therapy. - Smoking status: quit 1 years ago   - Other: patient is looking to have gastric sleeve surgery. Patient has been controlling diet more since virus situation. Patient is working to increase exercise amount and continuing to get diet benefit from Paul Oni. Paul Oni was recently increased to 1 mg weekly. Per recent endocrinologist OV, patient to continue current regimen and work on cutting back on soda and reducing carb intake.  Patient stated she was cutting back on soda and carbs, but is still trying to reduce more. PLAN:  - DM program gaps identified:   · Initial requirements: Requirements met   · Ongoing requirements: OV with provider for DM (2nd), ACC/diabetes educator visit (if A1c over 8%), A1c (2nd), Influenza vaccination for [id number] and Medication adherence over 70%   - Education to patient: Discussed general issues about diabetes pathophysiology and management., Addressed ADA diet., Suggested low cholesterol diet., Encouraged aerobic exercise., Discussed foot care., Reminded to get yearly retinal exam. and Discussed ways to avoid symptomatic hypoglycemia.   - Follow up: PCP for identified gaps or as scheduled below  - Upcoming appointments:   Future Appointments   Date Time Provider Mateo Anahi   4/9/2020  3:00 PM Brandon Rubi MD [address] [facility]   4/21/2020  3:30 PM Mechelle Webster  [facility]   5/5/2020  3:20 PM Jefferson Matos  [facility]   7/23/2020  2:30 PM Bartolome Sage MD RDE [facility], PharmD, [address] [facility] [phone], option 7    CLINICAL PHARMACY CONSULT: MED RECONCILIATION/REVIEW ADDENDUM  For Pharmacy Admin Tracking Only  PHSO: PHSO Patient?: Yes  Total # of Interventions Recommended: Count: 1  - Updated Order #: 1 Updated Order Reason(s):  Other  Recommended intervention potential cost savings: 1  Total Interventions Accepted: 1  Time Spent (min): 45

## 2020-04-09 ENCOUNTER — VIRTUAL VISIT (OUTPATIENT)
Dept: FAMILY MEDICINE CLINIC | Age: 30
End: 2020-04-09

## 2020-04-09 DIAGNOSIS — Z72.0 TOBACCO ABUSE DISORDER: ICD-10-CM

## 2020-04-09 DIAGNOSIS — I10 ESSENTIAL HYPERTENSION: ICD-10-CM

## 2020-04-09 DIAGNOSIS — Z00.00 WELL ADULT EXAM: ICD-10-CM

## 2020-04-09 DIAGNOSIS — Z13.39 SCREENING FOR ALCOHOLISM: ICD-10-CM

## 2020-04-09 DIAGNOSIS — E11.21 TYPE 2 DIABETES WITH NEPHROPATHY (HCC): ICD-10-CM

## 2020-04-09 DIAGNOSIS — F41.1 GAD (GENERALIZED ANXIETY DISORDER): ICD-10-CM

## 2020-04-09 DIAGNOSIS — Z00.00 MEDICARE ANNUAL WELLNESS VISIT, SUBSEQUENT: Primary | ICD-10-CM

## 2020-04-09 DIAGNOSIS — Z00.00 WELL WOMAN EXAM (NO GYNECOLOGICAL EXAM): ICD-10-CM

## 2020-04-09 RX ORDER — METOPROLOL TARTRATE 100 MG/1
TABLET ORAL
Qty: 180 TAB | Refills: 2 | Status: SHIPPED | OUTPATIENT
Start: 2020-04-09 | End: 2021-01-25 | Stop reason: SDUPTHER

## 2020-04-09 RX ORDER — VILAZODONE HYDROCHLORIDE 40 MG/1
40 TABLET ORAL DAILY
Qty: 90 TAB | Refills: 2 | Status: SHIPPED | OUTPATIENT
Start: 2020-04-09 | End: 2021-02-03 | Stop reason: SDUPTHER

## 2020-04-09 RX ORDER — OMEPRAZOLE 40 MG/1
CAPSULE, DELAYED RELEASE ORAL
Qty: 90 CAP | Refills: 3 | Status: SHIPPED | OUTPATIENT
Start: 2020-04-09 | End: 2021-02-03 | Stop reason: SDUPTHER

## 2020-04-09 RX ORDER — ATORVASTATIN CALCIUM 40 MG/1
TABLET, FILM COATED ORAL
Qty: 90 TAB | Refills: 3 | Status: SHIPPED | OUTPATIENT
Start: 2020-04-09 | End: 2020-05-14 | Stop reason: SDUPTHER

## 2020-04-09 NOTE — PROGRESS NOTES
HISTORY OF PRESENT ILLNESS  Orelia Saint is a 30 y.o. female. HPI     Pt main complaints were provided on virtual visit and video telemed format, pt consented to such visit secondary to the current national and state emergency in order to decrease the transmission rate of the corona virus,  pt is w/ comorbid history at high risk    Present on VV for the concerns for the current medical conditions and stating that the pt has had no traveling and unaware if the pt has been exposed to covid-19 individual,  she is currently working, has no fever no cough no dyspnea, no ha, not dizzy, nl smell nl taste, no abd upset,  Not feeling anxious,  ++body ache, currently on ABX Ceftin from ENT Doc, also wanted to do the physical for her insurance company   Needs to order to be faxed to her, has +form for us to complete will fax it to us,    based on the record has done recetn labs,   Want to check her nicotine level in her system,   Current Outpatient Medications   Medication Sig Dispense Refill    semaglutide (OZEMPIC) 1 mg/dose (2 mg/1.5 mL) sub-q pen 1 mg by SubCUTAneous route every seven (7) days. 9 Box 3    atorvastatin (LIPITOR) 40 mg tablet TAKE 1 TABLET BY MOUTH EVERY DAY 90 Tab 3    flash glucose sensor (FreeStyle Maureen 14 Day Sensor) kit CHANGE SENSOR EVERY 14 DAYS 6 Kit 3    glipiZIDE (GLUCOTROL) 5 mg tablet Take 1 tabs with dinner. 90 Tab 3    insulin glargine (Lantus Solostar U-100 Insulin) 100 unit/mL (3 mL) inpn 30 units daily 45 mL 3    Insulin Needles, Disposable, (Violet Pen Needle) 32 gauge x 5/32\" ndle One shot daily 100 Pen Needle 3    metFORMIN ER (GLUCOPHAGE XR) 500 mg tablet TAKE 2 TABLETS BY MOUTH WITH BREAKFAST AND TAKE 2 TABLETS WITH DINNER 360 Tab 3    gabapentin (NEURONTIN) 300 mg capsule Take 1 Cap by mouth three (3) times daily. Max Daily Amount: 900 mg. 90 Cap 5    vilazodone (VIIBRYD) 40 mg tab tablet Take 1 Tab by mouth daily.  30 Tab 2    galcanezumab-gnlm (EMGALITY PEN) 120 mg/mL injection 1 mL by SubCUTAneous route every thirty (30) days. 1 mL 11    metoprolol tartrate (LOPRESSOR) 100 mg IR tablet TAKE 1 TABLET BY MOUTH TWICE A DAY 60 Tab 6    spironolactone (ALDACTONE) 25 mg tablet TAKE 1 TABLET BY MOUTH TWICE A DAY 60 Tab 6    omeprazole (PRILOSEC) 40 mg capsule TAKE 1 CAP BY MOUTH DAILY. 30 Cap 3    dextromethorphan-guaiFENesin (MUCINEX DM) 60-1,200 mg Tb12 Take 1 Tab by mouth two (2) times daily as needed for Cough. Indications: Cold Symptoms, cough 30 Tab 0    ALPRAZolam (XANAX) 0.5 mg tablet Take 1 Tab by mouth nightly as needed for Anxiety or Sleep. Max Daily Amount: 0.5 mg. 30 Tab 1    fluticasone (FLONASE) 50 mcg/actuation nasal spray 2 Sprays by Both Nostrils route daily. 1 Bottle 0    flash glucose scanning reader (Kollabora RUBEN 14 DAY READER) misc Dispense one 14 day reader 1 Each 0    butalbital-acetaminophen-caffeine (FIORICET) -40 mg per tablet Take 0.5-1 Tabs by mouth every six (6) hours as needed for Headache. Max Daily Amount: 4 Tabs. 12 Tab 0    albuterol (PROVENTIL HFA, VENTOLIN HFA) 90 mcg/actuation inhaler Take 1 Puff by inhalation every four (4) hours as needed for Wheezing. 1 Inhaler 1    albuterol (ACCUNEB) 1.25 mg/3 mL nebulizer solution Take 3 mL by inhalation every six (6) hours as needed for Wheezing.  1 Bottle 6     Allergies   Allergen Reactions    Latex Itching     Past Medical History:   Diagnosis Date    Asthma     [place] hump 1/7/2015    Carpal tunnel syndrome of right wrist 12/20/2016    [facility] [facility]     ALIYA (generalized anxiety disorder) 4/4/2018    GERD (gastroesophageal reflux disease) 8/13/2014    HTN (hypertension) 7/25/2011    Hyperaldosteronism ([facility] [address] 10/19/2015    Hyperhidrosis 4/4/2018    Other ill-defined conditions(799.89)     migraines    Palpitation 4/4/2018    Tachycardia 4/4/2018     Past Surgical History:   Procedure Laterality Date    [place] REMOVAL OF TONSILS,<13 Y/O       Family History   Problem Relation Age of Onset   Connye Sole Arthritis-rheumatoid Mother    Shalini Brooks Hypertension Mother     Diabetes Mother         Type II    Psychiatric Disorder Father     Drug Abuse Father     Cancer Other         prostate    Hypertension Sister     Thyroid Disease Sister         \"I think\"    Attention Deficit Hyperactivity Disorder Brother     Hypertension Sister      Social History     Tobacco Use    Smoking status: Former Smoker     Packs/day: 0.50     Years: 5.00     Pack years: 2.50     Last attempt to quit: 2019     Years since quittin.4    Smokeless tobacco: Never Used   Substance Use Topics    Alcohol use: Yes     Alcohol/week: 0.0 standard drinks     Types: 1 Standard drinks or equivalent per week     Comment: socially, Monthly or less      Lab Results   Component Value Date/Time    Hemoglobin A1c 8.1 (H) 2020 09:44 AM    Hemoglobin A1c 10.9 (H) 2017 04:08 PM    Hemoglobin A1c 8.4 (H) 2016 12:00 AM    Glucose 186 (H) 2020 09:44 AM    Glucose (POC) 116 (H) 2016 08:34 PM    Microalb/Creat ratio (ug/mg creat.) 16 2020 09:44 AM    LDL, calculated 48 2020 09:44 AM    Creatinine 0.71 2020 09:44 AM         Review of Systems   Constitutional: Negative for chills and fever. HENT: Negative for nosebleeds. Eyes: Negative for pain. Respiratory: Negative for cough and wheezing. Cardiovascular: Negative for chest pain and leg swelling. Gastrointestinal: Negative for constipation, diarrhea and nausea. Genitourinary: Negative for frequency. Musculoskeletal: Positive for joint pain and myalgias. Skin: Negative for rash. Neurological: Negative for loss of consciousness. Endo/Heme/Allergies: Does not bruise/bleed easily. Psychiatric/Behavioral: Negative for depression. The patient is not nervous/anxious and does not have insomnia. All other systems reviewed and are negative. Physical Exam  Constitutional:       Appearance: She is obese.  She is not ill-appearing or toxic-appearing. HENT:      Head: Normocephalic and atraumatic. Mouth/Throat:      Mouth: Mucous membranes are moist.   Neurological:      Mental Status: She is alert and oriented to person, place, and time. Psychiatric:         Mood and Affect: Mood normal.         Behavior: Behavior normal.         Thought Content: Thought content normal.         Judgment: Judgment normal.         ASSESSMENT and PLAN  Diagnoses and all orders for this visit:    1. Well adult exam  -     URINALYSIS W/ RFLX MICROSCOPIC  -     JULIEN COMPREHENSIVE PLUS PANEL  -     SED RATE (ESR)  -     CRP, HIGH SENSITIVITY    2. Well woman exam (no gynecological exam)    3. Essential hypertension  -     metoprolol tartrate (LOPRESSOR) 100 mg IR tablet; TAKE 1 TABLET BY MOUTH TWICE A DAY  -     omeprazole (PRILOSEC) 40 mg capsule; TAKE 1 CAP BY MOUTH DAILY. 4. Tobacco abuse disorder  -     omeprazole (PRILOSEC) 40 mg capsule; TAKE 1 CAP BY MOUTH DAILY. 5. ALIYA (generalized anxiety disorder)  -     metoprolol tartrate (LOPRESSOR) 100 mg IR tablet; TAKE 1 TABLET BY MOUTH TWICE A DAY  -     omeprazole (PRILOSEC) 40 mg capsule; TAKE 1 CAP BY MOUTH DAILY. -     atorvastatin (LIPITOR) 40 mg tablet; TAKE 1 TABLET BY MOUTH EVERY DAY  -     vilazodone (VIIBRYD) 40 mg tab tablet; Take 1 Tab by mouth daily. 6. Type 2 diabetes with nephropathy (HCC)  -     metoprolol tartrate (LOPRESSOR) 100 mg IR tablet; TAKE 1 TABLET BY MOUTH TWICE A DAY  -     omeprazole (PRILOSEC) 40 mg capsule; TAKE 1 CAP BY MOUTH DAILY. -     atorvastatin (LIPITOR) 40 mg tablet; TAKE 1 TABLET BY MOUTH EVERY DAY  -     vilazodone (VIIBRYD) 40 mg tab tablet; Take 1 Tab by mouth daily.       This is the Subsequent Medicare Annual Wellness Exam, performed 12 months or more after the Initial AWV or the last Subsequent AWV    Consent: Neha Crooks, who was seen by synchronous (real-time) audio-video technology, and/or her healthcare decision maker, is aware that this patient-initiated, Telehealth encounter on 4/9/2020 is a billable service. While AWVs are fully covered by Medicare, any services rendered on this date that are not included in an AWV are subject to additional billing, with coverage as determined by her insurance carrier. She is aware that she may receive a bill for any such additional services and has provided verbal consent to proceed: Yes. I have reviewed the patient's medical history in detail and updated the computerized patient record.      History     Patient Active Problem List   Diagnosis Code    HTN (hypertension) I10    Obesity, morbid (more than 100 lbs over ideal weight or BMI > 40) ([facility]) E66.01    Fatigue R53.83    Vitamin D deficiency E55.9    AR (allergic rhinitis) J30.9    Obesity E66.9    Increased pulse rate R00.0    Acute laryngitis J04.0    Viral pneumonia, unspecified J12.9    Migraine headache G43.909    GERD (gastroesophageal reflux disease) K21.9    [place] hump E65    Type 2 diabetes mellitus without complication ([facility]) M78.3    Acute pharyngitis J02.9    Carpal tunnel syndrome of right wrist G56.01    Sleep apnea in adult G47.30    ALIYA (generalized anxiety disorder) F41.1    Hyperhidrosis R61    Palpitation R00.2    Tachycardia R00.0    Type 2 diabetes mellitus with diabetic neuropathy ([facility]) E11.40    Class 3 severe obesity due to excess calories with serious comorbidity and body mass index (BMI) of 60.0 to 69.9 in adult ([facility] [address] E66.01, Z68.44    Type 2 diabetes with nephropathy ([address] E11.21     Past Medical History:   Diagnosis Date    Asthma     [place] hump 1/7/2015    Carpal tunnel syndrome of right wrist 12/20/2016    Diabetes ([facility] [address]     ALIYA (generalized anxiety disorder) 4/4/2018    GERD (gastroesophageal reflux disease) 8/13/2014    HTN (hypertension) 7/25/2011    Hyperaldosteronism ([address] 10/19/2015    Hyperhidrosis 4/4/2018    Other ill-defined conditions(799.89)     migraines    Palpitation 4/4/2018    Tachycardia 4/4/2018      Past Surgical History:   Procedure Laterality Date    [place] REMOVAL OF TONSILS,<11 Y/O       Current Outpatient Medications   Medication Sig Dispense Refill    metoprolol tartrate (LOPRESSOR) 100 mg IR tablet TAKE 1 TABLET BY MOUTH TWICE A  Tab 2    omeprazole (PRILOSEC) 40 mg capsule TAKE 1 CAP BY MOUTH DAILY. 90 Cap 3    atorvastatin (LIPITOR) 40 mg tablet TAKE 1 TABLET BY MOUTH EVERY DAY 90 Tab 3    vilazodone (VIIBRYD) 40 mg tab tablet Take 1 Tab by mouth daily. 90 Tab 2    semaglutide (OZEMPIC) 1 mg/dose (2 mg/1.5 mL) sub-q pen 1 mg by SubCUTAneous route every seven (7) days. 9 Box 3    flash glucose sensor (FreeStyle Maureen 14 Day Sensor) kit CHANGE SENSOR EVERY 14 DAYS 6 Kit 3    glipiZIDE (GLUCOTROL) 5 mg tablet Take 1 tabs with dinner. 90 Tab 3    insulin glargine (Lantus Solostar U-100 Insulin) 100 unit/mL (3 mL) inpn 30 units daily 45 mL 3    Insulin Needles, Disposable, (Violet Pen Needle) 32 gauge x 5/32\" ndle One shot daily 100 Pen Needle 3    metFORMIN ER (GLUCOPHAGE XR) 500 mg tablet TAKE 2 TABLETS BY MOUTH WITH BREAKFAST AND TAKE 2 TABLETS WITH DINNER 360 Tab 3    gabapentin (NEURONTIN) 300 mg capsule Take 1 Cap by mouth three (3) times daily. Max Daily Amount: 900 mg. 90 Cap 5    galcanezumab-gnlm (EMGALITY PEN) 120 mg/mL injection 1 mL by SubCUTAneous route every thirty (30) days. 1 mL 11    dextromethorphan-guaiFENesin (MUCINEX DM) 60-1,200 mg Tb12 Take 1 Tab by mouth two (2) times daily as needed for Cough. Indications: Cold Symptoms, cough 30 Tab 0    ALPRAZolam (XANAX) 0.5 mg tablet Take 1 Tab by mouth nightly as needed for Anxiety or Sleep. Max Daily Amount: 0.5 mg. 30 Tab 1    fluticasone (FLONASE) 50 mcg/actuation nasal spray 2 Sprays by Both Nostrils route daily.  1 Bottle 0    flash glucose scanning reader (FREESTYLE MAUREEN 14 DAY READER) misc Dispense one 14 day reader 1 Each 0    butalbital-acetaminophen-caffeine (FIORICET) -40 mg per tablet Take 0.5-1 Tabs by mouth every six (6) hours as needed for Headache. Max Daily Amount: 4 Tabs. 12 Tab 0    spironolactone (ALDACTONE) 25 mg tablet Take 1 Tab by mouth two (2) times a day. 180 Tab 3    Blood-Glucose Meter,Continuous (Dexcom G6 ) misc Change sensor every 10 days 1 Each 0    Blood-Glucose Transmitter (Dexcom G6 Transmitter) nahid Change sensor every 10 days 1 Device 3    Blood-Glucose Sensor (Dexcom G6 Sensor) nahid Change sensor every 10 days 3 Device 12    albuterol (PROVENTIL HFA, VENTOLIN HFA) 90 mcg/actuation inhaler Take 1 Puff by inhalation every four (4) hours as needed for Wheezing. 1 Inhaler 1    albuterol (ACCUNEB) 1.25 mg/3 mL nebulizer solution Take 3 mL by inhalation every six (6) hours as needed for Wheezing. 1 Bottle 6     Allergies   Allergen Reactions    Latex Itching       Family History   Problem Relation Age of Onset   22 [facility] Arthritis-rheumatoid Mother     Hypertension Mother     Diabetes Mother         Type II    Psychiatric Disorder Father     Drug Abuse Father     Cancer Other         prostate    Hypertension Sister     Thyroid Disease Sister         \"I think\"    Attention Deficit Hyperactivity Disorder Brother     Hypertension Sister      Social History     Tobacco Use    Smoking status: Former Smoker     Packs/day: 0.50     Years: 5.00     Pack years: 2.50     Last attempt to quit: 2019     Years since quittin.4    Smokeless tobacco: Never Used   Substance Use Topics    Alcohol use:  Yes     Alcohol/week: 0.0 standard drinks     Types: 1 Standard drinks or equivalent per week     Comment: socially, Monthly or less       Depression Risk Factor Screening:     3 most recent PHQ Screens 2020   PHQ Not Done -   Little interest or pleasure in doing things Several days   Feeling down, depressed, irritable, or hopeless More than half the days   Total Score PHQ 2 3   Trouble falling or staying asleep, or sleeping too much More than half the days   Feeling tired or having little energy More than half the days   Poor appetite, weight loss, or overeating Not at all   Feeling bad about yourself - or that you are a failure or have let yourself or your family down More than half the days   Trouble concentrating on things such as school, work, reading, or watching TV Not at all   Moving or speaking so slowly that other people could have noticed; or the opposite being so fidgety that others notice Not at all   Thoughts of being better off dead, or hurting yourself in some way Not at all   PHQ 9 Score 9       Alcohol Risk Factor Screening:   Do you average 1 drink per night or more than 7 drinks a week:  No    On any one occasion in the past three months have you have had more than 3 drinks containing alcohol:  No      Functional Ability and Level of Safety:   Hearing: Hearing is good. Activities of Daily Living: The home contains: handrails, grab bars and rugs  Patient does total self care    Ambulation: with no difficulty    Fall Risk:  Fall Risk Assessment, last 12 mths 4/4/2018   Able to walk? Yes   Fall in past 12 months? No       Abuse Screen:  Patient is not abused    Cognitive Screening   Has your family/caregiver stated any concerns about your memory: no  Cognitive Screening: Normal - MMSE (Mini Mental Status Exam)    Patient Care Team   Patient Care Team:  Minda New MD as PCP - General (Family Practice)  Minda New MD as PCP - [facility] Empaneled Provider  Hansa Velazquez MD as Physician (Sleep Medicine)    Assessment/Plan   Education and counseling provided:  Are appropriate based on today's review and evaluation  Screening for glaucoma  Medical nutrition therapy for individuals with diabetes or renal disease  Diabetes outpatient self-management training services    Diagnoses and all orders for this visit:    1. Medicare annual wellness visit, subsequent    2.  Well woman exam (no gynecological exam)  -     metoprolol tartrate (LOPRESSOR) 100 mg IR tablet; TAKE 1 TABLET BY MOUTH TWICE A DAY  -     omeprazole (PRILOSEC) 40 mg capsule; TAKE 1 CAP BY MOUTH DAILY. 3. Well adult exam  -     URINALYSIS W/ RFLX MICROSCOPIC  -     JULIEN COMPREHENSIVE PLUS PANEL  -     SED RATE (ESR)  -     CRP, HIGH SENSITIVITY    4. Essential hypertension  -     metoprolol tartrate (LOPRESSOR) 100 mg IR tablet; TAKE 1 TABLET BY MOUTH TWICE A DAY  -     omeprazole (PRILOSEC) 40 mg capsule; TAKE 1 CAP BY MOUTH DAILY. 5. Tobacco abuse disorder  -     omeprazole (PRILOSEC) 40 mg capsule; TAKE 1 CAP BY MOUTH DAILY. 6. ALIYA (generalized anxiety disorder)  -     metoprolol tartrate (LOPRESSOR) 100 mg IR tablet; TAKE 1 TABLET BY MOUTH TWICE A DAY  -     omeprazole (PRILOSEC) 40 mg capsule; TAKE 1 CAP BY MOUTH DAILY. -     atorvastatin (LIPITOR) 40 mg tablet; TAKE 1 TABLET BY MOUTH EVERY DAY  -     vilazodone (VIIBRYD) 40 mg tab tablet; Take 1 Tab by mouth daily. 7. Type 2 diabetes with nephropathy (HCC)  -     metoprolol tartrate (LOPRESSOR) 100 mg IR tablet; TAKE 1 TABLET BY MOUTH TWICE A DAY  -     omeprazole (PRILOSEC) 40 mg capsule; TAKE 1 CAP BY MOUTH DAILY. -     atorvastatin (LIPITOR) 40 mg tablet; TAKE 1 TABLET BY MOUTH EVERY DAY  -     vilazodone (VIIBRYD) 40 mg tab tablet; Take 1 Tab by mouth daily. 8. Screening for alcoholism  -     [place] ANNUAL ALCOHOL SCREEN 15 MIN        Health Maintenance Due   Topic Date Due    Eye Exam Retinal or Dilated  01/07/2017    PAP AKA CERVICAL CYTOLOGY  02/12/2017       Justice Rodriguez is a 30 y.o. female being evaluated by a video visit encounter for concerns as above. A caregiver was present when appropriate. Due to this being a TeleHealth encounter (During [facility] public health emergency), evaluation of the following organ systems was limited: Vitals/Constitutional/EENT/Resp/CV/GI//MS/Neuro/Skin/Heme-Lymph-Imm.   Pursuant to the emergency declaration under the [address] [place] [address] Act, 1135 waiver authority and the Coronavirus Preparedness and Response Supplemental Appropriations Act, this Virtual  Visit was conducted, with patient's (and/or legal guardian's) consent, to reduce the patient's risk of exposure to COVID-19 and provide necessary medical care. Services were provided through a video synchronous discussion virtually to substitute for in-person clinic visit. Patient and provider were located at their individual homes.     Shad Bowles MD

## 2020-04-09 NOTE — PROGRESS NOTES
Chief Complaint   Patient presents with    Follow-up     Patient is here for a f/u has concerns about joint pain no fever noted      1. Have you been to the ER, urgent care clinic since your last visit? Hospitalized since your last visit? No    2. Have you seen or consulted any other health care providers outside of the [address] [place] [address] since your last visit? Include any pap smears or colon screening.  No

## 2020-04-10 RX ORDER — BLOOD-GLUCOSE SENSOR
EACH MISCELLANEOUS
Qty: 3 DEVICE | Refills: 12 | Status: SHIPPED | OUTPATIENT
Start: 2020-04-10 | End: 2021-05-10

## 2020-04-10 RX ORDER — BLOOD-GLUCOSE TRANSMITTER
EACH MISCELLANEOUS
Qty: 1 DEVICE | Refills: 3 | Status: SHIPPED | OUTPATIENT
Start: 2020-04-10 | End: 2021-05-10

## 2020-04-10 RX ORDER — BLOOD-GLUCOSE,RECEIVER,CONT
EACH MISCELLANEOUS
Qty: 1 EACH | Refills: 0 | Status: SHIPPED | OUTPATIENT
Start: 2020-04-10 | End: 2021-09-29

## 2020-04-13 DIAGNOSIS — E26.9 HYPERALDOSTERONISM (HCC): ICD-10-CM

## 2020-04-13 RX ORDER — SPIRONOLACTONE 25 MG/1
25 TABLET ORAL 2 TIMES DAILY
Qty: 180 TAB | Refills: 3 | Status: SHIPPED | OUTPATIENT
Start: 2020-04-13 | End: 2021-02-16 | Stop reason: ALTCHOICE

## 2020-04-13 NOTE — TELEPHONE ENCOUNTER
PCP: Alpa Walker MD    Last appt: 4/9/2020  Future Appointments   Date Time Provider Mateo Christian   4/21/2020  3:30 PM Franco Farah  [facility]   5/5/2020  3:20 PM Woody Henderson  [facility]   7/23/2020  2:30 PM Aminata Johns MD RDE [facility] [address] [facility]       Requested Prescriptions     Pending Prescriptions Disp Refills    spironolactone (ALDACTONE) 25 mg tablet 180 Tab 3     Sig: Take 1 Tab by mouth two (2) times a day.      Orders Only on 02/25/2020   Component Date Value Ref Range Status    Hemoglobin A1c 03/07/2020 8.1* 4.8 - 5.6 % Final    Comment:          Prediabetes: 5.7 - 6.4           Diabetes: >6.4           Glycemic control for adults with diabetes: <7.0      Estimated average glucose 03/07/2020 186  mg/dL Final    Creatinine, urine 03/07/2020 358.2  Not Estab. mg/dL Final    Microalbumin, urine 03/07/2020 57.1  Not Estab. ug/mL Final    Microalb/Creat ratio (ug/mg creat.) 03/07/2020 16  0 - 29 mg/g creat Final    Comment:                        Normal:                0 -  29                         Moderately increased: 30 - 300                         Severely increased:       >300                **Please note reference interval change**      Cholesterol, total 03/07/2020 93* 100 - 199 mg/dL Final    Triglyceride 03/07/2020 70  0 - 149 mg/dL Final    HDL Cholesterol 03/07/2020 31* >39 mg/dL Final    VLDL, calculated 03/07/2020 14  5 - 40 mg/dL Final    LDL, calculated 03/07/2020 48  0 - 99 mg/dL Final    Glucose 03/07/2020 186* 65 - 99 mg/dL Final    BUN 03/07/2020 10  6 - 20 mg/dL Final    Creatinine 03/07/2020 0.71  0.57 - 1.00 mg/dL Final    GFR est non-AA 03/07/2020 114  >59 mL/min/1.73 Final    GFR est AA 03/07/2020 131  >59 mL/min/1.73 Final    BUN/Creatinine ratio 03/07/2020 14  9 - 23 Final    Sodium 03/07/2020 139  134 - 144 mmol/L Final    Potassium 03/07/2020 4.8  3.5 - 5.2 mmol/L Final    Chloride 03/07/2020 103  96 - 106 mmol/L Final    CO2 03/07/2020 22  20 - 29 mmol/L Final    Calcium 03/07/2020 9.3  8.7 - 10.2 mg/dL Final    Protein, total 03/07/2020 6.5  6.0 - 8.5 g/dL Final    Albumin 03/07/2020 3.6* 3.8 - 4.8 g/dL Final    GLOBULIN, TOTAL 03/07/2020 2.9  1.5 - 4.5 g/dL Final    A-G Ratio 03/07/2020 1.2  1.2 - 2.2 Final    Bilirubin, total 03/07/2020 0.2  0.0 - 1.2 mg/dL Final    Alk. phosphatase 03/07/2020 98  39 - 117 IU/L Final    AST (SGOT) 03/07/2020 9  0 - 40 IU/L Final    ALT (SGPT) 03/07/2020 16  0 - 32 IU/L Final    INTERPRETATION 03/07/2020 Note   Final    Supplemental report is available.     PDF Image 03/68/9818 Not applicable   Final

## 2020-04-14 RX ORDER — VILAZODONE HYDROCHLORIDE 40 MG/1
TABLET ORAL
Qty: 30 TAB | Refills: 2 | OUTPATIENT
Start: 2020-04-14

## 2020-04-15 ENCOUNTER — VIRTUAL VISIT (OUTPATIENT)
Dept: INTERNAL MEDICINE CLINIC | Age: 30
End: 2020-04-15

## 2020-04-16 NOTE — PATIENT INSTRUCTIONS
Medicare Wellness Visit, Female The best way to live healthy is to have a lifestyle where you eat a well-balanced diet, exercise regularly, limit alcohol use, and quit all forms of tobacco/nicotine, if applicable. Regular preventive services are another way to keep healthy. Preventive services (vaccines, screening tests, monitoring & exams) can help personalize your care plan, which helps you manage your own care. Screening tests can find health problems at the earliest stages, when they are easiest to treat. [facility] follows the current, evidence-based guidelines published by the [facility] ([facility]) when recommending preventive services for our patients. Because we follow these guidelines, sometimes recommendations change over time as research supports it. (For example, mammograms used to be recommended annually. Even though Medicare will still pay for an annual mammogram, the newer guidelines recommend a mammogram every two years for women of average risk). Of course, you and your doctor may decide to screen more often for some diseases, based on your risk and your co-morbidities (chronic disease you are already diagnosed with). Preventive services for you include: - Medicare offers their members a free annual wellness visit, which is time for you and your primary care provider to discuss and plan for your preventive service needs. Take advantage of this benefit every year! 
-All adults over the age of 72 should receive the recommended pneumonia vaccines. Current USPSTF guidelines recommend a series of two vaccines for the best pneumonia protection.  
-All adults should have a flu vaccine yearly and a tetanus vaccine every 10 years.  
-All adults age 48 and older should receive the shingles vaccines (series of two vaccines). -All adults age 38-68 who are overweight should have a diabetes screening test once every three years. -All adults born between [birth year] and 1967 should be screened once for Hepatitis C. 
-Other screening tests and preventive services for persons with diabetes include: an eye exam to screen for diabetic retinopathy, a kidney function test, a foot exam, and stricter control over your cholesterol.  
-Cardiovascular screening for adults with routine risk involves an electrocardiogram (ECG) at intervals determined by your doctor.  
-Colorectal cancer screenings should be done for adults age 54-65 with no increased risk factors for colorectal cancer. There are a number of acceptable methods of screening for this type of cancer. Each test has its own benefits and drawbacks. Discuss with your doctor what is most appropriate for you during your annual wellness visit. The different tests include: colonoscopy (considered the best screening method), a fecal occult blood test, a fecal DNA test, and sigmoidoscopy. 
 
-A bone mass density test is recommended when a woman turns 65 to screen for osteoporosis. This test is only recommended one time, as a screening. Some providers will use this same test as a disease monitoring tool if you already have osteoporosis. -Breast cancer screenings are recommended every other year for women of normal risk, age 54-69. 
-Cervical cancer screenings for women over age 72 are only recommended with certain risk factors. Here is a list of your current Health Maintenance items (your personalized list of preventive services) with a due date: 
Health Maintenance Due Topic Date Due [facility] Eye Exam  01/07/2017  Pap Test  02/12/2017

## 2020-04-21 ENCOUNTER — VIRTUAL VISIT (OUTPATIENT)
Dept: BEHAVIORAL/MENTAL HEALTH CLINIC | Age: 30
End: 2020-04-21

## 2020-04-21 DIAGNOSIS — F41.1 GAD (GENERALIZED ANXIETY DISORDER): Primary | ICD-10-CM

## 2020-04-21 DIAGNOSIS — F34.1 DYSTHYMIA: ICD-10-CM

## 2020-04-21 NOTE — PROGRESS NOTES
The client, Teresita Milner is a 30 y.o. female presented with anxiety, depression, PTSD who is a transfer from her previous psychiatric provider  She is a [employer] employee and working as [place] for 1 year. She came for evalution for gastric sleeve surgery. She worries a lot. She has social anxiety. She is taking alprazolam prn only. Reviewed addictive nature of medication and ways to take safely. Reported improvement in anxiety and depression with vilazodone, but was still anxious and depressed. Plan to adjust the medications as per the response and tolerability. Discussed importance of psychotherapy in treatment plan and gave resources. client is here for clinical competency. Client stated that she clearly  understands the procedure and was explained by the surgeon. She understands   risks/ benefits/ complications of the procedure. She is willing for the procedure. She is clinically depressed and has anxiety, social anxiety and h/o PTSD. client is not demented not has any psychosis or adriana. Reviewed labs and records. Patient denies SI/HI/SIB. No evidence of AH/VH or delusions. Possible organic causes contributing are: DM II, PETE, HT  Reviewed medical admissions and discussed with the patient. Client is medically . .stable.         Diagnosis: moderate depression, anxiety,panic attacks ALIYA, social anxiety,  PTSD, r/o mood disorder, r/o Borderline personality disorder, r/o Bipolar disorder     Treatment Plan:   1. Medication: Vilazodone 40 mg daily                         Continue alprazolam 0.5 mg prn - none given - has enough                         Resources for CBT     Consent: Teresita Milner, who was seen by synchronous (real-time) audio-video technology, and/or her healthcare decision maker, is aware that this patient-initiated, Telehealth encounter on 4/21/2020 is a billable service, with coverage as determined by her insurance carrier.  She is aware that she may receive a bill and has provided verbal consent to proceed: Yes. Assessment & Plan:   Diagnoses and all orders for this visit:    1. ALIYA (generalized anxiety disorder)    2. Dysthymia      Since her PCP had prescribed her medications and she is stable, advised she could continue with him but still get a therapist to work on unresolved psychological issues. She agreed. I spent at least 15 minutes with this established patient, and >50% of the time was spent counseling and/or coordinating care regarding her current symptoms and needs  712  Subjective:   Kermit Coreas is a 30 y.o. female who was seen for Follow-up    In the last visit, her Viibryd was increased to 40mg . She has since noticed much improvement, only needing the Xanax twice in two weeks. 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 She has trouble winding down but overall making progress and gains. She had vivid dreams that were pleasant. She has not scheduled an appointment with a therapist as of yet. She is unable to vent her frustrations to the family for fear of \"being a burden\", because they dismiss her symptoms. She lives with her mother, two sisters and nephews, enjoys the movies and social outings. Prior to Admission medications    Medication Sig Start Date End Date Taking? Authorizing Provider   spironolactone (ALDACTONE) 25 mg tablet Take 1 Tab by mouth two (2) times a day.  4/13/20   Shaun Hooks MD   Blood-Glucose Meter,Continuous (Dexcom G6 ) misc Change sensor every 10 days 4/10/20   Verena Hatfield MD   Blood-Glucose Transmitter (Dexcom G6 Transmitter) nahid Change sensor every 10 days 4/10/20   Verena Hatfield MD   Blood-Glucose Sensor (Dexcom G6 Sensor) nahid Change sensor every 10 days 4/10/20   Verena Hatfield MD   metoprolol tartrate (LOPRESSOR) 100 mg IR tablet TAKE 1 TABLET BY MOUTH TWICE A DAY 4/9/20   Christopher Slaughter MD   omeprazole (PRILOSEC) 40 mg capsule TAKE 1 CAP BY MOUTH DAILY. 4/9/20   Khadra Baxter MD   atorvastatin (LIPITOR) 40 mg tablet TAKE 1 TABLET BY MOUTH EVERY DAY 4/9/20   Khadra Baxter MD   vilazodone (VIIBRYD) 40 mg tab tablet Take 1 Tab by mouth daily. 4/9/20   Khadra Baxter MD   semaglutide (OZEMPIC) 1 mg/dose (2 mg/1.5 mL) sub-q pen 1 mg by SubCUTAneous route every seven (7) days. 3/26/20   Christelle Barnett MD   flash glucose sensor (FreeStyle Maureen 14 Day Sensor) kit CHANGE SENSOR EVERY 14 DAYS 3/26/20   Christelle Barnett MD   glipiZIDE (GLUCOTROL) 5 mg tablet Take 1 tabs with dinner. 3/26/20   Christelle Barnett MD   insulin glargine (Lantus Solostar U-100 Insulin) 100 unit/mL (3 mL) inpn 30 units daily 3/26/20   Christelle Barnett MD   Insulin Needles, Disposable, (Violet Pen Needle) 32 gauge x 5/32\" ndle One shot daily 3/26/20   Christelle Barnett MD   metFORMIN ER (GLUCOPHAGE XR) 500 mg tablet TAKE 2 TABLETS BY MOUTH WITH BREAKFAST AND TAKE 2 TABLETS WITH DINNER 3/26/20   Christelle Barnett MD   gabapentin (NEURONTIN) 300 mg capsule Take 1 Cap by mouth three (3) times daily. Max Daily Amount: 900 mg. 2/3/20   Torrie Pearson MD   galcanezumab-[facility] PEN) 120 mg/mL injection 1 mL by SubCUTAneous route every thirty (30) days. 1/2/20   Torrie Pearson MD   dextromethorphan-guaiFENesin [facility] DM) 60-1,200 mg Tb12 Take 1 Tab by mouth two (2) times daily as needed for Cough. Indications: Cold Symptoms, cough 10/2/19   Khadra Baxter MD   ALPRAZolam Smita Marline) 0.5 mg tablet Take 1 Tab by mouth nightly as needed for Anxiety or Sleep. Max Daily Amount: 0.5 mg. 3/4/19   Khadra Baxter MD   fluticasone (FLONASE) 50 mcg/actuation nasal spray 2 Sprays by Both Nostrils route daily.  1/13/19   Timoteo Schmitt MD   flash glucose scanning reader (EntraTympanic MAUREEN 14 DAY READER) misc Dispense one 14 day reader 11/21/18   Christelle Barnett MD   butalbital-acetaminophen-caffeine (FIORICET) -40 mg per tablet Take 0.5-1 Tabs by mouth every six (6) hours as needed for Headache. Max Daily Amount: 4 Tabs. 5/28/16   Chencho Parker MD   albuterol (PROVENTIL HFA, VENTOLIN HFA) 90 mcg/actuation inhaler Take 1 Puff by inhalation every four (4) hours as needed for Wheezing. 9/27/13   Adrien Tucker MD   albuterol (ACCUNEB) 1.25 mg/3 mL nebulizer solution Take 3 mL by inhalation every six (6) hours as needed for Wheezing. 9/24/13   Jovani Vuong MD     Allergies   Allergen Reactions    Latex Itching       ROS        Objective:   Vital Signs: (As obtained by patient/caregiver at home)  There were no vitals taken for this visit. She was alert, attentive, vocal and articulate. NO thought or speech disorder noted. She denied any SI/HI. No perceptual distortions noted nor any delusions. She showed insight and judgment appeared fully intact. She felt better and appeared brighter. We discussed the expected course, resolution and complications of the diagnosis(es) in detail. Medication risks, benefits, costs, interactions, and alternatives were discussed as indicated. I advised her to contact the office if her condition worsens, changes or fails to improve as anticipated. She expressed understanding with the diagnosis(es) and plan. Charlette Friend is a 30 y.o. female being evaluated by a video visit encounter for concerns as above. A caregiver was present when appropriate. Due to this being a TeleHealth encounter (During YRIOU-30 public health emergency), evaluation of the following organ systems was limited: Vitals/Constitutional/EENT/Resp/CV/GI//MS/Neuro/Skin/Heme-Lymph-Imm. Pursuant to the emergency declaration under the [facility], 1135 waiver authority and the [employer] and [employer] Act, this Virtual  Visit was conducted, with patient's (and/or legal guardian's) consent, to reduce the patient's risk of exposure to COVID-19 and provide necessary medical care. Services were provided through a video synchronous discussion virtually to substitute for in-person clinic visit. Patient and provider were located at their individual homes.         Janelle Trevino MD

## 2020-04-23 ENCOUNTER — VIRTUAL VISIT (OUTPATIENT)
Dept: INTERNAL MEDICINE CLINIC | Age: 30
End: 2020-04-23

## 2020-04-23 ENCOUNTER — PATIENT MESSAGE (OUTPATIENT)
Dept: CASE MANAGEMENT | Age: 30
End: 2020-04-23

## 2020-05-05 ENCOUNTER — VIRTUAL VISIT (OUTPATIENT)
Dept: NEUROLOGY | Age: 30
End: 2020-05-05

## 2020-05-05 DIAGNOSIS — G47.33 OSA (OBSTRUCTIVE SLEEP APNEA): ICD-10-CM

## 2020-05-05 DIAGNOSIS — G56.21 ULNAR NEUROPATHY AT ELBOW, RIGHT: ICD-10-CM

## 2020-05-05 DIAGNOSIS — R20.2 PARESTHESIA: ICD-10-CM

## 2020-05-05 DIAGNOSIS — G43.009 MIGRAINE WITHOUT AURA AND WITHOUT STATUS MIGRAINOSUS, NOT INTRACTABLE: Primary | ICD-10-CM

## 2020-05-05 DIAGNOSIS — Z72.0 TOBACCO USE: ICD-10-CM

## 2020-05-05 DIAGNOSIS — G56.01 CARPAL TUNNEL SYNDROME OF RIGHT WRIST: ICD-10-CM

## 2020-05-05 RX ORDER — GABAPENTIN 300 MG/1
300 CAPSULE ORAL 3 TIMES DAILY
Qty: 270 CAP | Refills: 1 | Status: SHIPPED | OUTPATIENT
Start: 2020-05-05 | End: 2021-01-22 | Stop reason: SDUPTHER

## 2020-05-05 NOTE — PROGRESS NOTES
Neurology follow-up note       Chief complaint: Migraine    Subjective  Kenneth Osullivan is a 30 y.o. female who presented to the neurology office for management of numbness and tingling in the hands and headaches. Regarding the numbness in the hand, it started around mid May 2016 and it is bilateral.  The medial 2 fingers are involved in the right hand and all the fingers are involved in the left hand. It is gradually progressive. She does also have neck pain with radiation to the right shoulder. Her symptoms are off and on and it is more when she is working and improves when her hands and not moving. The numbness and tingling lasts for minutes but the pain around her wrist can be for a few days. She denies any weakness. EMG/nerve conduction study has been normal.  She is taking gabapentin 300 mg p.o. thrice daily and that has helped her. She uses right elbow splint and bilateral hand splints. She uses the splint intermittently. She states that the numbness in the hands are worsening. She does also noticed some weakness in her right hand. .    Regarding the headaches, she has been having migraines for a long time and her headaches starts with pain in the left eye and left side of the face and it is 10 out of 10 in severity, associated with photophobia, phonophobia and nausea but she denies any vomiting. The headache can last from 3 days to week. It is throbbing in character. She could not get it refilled and stopped taking nortritpyline. Her headaches have worsened. She does also have obstructive sleep apnea and she uses CPAP around 5 days in a week. Interval history:  The patient has quit smoking. The patient's headaches have gotten much better. She was having daily headaches but now is having 2-3 headaches a month. Emgality is free for the patient and she is using it.     Headache frequency baseline: Daily  Headache frequency now: 2-3/month    Risk Factors for headaches  Smoking: Quit Nov 2019  Coffee: Denies cups/day  Tea: 0 cups/day  Soda: 2 cans/day  Water: 4 glasses/day  Sleeps at 9 AM and wakes up at 2 PM to 3 PM.     Medications tried  Abortive  Imitrex    Preventitive  Amitriptyline  Nortriptyline  Metoprolol  Gabapentin    Current Outpatient Medications   Medication Sig    gabapentin (NEURONTIN) 300 mg capsule Take 1 Cap by mouth three (3) times daily. Max Daily Amount: 900 mg.  spironolactone (ALDACTONE) 25 mg tablet Take 1 Tab by mouth two (2) times a day.  Blood-Glucose Meter,Continuous (Dexcom G6 ) misc Change sensor every 10 days    Blood-Glucose Transmitter (Dexcom G6 Transmitter) nahid Change sensor every 10 days    Blood-Glucose Sensor (Dexcom G6 Sensor) nahid Change sensor every 10 days    metoprolol tartrate (LOPRESSOR) 100 mg IR tablet TAKE 1 TABLET BY MOUTH TWICE A DAY    omeprazole (PRILOSEC) 40 mg capsule TAKE 1 CAP BY MOUTH DAILY.  atorvastatin (LIPITOR) 40 mg tablet TAKE 1 TABLET BY MOUTH EVERY DAY    vilazodone (VIIBRYD) 40 mg tab tablet Take 1 Tab by mouth daily.  semaglutide (OZEMPIC) 1 mg/dose (2 mg/1.5 mL) sub-q pen 1 mg by SubCUTAneous route every seven (7) days.  flash glucose sensor (FreeStyle Maureen 14 Day Sensor) kit CHANGE SENSOR EVERY 14 DAYS    glipiZIDE (GLUCOTROL) 5 mg tablet Take 1 tabs with dinner.  insulin glargine (Lantus Solostar U-100 Insulin) 100 unit/mL (3 mL) inpn 30 units daily    Insulin Needles, Disposable, (Violet Pen Needle) 32 gauge x 5/32\" ndle One shot daily    metFORMIN ER (GLUCOPHAGE XR) 500 mg tablet TAKE 2 TABLETS BY MOUTH WITH BREAKFAST AND TAKE 2 TABLETS WITH DINNER    galcanezumab-gnlm (EMGALITY PEN) 120 mg/mL injection 1 mL by SubCUTAneous route every thirty (30) days.  dextromethorphan-guaiFENesin (MUCINEX DM) 60-1,200 mg Tb12 Take 1 Tab by mouth two (2) times daily as needed for Cough.  Indications: Cold Symptoms, cough    ALPRAZolam (XANAX) 0.5 mg tablet Take 1 Tab by mouth nightly as needed for Anxiety or Sleep. Max Daily Amount: 0.5 mg.    fluticasone (FLONASE) 50 mcg/actuation nasal spray 2 Sprays by Both Nostrils route daily.  flash glucose scanning reader (UforaSTYLE RUBEN 14 DAY READER) misc Dispense one 14 day reader    butalbital-acetaminophen-caffeine (FIORICET) -40 mg per tablet Take 0.5-1 Tabs by mouth every six (6) hours as needed for Headache. Max Daily Amount: 4 Tabs.  albuterol (PROVENTIL HFA, VENTOLIN HFA) 90 mcg/actuation inhaler Take 1 Puff by inhalation every four (4) hours as needed for Wheezing.  albuterol (ACCUNEB) 1.25 mg/3 mL nebulizer solution Take 3 mL by inhalation every six (6) hours as needed for Wheezing. No current facility-administered medications for this visit. REVIEW OF SYSTEMS:   A ten system review of constitutional, cardiovascular, respiratory, musculoskeletal, endocrine, skin, SHEENT, genitourinary, psychiatric and neurologic systems was obtained and is unremarkable except as stated in HPI     EXAMINATION:   There were no vitals taken for this visit. General:  Exam limited because of virtual visit. General appearance: Pt is in no acute distress     Neurological Examination:   Mental Status: AAO x3. Speech is fluent. Follows commands, has normal fund of knowledge, attention, short term recall, comprehension and insight. Cranial Nerves:  Extraocular movements are full. Facial movement intact. Hearing intact to conversation. Shoulder shrug symmetric. Tongue midline. Motor: Strength is symmetric in all 4 ext.      Sensation: Normal according to patient to light touch    Coordination/Cerebellar: Intact to finger-nose-finger     Gait: Casual gait is normal.     Laboratory review:   Results for orders placed or performed in visit on 02/25/20   HEMOGLOBIN A1C WITH EAG   Result Value Ref Range    Hemoglobin A1c 8.1 (H) 4.8 - 5.6 %    Estimated average glucose 186 mg/dL   MICROALBUMIN, UR, RAND W/ MICROALB/CREAT RATIO   Result Value Ref Range    Creatinine, urine 358.2 Not Estab. mg/dL    Microalbumin, urine 57.1 Not Estab. ug/mL    Microalb/Creat ratio (ug/mg creat.) 16 0 - 29 mg/g creat   LIPID PANEL   Result Value Ref Range    Cholesterol, total 93 (L) 100 - 199 mg/dL    Triglyceride 70 0 - 149 mg/dL    HDL Cholesterol 31 (L) >39 mg/dL    VLDL, calculated 14 5 - 40 mg/dL    LDL, calculated 48 0 - 99 mg/dL   METABOLIC PANEL, COMPREHENSIVE   Result Value Ref Range    Glucose 186 (H) 65 - 99 mg/dL    BUN 10 6 - 20 mg/dL    Creatinine 0.71 0.57 - 1.00 mg/dL    GFR est non- >59 mL/min/1.73    GFR est  >59 mL/min/1.73    BUN/Creatinine ratio 14 9 - 23    Sodium 139 134 - 144 mmol/L    Potassium 4.8 3.5 - 5.2 mmol/L    Chloride 103 96 - 106 mmol/L    CO2 22 20 - 29 mmol/L    Calcium 9.3 8.7 - 10.2 mg/dL    Protein, total 6.5 6.0 - 8.5 g/dL    Albumin 3.6 (L) 3.8 - 4.8 g/dL    GLOBULIN, TOTAL 2.9 1.5 - 4.5 g/dL    A-G Ratio 1.2 1.2 - 2.2    Bilirubin, total 0.2 0.0 - 1.2 mg/dL    Alk. phosphatase 98 39 - 117 IU/L    AST (SGOT) 9 0 - 40 IU/L    ALT (SGPT) 16 0 - 32 IU/L   CVD REPORT   Result Value Ref Range    INTERPRETATION Note    DIABETES PATIENT EDUCATION   Result Value Ref Range    PDF Image Not applicable      Laboratory review:  12/20/2016  Glucose 352, potassium 5.3, platelet 716, LNG7L 9.3    Imaging review:  7/27/2014  CT soft tissue neck with contrast   No evidence of neck mass, abscess or lymphadenopathy    3/16/2017  EMG/nerve conduction study  Nerve conduction studies of the bilateral upper extremities were unremarkable. Needle electrode examination of the right upper extremity was unremarkable. This is a normal study. There is no electrophysiological evidence of median neuropathy at the wrist or and ulnar neuropathy at the elbow on either side. There is no electrophysiological evidence of right cervical radiculopathy.     4/14/2017  MRI of the brain without contrast  Normal    MRI cervical spine  Straightening of the usual cervical lordosis but otherwise normal study    Documentation review:  None    Assessment/Plan:   Gelacio Suh is a 30 y.o. female who presented to the neurology office for management of migraines and clinically does have right ulnar neuropathy and left carpal tunnel syndrome. She does also have obstructive sleep apnea and quit smoking. Her EMG/nerve conduction study has been normal. She states that the numbness is worsening. She clinically does have right ulnar neuropathy and left carpal tunnel syndrome. She does also use right elbow splint and bilateral hand splints intermittently. Recommend that she uses it every day for 3 months. She states that the gabapentin has helped her with the paresthesia in her hands. She is taking gabapentin 300 mg p.o. 2-3 times daily. .       For her headaches, she has tried amitriptyline, nortriptyline, metoprolol and gabapentin. She is now on Emgality and that has helped her significantly. We will continue the same . She does also have obstructive sleep apnea. She is compliant with her CPAP for around 5 days in a week. Follow-up in 3 months      ICD-10-CM ICD-9-CM    1. Migraine without aura and without status migrainosus, not intractable G43.009 346.10 gabapentin (NEURONTIN) 300 mg capsule   2. Carpal tunnel syndrome of right wrist G56.01 354.0    3. Ulnar neuropathy at elbow, right G56.21 354.2    4. Tobacco use Z72.0 305.1    5. PETE (obstructive sleep apnea) G47.33 327.23    6. Paresthesia R20.2 782.0 gabapentin (NEURONTIN) 300 mg capsule       Thank you for allowing me to participate in the care of Ms. Mikey Goldstein. Please feel free to contact me if you have any questions. Joey Garcia MD  Neurologist and Clinical Neurophysiologist    CC: Shanell Nobles MD  Fax: [phone]    This note will not be viewable in [address] Gelacio Suh was seen by synchronous (real-time) audio-video technology on 05/05/20.      Consent:  She and/or her healthcare decision maker is aware that this patient-initiated Telehealth encounter is a billable service, with coverage as determined by her insurance carrier. She is aware that she may receive a bill and has provided verbal consent to proceed: Yes    I was in the office while conducting this encounter. Pursuant to the emergency declaration under the [address] [place] [address], [facility] waiver authority and the [employer] Act, this Virtual  Visit was conducted, with patient's consent, to reduce the patient's risk of exposure to COVID-19 and provide continuity of care for an established patient. Services were provided through a video synchronous discussion virtually to substitute for in-person clinic visit.

## 2020-05-07 LAB
APPEARANCE UR: CLEAR
BILIRUB UR QL STRIP: NEGATIVE
CENTROMERE B AB SER-ACNC: <0.2 AI (ref 0–0.9)
CHROMATIN AB SERPL-ACNC: <0.2 AI (ref 0–0.9)
COLOR UR: YELLOW
CRP SERPL HS-MCNC: 35.64 MG/L (ref 0–3)
DSDNA AB SER-ACNC: <1 IU/ML (ref 0–9)
ENA JO1 AB SER-ACNC: <0.2 AI (ref 0–0.9)
ENA RNP AB SER-ACNC: <0.2 AI (ref 0–0.9)
ENA SCL70 AB SER-ACNC: <0.2 AI (ref 0–0.9)
ENA SM AB SER-ACNC: <0.2 AI (ref 0–0.9)
ENA SM+RNP AB SER-ACNC: <0.2 AI (ref 0–0.9)
ENA SS-A AB SER-ACNC: <0.2 AI (ref 0–0.9)
ENA SS-B AB SER-ACNC: 2.5 AI (ref 0–0.9)
ERYTHROCYTE [SEDIMENTATION RATE] IN BLOOD BY WESTERGREN METHOD: 108 MM/HR (ref 0–32)
GLUCOSE UR QL: NEGATIVE
HGB UR QL STRIP: NEGATIVE
KETONES UR QL STRIP: NEGATIVE
LEUKOCYTE ESTERASE UR QL STRIP: NEGATIVE
MICRO URNS: NORMAL
NITRITE UR QL STRIP: NEGATIVE
PH UR STRIP: 5 [PH] (ref 5–7.5)
PROT UR QL STRIP: NORMAL
RIBOSOMAL P AB SER-ACNC: <0.2 AI (ref 0–0.9)
SEE BELOW:, 164879: ABNORMAL
SP GR UR: 1.03 (ref 1–1.03)
UROBILINOGEN UR STRIP-MCNC: 0.2 MG/DL (ref 0.2–1)

## 2020-05-14 ENCOUNTER — CLINICAL SUPPORT (OUTPATIENT)
Dept: CARDIOLOGY CLINIC | Age: 30
End: 2020-05-14

## 2020-05-14 ENCOUNTER — VIRTUAL VISIT (OUTPATIENT)
Dept: FAMILY MEDICINE CLINIC | Age: 30
End: 2020-05-14

## 2020-05-14 ENCOUNTER — TELEPHONE (OUTPATIENT)
Dept: CARDIOLOGY CLINIC | Age: 30
End: 2020-05-14

## 2020-05-14 VITALS
HEIGHT: 63 IN | DIASTOLIC BLOOD PRESSURE: 90 MMHG | BODY MASS INDEX: 51.91 KG/M2 | SYSTOLIC BLOOD PRESSURE: 145 MMHG | WEIGHT: 293 LBS

## 2020-05-14 DIAGNOSIS — R61 HYPERHIDROSIS: ICD-10-CM

## 2020-05-14 DIAGNOSIS — M35.03 SJOGREN'S SYNDROME WITH MYOPATHY (HCC): ICD-10-CM

## 2020-05-14 DIAGNOSIS — R00.2 PALPITATION: ICD-10-CM

## 2020-05-14 DIAGNOSIS — R00.2 PALPITATIONS: ICD-10-CM

## 2020-05-14 DIAGNOSIS — F41.1 GAD (GENERALIZED ANXIETY DISORDER): Primary | ICD-10-CM

## 2020-05-14 DIAGNOSIS — R00.0 TACHYCARDIA: ICD-10-CM

## 2020-05-14 DIAGNOSIS — E11.21 TYPE 2 DIABETES WITH NEPHROPATHY (HCC): ICD-10-CM

## 2020-05-14 DIAGNOSIS — R79.82 ELEVATED C-REACTIVE PROTEIN (CRP): ICD-10-CM

## 2020-05-14 DIAGNOSIS — Z71.89 ADVICE GIVEN ABOUT COVID-19 VIRUS INFECTION: ICD-10-CM

## 2020-05-14 DIAGNOSIS — R00.0 TACHYCARDIA: Primary | ICD-10-CM

## 2020-05-14 RX ORDER — ALPRAZOLAM 0.5 MG/1
0.5 TABLET ORAL
Qty: 30 TAB | Refills: 3 | Status: SHIPPED | OUTPATIENT
Start: 2020-05-14 | End: 2021-09-29

## 2020-05-14 RX ORDER — AZELASTINE HCL 205.5 UG/1
1 SPRAY NASAL
COMMUNITY
Start: 2020-04-21 | End: 2020-08-12 | Stop reason: ALTCHOICE

## 2020-05-14 RX ORDER — ASPIRIN 81 MG/1
81 TABLET ORAL DAILY
Qty: 30 TAB | Refills: 11 | Status: SHIPPED | OUTPATIENT
Start: 2020-05-14 | End: 2021-10-03 | Stop reason: SDUPTHER

## 2020-05-14 RX ORDER — ATORVASTATIN CALCIUM 40 MG/1
TABLET, FILM COATED ORAL
Qty: 90 TAB | Refills: 3 | Status: SHIPPED | OUTPATIENT
Start: 2020-05-14 | End: 2021-07-08 | Stop reason: SDUPTHER

## 2020-05-14 RX ORDER — NYSTATIN AND TRIAMCINOLONE ACETONIDE 100000; 1 [USP'U]/G; MG/G
OINTMENT TOPICAL
COMMUNITY
Start: 2020-04-02 | End: 2020-05-18

## 2020-05-14 NOTE — TELEPHONE ENCOUNTER
Patient is calling to make an appointment with Dr. Amaya Frausto for palpitations, tachycardia and elevated C-reactive protein. She is being referred by Dr. Maximus Beregron (ref in [address] [facility]). Please advise.     Phone #: [phone]  Thanks

## 2020-05-14 NOTE — TELEPHONE ENCOUNTER
Called patient. Verified patient's identity with two identifiers. She said she was referred by PCP and asked to see Dr. Rachelle Neff because her mother sees him. She has been having palpitations, her heart beats really fast if she forgets to take her metoprolol, and had elevated C-reactive protein. She also mentioned having been told several years ago that she had an enlarged heart after having an echo. I told patient I would ask Dr. Rachelle Neff to review her chart and advise next steps to take and call her back.

## 2020-05-14 NOTE — PROGRESS NOTES
HISTORY OF PRESENT ILLNESS  Antonio Valentin is a 30 y.o. female. HPI  Today's Pt main concerns were provided on virtual visit and Video telemed format,  Present on VV for the concern of the current medical conditions,  pt is w/ comorbid history and stating that the pt has had no traveling and unaware if the pt has been exposed to covid-19 individual,   pt is currently working from home, and having no work concerns, Have been staying at home for couple of wks,   not asthmatic, no wheezing not tobacco abuser, pt has no fever no cough no dyspnea, no ha, not dizzy, nl smell nl taste, no N/V/D,   Not feeling anxious, and finally having no sinus congestion,   no body ache. repeat bp 109/69  Sed rate (ESR) 108   Sjogren's Anti-SS-B 2.5 , pt has no eyes nor oral dryness but c/o body and ms ache constant pain and anxiolytic meds helping her condition     Depression with the anxiety and panic states of mind    nicley controlled with the current meds,   Patient state that it is getting better: pt states and reports of feeling less anxius, less guilty feeling,  less Hoplessness,ns/nh,ni,nh, less trouble with weight gain or loss, less tendency of etoh or illicit drug use, more ability of sleep, more ablitiy  to concentrate at work( she is only able to work 8-12 hrs per week at this time) and at home with the current medications,and all together a safe feeling at home and at work      Current Outpatient Medications   Medication Sig Dispense Refill    azelastine (ASTEPRO) 0.15 % (205.5 mcg)       nystatin-triamcinolone (MYCOLOG) 100,000-0.1 unit/gram-% ointment APPLY EXTERNALLY BY TOPICAL ROUTE TWICE DAILY FOR NO GREATER THAN 2 WEEKS      gabapentin (NEURONTIN) 300 mg capsule Take 1 Cap by mouth three (3) times daily. Max Daily Amount: 900 mg. 270 Cap 1    spironolactone (ALDACTONE) 25 mg tablet Take 1 Tab by mouth two (2) times a day.  180 Tab 3    Blood-Glucose Meter,Continuous (Dexcom G6 ) misc Change sensor every 10 days 1 Each 0    Blood-Glucose Transmitter (Dexcom G6 Transmitter) nahid Change sensor every 10 days 1 Device 3    Blood-Glucose Sensor (Dexcom G6 Sensor) nahid Change sensor every 10 days 3 Device 12    metoprolol tartrate (LOPRESSOR) 100 mg IR tablet TAKE 1 TABLET BY MOUTH TWICE A  Tab 2    omeprazole (PRILOSEC) 40 mg capsule TAKE 1 CAP BY MOUTH DAILY. 90 Cap 3    atorvastatin (LIPITOR) 40 mg tablet TAKE 1 TABLET BY MOUTH EVERY DAY 90 Tab 3    vilazodone (VIIBRYD) 40 mg tab tablet Take 1 Tab by mouth daily. 90 Tab 2    semaglutide (OZEMPIC) 1 mg/dose (2 mg/1.5 mL) sub-q pen 1 mg by SubCUTAneous route every seven (7) days. 9 Box 3    glipiZIDE (GLUCOTROL) 5 mg tablet Take 1 tabs with dinner. 90 Tab 3    insulin glargine (Lantus Solostar U-100 Insulin) 100 unit/mL (3 mL) inpn 30 units daily 45 mL 3    Insulin Needles, Disposable, (Violet Pen Needle) 32 gauge x 5/32\" ndle One shot daily 100 Pen Needle 3    metFORMIN ER (GLUCOPHAGE XR) 500 mg tablet TAKE 2 TABLETS BY MOUTH WITH BREAKFAST AND TAKE 2 TABLETS WITH DINNER 360 Tab 3    galcanezumab-gnlm (EMGALITY PEN) 120 mg/mL injection 1 mL by SubCUTAneous route every thirty (30) days. 1 mL 11    ALPRAZolam (XANAX) 0.5 mg tablet Take 1 Tab by mouth nightly as needed for Anxiety or Sleep. Max Daily Amount: 0.5 mg. 30 Tab 1    fluticasone (FLONASE) 50 mcg/actuation nasal spray 2 Sprays by Both Nostrils route daily. 1 Bottle 0    butalbital-acetaminophen-caffeine (FIORICET) -40 mg per tablet Take 0.5-1 Tabs by mouth every six (6) hours as needed for Headache. Max Daily Amount: 4 Tabs. 12 Tab 0    albuterol (PROVENTIL HFA, VENTOLIN HFA) 90 mcg/actuation inhaler Take 1 Puff by inhalation every four (4) hours as needed for Wheezing.  1 Inhaler 1    flash glucose sensor (FreeStyle Maureen 14 Day Sensor) kit CHANGE SENSOR EVERY 14 DAYS 6 Kit 3    dextromethorphan-guaiFENesin (MUCINEX DM) 60-1,200 mg Tb12 Take 1 Tab by mouth two (2) times daily as needed for Cough. Indications: Cold Symptoms, cough 30 Tab 0    flash glucose scanning reader (FREESTYLE RUBEN 14 DAY READER) misc Dispense one 14 day reader 1 Each 0    albuterol (ACCUNEB) 1.25 mg/3 mL nebulizer solution Take 3 mL by inhalation every six (6) hours as needed for Wheezing. 1 Bottle 6     Allergies   Allergen Reactions    Latex Itching     Past Medical History:   Diagnosis Date    Asthma     [place] hump 2015    Carpal tunnel syndrome of right wrist 2016    Diabetes ([facility] [address]     ALIYA (generalized anxiety disorder) 2018    GERD (gastroesophageal reflux disease) 2014    HTN (hypertension) 2011    Hyperaldosteronism ([facility] [address] 10/19/2015    Hyperhidrosis 2018    Other ill-defined conditions(799.89)     migraines    Palpitation 2018    Tachycardia 2018     Past Surgical History:   Procedure Laterality Date    [place] REMOVAL OF TONSILS,<11 Y/O       Family History   Problem Relation Age of Onset   [address] [place] [address] Arthritis-rheumatoid Mother     Hypertension Mother     Diabetes Mother         Type II    Psychiatric Disorder Father     Drug Abuse Father     Cancer Other         prostate    Hypertension Sister     Thyroid Disease Sister         \"I think\"    Attention Deficit Hyperactivity Disorder Brother     Hypertension Sister      Social History     Tobacco Use    Smoking status: Former Smoker     Packs/day: 0.50     Years: 5.00     Pack years: 2.50     Last attempt to quit: 2019     Years since quittin.5    Smokeless tobacco: Never Used   Substance Use Topics    Alcohol use:  Yes     Alcohol/week: 0.0 standard drinks     Types: 1 Standard drinks or equivalent per week     Comment: socially, Monthly or less      Lab Results   Component Value Date/Time    WBC 10.3 2017 04:08 PM    HGB 12.2 2017 04:08 PM    HCT 39.7 2017 04:08 PM    PLATELET 883  04:08 PM    MCV 87 2017 04:08 PM     Lab Results   Component Value Date/Time Hemoglobin A1c 8.1 (H) 03/07/2020 09:44 AM    Hemoglobin A1c 10.9 (H) 03/20/2017 04:08 PM    Hemoglobin A1c 8.4 (H) 09/26/2016 12:00 AM    Glucose 186 (H) 03/07/2020 09:44 AM    Glucose (POC) 116 (H) 05/28/2016 08:34 PM    Microalb/Creat ratio (ug/mg creat.) 16 03/07/2020 09:44 AM    LDL, calculated 48 03/07/2020 09:44 AM    Creatinine 0.71 03/07/2020 09:44 AM      Lab Results   Component Value Date/Time    Cholesterol, total 93 (L) 03/07/2020 09:44 AM    HDL Cholesterol 31 (L) 03/07/2020 09:44 AM    LDL, calculated 48 03/07/2020 09:44 AM    Triglyceride 70 03/07/2020 09:44 AM     Lab Results   Component Value Date/Time    GFR est non- 03/07/2020 09:44 AM    GFR est  03/07/2020 09:44 AM    Creatinine 0.71 03/07/2020 09:44 AM    BUN 10 03/07/2020 09:44 AM    Sodium 139 03/07/2020 09:44 AM    Potassium 4.8 03/07/2020 09:44 AM    Chloride 103 03/07/2020 09:44 AM    CO2 22 03/07/2020 09:44 AM        Review of Systems   Constitutional: Negative for chills and fever. HENT: Negative for nosebleeds. Eyes: Negative for pain. Respiratory: Negative for cough and wheezing. Cardiovascular: Negative for chest pain and leg swelling. Gastrointestinal: Negative for constipation, diarrhea and nausea. Genitourinary: Negative for frequency. Musculoskeletal: Negative for joint pain and myalgias. Skin: Negative for rash. Neurological: Negative for loss of consciousness. Endo/Heme/Allergies: Does not bruise/bleed easily. Psychiatric/Behavioral: Negative for depression. The patient is not nervous/anxious and does not have insomnia. All other systems reviewed and are negative. Physical Exam  Constitutional:       Appearance: She is obese. She is not ill-appearing or toxic-appearing. HENT:      Head: Normocephalic and atraumatic. Mouth/Throat:      Mouth: Mucous membranes are moist.   Neurological:      Mental Status: She is alert and oriented to person, place, and time.    Psychiatric: Mood and Affect: Mood normal.         Behavior: Behavior normal.         Thought Content: Thought content normal.         Judgment: Judgment normal.         ASSESSMENT and PLAN  Diagnoses and all orders for this visit:    1. ALIYA (generalized anxiety disorder)  -     ALPRAZolam (XANAX) 0.5 mg tablet; Take 1 Tab by mouth nightly as needed for Anxiety or Sleep. Max Daily Amount: 0.5 mg. Indications: anxious, repeated episodes of anxiety, panic disorder  -     atorvastatin (LIPITOR) 40 mg tablet; TAKE 1 TABLET BY MOUTH EVERY DAY  -     aspirin delayed-release 81 mg tablet; Take 1 Tab by mouth daily. 2. Hyperhidrosis  -     ALPRAZolam (XANAX) 0.5 mg tablet; Take 1 Tab by mouth nightly as needed for Anxiety or Sleep. Max Daily Amount: 0.5 mg. Indications: anxious, repeated episodes of anxiety, panic disorder  -     aspirin delayed-release 81 mg tablet; Take 1 Tab by mouth daily. 3. Palpitation  -     ALPRAZolam (XANAX) 0.5 mg tablet; Take 1 Tab by mouth nightly as needed for Anxiety or Sleep. Max Daily Amount: 0.5 mg. Indications: anxious, repeated episodes of anxiety, panic disorder  -     aspirin delayed-release 81 mg tablet; Take 1 Tab by mouth daily.  -     REFERRAL TO CARDIOLOGY    4. Tachycardia  -     ALPRAZolam (XANAX) 0.5 mg tablet; Take 1 Tab by mouth nightly as needed for Anxiety or Sleep. Max Daily Amount: 0.5 mg. Indications: anxious, repeated episodes of anxiety, panic disorder  -     aspirin delayed-release 81 mg tablet; Take 1 Tab by mouth daily.  -     REFERRAL TO CARDIOLOGY    5. Type 2 diabetes with nephropathy (HCC)  -     atorvastatin (LIPITOR) 40 mg tablet; TAKE 1 TABLET BY MOUTH EVERY DAY  -     aspirin delayed-release 81 mg tablet; Take 1 Tab by mouth daily. 6. Elevated C-reactive protein (CRP)  -     aspirin delayed-release 81 mg tablet; Take 1 Tab by mouth daily.  -     REFERRAL TO CARDIOLOGY    7. Sjogren's syndrome with myopathy (HCC)  -     aspirin delayed-release 81 mg tablet;  Take 1 Tab by mouth daily.  -     REFERRAL TO RHEUMATOLOGY    8. Advice given about COVID-19 virus infection  -     aspirin delayed-release 81 mg tablet; Take 1 Tab by mouth daily. Patient was told that the medication is not safe was told not to operate any machinery while taking the medication meanwhile emphasized that the pt should take the medication as needed not on a regular basis avoid alcohol intake and avoid other medication that could potentiate its effect, regardless patient was told any other medication given by any other doctor the pt need to call primary care for further advice` patient acknowledged understanding and agreed with today's recommendation        Concern abdout COVID-19 addressed and  Pt was told to need to get tested stay in isolation till the results becomes available, do all the recommended precautions and all the social distatncing as possible,  Pursuant to the emergency declaration under the [employer] and [employer], 1135 waiver authority and the [employer] and [employer] Act, this Virtual Visit was conducted, with patient's consent, to reduce the patient's risk of exposure to COVID-19 and provide continuity of care for an established patient. Pt was also told if develop dyspnea needs to call 911 or go to er, call for mini advise, pt agreed with todays recommendations, Services were provided through a Video synchronous discussion virtually to substitute for in-person appointment.

## 2020-05-14 NOTE — PROGRESS NOTES
Chief Complaint   Patient presents with    Results     F/U on test results. 1. Have you been to the ER, urgent care clinic since your last visit? Hospitalized since your last visit? No    2. Have you seen or consulted any other health care providers outside of the [address] [place] [address] since your last visit? Include any pap smears or colon screening.  No

## 2020-05-14 NOTE — TELEPHONE ENCOUNTER
Called patient. She said the only cardiac workup she has had was at [facility] so we should have it. Explained event monitor and gave # for billing questions. Patient agreed to event monitor and VV f/u a few weeks after. Patient verbalized understanding and denied further questions or concerns.        Future Appointments   Date Time Provider Mateo Diegoi   6/23/2020  1:00 PM Joanne Rubi  [address]   7/23/2020  2:30 PM Lamar Burnham MD RDE [facility] [address] [facility]   8/12/2020  1:20 PM Kyra Sanches  [facility]

## 2020-05-18 RX ORDER — NYSTATIN AND TRIAMCINOLONE ACETONIDE 100000; 1 [USP'U]/G; MG/G
OINTMENT TOPICAL AS NEEDED
COMMUNITY
End: 2021-06-22 | Stop reason: ALTCHOICE

## 2020-05-18 RX ORDER — NYSTATIN 100000 U/G
OINTMENT TOPICAL AS NEEDED
COMMUNITY
End: 2020-08-13

## 2020-05-19 ENCOUNTER — HOSPITAL ENCOUNTER (OUTPATIENT)
Age: 30
Setting detail: OUTPATIENT SURGERY
Discharge: HOME | End: 2020-05-19
Attending: SPECIALIST | Admitting: SPECIALIST
Payer: COMMERCIAL

## 2020-05-19 ENCOUNTER — ANESTHESIA (OUTPATIENT)
Dept: ENDOSCOPY | Age: 30
End: 2020-05-19
Payer: COMMERCIAL

## 2020-05-19 ENCOUNTER — ANESTHESIA EVENT (OUTPATIENT)
Dept: ENDOSCOPY | Age: 30
End: 2020-05-19
Payer: COMMERCIAL

## 2020-05-19 VITALS
OXYGEN SATURATION: 100 % | RESPIRATION RATE: 18 BRPM | SYSTOLIC BLOOD PRESSURE: 121 MMHG | WEIGHT: 293 LBS | HEART RATE: 81 BPM | TEMPERATURE: 98 F | HEIGHT: 63 IN | DIASTOLIC BLOOD PRESSURE: 76 MMHG | BODY MASS INDEX: 51.91 KG/M2

## 2020-05-19 PROCEDURE — 74011250636 HC RX REV CODE- 250/636: Performed by: SPECIALIST

## 2020-05-19 PROCEDURE — 76040000007: Performed by: SPECIALIST

## 2020-05-19 PROCEDURE — 77030039825 HC MSK NSL PAP SUPERNO2VA VYRM -B: Performed by: ANESTHESIOLOGY

## 2020-05-19 PROCEDURE — 74011000250 HC RX REV CODE- 250: Performed by: NURSE ANESTHETIST, CERTIFIED REGISTERED

## 2020-05-19 PROCEDURE — 77030021593 HC FCPS BIOP ENDOSC BSC -A: Performed by: SPECIALIST

## 2020-05-19 PROCEDURE — 74011250636 HC RX REV CODE- 250/636: Performed by: NURSE ANESTHETIST, CERTIFIED REGISTERED

## 2020-05-19 PROCEDURE — 88305 TISSUE EXAM BY PATHOLOGIST: CPT

## 2020-05-19 PROCEDURE — 88342 IMHCHEM/IMCYTCHM 1ST ANTB: CPT

## 2020-05-19 PROCEDURE — 76060000032 HC ANESTHESIA 0.5 TO 1 HR: Performed by: SPECIALIST

## 2020-05-19 RX ORDER — SODIUM CHLORIDE 9 MG/ML
50 INJECTION, SOLUTION INTRAVENOUS CONTINUOUS
Status: DISCONTINUED | OUTPATIENT
Start: 2020-05-19 | End: 2020-05-19 | Stop reason: HOSPADM

## 2020-05-19 RX ORDER — SODIUM CHLORIDE 0.9 % (FLUSH) 0.9 %
5-40 SYRINGE (ML) INJECTION EVERY 8 HOURS
Status: DISCONTINUED | OUTPATIENT
Start: 2020-05-19 | End: 2020-05-19 | Stop reason: HOSPADM

## 2020-05-19 RX ORDER — EPINEPHRINE 0.1 MG/ML
1 INJECTION INTRACARDIAC; INTRAVENOUS
Status: DISCONTINUED | OUTPATIENT
Start: 2020-05-19 | End: 2020-05-19 | Stop reason: HOSPADM

## 2020-05-19 RX ORDER — GLYCOPYRROLATE 0.2 MG/ML
INJECTION INTRAMUSCULAR; INTRAVENOUS AS NEEDED
Status: DISCONTINUED | OUTPATIENT
Start: 2020-05-19 | End: 2020-05-19 | Stop reason: HOSPADM

## 2020-05-19 RX ORDER — PROPOFOL 10 MG/ML
INJECTION, EMULSION INTRAVENOUS AS NEEDED
Status: DISCONTINUED | OUTPATIENT
Start: 2020-05-19 | End: 2020-05-19 | Stop reason: HOSPADM

## 2020-05-19 RX ORDER — FLUMAZENIL 0.1 MG/ML
0.2 INJECTION INTRAVENOUS
Status: DISCONTINUED | OUTPATIENT
Start: 2020-05-19 | End: 2020-05-19 | Stop reason: HOSPADM

## 2020-05-19 RX ORDER — SODIUM CHLORIDE 9 MG/ML
INJECTION, SOLUTION INTRAVENOUS
Status: DISCONTINUED | OUTPATIENT
Start: 2020-05-19 | End: 2020-05-19 | Stop reason: HOSPADM

## 2020-05-19 RX ORDER — LIDOCAINE HYDROCHLORIDE 20 MG/ML
INJECTION, SOLUTION EPIDURAL; INFILTRATION; INTRACAUDAL; PERINEURAL AS NEEDED
Status: DISCONTINUED | OUTPATIENT
Start: 2020-05-19 | End: 2020-05-19 | Stop reason: HOSPADM

## 2020-05-19 RX ORDER — ATROPINE SULFATE 0.1 MG/ML
0.5 INJECTION INTRAVENOUS
Status: DISCONTINUED | OUTPATIENT
Start: 2020-05-19 | End: 2020-05-19 | Stop reason: HOSPADM

## 2020-05-19 RX ORDER — NALOXONE HYDROCHLORIDE 0.4 MG/ML
0.4 INJECTION, SOLUTION INTRAMUSCULAR; INTRAVENOUS; SUBCUTANEOUS
Status: DISCONTINUED | OUTPATIENT
Start: 2020-05-19 | End: 2020-05-19 | Stop reason: HOSPADM

## 2020-05-19 RX ORDER — SODIUM CHLORIDE 0.9 % (FLUSH) 0.9 %
5-40 SYRINGE (ML) INJECTION AS NEEDED
Status: DISCONTINUED | OUTPATIENT
Start: 2020-05-19 | End: 2020-05-19 | Stop reason: HOSPADM

## 2020-05-19 RX ORDER — DEXTROMETHORPHAN/PSEUDOEPHED 2.5-7.5/.8
1.2 DROPS ORAL
Status: DISCONTINUED | OUTPATIENT
Start: 2020-05-19 | End: 2020-05-19 | Stop reason: HOSPADM

## 2020-05-19 RX ADMIN — LIDOCAINE HYDROCHLORIDE 40 MG: 20 INJECTION, SOLUTION EPIDURAL; INFILTRATION; INTRACAUDAL; PERINEURAL at 08:27

## 2020-05-19 RX ADMIN — PROPOFOL 100 MG: 10 INJECTION, EMULSION INTRAVENOUS at 08:31

## 2020-05-19 RX ADMIN — PROPOFOL 50 MG: 10 INJECTION, EMULSION INTRAVENOUS at 08:34

## 2020-05-19 RX ADMIN — SODIUM CHLORIDE: 900 INJECTION, SOLUTION INTRAVENOUS at 08:15

## 2020-05-19 RX ADMIN — GLYCOPYRROLATE 0.1 MG: 0.2 INJECTION, SOLUTION INTRAMUSCULAR; INTRAVENOUS at 08:28

## 2020-05-19 NOTE — ANESTHESIA PREPROCEDURE EVALUATION
Anesthetic History   No history of anesthetic complications            Review of Systems / Medical History  Patient summary reviewed, nursing notes reviewed and pertinent labs reviewed    Pulmonary        Sleep apnea    Asthma        Neuro/Psych         Psychiatric history     Cardiovascular    Hypertension              Exercise tolerance: >4 METS     GI/Hepatic/Renal     GERD           Endo/Other    Diabetes    Morbid obesity     Other Findings              Physical Exam    Airway  Mallampati: III  TM Distance: 4 - 6 cm  Neck ROM: normal range of motion   Mouth opening: Normal     Cardiovascular    Rhythm: regular  Rate: normal         Dental  No notable dental hx       Pulmonary  Breath sounds clear to auscultation               Abdominal  Abdominal exam normal       Other Findings            Anesthetic Plan    ASA: 3  Anesthesia type: MAC          Induction: Intravenous  Anesthetic plan and risks discussed with: Patient

## 2020-05-19 NOTE — DISCHARGE INSTRUCTIONS
Phylicia Nevarez  [id number]  1990    EGD DISCHARGE INSTRUCTIONS  Discomfort:  Sore throat- throat lozenges or warm salt water gargle  redness at IV site- apply warm compress to area; if redness or soreness persist- contact your physician  Gaseous discomfort- walking, belching will help relieve any discomfort    DIET  You may resume your regular diet - however -  remember your colon is empty and a heavy meal will produce gas. Avoid these foods:  vegetables, fried / greasy foods, carbonated drinks  You may not drink alcoholic beverages for at least 12 hours    MEDICATIONS   Regarding Aspirin or Nonsteroidal medications specifically, please see below. ACTIVITY  You may resume your normal daily activities. Spend the remainder of the day resting -  avoid any strenuous activity. You may not operate a vehicle for 12 hours  You may not engage in an occupation involving machinery or appliances for rest of today. Avoid making any critical decisions for at least 24 hour    CALL M.D. ANY SIGN OF   Increasing pain, nausea, vomiting  Abdominal distension (swelling)  New increased bleeding (oral or rectal)  Fever (chills)  Pain in chest area  Bloody discharge from nose or mouth  Shortness of breath    You may not  take any Advil, Aspirin, Ibuprofen, Motrin, Aleve, or Goodys for 10 days, ONLY  Tylenol as needed for pain.     Post procedure diagnosis: 1.gastritis   2.small hiatal hernia      Follow-up Instructions:   Call Dr. Regina Polo  Results of procedure / biopsy in 10 days  Telephone #  [phone]        DISCHARGE SUMMARY from Nurse    The following personal items collected during your admission are returned to you:   Dental Appliance: Dental Appliances: None  Vision: Visual Aid: Glasses  Hearing Aid:    Jewelry:    Clothing:    Other Valuables:    Valuables sent to safe:             Patient Education   Patient Education   Patient Education        Hiatal Hernia: Care Instructions  Your Care Instructions  A hiatal hernia occurs when part of the stomach bulges into the chest cavity. A hiatal hernia may allow stomach acid and juices to back up into the esophagus (acid reflux). This can cause a feeling of burning, warmth, heat, or pain behind the breastbone. This feeling may often occur after you eat, soon after you lie down, or when you bend forward, and it may come and go. You also may have a sour taste in your mouth. These symptoms are commonly known as heartburn or reflux. But not all hiatal hernias cause symptoms. Follow-up care is a key part of your treatment and safety. Be sure to make and go to all appointments, and call your doctor if you are having problems. It's also a good idea to know your test results and keep a list of the medicines you take. How can you care for yourself at home? · Take your medicines exactly as prescribed. Call your doctor if you think you are having a problem with your medicine. · Do not take aspirin or other nonsteroidal anti-inflammatory drugs (NSAIDs), such as ibuprofen (Advil, Motrin) or naproxen (Aleve), unless your doctor says it is okay. Ask your doctor what you can take for pain. · Your doctor may recommend over-the-counter medicine. For mild or occasional indigestion, antacids such as Tums, Gaviscon, Maalox, or Mylanta may help. Your doctor also may recommend over-the-counter acid reducers, such as famotidine (Pepcid AC), cimetidine (Tagamet HB), ranitidine (Zantac 75 and Zantac 150), or omeprazole (Prilosec). Read and follow all instructions on the label. If you use these medicines often, talk with your doctor. · Change your eating habits. ? It's best to eat several small meals instead of two or three large meals. ? After you eat, wait 2 to 3 hours before you lie down. Late-night snacks aren't a good idea. ? Chocolate, mint, and alcohol can make heartburn worse. They relax the valve between the esophagus and the stomach. ?  Spicy foods, foods that have a lot of acid (like tomatoes and oranges), and coffee can make heartburn symptoms worse in some people. If your symptoms are worse after you eat a certain food, you may want to stop eating that food to see if your symptoms get better. · Do not smoke or chew tobacco.  · If you get heartburn at night, raise the head of your bed 6 to 8 inches by putting the frame on blocks or placing a foam wedge under the head of your mattress. (Adding extra pillows does not work.)  · Do not wear tight clothing around your middle. · Lose weight if you need to. Losing just 5 to 10 pounds can help. When should you call for help? Call your doctor now or seek immediate medical care if:    · You have new or worse belly pain.     · You are vomiting.    Watch closely for changes in your health, and be sure to contact your doctor if:    · You have new or worse symptoms of indigestion.     · You have trouble or pain swallowing.     · You are losing weight.     · You do not get better as expected. Where can you learn more? Go to http://donavon-stacy.info/  Enter T074 in the search box to learn more about \"Hiatal Hernia: Care Instructions. \"  Current as of: August 11, 2019Content Version: 12.4  © 2358-3976 Healthwise, Incorporated. Care instructions adapted under license by [employer] (which disclaims liability or warranty for this information). If you have questions about a medical condition or this instruction, always ask your healthcare professional. [place] [address] any warranty or liability for your use of this information. Gastritis: Care Instructions  Your Care Instructions    Gastritis is a sore and upset stomach. It happens when something irritates the stomach lining. Many things can cause it. These include an infection such as the flu or something you ate or drank. Medicines or a sore on the lining of the stomach (ulcer) also can cause it. Your belly may bloat and ache.  You may belch, vomit, and feel sick to your stomach. You should be able to relieve the problem by taking medicine. And it may help to change your diet. If gastritis lasts, your doctor may prescribe medicine. Follow-up care is a key part of your treatment and safety. Be sure to make and go to all appointments, and call your doctor if you are having problems. It's also a good idea to know your test results and keep a list of the medicines you take. How can you care for yourself at home? · If your doctor prescribed antibiotics, take them as directed. Do not stop taking them just because you feel better. You need to take the full course of antibiotics. · Be safe with medicines. If your doctor prescribed medicine to decrease stomach acid, take it as directed. Call your doctor if you think you are having a problem with your medicine. · Do not take any other medicine, including over-the-counter pain relievers, without talking to your doctor first.  · If your doctor recommends over-the-counter medicine to reduce stomach acid, such as Pepcid AC, Prilosec, Tagamet HB, or Zantac 75, follow the directions on the label. · Drink plenty of fluids (enough so that your urine is light yellow or clear like water) to prevent dehydration. Choose water and other caffeine-free clear liquids. If you have kidney, heart, or liver disease and have to limit fluids, talk with your doctor before you increase the amount of fluids you drink. · Limit how much alcohol you drink. · Avoid coffee, tea, cola drinks, chocolate, and other foods with caffeine. They increase stomach acid. When should you call for help? Call 911 anytime you think you may need emergency care. For example, call if:    · You vomit blood or what looks like coffee grounds.     · You pass maroon or very bloody stools.    Call your doctor now or seek immediate medical care if:    · You start breathing fast and have not produced urine in the last 8 hours.     · You cannot keep fluids down.  Watch closely for changes in your health, and be sure to contact your doctor if:    · You do not get better as expected. Where can you learn more? Go to http://donavon-stacy.info/  Enter Z536 in the search box to learn more about \"Gastritis: Care Instructions. \"  Current as of: August 11, 2019Content Version: 12.4  © 2713-4834 [employer]. Care instructions adapted under license by [employer] (which disclaims liability or warranty for this information). If you have questions about a medical condition or this instruction, always ask your healthcare professional. [place] [address] any warranty or liability for your use of this information. Learning How To Care for Someone Who Has COVID-19  Things to know  Most people who get COVID-19 will recover with time and home care. Here are some things to know if you're caring for someone who's sick. · Treat the symptoms. Common symptoms include a fever, coughing, and feeling short of breath. Urge the person to get extra rest and drink plenty of fluids to replace fluids lost from fever. To reduce a fever, offer acetaminophen (such as Tylenol). It may also help with muscle aches. Read and follow all instructions on the label. · Watch for signs that the illness is getting worse. The person may need medical care if they're getting sicker (for example, if it's hard to breathe). But call the doctor's office before you go. They can tell you what to do. Call 911 or emergency services if the person has any of these symptoms:  ? Severe trouble breathing or shortness of breath. ? Constant pain or pressure in their chest.  ? Confusion, or trouble thinking clearly. ? A blue tint to their lips or face. Some people are more likely to get very sick and need medical care.  Call the doctor as soon as symptoms start if the person you're caring for is over 72, smokes, or has a serious health problem, like asthma, heart disease, diabetes, or an immune system problem. · Protect yourself and others. The virus spreads easily from person to person, so take extra care to avoid catching or spreading the infection. ? Keep the sick person away from others as much as you can. § Have the person stay in one room. If you can, give them their own bathroom to use. § Have only one person take care of them. Keep other people--and pets--out of the sickroom. § Have the person wear a cloth face cover (if you have any) around other people. This includes when anyone is in the room with them or if they leave their room (for example, to go to the bathroom). If the face cover makes it harder for the sick person to breath, the other person should wear a face cover. § Don't share personal items. These include dishes, cups, towels, and bedding. ? Wash your hands often and well. Use soap and water, and scrub for at least 20 seconds. This is especially important after you've been around the sick person or touched things they've touched. If soap and water aren't handy, use a hand  with at least 60% alcohol. ? Avoid touching your mouth, nose, and eyes. ? Take care with the person's laundry. It's okay to wash the sick person's laundry with yours. If you have them, wear disposable gloves when handling their dirty laundry, and wash your hands well after you touch it. Wash items in the warmest water allowed for the fabric type, and dry them completely. ? Clean high-touch items every day and anytime the sick person touches them. These include doorknobs, light switches, toilets, counters, and remote controls. Use a household disinfectant or a homemade bleach solution. (Follow the directions on the label.) If the sick person has their own room, have them disinfect it every day. ? Limit visitors to your home. To help protect family and friends, stay in touch with them only by phone or computer.   Current as of: April 15, 2020               Content Version: 12.4  © 4783-0879 Healthwise, Incorporated. Care instructions adapted under license by your healthcare professional. If you have questions about a medical condition or this instruction, always ask your healthcare professional. [address] any warranty or liability for your use of this information.

## 2020-05-19 NOTE — PROCEDURES
[address]  [address]                 NAME:  Joseph Farrar   :   1990   MRN:   [id number]     Date/Time:  2020 8:54 AM    Esophagogastroduodenoscopy (EGD) Procedure Note    :  Jhonny Delarosa MD    Referring Provider:  Houston Curran MD    Anethesia/Sedation:  MAC anesthesia Propofol    Preoperative diagnosis: EPIGASTRC PAIN, GERD, CHEST PAIN    Postoperative diagnosis: 1.gastritis   2.small hiatal hernia    Procedure Details     After infom consent was obtained for the procedure, with all risks and benefits of procedure explained the patient was taken to the endoscopy suite and placed in the left lateral decubitus position. Following sequential administration of sedation as per above, the [id number] gastroscope was inserted into the mouth and advanced under direct vision to second portion of the duodenum. A careful inspection was made as the gastroscope was withdrawn, including a retroflexed view of the proximal stomach; findings and interventions are described below. Findings:  Esophagus:Small hiatal hernia, Irregular Z line at 37 cm, biopsies done to rule out [place] esophagus  Stomach:Patchy antral erythema, biopsies done  Duodenum/jejunum:normal      Therapies:  none    Specimens: GE junction, antral bx           EBL: None    Complications:   None; patient tolerated the procedure well. Impression:    See Postoperative diagnosis above    Recommendations:  -Acid suppression with a proton pump inhibitor. , -Await pathology. , -No NSAIDS    Discharge disposition:  Home in the company of  when able to ambulate    Jhonny Delarosa MD

## 2020-05-19 NOTE — ROUTINE PROCESS
Orelia Saint 1990 
[id number] Situation: 
Verbal report received from: DORIAN Wilkerson Procedure: Procedure(s): ESOPHAGOGASTRODUODENOSCOPY (EGD) ESOPHAGOGASTRODUODENAL (EGD) BIOPSY Background: 
 
Preoperative diagnosis: EPIGASTRC PAIN, GERD, CHEST PAIN Postoperative diagnosis: 1.gastritis 2.small hiatal hernia :  Dr. Marilin Sanders Assistant(s): Endoscopy Technician-1: Marcela Grullon Endoscopy RN-1: Toni Fabian Specimens:  
ID Type Source Tests Collected by Time Destination 1 : antrum bx Preservative Gastric  Toya Wong MD 5/19/2020 1161 Pathology 2 : EG junction bx Preservative Gastric  Toya Wong MD 5/19/2020 1801 Pathology H. Pylori  no Assessment: 
Intra-procedure medications Anesthesia gave intra-procedure sedation and medications, see anesthesia flow sheet yes Intravenous fluids: NS@ Buzzy Spotted Vital signs stable Abdominal assessment: round and soft Recommendation: 
Discharge patient per MD order. Family or Friend Permission to share finding with family or friend yes

## 2020-05-19 NOTE — H&P
Pre-endoscopy H and P     The patient was seen and examined in the endoscopy suite. The airway was assessed and docuemented. The problem list and medications were reviewed.      Patient Active Problem List   Diagnosis Code    HTN (hypertension) I10    Obesity, morbid (more than 100 lbs over ideal weight or BMI > 40) ([facility]) E66.01    Fatigue R53.83    Vitamin D deficiency E55.9    AR (allergic rhinitis) J30.9    Obesity E66.9    Increased pulse rate R00.0    Acute laryngitis J04.0    Viral pneumonia, unspecified J12.9    Migraine headache G43.909    GERD (gastroesophageal reflux disease) K21.9    [place] hump E65    Type 2 diabetes mellitus without complication ([facility]) Y60.9    Acute pharyngitis J02.9    Carpal tunnel syndrome of right wrist G56.01    Sleep apnea in adult G47.30    ALIYA (generalized anxiety disorder) F41.1    Hyperhidrosis R61    Palpitation R00.2    Tachycardia R00.0    Type 2 diabetes mellitus with diabetic neuropathy ([facility]) E11.40    Class 3 severe obesity due to excess calories with serious comorbidity and body mass index (BMI) of 60.0 to 69.9 in adult ([facility]) E66.01, Z68.44    Type 2 diabetes with nephropathy ([facility]) E11.21    Dysthymia F34.1     Social History     Socioeconomic History    Marital status: SINGLE     Spouse name: Not on file    Number of children: Not on file    Years of education: Not on file    Highest education level: Not on file   Occupational History    Occupation: customer ser and student     Comment: [employer]   Social Needs    Financial resource strain: Not on file    Food insecurity     Worry: Not on file     Inability: Not on file   [employer] needs     Medical: Not on file     Non-medical: Not on file   Tobacco Use    Smoking status: Former Smoker     Packs/day: 0.50     Years: 5.00     Pack years: 2.50     Last attempt to quit: 2019     Years since quittin.5    Smokeless tobacco: Never Used   Substance and Sexual Activity    Alcohol use: Yes     Alcohol/week: 0.0 standard drinks     Types: 1 Standard drinks or equivalent per week     Comment: socially, Monthly or less    Drug use: No    Sexual activity: Not Currently     Partners: Male     Birth control/protection: Condom   Lifestyle    Physical activity     Days per week: Not on file     Minutes per session: Not on file    Stress: Not on file   Relationships    Social connections     Talks on phone: Not on file     Gets together: Not on file     Attends Mu-ism service: Not on file     Active member of club or organization: Not on file     Attends meetings of clubs or organizations: Not on file     Relationship status: Not on file    Intimate partner violence     Fear of current or ex partner: Not on file     Emotionally abused: Not on file     Physically abused: Not on file     Forced sexual activity: Not on file   Other Topics Concern    Not on file   Social History Narrative    Not on file     Past Medical History:   Diagnosis Date    Asthma     [place] hump 1/7/2015    Carpal tunnel syndrome of right wrist 12/20/2016    [facility] [address]     ALIYA (generalized anxiety disorder) 4/4/2018    GERD (gastroesophageal reflux disease) 8/13/2014    HTN (hypertension) 7/25/2011    Hyperaldosteronism ([facility] [address] 10/19/2015    Hyperhidrosis 4/4/2018    Morbid obesity ([facility] [address] [facility]     Other ill-defined conditions(799.89)     migraines    Palpitation 4/4/2018    Sleep apnea     uses CPAP    Tachycardia 4/4/2018         Prior to Admission Medications   Prescriptions Last Dose Informant Patient Reported? Taking? ALPRAZolam (XANAX) 0.5 mg tablet 5/17/2020  No Yes   Sig: Take 1 Tab by mouth nightly as needed for Anxiety or Sleep. Max Daily Amount: 0.5 mg.  Indications: anxious, repeated episodes of anxiety, panic disorder   Blood-Glucose Meter,Continuous (Dexcom G6 ) misc 5/19/2020 at Unknown time  No Yes   Sig: Change sensor every 10 days   Blood-Glucose Sensor (Dexcom G6 Sensor) nahid 2020 at Unknown time  No Yes   Sig: Change sensor every 10 days   Blood-Glucose Transmitter (Dexcom G6 Transmitter) nahid 2020 at Unknown time  No Yes   Sig: Change sensor every 10 days   Insulin Needles, Disposable, (Violet Pen Needle) 32 gauge x \" ndle   No No   Sig: One shot daily   albuterol (ACCUNEB) 1.25 mg/3 mL nebulizer solution Unknown at Unknown time  No No   Sig: Take 3 mL by inhalation every six (6) hours as needed for Wheezing. albuterol (PROVENTIL HFA, VENTOLIN HFA) 90 mcg/actuation inhaler   No Yes   Sig: Take 1 Puff by inhalation every four (4) hours as needed for Wheezing. aspirin delayed-release 81 mg tablet   No Yes   Sig: Take 1 Tab by mouth daily. atorvastatin (LIPITOR) 40 mg tablet 2020 at Unknown time  No Yes   Sig: TAKE 1 TABLET BY MOUTH EVERY DAY   azelastine (ASTEPRO) 0.15 % (205.5 mcg)   Yes Yes   Si [place] by Both Nostrils route nightly. butalbital-acetaminophen-caffeine (FIORICET) -40 mg per tablet Unknown at Unknown time  No No   Sig: Take 0.5-1 Tabs by mouth every six (6) hours as needed for Headache. Max Daily Amount: 4 Tabs. flash glucose scanning reader (FREESTYLE RUBEN 14 DAY READER) misc   No No   Sig: Dispense one 14 day reader   flash glucose sensor (FreeStyle Ruben 14 Day Sensor) kit   No No   Sig: CHANGE SENSOR EVERY 14 DAYS   fluticasone (FLONASE) 50 mcg/actuation nasal spray   No Yes   Si Sprays by Both Nostrils route daily. gabapentin (NEURONTIN) 300 mg capsule 2020 at Unknown time  No Yes   Sig: Take 1 Cap by mouth three (3) times daily. Max Daily Amount: 900 mg.   galcanezumab-gnlm (EMGALITY PEN) 120 mg/mL injection 2020  No Yes   Si mL by SubCUTAneous route every thirty (30) days. glipiZIDE (GLUCOTROL) 5 mg tablet 2020 at Unknown time  No Yes   Sig: Take 1 tabs with dinner.    insulin glargine (Lantus Solostar U-100 Insulin) 100 unit/mL (3 mL) inpn 2020 at Unknown time  No Yes   Si units daily metFORMIN ER (GLUCOPHAGE XR) 500 mg tablet 2020 at Unknown time  No Yes   Sig: TAKE 2 TABLETS BY MOUTH WITH BREAKFAST AND TAKE 2 TABLETS WITH DINNER   metoprolol tartrate (LOPRESSOR) 100 mg IR tablet 2020 at Unknown time  No Yes   Sig: TAKE 1 TABLET BY MOUTH TWICE A DAY   nystatin (MYCOSTATIN) 100,000 unit/gram ointment   Yes Yes   Sig: Apply  to affected area as needed. nystatin-triamcinolone (MYCOLOG) 100,000-0.1 unit/gram-% ointment   Yes Yes   Sig: Apply  to affected area as needed for Skin Irritation. omeprazole (PRILOSEC) 40 mg capsule 2020 at Unknown time  No Yes   Sig: TAKE 1 CAP BY MOUTH DAILY. semaglutide (OZEMPIC) 1 mg/dose (2 mg/1.5 mL) sub-q pen 2020  No Yes   Si mg by SubCUTAneous route every seven (7) days. spironolactone (ALDACTONE) 25 mg tablet 2020 at Unknown time  No Yes   Sig: Take 1 Tab by mouth two (2) times a day. vilazodone (VIIBRYD) 40 mg tab tablet 2020 at Unknown time  No Yes   Sig: Take 1 Tab by mouth daily. Facility-Administered Medications: None       Chief complaint, history of present illness, and review of systems and Past medical History are positive for: GERD, chest pain, epigastric pain, obesity. The heart, lungs and mental status were satisfactory for the administration of sedation and for the procedure. I discussed with the patient the objectives, risks, consequences and alternatives to the procedure. The patient was counseled at length about the risks of roberto carlos Covid-19 in the kirti-operative and post-operative states including the recovery window of their procedure. The patient was made aware that roberto carlos Covid-19 after a surgical procedure may worsen their prognosis for recovering from the virus and lend to a higher morbidity and or mortality risk. The patient was given the options of postponing their procedure. All of the risks, benefits, and alternatives were discussed.  The patient does  wish to proceed with the procedure.     Plan: Endoscopic procedure with sedation     Kiarra Cameron MD   5/19/2020  8:21 AM

## 2020-06-01 ENCOUNTER — HOSPITAL ENCOUNTER (OUTPATIENT)
Dept: GENERAL RADIOLOGY | Age: 30
Discharge: HOME | End: 2020-06-01
Payer: COMMERCIAL

## 2020-06-01 DIAGNOSIS — M54.50 LOW BACK PAIN: ICD-10-CM

## 2020-06-01 DIAGNOSIS — R70.0 ELEVATED SEDIMENTATION RATE: ICD-10-CM

## 2020-06-01 DIAGNOSIS — M25.50 PAIN IN JOINT, MULTIPLE SITES: ICD-10-CM

## 2020-06-01 PROCEDURE — 73130 X-RAY EXAM OF HAND: CPT

## 2020-06-01 PROCEDURE — 73630 X-RAY EXAM OF FOOT: CPT

## 2020-06-01 PROCEDURE — 73562 X-RAY EXAM OF KNEE 3: CPT

## 2020-06-01 PROCEDURE — 72114 X-RAY EXAM L-S SPINE BENDING: CPT

## 2020-06-09 ENCOUNTER — OFFICE VISIT (OUTPATIENT)
Dept: SURGERY | Age: 30
End: 2020-06-09

## 2020-06-09 VITALS — WEIGHT: 293 LBS | HEIGHT: 63 IN | BODY MASS INDEX: 51.91 KG/M2

## 2020-06-09 DIAGNOSIS — K21.9 GASTROESOPHAGEAL REFLUX DISEASE, ESOPHAGITIS PRESENCE NOT SPECIFIED: ICD-10-CM

## 2020-06-09 DIAGNOSIS — E66.01 MORBID OBESITY WITH BMI OF 50.0-59.9, ADULT (HCC): Primary | ICD-10-CM

## 2020-06-09 NOTE — PROGRESS NOTES
1. Have you been to the ER, urgent care clinic since your last visit? Hospitalized since your last visit? No    2. Have you seen or consulted any other health care providers outside of the [address] [place] [address] since your last visit? Include any pap smears or colon screening.  No

## 2020-06-09 NOTE — LETTER
6/9/20 Patient: Margo Lara YOB: 1990 Date of Visit: 6/9/2020 Maximus Bergeron MD 
[address] [place] [id number] VIA In Basket Dear Maximus Bergeron MD, Thank you for referring Ms. Margo Lara to [facility] for evaluation. My notes for this consultation are attached. If you have questions, please do not hesitate to call me. I look forward to following your patient along with you. Sincerely, Lynn Bradshaw MD

## 2020-06-09 NOTE — PROGRESS NOTES
Bariatric Surgery Consult  Dianna Farfan is a 26 y.o. female with a history of morbid obesity. Her Height: 5' 3\" (160 cm), Weight: 338 lb (153.3 kg). Body mass index is 59.87 kg/m². She reports that she has been trying to lose weight for 5 years. Her maximum weight was 340 pounds. She has attended our bariatric surgery information seminar. Everette Killian wants to consider laparoscopic sleeve gastrectomy. She has now completed her preoperative evaluation. And had a recent EGD. Pt is referred by:  Gail Veras MD.        Comorbidities:     Bariatric comorbidities present: hypertension, insulin dependent diabetes, GERD, chronic back problems, osteoarthritis and obstructive sleep apnea    Ambulatory status: independent    The patient's reported level of exercise: moderately active.       Patient Active Problem List    Diagnosis Date Noted    Dysthymia 04/21/2020    Type 2 diabetes with nephropathy ([facility] 01/13/2019    Class 3 severe obesity due to excess calories with serious comorbidity and body mass index (BMI) of 60.0 to 69.9 in adult [facility]) 08/09/2018    Type 2 diabetes mellitus with diabetic neuropathy ([address] 04/25/2018    ALIYA (generalized anxiety disorder) 04/04/2018    Hyperhidrosis 04/04/2018    Palpitation 04/04/2018    Tachycardia 04/04/2018    Sleep apnea in adult 03/20/2017    Carpal tunnel syndrome of right wrist 12/20/2016    Acute pharyngitis 06/15/2016    Type 2 diabetes mellitus without complication ([address] 84/97/3340    [facility] 01/07/2015    GERD (gastroesophageal reflux disease) 08/13/2014    Migraine headache 01/28/2014    Viral pneumonia, unspecified 09/22/2013    Obesity 09/20/2013    Increased pulse rate 09/20/2013    Acute laryngitis 09/20/2013    Vitamin D deficiency 09/28/2012    AR (allergic rhinitis) 09/28/2012    Fatigue 08/27/2012    HTN (hypertension) 07/25/2011    Obesity, morbid (more than 100 lbs over ideal weight or BMI > 40) ([address] 07/25/2011     Past Medical History:   Diagnosis Date    Asthma     [place] hump 2015    Carpal tunnel syndrome of right wrist 2016    Diabetes ([facility] [address]     ALIYA (generalized anxiety disorder) 2018    GERD (gastroesophageal reflux disease) 2014    HTN (hypertension) 2011    Hyperaldosteronism ([facility] [address] [facility] 10/19/2015    Hyperhidrosis 2018    Morbid obesity ([facility]     Other ill-defined conditions(799.89)     migraines    Palpitation 2018    Sleep apnea     uses CPAP    Tachycardia 2018      Past Surgical History:   Procedure Laterality Date    TN REMOVAL OF TONSILS,<13 Y/O        Social History     Tobacco Use    Smoking status: Former Smoker     Packs/day: 0.50     Years: 5.00     Pack years: 2.50     Last attempt to quit: 2019     Years since quittin.6    Smokeless tobacco: Never Used   Substance Use Topics    Alcohol use: Yes     Alcohol/week: 0.0 standard drinks     Types: 1 Standard drinks or equivalent per week     Comment: socially, Monthly or less      Family History   Problem Relation Age of Onset   [address] [place] [address] Arthritis-rheumatoid Mother     Hypertension Mother     Diabetes Mother         Type II    Anesth Problems Mother         respiratory issues - feels like she cannot breath when coming out of anesthesia    Psychiatric Disorder Father     Drug Abuse Father     Cancer Other         prostate    Hypertension Sister     Thyroid Disease Sister         \"I think\"    Breast Cancer Sister     Attention Deficit Hyperactivity Disorder Brother     Hypertension Sister       . Current Outpatient Medications   Medication Sig    nystatin (MYCOSTATIN) 100,000 unit/gram ointment Apply  to affected area as needed.  nystatin-triamcinolone (MYCOLOG) 100,000-0.1 unit/gram-% ointment Apply  to affected area as needed for Skin Irritation.  ALPRAZolam (XANAX) 0.5 mg tablet Take 1 Tab by mouth nightly as needed for Anxiety or Sleep. Max Daily Amount: 0.5 mg.  Indications: anxious, repeated episodes of anxiety, panic disorder    azelastine (ASTEPRO) 0.15 % (205.5 mcg) 1 Spray by Both Nostrils route nightly.  atorvastatin (LIPITOR) 40 mg tablet TAKE 1 TABLET BY MOUTH EVERY DAY    aspirin delayed-release 81 mg tablet Take 1 Tab by mouth daily.  gabapentin (NEURONTIN) 300 mg capsule Take 1 Cap by mouth three (3) times daily. Max Daily Amount: 900 mg.  spironolactone (ALDACTONE) 25 mg tablet Take 1 Tab by mouth two (2) times a day.  Blood-Glucose Meter,Continuous (Dexcom G6 ) misc Change sensor every 10 days    Blood-Glucose Transmitter (Dexcom G6 Transmitter) nahid Change sensor every 10 days    Blood-Glucose Sensor (Dexcom G6 Sensor) nahid Change sensor every 10 days    metoprolol tartrate (LOPRESSOR) 100 mg IR tablet TAKE 1 TABLET BY MOUTH TWICE A DAY    omeprazole (PRILOSEC) 40 mg capsule TAKE 1 CAP BY MOUTH DAILY.  vilazodone (VIIBRYD) 40 mg tab tablet Take 1 Tab by mouth daily.  semaglutide (OZEMPIC) 1 mg/dose (2 mg/1.5 mL) sub-q pen 1 mg by SubCUTAneous route every seven (7) days.  flash glucose sensor (FreeStyle Maureen 14 Day Sensor) kit CHANGE SENSOR EVERY 14 DAYS    glipiZIDE (GLUCOTROL) 5 mg tablet Take 1 tabs with dinner.  insulin glargine (Lantus Solostar U-100 Insulin) 100 unit/mL (3 mL) inpn 30 units daily    Insulin Needles, Disposable, (Violet Pen Needle) 32 gauge x 5/32\" ndle One shot daily    metFORMIN ER (GLUCOPHAGE XR) 500 mg tablet TAKE 2 TABLETS BY MOUTH WITH BREAKFAST AND TAKE 2 TABLETS WITH DINNER    galcanezumab-gnlm (EMGALITY PEN) 120 mg/mL injection 1 mL by SubCUTAneous route every thirty (30) days.  fluticasone (FLONASE) 50 mcg/actuation nasal spray 2 Sprays by Both Nostrils route daily.  flash glucose scanning reader (FREESTYLE MAUREEN 14 DAY READER) misc Dispense one 14 day reader    butalbital-acetaminophen-caffeine (FIORICET) -40 mg per tablet Take 0.5-1 Tabs by mouth every six (6) hours as needed for Headache.  Max Daily Amount: 4 Tabs.    albuterol (PROVENTIL HFA, VENTOLIN HFA) 90 mcg/actuation inhaler Take 1 Puff by inhalation every four (4) hours as needed for Wheezing.  albuterol (ACCUNEB) 1.25 mg/3 mL nebulizer solution Take 3 mL by inhalation every six (6) hours as needed for Wheezing. No current facility-administered medications for this visit. Allergies   Allergen Reactions    Latex Itching         Review of Systems:    Constitutional: negative  Ears, Nose, Mouth, Throat, and Face: negative  Respiratory: negative  Cardiovascular: negative  Gastrointestinal: negative  Genitourinary:negative  Integument/Breast: negative  Hematologic/Lymphatic: negative  Musculoskeletal:negative    Objective:     Visit Vitals  Ht 5' 3\" (1.6 m)   Wt 338 lb (153.3 kg)   BMI 59.87 kg/m²        Physical Exam:    Physical exam could not be done due to virtual visit    Lab Results   Component Value Date/Time    Sodium 139 03/07/2020 09:44 AM    Potassium 4.8 03/07/2020 09:44 AM    Chloride 103 03/07/2020 09:44 AM    CO2 22 03/07/2020 09:44 AM    Anion gap 5 07/27/2014 03:24 PM    Glucose 186 (H) 03/07/2020 09:44 AM    BUN 10 03/07/2020 09:44 AM    Creatinine 0.71 03/07/2020 09:44 AM    BUN/Creatinine ratio 14 03/07/2020 09:44 AM    GFR est  03/07/2020 09:44 AM    GFR est non- 03/07/2020 09:44 AM    Calcium 9.3 03/07/2020 09:44 AM    Bilirubin, total 0.2 03/07/2020 09:44 AM    Alk.  phosphatase 98 03/07/2020 09:44 AM    Protein, total 6.5 03/07/2020 09:44 AM    Albumin 3.6 (L) 03/07/2020 09:44 AM    Globulin 3.8 07/27/2014 03:24 PM    A-G Ratio 1.2 03/07/2020 09:44 AM    ALT (SGPT) 16 03/07/2020 09:44 AM     Lab Results   Component Value Date/Time    WBC 10.3 03/20/2017 04:08 PM    HGB 12.2 03/20/2017 04:08 PM    HCT 39.7 03/20/2017 04:08 PM    PLATELET 075 41/61/5211 04:08 PM    MCV 87 03/20/2017 04:08 PM     Lab Results   Component Value Date/Time    Hemoglobin A1c 8.1 (H) 03/07/2020 09:44 AM    Hemoglobin A1c (POC) 8.4 12/24/2019 02:20 PM     EGD report:  Findings:  Esophagus:Small hiatal hernia, Irregular Z line at 37 cm, biopsies done to rule out Shelby esophagus  Stomach:Patchy antral erythema, biopsies done  Duodenum/jejunum:normal        Therapies:  none     Specimens: GE junction, antral bx           EBL: None     Complications:   None; patient tolerated the procedure well. Impression:    See Postoperative diagnosis above     Recommendations:  -Acid suppression with a proton pump inhibitor. , -Await pathology. , -No NSAIDS     Discharge disposition:  Home in the company of  when able to ambulate     Lizandro Travis MD    Assessment:     1. Morbid obesity (Body mass index is 59.87 kg/m².) with multiple comorbidities. The patient meets criteria established by the NIH for weight loss surgery candidates. Without weight reduction, co-morbidities will escalate as well as increase risk of early mortality. Our recommendation is the patient could be served with laparoscopic sleeve gastrectomy. I explained to the patient differences between laparoscopic gastric bypass, laparoscopic adjustable gastric banding, and laparoscopic vertical sleeve gastrectomy with respect to expected weight loss, resolution of comorbidities and risks. Ms. Mason Gaitan has attended one our informational meetings and has seen our educational materials. She has requested Dr. Cayla Whittington to perform her procedure. I reviewed the role for this procedure as a tool to help her achieve her weight loss goals. I reminded her that effective weight loss comes from lifelong adherence to changes in dietary choices, eating habits and exercise. Recommendation: We will request approval for laparoscopic sleeve gastrectomy with hiatal hernia repair. She is a candidate for laparoscopic sleeve gastrectomy. She does have a small hiatal hernia which will need to be repaired at the time of her sleeve. Biopsy showed no Shelby's and minimal esophagitis.   She is not comfortable with gastric bypass and we discussed the risks and benefits of each and she would only prefer sleeve gastrectomy. We will submit for insurance approval for laparoscopic sleeve gastrectomy and hiatal hernia repair. I was in the office while conducting this encounter. Consent:  She and/or her healthcare decision maker is aware that this patient-initiated Telehealth encounter is a billable service, with coverage as determined by her insurance carrier. She is aware that she may receive a bill and has provided verbal consent to proceed: Yes    This virtual visit was conducted via [employer]. Pursuant to the emergency declaration under the [facility], [facility] waiver authority and the [employer] Act, this Virtual  Visit was conducted to reduce the patient's risk of exposure to COVID-19 and provide continuity of care for an established patient. Services were provided through a video synchronous discussion virtually to substitute for in-person clinic visit. Due to this being a TeleHealth evaluation, many elements of the physical examination are unable to be assessed. Total Time: minutes: 11-20 minutes. Signed By: Nickie Nolasco MD     June 9, 2020       Greater than half of the time: 20 minutes was used in counciling the patient about bariatric surgery and the steps she needs to take to move forward with her surgery. Ms. Davey Drew has a reminder for a \"due or due soon\" health maintenance. I have asked that she contact her primary care provider for follow-up on this health maintenance.

## 2020-06-11 ENCOUNTER — DOCUMENTATION ONLY (OUTPATIENT)
Dept: CARDIOLOGY CLINIC | Age: 30
End: 2020-06-11

## 2020-06-17 RX ORDER — METFORMIN HYDROCHLORIDE 500 MG/1
TABLET, EXTENDED RELEASE ORAL
Qty: 360 TAB | Refills: 3 | Status: SHIPPED | OUTPATIENT
Start: 2020-06-17 | End: 2020-09-09

## 2020-06-23 ENCOUNTER — VIRTUAL VISIT (OUTPATIENT)
Dept: CARDIOLOGY CLINIC | Age: 30
End: 2020-06-23

## 2020-06-23 DIAGNOSIS — Z01.810 PREOP CARDIOVASCULAR EXAM: ICD-10-CM

## 2020-06-23 DIAGNOSIS — R00.2 PALPITATIONS: ICD-10-CM

## 2020-06-23 DIAGNOSIS — R00.0 TACHYCARDIA: Primary | ICD-10-CM

## 2020-06-23 DIAGNOSIS — I10 ESSENTIAL HYPERTENSION: ICD-10-CM

## 2020-06-23 DIAGNOSIS — E66.01 OBESITY, MORBID (MORE THAN 100 LBS OVER IDEAL WEIGHT OR BMI > 40) (HCC): ICD-10-CM

## 2020-06-23 NOTE — PROGRESS NOTES
HISTORY OF PRESENT ILLNESS  Giuliano Gallardo is a 26 y.o. female     SUMMARY:   Problem List  Date Reviewed: 6/23/2020          Codes Class Noted    Dysthymia ICD-10-CM: F34.1  ICD-9-CM: 300.4  4/21/2020        Type 2 diabetes with nephropathy ([facility] ICD-10-CM: E11.21  ICD-9-CM: 250.40, 583.81  1/13/2019        Class 3 severe obesity due to excess calories with serious comorbidity and body mass index (BMI) of 60.0 to 69.9 in adult [facility]) ICD-10-CM: E66.01, Z68.44  ICD-9-CM: 278.01, V85.44  8/9/2018        Type 2 diabetes mellitus with diabetic neuropathy ([facility] ICD-10-CM: E11.40  ICD-9-CM: 250.60, 357.2  4/25/2018        ALIYA (generalized anxiety disorder) ICD-10-CM: F41.1  ICD-9-CM: 300.02  4/4/2018        Hyperhidrosis ICD-10-CM: R61  ICD-9-CM: 705.21  4/4/2018        Palpitation ICD-10-CM: R00.2  ICD-9-CM: 785.1  4/4/2018        Tachycardia ICD-10-CM: R00.0  ICD-9-CM: 785.0  4/4/2018        Sleep apnea in adult ICD-10-CM: G47.30  ICD-9-CM: 327.23  3/20/2017        Carpal tunnel syndrome of right wrist ICD-10-CM: G56.01  ICD-9-CM: 354.0  12/20/2016        Acute pharyngitis ICD-10-CM: J02.9  ICD-9-CM: 419  6/15/2016        Type 2 diabetes mellitus without complication ([facility] YRW-09-TD: E11.9  ICD-9-CM: 250.00  4/25/2016        [place] hump ICD-10-CM: E65  ICD-9-CM: 278.1  1/7/2015        GERD (gastroesophageal reflux disease) ICD-10-CM: K21.9  ICD-9-CM: 530.81  8/13/2014        Migraine headache ICD-10-CM: J17.235  ICD-9-CM: 346.90  1/28/2014        Viral pneumonia, unspecified ICD-10-CM: J12.9  ICD-9-CM: 480.9  9/22/2013        Obesity ICD-10-CM: E66.9  ICD-9-CM: 278.00  9/20/2013        Increased pulse rate ICD-10-CM: R00.0  ICD-9-CM: 785.0  9/20/2013        Acute laryngitis ICD-10-CM: J04.0  ICD-9-CM: 464.00  9/20/2013        Vitamin D deficiency ICD-10-CM: E55.9  ICD-9-CM: 268.9  9/28/2012        AR (allergic rhinitis) ICD-10-CM: J30.9  ICD-9-CM: 477.9  9/28/2012        Fatigue ICD-10-CM: R53.83  ICD-9-CM: 780.79  8/27/2012        HTN (hypertension) ICD-10-CM: I10  ICD-9-CM: 401.9  7/25/2011        Obesity, morbid (more than 100 lbs over ideal weight or BMI > 40) (HCC) ICD-10-CM: E66.01  ICD-9-CM: 278.01  7/25/2011              Current Outpatient Medications on File Prior to Visit   Medication Sig    glipiZIDE (GLUCOTROL) 5 mg tablet Take 1 Tab by mouth daily (with dinner).  metFORMIN ER (GLUCOPHAGE XR) 500 mg tablet TAKE 2 TABLETS BY MOUTH WITH BREAKFAST AND TAKE 2 TABLETS WITH DINNER    nystatin (MYCOSTATIN) 100,000 unit/gram ointment Apply  to affected area as needed.  nystatin-triamcinolone (MYCOLOG) 100,000-0.1 unit/gram-% ointment Apply  to affected area as needed for Skin Irritation.  ALPRAZolam (XANAX) 0.5 mg tablet Take 1 Tab by mouth nightly as needed for Anxiety or Sleep. Max Daily Amount: 0.5 mg. Indications: anxious, repeated episodes of anxiety, panic disorder    azelastine (ASTEPRO) 0.15 % (205.5 mcg) 1 Spray by Both Nostrils route nightly.  atorvastatin (LIPITOR) 40 mg tablet TAKE 1 TABLET BY MOUTH EVERY DAY    aspirin delayed-release 81 mg tablet Take 1 Tab by mouth daily.  gabapentin (NEURONTIN) 300 mg capsule Take 1 Cap by mouth three (3) times daily. Max Daily Amount: 900 mg.  spironolactone (ALDACTONE) 25 mg tablet Take 1 Tab by mouth two (2) times a day.  Blood-Glucose Meter,Continuous (Dexcom G6 ) misc Change sensor every 10 days    Blood-Glucose Transmitter (Dexcom G6 Transmitter) nahid Change sensor every 10 days    Blood-Glucose Sensor (Dexcom G6 Sensor) nahid Change sensor every 10 days    metoprolol tartrate (LOPRESSOR) 100 mg IR tablet TAKE 1 TABLET BY MOUTH TWICE A DAY    omeprazole (PRILOSEC) 40 mg capsule TAKE 1 CAP BY MOUTH DAILY.  vilazodone (VIIBRYD) 40 mg tab tablet Take 1 Tab by mouth daily.  semaglutide (OZEMPIC) 1 mg/dose (2 mg/1.5 mL) sub-q pen 1 mg by SubCUTAneous route every seven (7) days.     insulin glargine (Lantus Solostar U-100 Insulin) 100 unit/mL (3 mL) inpn 30 units daily    Insulin Needles, Disposable, (Violet Pen Needle) 32 gauge x 5/32\" ndle One shot daily    galcanezumab-gnlm (EMGALITY PEN) 120 mg/mL injection 1 mL by SubCUTAneous route every thirty (30) days.  fluticasone (FLONASE) 50 mcg/actuation nasal spray 2 Sprays by Both Nostrils route daily.  butalbital-acetaminophen-caffeine (FIORICET) -40 mg per tablet Take 0.5-1 Tabs by mouth every six (6) hours as needed for Headache. Max Daily Amount: 4 Tabs.  albuterol (PROVENTIL HFA, VENTOLIN HFA) 90 mcg/actuation inhaler Take 1 Puff by inhalation every four (4) hours as needed for Wheezing.  albuterol (ACCUNEB) 1.25 mg/3 mL nebulizer solution Take 3 mL by inhalation every six (6) hours as needed for Wheezing.  flash glucose sensor (FreeStyle Maureen 14 Day Sensor) kit CHANGE SENSOR EVERY 14 DAYS    flash glucose scanning reader (FREESTYLE MAUREEN 14 DAY READER) misc Dispense one 14 day reader     No current facility-administered medications on file prior to visit. CARDIOLOGY STUDIES TO DATE:  9/13 normal echo, though poor acoustic window  6/20 event monitor, one 8 beat run of VT    Chief Complaint   Patient presents with    New Patient     HPI :  Ms. Viktoriya Helms is a morbidly obese female whose mother is a patient of mine. She has several concerns. One is she has upcoming gastric sleeve surgery and the second is her doctor found that she had an elevated CRP and she been complaining of some palpitations. Turns out she is actually had palpitations for years and they really are fairly well-controlled with metoprolol as long as she remembers to take her doses. She has longstanding hypertension and diabetes, and says her last A1c was about 8.1 which is good for her. She is never smoked. Most recent lipid profile looked outstanding. Family history is negative for premature coronary disease.     She just started back at the gym doing some light exercise 3 days a week and with this she is noticed some shortness of breath. This does not sound out of proportion to her deconditioning and her weight. She had an echocardiogram done in 2013 when she had an episode of pneumonia and when she called to make this appointment we provided with an event monitor and I discussed those results with her. CARDIAC ROS:   negative for chest pain, syncope, orthopnea, paroxysmal nocturnal dyspnea, exertional chest pressure/discomfort, claudication, lower extremity edema    Family History   Problem Relation Age of Onset   [facility] Arthritis-rheumatoid Mother     Hypertension Mother     Diabetes Mother         Type II    Anesth Problems Mother         respiratory issues - feels like she cannot breath when coming out of anesthesia    Psychiatric Disorder Father     Drug Abuse Father     Cancer Other         prostate    Hypertension Sister     Thyroid Disease Sister         \"I think\"    Breast Cancer Sister     Attention Deficit Hyperactivity Disorder Brother     Hypertension Sister        Past Medical History:   Diagnosis Date    Asthma     [place] hump 1/7/2015    Carpal tunnel syndrome of right wrist 12/20/2016    Diabetes ([address]     ALIYA (generalized anxiety disorder) 4/4/2018    GERD (gastroesophageal reflux disease) 8/13/2014    HTN (hypertension) 7/25/2011    Hyperaldosteronism ([address] 10/19/2015    Hyperhidrosis 4/4/2018    Morbid obesity ([address]     Other ill-defined conditions(799.89)     migraines    Palpitation 4/4/2018    Sleep apnea     uses CPAP    Tachycardia 4/4/2018       GENERAL ROS:  A comprehensive review of systems was negative except for that written in the HPI. There were no vitals taken for this visit.     Wt Readings from Last 3 Encounters:   06/09/20 338 lb (153.3 kg)   05/19/20 335 lb (152 kg)   05/14/20 335 lb (152 kg)            BP Readings from Last 3 Encounters:   05/19/20 121/76   05/14/20 145/90   01/16/20 (!) 84/50       PHYSICAL EXAM  General: Well developed, in no acute distress, cooperative and alert  HEENT: Pupils equal/round. No marked JVD visible on video. Respiratory: No audible wheezing, no signs of respiratory distress, lips non cyanotic  Extremities:  No edema  Neuro: A&Ox3, speech clear, no facial droop, answering questions appropriately  Skin: Skin color is normal. No rashes or lesions. Non diaphoretic on visible skin during exam      Lab Results   Component Value Date/Time    Cholesterol, total 93 (L) 03/07/2020 09:44 AM    Cholesterol, total 172 03/20/2017 04:08 PM    Cholesterol, total 163 03/16/2016 11:16 AM    Cholesterol, total 162 11/24/2015 08:35 AM    Cholesterol, total 160 12/02/2014 09:04 AM    HDL Cholesterol 31 (L) 03/07/2020 09:44 AM    HDL Cholesterol 30 (L) 03/20/2017 04:08 PM    HDL Cholesterol 32 (L) 03/16/2016 11:16 AM    HDL Cholesterol 34 (L) 11/24/2015 08:35 AM    HDL Cholesterol 36 (L) 12/02/2014 09:04 AM    LDL, calculated 48 03/07/2020 09:44 AM    LDL, calculated 96 03/20/2017 04:08 PM    LDL, calculated 106 (H) 03/16/2016 11:16 AM    LDL, calculated 100 (H) 11/24/2015 08:35 AM    LDL, calculated 101 (H) 12/02/2014 09:04 AM    Triglyceride 70 03/07/2020 09:44 AM    Triglyceride 230 (H) 03/20/2017 04:08 PM    Triglyceride 126 03/16/2016 11:16 AM    Triglyceride 139 11/24/2015 08:35 AM    Triglyceride 117 12/02/2014 09:04 AM     ASSESSMENT :      Single episode of nonsustained asymptomatic VT in a patient with morbid obesity, sleep apnea hypertension and diabetes is not unexpected. I think she needs an echocardiogram to just make sure that her heart muscle is still okay and there is no significant valve issues. Assuming that looks okay she will need no further cardiac testing she should be fine for gastric surgery without special precautions or further testing.   current treatment plan is effective, no change in therapy  lab results and schedule of future lab studies reviewed with patient  reviewed diet, exercise and weight control    Encounter Diagnoses   Name Primary?  Tachycardia Yes    Palpitations     Essential hypertension     Obesity, morbid (more than 100 lbs over ideal weight or BMI > 40) (HCC)     Preop cardiovascular exam      No orders of the defined types were placed in this encounter. Follow-up and Dispositions    · Return if symptoms worsen or fail to improve. Capri Plummer MD  6/23/2020  Please note that this dictation was completed with [employer], the computer voice recognition software. Quite often unanticipated grammatical, syntax, homophones, and other interpretive errors are inadvertently transcribed by the computer software. Please disregard these errors. Please excuse any errors that have escaped final proofreading. Thank you.

## 2020-06-23 NOTE — PROGRESS NOTES
Patient will have checked BP prior to VV at 1:00 pm.     Dr. Shey Castelan- please see message regarding heart monitor results.

## 2020-06-26 ENCOUNTER — TELEPHONE (OUTPATIENT)
Dept: CARDIOLOGY CLINIC | Age: 30
End: 2020-06-26

## 2020-06-26 NOTE — TELEPHONE ENCOUNTER
Dr. Srivastava Plain-  Patient's end of service event monitor results are on your desk for review.      Future Appointments   Date Time Provider Mateo Diegoi   7/2/2020  4:00 PM ECHO, [address]   7/23/2020  2:30 PM Bartolome Sage MD RDE [facility] [address] [facility]   8/12/2020  1:20 PM Jefferson Matos MD [address] [place] [address]   8/19/2020 10:00 AM MARTÍNEZ Piedra   8/19/2020 10:30 AM MD Carrington Hugo

## 2020-06-29 NOTE — TELEPHONE ENCOUNTER
Needs echo for htn, tachycardia and preop.  Monitor shows one short episode of rapid heart rate, assymptomatic

## 2020-06-29 NOTE — TELEPHONE ENCOUNTER
Patient is already scheduled for an echo this Thursday 7/2. Patient informed of monitor results by [employer] message.     Future Appointments   Date Time Provider Mateo Christian   7/2/2020  4:00 [facility]   7/23/2020  2:30 PM Neri Rojas MD RDE [facility]   8/12/2020  1:20 PM Maria Luisa Bautista MD [address] [place] [address]   8/19/2020 10:00 AM MARTÍNEZ [place]   8/19/2020 10:30 AM MD Carrington Davenport

## 2020-07-06 NOTE — TELEPHONE ENCOUNTER
Response per Tran White:    Consent:  Yes I consent to enrolling in the [facility] Cessation Treatment program and having my information reviewed by a [facility] pharmacist.    Current Nicotine Products Used: Cigarettes  # Of Cigarettes/day: 10 or fewer cigarettes  Time Of 1st Cigarette: Less than 10 minutes after waking up  Cigar/cigarillo Frequency: Left Blank   Smokeless Tobacco Frequency: Left Blank  Ecig/vaping Frequency: Left Blank  Other Nicotine Product Frequency: Left Blank  Health Conditions: Hypertension/ Anxiety/ Diabetes/ Depression or mood changes  Pregnant: No  Dentures/dental Work: No  Tried Quitting: Yes  Methods Used: Chantix/ Tried quitting cold turkey  Methods products To Try: Chantix/ Try quitting cold turkey  Other Methods products To Try: Left Blank      Methods Patient interested in: Chantix/ Try quitting cold turkey  Mailing address: Kaushal Morris  [place], [address] [place] [address]  Phone number: [phone]

## 2020-07-07 ENCOUNTER — TELEPHONE (OUTPATIENT)
Dept: CARDIOLOGY CLINIC | Age: 30
End: 2020-07-07

## 2020-07-07 NOTE — TELEPHONE ENCOUNTER
Marilyn Duran MD,     Please state your agreement/disagreement - patient requesting smoking cessation product nicotine patches and nicotine gum. Order pended for your convenience if you agree. Thank you,  Abdifatah Garcia, PharmD, [address]  Toll free [phone], option 7  =======================================================  CLINICAL PHARMACY CONSULT: Carlos Elizabeth is a 26 y.o. female currently identified as interested in Chantix through the [email or web address].     OBJECTIVE  Estimated Creatinine Clearance: 166.6 mL/min (by C-G formula based on SCr of 0.71 mg/dL). Social History     Tobacco Use    Smoking status: Former Smoker     Packs/day: 0.50     Years: 5.00     Pack years: 2.50     Last attempt to quit: 2019     Years since quittin.6    Smokeless tobacco: Never Used   Substance Use Topics    Alcohol use:  Yes     Alcohol/week: 0.0 standard drinks     Types: 1 Standard drinks or equivalent per week     Comment: socially, Monthly or less       Responses per [employer] Questionnaire:    Consent:  Yes I consent to enrolling in the [facility] Cessation Treatment program and having my information reviewed by a [facility] pharmacist.     Current Nicotine Products Used: Cigarettes  # Of Cigarettes/day: 10 or fewer cigarettes  Time Of 1st Cigarette: Less than 10 minutes after waking up  Cigar/cigarillo Frequency: Left Blank   Smokeless Tobacco Frequency: Left Blank  Ecig/vaping Frequency: Left Blank  Other Nicotine Product Frequency: Left Blank  Health Conditions: Hypertension/ Anxiety/ Diabetes/ Depression or mood changes  Pregnant: No  Dentures/dental Work: No  Tried Quitting: Yes  Methods Used: Chantix/ Tried quitting cold turkey  Methods products To Try: Chantix/ Try quitting cold turkey  Other Methods products To Try: Left Blank        Methods Patient interested in: Chantix/ Try quitting cold turkey  Mailing address: [id number] [facility], [address] [place] [address]  Phone number: [phone]    ASSESSMENT    Has the patient tried quitting smoking in the past? Yes, has tried Chantix and quitting cold turkey    Called patient for smoking cessation as part of [employer] employee wellness program. Patient tried Chantix in the past and tried for about 3 weeks and did not notice a difference so she quit cold turkey. Patient also was on bupropion in the past and did not work. Patient to try nicotine patches and gum. Patient was counseled on medication use, benefit, cost ($0 up to 180 ds through [facility]), potential ADRs . PLAN    Patient not a candidate for Chantix therapy. Patient to begin nicotine gum PRN and nicotine patches 14mg/24hr daily for 6 weeks then 7mg/24hr for 2 weeks.     Nelly Mishra, PharmD, [address] [facility] [phone], option 7

## 2020-07-07 NOTE — TELEPHONE ENCOUNTER
Called patient. Verified patient's identity with two identifiers. Notified her of results and Dr. Patti Saenz message. Patient verbalized understanding and denied further questions or concerns.

## 2020-07-07 NOTE — TELEPHONE ENCOUNTER
----- Message from Nuris Cameron MD sent at 7/7/2020  8:33 AM EDT -----  Heart muscle is strong and valves all ok.  Looks great

## 2020-07-13 LAB — HBA1C MFR BLD HPLC: 7.8 %

## 2020-07-14 RX ORDER — DIPHENHYDRAMINE HCL 25 MG
4 CAPSULE ORAL AS NEEDED
Qty: 440 EACH | Refills: 4 | Status: SHIPPED | OUTPATIENT
Start: 2020-07-14 | End: 2020-08-12 | Stop reason: ALTCHOICE

## 2020-07-14 RX ORDER — IBUPROFEN 200 MG
1 TABLET ORAL EVERY 24 HOURS
Qty: 42 PATCH | Refills: 0 | Status: SHIPPED | OUTPATIENT
Start: 2020-07-14 | End: 2020-07-21

## 2020-07-14 RX ORDER — NICOTINE 7MG/24HR
1 PATCH, TRANSDERMAL 24 HOURS TRANSDERMAL EVERY 24 HOURS
Qty: 14 PATCH | Refills: 0 | Status: SHIPPED | OUTPATIENT
Start: 2020-07-14 | End: 2020-07-21

## 2020-07-20 NOTE — TELEPHONE ENCOUNTER
Thank you for your response. Patient aware prescriptions have been sent to pharmacy. No further outreach planned at this time.     Juarez Beck, PharmD, [address] [facility] [place] [phone], option 7    CLINICAL PHARMACY CONSULT: MED RECONCILIATION/REVIEW ADDENDUM  For Pharmacy Admin Tracking Only  PHSO: PHSO Patient?: Yes  Total # of Interventions Recommended: Count: 1  - New Order #: 1 New Medication Order Reason(s): Needs Additional Medication Therapy  Recommended intervention potential cost savings: 1  Total Interventions Accepted: 1  Time Spent (min): 30

## 2020-07-21 ENCOUNTER — VIRTUAL VISIT (OUTPATIENT)
Dept: ENDOCRINOLOGY | Age: 30
End: 2020-07-21

## 2020-07-21 DIAGNOSIS — E66.01 CLASS 3 SEVERE OBESITY DUE TO EXCESS CALORIES WITH SERIOUS COMORBIDITY AND BODY MASS INDEX (BMI) OF 60.0 TO 69.9 IN ADULT (HCC): ICD-10-CM

## 2020-07-21 DIAGNOSIS — I10 ESSENTIAL HYPERTENSION: ICD-10-CM

## 2020-07-21 DIAGNOSIS — E78.2 MIXED HYPERLIPIDEMIA: ICD-10-CM

## 2020-07-21 DIAGNOSIS — E11.9 TYPE 2 DIABETES MELLITUS WITHOUT COMPLICATION, WITHOUT LONG-TERM CURRENT USE OF INSULIN (HCC): Primary | ICD-10-CM

## 2020-07-21 RX ORDER — MELOXICAM 15 MG/1
15 TABLET ORAL DAILY
COMMUNITY
Start: 2020-06-26 | End: 2020-09-22

## 2020-07-21 RX ORDER — HYDROXYCHLOROQUINE SULFATE 200 MG/1
200 TABLET, FILM COATED ORAL 2 TIMES DAILY
COMMUNITY
Start: 2020-06-26 | End: 2020-08-19

## 2020-07-21 NOTE — PROGRESS NOTES
Chief Complaint   Patient presents with    Diabetes      Pt will log onto [employer] at 4pm    Other     Pharmacy: [facility]            **THIS IS A VIRTUAL VISIT VIA A VIDEO ENCOUNTER. PATIENT AGREED TO HAVE THEIR CARE DELIVERED OVER VIDEO IN PLACE OF THEIR REGULARLY SCHEDULED OFFICE VISIT**      History of Present Illness: Kenna Maynard is a 26 y.o. female here for follow up of diabetes. She was diagnosed with diabetes \"when I was in high school\". Because pt has features concerning for cushing's her PCP checked a 24 hour urine cortisol which was not elevated (42). She also had a TSH drawn which was 2.10. In March 2016 we tested pt for hyperaldosteronism, which was negative. I tested her for evidence of PCOS, her Testosterone was 14, with DHEA-S 109, her 17-OH Prog was not elevated and an overnight dexamethasone suppression test did suppress her ACTH and cortisol. At our last visit in March 2020 her A1C had improved down to 8.1%. She had lost about 10 pounds. She was encoruaged to continue the Lantus 30 units daily, Ozempic 1.0,g weekly, Metformin 1000mg BID and Glipizide 5mg with dinner. Pt reports that she had her A1C checked last week and it was down to 7.8%. Pt notes she started going back to [facility] in June 2020. She notes that she stopped taking her Lantus because she was going to bed with BGs in the 150s and waking up in the 80's. We agreed that since her BGs were dropping overnight to have her stop the Lantus. In June she saw cardiology \"everything looked good\". In May she had an EGD which showed gastritis and a small hiatal hernia. She is scheduled for the Gastric Sleeve on September 8th. Pt is still in the boot [employer]. She notes her weight is still going down. Today she is at 338 pounds. Pt is still taking the Ozempic 1.0mg weekly, Metformin 1000mg BID and Glipizide 5mg with dinner. She checks her BGs using the DexCom CGM.   She reports her BGs are running in the . She notes that she will have the low BGs in the middle of the night. The high occur if she is eating a high carb meal.  Her BGs are averaging in the 130;s    She is now working day 8A-5P and and working part time at night. She is working from home for her night job. She is also helping out on Saturdays at the [facility] with the covid testing. She wakes around 630AM.  She has breakfast around 7-8AM, this AM she had two boiled eggs, apple sauce and water. She is now working 8AM-5PM and a part-time from [employer].  She has lunch around [place], today she had beef with broccoli, and egg roll and regular soda. She has diner around 6-7PM, before her evening job. Last night she had pot roast, potatoes, and mixed vegetables, cabbage and water. She will snack on chips or sunflower seeds during her evening shift. She is going to bed around 1130PM-MN. She denies any HS snacking. She was perscibed contrave, but it was too expensive so she never took it. She tried phentermine for a month and she notes it did not help and she stopped taking it. No history of vascular disease. No history of neuropathy, or nephropathy. Last eye exam was June 2020, no retinopathy. Current Outpatient Medications   Medication Sig    meloxicam (MOBIC) 15 mg tablet     hydrOXYchloroQUINE (PLAQUENIL) 200 mg tablet     nicotine (NICORETTE) 4 mg gum Take 1 Each by mouth as needed for Smoking Cessation for up to 30 days.  glipiZIDE (GLUCOTROL) 5 mg tablet Take 1 Tab by mouth daily (with dinner).  metFORMIN ER (GLUCOPHAGE XR) 500 mg tablet TAKE 2 TABLETS BY MOUTH WITH BREAKFAST AND TAKE 2 TABLETS WITH DINNER    nystatin (MYCOSTATIN) 100,000 unit/gram ointment Apply  to affected area as needed.  nystatin-triamcinolone (MYCOLOG) 100,000-0.1 unit/gram-% ointment Apply  to affected area as needed for Skin Irritation.     ALPRAZolam (XANAX) 0.5 mg tablet Take 1 Tab by mouth nightly as needed for Anxiety or Sleep. Max Daily Amount: 0.5 mg. Indications: anxious, repeated episodes of anxiety, panic disorder    azelastine (ASTEPRO) 0.15 % (205.5 mcg) 1 Spray by Both Nostrils route nightly.  atorvastatin (LIPITOR) 40 mg tablet TAKE 1 TABLET BY MOUTH EVERY DAY    aspirin delayed-release 81 mg tablet Take 1 Tab by mouth daily.  gabapentin (NEURONTIN) 300 mg capsule Take 1 Cap by mouth three (3) times daily. Max Daily Amount: 900 mg.  spironolactone (ALDACTONE) 25 mg tablet Take 1 Tab by mouth two (2) times a day.  Blood-Glucose Meter,Continuous (Dexcom G6 ) misc Change sensor every 10 days    Blood-Glucose Transmitter (Dexcom G6 Transmitter) nahid Change sensor every 10 days    Blood-Glucose Sensor (Dexcom G6 Sensor) nahid Change sensor every 10 days    metoprolol tartrate (LOPRESSOR) 100 mg IR tablet TAKE 1 TABLET BY MOUTH TWICE A DAY    omeprazole (PRILOSEC) 40 mg capsule TAKE 1 CAP BY MOUTH DAILY.  vilazodone (VIIBRYD) 40 mg tab tablet Take 1 Tab by mouth daily.  semaglutide (OZEMPIC) 1 mg/dose (2 mg/1.5 mL) sub-q pen 1 mg by SubCUTAneous route every seven (7) days.  Insulin Needles, Disposable, (Violet Pen Needle) 32 gauge x 5/32\" ndle One shot daily    fluticasone (FLONASE) 50 mcg/actuation nasal spray 2 Sprays by Both Nostrils route daily.  butalbital-acetaminophen-caffeine (FIORICET) -40 mg per tablet Take 0.5-1 Tabs by mouth every six (6) hours as needed for Headache. Max Daily Amount: 4 Tabs.  albuterol (PROVENTIL HFA, VENTOLIN HFA) 90 mcg/actuation inhaler Take 1 Puff by inhalation every four (4) hours as needed for Wheezing.  albuterol (ACCUNEB) 1.25 mg/3 mL nebulizer solution Take 3 mL by inhalation every six (6) hours as needed for Wheezing.  galcanezumab-gnlm (EMGALITY PEN) 120 mg/mL injection 1 mL by SubCUTAneous route every thirty (30) days. No current facility-administered medications for this visit.       Allergies   Allergen Reactions    Latex Itching     Review of Systems:  - Eyes: no blurry vision or double vision  - Cardiovascular: no chest pain  - Respiratory: no shortness of breath  - Musculoskeletal: no myalgias  - Neurological: no numbness/tingling in extremities    Physical Examination:  There were no vitals taken for this visit. - GENERAL: NCAT, Appears well nourished   - EYES: EOMI, non-icteric, no proptosis   - Ear/Nose/Throat: NCAT, no visible inflammation or masses   - CARDIOVASCULAR: no cyanosis, no visible JVD   - RESPIRATORY: respiratory effort normal without any distress or labored breathing   - MUSCULOSKELETAL: Normal ROM of neck and upper extremities observed   - SKIN: No rash on face   - NEUROLOGIC:  No facial asymmetry (Cranial nerve 7 motor function), No gaze palsy   - PSYCHIATRIC: Normal affect, Normal insight and judgement     Data Reviewed:   A1C 7.8%    Assessment/Plan:   1) DM > Her BGs are continuing to improve and her A1C has improved to 7.8%. She has had some low BGs overnight. Pt to only take the Glipizide with her dinner, and to skip if she has no or a small dinner. Pt to continue the Ozempic 1.0mg weekly and Metformin 1000mg daily. Pt to send me her BG readings in 4 weeks. 2) HTN > Will not make any adjustments to her HTN regimen at this time. 3) HLD > Her lipid panel was at goal in March of 2020. Pt is tolerating the Atorvastatin 40mg daily. 4) Obesity > Pt is back at the boot camp, she is having weight loss and she is scheduled for gastric sleeve on 9/8/20. The Ozempic will help with the continued weight loss as well. Copy sent to:  Mansi Sandoval

## 2020-08-12 ENCOUNTER — VIRTUAL VISIT (OUTPATIENT)
Dept: NEUROLOGY | Age: 30
End: 2020-08-12
Payer: COMMERCIAL

## 2020-08-12 DIAGNOSIS — Z72.0 TOBACCO USE: ICD-10-CM

## 2020-08-12 DIAGNOSIS — G47.33 OSA (OBSTRUCTIVE SLEEP APNEA): ICD-10-CM

## 2020-08-12 DIAGNOSIS — G56.01 CARPAL TUNNEL SYNDROME OF RIGHT WRIST: ICD-10-CM

## 2020-08-12 DIAGNOSIS — G43.009 MIGRAINE WITHOUT AURA AND WITHOUT STATUS MIGRAINOSUS, NOT INTRACTABLE: Primary | ICD-10-CM

## 2020-08-12 DIAGNOSIS — R20.2 PARESTHESIA: ICD-10-CM

## 2020-08-12 DIAGNOSIS — G56.21 ULNAR NEUROPATHY AT ELBOW, RIGHT: ICD-10-CM

## 2020-08-12 PROCEDURE — 99443 PR PHYS/QHP TELEPHONE EVALUATION 21-30 MIN: CPT | Performed by: PSYCHIATRY & NEUROLOGY

## 2020-08-12 NOTE — PROGRESS NOTES
Neurology follow-up note       Chief complaint: Migraine    Subjective  Jessica Meadows is a 26 y.o. female who presented to the neurology office for management of numbness and tingling in the hands and headaches. Regarding the numbness in the hand, it started around mid May 2016 and it is bilateral.  The medial 2 fingers are involved in the right hand and all the fingers are involved in the left hand. It is gradually progressive. She does also have neck pain with radiation to the right shoulder. Her symptoms are off and on and it is more when she is working and improves when her hands and not moving. The numbness and tingling lasts for minutes but the pain around her wrist can be for a few days. She denies any weakness. EMG/nerve conduction study has been normal.  She is taking gabapentin 300 mg p.o. thrice daily and that has helped her. She uses right elbow splint and bilateral hand splints. She uses the splint intermittently. She states that the numbness in the hands are worsening. She does also noticed some weakness in her right hand. .    Regarding the headaches, she has been having migraines for a long time and her headaches starts with pain in the left eye and left side of the face and it is 10 out of 10 in severity, associated with photophobia, phonophobia and nausea but she denies any vomiting. The headache can last from 3 days to week. It is throbbing in character. She could not get it refilled and stopped taking nortritpyline. Her headaches have worsened. She does also have obstructive sleep apnea and she uses CPAP around 5 days in a week. Interval history:  Headaches are doing good with emgality. She is planning to have gastric sleeve surgery. It is on sept 8th, 2020.    She is complaining of right hand numbness which is persistent although slightly better from the past.    Headache frequency baseline: Daily  Headache frequency now: 2-3/month    Risk Factors for headaches  Smoking: Quit Nov 2019  Coffee: Denies cups/day  Tea: 0 cups/day  Soda: 1 cans/day  Water: 4 glasses/day  Sleeps at 9 AM and wakes up at 2 PM to 3 PM.     Medications tried  Abortive  Imitrex    Preventitive  Amitriptyline  Nortriptyline  Metoprolol  Gabapentin    Current Outpatient Medications   Medication Sig    meloxicam (MOBIC) 15 mg tablet     hydrOXYchloroQUINE (PLAQUENIL) 200 mg tablet     metFORMIN ER (GLUCOPHAGE XR) 500 mg tablet TAKE 2 TABLETS BY MOUTH WITH BREAKFAST AND TAKE 2 TABLETS WITH DINNER    nystatin (MYCOSTATIN) 100,000 unit/gram ointment Apply  to affected area as needed.  nystatin-triamcinolone (MYCOLOG) 100,000-0.1 unit/gram-% ointment Apply  to affected area as needed for Skin Irritation.  ALPRAZolam (XANAX) 0.5 mg tablet Take 1 Tab by mouth nightly as needed for Anxiety or Sleep. Max Daily Amount: 0.5 mg. Indications: anxious, repeated episodes of anxiety, panic disorder    atorvastatin (LIPITOR) 40 mg tablet TAKE 1 TABLET BY MOUTH EVERY DAY    aspirin delayed-release 81 mg tablet Take 1 Tab by mouth daily.  gabapentin (NEURONTIN) 300 mg capsule Take 1 Cap by mouth three (3) times daily. Max Daily Amount: 900 mg.  spironolactone (ALDACTONE) 25 mg tablet Take 1 Tab by mouth two (2) times a day.  Blood-Glucose Meter,Continuous (Dexcom G6 ) misc Change sensor every 10 days    Blood-Glucose Transmitter (Dexcom G6 Transmitter) nahid Change sensor every 10 days    Blood-Glucose Sensor (Dexcom G6 Sensor) nahid Change sensor every 10 days    metoprolol tartrate (LOPRESSOR) 100 mg IR tablet TAKE 1 TABLET BY MOUTH TWICE A DAY    omeprazole (PRILOSEC) 40 mg capsule TAKE 1 CAP BY MOUTH DAILY.  vilazodone (VIIBRYD) 40 mg tab tablet Take 1 Tab by mouth daily.  semaglutide (OZEMPIC) 1 mg/dose (2 mg/1.5 mL) sub-q pen 1 mg by SubCUTAneous route every seven (7) days.     Insulin Needles, Disposable, (Violet Pen Needle) 32 gauge x 5/32\" ndle One shot daily    galcanezumab-[facility] (EMGALITY PEN) 120 mg/mL injection 1 mL by SubCUTAneous route every thirty (30) days.  butalbital-acetaminophen-caffeine (FIORICET) -40 mg per tablet Take 0.5-1 Tabs by mouth every six (6) hours as needed for Headache. Max Daily Amount: 4 Tabs.  albuterol (PROVENTIL HFA, VENTOLIN HFA) 90 mcg/actuation inhaler Take 1 Puff by inhalation every four (4) hours as needed for Wheezing.  albuterol (ACCUNEB) 1.25 mg/3 mL nebulizer solution Take 3 mL by inhalation every six (6) hours as needed for Wheezing. No current facility-administered medications for this visit. REVIEW OF SYSTEMS:   A ten system review of constitutional, cardiovascular, respiratory, musculoskeletal, endocrine, skin, SHEENT, genitourinary, psychiatric and neurologic systems was obtained and is unremarkable except as stated in HPI     EXAMINATION:   There were no vitals taken for this visit.      Telephone encounter    Laboratory review:   Results for orders placed or performed in visit on 07/02/20   ECHO ADULT COMPLETE   Result Value Ref Range    IVSd 1.03 (A) 0.6 - 0.9 cm    LVIDd 4.10 3.9 - 5.3 cm    LVIDs 2.87 cm    LVPWd 1.20 (A) 0.6 - 0.9 cm    BP EF 55.4 55 - 100 percent    LV Ejection Fraction MOD 2C 60 percent    LV Ejection Fraction MOD 4C 53 percent    LV ED Vol A2C 72.75 mL    LV ED Vol A4C 122.17 mL    LV ED Vol BP 96.87 56 - 104 mL    LV ES Vol A2C 28.80 mL    LV ES Vol A4C 56.95 mL    LV ES Vol BP 43.22 19 - 49 mL    LVOT Peak Gradient 4.43 mmHg    MV Mean Gradient 2.61 mmHg    LVOT Peak Velocity 105.26 cm/s    LVOT VTI 20.23 cm    RVIDd 3.43 cm    TAPSV 12.1 cm/s    Left Atrium Major Axis 3.76 cm    LA Area 4C 14.69 cm2    LA Vol 2C 35.98 22 - 52 mL    LA Vol 4C 34.49 22 - 52 mL    LA Volume 35.80 22 - 52 mL    Right Atrium Major Axis 3.50 cm    AoV PG 9.62 mmHg    Aortic Valve Systolic Mean Gradient 2.65 mmHg    Aortic Valve Systolic Peak Velocity 619.68 cm/s    AoV VTI 30.51 cm    MV A Wilner 92.56 cm/s    Mitral Valve E Wave Deceleration Time 0.24 s    MV E Wilner 109.53 cm/s    E/E' ratio (averaged) 13.04     E/E' lateral 12.40     E/E' septal 13.67     LV E' Lateral Velocity 8.83 cm/s    LV E' Septal Velocity 8.01 cm/s    Mitral Valve Pressure Half-time 0.07 s    MVA (PHT) 3.15 cm2    Tapse 1.92 1.5 - 2.0 cm    Ao Root D 2.79 cm    MV E/A 1.18     LV Mass .3 67 - 162 g    LV Mass AL Index 63.8 43 - 95 g/m2    LVES Vol Index BP 17.9 mL/m2    LVED Vol Index BP 40.1 mL/m2    Left Atrium Minor Axis 1.56 cm    LA Vol Index 14.81 16 - 28 ml/m2    LA Vol Index 14.89 16 - 28 ml/m2    LA Vol Index 14.27 16 - 28 ml/m2    LVED Vol Index A4C 50.5 mL/m2    LVED Vol Index A2C 30.1 mL/m2    LVES Vol Index A4C 23.6 mL/m2    LVES Vol Index A2C 11.9 mL/m2     Laboratory review:  12/20/2016  Glucose 352, potassium 5.3, platelet 307, YQS1I 9.3    Imaging review:  7/27/2014  CT soft tissue neck with contrast   No evidence of neck mass, abscess or lymphadenopathy    3/16/2017  EMG/nerve conduction study  Nerve conduction studies of the bilateral upper extremities were unremarkable. Needle electrode examination of the right upper extremity was unremarkable. This is a normal study. There is no electrophysiological evidence of median neuropathy at the wrist or and ulnar neuropathy at the elbow on either side. There is no electrophysiological evidence of right cervical radiculopathy. 4/14/2017  MRI of the brain without contrast  Normal    MRI cervical spine  Straightening of the usual cervical lordosis but otherwise normal study    Documentation review:  None    Assessment/Plan:   Faheem Gil is a 26 y.o. female who presented to the neurology office for management of migraines and clinically does have right ulnar neuropathy and left carpal tunnel syndrome. She does also have obstructive sleep apnea and quit smoking. Her EMG/nerve conduction study has been normal. She states that the numbness is worsening.  She clinically does have right ulnar neuropathy and left carpal tunnel syndrome. She does also use right elbow splint and bilateral hand splints intermittently. Continue to use splints. Will repeat EMG/nerve conduction study to check for right carpal tunnel syndrome and ulnar neuropathy. Last EMG was more than 3 years ago. She states that the gabapentin has helped her with the paresthesia in her hands. She is taking gabapentin 300 mg p.o. 2-3 times daily. For her headaches, she has tried amitriptyline, nortriptyline, metoprolol and gabapentin. She is now on Emgality and that has helped her significantly. We will continue the same . She does also have obstructive sleep apnea. Follow-up in 3 months      ICD-10-CM ICD-9-CM    1. Migraine without aura and without status migrainosus, not intractable  G43.009 346.10    2. Carpal tunnel syndrome of right wrist  G56.01 354.0    3. Ulnar neuropathy at elbow, right  G56.21 354.2    4. Tobacco use  Z72.0 305.1    5. PETE (obstructive sleep apnea)  G47.33 327.23    6. Paresthesia  R20.2 782.0        Thank you for allowing me to participate in the care of Ms. Gomez. Please feel free to contact me if you have any questions. Britney Rosa MD  Neurologist and Clinical Neurophysiologist    CC: Joy Smith MD  Fax: [phone]    This note will not be viewable in [address] Jessica Meadows is a 26 y.o. female evaluated via telephone on 8/12/2020. Consent:  She and/or health care decision maker is aware that that she may receive a bill for this telephone service, depending on her insurance coverage, and has provided verbal consent to proceed: Yes    Documentation:  I communicated with the patient and/or health care decision maker about his presenting symptoms.    Details of this discussion including any medical advice provided: See notes      I affirm this is a Patient Initiated Episode with an Established Patient who has not had a related appointment within my department in the past 7 days or scheduled within the next 24 hours.     Total Time: minutes: 21-30 minutes    Note: not billable if this call serves to triage the patient into an appointment for the relevant concern

## 2020-08-13 ENCOUNTER — HOSPITAL ENCOUNTER (OUTPATIENT)
Dept: PREADMISSION TESTING | Age: 30
Discharge: HOME | End: 2020-08-13
Payer: COMMERCIAL

## 2020-08-13 ENCOUNTER — HOSPITAL ENCOUNTER (OUTPATIENT)
Dept: GENERAL RADIOLOGY | Age: 30
Discharge: HOME | End: 2020-08-13
Attending: SURGERY
Payer: COMMERCIAL

## 2020-08-13 VITALS
HEART RATE: 75 BPM | WEIGHT: 293 LBS | DIASTOLIC BLOOD PRESSURE: 76 MMHG | HEIGHT: 64 IN | TEMPERATURE: 98.3 F | OXYGEN SATURATION: 99 % | RESPIRATION RATE: 20 BRPM | BODY MASS INDEX: 50.02 KG/M2 | SYSTOLIC BLOOD PRESSURE: 115 MMHG

## 2020-08-13 DIAGNOSIS — E66.9 DIABETES MELLITUS TYPE 2 IN OBESE (HCC): ICD-10-CM

## 2020-08-13 DIAGNOSIS — Z01.818 PRE-OPERATIVE CLEARANCE: ICD-10-CM

## 2020-08-13 DIAGNOSIS — E11.69 DIABETES MELLITUS TYPE 2 IN OBESE (HCC): ICD-10-CM

## 2020-08-13 DIAGNOSIS — E66.01 MORBID OBESITY (HCC): Primary | ICD-10-CM

## 2020-08-13 DIAGNOSIS — E66.01 MORBID OBESITY (HCC): ICD-10-CM

## 2020-08-13 LAB
APPEARANCE UR: CLEAR
ATRIAL RATE: 76 BPM
BACTERIA URNS QL MICRO: ABNORMAL /HPF
BILIRUB UR QL: NEGATIVE
CALCULATED P AXIS, ECG09: 18 DEGREES
CALCULATED R AXIS, ECG10: 77 DEGREES
CALCULATED T AXIS, ECG11: 40 DEGREES
COLOR UR: ABNORMAL
DIAGNOSIS, 93000: NORMAL
EPITH CASTS URNS QL MICRO: ABNORMAL /LPF
GLUCOSE UR STRIP.AUTO-MCNC: NEGATIVE MG/DL
HGB UR QL STRIP: NEGATIVE
KETONES UR QL STRIP.AUTO: NEGATIVE MG/DL
LEUKOCYTE ESTERASE UR QL STRIP.AUTO: NEGATIVE
NITRITE UR QL STRIP.AUTO: NEGATIVE
P-R INTERVAL, ECG05: 152 MS
PH UR STRIP: 5 [PH] (ref 5–8)
PROT UR STRIP-MCNC: NEGATIVE MG/DL
Q-T INTERVAL, ECG07: 384 MS
QRS DURATION, ECG06: 86 MS
QTC CALCULATION (BEZET), ECG08: 432 MS
RBC #/AREA URNS HPF: ABNORMAL /HPF (ref 0–5)
SP GR UR REFRACTOMETRY: 1.02 (ref 1–1.03)
UA: UC IF INDICATED,UAUC: ABNORMAL
UROBILINOGEN UR QL STRIP.AUTO: 0.2 EU/DL (ref 0.2–1)
VENTRICULAR RATE, ECG03: 76 BPM
WBC URNS QL MICRO: ABNORMAL /HPF (ref 0–4)

## 2020-08-13 PROCEDURE — 93005 ELECTROCARDIOGRAM TRACING: CPT

## 2020-08-13 PROCEDURE — 71046 X-RAY EXAM CHEST 2 VIEWS: CPT

## 2020-08-13 PROCEDURE — 81001 URINALYSIS AUTO W/SCOPE: CPT

## 2020-08-13 NOTE — PROGRESS NOTES
PATs unable to obtain labs and patient said she could go to [employer] orders placed for pre op with [employer]

## 2020-08-13 NOTE — PERIOP NOTES
PATIENT GIVEN 2 BOTTLES OF 6 CHLORHEXIDINE SOLUTION  WITH INSTRUCTIONS BOTH WRITTEN AND VERBAL FOR THEIR USE. PATIENT VOICED UNDERSTANDING OF SAME AND HAD ALL QUESTIONS ADDRESSED AND ANSWERED TO THEIR SATISFACTION. PATIENT ALSO GIVEN PRE-OP INSTRUCTIONS AND VOICED UNDERSTANDING OF SAME. ERAS BOOKLET REVIEWED AND PATIENT APPEARED TO HAVE A GOOD UNDERSTANDING OF INSTRUCTIONS. DENIED ANY QUESTIONS AT THIS TIME.

## 2020-08-14 ENCOUNTER — TELEPHONE (OUTPATIENT)
Dept: SURGERY | Age: 30
End: 2020-08-14

## 2020-08-14 NOTE — TELEPHONE ENCOUNTER
Confirmed with Susan order placed yesterday for patient to get lab draw at [facility]. Manuel Vincenton 20 confirmed they were able to draw labs TSH 3rd generation added per NP.  Tomasz Zepeda will fax over auth sheet to be signed and faxed back. If they are unable to complete study they will follow up with our office.

## 2020-08-14 NOTE — TELEPHONE ENCOUNTER
----- Message from Jayne Barker RN sent at 2020 11:11 AM EDT -----  Bernabe Panda  1990 came to PAT today and we were unable to get her lab draw. She said she would prefer to go to [facility] to have her lab drawn on a day when she is better hydrated. She did not eat or drink anything today because she ran late for appointment. My # in this office is [phone]. If you could leave me a message. I'm off for the next few days and I won't be checking [facility] for messages. Just leave a message on the phone and whoever is in here tomorrow can follow-up if your message comes back after I Willy. We could set her up another PAT appointment for just lab but she seems to cant to go to [facility].  Thanks, Darcie  We got her urine, EKG and she will go for Chest Xray.

## 2020-08-16 ENCOUNTER — HOSPITAL ENCOUNTER (EMERGENCY)
Age: 30
Discharge: HOME | End: 2020-08-16
Attending: EMERGENCY MEDICINE
Payer: COMMERCIAL

## 2020-08-16 ENCOUNTER — NURSE TRIAGE (OUTPATIENT)
Dept: OTHER | Facility: CLINIC | Age: 30
End: 2020-08-16

## 2020-08-16 VITALS
TEMPERATURE: 98.2 F | HEART RATE: 98 BPM | DIASTOLIC BLOOD PRESSURE: 77 MMHG | RESPIRATION RATE: 18 BRPM | SYSTOLIC BLOOD PRESSURE: 119 MMHG | WEIGHT: 293 LBS | HEIGHT: 63 IN | BODY MASS INDEX: 51.91 KG/M2 | OXYGEN SATURATION: 100 %

## 2020-08-16 DIAGNOSIS — M76.32 ILIOTIBIAL BAND SYNDROME OF LEFT SIDE: Primary | ICD-10-CM

## 2020-08-16 PROCEDURE — 99282 EMERGENCY DEPT VISIT SF MDM: CPT

## 2020-08-16 NOTE — ED PROVIDER NOTES
EMERGENCY DEPARTMENT HISTORY AND PHYSICAL EXAM    Please note that this dictation was completed with [employer]OLLEGE, the computer voice recognition software. Quite often unanticipated grammatical, syntax, homophones, and other interpretive errors are inadvertently transcribed by the computer software. Please disregard these errors. Please excuse any errors that have escaped final proofreading. Date: 8/16/2020  Patient Name: Dimas Diaz  Patient Age and Sex: 26 y.o. female    History of Presenting Illness     Chief Complaint   Patient presents with    Hip Pain       History Provided By: Patient    HPI: Dimas Diaz, is a 26 y.o. female whose medical history is noted below and includes obesity, arthritis, presents to the ED with left hip pain that is radiating toward her left knee. The start her on her gluteus, radiates down along the lateral side of her left thigh and stops just above the left knee. No history of any trauma. No history of any unusual activity or overexertion that she can remember. The pain is dull and constant. Worse with movement such as bending forward to touch her toes or going up and down stairs. Generally rest makes it better. She is still able to ambulate and has a full range of motion of her hip, knee and ankle. The pain was not severe initially but over the course of the day today became moderate to severe. She is starting a new job tomorrow and wanted to make sure that things okay. Pt denies any other alleviating or exacerbating factors. No other associated signs or symptoms. There are no other complaints, changes or physical findings at this time.      PCP: Mac Sanchez MD    Past History   All documented elements of the [address] reviewed and verified by me. -Joshua Carr MD    Past Medical History:  Past Medical History:   Diagnosis Date    Arrhythmia     tachycardia    Asthma     Schuyler hum 1/7/2015    Carpal tunnel syndrome of right wrist 12/20/2016    Diabetes ([facility] [address]  ALIYA (generalized anxiety disorder) 2018    GERD (gastroesophageal reflux disease) 2014    HTN (hypertension) 2011    Hyperaldosteronism ([facility] [address] 10/19/2015    Hyperhidrosis 2018    Morbid obesity ([facility] [address]     Other ill-defined conditions(799.89)     migraines    Palpitation 2018    Sleep apnea     uses CPAP    Tachycardia 2018       Past Surgical History:  Past Surgical History:   Procedure Laterality Date    HX TONSILLECTOMY      SC REMOVAL OF TONSILS,<11 Y/O         Family History:  Family History   Problem Relation Age of Onset    Hypertension Mother     Diabetes Mother         Type II    Anesth Problems Mother         respiratory issues - feels like she cannot breath when coming out of anesthesia    Psychiatric Disorder Father     Drug Abuse Father     Cancer Other         prostate    Hypertension Sister     Thyroid Disease Sister         \"I think\"    Breast Cancer Sister     Attention Deficit Hyperactivity Disorder Brother     Hypertension Sister        Social History:  Social History     Tobacco Use    Smoking status: Former Smoker     Packs/day: 0.50     Years: 5.00     Pack years: 2.50     Last attempt to quit: 2019     Years since quittin.7    Smokeless tobacco: Never Used   Substance Use Topics    Alcohol use: Yes     Alcohol/week: 1.0 standard drinks     Types: 1 Shots of liquor, 1 Standard drinks or equivalent per week     Comment: socially, Monthly or less    Drug use: No       Allergies:   Allergies   Allergen Reactions    Latex Itching       Review of Systems   All other systems reviewed and negative  Constitutional: No fever, normal appetite  Eyes: No eye pain or vision changes  ENT: No congestion, no sore throat   Cardiovascular: No chest pain, no palpitations  Respiratory: No cough or SOB  Gastrointestinal: No vomiting or diarrhea, no abdominal pain  Genitourinary: No dysuria or hematuria  Musculoskeletal: No hip pain or swelling, tenderness along lateral aspect of left thigh mainly. Full range of motion in knee. No swelling  Hematologic/Lymphatic: No palpable or tender lymphadenopathy, no bleeding tendency  Neurological: No numbness no focal weakness  Psychiatric: Not SI/HI, no auditory or visual hallucinations    Physical Exam   Reviewed patients vital signs and nursing note  Constitutional: alert, no acute distress  Eyes: EOMI, normal conjunctiva, PERRLA  ENT: moist mucous membranes, no nasal congestion  Neck: Active, full ROM of neck, no tenderness to palpation   Skin: No rashes, no ecchymosis          Respiratory: Equal chest expansion. Normal work of breathing. Lungs clear to auscultation. Cardiovascular: Regular rate and rhythm. Equal and normal pulses in all extremities, no peripheral edema    Gastrointestinal: abdomen non distended, soft, not tender  MSK: Full, active ROM in all 4 extremities, no midline back pain, tenderness along the iliotibial band of left leg. Neurologic: alert and oriented at patient's baseline, normal speech; no unilateral or focal weakness  Psych: Cooperative with exam; Appropriate mood and affect       Diagnostic Study Results     Labs - I have personally reviewed and interpreted all laboratory results. Oswald Zimmerman MD, MSc  No results found for this or any previous visit (from the past 24 hour(s)). Radiologic Studies - I have personally reviewed and interpreted all imaging studies and agree with radiology interpretation and report. - Oswald Zimmerman MD, MSc  No orders to display         Medical Decision Making   I am the first provider for this patient. Records Reviewed: I reviewed our electronic medical record system for any past medical records that were available that may contribute to the patient's current condition, including their PMH, surgical history, social and family history. Reviewed the nursing notes and vital signs from today's visit.  Nursing notes will be reviewed as they become available in realtime while the pt has been in the ED. Denise Leigh MD Msc    Vital Signs-Reviewed the patient's vital signs. Patient Vitals for the past 24 hrs:   Temp Pulse Resp BP SpO2   08/16/20 1941 98.2 °F (36.8 °C) 98 18 119/77 100 %       Provider Notes (Medical Decision Making):   Overall well-appearing 24-year-old female patient presents with pain along her iliotibial band. Started yesterday, has been progressing in severity. Physical exam does not suggest any joint pathology in her left hip or knee. These exams are normal.  Also no midline back pain. At this point, I recommended treatment with stretching exercises and NSAIDs. Would recommend getting a roller to help with the stretching. If not better in 3 to 4 days, may need assistance with physical therapy. She has a primary care doctor and will follow up with them if pain has not improved in 3 to 4 days. Return precautions discussed. ED Course:   Initial assessment performed. The patients presenting problems have been discussed, and they are in agreement with the care plan formulated and outlined with them. I have encouraged them to ask questions as they arise throughout their visit. Progress note:  Patient has been reassessed and reports feeling considerably better, has normal vital signs and feels comfortable going home. I think this is reasonable as no findings today suggest a life-threatening condition. DISPOSITION: DISCHARGE  The patient's results have been reviewed with patient and available family and/or caregiver. They verbally convey their understanding and agreement of the patient's signs, symptoms, diagnosis, treatment and prognosis and additionally agree to follow up as recommended in the discharge instructions or to return to the Emergency Department should the patient's condition change prior to their follow-up appointment.    The patient and available family and/or caregiver verbally agree with the care plan and all of their questions have been answered. The discharge instructions have also been provided to the them with educational information regarding the patient's diagnosis as well a list of reasons why the patient would want to return to the ER prior to their follow-up appointment should any concerns arise, the patient's condition change or symptoms worsen. Smitha Jackson MD, Msc    PLAN:  Discharge Medication List as of 8/16/2020  8:24 PM      1.   2.     Follow-up Information     Follow up With Specialties Details Why Contact Info    Zo Merchant MD Family Medicine Call in 3 days if not better or if symptoms worsen [address] [place] [address]       [facility] EMERGENCY DEPT Emergency Medicine  As needed [facility]  [phone]        3. Return to ED if worse       I, Joseph Diggs MD, am the attending of record for this patient encounter. Diagnosis     Clinical Impression:   1. Iliotibial band syndrome of left side        Attestation:  I personally performed the services described in this documentation on this date 8/16/2020 for patient Nando Morales.   Smitha Jackson MD

## 2020-08-16 NOTE — ED TRIAGE NOTES
Pt reports chronic left hip pain. Pt reports worsening hip pain x last night. Pt reports hx rheumatoid arthritis.

## 2020-08-16 NOTE — TELEPHONE ENCOUNTER
Reason for Disposition   [1] SEVERE pain (e.g., excruciating, unable to do any normal activities) AND [2] not improved after 2 hours of pain medicine    Answer Assessment - Initial Assessment Questions  1. LOCATION and RADIATION: \"Where is the pain located? \"       Left hip radiating down left left  2. QUALITY: \"What does the pain feel like? \"  (e.g., sharp, dull, aching, burning)     sharp  3. SEVERITY: \"How bad is the pain? \" \"What does it keep you from doing? \"   (Scale 1-10; or mild, moderate, severe)    -  MILD (1-3): doesn't interfere with normal activities     -  MODERATE (4-7): interferes with normal activities (e.g., work or school) or awakens from sleep, limping     -  SEVERE (8-10): excruciating pain, unable to do any normal activities, unable to walk      severe  4. ONSET: \"When did the pain start? \" \"Does it come and go, or is it there all the time? \"      8/15/20 constant  5. WORK OR EXERCISE: \"Has there been any recent work or exercise that involved this part of the body? \"       No  6. CAUSE: \"What do you think is causing the hip pain? \"       unknown  7. AGGRAVATING FACTORS: \"What makes the hip pain worse? \" (e.g., walking, climbing stairs, running)      moving  8. OTHER SYMPTOMS: \"Do you have any other symptoms? \" (e.g., back pain, pain shooting down leg,  fever, rash)      Pain shooting down leg    Protocols used: HIP PAIN-ADULT-    Patient called in via the employee health benefit Line to report having symptoms of left hip pain. Pt states pain has been intermittent for a while but started yesterday as constant. Rates the pain as severe and sharp. Pt has tried prescribed pain medication with no relief. Patient informed of disposition. Care advice as documented. Instructed patient to call back with worsening symptoms. Patient verbalized understanding and denies any further questions/concerns. Please do not respond to the triage nurse through this encounter.   Any subsequent communication should be directly with the patient.

## 2020-08-17 ENCOUNTER — TELEPHONE (OUTPATIENT)
Dept: SURGERY | Age: 30
End: 2020-08-17

## 2020-08-17 LAB
25(OH)D3+25(OH)D2 SERPL-MCNC: 15.8 NG/ML (ref 30–100)
ALBUMIN SERPL-MCNC: 4.1 G/DL (ref 3.8–4.8)
ALBUMIN/GLOB SERPL: 1.5 {RATIO} (ref 1.2–2.2)
ALP SERPL-CCNC: 86 IU/L (ref 39–117)
ALT SERPL-CCNC: 17 IU/L (ref 0–32)
AST SERPL-CCNC: 17 IU/L (ref 0–40)
BILIRUB SERPL-MCNC: 0.3 MG/DL (ref 0–1.2)
BUN SERPL-MCNC: 10 MG/DL (ref 6–20)
BUN/CREAT SERPL: 13 (ref 9–23)
CALCIUM SERPL-MCNC: 9.6 MG/DL (ref 8.7–10.2)
CHLORIDE SERPL-SCNC: 101 MMOL/L (ref 96–106)
CO2 SERPL-SCNC: 22 MMOL/L (ref 20–29)
CREAT SERPL-MCNC: 0.8 MG/DL (ref 0.57–1)
ERYTHROCYTE [DISTWIDTH] IN BLOOD BY AUTOMATED COUNT: 13.9 % (ref 11.7–15.4)
GLOBULIN SER CALC-MCNC: 2.7 G/DL (ref 1.5–4.5)
GLUCOSE SERPL-MCNC: 122 MG/DL (ref 65–99)
HCT VFR BLD AUTO: 39.3 % (ref 34–46.6)
HGB BLD-MCNC: 12 G/DL (ref 11.1–15.9)
IRON SERPL-MCNC: 44 UG/DL (ref 27–159)
MCH RBC QN AUTO: 26.3 PG (ref 26.6–33)
MCHC RBC AUTO-ENTMCNC: 30.5 G/DL (ref 31.5–35.7)
MCV RBC AUTO: 86 FL (ref 79–97)
PLATELET # BLD AUTO: 402 X10E3/UL (ref 150–450)
POTASSIUM SERPL-SCNC: 5.1 MMOL/L (ref 3.5–5.2)
PROT SERPL-MCNC: 6.8 G/DL (ref 6–8.5)
RBC # BLD AUTO: 4.56 X10E6/UL (ref 3.77–5.28)
SODIUM SERPL-SCNC: 140 MMOL/L (ref 134–144)
WBC # BLD AUTO: 9.5 X10E3/UL (ref 3.4–10.8)

## 2020-08-17 NOTE — PROGRESS NOTES
Ms. Elías Bruno - I have reviewed your labs and your Vitamin D was low. Your level was 15 and normal is . You have been lower in the past.   You are meeting with Ignacio Menendez on Wednesday and she will discuss your labs with you. I recommend a prescription Vit D if you are not already on one. Ignacio Menendez can prescribe for you when she sees you on Wednesday.   See you soon - CORRINE Pillai

## 2020-08-17 NOTE — TELEPHONE ENCOUNTER
Fredis Fitzgerald has an appointment to see Dr. Nav Whitt on Wednesday and wants to know if she can bring her mother with her since she will be signing for her surgery. Please let her know.

## 2020-08-17 NOTE — DISCHARGE INSTRUCTIONS
Patient Education        Iliotibial Band Syndrome: Care Instructions  Your Care Instructions  Iliotibial band syndrome is pain and swelling of the iliotibial band (also called the IT band). This is a band of tissue that runs down the outside of your thigh. It connects the side of your hip to the side of your knee. It helps keep your knee and hip stable and in their normal position. When you have IT band syndrome, you may feel pain on the outside of your hip. It happens as your IT band snaps back and forth over the bony point of your hip. Sometimes you may only feel pain on the outside of your knee. You can get this syndrome if the IT band is too tight or if you do certain activities over and over that put pressure on your hip or knee. This is a common problem in runners, cyclists, and people who do other aerobic activities. IT band syndrome is treated with rest and medicines. These relieve swelling and pain. Physical therapy is also used. It may include stretching or doing certain exercises that can help strengthen your IT band and hip muscles. Sometimes a steroid shot is given to help relieve pain at the spot that is most sore. Follow-up care is a key part of your treatment and safety. Be sure to make and go to all appointments, and call your doctor if you are having problems. It's also a good idea to know your test results and keep a list of the medicines you take. How can you care for yourself at home? · Stay at a healthy weight. Being overweight puts extra strain on your hip and knee joints. · Take pain medicines exactly as directed. ? If the doctor gave you a prescription medicine for pain, take it as prescribed. ? If you are not taking a prescription pain medicine, ask your doctor if you can take an over-the-counter medicine. · Talk to your doctor or physical therapist about exercises that will help ease hip and knee pain. ? Stretch before you exercise. This can help prevent stiffness and injury. You can try gentle forms of yoga to help keep your joints and muscles flexible. ? Use exercises that are less stressful on the joints. Walk instead of jog. Ride a stationary bike with little resistance. Or you can swim or try water exercise. ? Do exercises that can help strengthen your IT band and hip muscles. Your doctor or physical therapist can tell you what kind of exercises are best for you. He or she can help you learn the right way to do the exercises. When should you call for help? Watch closely for changes in your health, and be sure to contact your doctor if:  · You have pain in your hip or knee that doesn't go away. · You do not get better as expected. Where can you learn more? Go to http://donavonOlaworksstacy.info/  Enter L449 in the search box to learn more about \"Iliotibial Band Syndrome: Care Instructions. \"  Current as of: March 2, 2020               Content Version: 12.5  © 7488-7089 [employer]. Care instructions adapted under license by [employer] (which disclaims liability or warranty for this information). If you have questions about a medical condition or this instruction, always ask your healthcare professional. [place] [address] any warranty or liability for your use of this information. Patient Education        Iliotibial Band Syndrome: Exercises  Introduction  Here are some examples of exercises for you to try. The exercises may be suggested for a condition or for rehabilitation. Start each exercise slowly. Ease off the exercises if you start to have pain. You will be told when to start these exercises and which ones will work best for you. How to do the exercises  Iliotibial band stretch   1. Lean sideways against a wall. If you are not steady on your feet, hold on to a chair or counter. 2. Stand on the leg with the affected hip, with that leg close to the wall. Then cross your other leg in front of it.   3. Let your affected hip drop out to the side of your body and against the wall. Then lean away from your affected hip until you feel a stretch. 4. Hold the stretch for 15 to 30 seconds. 5. Repeat 2 to 4 times. Piriformis stretch   1. Lie on your back with your legs straight. 2. Lift your affected leg and bend your knee. With your opposite hand, reach across your body, and then gently pull your knee toward your opposite shoulder. 3. Hold the stretch for 15 to 30 seconds. 4. Repeat 2 to 4 times. Hamstring wall stretch   1. Lie on your back in a doorway, with your good leg through the open door. 2. Slide your affected leg up the wall to straighten your knee. You should feel a gentle stretch down the back of your leg. 3. Hold the stretch for at least 1 minute to begin. Then try to lengthen the time you hold the stretch to as long as 6 minutes. 4. Repeat 2 to 4 times. 5. If you do not have a place to do this exercise in a doorway, there is another way to do it:  6. Lie on your back, and bend the knee of your affected leg. 7. Loop a towel under the ball and toes of that foot, and hold the ends of the towel in your hands. 8. Straighten your knee, and slowly pull back on the towel. You should feel a gentle stretch down the back of your leg. 9. Hold the stretch for 15 to 30 seconds. Or even better, hold the stretch for 1 minute if you can. 10. Repeat 2 to 4 times. 1. Do not arch your back. 2. Do not bend either knee. 3. Keep one heel touching the floor and the other heel touching the wall. Do not point your toes. Follow-up care is a key part of your treatment and safety. Be sure to make and go to all appointments, and call your doctor if you are having problems. It's also a good idea to know your test results and keep a list of the medicines you take. Where can you learn more?   Go to http://donavon-stacy.info/  Enter P252 in the search box to learn more about \"Iliotibial Band Syndrome: Exercises. \"  Current as of: March 2, 2020               Content Version: 12.5  © 3260-8459 [employer]. Care instructions adapted under license by [employer] (which disclaims liability or warranty for this information). If you have questions about a medical condition or this instruction, always ask your healthcare professional. [place] [address] any warranty or liability for your use of this information.

## 2020-08-17 NOTE — ED NOTES
Discharge instructions were given to the patient by Kindra Hooker RN. The patient left the Emergency Department ambulatory, alert and oriented and in no acute distress with 0 prescriptions. The patient was encouraged to call or return to the ED for worsening issues or problems and was encouraged to schedule a follow up appointment for continuing care. The patient verbalized understanding of discharge instructions and prescriptions, all questions were answered. The patient has no further concerns at this time.

## 2020-08-17 NOTE — ED NOTES
Patient here with c/o left hip pain. Patient reports hx of chronic pain in left hip, states pain worsened without known cause as of last night. Patient reports pain continued into today. Patient denies fall or injury. Patient denies hx of surgery. Patient denies swelling or redness to left leg. Patient pulses and sensation and perfusion normal in LLE. Emergency Department Nursing Plan of Care       The Nursing Plan of Care is developed from the Nursing assessment and Emergency Department Attending provider initial evaluation. The plan of care may be reviewed in the ED Provider note.     The Plan of Care was developed with the following considerations:   Patient / Family readiness to learn indicated by:verbalized understanding  Persons(s) to be included in education: patient  Barriers to Learning/Limitations:No    Signed     Evie Phelps RN    8/16/2020   8:05 PM

## 2020-08-17 NOTE — TELEPHONE ENCOUNTER
Spoke with patient and advised she can face time her mother during her appointment but she will not be allowed in do to covid restrictions

## 2020-08-19 ENCOUNTER — OFFICE VISIT (OUTPATIENT)
Dept: SURGERY | Age: 30
End: 2020-08-19
Payer: COMMERCIAL

## 2020-08-19 VITALS
WEIGHT: 293 LBS | OXYGEN SATURATION: 98 % | BODY MASS INDEX: 51.91 KG/M2 | SYSTOLIC BLOOD PRESSURE: 130 MMHG | RESPIRATION RATE: 20 BRPM | TEMPERATURE: 97.5 F | HEART RATE: 92 BPM | HEIGHT: 63 IN | DIASTOLIC BLOOD PRESSURE: 80 MMHG

## 2020-08-19 VITALS
SYSTOLIC BLOOD PRESSURE: 130 MMHG | OXYGEN SATURATION: 98 % | RESPIRATION RATE: 20 BRPM | HEIGHT: 63 IN | HEART RATE: 92 BPM | TEMPERATURE: 97.5 F | WEIGHT: 293 LBS | BODY MASS INDEX: 51.91 KG/M2 | DIASTOLIC BLOOD PRESSURE: 80 MMHG

## 2020-08-19 DIAGNOSIS — G47.33 OBSTRUCTIVE SLEEP APNEA SYNDROME: ICD-10-CM

## 2020-08-19 DIAGNOSIS — K44.9 HIATAL HERNIA: ICD-10-CM

## 2020-08-19 DIAGNOSIS — K21.9 GASTROESOPHAGEAL REFLUX DISEASE, ESOPHAGITIS PRESENCE NOT SPECIFIED: ICD-10-CM

## 2020-08-19 DIAGNOSIS — E66.01 MORBID OBESITY WITH BMI OF 50.0-59.9, ADULT (HCC): Primary | ICD-10-CM

## 2020-08-19 DIAGNOSIS — E11.8 CONTROLLED DIABETES MELLITUS TYPE 2 WITH COMPLICATIONS, UNSPECIFIED WHETHER LONG TERM INSULIN USE (HCC): ICD-10-CM

## 2020-08-19 DIAGNOSIS — I10 ESSENTIAL HYPERTENSION: ICD-10-CM

## 2020-08-19 PROCEDURE — 90000 NO LOS: CPT | Performed by: SURGERY

## 2020-08-19 PROCEDURE — 99024 POSTOP FOLLOW-UP VISIT: CPT | Performed by: NURSE PRACTITIONER

## 2020-08-19 RX ORDER — POLYETHYLENE GLYCOL 3350 17 G/17G
17 POWDER, FOR SOLUTION ORAL DAILY
Qty: 510 G | Refills: 0 | Status: SHIPPED | OUTPATIENT
Start: 2020-08-19 | End: 2020-09-18

## 2020-08-19 RX ORDER — ONDANSETRON 4 MG/1
4 TABLET, ORALLY DISINTEGRATING ORAL
Qty: 30 TAB | Refills: 0 | Status: SHIPPED | OUTPATIENT
Start: 2020-08-19 | End: 2020-08-19 | Stop reason: SDUPTHER

## 2020-08-19 RX ORDER — ENOXAPARIN SODIUM 100 MG/ML
40 INJECTION SUBCUTANEOUS DAILY
Qty: 14 SYRINGE | Refills: 0 | Status: SHIPPED | OUTPATIENT
Start: 2020-09-08 | End: 2020-08-19 | Stop reason: SDUPTHER

## 2020-08-19 RX ORDER — HYOSCYAMINE SULFATE 0.12 MG/1
0.12 TABLET SUBLINGUAL
Qty: 30 TAB | Refills: 0 | Status: SHIPPED | OUTPATIENT
Start: 2020-08-19 | End: 2020-10-13

## 2020-08-19 RX ORDER — ERGOCALCIFEROL 1.25 MG/1
50000 CAPSULE ORAL
Qty: 36 CAP | Refills: 0 | Status: SHIPPED | OUTPATIENT
Start: 2020-08-19 | End: 2020-08-19 | Stop reason: SDUPTHER

## 2020-08-19 RX ORDER — ONDANSETRON 4 MG/1
4 TABLET, ORALLY DISINTEGRATING ORAL
Qty: 30 TAB | Refills: 0 | Status: SHIPPED | OUTPATIENT
Start: 2020-08-19 | End: 2020-10-13

## 2020-08-19 RX ORDER — ENOXAPARIN SODIUM 100 MG/ML
40 INJECTION SUBCUTANEOUS DAILY
Qty: 14 SYRINGE | Refills: 0 | Status: SHIPPED | OUTPATIENT
Start: 2020-09-08 | End: 2020-09-22

## 2020-08-19 RX ORDER — ERGOCALCIFEROL 1.25 MG/1
50000 CAPSULE ORAL
Qty: 36 CAP | Refills: 0 | Status: SHIPPED | OUTPATIENT
Start: 2020-08-19 | End: 2021-02-15 | Stop reason: ALTCHOICE

## 2020-08-19 RX ORDER — HYOSCYAMINE SULFATE 0.12 MG/1
0.12 TABLET SUBLINGUAL
Qty: 30 TAB | Refills: 0 | Status: SHIPPED | OUTPATIENT
Start: 2020-08-19 | End: 2020-08-19 | Stop reason: SDUPTHER

## 2020-08-19 RX ORDER — POLYETHYLENE GLYCOL 3350 17 G/17G
17 POWDER, FOR SOLUTION ORAL DAILY
Qty: 510 G | Refills: 0 | Status: SHIPPED | OUTPATIENT
Start: 2020-08-19 | End: 2020-08-19 | Stop reason: SDUPTHER

## 2020-08-19 NOTE — PROGRESS NOTES
ICD-10-CM ICD-9-CM    1. Morbid obesity with BMI of 50.0-59.9, adult ([facility])  E66.01 278.01     Z68.43 V85.43    2. Gastroesophageal reflux disease, esophagitis presence not specified  K21.9 530.81    3. Essential hypertension  I10 401.9    4. Controlled diabetes mellitus type 2 with complications, unspecified whether long term insulin use ([facility])  E11.8 250.90        Plan:   1. Morbid Obesity- Patient is schedule for Sleeve gastrectomy with Dr. Irma Martin on 9/8/2020 at [address] patient in regard to post diet restrictions, follow- up office visits, follow- up care. Patient as received educational booklet and vitamin list. Reviewed liver shrinking diet. Post op medications prescribed: Levsin, Miralax, Lovenox and zofran  Reviewed ERAS protocol: Take 1000mg of Tylenol with lunch and dinner the day before surgery. You may drink clear liquids up to 1 hours before surgery. Do NOT start medications prescribed until after surgery. Reviewed with patient that lifelong vitamin supplementation is recommended by provider as well as risks of non-compliance. 2. GERD- She will continue Prilosec after surgery  3. HTN- Follow up with PCP 4 weeks after surgery  4. DM- Follow up with PCP or endocrinologist 4 weeks after surgery. Stop Metformin 7 days before surgery. 5. Sleep apnea- bring CPAP  She will stop her Plaquenil today and will resume 4 weeks after surgery. She will continue Neurontin after surgery. Pt  verbalized understanding and questions were answered to the best of my knowledge and ability.               Signed By: Tessie Vazquez NP     August 19, 2020

## 2020-08-19 NOTE — PATIENT INSTRUCTIONS
Learning About Bariatric Surgery What is bariatric surgery? Bariatric surgery is surgery to help you lose weight. This type of surgery is only used for people who are very overweight and have not been able to lose weight with diet and exercise. This surgery makes the stomach smaller. Some types of surgery also change the connection between your stomach and intestines. How is bariatric surgery done? Bariatric surgery may be either \"open\" or \"laparoscopic. \" Open surgery is done through a large cut (incision) in the belly. Laparoscopic surgery is done through several small cuts. The doctor puts a lighted tube, or scope, and other surgical tools through small cuts in your belly. The doctor is able to see your organs with the scope. There are different types of bariatric surgery. Gastric sleeve surgery The surgery is usually done through several small incisions in the belly. The doctor removes more than half of your stomach. This leaves a thin sleeve, or tube, that is about the size of a banana. Because part of your stomach has been removed, this can't be reversed. Jt-en-Y gastric bypass surgery Jt-en-Y (say \"carly-en-why\") surgery changes the connection between the stomach and the intestines. The doctor separates a section of your stomach from the rest of your stomach. This makes a small pouch. The new pouch will hold the food you eat. The doctor connects the stomach pouch to the middle part of the small intestine. Gastric banding surgery The surgery is usually done through several small incisions in the belly. The doctor wraps a band around the upper part of the stomach. This creates a small pouch. The small size of the pouch means that you will get full after you eat just a small amount of food. The doctor can inflate or deflate the band to adjust the size. This lets the doctor adjust how quickly food passes from the new pouch into the stomach.  It does not change the connection between the stomach and the intestines. What can you expect after the surgery? You may stay in the hospital for one or more days after the surgery. How long you stay depends on the type of surgery you had. Most people need 2 to 4 weeks before they are ready to get back to their usual routine. For the first 2 to 6 weeks after surgery, you probably will need to follow a liquid or soft diet. Bit by bit, you will be able to eat more solid foods. Your doctor may advise you to work with a dietitian. This way you'll be sure to get enough protein, vitamins, and minerals while you are losing weight. Even with a healthy diet, you may need to take vitamin and mineral supplements. After surgery, you will not be able to eat very much at one time. You will get full quickly. Try not to eat too much at one time or eat foods that are high in fat or sugar. If you do, you may vomit, get stomach pain, or have diarrhea. You probably will lose weight very quickly in the first few months after surgery. As time goes on, your weight loss will slow down. You will have regular doctor visits to check how you are doing. Think of bariatric surgery as a tool to help you lose weight. It isn't an instant fix. You will still need to eat a healthy diet and get regular exercise. This will help you reach your weight goal and avoid regaining the weight you lose. Follow-up care is a key part of your treatment and safety. Be sure to make and go to all appointments, and call your doctor if you are having problems. It's also a good idea to know your test results and keep a list of the medicines you take. Where can you learn more? Go to http://donavon-stacy.info/ Enter G469 in the search box to learn more about \"Learning About Bariatric Surgery. \" Current as of: December 11, 2019               Content Version: 12.5 © 3760-1309 [employer]. Care instructions adapted under license by [employer] (which disclaims liability or warranty for this information). If you have questions about a medical condition or this instruction, always ask your healthcare professional. [address] any warranty or liability for your use of this information. You will start the liver shrinking diet 2 weeks before surgery unless otherwise told by physician or NP. Follow your handbooks instructions for Liver shrinking diet. Enhanced Recovery after Surgery (ERAS) Take 1000mg of Tylenol with lunch and dinner the day before surgery. You may drink clear liquids up to 1 hours before surgery. Do NOT start medications prescribed until after surgery. COVID_19 testing Pre-Admission testing will be in touch with you in regards to COVID-19 testing. You will be required to be tested 96 hours prior to surgery. Failure to have test completed or a positive result will require rescheduling of your procedure. No visitors while you are in the hospital.  
 
Your first follow up visit will be a face to face visit. Your second and third follow up will be virtual.  
 
Diet after surgery until your 2 week follow up visit. Bariatric Full Liquids  2 weeks What is this diet? ? Easy to swallow liquids allow your stomach to heal after surgery ? Prevents dehydration ? Introduction of liquid meals (protein shakes) When do I begin? ? The morning after surgery ? Use 1 ounce (30 mL) cups ? Initial goal is to drink 4 ounces of fluid over 1 hour (3 oz. water + 1 oz. protein shake). The rate at which you drink should be controlled. Take a sip and evaluate how you feel before you continue to drink. ? Repeat every hour while awake ? Discharge Goal:  Consume & tolerate at least 4 ounces per hour for 4 hours ? Stay on liquids for 2 weeks at home What foods can I eat? 
? No sugar added, non-carbonated, non-caffeinated fluids ? Meal replacement shakes (2-3 per day), from the approved list 
? Strained, blenderized soup ? Limit juice to 4 ounces per day. Choose only 100% no sugar added fruit juice. Juice can be diluted with water. Key Points ? Sip, do not gulp ? Use 1 ounce medicine cups to control the rate ? Stop when full. If you feel fullness, pain or nausea stop sipping until the feeling passes ? Do not drink from a straw ? Fluid Goal:  48-64 ounces of liquids every day. 48 ounces per day should be from clear liquids. ? Sip, sip, sip throughout the day ? Main priority is to stay hydrated ? Aim for 60 grams of protein every day. Most of your protein will come from shakes. ? Add additional protein supplements to meet protein needs ? Limit caffeine ? Avoid alcohol ? Record the ounces of liquids and shakes consumed per day in the log provided ? Bring the log to your surgeon's appointments to monitor hydration and  protein intake

## 2020-08-19 NOTE — PROGRESS NOTES
[employer] [facility] at [facility] History and Physical    History of Present Illness:      Dimas Diaz is a 26 y.o. female who has been approved for laparoscopic sleeve gastrectomy. She had an EGD with Dr. Mariaa Loredo which showed a small hiatal hernia. She is having some hip pain but seen her primary care for this issue. She is otherwise doing well with no other issues currently. Past Medical History:   Diagnosis Date    Arrhythmia     tachycardia    Asthma     [place] hump 1/7/2015    Carpal tunnel syndrome of right wrist 12/20/2016    Diabetes ([facility] [address]     ALIYA (generalized anxiety disorder) 4/4/2018    GERD (gastroesophageal reflux disease) 8/13/2014    HTN (hypertension) 7/25/2011    Hyperaldosteronism ([facility] [address] 10/19/2015    Hyperhidrosis 4/4/2018    Morbid obesity (HCC)     Other ill-defined conditions(799.89)     migraines    Palpitation 4/4/2018    Sleep apnea     uses CPAP    Tachycardia 4/4/2018       Past Surgical History:   Procedure Laterality Date    HX TONSILLECTOMY      IL REMOVAL OF TONSILS,<13 Y/O           Current Outpatient Medications:     meloxicam (MOBIC) 15 mg tablet, Take 15 mg by mouth daily. , Disp: , Rfl:     hydrOXYchloroQUINE (PLAQUENIL) 200 mg tablet, 200 mg two (2) times a day., Disp: , Rfl:     metFORMIN ER (GLUCOPHAGE XR) 500 mg tablet, TAKE 2 TABLETS BY MOUTH WITH BREAKFAST AND TAKE 2 TABLETS WITH DINNER (Patient taking differently: 1,000 mg two (2) times a day. TAKE 2 TABLETS BY MOUTH WITH BREAKFAST AND TAKE 2 TABLETS WITH DINNER), Disp: 360 Tab, Rfl: 3    nystatin-triamcinolone (MYCOLOG) 100,000-0.1 unit/gram-% ointment, Apply  to affected area as needed for Skin Irritation. , Disp: , Rfl:     ALPRAZolam (XANAX) 0.5 mg tablet, Take 1 Tab by mouth nightly as needed for Anxiety or Sleep. Max Daily Amount: 0.5 mg.  Indications: anxious, repeated episodes of anxiety, panic disorder, Disp: 30 Tab, Rfl: 3    atorvastatin (LIPITOR) 40 mg tablet, TAKE 1 TABLET BY MOUTH EVERY DAY (Patient taking differently: 40 mg nightly. TAKE 1 TABLET BY MOUTH EVERY DAY), Disp: 90 Tab, Rfl: 3    aspirin delayed-release 81 mg tablet, Take 1 Tab by mouth daily. , Disp: 30 Tab, Rfl: 11    gabapentin (NEURONTIN) 300 mg capsule, Take 1 Cap by mouth three (3) times daily. Max Daily Amount: 900 mg., Disp: 270 Cap, Rfl: 1    spironolactone (ALDACTONE) 25 mg tablet, Take 1 Tab by mouth two (2) times a day., Disp: 180 Tab, Rfl: 3    Blood-Glucose Meter,Continuous (Dexcom G6 ) misc, Change sensor every 10 days, Disp: 1 Each, Rfl: 0    Blood-Glucose Transmitter (Dexcom G6 Transmitter) nahid, Change sensor every 10 days, Disp: 1 Device, Rfl: 3    Blood-Glucose Sensor (Dexcom G6 Sensor) nahid, Change sensor every 10 days, Disp: 3 Device, Rfl: 12    metoprolol tartrate (LOPRESSOR) 100 mg IR tablet, TAKE 1 TABLET BY MOUTH TWICE A DAY, Disp: 180 Tab, Rfl: 2    omeprazole (PRILOSEC) 40 mg capsule, TAKE 1 CAP BY MOUTH DAILY. , Disp: 90 Cap, Rfl: 3    vilazodone (VIIBRYD) 40 mg tab tablet, Take 1 Tab by mouth daily. , Disp: 90 Tab, Rfl: 2    semaglutide (OZEMPIC) 1 mg/dose (2 mg/1.5 mL) sub-q pen, 1 mg by SubCUTAneous route every seven (7) days. (Patient taking differently: 1 mg by SubCUTAneous route every Sunday.), Disp: 9 Box, Rfl: 3    Insulin Needles, Disposable, (Violet Pen Needle) 32 gauge x 5/32\" ndle, One shot daily, Disp: 100 Pen Needle, Rfl: 3    galcanezumab-gnlm (EMGALITY PEN) 120 mg/mL injection, 1 mL by SubCUTAneous route every thirty (30) days. , Disp: 1 mL, Rfl: 11    butalbital-acetaminophen-caffeine (FIORICET) -40 mg per tablet, Take 0.5-1 Tabs by mouth every six (6) hours as needed for Headache.  Max Daily Amount: 4 Tabs., Disp: 12 Tab, Rfl: 0    albuterol (PROVENTIL HFA, VENTOLIN HFA) 90 mcg/actuation inhaler, Take 1 Puff by inhalation every four (4) hours as needed for Wheezing., Disp: 1 Inhaler, Rfl: 1    Allergies   Allergen Reactions    Latex Itching Social History     Socioeconomic History    Marital status: SINGLE     Spouse name: Not on file    Number of children: Not on file    Years of education: Not on file    Highest education level: Not on file   Occupational History    Occupation: customer ser and student     Comment: [employer]   Social Needs    Financial resource strain: Not on file    Food insecurity     Worry: Not on file     Inability: Not on file   [employer] needs     Medical: Not on file     Non-medical: Not on file   Tobacco Use    Smoking status: Former Smoker     Packs/day: 0.50     Years: 5.00     Pack years: 2.50     Last attempt to quit: 2019     Years since quittin.8    Smokeless tobacco: Never Used   Substance and Sexual Activity    Alcohol use:  Yes     Alcohol/week: 1.0 standard drinks     Types: 1 Shots of liquor, 1 Standard drinks or equivalent per week     Comment: socially, Monthly or less    Drug use: No    Sexual activity: Not Currently     Partners: Male     Birth control/protection: Condom   Lifestyle    Physical activity     Days per week: Not on file     Minutes per session: Not on file    Stress: Not on file   Relationships    Social connections     Talks on phone: Not on file     Gets together: Not on file     Attends Faith service: Not on file     Active member of club or organization: Not on file     Attends meetings of clubs or organizations: Not on file     Relationship status: Not on file    Intimate partner violence     Fear of current or ex partner: Not on file     Emotionally abused: Not on file     Physically abused: Not on file     Forced sexual activity: Not on file   Other Topics Concern    Not on file   Social History Narrative    Not on file       Family History   Problem Relation Age of Onset    Hypertension Mother     Diabetes Mother         Type II    Anesth Problems Mother         respiratory issues - feels like she cannot breath when coming out of anesthesia    Psychiatric Disorder Father     Drug Abuse Father     Cancer Other         prostate    Hypertension Sister     Thyroid Disease Sister         \"I think\"   [facility] Breast Cancer Sister     Attention Deficit Hyperactivity Disorder Brother     Hypertension Sister        ROS   Constitutional: negative  Ears, Nose, Mouth, Throat, and Face: negative  Respiratory: negative  Cardiovascular: negative  Gastrointestinal: negative  Genitourinary:negative  Integument/Breast: negative  Hematologic/Lymphatic: negative  Behavioral/Psychiatric: negative  Allergic/Immunologic: negative      Physical Exam:     Visit Vitals  /80   Pulse 92   Temp 97.5 °F (36.4 °C)   Resp 20   Ht 5' 3\" (1.6 m)   Wt 331 lb (150.1 kg)   SpO2 98%   BMI 58.63 kg/m²       General - alert and oriented, no apparent distress  HEENT - no jaundice, no hearing imparement  Pulm - CTAB, no C/W/R  CV - RRR, no M/R/G  Abd -soft, nondistended, bowel sounds present, nontender to palpation  Ext - pulses intact in UE and LE bilaterally, no edema  Skin - supple, no rashes  Psychiatric - normal affect, good mood    Labs  Lab Results   Component Value Date/Time    Sodium 140 08/14/2020 11:25 AM    Potassium 5.1 08/14/2020 11:25 AM    Chloride 101 08/14/2020 11:25 AM    CO2 22 08/14/2020 11:25 AM    Anion gap 5 07/27/2014 03:24 PM    Glucose 122 (H) 08/14/2020 11:25 AM    BUN 10 08/14/2020 11:25 AM    Creatinine 0.80 08/14/2020 11:25 AM    BUN/Creatinine ratio 13 08/14/2020 11:25 AM    GFR est  08/14/2020 11:25 AM    GFR est non-AA 98 08/14/2020 11:25 AM    Calcium 9.6 08/14/2020 11:25 AM    Bilirubin, total 0.3 08/14/2020 11:25 AM    Alk.  phosphatase 86 08/14/2020 11:25 AM    Protein, total 6.8 08/14/2020 11:25 AM    Albumin 4.1 08/14/2020 11:25 AM    Globulin 3.8 07/27/2014 03:24 PM    A-G Ratio 1.5 08/14/2020 11:25 AM    ALT (SGPT) 17 08/14/2020 11:25 AM    AST (SGOT) 17 08/14/2020 11:25 AM     Lab Results   Component Value Date/Time    WBC 9.5 08/14/2020 11:25 AM    HGB 12.0 08/14/2020 11:25 AM    HCT 39.3 08/14/2020 11:25 AM    PLATELET 957 14/71/4448 11:25 AM    MCV 86 08/14/2020 11:25 AM         Imaging  CXR - normal  I have reviewed and agree with all of the pertinent images    EGD - Findings:  Esophagus:Small hiatal hernia, Irregular Z line at 37 cm, biopsies done to rule out [place] esophagus  Stomach:Patchy antral erythema, biopsies done  Duodenum/jejunum:normal        Therapies:  none     Specimens: GE junction, antral bx           EBL: None     Complications:   None; patient tolerated the procedure well. Impression:    See Postoperative diagnosis above     Recommendations:  -Acid suppression with a proton pump inhibitor. , -Await pathology. , -No NSAIDS     Discharge disposition:  Home in the company of  when able to ambulate    Assessment:     Kenna Maynard is a 26 y.o. female with morbid obesity    Recommendations:     1. She is now ready to proceed with laparoscopic sleeve gastrectomy and hiatal hernia repair. I have discussed the above procedure with the patient in detail. We reviewed the benefits and possible complications of the surgery which include bleeding, infection, damage to adjacent organs, venous thromboembolism, need for repeat surgery, death and other unforseen complications. The patient agreed to proceed with the surgery. Kristin Cheek MD    Ms. Anuj Erica has a reminder for a \"due or due soon\" health maintenance. I have asked that she contact her primary care provider for follow-up on this health maintenance.

## 2020-08-19 NOTE — LETTER
8/19/20 Patient: Jessica Meadows YOB: 1990 Date of Visit: 8/19/2020 Linh Roberson MD 
[address] [place] [id number] VIA In Basket Dear Linh Roberson MD, Thank you for referring Ms. Jessica Meadows to [facility] for evaluation. My notes for this consultation are attached. If you have questions, please do not hesitate to call me. I look forward to following your patient along with you. Sincerely, Aviva Simon MD

## 2020-08-19 NOTE — PROGRESS NOTES
1. Have you been to the ER, urgent care clinic since your last visit? Hospitalized since your last visit? yes ED for hip pain and Ortho on call yesterday  2. Have you seen or consulted any other health care providers outside of the [address] [place] [address] since your last visit? Include any pap smears or colon screening.  ortho

## 2020-08-19 NOTE — H&P (VIEW-ONLY)
[facility] at [facility] Surgery History and Physical 
 
History of Present Illness:  
  
Shira Rubi is a 26 y.o. female who has been approved for laparoscopic sleeve gastrectomy. She had an EGD with Dr. Minh Gamboa which showed a small hiatal hernia. She is having some hip pain but seen her primary care for this issue. She is otherwise doing well with no other issues currently. Past Medical History:  
Diagnosis Date  Arrhythmia   
 tachycardia  Asthma  [place] hump 1/7/2015  Carpal tunnel syndrome of right wrist 12/20/2016  Diabetes ([facility] [address]  ALIYA (generalized anxiety disorder) 4/4/2018  GERD (gastroesophageal reflux disease) 8/13/2014  
 HTN (hypertension) 7/25/2011  Hyperaldosteronism ([facility] [address] 10/19/2015  Hyperhidrosis 4/4/2018  Morbid obesity ([facility] [address]  Other ill-defined conditions(799.89) migraines  Palpitation 4/4/2018  Sleep apnea   
 uses CPAP  
 Tachycardia 4/4/2018 Past Surgical History:  
Procedure Laterality Date  HX TONSILLECTOMY  DC REMOVAL OF TONSILS,<9 Y/O    
 
 
 
Current Outpatient Medications:  
  meloxicam (MOBIC) 15 mg tablet, Take 15 mg by mouth daily. , Disp: , Rfl:  
  hydrOXYchloroQUINE (PLAQUENIL) 200 mg tablet, 200 mg two (2) times a day., Disp: , Rfl:  
  metFORMIN ER (GLUCOPHAGE XR) 500 mg tablet, TAKE 2 TABLETS BY MOUTH WITH BREAKFAST AND TAKE 2 TABLETS WITH DINNER (Patient taking differently: 1,000 mg two (2) times a day. TAKE 2 TABLETS BY MOUTH WITH BREAKFAST AND TAKE 2 TABLETS WITH DINNER), Disp: 360 Tab, Rfl: 3 
  nystatin-triamcinolone (MYCOLOG) 100,000-0.1 unit/gram-% ointment, Apply  to affected area as needed for Skin Irritation. , Disp: , Rfl:  
  ALPRAZolam (XANAX) 0.5 mg tablet, Take 1 Tab by mouth nightly as needed for Anxiety or Sleep. Max Daily Amount: 0.5 mg. Indications: anxious, repeated episodes of anxiety, panic disorder, Disp: 30 Tab, Rfl: 3   atorvastatin (LIPITOR) 40 mg tablet, TAKE 1 TABLET BY MOUTH EVERY DAY (Patient taking differently: 40 mg nightly. TAKE 1 TABLET BY MOUTH EVERY DAY), Disp: 90 Tab, Rfl: 3 
  aspirin delayed-release 81 mg tablet, Take 1 Tab by mouth daily. , Disp: 30 Tab, Rfl: 11 
  gabapentin (NEURONTIN) 300 mg capsule, Take 1 Cap by mouth three (3) times daily. Max Daily Amount: 900 mg., Disp: 270 Cap, Rfl: 1 
  spironolactone (ALDACTONE) 25 mg tablet, Take 1 Tab by mouth two (2) times a day., Disp: 180 Tab, Rfl: 3   Blood-Glucose Meter,Continuous (Dexcom G6 ) misc, Change sensor every 10 days, Disp: 1 Each, Rfl: 0 
  Blood-Glucose Transmitter (Dexcom G6 Transmitter) nahid, Change sensor every 10 days, Disp: 1 Device, Rfl: 3   Blood-Glucose Sensor (Dexcom G6 Sensor) nahid, Change sensor every 10 days, Disp: 3 Device, Rfl: 12 
  metoprolol tartrate (LOPRESSOR) 100 mg IR tablet, TAKE 1 TABLET BY MOUTH TWICE A DAY, Disp: 180 Tab, Rfl: 2 
  omeprazole (PRILOSEC) 40 mg capsule, TAKE 1 CAP BY MOUTH DAILY. , Disp: 90 Cap, Rfl: 3   vilazodone (VIIBRYD) 40 mg tab tablet, Take 1 Tab by mouth daily. , Disp: 90 Tab, Rfl: 2 
  semaglutide (OZEMPIC) 1 mg/dose (2 mg/1.5 mL) sub-q pen, 1 mg by SubCUTAneous route every seven (7) days. (Patient taking differently: 1 mg by SubCUTAneous route every Sunday.), Disp: 9 Box, Rfl: 3 
  Insulin Needles, Disposable, (Violet Pen Needle) 32 gauge x 5/32\" ndle, One shot daily, Disp: 100 Pen Needle, Rfl: 3 
  galcanezumab-gnlm (EMGALITY PEN) 120 mg/mL injection, 1 mL by SubCUTAneous route every thirty (30) days. , Disp: 1 mL, Rfl: 11 
  butalbital-acetaminophen-caffeine (FIORICET) -40 mg per tablet, Take 0.5-1 Tabs by mouth every six (6) hours as needed for Headache. Max Daily Amount: 4 Tabs., Disp: 12 Tab, Rfl: 0 
  albuterol (PROVENTIL HFA, VENTOLIN HFA) 90 mcg/actuation inhaler, Take 1 Puff by inhalation every four (4) hours as needed for Wheezing., Disp: 1 Inhaler, Rfl: 1 Allergies Allergen Reactions  Latex Itching Social History Socioeconomic History  Marital status: SINGLE Spouse name: Not on file  Number of children: Not on file  Years of education: Not on file  Highest education level: Not on file Occupational History  Occupation: customer ser and student Comment: [employer] Social Needs  Financial resource strain: Not on file  Food insecurity Worry: Not on file Inability: Not on file  Transportation needs Medical: Not on file Non-medical: Not on file Tobacco Use  Smoking status: Former Smoker Packs/day: 0.50 Years: 5.00 Pack years: 2.50 Last attempt to quit: 2019 Years since quittin.8  Smokeless tobacco: Never Used Substance and Sexual Activity  Alcohol use: Yes Alcohol/week: 1.0 standard drinks Types: 1 Shots of liquor, 1 Standard drinks or equivalent per week Comment: socially, Monthly or less  Drug use: No  
 Sexual activity: Not Currently Partners: Male Birth control/protection: Condom Lifestyle  Physical activity Days per week: Not on file Minutes per session: Not on file  Stress: Not on file Relationships  Social connections Talks on phone: Not on file Gets together: Not on file Attends [employer] service: Not on file Active member of club or organization: Not on file Attends meetings of clubs or organizations: Not on file Relationship status: Not on file  Intimate partner violence Fear of current or ex partner: Not on file Emotionally abused: Not on file Physically abused: Not on file Forced sexual activity: Not on file Other Topics Concern  Not on file Social History Narrative  Not on file Family History Problem Relation Age of Onset  Hypertension Mother  Diabetes Mother Type II  
 Anesth Problems Mother respiratory issues - feels like she cannot breath when coming out of anesthesia  Psychiatric Disorder Father  Drug Abuse Father  Cancer Other   
     prostate  Hypertension Sister  Thyroid Disease Sister \"I think\"  Breast Cancer Sister  Attention Deficit Hyperactivity Disorder Brother  Hypertension Sister ROS Constitutional: negative Ears, Nose, Mouth, Throat, and Face: negative Respiratory: negative Cardiovascular: negative Gastrointestinal: negative Genitourinary:negative Integument/Breast: negative Hematologic/Lymphatic: negative Behavioral/Psychiatric: negative Allergic/Immunologic: negative Physical Exam:  
 
Visit Vitals /80 Pulse 92 Temp 97.5 °F (36.4 °C) Resp 20 Ht 5' 3\" (1.6 m) Wt 331 lb (150.1 kg) SpO2 98% BMI 58.63 kg/m² General - alert and oriented, no apparent distress HEENT - no jaundice, no hearing imparement Pulm - CTAB, no C/W/R 
CV - RRR, no M/R/G Abd -soft, nondistended, bowel sounds present, nontender to palpation Ext - pulses intact in UE and LE bilaterally, no edema Skin - supple, no rashes Psychiatric - normal affect, good mood Labs Lab Results Component Value Date/Time Sodium 140 08/14/2020 11:25 AM  
 Potassium 5.1 08/14/2020 11:25 AM  
 Chloride 101 08/14/2020 11:25 AM  
 CO2 22 08/14/2020 11:25 AM  
 Anion gap 5 07/27/2014 03:24 PM  
 Glucose 122 (H) 08/14/2020 11:25 AM  
 BUN 10 08/14/2020 11:25 AM  
 Creatinine 0.80 08/14/2020 11:25 AM  
 BUN/Creatinine ratio 13 08/14/2020 11:25 AM  
 GFR est  08/14/2020 11:25 AM  
 GFR est non-AA 98 08/14/2020 11:25 AM  
 Calcium 9.6 08/14/2020 11:25 AM  
 Bilirubin, total 0.3 08/14/2020 11:25 AM  
 Alk.  phosphatase 86 08/14/2020 11:25 AM  
 Protein, total 6.8 08/14/2020 11:25 AM  
 Albumin 4.1 08/14/2020 11:25 AM  
 Globulin 3.8 07/27/2014 03:24 PM  
 A-G Ratio 1.5 08/14/2020 11:25 AM  
 ALT (SGPT) 17 08/14/2020 11:25 AM  
 AST (SGOT) 17 08/14/2020 11:25 AM  
 
Lab Results Component Value Date/Time WBC 9.5 08/14/2020 11:25 AM  
 HGB 12.0 08/14/2020 11:25 AM  
 HCT 39.3 08/14/2020 11:25 AM  
 PLATELET 265 89/96/5955 11:25 AM  
 MCV 86 08/14/2020 11:25 AM  
 
 
 
Imaging CXR - normal 
I have reviewed and agree with all of the pertinent images EGD - Findings: 
Esophagus:Small hiatal hernia, Irregular Z line at 37 cm, biopsies done to rule out [place] esophagus Stomach:Patchy antral erythema, biopsies done Duodenum/jejunum:normal 
  
  
Therapies:  none 
  
Specimens: GE junction, antral bx 
        
EBL: None 
  
Complications:   None; patient tolerated the procedure well. Impression:   
See Postoperative diagnosis above 
  
Recommendations: 
-Acid suppression with a proton pump inhibitor. , -Await pathology. , -No NSAIDS 
  
Discharge disposition:  Home in the company of  when able to ambulate Assessment:  
 
Margo Lara is a 26 y.o. female with morbid obesity Recommendations:  
 
1. She is now ready to proceed with laparoscopic sleeve gastrectomy and hiatal hernia repair. I have discussed the above procedure with the patient in detail. We reviewed the benefits and possible complications of the surgery which include bleeding, infection, damage to adjacent organs, venous thromboembolism, need for repeat surgery, death and other unforseen complications. The patient agreed to proceed with the surgery. Frank Adams MD 
 
Ms. Tim Richey has a reminder for a \"due or due soon\" health maintenance. I have asked that she contact her primary care provider for follow-up on this health maintenance.

## 2020-08-19 NOTE — PROGRESS NOTES
1. Have you been to the ER, urgent care clinic since your last visit? Hospitalized since your last visit? yes ED for back pain and ortho on call    2. Have you seen or consulted any other health care providers outside of the [address] [place] [address] since your last visit? Include any pap smears or colon screening.  ortho

## 2020-08-28 ENCOUNTER — DOCUMENTATION ONLY (OUTPATIENT)
Dept: SURGERY | Age: 30
End: 2020-08-28

## 2020-09-01 ENCOUNTER — HOSPITAL ENCOUNTER (OUTPATIENT)
Dept: PHYSICAL THERAPY | Age: 30
Discharge: HOME | End: 2020-09-01

## 2020-09-04 ENCOUNTER — HOSPITAL ENCOUNTER (OUTPATIENT)
Dept: PREADMISSION TESTING | Age: 30
Discharge: HOME | End: 2020-09-04
Payer: COMMERCIAL

## 2020-09-04 DIAGNOSIS — Z01.812 PRE-PROCEDURE LAB EXAM: ICD-10-CM

## 2020-09-04 PROCEDURE — 87635 SARS-COV-2 COVID-19 AMP PRB: CPT

## 2020-09-05 LAB — SARS-COV-2, COV2NT: NOT DETECTED

## 2020-09-06 ENCOUNTER — ANESTHESIA EVENT (OUTPATIENT)
Dept: SURGERY | Age: 30
End: 2020-09-06
Payer: COMMERCIAL

## 2020-09-08 ENCOUNTER — HOSPITAL ENCOUNTER (OUTPATIENT)
Age: 30
Setting detail: OBSERVATION
LOS: 1 days | Discharge: HOME | End: 2020-09-09
Attending: SURGERY | Admitting: SURGERY
Payer: COMMERCIAL

## 2020-09-08 ENCOUNTER — ANESTHESIA (OUTPATIENT)
Dept: SURGERY | Age: 30
End: 2020-09-08
Payer: COMMERCIAL

## 2020-09-08 DIAGNOSIS — E66.01 MORBID OBESITY (HCC): Primary | ICD-10-CM

## 2020-09-08 DIAGNOSIS — E66.01 OBESITY, MORBID (MORE THAN 100 LBS OVER IDEAL WEIGHT OR BMI > 40) (HCC): ICD-10-CM

## 2020-09-08 LAB
GLUCOSE BLD STRIP.AUTO-MCNC: 122 MG/DL (ref 65–100)
GLUCOSE BLD STRIP.AUTO-MCNC: 162 MG/DL (ref 65–100)
GLUCOSE BLD STRIP.AUTO-MCNC: 201 MG/DL (ref 65–100)
GLUCOSE BLD STRIP.AUTO-MCNC: 210 MG/DL (ref 65–100)
HCG UR QL: NEGATIVE
SERVICE CMNT-IMP: ABNORMAL

## 2020-09-08 PROCEDURE — 77030011640 HC PAD GRND REM COVD -A: Performed by: SURGERY

## 2020-09-08 PROCEDURE — 82962 GLUCOSE BLOOD TEST: CPT

## 2020-09-08 PROCEDURE — 77030002895 HC DEV VASC CLOSR COVD -B: Performed by: SURGERY

## 2020-09-08 PROCEDURE — 77030020268 HC MISC GENERAL SUPPLY: Performed by: SURGERY

## 2020-09-08 PROCEDURE — 77030020263 HC SOL INJ SOD CL0.9% LFCR 1000ML: Performed by: SURGERY

## 2020-09-08 PROCEDURE — 77030040956 HC DSCTR SONICISION MEDT -I: Performed by: SURGERY

## 2020-09-08 PROCEDURE — 74011250636 HC RX REV CODE- 250/636: Performed by: NURSE ANESTHETIST, CERTIFIED REGISTERED

## 2020-09-08 PROCEDURE — 76060000035 HC ANESTHESIA 2 TO 2.5 HR: Performed by: SURGERY

## 2020-09-08 PROCEDURE — 77030037032 HC INSRT SCIS CLICKLLINE DISP STOR -B: Performed by: SURGERY

## 2020-09-08 PROCEDURE — 77030022704 HC SUT VLOC COVD -B: Performed by: SURGERY

## 2020-09-08 PROCEDURE — P9045 ALBUMIN (HUMAN), 5%, 250 ML: HCPCS | Performed by: NURSE ANESTHETIST, CERTIFIED REGISTERED

## 2020-09-08 PROCEDURE — 99218 HC RM OBSERVATION: CPT

## 2020-09-08 PROCEDURE — 77030014090 HC TRCR OPT AMR -B: Performed by: SURGERY

## 2020-09-08 PROCEDURE — 74011250637 HC RX REV CODE- 250/637

## 2020-09-08 PROCEDURE — 77030008756 HC TU IRR SUC STRY -B: Performed by: SURGERY

## 2020-09-08 PROCEDURE — 74011250636 HC RX REV CODE- 250/636

## 2020-09-08 PROCEDURE — 81025 URINE PREGNANCY TEST: CPT

## 2020-09-08 PROCEDURE — 88307 TISSUE EXAM BY PATHOLOGIST: CPT

## 2020-09-08 PROCEDURE — 77030038157 HC DEV PWR CNTR DISP SIGNIA COVD -C: Performed by: SURGERY

## 2020-09-08 PROCEDURE — 77030037367 HC STPLR ENDO TRI-STPLR COVD -D: Performed by: SURGERY

## 2020-09-08 PROCEDURE — 77030026438 HC STYL ET INTUB CARD -A: Performed by: ANESTHESIOLOGY

## 2020-09-08 PROCEDURE — 74011636637 HC RX REV CODE- 636/637: Performed by: SURGERY

## 2020-09-08 PROCEDURE — 77030031139 HC SUT VCRL2 J&J -A: Performed by: SURGERY

## 2020-09-08 PROCEDURE — 43775 LAP SLEEVE GASTRECTOMY: CPT | Performed by: SURGERY

## 2020-09-08 PROCEDURE — 76010000131 HC OR TIME 2 TO 2.5 HR: Performed by: SURGERY

## 2020-09-08 PROCEDURE — 74011250636 HC RX REV CODE- 250/636: Performed by: ANESTHESIOLOGY

## 2020-09-08 PROCEDURE — 77030012770 HC TRCR OPT FX AMR -B: Performed by: SURGERY

## 2020-09-08 PROCEDURE — 76210000016 HC OR PH I REC 1 TO 1.5 HR: Performed by: SURGERY

## 2020-09-08 PROCEDURE — 74011250636 HC RX REV CODE- 250/636: Performed by: SURGERY

## 2020-09-08 PROCEDURE — 77030008684 HC TU ET CUF COVD -B: Performed by: ANESTHESIOLOGY

## 2020-09-08 PROCEDURE — 77030034208 HC SLV GASTRCTMY 3D CAL SYS DISP BOEH -C: Performed by: SURGERY

## 2020-09-08 PROCEDURE — 77030040361 HC SLV COMPR DVT MDII -B: Performed by: SURGERY

## 2020-09-08 PROCEDURE — 77030010507 HC ADH SKN DERMBND J&J -B: Performed by: SURGERY

## 2020-09-08 PROCEDURE — 77030002933 HC SUT MCRYL J&J -A: Performed by: SURGERY

## 2020-09-08 PROCEDURE — 77030019908 HC STETH ESOPH SIMS -A: Performed by: ANESTHESIOLOGY

## 2020-09-08 PROCEDURE — 74011250637 HC RX REV CODE- 250/637: Performed by: SURGERY

## 2020-09-08 PROCEDURE — 77030020829: Performed by: SURGERY

## 2020-09-08 PROCEDURE — 74011000250 HC RX REV CODE- 250: Performed by: SURGERY

## 2020-09-08 PROCEDURE — 74011000250 HC RX REV CODE- 250: Performed by: NURSE ANESTHETIST, CERTIFIED REGISTERED

## 2020-09-08 PROCEDURE — 77030016151 HC PROTCTR LNS DFOG COVD -B: Performed by: SURGERY

## 2020-09-08 PROCEDURE — 77030037368 HC STPLR ENDO TRI-STPLR COVD -E: Performed by: SURGERY

## 2020-09-08 RX ORDER — ALBUMIN HUMAN 50 G/1000ML
SOLUTION INTRAVENOUS AS NEEDED
Status: DISCONTINUED | OUTPATIENT
Start: 2020-09-08 | End: 2020-09-08 | Stop reason: HOSPADM

## 2020-09-08 RX ORDER — LIDOCAINE HYDROCHLORIDE ANHYDROUS AND DEXTROSE MONOHYDRATE .8; 5 G/100ML; G/100ML
1 INJECTION, SOLUTION INTRAVENOUS CONTINUOUS
Status: ACTIVE | OUTPATIENT
Start: 2020-09-08 | End: 2020-09-09

## 2020-09-08 RX ORDER — GABAPENTIN 100 MG/1
200 CAPSULE ORAL 2 TIMES DAILY
Status: DISCONTINUED | OUTPATIENT
Start: 2020-09-08 | End: 2020-09-09 | Stop reason: HOSPADM

## 2020-09-08 RX ORDER — PROPOFOL 10 MG/ML
INJECTION, EMULSION INTRAVENOUS AS NEEDED
Status: DISCONTINUED | OUTPATIENT
Start: 2020-09-08 | End: 2020-09-08 | Stop reason: HOSPADM

## 2020-09-08 RX ORDER — ALBUTEROL SULFATE 0.83 MG/ML
2.5 SOLUTION RESPIRATORY (INHALATION)
Status: DISCONTINUED | OUTPATIENT
Start: 2020-09-08 | End: 2020-09-09 | Stop reason: HOSPADM

## 2020-09-08 RX ORDER — ONDANSETRON 2 MG/ML
4 INJECTION INTRAMUSCULAR; INTRAVENOUS AS NEEDED
Status: DISCONTINUED | OUTPATIENT
Start: 2020-09-08 | End: 2020-09-08 | Stop reason: HOSPADM

## 2020-09-08 RX ORDER — LORAZEPAM 2 MG/ML
INJECTION INTRAMUSCULAR
Status: COMPLETED
Start: 2020-09-08 | End: 2020-09-08

## 2020-09-08 RX ORDER — KETOROLAC TROMETHAMINE 30 MG/ML
15 INJECTION, SOLUTION INTRAMUSCULAR; INTRAVENOUS EVERY 6 HOURS
Status: DISCONTINUED | OUTPATIENT
Start: 2020-09-08 | End: 2020-09-09 | Stop reason: HOSPADM

## 2020-09-08 RX ORDER — SODIUM CHLORIDE, SODIUM LACTATE, POTASSIUM CHLORIDE, CALCIUM CHLORIDE 600; 310; 30; 20 MG/100ML; MG/100ML; MG/100ML; MG/100ML
INJECTION, SOLUTION INTRAVENOUS
Status: DISCONTINUED | OUTPATIENT
Start: 2020-09-08 | End: 2020-09-08 | Stop reason: HOSPADM

## 2020-09-08 RX ORDER — HYDROMORPHONE HYDROCHLORIDE 1 MG/ML
0.5 INJECTION, SOLUTION INTRAMUSCULAR; INTRAVENOUS; SUBCUTANEOUS
Status: DISCONTINUED | OUTPATIENT
Start: 2020-09-08 | End: 2020-09-09 | Stop reason: HOSPADM

## 2020-09-08 RX ORDER — PHENYLEPHRINE HCL IN 0.9% NACL 0.4MG/10ML
SYRINGE (ML) INTRAVENOUS AS NEEDED
Status: DISCONTINUED | OUTPATIENT
Start: 2020-09-08 | End: 2020-09-08 | Stop reason: HOSPADM

## 2020-09-08 RX ORDER — HYOSCYAMINE SULFATE 0.12 MG/1
TABLET SUBLINGUAL
Status: COMPLETED
Start: 2020-09-08 | End: 2020-09-08

## 2020-09-08 RX ORDER — SODIUM CHLORIDE 0.9 % (FLUSH) 0.9 %
5-40 SYRINGE (ML) INJECTION AS NEEDED
Status: DISCONTINUED | OUTPATIENT
Start: 2020-09-08 | End: 2020-09-09 | Stop reason: HOSPADM

## 2020-09-08 RX ORDER — SODIUM CHLORIDE 0.9 % (FLUSH) 0.9 %
5-40 SYRINGE (ML) INJECTION EVERY 8 HOURS
Status: DISCONTINUED | OUTPATIENT
Start: 2020-09-08 | End: 2020-09-08 | Stop reason: HOSPADM

## 2020-09-08 RX ORDER — HYDRALAZINE HYDROCHLORIDE 20 MG/ML
20 INJECTION INTRAMUSCULAR; INTRAVENOUS
Status: DISCONTINUED | OUTPATIENT
Start: 2020-09-08 | End: 2020-09-09 | Stop reason: HOSPADM

## 2020-09-08 RX ORDER — KETAMINE HYDROCHLORIDE 10 MG/ML
INJECTION, SOLUTION INTRAMUSCULAR; INTRAVENOUS AS NEEDED
Status: DISCONTINUED | OUTPATIENT
Start: 2020-09-08 | End: 2020-09-08 | Stop reason: HOSPADM

## 2020-09-08 RX ORDER — LIDOCAINE HYDROCHLORIDE 20 MG/ML
INJECTION, SOLUTION EPIDURAL; INFILTRATION; INTRACAUDAL; PERINEURAL AS NEEDED
Status: DISCONTINUED | OUTPATIENT
Start: 2020-09-08 | End: 2020-09-08 | Stop reason: HOSPADM

## 2020-09-08 RX ORDER — SODIUM CHLORIDE 0.9 % (FLUSH) 0.9 %
5-40 SYRINGE (ML) INJECTION EVERY 8 HOURS
Status: DISCONTINUED | OUTPATIENT
Start: 2020-09-08 | End: 2020-09-09 | Stop reason: HOSPADM

## 2020-09-08 RX ORDER — ACETAMINOPHEN 500 MG
1000 TABLET ORAL EVERY 6 HOURS
Status: DISCONTINUED | OUTPATIENT
Start: 2020-09-08 | End: 2020-09-09 | Stop reason: HOSPADM

## 2020-09-08 RX ORDER — SODIUM CHLORIDE 0.9 % (FLUSH) 0.9 %
5-40 SYRINGE (ML) INJECTION AS NEEDED
Status: DISCONTINUED | OUTPATIENT
Start: 2020-09-08 | End: 2020-09-08 | Stop reason: HOSPADM

## 2020-09-08 RX ORDER — MAGNESIUM SULFATE 100 %
4 CRYSTALS MISCELLANEOUS AS NEEDED
Status: DISCONTINUED | OUTPATIENT
Start: 2020-09-08 | End: 2020-09-09 | Stop reason: HOSPADM

## 2020-09-08 RX ORDER — MIDAZOLAM HYDROCHLORIDE 1 MG/ML
INJECTION, SOLUTION INTRAMUSCULAR; INTRAVENOUS AS NEEDED
Status: DISCONTINUED | OUTPATIENT
Start: 2020-09-08 | End: 2020-09-08 | Stop reason: HOSPADM

## 2020-09-08 RX ORDER — SUCCINYLCHOLINE CHLORIDE 20 MG/ML
INJECTION INTRAMUSCULAR; INTRAVENOUS AS NEEDED
Status: DISCONTINUED | OUTPATIENT
Start: 2020-09-08 | End: 2020-09-08 | Stop reason: HOSPADM

## 2020-09-08 RX ORDER — HYOSCYAMINE SULFATE 0.12 MG/1
0.12 TABLET SUBLINGUAL
Status: DISCONTINUED | OUTPATIENT
Start: 2020-09-08 | End: 2020-09-09 | Stop reason: HOSPADM

## 2020-09-08 RX ORDER — PANTOPRAZOLE SODIUM 40 MG/1
40 TABLET, DELAYED RELEASE ORAL DAILY
Status: DISCONTINUED | OUTPATIENT
Start: 2020-09-09 | End: 2020-09-09 | Stop reason: HOSPADM

## 2020-09-08 RX ORDER — MIDAZOLAM HYDROCHLORIDE 1 MG/ML
1 INJECTION, SOLUTION INTRAMUSCULAR; INTRAVENOUS AS NEEDED
Status: DISCONTINUED | OUTPATIENT
Start: 2020-09-08 | End: 2020-09-08 | Stop reason: HOSPADM

## 2020-09-08 RX ORDER — ENOXAPARIN SODIUM 100 MG/ML
40 INJECTION SUBCUTANEOUS EVERY 24 HOURS
Status: DISCONTINUED | OUTPATIENT
Start: 2020-09-08 | End: 2020-09-08

## 2020-09-08 RX ORDER — ALBUTEROL SULFATE 90 UG/1
1 AEROSOL, METERED RESPIRATORY (INHALATION)
Status: DISCONTINUED | OUTPATIENT
Start: 2020-09-08 | End: 2020-09-08 | Stop reason: ALTCHOICE

## 2020-09-08 RX ORDER — BUPIVACAINE HYDROCHLORIDE AND EPINEPHRINE 5; 5 MG/ML; UG/ML
INJECTION, SOLUTION EPIDURAL; INTRACAUDAL; PERINEURAL AS NEEDED
Status: DISCONTINUED | OUTPATIENT
Start: 2020-09-08 | End: 2020-09-08 | Stop reason: HOSPADM

## 2020-09-08 RX ORDER — DEXAMETHASONE SODIUM PHOSPHATE 4 MG/ML
INJECTION, SOLUTION INTRA-ARTICULAR; INTRALESIONAL; INTRAMUSCULAR; INTRAVENOUS; SOFT TISSUE AS NEEDED
Status: DISCONTINUED | OUTPATIENT
Start: 2020-09-08 | End: 2020-09-08 | Stop reason: HOSPADM

## 2020-09-08 RX ORDER — HYDROMORPHONE HYDROCHLORIDE 1 MG/ML
1 INJECTION, SOLUTION INTRAMUSCULAR; INTRAVENOUS; SUBCUTANEOUS
Status: DISCONTINUED | OUTPATIENT
Start: 2020-09-08 | End: 2020-09-09 | Stop reason: HOSPADM

## 2020-09-08 RX ORDER — MAGNESIUM SULFATE HEPTAHYDRATE 40 MG/ML
INJECTION, SOLUTION INTRAVENOUS AS NEEDED
Status: DISCONTINUED | OUTPATIENT
Start: 2020-09-08 | End: 2020-09-08 | Stop reason: HOSPADM

## 2020-09-08 RX ORDER — LORAZEPAM 2 MG/ML
0.5 INJECTION INTRAMUSCULAR
Status: DISCONTINUED | OUTPATIENT
Start: 2020-09-08 | End: 2020-09-09 | Stop reason: HOSPADM

## 2020-09-08 RX ORDER — NALOXONE HYDROCHLORIDE 0.4 MG/ML
0.4 INJECTION, SOLUTION INTRAMUSCULAR; INTRAVENOUS; SUBCUTANEOUS AS NEEDED
Status: DISCONTINUED | OUTPATIENT
Start: 2020-09-08 | End: 2020-09-09 | Stop reason: HOSPADM

## 2020-09-08 RX ORDER — GABAPENTIN 250 MG/5ML
500 SOLUTION ORAL ONCE
Status: DISCONTINUED | OUTPATIENT
Start: 2020-09-08 | End: 2020-09-08 | Stop reason: HOSPADM

## 2020-09-08 RX ORDER — SODIUM CHLORIDE, SODIUM LACTATE, POTASSIUM CHLORIDE, CALCIUM CHLORIDE 600; 310; 30; 20 MG/100ML; MG/100ML; MG/100ML; MG/100ML
125 INJECTION, SOLUTION INTRAVENOUS CONTINUOUS
Status: DISCONTINUED | OUTPATIENT
Start: 2020-09-08 | End: 2020-09-09 | Stop reason: HOSPADM

## 2020-09-08 RX ORDER — DEXTROSE 50 % IN WATER (D50W) INTRAVENOUS SYRINGE
12.5-25 AS NEEDED
Status: DISCONTINUED | OUTPATIENT
Start: 2020-09-08 | End: 2020-09-08 | Stop reason: ALTCHOICE

## 2020-09-08 RX ORDER — ROCURONIUM BROMIDE 10 MG/ML
INJECTION, SOLUTION INTRAVENOUS AS NEEDED
Status: DISCONTINUED | OUTPATIENT
Start: 2020-09-08 | End: 2020-09-08 | Stop reason: HOSPADM

## 2020-09-08 RX ORDER — ONDANSETRON 2 MG/ML
4 INJECTION INTRAMUSCULAR; INTRAVENOUS
Status: DISCONTINUED | OUTPATIENT
Start: 2020-09-08 | End: 2020-09-09 | Stop reason: HOSPADM

## 2020-09-08 RX ORDER — FENTANYL CITRATE 50 UG/ML
INJECTION, SOLUTION INTRAMUSCULAR; INTRAVENOUS AS NEEDED
Status: DISCONTINUED | OUTPATIENT
Start: 2020-09-08 | End: 2020-09-08 | Stop reason: HOSPADM

## 2020-09-08 RX ORDER — METOPROLOL TARTRATE 50 MG/1
100 TABLET ORAL 2 TIMES DAILY
Status: DISCONTINUED | OUTPATIENT
Start: 2020-09-08 | End: 2020-09-09 | Stop reason: HOSPADM

## 2020-09-08 RX ORDER — INSULIN LISPRO 100 [IU]/ML
INJECTION, SOLUTION INTRAVENOUS; SUBCUTANEOUS
Status: DISPENSED
Start: 2020-09-08 | End: 2020-09-08

## 2020-09-08 RX ORDER — FENTANYL CITRATE 50 UG/ML
25 INJECTION, SOLUTION INTRAMUSCULAR; INTRAVENOUS
Status: COMPLETED | OUTPATIENT
Start: 2020-09-08 | End: 2020-09-08

## 2020-09-08 RX ORDER — ENOXAPARIN SODIUM 100 MG/ML
40 INJECTION SUBCUTANEOUS EVERY 24 HOURS
Status: DISCONTINUED | OUTPATIENT
Start: 2020-09-09 | End: 2020-09-09 | Stop reason: HOSPADM

## 2020-09-08 RX ORDER — INSULIN LISPRO 100 [IU]/ML
INJECTION, SOLUTION INTRAVENOUS; SUBCUTANEOUS
Status: DISCONTINUED | OUTPATIENT
Start: 2020-09-08 | End: 2020-09-09 | Stop reason: HOSPADM

## 2020-09-08 RX ORDER — SCOLOPAMINE TRANSDERMAL SYSTEM 1 MG/1
1 PATCH, EXTENDED RELEASE TRANSDERMAL ONCE
Status: DISCONTINUED | OUTPATIENT
Start: 2020-09-08 | End: 2020-09-09 | Stop reason: HOSPADM

## 2020-09-08 RX ORDER — DIPHENHYDRAMINE HYDROCHLORIDE 50 MG/ML
12.5 INJECTION, SOLUTION INTRAMUSCULAR; INTRAVENOUS
Status: COMPLETED | OUTPATIENT
Start: 2020-09-08 | End: 2020-09-09

## 2020-09-08 RX ADMIN — KETOROLAC TROMETHAMINE 15 MG: 30 INJECTION, SOLUTION INTRAMUSCULAR at 17:53

## 2020-09-08 RX ADMIN — LIDOCAINE HYDROCHLORIDE 100 MG: 20 INJECTION, SOLUTION EPIDURAL; INFILTRATION; INTRACAUDAL; PERINEURAL at 07:35

## 2020-09-08 RX ADMIN — MEPERIDINE HYDROCHLORIDE 12.5 MG: 25 INJECTION INTRAMUSCULAR; INTRAVENOUS; SUBCUTANEOUS at 09:59

## 2020-09-08 RX ADMIN — FENTANYL CITRATE 50 MCG: 50 INJECTION, SOLUTION INTRAMUSCULAR; INTRAVENOUS at 07:35

## 2020-09-08 RX ADMIN — ONDANSETRON 4 MG: 2 INJECTION INTRAMUSCULAR; INTRAVENOUS at 15:41

## 2020-09-08 RX ADMIN — SODIUM CHLORIDE, SODIUM LACTATE, POTASSIUM CHLORIDE, AND CALCIUM CHLORIDE 125 ML/HR: 600; 310; 30; 20 INJECTION, SOLUTION INTRAVENOUS at 10:27

## 2020-09-08 RX ADMIN — HYOSCYAMINE SULFATE 0.12 MG: 0.12 TABLET ORAL; SUBLINGUAL at 10:37

## 2020-09-08 RX ADMIN — HYDROMORPHONE HYDROCHLORIDE 1 MG: 1 INJECTION, SOLUTION INTRAMUSCULAR; INTRAVENOUS; SUBCUTANEOUS at 19:42

## 2020-09-08 RX ADMIN — SODIUM CHLORIDE, SODIUM LACTATE, POTASSIUM CHLORIDE, AND CALCIUM CHLORIDE 125 ML/HR: 600; 310; 30; 20 INJECTION, SOLUTION INTRAVENOUS at 23:34

## 2020-09-08 RX ADMIN — LORAZEPAM 0.5 MG: 2 INJECTION INTRAMUSCULAR; INTRAVENOUS at 10:20

## 2020-09-08 RX ADMIN — INSULIN LISPRO 3 UNITS: 100 INJECTION, SOLUTION INTRAVENOUS; SUBCUTANEOUS at 09:59

## 2020-09-08 RX ADMIN — ACETAMINOPHEN 1000 MG: 500 TABLET ORAL at 18:00

## 2020-09-08 RX ADMIN — ENOXAPARIN SODIUM 40 MG: 40 INJECTION SUBCUTANEOUS at 06:36

## 2020-09-08 RX ADMIN — PROPOFOL 150 MG: 10 INJECTION, EMULSION INTRAVENOUS at 07:35

## 2020-09-08 RX ADMIN — KETAMINE HYDROCHLORIDE 30 MG: 10 INJECTION, SOLUTION INTRAMUSCULAR; INTRAVENOUS at 07:35

## 2020-09-08 RX ADMIN — GABAPENTIN 200 MG: 100 CAPSULE ORAL at 17:57

## 2020-09-08 RX ADMIN — ACETAMINOPHEN 1000 MG: 650 SOLUTION ORAL at 06:36

## 2020-09-08 RX ADMIN — MAGNESIUM SULFATE 2 G: 2 INJECTION INTRAVENOUS at 07:44

## 2020-09-08 RX ADMIN — HYDROMORPHONE HYDROCHLORIDE 1 MG: 1 INJECTION, SOLUTION INTRAMUSCULAR; INTRAVENOUS; SUBCUTANEOUS at 13:24

## 2020-09-08 RX ADMIN — DEXAMETHASONE SODIUM PHOSPHATE 8 MG: 4 INJECTION, SOLUTION INTRAMUSCULAR; INTRAVENOUS at 07:45

## 2020-09-08 RX ADMIN — KETOROLAC TROMETHAMINE 15 MG: 30 INJECTION, SOLUTION INTRAMUSCULAR at 23:34

## 2020-09-08 RX ADMIN — Medication 10 ML: at 15:44

## 2020-09-08 RX ADMIN — LORAZEPAM 0.5 MG: 2 INJECTION INTRAMUSCULAR at 10:20

## 2020-09-08 RX ADMIN — Medication 80 MCG: at 08:16

## 2020-09-08 RX ADMIN — FENTANYL CITRATE 25 MCG: 50 INJECTION, SOLUTION INTRAMUSCULAR; INTRAVENOUS at 10:09

## 2020-09-08 RX ADMIN — SUCCINYLCHOLINE CHLORIDE 200 MG: 20 INJECTION, SOLUTION INTRAMUSCULAR; INTRAVENOUS at 07:36

## 2020-09-08 RX ADMIN — METOPROLOL TARTRATE 100 MG: 50 TABLET, FILM COATED ORAL at 17:56

## 2020-09-08 RX ADMIN — Medication 100 MCG: at 08:11

## 2020-09-08 RX ADMIN — KETOROLAC TROMETHAMINE 15 MG: 30 INJECTION, SOLUTION INTRAMUSCULAR at 11:28

## 2020-09-08 RX ADMIN — FENTANYL CITRATE 50 MCG: 50 INJECTION, SOLUTION INTRAMUSCULAR; INTRAVENOUS at 09:05

## 2020-09-08 RX ADMIN — CEFAZOLIN 3 G: 1 INJECTION, POWDER, FOR SOLUTION INTRAMUSCULAR; INTRAVENOUS; PARENTERAL at 07:45

## 2020-09-08 RX ADMIN — ONDANSETRON 4 MG: 2 INJECTION INTRAMUSCULAR; INTRAVENOUS at 10:08

## 2020-09-08 RX ADMIN — MIDAZOLAM 2 MG: 1 INJECTION INTRAMUSCULAR; INTRAVENOUS at 07:28

## 2020-09-08 RX ADMIN — ALBUMIN (HUMAN) 250 ML: 12.5 INJECTION, SOLUTION INTRAVENOUS at 07:55

## 2020-09-08 RX ADMIN — MEPERIDINE HYDROCHLORIDE 12.5 MG: 25 INJECTION INTRAMUSCULAR; INTRAVENOUS; SUBCUTANEOUS at 10:15

## 2020-09-08 RX ADMIN — ROCURONIUM BROMIDE 10 MG: 10 SOLUTION INTRAVENOUS at 07:35

## 2020-09-08 RX ADMIN — PROPOFOL 100 MG: 10 INJECTION, EMULSION INTRAVENOUS at 09:15

## 2020-09-08 RX ADMIN — SUGAMMADEX 400 MG: 100 INJECTION, SOLUTION INTRAVENOUS at 09:29

## 2020-09-08 RX ADMIN — SODIUM CHLORIDE, POTASSIUM CHLORIDE, SODIUM LACTATE AND CALCIUM CHLORIDE: 600; 310; 30; 20 INJECTION, SOLUTION INTRAVENOUS at 07:19

## 2020-09-08 RX ADMIN — INSULIN LISPRO 3 UNITS: 100 INJECTION, SOLUTION INTRAVENOUS; SUBCUTANEOUS at 17:52

## 2020-09-08 NOTE — OP NOTES
[address]   OPERATIVE REPORT    Name:  Jessica Spears  MR#:  [id number]  :  1990  ACCOUNT #:  [de-identified]  DATE OF SERVICE:  2020    PREOPERATIVE DIAGNOSIS:  Morbid obesity. POSTOPERATIVE DIAGNOSIS:  Morbid obesity. PROCEDURE PERFORMED:  Laparoscopic sleeve gastrectomy with laparoscopic hiatal hernia repair and esophagogastroduodenoscopy. SURGEON:  Kai Mayfield MD    ASSISTANT:  Caitlin Obregon SA    ANESTHESIA:  General.    COMPLICATIONS:  None. SPECIMENS REMOVED:  Partial gastrectomy. IMPLANTS:  None. ESTIMATED BLOOD LOSS:  Less than 50 mL. DRAINS:  None. FINDINGS:  Small hiatal hernia repaired primarily, normal sleeve gastrectomy over a 40-Burkinan ViSiGi bougie, normal postoperative EGD, negative leak test.    INDICATIONS FOR OPERATION:  The patient is a 56-year-old female, who has a history of morbid obesity with a BMI of 58 with bariatric comorbidities including hypertension, diabetes, obstructive sleep apnea, gastroesophageal reflux disease, who has been preoperatively evaluated for bariatric surgery. She has been through the necessary preoperative education and is now ready for surgery. DESCRIPTION OF OPERATION:  The patient was met in the preoperative holding area. The H and P was updated. Consent was signed. All risks and benefits were explained to the patient prior to start of the operation. She was taken back to the operating room. She was lying in a supine position. The abdomen was prepped and draped in standard sterile fashion. Time-out was called. Antibiotics were given. SCDs were on lower extremities. We started the operation by making a 5-mm incision into the left upper quadrant, inserting a VisiPort trocar into the intra-abdominal cavity, insufflating to 15 mmHg.   We then placed another 5-mm trocar superior into the left of the umbilicus, a 31-DS port superior and to the right of the umbilicus, then a right upper quadrant 5-mm port. We then had the patient placed in steep reverse Trendelenburg position and then we placed a subxiphoid liver retractor lifting the liver up and out of the way. We could see the stomach in its entirety and everything appeared to be normal.  There was a very small hiatal hernia, barely was present, but on her endoscopy had been identified and due to her GERD issues we were going to fix this hiatal hernia, so we dissected up along the pars flaccida dissecting up along the right caitlin and dissected along the right caitlin and into the mediastinum, dissecting between the right caitlin and the esophagus and GE junction. We dissected up anteriorly releasing these attachments of the hernia sac anteriorly and then on the left side. We then dissected posteriorly along the right caitlin, exposing the junction of the right and left caitlin posteriorly. Once we had dissected about 270 degrees, we would need to dissect and release our left side. We then started our dissection along the greater curve of the stomach starting at the midbody, dividing those gastrocolic ligaments and short gastric vessels going up superiorly along the greater curve of the stomach all the way up to the angle of His exposing the left caitlin and dissected between the left caitlin and the hiatus. Once we were up and there in that area for dissection, we had dissected around 360 degrees. The stomach was well within the abdominal cavity, and we had about 2-3 cm of the esophagus intra-abdominal.  We then had completed our hiatal hernia dissection. We would then close our hiatal hernia after we had placed the 40-Ivorian ViSiGi bougie down the esophagus and into the upper part of the stomach. We then closed our hiatal hernia defect with a running 0 nonabsorbable V-Loc suture in a running fashion. We closed it posteriorly and was snug underneath the bougie.   We then had completed the repair and removed our needle and then continued mobilizing the stomach inferiorly all the way down to our paco about 6 cm proximal to the pylorus, which would be the starting point of our staple line. The stomach was fully mobilized laterally and then we had the ViSiGi tube placed down the lesser curve of the stomach down into the area of the pylorus. It was hooked to suction and was in good position along the lesser curve. We then started creating a vertical sleeve gastrectomy with a Signia 60-mm black load with TRS reinforcement for the first firing of our stapler. We then used about 5 more staple loads to create the rest of our sleeve gastrectomy using purple loads with TRS reinforcement all the way up to the top creating a nice straight vertical sleeve gastrectomy all the way up into the upper portion of the stomach. The stomach was fully divided and our staple line was intact. There was no bleeding. Our staples had all fired correctly. We then removed the stomach and placed it into the right upper quadrant away from our operative field. We then had the ViSiGi tube removed off suction out of the stomach and then we oversewed the lateral portion with a 2-0 absorbable V-Loc suture buttressing with omentum laterally all the way down along the stomach. We then performed our endoscopy with placing the scope down the mouth into the esophagus and down into the sleeve stomach which had a normal appearance. There was no bleeding, twisting or angulation of the staple line. We then were satisfied with our sleeve. We then had completed that portion of the operation. We then removed our partial gastrectomy specimen from the 15-mm port site after using a fascial dilator to dilate the fascia. We then reinsufflated the abdominal cavity after our specimen was passed off the field and then closed our 15-mm port fascial defect with an Endo Close suture passing device in interrupted figure-of-eight fashion. We then looked at our operative anatomy one more time.   Everything was normal.  There was no bleeding. The staple line was nice and straight. We then removed the subxiphoid liver retractor and then suctioned out the abdominal cavity and then desufflated the abdominal cavity, removed the trocars and closed the skin with 4-0 Monocryl and Dermabond to complete the operation. Dr. Ivett Chisholm was present and scrubbed during the entire operation. The counts were correct.       Deanna Elaine MD      [id number]  D:  09/08/2020 9:50  T:  09/08/2020 14:45  JOB #:  [id number]

## 2020-09-08 NOTE — ANESTHESIA PREPROCEDURE EVALUATION
Anesthetic History   No history of anesthetic complications            Review of Systems / Medical History  Patient summary reviewed, nursing notes reviewed and pertinent labs reviewed    Pulmonary        Sleep apnea: CPAP    Asthma        Neuro/Psych         Psychiatric history     Cardiovascular    Hypertension              Exercise tolerance: >4 METS     GI/Hepatic/Renal     GERD           Endo/Other    Diabetes    Morbid obesity     Other Findings            Physical Exam    Airway  Mallampati: III  TM Distance: 4 - 6 cm  Neck ROM: normal range of motion   Mouth opening: Normal     Cardiovascular    Rhythm: regular  Rate: normal         Dental  No notable dental hx       Pulmonary  Breath sounds clear to auscultation               Abdominal  Abdominal exam normal       Other Findings            Anesthetic Plan    ASA: 3  Anesthesia type: general          Induction: Intravenous  Anesthetic plan and risks discussed with: Patient

## 2020-09-08 NOTE — ANESTHESIA POSTPROCEDURE EVALUATION
Post-Anesthesia Evaluation and Assessment    Patient: Tracey Yap MRN: [id number]  SSN: [id number]    YOB: 1990  Age: 26 y.o. Sex: female      I have evaluated the patient and they are stable and ready for discharge from the PACU. Cardiovascular Function/Vital Signs  Visit Vitals  /73   Pulse 99   Temp 36.7 °C (98.1 °F)   Resp 16   Ht 5' 3.5\" (1.613 m)   Wt 148.3 kg (327 lb)   SpO2 97%   BMI 57.02 kg/m²       Patient is status post General anesthesia for Procedure(s):  Laparoscopic Sleeve Gastrectomy with EGD; Laparoscopic hiatal hernia repair (E R A S) (LATEX ALLERGY)  ESOPHAGOGASTRODUODENOSCOPY (EGD). Nausea/Vomiting: None    Postoperative hydration reviewed and adequate. Pain:  Pain Scale 1: Numeric (0 - 10) (09/08/20 1048)  Pain Intensity 1: 7(pt states pain not at tolerable level yet but sleeping) (09/08/20 1048)   Managed    Neurological Status:   Neuro (WDL): Within Defined Limits (09/08/20 1048)  Neuro  Neurologic State: Sleeping (09/08/20 1048)  LUE Motor Response: Purposeful (09/08/20 0951)  LLE Motor Response: Purposeful (09/08/20 0951)  RUE Motor Response: Purposeful (09/08/20 0951)  RLE Motor Response: Purposeful (09/08/20 0951)   At baseline    Mental Status, Level of Consciousness: Alert and  oriented to person, place, and time    Pulmonary Status:   O2 Device: Nasal cannula (09/08/20 1048)   Adequate oxygenation and airway patent    Complications related to anesthesia: None    Post-anesthesia assessment completed.  No concerns    Signed By: Cortney Duarte MD     September 8, 2020

## 2020-09-08 NOTE — PERIOP NOTES
TRANSFER - OUT REPORT:    Verbal report given to SWETHA SHAH on Tiffany Ventura  being transferred to  for routine post - op       Report consisted of patients Situation, Background, Assessment and   Recommendations(SBAR). Time Pre op antibiotic given:0745  Anesthesia Stop time: 2008  Duran Present on Transfer to floor:no  Order for Duran on Chart:no  Discharge Prescriptions with Chart:no    Information from the following report(s) SBAR, OR Summary, Intake/Output, MAR and Recent Results was reviewed with the receiving nurse. Opportunity for questions and clarification was provided. Is the patient on 02? YES       L/Min 1       Other -    Is the patient on a monitor? NO    Is the nurse transporting with the patient? NO    Surgical Waiting Area notified of patient's transfer from PACU?  YES      The following personal items collected during your admission accompanied patient upon transfer:   Dental Appliance: Dental Appliances: (clothes, glasses, cpap returned in pacu)  Vision:    Hearing Aid:    Jewelry:    Clothing:    Other Valuables:    Valuables sent to safe:

## 2020-09-08 NOTE — ROUTINE PROCESS
Patient: Tiffany Ventura MRN: [id number]  SSN: [id number] YOB: 1990  Age: 26 y.o. Sex: female Patient is status post Procedure(s): 
Laparoscopic Sleeve Gastrectomy with EGD; Laparoscopic hiatal hernia repair (E R A S) (LATEX ALLERGY) ESOPHAGOGASTRODUODENOSCOPY (EGD). Surgeon(s) and Role: 
   Jose Elias Baron MD - Primary Local/Dose/Irrigation:  See [facility] Peripheral IV 09/08/20 Left Antecubital (Active) Peripheral IV 09/08/20 Left Hand (Active) Airway - Endotracheal Tube 09/08/20 (Active) Dressing/Packing:  Wound Abdomen trocar sites x 5-Dressing Type: Topical skin adhesive/glue (09/08/20 0930) Splint/Cast:  ] Other:  N/A

## 2020-09-08 NOTE — INTERVAL H&P NOTE
Update History & Physical 
 
The surgery was reviewed with the patient and I examined the patient. There was no change. The surgical site was confirmed by the patient and me. Plan:  The risk, benefits, expected outcome, and alternative to the recommended procedure have been discussed with the patient. Patient understands and wants to proceed with the procedure.  
 
Electronically signed by Myrna Villela MD on 9/8/2020 at 7:04 AM

## 2020-09-08 NOTE — BRIEF OP NOTE
Brief Postoperative Note    Patient: Jennifer Wells  YOB: 1990  MRN: [id number]    Date of Procedure: 9/8/2020     Pre-Op Diagnosis: MORBID OBESITY    Post-Op Diagnosis: Same as preoperative diagnosis.       Procedure(s):  Laparoscopic Sleeve Gastrectomy with EGD; Laparoscopic hiatal hernia repair (E R A S) (LATEX ALLERGY)  ESOPHAGOGASTRODUODENOSCOPY (EGD)    Surgeon(s):  Anjel Neff MD    Surgical Assistant: Rosa Buck    Anesthesia: General     Estimated Blood Loss (mL): less than 50     Complications: None    Specimens:   ID Type Source Tests Collected by Time Destination   1 : Gastric Fundus Fresh Stomach, Fundus  Anjel Neff MD 9/8/2020 [facility]        Implants: * No implants in log *    Drains: * No LDAs found *    Findings: small hiatal hernia repaired primarily, normal sleeve gastrectomy over a 40Fr Visigi tube, normal post op EGD, negative leak test    Electronically Signed by Loida Sneed MD on 9/8/2020 at 9:34 AM

## 2020-09-09 VITALS
DIASTOLIC BLOOD PRESSURE: 78 MMHG | WEIGHT: 293 LBS | HEART RATE: 78 BPM | HEIGHT: 64 IN | RESPIRATION RATE: 16 BRPM | OXYGEN SATURATION: 97 % | SYSTOLIC BLOOD PRESSURE: 130 MMHG | TEMPERATURE: 98.5 F | BODY MASS INDEX: 50.02 KG/M2

## 2020-09-09 LAB
ANION GAP SERPL CALC-SCNC: 6 MMOL/L (ref 5–15)
BUN SERPL-MCNC: 10 MG/DL (ref 6–20)
BUN/CREAT SERPL: 15 (ref 12–20)
CALCIUM SERPL-MCNC: 9.2 MG/DL (ref 8.5–10.1)
CHLORIDE SERPL-SCNC: 106 MMOL/L (ref 97–108)
CO2 SERPL-SCNC: 25 MMOL/L (ref 21–32)
CREAT SERPL-MCNC: 0.65 MG/DL (ref 0.55–1.02)
ERYTHROCYTE [DISTWIDTH] IN BLOOD BY AUTOMATED COUNT: 14.9 % (ref 11.5–14.5)
GLUCOSE BLD STRIP.AUTO-MCNC: 135 MG/DL (ref 65–100)
GLUCOSE BLD STRIP.AUTO-MCNC: 143 MG/DL (ref 65–100)
GLUCOSE SERPL-MCNC: 118 MG/DL (ref 65–100)
HCT VFR BLD AUTO: 34.9 % (ref 35–47)
HGB BLD-MCNC: 10.8 G/DL (ref 11.5–16)
MCH RBC QN AUTO: 26.6 PG (ref 26–34)
MCHC RBC AUTO-ENTMCNC: 30.9 G/DL (ref 30–36.5)
MCV RBC AUTO: 86 FL (ref 80–99)
NRBC # BLD: 0 K/UL (ref 0–0.01)
NRBC BLD-RTO: 0 PER 100 WBC
PLATELET # BLD AUTO: 333 K/UL (ref 150–400)
PMV BLD AUTO: 11.4 FL (ref 8.9–12.9)
POTASSIUM SERPL-SCNC: 4.2 MMOL/L (ref 3.5–5.1)
RBC # BLD AUTO: 4.06 M/UL (ref 3.8–5.2)
SERVICE CMNT-IMP: ABNORMAL
SERVICE CMNT-IMP: ABNORMAL
SODIUM SERPL-SCNC: 137 MMOL/L (ref 136–145)
WBC # BLD AUTO: 11.9 K/UL (ref 3.6–11)

## 2020-09-09 PROCEDURE — 36415 COLL VENOUS BLD VENIPUNCTURE: CPT

## 2020-09-09 PROCEDURE — 77010033678 HC OXYGEN DAILY

## 2020-09-09 PROCEDURE — 82962 GLUCOSE BLOOD TEST: CPT

## 2020-09-09 PROCEDURE — 74011636637 HC RX REV CODE- 636/637: Performed by: SURGERY

## 2020-09-09 PROCEDURE — 99218 HC RM OBSERVATION: CPT

## 2020-09-09 PROCEDURE — 74011250636 HC RX REV CODE- 250/636: Performed by: SURGERY

## 2020-09-09 PROCEDURE — 74011250637 HC RX REV CODE- 250/637: Performed by: SURGERY

## 2020-09-09 PROCEDURE — 80048 BASIC METABOLIC PNL TOTAL CA: CPT

## 2020-09-09 PROCEDURE — 96374 THER/PROPH/DIAG INJ IV PUSH: CPT

## 2020-09-09 PROCEDURE — 96372 THER/PROPH/DIAG INJ SC/IM: CPT

## 2020-09-09 PROCEDURE — 85027 COMPLETE CBC AUTOMATED: CPT

## 2020-09-09 RX ORDER — HYDROMORPHONE HYDROCHLORIDE 2 MG/1
2-4 TABLET ORAL
Qty: 30 TAB | Refills: 0 | Status: SHIPPED | OUTPATIENT
Start: 2020-09-09 | End: 2020-09-16

## 2020-09-09 RX ORDER — HYDROMORPHONE HYDROCHLORIDE 2 MG/1
4 TABLET ORAL
Status: DISCONTINUED | OUTPATIENT
Start: 2020-09-09 | End: 2020-09-09 | Stop reason: HOSPADM

## 2020-09-09 RX ADMIN — ACETAMINOPHEN 1000 MG: 500 TABLET ORAL at 13:34

## 2020-09-09 RX ADMIN — KETOROLAC TROMETHAMINE 15 MG: 30 INJECTION, SOLUTION INTRAMUSCULAR at 06:05

## 2020-09-09 RX ADMIN — Medication 10 ML: at 11:43

## 2020-09-09 RX ADMIN — ACETAMINOPHEN 1000 MG: 500 TABLET ORAL at 00:55

## 2020-09-09 RX ADMIN — SODIUM CHLORIDE, SODIUM LACTATE, POTASSIUM CHLORIDE, AND CALCIUM CHLORIDE 125 ML/HR: 600; 310; 30; 20 INJECTION, SOLUTION INTRAVENOUS at 08:59

## 2020-09-09 RX ADMIN — HYOSCYAMINE SULFATE 0.12 MG: 0.12 TABLET ORAL; SUBLINGUAL at 13:34

## 2020-09-09 RX ADMIN — INSULIN LISPRO 2 UNITS: 100 INJECTION, SOLUTION INTRAVENOUS; SUBCUTANEOUS at 06:58

## 2020-09-09 RX ADMIN — METOPROLOL TARTRATE 100 MG: 50 TABLET, FILM COATED ORAL at 11:43

## 2020-09-09 RX ADMIN — ACETAMINOPHEN 1000 MG: 500 TABLET ORAL at 06:58

## 2020-09-09 RX ADMIN — GABAPENTIN 200 MG: 100 CAPSULE ORAL at 08:45

## 2020-09-09 RX ADMIN — Medication 10 ML: at 06:05

## 2020-09-09 RX ADMIN — KETOROLAC TROMETHAMINE 15 MG: 30 INJECTION, SOLUTION INTRAMUSCULAR at 11:43

## 2020-09-09 RX ADMIN — ENOXAPARIN SODIUM 40 MG: 40 INJECTION SUBCUTANEOUS at 11:47

## 2020-09-09 RX ADMIN — PANTOPRAZOLE SODIUM 40 MG: 40 TABLET, DELAYED RELEASE ORAL at 08:45

## 2020-09-09 RX ADMIN — DIPHENHYDRAMINE HYDROCHLORIDE 12.5 MG: 50 INJECTION, SOLUTION INTRAMUSCULAR; INTRAVENOUS at 01:03

## 2020-09-09 NOTE — PROGRESS NOTES
JOSELYN: Discharge home when stable    -RUR: n/a due to observation status  -Observation notice provided in writing to patient and/or caregiver as well as verbal explanation of the policy. Patients who are in outpatient status also receive the Observation notice.    -Patient stated she would like assistance with a follow up appointment with her PCP.  CM notified the care management specialist.     Cecily Salas, ACM

## 2020-09-09 NOTE — PROGRESS NOTES
Bedside shift change report given to Anatoliy Beaver RN (oncoming nurse) by Oliver Cameron RN (offgoing nurse). Report included the following information SBAR, Kardex, Procedure Summary, Intake/Output, MAR and Recent Results.

## 2020-09-09 NOTE — ROUTINE PROCESS
Per CM request, 2 week PCP follow up appt has been scheduled. [facility] follow-up PCP transitional care appointment has been scheduled with Dr. Vivian Rincon for [facility], Sept 23 @ 10:00AM.Pending patient discharge. Harley Morales, Care Management Specialist. (virtual appts are all that the patients provider is offering at this time.)

## 2020-09-09 NOTE — ROUTINE PROCESS
Bedside shift change report given to [address] [place] [address] (oncoming nurse) by Teresa Vega RN  (offgoing nurse). Report included the following information SBAR, Kardex, Intake/Output and MAR.

## 2020-09-09 NOTE — DISCHARGE SUMMARY
Physician Discharge Summary     Patient ID:  Princess Gallego  [id number]  39 y.o.  1990    Admit Date: 2020    Discharge Date:     Admission Diagnoses: Morbid obesity ([facility] [E66.01]    Discharge Diagnoses: Active Problems: Morbid obesity ([facility] (2020)         Admission Condition: Good    Discharge Condition: Good    Procedure(s):    2020 - Procedure(s):  Laparoscopic Sleeve Gastrectomy with EGD; Laparoscopic hiatal hernia repair (E R A S) (LATEX ALLERGY)  ESOPHAGOGASTRODUODENOSCOPY (EGD)      Hospital Course:   Normal hospital course for this procedure. She did well on POD 1 tolerating liquids without issues, no N/V. She was DC home. Consults: None    Significant Diagnostic Studies: none    Disposition: home    Patient Instructions:   Current Discharge Medication List      START taking these medications    Details   HYDROmorphone (DILAUDID) 2 mg tablet Take 1-2 Tabs by mouth every four (4) hours as needed for Pain for up to 7 days. Max Daily Amount: 24 mg. Qty: 30 Tab, Refills: 0    Associated Diagnoses: Morbid obesity ([facility]         CONTINUE these medications which have NOT CHANGED    Details   meloxicam (MOBIC) 15 mg tablet Take 15 mg by mouth daily. ALPRAZolam (XANAX) 0.5 mg tablet Take 1 Tab by mouth nightly as needed for Anxiety or Sleep. Max Daily Amount: 0.5 mg. Indications: anxious, repeated episodes of anxiety, panic disorder  Qty: 30 Tab, Refills: 3    Associated Diagnoses: ALIYA (generalized anxiety disorder); Hyperhidrosis; Palpitation; Tachycardia      atorvastatin (LIPITOR) 40 mg tablet TAKE 1 TABLET BY MOUTH EVERY DAY  Qty: 90 Tab, Refills: 3    Associated Diagnoses: ALIYA (generalized anxiety disorder); Type 2 diabetes with nephropathy (HCC)      aspirin delayed-release 81 mg tablet Take 1 Tab by mouth daily. Qty: 30 Tab, Refills: 11    Associated Diagnoses: ALIYA (generalized anxiety disorder); Hyperhidrosis; Palpitation; Tachycardia;  Type 2 diabetes with nephropathy ([facility] Elevated C-reactive protein (CRP); Sjogren's syndrome with myopathy ([facility] Advice given about COVID-19 virus infection      gabapentin (NEURONTIN) 300 mg capsule Take 1 Cap by mouth three (3) times daily. Max Daily Amount: 900 mg. Qty: 270 Cap, Refills: 1    Associated Diagnoses: Migraine without aura and without status migrainosus, not intractable; Paresthesia      spironolactone (ALDACTONE) 25 mg tablet Take 1 Tab by mouth two (2) times a day. Qty: 180 Tab, Refills: 3    Associated Diagnoses: Hyperaldosteronism (HCC)      metoprolol tartrate (LOPRESSOR) 100 mg IR tablet TAKE 1 TABLET BY MOUTH TWICE A DAY  Qty: 180 Tab, Refills: 2    Associated Diagnoses: Essential hypertension; ALIYA (generalized anxiety disorder); Type 2 diabetes with nephropathy ([facility] Well woman exam (no gynecological exam)      omeprazole (PRILOSEC) 40 mg capsule TAKE 1 CAP BY MOUTH DAILY. Qty: 90 Cap, Refills: 3    Associated Diagnoses: Tobacco abuse disorder; Essential hypertension; ALIYA (generalized anxiety disorder); Type 2 diabetes with nephropathy ([facility] Well woman exam (no gynecological exam)      vilazodone (VIIBRYD) 40 mg tab tablet Take 1 Tab by mouth daily. Qty: 90 Tab, Refills: 2    Associated Diagnoses: ALIYA (generalized anxiety disorder); Type 2 diabetes with nephropathy (HCC)      semaglutide (OZEMPIC) 1 mg/dose (2 mg/1.5 mL) sub-q pen 1 mg by SubCUTAneous route every seven (7) days. Qty: 9 Box, Refills: 3      hyoscyamine SL (Levsin/SL) 0.125 mg SL tablet 1 Tab by SubLINGual route every four (4) hours as needed for Cramping. Qty: 30 Tab, Refills: 0      polyethylene glycol (MIRALAX) 17 gram/dose powder Take 17 g by mouth daily for 30 days. Qty: 510 g, Refills: 0      enoxaparin (LOVENOX) 40 mg/0.4 mL 0.4 mL by SubCUTAneous route daily for 14 days. Qty: 14 Syringe, Refills: 0      ondansetron (ZOFRAN ODT) 4 mg disintegrating tablet Take 1 Tab by mouth every eight (8) hours as needed for Nausea or Vomiting.   Qty: 30 Tab, Refills: 0      ergocalciferol (ERGOCALCIFEROL) 1,250 mcg (50,000 unit) capsule Take 1 Cap by mouth every Monday, Wednesday, Friday. Qty: 36 Cap, Refills: 0      nystatin-triamcinolone (MYCOLOG) 100,000-0.1 unit/gram-% ointment Apply  to affected area as needed for Skin Irritation. Blood-Glucose Meter,Continuous (Dexcom G6 ) misc Change sensor every 10 days  Qty: 1 Each, Refills: 0      Blood-Glucose Transmitter (Dexcom G6 Transmitter) nahid Change sensor every 10 days  Qty: 1 Device, Refills: 3      Blood-Glucose Sensor (Dexcom G6 Sensor) nahid Change sensor every 10 days  Qty: 3 Device, Refills: 12      Insulin Needles, Disposable, (Violet Pen Needle) 32 gauge x 5/32\" ndle One shot daily  Qty: 100 Pen Needle, Refills: 3      galcanezumab-gnlm (EMGALITY PEN) 120 mg/mL injection 1 mL by SubCUTAneous route every thirty (30) days. Qty: 1 mL, Refills: 11    Associated Diagnoses: Intractable chronic migraine without aura and without status migrainosus      butalbital-acetaminophen-caffeine (FIORICET) -40 mg per tablet Take 0.5-1 Tabs by mouth every six (6) hours as needed for Headache. Max Daily Amount: 4 Tabs. Qty: 12 Tab, Refills: 0      albuterol (PROVENTIL HFA, VENTOLIN HFA) 90 mcg/actuation inhaler Take 1 Puff by inhalation every four (4) hours as needed for Wheezing.   Qty: 1 Inhaler, Refills: 1         STOP taking these medications       metFORMIN ER (GLUCOPHAGE XR) 500 mg tablet Comments:   Reason for Stopping:               Activity: No heavy lifting for 2 weeks  Diet: bariatric liquid diet  Wound Care: Keep wound clean and dry    Follow-up with Ayse Matias MD in 2 week(s)  Follow-up tests/labs none    Signed:  Ayse Matias MD  9/9/2020  4:28 PM   .

## 2020-09-09 NOTE — CONSULTS
NUTRITION     Chart reviewed. Post-op bariatric diet instruction completed. Will gladly follow up for additional questions as needed. Thank you.      Nguyen Stinson RD

## 2020-09-09 NOTE — DISCHARGE INSTRUCTIONS
Laparoscopic Sleeve Gastrectomy    Patient Discharge Instructions    Sebastian Kan / [id number] : 1990    Admitted 2020 Discharged:        · It is important that you take the medication exactly as they are prescribed. · Keep your medication in the bottles provided by the pharmacist and keep a list of the medication names, dosages, and times to be taken in your wallet. · Do not take other medications without consulting your doctor. · Wound care: You may shower starting tomorrow. Do not remove the dermabond on the incision, it will fall of on its own in a few weeks. · No heavy lifting or strenuous activity for 2wks after the operation    Please add a B12 500mcg supplement daily. Because part of your stomach has been removed, you are more at risk for a B12 deficiency. Diet:  Full liquid Bariatric diet as outlined in your education book and on the handout provided by the dietician. Gastric Sleeve  Patient Discharge Instructions  [id number] [facility] at [facility]   [phone]    1. Activities: You may be active walking, stair climbing, and doing light weight activities with one to two-pound weights, sitting in a chair using different arm motions. Major restrictions include driving until your first office visit and lifting anything heavier than ten pounds. It is very important that you walk frequently. In addition to walking, you should continue to use your incentive spirometer (breathing exercises) throughout the day. 2. Caring for vour incision (s}: Your surgical incision(s) will be covered with Derma bond (super glue). You may shower as desired and simply pat dry the area over the incision(s). There may occasionally be seepage of yellowish to yellowish-maroon dissolved fat from your wound, which is of no major concern as long as the wound is not red, hardened in the area of the drainage, or if the drainage has a foul smell.  If there are any questions, call our office for further instructions. If there is this type of dissolved fat drainage,  the shower and gently express the fluid from your wound. Then keep gauze pads over the area to protect both the wound and your clothes. 3. When to call the office immediately:      *Chest pain (not associated with eating/drinking)  *Shortness of breath (more than normal)  *Sudden pain and/or redness in calf  *Fever greater than 101F  *Persistent nausea and/or vomiting (unable to keep down any liquids)  *Bleeding from incision(s)  *Severe abdominal pain  *Any other concerning symptoms    4. Diet: There are three main priorities as far as your diet is concerned---    a. Clear Liquids-drink from 1 oz. cups or standard shot glass. These liquids are non-carbonated, no added sugar, and not irritating to your stomach. They may include water, tea, coffee\" 100% no added sugar juices (avoid citrus) , clear broth, blenderized clear soups such as [employer] Healthy Request Chicken Noodle and Chicken & Rice, Sugar-free products including popsicles, Tera-Aid, and Crystal Lite. b. Vitamins: Multi-vitamin with iron, Vitamin B-12 and D capsule may be taken as recommended. c. Protein Intake: During your initial two-three weeks when your body is switching from burning glucose to directly burning fat, there is an attempt by your body to use protein as a fuel. To protect that protein, we like you to exercise as listed above, and to try to take in 50-60 grams of protein per day. This can be done with four 8 oz. serving of skim milk and Low Carb Milfay Instant Breakfast. Each 8 oz. should be considered a meal-breakfast, lunch, or supper, and during this mealtime it should be the sole ingested substance. Stop your clear liquids for 20 minutes before your start on the instant breakfast, which is sipped 1 oz. at a time.  You may also try the following substitute for Milfay Instant Breakfast: skim milk-fat free powdered milk + Egg Beaters + flavor extract. If desired, ice may be added and blenderized in the . If the milk upsets your stomach, you may purchase Lactaid tablets from any drug store. Lactaid skim milk may be purchased at most major supermarkets. Another alternative is Boost Diabetic, which is Lactose-free and ready to drink. There are many protein supplements on the market. Whey and Soy based protein powders/drinks are acceptable. If you have any questions about specific products please call the office. d. Other foods may be taken if you have met the requirements of a, b, and c. These foods should be low fat, low sugar, and low spice, and blenderized to the point that, if needed, could be sucked through a straw. One of the favorite treats is dietetic canned fruit blenderized with a combination of its juices and crushed ice, and then eaten in small amounts with a spoon. A smooth low-calorie, low-fat yogurt (should be 100 calories or less) is acceptable as is low-fat or fat-free cottage cheese. If you are having difficulty with your diet, please call the office. 5. Medications: Take no more than 2 pills at a time. Wait 20 minutes between pills. a. Vitamins as listed above---multi-vitamin with iron twice a day, B-12 once a day, vitamin D.    b. Acid reducing medicine--Will be prescribed by your surgeon at discharge. c. Mylanta Plus--one to two teaspoons as needed for belching or gas (other gas relieving medicines are also acceptable Beano, GasX, etc). d. Bowel Regulation--mild laxatives are permissible such as Milk of Magnesia, Dulcolax (either by mouth or suppository). If you are accustomed to using a r2ssodvjrh laxative not mentioned, you may continue to use it. Fibercon tablets or Benefiber may be taken as a fiber supplement to regulate bowel movements. Fibercon tablets should be broken in half and taken in half and taken one half in the morning and one half in the evening.   Benefiber (1 tsp.) can be added to your protein drink(s). It has no taste or added thickness. Imodium AD may be taken as needed for diarrhea.    e. Pain medication-one to two every four hours as needed for pain control. If pain is mild, try extra-strength Tylenol first.    f. Do not take any pain or arthritis medication such as Aspirin, Advil, Aleve, Naprosyn, or any other nonsteroidal anti-inflammatory medication unless approved by your surgeon. A Tylenol product is OK.    g. Preadmission medications may be resumed, but this should be discussed with your doctor before your discharge from the hospital.    6.  Follow up Office Visits:  call our office at [phone] if you have any questions or concerns. Your appointment should be 2 weeks from your surgery date. Do not plan on returning to work prior to this office visit unless discussed with your doctor. Information obtained by :  I understand that if any problems occur once I am at home I am to contact my physician. I understand and acknowledge receipt of the instructions indicated above.                                                                                                                                            Physician's or R.N.'s Signature                                                                  Date/Time                                                                                                                                              Patient or Representative Signature                                                          Date/Time         Master Fnie MD  [facility] [address], [address] [facility] [address]

## 2020-09-09 NOTE — PROGRESS NOTES
[facility]    POD #1    Subjective     Aleksg nwell, no N/V, pain controlled, OOB    Objective     Patient Vitals for the past 24 hrs:   Temp Pulse Resp BP SpO2   09/09/20 0525 98.4 °F (36.9 °C) 85 18 134/79 97 %   09/09/20 0000 98.5 °F (36.9 °C) 81 18 144/79 96 %   09/08/20 2000 98.3 °F (36.8 °C) 95 18 145/76 96 %   09/08/20 1532 97.6 °F (36.4 °C) (!) 101 18 131/56 98 %   09/08/20 1512 98.5 °F (36.9 °C) (!) 102 16 139/84 98 %   09/08/20 1346 98.3 °F (36.8 °C) (!) 104 18 135/80 97 %   09/08/20 1211 98.7 °F (37.1 °C) (!) 107 18 (!) 156/93 96 %   09/08/20 1115  (!) 102 16 164/79 97 %   09/08/20 1110  99 14  96 %   09/08/20 1048 98.1 °F (36.7 °C)  16  97 %   09/08/20 1045  99 10 152/73 98 %   09/08/20 1030  99 10 152/79 100 %   09/08/20 1020  99 18  100 %   09/08/20 1015  97 12 145/78 100 %   09/08/20 1010  (!) 103 19  98 %   09/08/20 1005  (!) 102 20 (!) 152/97 97 %   09/08/20 1000  (!) 104 16 (!) 148/98 97 %   09/08/20 0955  (!) 104 26 128/68 98 %   09/08/20 0954  (!) 105 14 154/84 97 %   09/08/20 0951 97.7 °F (36.5 °C) (!) 102 18 154/84 97 %       Date 09/08/20 0700 - 09/09/20 0659 09/09/20 0700 - 09/10/20 0659   Shift 0455-7561 2671-9056 24 Hour Total [id number] 2290-0659 24 Hour Total   INTAKE   I.V.(mL/kg/hr) 850(0.5)  850(0.2)        I.V. 850  850      Shift Total(mL/kg) 850(5.7)  850(5.7)      OUTPUT   Urine(mL/kg/hr) 200(0.1) 1100(0.6) 1300(0.4)        Urine Voided 200 1100 1300      Shift Total(mL/kg) 200(1.3) 1100(7.4) 1300(8.8)       -1100 -450      Weight (kg) 148. 3 148. 3 148. 3 148. 3 148. 3 148. 3       PE  Pulm - CTAB  CV - RRR  Abd - soft, ND, BS present, incisions c/d/i    Labs  Recent Results (from the past 12 hour(s))   GLUCOSE, POC    Collection Time: 09/08/20  9:18 PM   Result Value Ref Range    Glucose (POC) 162 (H) 65 - 100 mg/dL    Performed by [address], BASIC Collection Time: 09/09/20  5:32 AM   Result Value Ref Range    Sodium 137 136 - 145 mmol/L    Potassium 4.2 3.5 - 5.1 mmol/L    Chloride 106 97 - 108 mmol/L    CO2 25 21 - 32 mmol/L    Anion gap 6 5 - 15 mmol/L    Glucose 118 (H) 65 - 100 mg/dL    BUN 10 6 - 20 MG/DL    Creatinine 0.65 0.55 - 1.02 MG/DL    BUN/Creatinine ratio 15 12 - 20      GFR est AA >60 >60 ml/min/1.73m2    GFR est non-AA >60 >60 ml/min/1.73m2    Calcium 9.2 8.5 - 10.1 MG/DL   CBC W/O DIFF    Collection Time: 09/09/20  5:32 AM   Result Value Ref Range    WBC 11.9 (H) 3.6 - 11.0 K/uL    RBC 4.06 3.80 - 5.20 M/uL    HGB 10.8 (L) 11.5 - 16.0 g/dL    HCT 34.9 (L) 35.0 - 47.0 %    MCV 86.0 80.0 - 99.0 FL    MCH 26.6 26.0 - 34.0 PG    MCHC 30.9 30.0 - 36.5 g/dL    RDW 14.9 (H) 11.5 - 14.5 %    PLATELET 198 113 - 726 K/uL    MPV 11.4 8.9 - 12.9 FL    NRBC 0.0 0  WBC    ABSOLUTE NRBC 0.00 0.00 - 0.01 K/uL   GLUCOSE, POC    Collection Time: 09/09/20  6:46 AM   Result Value Ref Range    Glucose (POC) 143 (H) 65 - 100 mg/dL    Performed by Jeremie Blanton is a 26 y. o.yr old female s/p sleeve gastrectomy with hiatal hernia repair    Plan     Doing well  Start liquid diet today  OOB more  Continue IVF  PO dilaudid for pain  lovenox  Possible DC home later today    Kennedy Tomlinson MD

## 2020-09-11 ENCOUNTER — APPOINTMENT (OUTPATIENT)
Dept: VASCULAR SURGERY | Age: 30
End: 2020-09-11
Attending: EMERGENCY MEDICINE
Payer: COMMERCIAL

## 2020-09-11 ENCOUNTER — APPOINTMENT (OUTPATIENT)
Dept: CT IMAGING | Age: 30
End: 2020-09-11
Attending: EMERGENCY MEDICINE
Payer: COMMERCIAL

## 2020-09-11 ENCOUNTER — HOSPITAL ENCOUNTER (EMERGENCY)
Age: 30
Discharge: HOME | End: 2020-09-11
Attending: EMERGENCY MEDICINE | Admitting: EMERGENCY MEDICINE
Payer: COMMERCIAL

## 2020-09-11 ENCOUNTER — TELEPHONE (OUTPATIENT)
Dept: SURGERY | Age: 30
End: 2020-09-11

## 2020-09-11 ENCOUNTER — APPOINTMENT (OUTPATIENT)
Dept: GENERAL RADIOLOGY | Age: 30
End: 2020-09-11
Attending: EMERGENCY MEDICINE
Payer: COMMERCIAL

## 2020-09-11 VITALS
OXYGEN SATURATION: 100 % | RESPIRATION RATE: 22 BRPM | SYSTOLIC BLOOD PRESSURE: 123 MMHG | HEART RATE: 74 BPM | DIASTOLIC BLOOD PRESSURE: 77 MMHG | TEMPERATURE: 98 F

## 2020-09-11 DIAGNOSIS — R07.9 ACUTE CHEST PAIN: ICD-10-CM

## 2020-09-11 DIAGNOSIS — R06.02 SOB (SHORTNESS OF BREATH): Primary | ICD-10-CM

## 2020-09-11 LAB
ALBUMIN SERPL-MCNC: 3.1 G/DL (ref 3.5–5)
ALBUMIN/GLOB SERPL: 0.8 {RATIO} (ref 1.1–2.2)
ALP SERPL-CCNC: 65 U/L (ref 45–117)
ALT SERPL-CCNC: 77 U/L (ref 12–78)
ANION GAP SERPL CALC-SCNC: 6 MMOL/L (ref 5–15)
AST SERPL-CCNC: 22 U/L (ref 15–37)
ATRIAL RATE: 86 BPM
BASOPHILS # BLD: 0 K/UL (ref 0–0.1)
BASOPHILS NFR BLD: 1 % (ref 0–1)
BILIRUB SERPL-MCNC: 0.6 MG/DL (ref 0.2–1)
BNP SERPL-MCNC: 68 PG/ML
BUN SERPL-MCNC: 9 MG/DL (ref 6–20)
BUN/CREAT SERPL: 13 (ref 12–20)
CALCIUM SERPL-MCNC: 9.3 MG/DL (ref 8.5–10.1)
CALCULATED P AXIS, ECG09: 46 DEGREES
CALCULATED R AXIS, ECG10: 73 DEGREES
CALCULATED T AXIS, ECG11: 36 DEGREES
CHLORIDE SERPL-SCNC: 105 MMOL/L (ref 97–108)
CO2 SERPL-SCNC: 26 MMOL/L (ref 21–32)
COMMENT, HOLDF: NORMAL
CREAT SERPL-MCNC: 0.71 MG/DL (ref 0.55–1.02)
D DIMER PPP FEU-MCNC: 1.4 MG/L FEU (ref 0–0.65)
DIAGNOSIS, 93000: NORMAL
DIFFERENTIAL METHOD BLD: ABNORMAL
EOSINOPHIL # BLD: 0.1 K/UL (ref 0–0.4)
EOSINOPHIL NFR BLD: 1 % (ref 0–7)
ERYTHROCYTE [DISTWIDTH] IN BLOOD BY AUTOMATED COUNT: 15.4 % (ref 11.5–14.5)
GLOBULIN SER CALC-MCNC: 4 G/DL (ref 2–4)
GLUCOSE SERPL-MCNC: 97 MG/DL (ref 65–100)
HCT VFR BLD AUTO: 36.9 % (ref 35–47)
HGB BLD-MCNC: 11.2 G/DL (ref 11.5–16)
IMM GRANULOCYTES # BLD AUTO: 0 K/UL (ref 0–0.04)
IMM GRANULOCYTES NFR BLD AUTO: 0 % (ref 0–0.5)
LYMPHOCYTES # BLD: 2.5 K/UL (ref 0.8–3.5)
LYMPHOCYTES NFR BLD: 31 % (ref 12–49)
MCH RBC QN AUTO: 26.4 PG (ref 26–34)
MCHC RBC AUTO-ENTMCNC: 30.4 G/DL (ref 30–36.5)
MCV RBC AUTO: 87 FL (ref 80–99)
MONOCYTES # BLD: 0.9 K/UL (ref 0–1)
MONOCYTES NFR BLD: 11 % (ref 5–13)
NEUTS SEG # BLD: 4.7 K/UL (ref 1.8–8)
NEUTS SEG NFR BLD: 56 % (ref 32–75)
NRBC # BLD: 0 K/UL (ref 0–0.01)
NRBC BLD-RTO: 0 PER 100 WBC
P-R INTERVAL, ECG05: 134 MS
PLATELET # BLD AUTO: 322 K/UL (ref 150–400)
PMV BLD AUTO: 10.9 FL (ref 8.9–12.9)
POTASSIUM SERPL-SCNC: 4 MMOL/L (ref 3.5–5.1)
PROT SERPL-MCNC: 7.1 G/DL (ref 6.4–8.2)
Q-T INTERVAL, ECG07: 368 MS
QRS DURATION, ECG06: 82 MS
QTC CALCULATION (BEZET), ECG08: 440 MS
RBC # BLD AUTO: 4.24 M/UL (ref 3.8–5.2)
SAMPLES BEING HELD,HOLD: NORMAL
SODIUM SERPL-SCNC: 137 MMOL/L (ref 136–145)
TROPONIN I SERPL-MCNC: <0.05 NG/ML
VENTRICULAR RATE, ECG03: 86 BPM
WBC # BLD AUTO: 8.3 K/UL (ref 3.6–11)

## 2020-09-11 PROCEDURE — 74011000258 HC RX REV CODE- 258: Performed by: RADIOLOGY

## 2020-09-11 PROCEDURE — 96374 THER/PROPH/DIAG INJ IV PUSH: CPT

## 2020-09-11 PROCEDURE — 74011250636 HC RX REV CODE- 250/636: Performed by: EMERGENCY MEDICINE

## 2020-09-11 PROCEDURE — 83880 ASSAY OF NATRIURETIC PEPTIDE: CPT

## 2020-09-11 PROCEDURE — 71046 X-RAY EXAM CHEST 2 VIEWS: CPT

## 2020-09-11 PROCEDURE — 36415 COLL VENOUS BLD VENIPUNCTURE: CPT

## 2020-09-11 PROCEDURE — 74011000636 HC RX REV CODE- 636: Performed by: RADIOLOGY

## 2020-09-11 PROCEDURE — 93971 EXTREMITY STUDY: CPT

## 2020-09-11 PROCEDURE — 99283 EMERGENCY DEPT VISIT LOW MDM: CPT

## 2020-09-11 PROCEDURE — 85025 COMPLETE CBC W/AUTO DIFF WBC: CPT

## 2020-09-11 PROCEDURE — 84484 ASSAY OF TROPONIN QUANT: CPT

## 2020-09-11 PROCEDURE — 71275 CT ANGIOGRAPHY CHEST: CPT

## 2020-09-11 PROCEDURE — 85379 FIBRIN DEGRADATION QUANT: CPT

## 2020-09-11 PROCEDURE — 80053 COMPREHEN METABOLIC PANEL: CPT

## 2020-09-11 PROCEDURE — 93005 ELECTROCARDIOGRAM TRACING: CPT

## 2020-09-11 RX ORDER — SODIUM CHLORIDE 0.9 % (FLUSH) 0.9 %
10 SYRINGE (ML) INJECTION
Status: COMPLETED | OUTPATIENT
Start: 2020-09-11 | End: 2020-09-11

## 2020-09-11 RX ORDER — KETOROLAC TROMETHAMINE 30 MG/ML
15 INJECTION, SOLUTION INTRAMUSCULAR; INTRAVENOUS
Status: COMPLETED | OUTPATIENT
Start: 2020-09-11 | End: 2020-09-11

## 2020-09-11 RX ADMIN — KETOROLAC TROMETHAMINE 15 MG: 30 INJECTION, SOLUTION INTRAMUSCULAR at 12:09

## 2020-09-11 RX ADMIN — SODIUM CHLORIDE 100 ML: 900 INJECTION, SOLUTION INTRAVENOUS at 13:17

## 2020-09-11 RX ADMIN — IOPAMIDOL 100 ML: 755 INJECTION, SOLUTION INTRAVENOUS at 13:17

## 2020-09-11 RX ADMIN — Medication 10 ML: at 13:17

## 2020-09-11 NOTE — ED NOTES
Pt provided with discharge instructions. Pt verbalized understanding. IV removed. Ambulatory out of ED with VSS and no signs of distress.

## 2020-09-11 NOTE — ED PROVIDER NOTES
Patient is a 25-year-old female with history of non-insulin-dependent diabetes, GERD, hypertension, sleep apnea, asthma presenting to emergency department for evaluation of chest pain and shortness of breath with onset last night while she was sleeping. She reports that the pain woke her up last night. Pain is located in the central chest, with radiation to bilateral sides. She denies radiation to her arms, denies numbness or tingling. She also complains of right upper thigh pain with onset this morning, denies any leg swelling. No history of similar symptoms. She had a gastric sleeve surgery done with Dr. Boyd Cook this week on 9/8/2020 which was uncomplicated. She had residual abdominal soreness which has improved. She denies fever, chills, sweats, nausea, vomiting, diarrhea, or any other medical points this time. She is not currently taking birth control or estrogen products, no history of DVT or PE.            Past Medical History:   Diagnosis Date    Arrhythmia     tachycardia    Asthma     [place] hump 1/7/2015    Carpal tunnel syndrome of right wrist 12/20/2016    Diabetes ([facility] [address] [facility]     ALIYA (generalized anxiety disorder) 4/4/2018    GERD (gastroesophageal reflux disease) 8/13/2014    HTN (hypertension) 7/25/2011    Hyperaldosteronism ([address] 10/19/2015    Hyperhidrosis 4/4/2018    Morbid obesity ([address]     Other ill-defined conditions(799.89)     migraines    Palpitation 4/4/2018    Sleep apnea     uses CPAP    Tachycardia 4/4/2018       Past Surgical History:   Procedure Laterality Date    HX TONSILLECTOMY      LA REMOVAL OF TONSILS,<11 Y/O           Family History:   Problem Relation Age of Onset    Hypertension Mother     Diabetes Mother         Type II    Anesth Problems Mother         respiratory issues - feels like she cannot breath when coming out of anesthesia    Psychiatric Disorder Father     Drug Abuse Father     Cancer Other         prostate    Hypertension Sister     Thyroid Disease Sister         \"I think\"    Breast Cancer Sister     Attention Deficit Hyperactivity Disorder Brother     Hypertension Sister        Social History     Socioeconomic History    Marital status: SINGLE     Spouse name: Not on file    Number of children: Not on file    Years of education: Not on file    Highest education level: Not on file   Occupational History    Occupation: customer ser and student     Comment: [employer]   Social Needs    Financial resource strain: Not on file    Food insecurity     Worry: Not on file     Inability: Not on file   [employer] needs     Medical: Not on file     Non-medical: Not on file   Tobacco Use    Smoking status: Former Smoker     Packs/day: 0.50     Years: 5.00     Pack years: 2.50     Last attempt to quit: 2019     Years since quittin.8    Smokeless tobacco: Never Used   Substance and Sexual Activity    Alcohol use: Yes     Alcohol/week: 1.0 standard drinks     Types: 1 Shots of liquor, 1 Standard drinks or equivalent per week     Comment: socially, Monthly or less    Drug use: No    Sexual activity: Not Currently     Partners: Male     Birth control/protection: Condom   Lifestyle    Physical activity     Days per week: Not on file     Minutes per session: Not on file    Stress: Not on file   Relationships    Social connections     Talks on phone: Not on file     Gets together: Not on file     Attends Moravian service: Not on file     Active member of club or organization: Not on file     Attends meetings of clubs or organizations: Not on file     Relationship status: Not on file    Intimate partner violence     Fear of current or ex partner: Not on file     Emotionally abused: Not on file     Physically abused: Not on file     Forced sexual activity: Not on file   Other Topics Concern    Not on file   Social History Narrative    Not on file         ALLERGIES: Latex    Review of Systems   Constitutional: Negative for chills and fever. HENT: Negative for ear pain and sore throat. Eyes: Negative for visual disturbance. Respiratory: Positive for shortness of breath. Negative for cough. Cardiovascular: Positive for chest pain. Negative for palpitations and leg swelling. Gastrointestinal: Negative for abdominal pain, diarrhea, nausea and vomiting. Genitourinary: Negative for flank pain. Musculoskeletal: Positive for myalgias (right thigh pain). Negative for back pain. Skin: Negative for color change. Neurological: Negative for dizziness and headaches. Psychiatric/Behavioral: Negative for confusion. Vitals:    09/11/20 0923   BP: 116/73   Pulse: 85   Resp: 25   Temp: 97.3 °F (36.3 °C)   SpO2: 97%            Physical Exam  Vitals signs and nursing note reviewed. Constitutional:       General: She is not in acute distress. Appearance: Normal appearance. She is not ill-appearing. Interventions: She is not intubated. HENT:      Head: Normocephalic and atraumatic. Mouth/Throat:      Pharynx: Oropharynx is clear. Eyes:      Extraocular Movements: Extraocular movements intact. Conjunctiva/sclera: Conjunctivae normal.   Neck:      Musculoskeletal: Normal range of motion. No neck rigidity. Cardiovascular:      Rate and Rhythm: Normal rate and regular rhythm. Pulmonary:      Effort: Pulmonary effort is normal. No tachypnea, bradypnea, accessory muscle usage or respiratory distress. She is not intubated. Breath sounds: Normal breath sounds. No decreased breath sounds, wheezing, rhonchi or rales. Chest:      Chest wall: Tenderness (tenderness to palpation in central chest) present. Abdominal:      Palpations: Abdomen is soft. Tenderness: There is no abdominal tenderness. Musculoskeletal: Normal range of motion. Right lower leg: She exhibits tenderness (tenderness to palpation on right posterior upper and lower leg). No edema. Left lower leg: She exhibits no tenderness. No edema. Skin:     General: Skin is warm and dry. Neurological:      General: No focal deficit present. Mental Status: She is alert and oriented to person, place, and time. Psychiatric:         Mood and Affect: Mood normal.          MDM  Number of Diagnoses or Management Options  Acute chest pain:   SOB (shortness of breath):   Diagnosis management comments: Patient is alert, well-appearing, afebrile, vitals stable. Oxygen saturation 97% on room air. No clinical signs of respiratory distress or shortness of breath at this time. Onset of chest pain and shortness of breath overnight, dyspnea worse with exertion. She is postoperative from a gastric sleeve surgery, done by Dr. Paulo Moffett 9/8/2020. Lungs are clear to auscultation bilaterally. Chest pain is reproducible on exam.  Abdomen soft, mild soreness without guarding, nondistended. Tenderness palpation in the right posterior lower leg, no visible edema. No tenderness to left leg. I personally ambulated patient to the ED, no clinical symptoms of shortness of breath. EKG without acute ischemic changes or ectopy. Plan to obtain troponin, d-dimer, CBC, CMP, pro-BNP, right lower extremity duplex ultrasound, monitor, reassess. Amount and/or Complexity of Data Reviewed  Clinical lab tests: reviewed  Tests in the radiology section of CPT®: reviewed  Tests in the medicine section of CPT®: reviewed      ED Course as of Sep 11 1339   Fri Sep 11, 2020   1158 CBC WITH AUTOMATED DIFF(!):    WBC 8.3   RBC 4.24   HGB 11.2(!)   HCT 36.9   MCV 87.0   MCH 26.4   MCHC 30.4   RDW 15.4(!)   PLATELET 157   MPV 14.9   NRBC 0.0   ABSOLUTE NRBC 0.00   NEUTROPHILS 56   LYMPHOCYTES 31   MONOCYTES 11   EOSINOPHILS 1   BASOPHILS 1   IMMATURE GRANULOCYTES 0   ABS. NEUTROPHILS 4.7   ABS. LYMPHOCYTES 2.5   ABS. MONOCYTES 0.9   ABS. EOSINOPHILS 0.1   ABS. BASOPHILS 0.0   ABS. IMM.  GRANS. 0.0   DF AUTOMATED []   1158 TROPONIN I:    Troponin-I, Qt. <0.05 []   5820 METABOLIC PANEL, COMPREHENSIVE(!):    Sodium 137   Potassium 4.0   Chloride 105   CO2 26   Anion gap 6   Glucose 97   BUN 9   Creatinine 0.71   BUN/Creatinine ratio 13   GFR est AA >60   GFR est non-AA >60   Calcium 9.3   Bilirubin, total 0.6   ALT 77   AST 22   Alk. phosphatase 65   Protein, total 7.1   Albumin 3.1(!)   Globulin 4.0   A-G Ratio 0.8(!) [EH]   1158 NT-PRO BNP:    NT pro-BNP 68 [EH]   1158 CTA ordered. Duplex LE negative for DVT. D DIMER(!):    D-dimer 1.40(!) [EH]   1159 IMPRESSION: No acute cardiopulmonary disease. XR CHEST PA LAT [EH]   4419 Right Lower Venous     The common femoral, great saphenous, profunda femoral, femoral and popliteal vein(s) were imaged in the transverse and longitudinal planes. The vessels showed normal color filling and compressibility. Doppler interrogation of the veins showed phasic and spontaneous flow. Limited visualization of the posterior tibial and peroneal veins; patent where visualized. Left Lower Venous     For comparison purposes, the left common femoral vein was briefly interrogated. These vein demonstrates normal color filing and compressibility. Doppler flow was phasic and spontaneous. [EH]   5430 ED EKG interpretation:1:07 PM  Rhythm: normal sinus rhythm; and regular. Rate (approx.): 86; Axis: normal; P wave: normal; ST/T wave: no concerning ST elevations or depressions; Other findings: Sinus arrhythmia. LINK [place]        [EH]   1330 IMPRESSION:   No acute pulmonary embolism or other acute abnormality in the chest.   CTA CHEST W OR W WO CONT [EH]      ED Course User Index  [EH] LINK Lauren         1:40 PM  Troponin x1 negative. Patient in low risk heart category. D-dimer elevated, CTA of chest negative for acute pulmonary embolism, lower extremity ultrasound negative for acute DVT. Chest x-ray clear. Remainder of ED work-up unremarkable. Patient symptoms improved with IV Toradol.   She is to be discharged home with follow-up with her PCP and general surgeon as needed. Strict return precautions outlined. All questions answered at this time. Pt has been reevaluated. There are no new complaints, changes, or physical findings at this time. Medications have been reviewed w/ pt and/or family. Pt and/or family's questions have been answered. Pt and/or family expressed good understanding of the dx/tx/rx and is in agreement with plan of care. Pt instructed and agreed to f/u w/ PCP, general surgery and to return to ED upon further deterioration. Pt is ready for discharge. IMPRESSION:  1. SOB (shortness of breath)    2. Acute chest pain        PLAN:  1. Current Discharge Medication List        2.    Follow-up Information     Follow up With Specialties Details Why Contact Info    Alexander Snider MD Family Medicine Go in 3 days As needed [address].  [phone]              Return to ED if worse       Procedures

## 2020-09-11 NOTE — TELEPHONE ENCOUNTER
Patient in ER being evaluated for chest discomfort and SOB            ----- Message from [address]an sent at 9/10/2020 11:36 AM EDT -----  Regardinhr post op call    ----- Message -----  From: Jose Cruz Barkley  Sent: 9/10/2020   8:25 AM EDT  To: Francisca Rosales  Subject: Discharge Phone Call                             Good morning,    Please call Ms. Porter Zavala for her discharge phone call. Thank you! Elisabeth

## 2020-09-11 NOTE — ED TRIAGE NOTES
Arrives ambulatory for c/o SOB and right sided chest pain, worse with activity. Pt had gastric sleeve surgery on Tuesday by Dr. Rigoberto Will without complications. Left calf pain the other day, none currently. Also c/o right thigh pain currently.

## 2020-09-14 ENCOUNTER — PATIENT OUTREACH (OUTPATIENT)
Dept: CASE MANAGEMENT | Age: 30
End: 2020-09-14

## 2020-09-14 NOTE — PROGRESS NOTES
Patient contacted regarding recent discharge and COVID-19 risk. Discussed COVID-19 related testing which was not done at this time. Test results were not done. Patient informed of results, if available? No-    Care Transition Nurse/ Ambulatory Care Manager/ LPN Care Coordinator contacted the patient by telephone to perform post discharge assessment. Verified name and  with patient as identifiers. Patient has following risk factors of: morbid obesity. CTN/ACM/LPN reviewed discharge instructions, medical action plan and red flags related to discharge diagnosis. Reviewed and educated them on any new and changed medications related to discharge diagnosis. Advised obtaining a 90-day supply of all daily and as-needed medications. Advance Care Planning:   Does patient have an Advance Directive: currently not on file; education provided    Patient reports healthcare Decision Maker remains Mother:  Gale Thompson    Education provided regarding infection prevention, and signs and symptoms of COVID-19 and when to seek medical attention with patient who verbalized understanding. Discussed exposure protocols and quarantine from [address] you at higher risk for severe illness  and given an opportunity for questions and concerns. The patient agrees to contact the COVID-19 hotline [phone] or PCP office for questions related to their healthcare. CTN/ACM/LPN provided contact information for future reference. From CDC: Are you at higher risk for severe illness?  Wash your hands often.  Avoid close contact (6 feet, which is about two arm lengths) with people who are sick.  Put distance between yourself and other people if COVID-19 is spreading in your community.  Clean and disinfect frequently touched surfaces.  Avoid all cruise travel and non-essential air travel.    Call your healthcare professional if you have concerns about COVID-19 and your underlying condition or if you are sick.    For more information on steps you can take to protect yourself, see CDC's How to Protect Yourself      Patient/family/caregiver given information for [employer] and agrees to enroll no  . Plan for follow-up call in 7-14 days based on severity of symptoms and risk factors.

## 2020-09-17 ENCOUNTER — OFFICE VISIT (OUTPATIENT)
Dept: NEUROLOGY | Age: 30
End: 2020-09-17
Payer: COMMERCIAL

## 2020-09-17 VITALS — TEMPERATURE: 98.2 F

## 2020-09-17 DIAGNOSIS — G56.03 BILATERAL CARPAL TUNNEL SYNDROME: Primary | ICD-10-CM

## 2020-09-17 PROCEDURE — 95911 NRV CNDJ TEST 9-10 STUDIES: CPT | Performed by: PSYCHIATRY & NEUROLOGY

## 2020-09-17 PROCEDURE — 95885 MUSC TST DONE W/NERV TST LIM: CPT | Performed by: PSYCHIATRY & NEUROLOGY

## 2020-09-17 NOTE — PROCEDURES
ELECTRODIAGNOSTIC REPORT      Test Date:  2020    Patient: Joselito Aviles : 1990 Physician: Maude Sykes M.D.   ID#: [id number] SEX: Female Ref. Phys:      Patient History / Exam:  Lord Bottom 26 y.o. female presents with bilateral hand numbness. Query carpal tunnel syndrome    EMG & NCV Findings:  Evaluation of the left median motor and the right median motor nerves showed normal distal onset latency (L3.7, R3.6 ms), normal amplitude (L9.3, R13.6 mV), and normal conduction velocity (Elbow-Wrist, L62, R59 m/s). The right ulnar motor nerve showed normal distal onset latency (2.3 ms), normal amplitude (9.9 mV), decreased conduction velocity (Wrist-Abd Dig Minimi, 35 m/s), normal conduction velocity (B Elbow-Wrist, 56 m/s), and normal conduction velocity (A Elbow-B Elbow, 63 m/s). The left median sensory nerve showed normal distal onset latency (3.1 ms), normal distal peak latency (4.0 ms), and normal amplitude (36.9 µV). The right median sensory nerve showed normal distal onset latency (3.3 ms), prolonged distal peak latency (4.3 ms), and normal amplitude (19.9 µV). The right ulnar sensory nerve showed normal distal peak latency (3.4 ms) and normal amplitude (40.5 µV). The left median/ulnar (palm) comparison and the right median/ulnar (palm) comparison nerves showed prolonged distal peak latency (Median Palm, L2.3, R2.5 ms), reduced amplitude (Median Palm, L16.6, R21.9 µV), normal distal peak latency (Ulnar Palm, L1.5, R1.8 ms), reduced amplitude (Ulnar Palm, L9.6, R10.4 µV), and abnormal peak latency difference (Median Palm-Ulnar Palm, L0.8, R0.7 ms). All left vs. right side differences were within normal limits. All examined muscles (as indicated in the following table) showed no evidence of electrical instability. Impression: This is an abnormal study. 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 ___________________________  S. Fernando Frye M.D.    Nerve Conduction Studies  Anti Sensory Summary Table     Stim Site NR Onset (ms) Peak (ms) O-P Amp (µV) Norm Peak (ms) Norm O-P Amp Site1 Site2 Dist (cm) Norm Wilner (m/s)   Left Median Anti Sensory (2nd Digit)  34°C   Wrist    3.1 4.0 36.9 <4 >11 Wrist 2nd Digit 14.0    Right Median Anti Sensory (2nd Digit)   Wrist    3.3 4.3 19.9 <4 >11 Wrist 2nd Digit 14.0    Right Ulnar Anti Sensory (5th Digit)   Wrist    2.3 3.4 40.5 <4.0 >10 Wrist 5th Digit 14.0      Motor Summary Table     Stim Site NR Onset (ms) Norm Onset (ms) O-P Amp (mV) Norm O-P Amp P-T Amp (mV) Site1 Site2 Dist (cm) Wilner (m/s)   Left Median Motor (Abd Poll Brev)  34°C   Wrist    3.7 <4.5 9.3 >4.1  Wrist Abd Poll Brev 8.0 22   Elbow    7.4  7.0   Elbow Wrist 23.0 62   Right Median Motor (Abd Poll Brev)  34°C   Wrist    3.6 <4.5 13.6 >4.1  Wrist Abd Poll Brev 8.0 22   Elbow    7.3  12.3   Elbow Wrist 22.0 59   Right Ulnar Motor (Abd Dig Minimi)  34°C   Wrist    2.3 <3.1 9.9 >7.0  Wrist Abd Dig Minimi 8.0 35   B Elbow    6.4  8.9   B Elbow Wrist 23.0 56   A Elbow    8.0  8.8   A Elbow B Elbow 10.0 63     Comparison Summary Table     Stim Site NR Peak (ms) P-T Amp (µV) Site1 Site2 Dist (cm) Delta-P (ms)   Left Median/Ulnar Palm Comparison (Wrist)  34°C   Median Palm    2.3 17.8 Median Palm Ulnar Palm 8.0 0.8   Ulnar Palm    1.5 19.0       Right Median/Ulnar Palm Comparison (Wrist)   Median Palm    2.5 27.8 Median Palm Ulnar Palm 8.0 0.7   Ulnar Palm    1.8 22.7           EMG     Side Muscle Nerve Root Ins Act Fibs Psw Recrt Duration Amp Poly Comment   Right 1stDorInt Ulnar C8-T1 Nml Nml Nml Nml Nml Nml Nml    Right Abd Poll Brev Median C8-T1 Nml Nml Nml Nml Nml Nml Nml    Right PronatorTeres Median C6-7 Nml Nml Nml Nml Nml Nml Nml    Left 1stDorInt Ulnar C8-T1 Nml Nml Nml Nml Nml Nml Nml    Left Abd Poll Brev Median C8-T1 Nml Nml Nml Nml Nml Nml Nml      Waveforms:

## 2020-09-22 ENCOUNTER — OFFICE VISIT (OUTPATIENT)
Dept: SURGERY | Age: 30
End: 2020-09-22
Payer: COMMERCIAL

## 2020-09-22 VITALS
TEMPERATURE: 98 F | DIASTOLIC BLOOD PRESSURE: 72 MMHG | WEIGHT: 293 LBS | SYSTOLIC BLOOD PRESSURE: 93 MMHG | HEART RATE: 83 BPM | BODY MASS INDEX: 54.16 KG/M2

## 2020-09-22 DIAGNOSIS — Z09 FOLLOW-UP EXAMINATION AFTER ABDOMINAL SURGERY: Primary | ICD-10-CM

## 2020-09-22 DIAGNOSIS — E66.01 MORBID OBESITY (HCC): ICD-10-CM

## 2020-09-22 PROCEDURE — 99024 POSTOP FOLLOW-UP VISIT: CPT | Performed by: NURSE PRACTITIONER

## 2020-09-22 RX ORDER — IRON,CARBONYL/FOLIC ACID/MV-MN 30-0.8MG
2 TABLET,CHEWABLE ORAL DAILY
COMMUNITY
Start: 2020-08-25

## 2020-09-22 NOTE — PATIENT INSTRUCTIONS
Ok to switch to a capsule bariatric vitamin, just contact the company at [employer]. In addition to everything on the Bariatric Liquid diet, you may  
add these foods to your diet. Protein - include with every meal 
? Egg or egg substitute ? Low fat or fat-free cottage cheese ? Low fat or fat-free yogurt ? Low fat [place] yogurt ? Fat-free, 1% milk, or Lactaid milk ? Low-fat or vegetarian refried beans ? Well-cooked beans and lentils ? Fat-free or 2% reduced-fat cheese ? Hummus ? Low fat soup Snacks/Other Options: 
? Sugar free fudgsicles ? Sugar free cocoa ? No sugar added pudding Fruits and Vegetables ? Applesauce (no sugar added) ? Canned fruit (no sugar added) ? Fresh soft peeled fruits (melons, banana, avocado, berries) ? Any soft cooked vegetables ? Mashed potatoes, Sweet potatoes, baked potatoes (no skin) Condiments ? Fat free non-stick spray ? Herbs and spices ? Lite butter, margarine, canola oil, olive oil ? Reduced-fat or fat-free carter ? Reduced-fat or fat-free salad dressing ? Reduced-fat or fat-free cream cheese ? Reduced-fat or fat-free sour cream 
? Lemon juice ? Salt, pepper, mustard, ketchup, salsa Prepare food to the appropriate texture. Sample Meal Plan:  Soft and Mushy Breakfast ½ cup plain oatmeal with protein powder. Add cinnamon, nutmeg, Splenda brown sugar as desired for flavor 20-25 grams protein Snack (optional) High protein gelatin (recipe on www.[employer]. com) 10 grams protein Lunch ½ cup low fat cottage cheese or [place] yogurt with soft fruit 10 - 15 grams protein Snack (optional) High protein pudding or high protein popsicle (recipe on www.[employer]. com) 10 grams protein Dinner ¼ cup low-fat well cooked beans with low-fat cheese sprinkled on top ¼ cup no sugar added applesauce (can sprinkle protein powder) 5-8 grams protein 510  grams protein Key Points ? Continue to drink 48-64 ounces of low calorie, non-carbonated, sugar free beverages between meals. ? Eat 3 meals per day ? Measure each meal to ?cup per meal 
? Aim for 60 grams of protein every day. Try food sources of protein first.  
? Continue to supplement with protein shakes/powder to meet protein goals. ? Take small bites. Try eating with smaller utensils (baby spoon, cocktail fork). ? Chew food thoroughly ? Allow about 30 minutes to eat a meal.  Eating too fast may cause nausea or vomiting. ? Stop eating as soon as you feel full. Overeating may stretch your stomach's capacity and prevent desired weight loss. ? Do not drink liquids during meals and 30 minutes after meals. Drinking with meals may cause nausea or vomiting. ? Add one new food at a time ? Take vitamins daily ? No lifting greater than 20 lbs. ? You can do light jogging and walking. ? You may go into a pool. What to do if you are constipated: You may  take Milk of Magnesia. Take 2 Tablespoons followed by 16 oz of water then 2 hours later take another 2 tablespoons. If  milk of magnesia does not work then take [employer] or Miralax over the counter. Keep in mind that the Benefiber or Miralax may take a day or two to work. If all of the above do not work try a Fleets enema and follow the directions on the box. If you are not able to tolerate liquids or soft foods. Please call our office  [phone] If you have vomiting and persistent epigastric pain or chest pain. You should call our office, the doctor on-call or go to the emergency room.

## 2020-09-22 NOTE — PROGRESS NOTES
1. Have you been to the ER, urgent care clinic since your last visit? Hospitalized since your last visit? No      2. Have you seen or consulted any other health care providers outside of the [address] [place] [address] since your last visit? Include any pap smears or colon screening.  No

## 2020-09-22 NOTE — PROGRESS NOTES
Chief Complaint   Patient presents with    Morbid Obesity     2 weeks s/p sleeve. Down 16.4lb       Nancy Cheung is 2 weeks status post Sleeve gastrectomy for treatment of morbid obesity . Presents today for obesity management. Was in the ER post op with epigastric chest pain. \"nothing was wrong it was all that pressure\"   Resolved now    Patient has lost 16 lbs. Since surgery. Patient is satisfied with progress. Patient is consuming 60+ grams of protein daily. Patient is drinking 64 oz of fluids per day. Bowels moving most days   Nausea  no  Regurgitation  no  No fever or chills, chest pain or shortness of breath. Vitamin compliance yes  Activity  Walking every day   Sleep   Good   Physical Exam  Visit Vitals  BP 93/72 (BP 1 Location: Left arm, BP Patient Position: Sitting)   Pulse 83   Temp 98 °F (36.7 °C) (Oral)   Wt 310 lb 9.6 oz (140.9 kg)   LMP 09/20/2020   BMI 54.16 kg/m²     A + O x 3  Chest  CTA, unlabored   COR  RRR  ABD Soft, obese, lap sites C/D/I, no erythema or induration; NT/ND, no masses or hernias   EXT No edema; ambulating independently       ICD-10-CM ICD-9-CM    1. Follow-up examination after abdominal surgery  Z09 V67.09    2. Morbid obesity ([address]  E66.01 278.01    3. BMI 50.0-59.9, adult [facility])  Z68.43 V85.43        Nancy Cheung is 2 weeks  s/p Sleeve gastrectomy for treatment of morbid obesity  doing well   Diet soft stage I  Continue vitamins and protein supplements   Activity daily walking 10 minutes every hour   Sleep hygiene reviewed   Follow-up in 2 weeks    Support group  Nancy Cheung verbalized understanding and questions were answered to the best of my knowledge and ability. Diet, activity and mindfulness educational materials were provided. 16 minutes spent in face to face with [address] > 50% counseling.

## 2020-09-23 ENCOUNTER — VIRTUAL VISIT (OUTPATIENT)
Dept: FAMILY MEDICINE CLINIC | Age: 30
End: 2020-09-23
Payer: COMMERCIAL

## 2020-09-23 DIAGNOSIS — E53.8 B12 DEFICIENCY DUE TO DIET: ICD-10-CM

## 2020-09-23 DIAGNOSIS — Z76.89 ENCOUNTER FOR SUPPORT AND COORDINATION OF TRANSITION OF CARE: ICD-10-CM

## 2020-09-23 DIAGNOSIS — Z98.84 S/P GASTRIC BYPASS: ICD-10-CM

## 2020-09-23 DIAGNOSIS — I10 ESSENTIAL HYPERTENSION: Primary | ICD-10-CM

## 2020-09-23 DIAGNOSIS — Z71.89 ADVICE GIVEN ABOUT COVID-19 VIRUS INFECTION: ICD-10-CM

## 2020-09-23 PROCEDURE — 99214 OFFICE O/P EST MOD 30 MIN: CPT | Performed by: FAMILY MEDICINE

## 2020-09-23 RX ORDER — CYANOCOBALAMIN, ISOPROPYL ALCOHOL 1000MCG/ML
KIT INJECTION
Qty: 1 KIT | Refills: 0 | Status: SHIPPED | OUTPATIENT
Start: 2020-09-23 | End: 2020-10-14 | Stop reason: SDUPTHER

## 2020-09-23 RX ORDER — LANOLIN ALCOHOL/MO/W.PET/CERES
1000 CREAM (GRAM) TOPICAL DAILY
Qty: 30 TAB | Refills: 6 | Status: SHIPPED | OUTPATIENT
Start: 2020-09-23 | End: 2020-09-23 | Stop reason: ALTCHOICE

## 2020-09-23 NOTE — PROGRESS NOTES
HISTORY OF PRESENT ILLNESS  New Cheung is a 26 y.o. female. HPI   Pt's main concerns were provided on virtual visit, a telemed format,  pt is w/ comorbid medical history and unaware of been exposed to covid-19 individual, Pt Have been staying at home for couple of wks,  pt has no fever no cough no dyspnea, no ha, not dizzy, nl smell nl taste, no N/V/D, no body ache. Discharged on 9/9/2020 no rehab, was kept over night,  Wt is at 310, was 340 lb  Patient with current history of morbid obseity which presents   postoperatively for transitional care stating that pt doing well post operatively patient state that she was discharged recently had so far went to ER x1 for chest spasm and no dvt no pe neg results,   patient has not been feeling nauseated,   patient is able to keep any food down, patient states that the intake has been precisely managed since the operation,   patient is passing gas , she has been having bowel movement normal colored none bloody and her pain is nicely controlled and there was no sign sxs of dvt, infection nor bleeding, incision sites clean dry and intact,  she is compliant with multivitamins and the prescribed medication and has had no c, patient also state that she has been active with ambulation, leg elevation, currently has no fever or chills,or any worsening pain or leg swelling including problem with cp, and Sob     Current Outpatient Medications   Medication Sig Dispense Refill    iron,carbonyl-FA-multivit-min (Opurity Multivitamin) 30 mg iron- 800 mcg chew       calcium carbonate/vitamin D3 (CALCIUM 600 + D,3, PO) Take 1 Tab by mouth two (2) times a day.  Arm Brace misc Bilateral carpal tunnel splint to be used every night 2 Each 0    ergocalciferol (ERGOCALCIFEROL) 1,250 mcg (50,000 unit) capsule Take 1 Cap by mouth every Monday, Wednesday, Friday.  36 Cap 0    nystatin-triamcinolone (MYCOLOG) 100,000-0.1 unit/gram-% ointment Apply  to affected area as needed for Skin Irritation.  ALPRAZolam (XANAX) 0.5 mg tablet Take 1 Tab by mouth nightly as needed for Anxiety or Sleep. Max Daily Amount: 0.5 mg. Indications: anxious, repeated episodes of anxiety, panic disorder 30 Tab 3    gabapentin (NEURONTIN) 300 mg capsule Take 1 Cap by mouth three (3) times daily. Max Daily Amount: 900 mg. 270 Cap 1    spironolactone (ALDACTONE) 25 mg tablet Take 1 Tab by mouth two (2) times a day. 180 Tab 3    metoprolol tartrate (LOPRESSOR) 100 mg IR tablet TAKE 1 TABLET BY MOUTH TWICE A  Tab 2    omeprazole (PRILOSEC) 40 mg capsule TAKE 1 CAP BY MOUTH DAILY. 90 Cap 3    vilazodone (VIIBRYD) 40 mg tab tablet Take 1 Tab by mouth daily. 90 Tab 2    galcanezumab-gnlm (EMGALITY PEN) 120 mg/mL injection 1 mL by SubCUTAneous route every thirty (30) days. 1 mL 11    butalbital-acetaminophen-caffeine (FIORICET) -40 mg per tablet Take 0.5-1 Tabs by mouth every six (6) hours as needed for Headache. Max Daily Amount: 4 Tabs. 12 Tab 0    albuterol (PROVENTIL HFA, VENTOLIN HFA) 90 mcg/actuation inhaler Take 1 Puff by inhalation every four (4) hours as needed for Wheezing. 1 Inhaler 1    hyoscyamine SL (Levsin/SL) 0.125 mg SL tablet 1 Tab by SubLINGual route every four (4) hours as needed for Cramping. 30 Tab 0    ondansetron (ZOFRAN ODT) 4 mg disintegrating tablet Take 1 Tab by mouth every eight (8) hours as needed for Nausea or Vomiting. 30 Tab 0    atorvastatin (LIPITOR) 40 mg tablet TAKE 1 TABLET BY MOUTH EVERY DAY (Patient taking differently: 40 mg nightly. TAKE 1 TABLET BY MOUTH EVERY DAY) 90 Tab 3    aspirin delayed-release 81 mg tablet Take 1 Tab by mouth daily.  (Patient not taking: Reported on 9/23/2020) 30 Tab 11    Blood-Glucose Meter,Continuous (Dexcom G6 ) misc Change sensor every 10 days 1 Each 0    Blood-Glucose Transmitter (Dexcom G6 Transmitter) nahid Change sensor every 10 days 1 Device 3    Blood-Glucose Sensor (Dexcom G6 Sensor) nahid Change sensor every 10 days 3 Device 12     Allergies   Allergen Reactions    Latex Itching     Past Medical History:   Diagnosis Date    Arrhythmia     tachycardia    Asthma     Range hump 2015    Carpal tunnel syndrome of right wrist 2016    Diabetes ([facility]     ALIYA (generalized anxiety disorder) 2018    GERD (gastroesophageal reflux disease) 2014    HTN (hypertension) 2011    Hyperaldosteronism ([facility] 10/19/2015    Hyperhidrosis 2018    Morbid obesity ([facility]     Other ill-defined conditions(799.89)     migraines    Palpitation 2018    Sleep apnea     uses CPAP    Tachycardia 2018     Past Surgical History:   Procedure Laterality Date    HX GASTRECTOMY  2020    Gastric sleeve     HX TONSILLECTOMY      MS REMOVAL OF TONSILS,<11 Y/O       Family History   Problem Relation Age of Onset    Hypertension Mother     Diabetes Mother         Type II    Anesth Problems Mother         respiratory issues - feels like she cannot breath when coming out of anesthesia    Psychiatric Disorder Father     Drug Abuse Father     Cancer Other         prostate    Hypertension Sister     Thyroid Disease Sister         \"I think\"    Breast Cancer Sister     Attention Deficit Hyperactivity Disorder Brother     Hypertension Sister      Social History     Tobacco Use    Smoking status: Former Smoker     Packs/day: 0.50     Years: 5.00     Pack years: 2.50     Last attempt to quit: 2019     Years since quittin.8    Smokeless tobacco: Never Used   Substance Use Topics    Alcohol use:  Yes     Alcohol/week: 1.0 standard drinks     Types: 1 Shots of liquor, 1 Standard drinks or equivalent per week     Comment: socially, Monthly or less      Lab Results   Component Value Date/Time    WBC 8.3 2020 10:50 AM    HGB 11.2 (L) 2020 10:50 AM    HCT 36.9 2020 10:50 AM    PLATELET 527  10:50 AM    MCV 87.0 2020 10:50 AM     Lab Results   Component Value Date/Time TSH 1.730 04/04/2018 01:17 PM         Review of Systems   Constitutional: Positive for malaise/fatigue. Negative for chills and fever. HENT: Negative for nosebleeds. Eyes: Negative for pain. Respiratory: Negative for cough and wheezing. Cardiovascular: Negative for chest pain and leg swelling. Gastrointestinal: Negative for constipation, diarrhea and nausea. Genitourinary: Negative for frequency. Musculoskeletal: Negative for joint pain and myalgias. Skin: Negative for rash. Neurological: Negative for loss of consciousness. Endo/Heme/Allergies: Does not bruise/bleed easily. Psychiatric/Behavioral: Negative for depression. The patient is not nervous/anxious and does not have insomnia. All other systems reviewed and are negative. Physical Exam  Constitutional:       Appearance: She is obese. She is not ill-appearing or toxic-appearing. HENT:      Head: Normocephalic and atraumatic. Mouth/Throat:      Mouth: Mucous membranes are moist.   Neurological:      Mental Status: She is alert and oriented to person, place, and time. Psychiatric:         Mood and Affect: Mood normal.         Behavior: Behavior normal.         Thought Content: Thought content normal.         Judgment: Judgment normal.         ASSESSMENT and PLAN  Diagnoses and all orders for this visit:    1. Essential hypertension  -     cyanocobalamin, vitamin B-12, (B-12 Compliance) 1,000 mcg/mL kit; Once monthly  Indications: inadequate vitamin B12    2. Advice given about COVID-19 virus infection  -     cyanocobalamin, vitamin B-12, (B-12 Compliance) 1,000 mcg/mL kit; Once monthly  Indications: inadequate vitamin B12    3. Encounter for support and coordination of transition of care  -     cyanocobalamin, vitamin B-12, (B-12 Compliance) 1,000 mcg/mL kit; Once monthly  Indications: inadequate vitamin B12    4. S/P gastric bypass  -     cyanocobalamin, vitamin B-12, (B-12 Compliance) 1,000 mcg/mL kit;  Once monthly Indications: inadequate vitamin B12    5. B12 deficiency due to diet  -     cyanocobalamin, vitamin B-12, (B-12 Compliance) 1,000 mcg/mL kit; Once monthly  Indications: inadequate vitamin B12      Patient advised to continue with least amount of intake continue with current prescribed vitamins increase physical activity avoid alcohol and cigarettes and secondhand smoke,  pt doing well post hospitalization,  pain is nicely controlled and there was no sign sxs of dvt, infection nor bleeding, patient has been compliant with prescribed medications  and has hd no c, was advised to do ambulation, elevation, call for any sign of bleeding, fever or chills,or any worsening pain or leg swelling including problem with cp, and Sob, At this time to repeat blood pressure was normal     Patient advised to have the mask on most of the time, social distance and handwashing avoid crowded area pursuant to the emergency declaration under the [address] and [facility] waiver authority and the [employer] Act, this Virtual Visit was conducted, with patient's consent, to reduce the patient's risk of exposure to COVID-19 and provide continuity of care for an established patient  Services were provided through a Video synchronous discussion virtually to substitute for in-person appointment.

## 2020-09-23 NOTE — PATIENT INSTRUCTIONS
Gastric Sleeve Surgery: What to Expect at Home Your Recovery Sleeve gastrectomy is surgery to remove part of the stomach to help with weight loss. The surgery, which is also called gastric sleeve surgery, limits the amount of food your stomach can hold. You will have some belly pain. You may need pain medicine for the first week or so after surgery. The cuts (incisions) that the doctor made may be tender and sore. Because the surgery makes your stomach smaller, you will get full more quickly when you eat. It's important to think of this surgery as a tool to help you lose weight. It's not an instant fix. You will still need to eat a healthy diet and get regular exercise. This will help you reach your weight goal and avoid regaining the weight you lose. It's common to have many different emotions after this surgery. You may feel happy or excited as you begin to lose weight. But you may also feel overwhelmed or frustrated by the changes that you have to make in your diet, activity, and lifestyle. Talk with your doctor if you have concerns or questions. This care sheet gives you a general idea about how long it will take for you to recover. But each person recovers at a different pace. Follow the steps below to get better as quickly as possible. How can you care for yourself at home? Activity 
  · Rest when you feel tired. Getting enough sleep will help you recover.  
  · Try to walk each day. Start out by walking a little more than you did the day before. Bit by bit, increase the amount you walk. Walking boosts blood flow and helps prevent pneumonia and constipation.  
  · Avoid lifting anything that would make you strain.  This may include heavy grocery bags and milk containers, a heavy briefcase or backpack, cat litter or dog food bags, a vacuum , or a child.  
  · Avoid strenuous activities, such as bicycle riding, jogging, weight lifting, or aerobic exercise, until your doctor says it is okay. Do not take part in any activity where you could be hit in the belly. This could be sports or playing with children.  
  · Hold a pillow over your incision when you cough or take deep breaths. This will support your belly and decrease your pain.  
  · Do breathing exercises at home as instructed by your doctor. This will help prevent pneumonia.  
  · You can shower. Pat the incision dry. Do not take a bath for the first 2 weeks, or until your doctor tells you it is okay.  
  · You may drive when you are no longer taking prescription pain medicine and can quickly move your foot from the gas pedal to the brake. You must also be able to sit comfortably for a long period of time, even if you do not plan to go far. You might get caught in traffic.  
  · You will probably need to take 2 to 4 weeks off from work. It depends on the type of work you do and how you feel.  
  · Ask your doctor when it is okay for you to have sex. Diet 
  · Your doctor will give you specific instructions about what to eat after the surgery. For the first 2 to 6 weeks, you will need to follow a liquid or soft diet. Bit by bit, you will be able to add solid foods back into your diet.  
  · Have 5 or 6 small meals each day instead of 2 or 3 large meals. Chew each bite of food very well. Eat slowly. You may need to take 20 to 30 minutes to eat a meal.  
  · Avoid crusty breads, bagels, tough meats, raw vegetables, nuts and seeds (including crackers and breads that have nuts and seeds), and other foods that are hard to digest. If you feel full quickly, try to drink fluids between meals instead of with meals.  
  · Avoid fizzy drinks, such as soda pop.  
  · Avoid drinking with straws. This may help you swallow less air when you drink.  
  · Check with your doctor before drinking alcohol. Your body may absorb alcohol more quickly after surgery.   · You may notice that your bowel movements are not regular right after your surgery. This is common. Try to avoid constipation and straining with bowel movements. Take a fiber supplement every day. If you have not had a bowel movement after a couple of days, ask your doctor about taking a mild laxative. Medicines 
  · Your doctor will tell you if and when you can restart your medicines. He or she will also give you instructions about taking any new medicines.  
  · If you take aspirin or some other blood thinner, ask your doctor if and when to start taking it again. Make sure that you understand exactly what your doctor wants you to do.  
  · Take pain medicines exactly as directed. ? If the doctor gave you a prescription medicine for pain, take it as prescribed. ? Do not take two or more pain medicines at the same time unless the doctor told you to. Many pain medicines contain acetaminophen, which is Tylenol. Too much acetaminophen (Tylenol) can be harmful. ? Do not take aspirin (Anabela, Bufferin), ibuprofen (Advil, Motrin), or naproxen (Aleve) until your doctor says it is okay.  
  · If you think your pain medicine is making you sick to your stomach: 
? Take your medicine after meals (unless your doctor has told you not to). ? Ask your doctor for a different pain medicine.  
  · If your doctor prescribed antibiotics, take them as directed. Do not stop taking them just because you feel better. You need to take the full course of antibiotics. Incision care 
  · If you have strips of tape on the incision, leave the tape on for a week or until it falls off.  
  · Wash the area daily with warm, soapy water and pat it dry. Don't use hydrogen peroxide or alcohol, which can slow healing. You may cover the area with a gauze bandage if it weeps or rubs against clothing. Change the bandage every day.  
  · Keep the area clean and dry. Follow-up care is a key part of your treatment and safety.  Be sure to make and go to all appointments, and call your doctor if you are having problems. It's also a good idea to know your test results and keep a list of the medicines you take. When should you call for help? Call 911 anytime you think you may need emergency care. For example, call if: 
  · You passed out (lost consciousness).  
  · You are short of breath. Call your doctor now or seek immediate medical care if: 
  · You have pain that does not get better after you take pain medicine.  
  · You cannot pass stool or gas.  
  · You are sick to your stomach and cannot drink fluids.  
  · You have loose stitches, or your incision comes open.  
  · You have signs of a blood clot, such as: 
? Pain in your calf, back of the knee, thigh, or groin. ? Redness and swelling in your leg or groin.  
  · You have signs of infection, such as: 
? Increased pain, swelling, warmth, or redness. ? Red streaks leading from the incision. ? Pus draining from the incision. ? A fever. Watch closely for changes in your health, and be sure to contact your doctor if you have any problems. Where can you learn more? Go to http://donavon-stacy.info/ Enter [phone] in the search box to learn more about \"Gastric Sleeve Surgery: What to Expect at Home. \" Current as of: December 11, 2019               Content Version: 12.6 © 7084-4769 [employer]. Care instructions adapted under license by [employer] (which disclaims liability or warranty for this information). If you have questions about a medical condition or this instruction, always ask your healthcare professional. [place] [address] any warranty or liability for your use of this information.

## 2020-09-23 NOTE — PROGRESS NOTES
Chief Complaint   Patient presents with    Follow-up     gastric sleeve      1. Have you been to the ER, urgent care clinic since your last visit? Hospitalized since your last visit? Yes When: 9/11/20 Where: [facility]  Reason for visit: chest pain, shortness of breath    2. Have you seen or consulted any other health care providers outside of the [address] [place] [address] since your last visit? Include any pap smears or colon screening. No    Call placed to pt. Verified patient with two type of identifiers.   here today for post op gastro sleeve follow up,  Surgery on 9/8/2020,  Lost 16 pounds since surgery,  Denies c/o at present,      Not on diabetic medication at this time,  Blood sugars range  throughout the day

## 2020-09-29 ENCOUNTER — TELEPHONE (OUTPATIENT)
Dept: SURGERY | Age: 30
End: 2020-09-29

## 2020-09-29 NOTE — TELEPHONE ENCOUNTER
----- Message from Mundo Ramos sent at 9/10/2020 11:37 AM EDT -----  Regarding: 3wk post op call    ----- Message -----  From: Tiffany Miles  Sent: 9/10/2020   8:25 AM EDT  To: Francisca Rosales  Subject: Discharge Phone Call                             Good morning,    Please call Ms. Junito iRos for her discharge phone call. Thank you! Elisabeth

## 2020-09-29 NOTE — TELEPHONE ENCOUNTER
Bariatric Post-Operative Phone Calls: Week 3    Diet:Question of any nausea and/or vomiting. Question of tolerance to diet advancement from liquids to solids. Protein intake (goal is 60 grams of protein daily)   Poor____Fair____Good____Great__x__     Comment:______________________________________________________________      ______________________________________________________________________    Hydration:Less than 32 ounces of water daily is fair to poor (Goal is 64 ounces per day)   Poor____ Fair____ Good____Great_x___    Comment:______________________________________________________________    ______________________________________________________________________      Ambulation:( walking at least 3 x week, for at least 30 minutes)   Poor______ Fair______ Good______     Great__x____ Comment:__________________________________________________    ______________________________________________________________________      Urine Color: Question of any odor and color(should be adalberto, pale, and clear) Dark______ Amber______ Pale______      Clear___x___ Comment:___________________________________________________                           ________________________________________________________________    Bowel movements: Question of any constipation- haven't had any bowel movements for more than 3 days. This could be related to protein intake and/or narcotic pain medication usage. Comment:                                                         Having regular bowel movements                                                                     Pain: Left sided abdominal pain is normal (should be less than 3)         Question if pain medication is helpful.  10___ 9___ 8___ 7___ 6___ 5___ 4___ 3___     2___1___0__x_Comment:_________________________________________________    ______________________________________________________________________      Incision: (No redness, pain, swelling or fever) Healing Well___x___ Healed______Redness_________ Pain_________     Swelling_________ Fever__________(greater than 101 needs evaluation)    Comment:____________________________________________________________    ______________________________________________________________________  Use of incentive spirometer: Yes____       No    x       Next Appointment:_10/6/20_____________                 Support Group: Yes______No___x___    Additional Comments:____________________________________________________________    ____________________________________________________________________      If more than one parameter is not met or considered poor, nurse needs to discuss with provider recommend for patient to be seen in the office as soon as possible or refer to the provider for follow-up. Reinforce to patient to use bariatric educational booklet as guide. It is appropriate to refer patient to the nutritionist to discuss more in detail of diet and nutrition.

## 2020-10-01 ENCOUNTER — NURSE TRIAGE (OUTPATIENT)
Dept: OTHER | Facility: CLINIC | Age: 30
End: 2020-10-01

## 2020-10-01 ENCOUNTER — HOSPITAL ENCOUNTER (EMERGENCY)
Age: 30
Discharge: HOME | End: 2020-10-01
Attending: EMERGENCY MEDICINE
Payer: COMMERCIAL

## 2020-10-01 ENCOUNTER — TELEPHONE (OUTPATIENT)
Dept: ENDOCRINOLOGY | Age: 30
End: 2020-10-01

## 2020-10-01 VITALS
DIASTOLIC BLOOD PRESSURE: 63 MMHG | TEMPERATURE: 97 F | SYSTOLIC BLOOD PRESSURE: 105 MMHG | OXYGEN SATURATION: 99 % | HEART RATE: 85 BPM | RESPIRATION RATE: 16 BRPM

## 2020-10-01 DIAGNOSIS — E16.2 HYPOGLYCEMIA: Primary | ICD-10-CM

## 2020-10-01 LAB
ALBUMIN SERPL-MCNC: 3.1 G/DL (ref 3.5–5)
ALBUMIN/GLOB SERPL: 0.7 {RATIO} (ref 1.1–2.2)
ALP SERPL-CCNC: 84 U/L (ref 45–117)
ALT SERPL-CCNC: 34 U/L (ref 12–78)
ANION GAP SERPL CALC-SCNC: 8 MMOL/L (ref 5–15)
AST SERPL-CCNC: 24 U/L (ref 15–37)
BASOPHILS # BLD: 0.1 K/UL (ref 0–0.1)
BASOPHILS NFR BLD: 1 % (ref 0–1)
BILIRUB SERPL-MCNC: 0.4 MG/DL (ref 0.2–1)
BUN SERPL-MCNC: 9 MG/DL (ref 6–20)
BUN/CREAT SERPL: 13 (ref 12–20)
CALCIUM SERPL-MCNC: 9.6 MG/DL (ref 8.5–10.1)
CHLORIDE SERPL-SCNC: 108 MMOL/L (ref 97–108)
CO2 SERPL-SCNC: 24 MMOL/L (ref 21–32)
COMMENT, HOLDF: NORMAL
CREAT SERPL-MCNC: 0.68 MG/DL (ref 0.55–1.02)
DIFFERENTIAL METHOD BLD: ABNORMAL
EOSINOPHIL # BLD: 0.2 K/UL (ref 0–0.4)
EOSINOPHIL NFR BLD: 2 % (ref 0–7)
ERYTHROCYTE [DISTWIDTH] IN BLOOD BY AUTOMATED COUNT: 15.9 % (ref 11.5–14.5)
GLOBULIN SER CALC-MCNC: 4.3 G/DL (ref 2–4)
GLUCOSE BLD STRIP.AUTO-MCNC: 90 MG/DL (ref 65–100)
GLUCOSE SERPL-MCNC: 81 MG/DL (ref 65–100)
HCT VFR BLD AUTO: 37.9 % (ref 35–47)
HGB BLD-MCNC: 11.5 G/DL (ref 11.5–16)
IMM GRANULOCYTES # BLD AUTO: 0 K/UL (ref 0–0.04)
IMM GRANULOCYTES NFR BLD AUTO: 0 % (ref 0–0.5)
LYMPHOCYTES # BLD: 3.4 K/UL (ref 0.8–3.5)
LYMPHOCYTES NFR BLD: 40 % (ref 12–49)
MCH RBC QN AUTO: 26.4 PG (ref 26–34)
MCHC RBC AUTO-ENTMCNC: 30.3 G/DL (ref 30–36.5)
MCV RBC AUTO: 87.1 FL (ref 80–99)
MONOCYTES # BLD: 0.8 K/UL (ref 0–1)
MONOCYTES NFR BLD: 9 % (ref 5–13)
NEUTS SEG # BLD: 4 K/UL (ref 1.8–8)
NEUTS SEG NFR BLD: 48 % (ref 32–75)
NRBC # BLD: 0 K/UL (ref 0–0.01)
NRBC BLD-RTO: 0 PER 100 WBC
PLATELET # BLD AUTO: 379 K/UL (ref 150–400)
PMV BLD AUTO: 11 FL (ref 8.9–12.9)
POTASSIUM SERPL-SCNC: 3.7 MMOL/L (ref 3.5–5.1)
PROT SERPL-MCNC: 7.4 G/DL (ref 6.4–8.2)
RBC # BLD AUTO: 4.35 M/UL (ref 3.8–5.2)
SAMPLES BEING HELD,HOLD: NORMAL
SERVICE CMNT-IMP: NORMAL
SODIUM SERPL-SCNC: 140 MMOL/L (ref 136–145)
WBC # BLD AUTO: 8.4 K/UL (ref 3.6–11)

## 2020-10-01 PROCEDURE — 85025 COMPLETE CBC W/AUTO DIFF WBC: CPT

## 2020-10-01 PROCEDURE — 82962 GLUCOSE BLOOD TEST: CPT

## 2020-10-01 PROCEDURE — 80053 COMPREHEN METABOLIC PANEL: CPT

## 2020-10-01 PROCEDURE — 99283 EMERGENCY DEPT VISIT LOW MDM: CPT

## 2020-10-01 PROCEDURE — 36415 COLL VENOUS BLD VENIPUNCTURE: CPT

## 2020-10-01 NOTE — TELEPHONE ENCOUNTER
Patient called early today around 5 AM. She was noted to have a hx of gastric sleeve surgery. Reports last night she has had low blood sugar into the 40's and is not able to bring it up after consuming ice cream, juice, etc, remains low and she is worried. She denies being on any diabetes medications. In light of this, I have asked her to visit the ED, however not to drive herself there. Advised I would let Dr. Viola Oneill know.

## 2020-10-01 NOTE — ED PROVIDER NOTES
HPI the patient complains of hypoglycemia since yesterday. She has a home glucose monitor and it has given her readings from the 40s to the 60s since yesterday. She complains of a mild frontal headache and dizziness. She had a gastric sleeve performed on 9/8. Prior to that she had been on diabetic medicines but has been off of those medications since 9/1. She denies syncope, vomiting, fever, abdominal pain or other complaints.     Past Medical History:   Diagnosis Date    Arrhythmia     tachycardia    Asthma     Harney hump 1/7/2015    Carpal tunnel syndrome of right wrist 12/20/2016    Diabetes ([facility]     ALIYA (generalized anxiety disorder) 4/4/2018    GERD (gastroesophageal reflux disease) 8/13/2014    HTN (hypertension) 7/25/2011    Hyperaldosteronism ([facility] 10/19/2015    Hyperhidrosis 4/4/2018    Morbid obesity ([facility]     Other ill-defined conditions(799.89)     migraines    Palpitation 4/4/2018    Sleep apnea     uses CPAP    Tachycardia 4/4/2018       Past Surgical History:   Procedure Laterality Date    HX GASTRECTOMY  09/08/2020    Gastric sleeve     HX TONSILLECTOMY      AK REMOVAL OF TONSILS,<13 Y/O           Family History:   Problem Relation Age of Onset    Hypertension Mother     Diabetes Mother         Type II    Anesth Problems Mother         respiratory issues - feels like she cannot breath when coming out of anesthesia    Psychiatric Disorder Father     Drug Abuse Father     Cancer Other         prostate    Hypertension Sister     Thyroid Disease Sister         \"I think\"    Breast Cancer Sister     Attention Deficit Hyperactivity Disorder Brother     Hypertension Sister        Social History     Socioeconomic History    Marital status: SINGLE     Spouse name: Not on file    Number of children: Not on file    Years of education: Not on file    Highest education level: Not on file   Occupational History    Occupation: customer ser and student     Comment: [employer] Social Needs    Financial resource strain: Not on file    Food insecurity     Worry: Not on file     Inability: Not on file    Transportation needs     Medical: Not on file     Non-medical: Not on file   Tobacco Use    Smoking status: Former Smoker     Packs/day: 0.50     Years: 5.00     Pack years: 2.50     Last attempt to quit: 2019     Years since quittin.9    Smokeless tobacco: Never Used   Substance and Sexual Activity    Alcohol use: Yes     Alcohol/week: 1.0 standard drinks     Types: 1 Shots of liquor, 1 Standard drinks or equivalent per week     Comment: socially, Monthly or less    Drug use: No    Sexual activity: Not Currently     Partners: Male     Birth control/protection: Condom   Lifestyle    Physical activity     Days per week: Not on file     Minutes per session: Not on file    Stress: Not on file   Relationships    Social connections     Talks on phone: Not on file     Gets together: Not on file     Attends Catholic service: Not on file     Active member of club or organization: Not on file     Attends meetings of clubs or organizations: Not on file     Relationship status: Not on file    Intimate partner violence     Fear of current or ex partner: Not on file     Emotionally abused: Not on file     Physically abused: Not on file     Forced sexual activity: Not on file   Other Topics Concern    Not on file   Social History Narrative    Not on file         ALLERGIES: Latex    Review of Systems   Constitutional: Negative for fever. HENT: Negative for voice change. Eyes: Negative for pain. Respiratory: Negative for cough and shortness of breath. Cardiovascular: Negative for chest pain. Gastrointestinal: Negative for abdominal pain, nausea and vomiting. Genitourinary: Negative for flank pain. Musculoskeletal: Negative for back pain. Skin: Negative for rash. Neurological: Positive for dizziness and headaches. Psychiatric/Behavioral: Negative for confusion. Vitals:    10/01/20 0617   BP: (!) 198/68   Pulse: 85   Resp: 16   Temp: 97 °F (36.1 °C)   SpO2: 99%            Physical Exam  Constitutional:       General: She is not in acute distress. Appearance: She is well-developed. HENT:      Head: Normocephalic. Neck:      Musculoskeletal: Normal range of motion. Cardiovascular:      Rate and Rhythm: Normal rate. Pulmonary:      Effort: Pulmonary effort is normal.   Musculoskeletal: Normal range of motion. Skin:     General: Skin is warm and dry. Capillary Refill: Capillary refill takes less than 2 seconds. Neurological:      General: No focal deficit present. Mental Status: She is alert and oriented to person, place, and time. Motor: No weakness.    Psychiatric:         Behavior: Behavior normal.          MDM       Procedures

## 2020-10-01 NOTE — TELEPHONE ENCOUNTER
Caller informing nurse triage that her endocrinologist has directed her to the ED  For low blood sugars    Proceeding to [address] ED    Dexacom reading in the 40's and \"low\" message\"  She called her endocrinologist, spoke with him 10 min ago and reported it was 40's. He directed her to ED  Brady Wynne (endocrinology)       Reason for Disposition   Caller has already spoken with the PCP and has no further questions.     Protocols used: NO CONTACT OR DUPLICATE CONTACT CALL-ADULT-

## 2020-10-01 NOTE — ED TRIAGE NOTES
Triage: Pt arrives from home with CC of  Low blood glucose readings on her home monitor. She reports it has been alerting her to readings in the 62s and 40s since yesterday. She took some juice and ice cream for the low blood glucose but she had a recent gastric sleeve and cannot tolerate excessive PO intake. Glucose in triage 90. Pt asymptomatic. Reports since getting the sleeve gastrectomy she has not been antihyperglycemics.

## 2020-10-02 ENCOUNTER — PATIENT OUTREACH (OUTPATIENT)
Dept: CASE MANAGEMENT | Age: 30
End: 2020-10-02

## 2020-10-02 ENCOUNTER — PATIENT OUTREACH (OUTPATIENT)
Dept: OTHER | Age: 30
End: 2020-10-02

## 2020-10-02 NOTE — ACP (ADVANCE CARE PLANNING)
Advance Care Planning:   Does patient have an Advance Directive: health care decision makers updated and currently not on file; patient declined education

## 2020-10-02 NOTE — PROGRESS NOTES
Patient on report as eligible for Case Management. Left discreet message on voicemail with this CM contact information. Will attempt to contact again to offer [address] [place] [address] Management services. Patient had gastric sleeve on 9/8, has had 2 ED visits since then, did not show up on our reports for CM. Will reach out again on 10/5.

## 2020-10-02 NOTE — PROGRESS NOTES
Patient contacted regarding recent discharge and COVID-19 risk. Discussed COVID-19 related testing which was not done at this time. Test results were not done. Patient informed of results, if available? no    Care Transition Nurse/ Ambulatory Care Manager/ LPN Care Coordinator contacted the patient by telephone to perform post discharge assessment. Verified name and  with patient as identifiers. Patient has following risk factors of: diabetes. CTN/ACM/LPN reviewed discharge instructions, medical action plan and red flags related to discharge diagnosis. Reviewed and educated them on any new and changed medications related to discharge diagnosis. Advised obtaining a 90-day supply of all daily and as-needed medications. Advance Care Planning:   Does patient have an Advance Directive: health care decision makers updated and currently not on file; patient declined education    Education provided regarding infection prevention, and signs and symptoms of COVID-19 and when to seek medical attention with patient who verbalized understanding. Discussed exposure protocols and [facility] at higher risk for severe illness 2019 and given an opportunity for questions and concerns. The patient agrees to contact the COVID-19 hotline [phone] or PCP office for questions related to their healthcare. CTN/ACM/LPN provided contact information for future reference. From CDC: Are you at higher risk for severe illness?  Wash your hands often.  Avoid close contact (6 feet, which is about two arm lengths) with people who are sick.  Put distance between yourself and other people if COVID-19 is spreading in your community.  Clean and disinfect frequently touched surfaces.  Avoid all cruise travel and non-essential air travel.  Call your healthcare professional if you have concerns about COVID-19 and your underlying condition or if you are sick.     For more information on steps you can take to protect yourself, see CDC's How to Protect Yourself      Patient/family/caregiver given information for GetWell Loop and agrees to enroll no    Plan for follow-up call in 7-14 days based on severity of symptoms and risk factors. ACM will reach out to [address] for possible follow up with patient- noted after call patient is a [employer]. Patient reports doing better today. Reviewed sx's of hypoglycemia. Patient reports has spoken with Dr Viola Oneill and he recommended she increase her protein after exercising to prevent hypoglycemia. She has a follow up with him 10/22 and a follow up with surgeon from recent sleeve gastrectomy (9/8) on Tuesday. COVID teaching provided. No questions /concerns. 11:05 am  Patient will receive follow up further by Yunier Roach as she is an employee.

## 2020-10-05 ENCOUNTER — PATIENT OUTREACH (OUTPATIENT)
Dept: OTHER | Age: 30
End: 2020-10-05

## 2020-10-05 NOTE — PROGRESS NOTES
Briefly spoke to a female who answered the phone. Let her know who I was. She stated that Martha Madison was still sleeping. Let her know that I would call back and asked for a good time. Was told in two hours. Will attempt outreach again around 1 or 2 pm today, 10/5.

## 2020-10-06 ENCOUNTER — PATIENT OUTREACH (OUTPATIENT)
Dept: OTHER | Age: 30
End: 2020-10-06

## 2020-10-06 ENCOUNTER — VIRTUAL VISIT (OUTPATIENT)
Dept: SURGERY | Age: 30
End: 2020-10-06
Payer: COMMERCIAL

## 2020-10-06 VITALS — BODY MASS INDEX: 53.88 KG/M2 | WEIGHT: 293 LBS

## 2020-10-06 DIAGNOSIS — Z09 FOLLOW-UP EXAMINATION AFTER ABDOMINAL SURGERY: Primary | ICD-10-CM

## 2020-10-06 DIAGNOSIS — E66.01 MORBID OBESITY (HCC): ICD-10-CM

## 2020-10-06 PROCEDURE — 99024 POSTOP FOLLOW-UP VISIT: CPT | Performed by: NURSE PRACTITIONER

## 2020-10-06 NOTE — PROGRESS NOTES
I was in the office while conducting this encounter. Consent:  She and/or her healthcare decision maker is aware that this patient-initiated Telehealth encounter is a billable service, with coverage as determined by her insurance carrier. She is aware that she may receive a bill and has provided verbal consent to proceed: No - Not billable    This virtual visit was conducted via [employer]. Pursuant to the emergency declaration under the [facility], [facility] waiver authority and the [employer] Act, this Virtual  Visit was conducted to reduce the patient's risk of exposure to COVID-19 and provide continuity of care for an established patient. Services were provided through a video synchronous discussion virtually to substitute for in-person clinic visit. Due to this being a TeleHealth evaluation, many elements of the physical examination are unable to be assessed. Total Time: minutes: 11-20 minutes. Chief Complaint   Patient presents with    Surgical Follow-up     4 wks s/p sleeve gastrectomy, down 17lbs lost 1lb / [phone]       Elana Maier is 4 weeks status post Sleeve gastrectomy for treatment of morbid obesity . Presents today for obesity management. Patient has lost 17 lbs. Since surgery. Patient is satisfied with progress. Still having pain, but \"all related to my ovaries and in the pelvis\"  Having right opherectomy 10/19/20  Patient is consuming about 40 grams of protein daily. Trying to get protein from food and using the Glucerna after walking   Had to go to the ER with BS< 40 \"it has only happened once\" and she is in contact with endocrinology   BS are better now   Also has been lightheaded and low BP 90's / 50's . She needs f/u with Lane Short MD but has not called yet   Patient is drinking about 48 oz of fluids per day. Bowels moving daily.     Constipated  no  Using laxatives no  Nausea no  Regurgitation  no  No fever or chills, chest pain or shortness of breath. Vitamin compliance yes  Activity  Walking every day   Sleep   Good   Physical Exam  Visit Vitals  Wt 309 lb (140.2 kg)   LMP 09/20/2020   BMI 53.88 kg/m²     A + O x 3, looks well   Breathing unlabored and regular   abd well healed   ambulating independently       ICD-10-CM ICD-9-CM    1. Follow-up examination after abdominal surgery  Z09 V67.09    2. Morbid obesity ([address]  E66.01 278.01    3. BMI 50.0-59.9, adult [facility])  Z68.43 V85.43        Nancy Armstrong is 4 weeks  s/p Sleeve gastrectomy for treatment of morbid obesity   Diabetes with hypoglycemia and has adjusted insulin endocrinology is following   Low BP - advised to decrease metoprolol to 1/2 tab bid (50 mg tab) and d/w Caryle Listen, MD this week   Cautioned with position changes    Diet soft stage 2   Continue vitamins and protein supplements   Activity daily walking 10 minutes every hour   Sleep hygiene reviewed   Follow-up in 2 weeks   Return to work 10/19/20 regular duties (she has GYN surgery scheduled for 10/19/20 and they will then start another leave occurrence unrelated to her bariatric surgery)    Support group  [address] verbalized understanding and questions were answered to the best of my knowledge and ability. Diet, activity and mindfulness educational materials were provided. Reviewed medication list, problem list and allergies. 11 minutes spent virtually with patient in directed conversation with the patient to include medical condition, assessment and plan.

## 2020-10-06 NOTE — PROGRESS NOTES
[facility] progress note    Patient eligible for [address] [address] care management    Received notification of discharge from ED on 10/1 for low glucose, post op gastric sleeve. Patient states she has been working with her endocrinologist for assistance with glucose. Patient states it has been better since her ED visit. Contacted patient to discuss post discharge needs and offer care management services. Two patient identifiers verified. Discussed the care management program.  Patient agrees to care management services at this time. Patient's primary care provider relationship reviewed with patient and modified, as applicable. Care management assessment completed:    Patient has significant diagnosis of diabetes. Participates in Be Well program.      Gastrointestinal Condition Focused Assessment    Skin- any open wounds, incisions or appliance yes  Description of wound- one area that has a stitch and remains intact - no drainage  New or worsening pain? no  If yes, pain rated 0-10: 2-3 Location/pain characteristics: low left sided pain due to ovarian cyst   New or worsening numbness or tingling? no  If yes, location of numbness and tingling: n/a  Activity level- moving several times a day, or as recommended? Yes - ot [place]  Abnormal activity level reported: n/a   Nutrition- prescribed diet? yes   Diet prescribed or recommended: lean, soft protein - deli meats, cooked veggies, tolerated well  Difficulty swallowing no  Last weight of 309 lbs on 10/6  Last BM on 10/5 abnormal consistency or amount no  Abnormal labs no     In the last 24 hour have you experienced;    Fever no  Low body temperature no  Chills or shaking no  Sweating no  Fast heart rate no  Fast breathing no  Dizziness/lightheadedness a little dizziness - taking medication beginning today per PCP  Confusion or unusual change in mental status no  Diarrhea no  Nausea no   Vomiting no  Shortness of breath or difficulty breathing no  Less urine output no  Cold, clammy, and pale skin no  Skin rash or skin color changes no    Red Flags:  Reviewed signs and symptoms of infection. Post op date 9/8. Medications:  New Medications at Discharge: none  Changed Medications at Discharge: none  Discontinued Medications at Discharge: none  Current Outpatient Medications   Medication Sig    cyanocobalamin, vitamin B-12, (B-12 Compliance) 1,000 mcg/mL kit Once monthly  Indications: inadequate vitamin B12    iron,carbonyl-FA-multivit-min (Opurity Multivitamin) 30 mg iron- 800 mcg chew     calcium carbonate/vitamin D3 (CALCIUM 600 + D,3, PO) Take 1 Tab by mouth two (2) times a day.  Arm Brace misc Bilateral carpal tunnel splint to be used every night    hyoscyamine SL (Levsin/SL) 0.125 mg SL tablet 1 Tab by SubLINGual route every four (4) hours as needed for Cramping.  ondansetron (ZOFRAN ODT) 4 mg disintegrating tablet Take 1 Tab by mouth every eight (8) hours as needed for Nausea or Vomiting.  ergocalciferol (ERGOCALCIFEROL) 1,250 mcg (50,000 unit) capsule Take 1 Cap by mouth every Monday, Wednesday, Friday.  nystatin-triamcinolone (MYCOLOG) 100,000-0.1 unit/gram-% ointment Apply  to affected area as needed for Skin Irritation.  ALPRAZolam (XANAX) 0.5 mg tablet Take 1 Tab by mouth nightly as needed for Anxiety or Sleep. Max Daily Amount: 0.5 mg. Indications: anxious, repeated episodes of anxiety, panic disorder    atorvastatin (LIPITOR) 40 mg tablet TAKE 1 TABLET BY MOUTH EVERY DAY (Patient taking differently: 40 mg nightly. TAKE 1 TABLET BY MOUTH EVERY DAY)    aspirin delayed-release 81 mg tablet Take 1 Tab by mouth daily.  gabapentin (NEURONTIN) 300 mg capsule Take 1 Cap by mouth three (3) times daily. Max Daily Amount: 900 mg.  spironolactone (ALDACTONE) 25 mg tablet Take 1 Tab by mouth two (2) times a day.     Blood-Glucose Meter,Continuous (Dexcom G6 ) misc Change sensor every 10 days    Blood-Glucose Transmitter (Dexcom G6 Transmitter) nahid Change sensor every 10 days    Blood-Glucose Sensor (Dexcom G6 Sensor) nahid Change sensor every 10 days    metoprolol tartrate (LOPRESSOR) 100 mg IR tablet TAKE 1 TABLET BY MOUTH TWICE A DAY    omeprazole (PRILOSEC) 40 mg capsule TAKE 1 CAP BY MOUTH DAILY.  vilazodone (VIIBRYD) 40 mg tab tablet Take 1 Tab by mouth daily.  galcanezumab-gnlm (EMGALITY PEN) 120 mg/mL injection 1 mL by SubCUTAneous route every thirty (30) days.  butalbital-acetaminophen-caffeine (FIORICET) -40 mg per tablet Take 0.5-1 Tabs by mouth every six (6) hours as needed for Headache. Max Daily Amount: 4 Tabs.  albuterol (PROVENTIL HFA, VENTOLIN HFA) 90 mcg/actuation inhaler Take 1 Puff by inhalation every four (4) hours as needed for Wheezing. No current facility-administered medications for this visit. There are no discontinued medications. Performed medication reconciliation with patient, and patient verbalizes understanding of administration of home medications. There were no barriers to obtaining medications identified at this time. Barriers/Support system:  patient, mother and sister    Home health/DME in place per discharge orders    Barriers/Challenges to Care: []  Decline in memory    []  Language barrier     []  Emotional                  []  Limited mobility  []  Lack of motivation     [] Vision, hearing or cognitive impairment []  Knowledge [] Financial Barriers []  Lack of support  []  Pain []  Other [x]  None      CM Identified  Problems   1. Potential Knowledge Deficit  2. Impaired Skin Integrity  3.  Imbalanced Nutrition r/t surgery    Key pt activities to achieve better health:   [x]  Weight loss  [x]  Improved diabetic control  []  Decreased cholesterol levels  []  Decreased blood pressure  []    []     Goals      Completes follow up appointments       Educate and encourage importance of FU for prevention of complications or disease;   Assess the patient's relationship with a PCP and next FU visit scheduled;  o Good relationship with PCP   Discuss importance of adherence to treatment plan and follow up visits;   Identify any barriers in transportation or access to FU appointments.  Assist patient with making FU appointments as needed;   It's also a good idea to know your test results       Demonstrates adherence to current diet plan following gastric sleeve      · Assess postop weight;  · Weight today, 10/6 was 309 lbs  · Review preop RD nutritional recommendations and diet plan;  · Working with nutritionist at her surgeon's office  · Encourage broths, soups, protein supplements, shakes;  · Encourage 5-6 small \"meals\" each day;  · Encourage slow and steady;  · Avoid carbonated beverages and drinking with straws;       Demonstrates adherence to vitamins and protein supplements within next 60 days      · Assess current use of vitamins and supplements daily;  · Discuss importance of electrolytes and minerals in the daily diet;  · Assess for barriers to taking daily vitamins and mineral supplements;          Goals: Patient stated, I want to get back to normal.  Provided support and encouragement. PCP/Specialist follow up: Patient scheduled to follow up: below. Future Appointments   Date Time Provider Mateo Christian   10/20/2020 10:20 AM Abbi Aranda NP [facility] BS AMB   10/22/2020  2:30 PM Hortencia Dunn MD RDE [facility] BS AMB   3/17/2021 10:20 AM MD WILTON Fontenot BS AMB      Reviewed red flags with patient, and patient verbalizes understanding. Patient given an opportunity to ask questions. No other clinical/social/functional needs noted. The patient agrees to contact the PCP office for questions related to their healthcare. The patient expressed thanks, offered no additional questions and ended the call. Plan for next call:  Call on 10/14. Ready for next procedure?

## 2020-10-06 NOTE — PATIENT INSTRUCTIONS
Moist Meats What is this diet? ? This phase adds moist meats in addition to foods in Soft & Mushy ? Lean protein sources What new foods can I eat? 
? Moist meat and poultry ? Moist fish and seafood Shopping Lists Protein - include with every meal 
? Tuna packed in water ? [place] (canned, frozen, fresh) ? White flaky fish (bk, cod, flounder, tilapia) ? Canned chicken packed in water ? 96-99% fat free thinly sliced deli meat (ham, turkey, chicken) ? Silken Tofu  
? Skinless turkey or chicken (prepare to a soft texture) ? Lean ground meat ? Lean pork (cooked until very tender, cut into small pieces) Sample Meal Plan:  Moist Meats Breakfast ½ cup soft cooked eggs (2) 16 grams protein Snack (optional) Low-fat string cheese 5 grams protein Lunch 2 slices of lean deli turkey (2 oz.), ¼ cup soft fruit or ½ cup tuna salad made with low-fat mayonnaise 14 grams protein 14 grams protein Snack (optional) Greek yogurt or low-fat cottage cheese or low-fat string cheese 8-15 grams protein Dinner Soft/flaky fish (2 oz.) ¼ cup soft cooked vegetables 14 grams protein Key Points ? AVOID REHEATING meats as it gets too dry ? Continue to drink 48-64 ounces of low calorie, non-carbonated, sugar free beverages between meals. ? Eat 3 meals per day ? Measure each meal to ?cup per meal 
? Aim for 60 grams of protein every day. Try food sources of protein first.  
? Continue to supplement with protein shakes/powder to meet protein goals. ? Take small bites. Try eating with smaller utensils (baby spoon, cocktail fork). ? Chew food thoroughly ? Allow about 30 minutes to eat a meal.  Eating too fast may cause nausea or vomiting. ? Stop eating as soon as you feel full. Overeating may stretch your stomach's capacity and prevent desired weight loss. ? Do not drink liquids during meals and 30 minutes after meals. Drinking with meals may cause nausea or vomiting. ? Add one new food at a time ? Take vitamins daily ? No lifting greater than 40 lbs. ? You can do light jogging and walking. ? You may go into a pool. What to do if you are constipated: You may  take Milk of Magnesia. Take 2 Tablespoons followed by 16 oz of water then 2 hours later take another 2 tablespoons. If  milk of magnesia does not work then take [place] or Miralax over the counter. Keep in mind that the Benefiber or Miralax may take a day or two to work. If all of the above do not work try a Fleets enema and follow the directions on the box. If you are not able to tolerate liquids or soft foods. Please call our office  [phone] If you have vomiting and persistent epigastric pain or chest pain. You should call our office, the doctor on-call or go to the emergency room.

## 2020-10-06 NOTE — LETTER
NOTIFICATION OF RETURN TO WORK / SCHOOL 
 
10/6/2020 3:34 PM 
 
Ms. Aura [address] 
[address] [facility] To Whom It May Concern: 
 
Aura [address] was under the care of Rachel Barr from 9/8/20 to present. She will be able to return to work/school on 10/19/20 with regular duties and/or activities . If there are questions or concerns please have the patient contact our office.  
 
Sincerely, 
 
 
Toshia Guzman NP

## 2020-10-06 NOTE — LETTER
10/6/2020 4:11 PM 
 
Ms. Chavarria Vashti Marsh [address] [facility] Welcome to Associate Care Management Congratulations for taking a step towards improving your health by participating in the [facility] Management (ACM) program. ACM is a new model of care designed to improve the coordination of your health care with an emphasis on your overall well-being. This confidential program is offered free of charge to [facility] participants and their covered family members. To make the most of your first ACM call, please have the following items ready to discuss: 
  
Make a list of any questions you have about your health including questions about dietary recommendations, lifestyle, and disease management. ? Inform the ACM of any health care providers you have visited in the last month and the reason for your visit. This includes urgent care or ED visits. ? Have a list of all prescribed medications including, over-the counter, herbal and dietary supplements. Inform ACM of any refills needed. ? Inform ACM if any new problems have developed in the last month. ? Confirm the date of your next ACM call. ? Activate a [employer] account, if you haven't already. [employer] can provide a communication between you and your care team; as well as a chance to view your care plan. ? If you want to designate a caregiver have access to your record, please ask your doctor's office for the form. ? Think about your goals to achieve better health. With continued partnership through [facility], we hope to optimize your health, increase your quality of life, and prevent hospitalization. We look forward to continuing to serve you. If you have any health concerns prior to your next [email or web address]) outreach, please contact your primary care doctor. Your ACM contact information is listed below for non-urgent questions. Name: Dano Murillo Contact Info: [phone] Sincerely, Chari Holt RN

## 2020-10-07 ENCOUNTER — DOCUMENTATION ONLY (OUTPATIENT)
Dept: PHARMACY | Age: 30
End: 2020-10-07

## 2020-10-07 NOTE — PROGRESS NOTES
The application for Elana Delgadoer for enrollment into the diabetes management program has been reviewed and accepted on 10/07/20.     Domi Valle

## 2020-10-13 ENCOUNTER — ANESTHESIA EVENT (OUTPATIENT)
Dept: SURGERY | Age: 30
End: 2020-10-13
Payer: COMMERCIAL

## 2020-10-13 NOTE — PERIOP NOTES
[facility]  Ambulatory Surgery Unit  Pre-operative Instructions    Surgery/Procedure Date  10/19            Tentative Arrival Time 0615      1. On the day of your surgery/procedure, please report to the Ambulatory Surgery Unit Registration Desk and sign in at your designated time. The Ambulatory Surgery Unit is located in [facility] on the [facility] side of the [facility] across from the [address] [place] [address]. Please have all of your health insurance cards and a photo ID. 2. You must have someone with you to drive you home, as you should not drive a car for 24 hours following anesthesia. Please make arrangements for a responsible adult friend or family member to stay with you for at least the first 24 hours after your surgery. 3. Do not have anything to eat or drink (including water, gum, mints, coffee, juice) after 11:59 PM  10/18. This may not apply to medications prescribed by your physician. (Please note below the special instructions with medications to take the morning of surgery, if applicable.)    4. We recommend you do not drink any alcoholic beverages for 24 hours before and after your surgery. 5. Contact your surgeons office for instructions on the following medications: non-steroidal anti-inflammatory drugs (i.e. Advil, Aleve), vitamins, and supplements. (Some surgeons will want you to stop these medications prior to surgery and others may allow you to take them)   **If you are currently taking Plavix, Coumadin, Aspirin and/or other blood-thinning agents, contact your surgeon for instructions. ** Your surgeon will partner with the physician prescribing these medications to determine if it is safe to stop or if you need to continue taking. Please do not stop taking these medications without instructions from your surgeon.     6. In an effort to help prevent surgical site infection, we ask that you shower with an anti-bacterial soap (i.e. Dial/Safeguard, or the soap provided to you at your preadmission testing appointment) for 3 days prior to and on the morning of surgery, using a fresh towel after each shower. (Please begin this process with fresh bed linens.) Do not apply any lotions, powders, or deodorants after the shower on the day of your procedure. If applicable, please do not shave the operative site for 48 hours prior to surgery. 7. Wear comfortable clothes. Wear glasses instead of contacts. Do not bring any jewelry or money (other than copays or fees as instructed). Do not wear make-up, particularly mascara, the morning of your surgery. Do not wear nail polish, particularly if you are having foot /hand surgery. Wear your hair loose or down, no ponytails, buns, shar pins or clips. All body piercings must be removed. 8. You should understand that if you do not follow these instructions your surgery may be cancelled. If your physical condition changes (i.e. fever, cold or flu) please contact your surgeon as soon as possible. 9. It is important that you be on time. If a situation occurs where you may be late, or if you have any questions or problems, please call [phone].    10. Your surgery time may be subject to change. You will receive a phone call the day prior to surgery to confirm your arrival time. 11. Pediatric patients: please bring a change of clothes, diapers, bottle/sippy cup, pacifier, etc.      Special Instructions: Take all medications and inhalers, as prescribed, on the morning of surgery with a sip of water EXCEPT: blood glucose medications, per pt stopped aspirin already per her MD last instructions per pt      Insulin Dependent Diabetic patients: Take your diabetic medications as prescribed the day before surgery. Hold all diabetic medications the day of surgery. If you are scheduled to arrive for surgery after 8:00 AM, and your AM blood sugar is >200, please call Ambulatory Surgery.     I understand a pre-operative phone call will be made to verify my surgery time. In the event that I am not available, I give permission for a message to be left on my answering service and/or with another person?       yes         ___________________      ___________________      ________________  (Signature of Patient)          (Witness)                   (Date and Time)

## 2020-10-14 ENCOUNTER — PATIENT OUTREACH (OUTPATIENT)
Dept: OTHER | Age: 30
End: 2020-10-14

## 2020-10-14 DIAGNOSIS — Z76.89 ENCOUNTER FOR SUPPORT AND COORDINATION OF TRANSITION OF CARE: ICD-10-CM

## 2020-10-14 DIAGNOSIS — Z71.89 ADVICE GIVEN ABOUT COVID-19 VIRUS INFECTION: ICD-10-CM

## 2020-10-14 DIAGNOSIS — Z98.84 S/P GASTRIC BYPASS: ICD-10-CM

## 2020-10-14 DIAGNOSIS — I10 ESSENTIAL HYPERTENSION: ICD-10-CM

## 2020-10-14 DIAGNOSIS — E53.8 B12 DEFICIENCY DUE TO DIET: ICD-10-CM

## 2020-10-14 NOTE — PERIOP NOTES
Pre op chart to Dr. Fausto Shell for review. Case to be moved to MAIN OR.        1600 pt notified by phone that case was moved to main OR, and will be contacted by main with arrival time.  Orders faxed to main PAT

## 2020-10-14 NOTE — PROGRESS NOTES
Attempt to reach patient for follow up. Discreet VM left with contact information. Will follow up with her post op. Surgery scheduled for 10/19. Will call her on 10/20 to review d/c instructions and red flags.

## 2020-10-15 ENCOUNTER — VIRTUAL VISIT (OUTPATIENT)
Dept: ENDOCRINOLOGY | Age: 30
End: 2020-10-15
Payer: COMMERCIAL

## 2020-10-15 ENCOUNTER — HOSPITAL ENCOUNTER (OUTPATIENT)
Dept: PREADMISSION TESTING | Age: 30
Discharge: HOME | End: 2020-10-15
Payer: COMMERCIAL

## 2020-10-15 DIAGNOSIS — E78.2 MIXED HYPERLIPIDEMIA: ICD-10-CM

## 2020-10-15 DIAGNOSIS — I10 ESSENTIAL HYPERTENSION: ICD-10-CM

## 2020-10-15 DIAGNOSIS — E55.9 HYPOVITAMINOSIS D: ICD-10-CM

## 2020-10-15 DIAGNOSIS — E66.01 CLASS 3 SEVERE OBESITY DUE TO EXCESS CALORIES WITH SERIOUS COMORBIDITY AND BODY MASS INDEX (BMI) OF 60.0 TO 69.9 IN ADULT (HCC): ICD-10-CM

## 2020-10-15 DIAGNOSIS — E11.9 TYPE 2 DIABETES MELLITUS WITHOUT COMPLICATION, WITHOUT LONG-TERM CURRENT USE OF INSULIN (HCC): Primary | ICD-10-CM

## 2020-10-15 PROCEDURE — 3051F HG A1C>EQUAL 7.0%<8.0%: CPT | Performed by: INTERNAL MEDICINE

## 2020-10-15 PROCEDURE — 99214 OFFICE O/P EST MOD 30 MIN: CPT | Performed by: INTERNAL MEDICINE

## 2020-10-15 PROCEDURE — 87635 SARS-COV-2 COVID-19 AMP PRB: CPT

## 2020-10-15 NOTE — PROGRESS NOTES
Chief Complaint   Patient presents with    Diabetes     pcp and pharmacy verified. Eye exam: this year (report in chart). ISMAEL            **THIS IS A VIRTUAL VISIT VIA A VIDEO ENCOUNTER. PATIENT AGREED TO HAVE THEIR CARE DELIVERED OVER VIDEO IN PLACE OF THEIR REGULARLY SCHEDULED OFFICE VISIT**      History of Present Illness: Luis Edmondson is a 26 y.o. female here for follow up of diabetes. She was diagnosed with diabetes \"when I was in high school\". Because pt has features concerning for cushing's her PCP checked a 24 hour urine cortisol which was not elevated (42). She also had a TSH drawn which was 2.10. In March 2016 we tested pt for hyperaldosteronism, which was negative. I tested her for evidence of PCOS, her Testosterone was 14, with DHEA-S 109, her 17-OH Prog was not elevated and an overnight dexamethasone suppression test did suppress her ACTH and cortisol. At our last visit in July 2020 she was still in the boot camp and her weight was down to 338 pounds. She was taking Ozempic 1.0mg weekly, Metformin 1000mg BID and Glipizide 5mg with dinner. We had stopped the Lantus, because she was having issues of hypoglycemia. Pt was encouraged to keep up the good work, her A1C was down to 7.8%. She had the Gastric Sleeve on 9/8/20. She notes that things are doing well. She notes she has been having some low BGs in the evening. She notes her DexCom has gone off the last 3 nights due to BGs under 60, and she had one in the 40s. Her DexCom will wake her around 3-4AM, which is about 3-4 hours after she goes to sleep. Pt notes she is not currently taking any medications for her DM, she stopped the Ozempic, Metformin and Glipizide on 9/1/20. During the day she notes that her BGs are running in the  range (more if I eat a carb). Since her surgery she has lost from 340 pounds down to 307 pounds. Pt is still in the \"boot camp\" and she is walking 1-2 miles every day.     She checks her BGs using the DexCom CGM. She will be having a right oophorectomy and removal of a left ovarian cyst on 10/19/20. When she goes back to work on November she will be working 3-12 hours shifts per week. Pt is currently waking around 730-8AM.  She is eating every 3 hours. Her first meal is around 9AM she will drink a bottle of water and then wait 30 minutes before eating. Her breakfast will be one egg with cheese and 2 tbls of corn-beef hash or two turkey sausages. Three hours later she will have 4 oz of tuna with carter and an egg and water. Around 3PM she will have lunch, yesterday she had tuna fish and a glucerna after her walk. She has dinner around 6-7PM, last night she had 1 oz of ground beef with gravy, green beans and rice. Her last meal in the evening is a popcicle or SF fudge-cicle before going to bed. She notes that before going to bed her BG will be in the 70s. She will go to bed around MN-1AM.    No history of vascular disease. No history of neuropathy, or nephropathy. Last eye exam was June 2020, no retinopathy. She is taking her Vitamin D 50,000 units three days per week. She is taking a MVI and B-12 1000mcg monthly. Pt is still taking her lipitor 40mg HS. Current Outpatient Medications   Medication Sig    cyanocobalamin, vitamin B-12, (B-12 Compliance) 1,000 mcg/mL kit Once monthly  Indications: inadequate vitamin B12    iron,carbonyl-FA-multivit-min (Opurity Multivitamin) 30 mg iron- 800 mcg chew     calcium carbonate/vitamin D3 (CALCIUM 600 + D,3, PO) Take 1 Tab by mouth two (2) times a day.  Arm Brace misc Bilateral carpal tunnel splint to be used every night    ergocalciferol (ERGOCALCIFEROL) 1,250 mcg (50,000 unit) capsule Take 1 Cap by mouth every Monday, Wednesday, Friday.  nystatin-triamcinolone (MYCOLOG) 100,000-0.1 unit/gram-% ointment Apply  to affected area as needed for Skin Irritation.     ALPRAZolam (XANAX) 0.5 mg tablet Take 1 Tab by mouth nightly as needed for Anxiety or Sleep. Max Daily Amount: 0.5 mg. Indications: anxious, repeated episodes of anxiety, panic disorder    atorvastatin (LIPITOR) 40 mg tablet TAKE 1 TABLET BY MOUTH EVERY DAY (Patient taking differently: 40 mg nightly. TAKE 1 TABLET BY MOUTH EVERY DAY)    aspirin delayed-release 81 mg tablet Take 1 Tab by mouth daily.  gabapentin (NEURONTIN) 300 mg capsule Take 1 Cap by mouth three (3) times daily. Max Daily Amount: 900 mg.  spironolactone (ALDACTONE) 25 mg tablet Take 1 Tab by mouth two (2) times a day.  Blood-Glucose Meter,Continuous (Dexcom G6 ) misc Change sensor every 10 days    Blood-Glucose Transmitter (Dexcom G6 Transmitter) nahid Change sensor every 10 days    Blood-Glucose Sensor (Dexcom G6 Sensor) nahid Change sensor every 10 days    metoprolol tartrate (LOPRESSOR) 100 mg IR tablet TAKE 1 TABLET BY MOUTH TWICE A DAY    omeprazole (PRILOSEC) 40 mg capsule TAKE 1 CAP BY MOUTH DAILY.  vilazodone (VIIBRYD) 40 mg tab tablet Take 1 Tab by mouth daily.  galcanezumab-gnlm (EMGALITY PEN) 120 mg/mL injection 1 mL by SubCUTAneous route every thirty (30) days.  butalbital-acetaminophen-caffeine (FIORICET) -40 mg per tablet Take 0.5-1 Tabs by mouth every six (6) hours as needed for Headache. Max Daily Amount: 4 Tabs.  albuterol (PROVENTIL HFA, VENTOLIN HFA) 90 mcg/actuation inhaler Take 1 Puff by inhalation every four (4) hours as needed for Wheezing. No current facility-administered medications for this visit.       Allergies   Allergen Reactions    Latex Itching     Review of Systems:  - Eyes: no blurry vision or double vision  - Cardiovascular: no chest pain  - Respiratory: no shortness of breath  - Musculoskeletal: no myalgias  - Neurological: no numbness/tingling in extremities    Physical Examination:  Last menstrual period 09/20/2020.  - GENERAL: NCAT, Appears well nourished   - EYES: EOMI, non-icteric, no proptosis   - Ear/Nose/Throat: NCAT, no visible inflammation or masses   - CARDIOVASCULAR: no cyanosis, no visible JVD   - RESPIRATORY: respiratory effort normal without any distress or labored breathing   - MUSCULOSKELETAL: Normal ROM of neck and upper extremities observed   - SKIN: No rash on face   - NEUROLOGIC:  No facial asymmetry (Cranial nerve 7 motor function), No gaze palsy   - PSYCHIATRIC: Normal affect, Normal insight and judgement     Data Reviewed:   Component      Latest Ref Rng & Units 10/1/2020 10/1/2020           6:17 AM  6:17 AM   WBC      3.6 - 11.0 K/uL  8.4   RBC      3.80 - 5.20 M/uL  4.35   HGB      11.5 - 16.0 g/dL  11.5   HCT      35.0 - 47.0 %  37.9   MCV      80.0 - 99.0 FL  87.1   MCH      26.0 - 34.0 PG  26.4   MCHC      30.0 - 36.5 g/dL  30.3   RDW      11.5 - 14.5 %  15.9 (H)   PLATELET      621 - 686 K/uL  379   MPV      8.9 - 12.9 FL  11.0   NRBC      0  WBC  0.0   ABSOLUTE NRBC      0.00 - 0.01 K/uL  0.00   NEUTROPHILS      32 - 75 %  48   LYMPHOCYTES      12 - 49 %  40   MONOCYTES      5 - 13 %  9   EOSINOPHILS      0 - 7 %  2   BASOPHILS      0 - 1 %  1   IMMATURE GRANULOCYTES      0.0 - 0.5 %  0   ABS. NEUTROPHILS      1.8 - 8.0 K/UL  4.0   ABS. LYMPHOCYTES      0.8 - 3.5 K/UL  3.4   ABS. MONOCYTES      0.0 - 1.0 K/UL  0.8   ABS. EOSINOPHILS      0.0 - 0.4 K/UL  0.2   ABS. BASOPHILS      0.0 - 0.1 K/UL  0.1   ABS. IMM.  GRANS.      0.00 - 0.04 K/UL  0.0   DF        AUTOMATED   Sodium      136 - 145 mmol/L 140    Potassium      3.5 - 5.1 mmol/L 3.7    Chloride      97 - 108 mmol/L 108    CO2      21 - 32 mmol/L 24    Anion gap      5 - 15 mmol/L 8    Glucose      65 - 100 mg/dL 81    BUN      6 - 20 MG/DL 9    Creatinine      0.55 - 1.02 MG/DL 0.68    BUN/Creatinine ratio      12 - 20   13    GFR est AA      >60 ml/min/1.73m2 >60    GFR est non-AA      >60 ml/min/1.73m2 >60    Calcium      8.5 - 10.1 MG/DL 9.6    Bilirubin, total      0.2 - 1.0 MG/DL 0.4    ALT      12 - 78 U/L 34 AST      15 - 37 U/L 24    Alk. phosphatase      45 - 117 U/L 84    Protein, total      6.4 - 8.2 g/dL 7.4    Albumin      3.5 - 5.0 g/dL 3.1 (L)    Globulin      2.0 - 4.0 g/dL 4.3 (H)    A-G Ratio      1.1 - 2.2   0.7 (L)        Assessment/Plan:   1) DM > She is now off all DM medications, but is having low BGs overnight. Will have pt stop the HS popcicles and instead have 1/2 an apple with at Tbls of peanut butter before she goes to bed. Pt to continue using the DexCom since she is having issues of hypoglycemia overnight. She will benefit from it's continued use. Will monitor her Dexcom readings periodically. 2) HTN > Will not make any adjustments to her HTN regimen at this time. 3) HLD > Her lipid panel was at goal in March of 2020. Pt is tolerating the Atorvastatin 40mg daily. Will order a lipid panel. 4) Obesity > Pt is s/p sleeve gastrectomy and has had 30 pounds of weight loss. She is still working out regularly. Pt encouraged to keep up the good work. 5) Hypovitaminosis D > Will order a Vitamin D level and adjust her dose as needed. Copy sent to:  Drs. Severo Escort

## 2020-10-16 LAB
25(OH)D3+25(OH)D2 SERPL-MCNC: 69.3 NG/ML (ref 30–100)
CHOLEST SERPL-MCNC: 93 MG/DL (ref 100–199)
EST. AVERAGE GLUCOSE BLD GHB EST-MCNC: 140 MG/DL
HBA1C MFR BLD: 6.5 % (ref 4.8–5.6)
HDLC SERPL-MCNC: 32 MG/DL
HEALTH STATUS, XMCV2T: NORMAL
INTERPRETATION, 910389: NORMAL
LDLC SERPL CALC-MCNC: 42 MG/DL (ref 0–99)
Lab: NORMAL
SARS-COV-2, COV2NT: NOT DETECTED
SOURCE, COVRS: NORMAL
SPECIMEN SOURCE, FCOV2M: NORMAL
SPECIMEN TYPE, XMCV1T: NORMAL
TRIGL SERPL-MCNC: 95 MG/DL (ref 0–149)
VIT B12 SERPL-MCNC: 878 PG/ML (ref 232–1245)
VLDLC SERPL CALC-MCNC: 19 MG/DL (ref 5–40)

## 2020-10-16 RX ORDER — CYANOCOBALAMIN, ISOPROPYL ALCOHOL 1000MCG/ML
KIT INJECTION
Qty: 1 KIT | Refills: 5 | Status: SHIPPED | OUTPATIENT
Start: 2020-10-16 | End: 2021-02-03 | Stop reason: SDUPTHER

## 2020-10-19 ENCOUNTER — HOSPITAL ENCOUNTER (OUTPATIENT)
Age: 30
Setting detail: OUTPATIENT SURGERY
Discharge: HOME | End: 2020-10-19
Attending: OBSTETRICS & GYNECOLOGY | Admitting: OBSTETRICS & GYNECOLOGY
Payer: COMMERCIAL

## 2020-10-19 ENCOUNTER — ANESTHESIA (OUTPATIENT)
Dept: SURGERY | Age: 30
End: 2020-10-19
Payer: COMMERCIAL

## 2020-10-19 VITALS
BODY MASS INDEX: 51.91 KG/M2 | HEIGHT: 63 IN | DIASTOLIC BLOOD PRESSURE: 57 MMHG | TEMPERATURE: 98 F | HEART RATE: 62 BPM | SYSTOLIC BLOOD PRESSURE: 126 MMHG | WEIGHT: 293 LBS | OXYGEN SATURATION: 100 % | RESPIRATION RATE: 20 BRPM

## 2020-10-19 DIAGNOSIS — D27.0 DERMOID CYST OF RIGHT OVARY: Primary | ICD-10-CM

## 2020-10-19 LAB
GLUCOSE BLD STRIP.AUTO-MCNC: 122 MG/DL (ref 65–100)
GLUCOSE BLD STRIP.AUTO-MCNC: 92 MG/DL (ref 65–100)
HCG UR QL: NEGATIVE
SERVICE CMNT-IMP: ABNORMAL
SERVICE CMNT-IMP: NORMAL

## 2020-10-19 PROCEDURE — 77030031139 HC SUT VCRL2 J&J -A: Performed by: OBSTETRICS & GYNECOLOGY

## 2020-10-19 PROCEDURE — 77030027744 HC PWDR HEMSTAT ARISTA ABSRB 5GM BARD -D: Performed by: OBSTETRICS & GYNECOLOGY

## 2020-10-19 PROCEDURE — 77030008684 HC TU ET CUF COVD -B: Performed by: ANESTHESIOLOGY

## 2020-10-19 PROCEDURE — 77030033162 HC PCH SPEC RTVR ENDOSC AMR -B: Performed by: OBSTETRICS & GYNECOLOGY

## 2020-10-19 PROCEDURE — 88307 TISSUE EXAM BY PATHOLOGIST: CPT

## 2020-10-19 PROCEDURE — 77030008606 HC TRCR ENDOSC KII AMR -B: Performed by: OBSTETRICS & GYNECOLOGY

## 2020-10-19 PROCEDURE — 74011250636 HC RX REV CODE- 250/636

## 2020-10-19 PROCEDURE — 77030020263 HC SOL INJ SOD CL0.9% LFCR 1000ML: Performed by: OBSTETRICS & GYNECOLOGY

## 2020-10-19 PROCEDURE — 74011000250 HC RX REV CODE- 250: Performed by: NURSE ANESTHETIST, CERTIFIED REGISTERED

## 2020-10-19 PROCEDURE — 77030008756 HC TU IRR SUC STRY -B: Performed by: OBSTETRICS & GYNECOLOGY

## 2020-10-19 PROCEDURE — 88305 TISSUE EXAM BY PATHOLOGIST: CPT

## 2020-10-19 PROCEDURE — 77030002933 HC SUT MCRYL J&J -A: Performed by: OBSTETRICS & GYNECOLOGY

## 2020-10-19 PROCEDURE — 77030002895 HC DEV VASC CLOSR COVD -B: Performed by: OBSTETRICS & GYNECOLOGY

## 2020-10-19 PROCEDURE — 76060000034 HC ANESTHESIA 1.5 TO 2 HR: Performed by: OBSTETRICS & GYNECOLOGY

## 2020-10-19 PROCEDURE — 77030018684: Performed by: OBSTETRICS & GYNECOLOGY

## 2020-10-19 PROCEDURE — 2709999900 HC NON-CHARGEABLE SUPPLY

## 2020-10-19 PROCEDURE — 82962 GLUCOSE BLOOD TEST: CPT

## 2020-10-19 PROCEDURE — 77030021678 HC GLIDESCP STAT DISP VERT -B: Performed by: ANESTHESIOLOGY

## 2020-10-19 PROCEDURE — 77030038160 HC SHR COAG HARM J&J -F: Performed by: OBSTETRICS & GYNECOLOGY

## 2020-10-19 PROCEDURE — 76210000021 HC REC RM PH II 0.5 TO 1 HR: Performed by: OBSTETRICS & GYNECOLOGY

## 2020-10-19 PROCEDURE — 77030007955 HC PCH ENDOSC SPEC J&J -B: Performed by: OBSTETRICS & GYNECOLOGY

## 2020-10-19 PROCEDURE — 74011250636 HC RX REV CODE- 250/636: Performed by: ANESTHESIOLOGY

## 2020-10-19 PROCEDURE — 74011250636 HC RX REV CODE- 250/636: Performed by: NURSE ANESTHETIST, CERTIFIED REGISTERED

## 2020-10-19 PROCEDURE — 77030018836 HC SOL IRR NACL ICUM -A: Performed by: OBSTETRICS & GYNECOLOGY

## 2020-10-19 PROCEDURE — 76010000153 HC OR TIME 1.5 TO 2 HR: Performed by: OBSTETRICS & GYNECOLOGY

## 2020-10-19 PROCEDURE — 74011000250 HC RX REV CODE- 250: Performed by: OBSTETRICS & GYNECOLOGY

## 2020-10-19 PROCEDURE — 77030010507 HC ADH SKN DERMBND J&J -B: Performed by: OBSTETRICS & GYNECOLOGY

## 2020-10-19 PROCEDURE — 77030012799 HC TRCR GELPRT BLN AMR -B: Performed by: OBSTETRICS & GYNECOLOGY

## 2020-10-19 PROCEDURE — 76210000016 HC OR PH I REC 1 TO 1.5 HR: Performed by: OBSTETRICS & GYNECOLOGY

## 2020-10-19 PROCEDURE — 77030020747 HC TU INSUF ENDOSC TELE -A: Performed by: OBSTETRICS & GYNECOLOGY

## 2020-10-19 PROCEDURE — 77030019908 HC STETH ESOPH SIMS -A: Performed by: ANESTHESIOLOGY

## 2020-10-19 PROCEDURE — 77030026438 HC STYL ET INTUB CARD -A: Performed by: ANESTHESIOLOGY

## 2020-10-19 PROCEDURE — 77030040922 HC BLNKT HYPOTHRM STRY -A

## 2020-10-19 PROCEDURE — 81025 URINE PREGNANCY TEST: CPT

## 2020-10-19 PROCEDURE — 74011250637 HC RX REV CODE- 250/637: Performed by: OBSTETRICS & GYNECOLOGY

## 2020-10-19 PROCEDURE — 2709999900 HC NON-CHARGEABLE SUPPLY: Performed by: OBSTETRICS & GYNECOLOGY

## 2020-10-19 PROCEDURE — 77030008771 HC TU NG SALEM SUMP -A: Performed by: ANESTHESIOLOGY

## 2020-10-19 PROCEDURE — 77030005513 HC CATH URETH FOL11 MDII -B: Performed by: OBSTETRICS & GYNECOLOGY

## 2020-10-19 RX ORDER — LIDOCAINE HYDROCHLORIDE 10 MG/ML
0.1 INJECTION, SOLUTION EPIDURAL; INFILTRATION; INTRACAUDAL; PERINEURAL AS NEEDED
Status: DISCONTINUED | OUTPATIENT
Start: 2020-10-19 | End: 2020-10-19 | Stop reason: HOSPADM

## 2020-10-19 RX ORDER — ONDANSETRON 2 MG/ML
INJECTION INTRAMUSCULAR; INTRAVENOUS AS NEEDED
Status: DISCONTINUED | OUTPATIENT
Start: 2020-10-19 | End: 2020-10-19 | Stop reason: HOSPADM

## 2020-10-19 RX ORDER — NEOSTIGMINE METHYLSULFATE 1 MG/ML
INJECTION, SOLUTION INTRAVENOUS AS NEEDED
Status: DISCONTINUED | OUTPATIENT
Start: 2020-10-19 | End: 2020-10-19 | Stop reason: HOSPADM

## 2020-10-19 RX ORDER — PROPOFOL 10 MG/ML
INJECTION, EMULSION INTRAVENOUS AS NEEDED
Status: DISCONTINUED | OUTPATIENT
Start: 2020-10-19 | End: 2020-10-19 | Stop reason: HOSPADM

## 2020-10-19 RX ORDER — SODIUM CHLORIDE 0.9 % (FLUSH) 0.9 %
5-40 SYRINGE (ML) INJECTION EVERY 8 HOURS
Status: DISCONTINUED | OUTPATIENT
Start: 2020-10-19 | End: 2020-10-19 | Stop reason: HOSPADM

## 2020-10-19 RX ORDER — FENTANYL CITRATE 50 UG/ML
25 INJECTION, SOLUTION INTRAMUSCULAR; INTRAVENOUS
Status: DISCONTINUED | OUTPATIENT
Start: 2020-10-19 | End: 2020-10-19 | Stop reason: HOSPADM

## 2020-10-19 RX ORDER — LIDOCAINE HYDROCHLORIDE 20 MG/ML
INJECTION, SOLUTION EPIDURAL; INFILTRATION; INTRACAUDAL; PERINEURAL AS NEEDED
Status: DISCONTINUED | OUTPATIENT
Start: 2020-10-19 | End: 2020-10-19 | Stop reason: HOSPADM

## 2020-10-19 RX ORDER — FENTANYL CITRATE 50 UG/ML
INJECTION, SOLUTION INTRAMUSCULAR; INTRAVENOUS AS NEEDED
Status: DISCONTINUED | OUTPATIENT
Start: 2020-10-19 | End: 2020-10-19 | Stop reason: HOSPADM

## 2020-10-19 RX ORDER — ROCURONIUM BROMIDE 10 MG/ML
INJECTION, SOLUTION INTRAVENOUS AS NEEDED
Status: DISCONTINUED | OUTPATIENT
Start: 2020-10-19 | End: 2020-10-19 | Stop reason: HOSPADM

## 2020-10-19 RX ORDER — DIPHENHYDRAMINE HYDROCHLORIDE 50 MG/ML
12.5 INJECTION, SOLUTION INTRAMUSCULAR; INTRAVENOUS AS NEEDED
Status: DISCONTINUED | OUTPATIENT
Start: 2020-10-19 | End: 2020-10-19 | Stop reason: HOSPADM

## 2020-10-19 RX ORDER — FENTANYL CITRATE 50 UG/ML
50 INJECTION, SOLUTION INTRAMUSCULAR; INTRAVENOUS AS NEEDED
Status: DISCONTINUED | OUTPATIENT
Start: 2020-10-19 | End: 2020-10-19 | Stop reason: HOSPADM

## 2020-10-19 RX ORDER — FENTANYL CITRATE 50 UG/ML
INJECTION, SOLUTION INTRAMUSCULAR; INTRAVENOUS
Status: COMPLETED
Start: 2020-10-19 | End: 2020-10-19

## 2020-10-19 RX ORDER — SODIUM CHLORIDE, SODIUM LACTATE, POTASSIUM CHLORIDE, CALCIUM CHLORIDE 600; 310; 30; 20 MG/100ML; MG/100ML; MG/100ML; MG/100ML
25 INJECTION, SOLUTION INTRAVENOUS CONTINUOUS
Status: DISCONTINUED | OUTPATIENT
Start: 2020-10-19 | End: 2020-10-19 | Stop reason: HOSPADM

## 2020-10-19 RX ORDER — MORPHINE SULFATE 10 MG/ML
2 INJECTION, SOLUTION INTRAMUSCULAR; INTRAVENOUS
Status: DISCONTINUED | OUTPATIENT
Start: 2020-10-19 | End: 2020-10-19 | Stop reason: HOSPADM

## 2020-10-19 RX ORDER — SODIUM CHLORIDE 0.9 % (FLUSH) 0.9 %
5-40 SYRINGE (ML) INJECTION AS NEEDED
Status: DISCONTINUED | OUTPATIENT
Start: 2020-10-19 | End: 2020-10-19 | Stop reason: HOSPADM

## 2020-10-19 RX ORDER — MIDAZOLAM HYDROCHLORIDE 1 MG/ML
0.5 INJECTION, SOLUTION INTRAMUSCULAR; INTRAVENOUS
Status: DISCONTINUED | OUTPATIENT
Start: 2020-10-19 | End: 2020-10-19 | Stop reason: HOSPADM

## 2020-10-19 RX ORDER — BUPIVACAINE HYDROCHLORIDE AND EPINEPHRINE 2.5; 5 MG/ML; UG/ML
INJECTION, SOLUTION EPIDURAL; INFILTRATION; INTRACAUDAL; PERINEURAL AS NEEDED
Status: DISCONTINUED | OUTPATIENT
Start: 2020-10-19 | End: 2020-10-19 | Stop reason: HOSPADM

## 2020-10-19 RX ORDER — MIDAZOLAM HYDROCHLORIDE 1 MG/ML
INJECTION, SOLUTION INTRAMUSCULAR; INTRAVENOUS AS NEEDED
Status: DISCONTINUED | OUTPATIENT
Start: 2020-10-19 | End: 2020-10-19 | Stop reason: HOSPADM

## 2020-10-19 RX ORDER — GLYCOPYRROLATE 0.2 MG/ML
INJECTION INTRAMUSCULAR; INTRAVENOUS AS NEEDED
Status: DISCONTINUED | OUTPATIENT
Start: 2020-10-19 | End: 2020-10-19 | Stop reason: HOSPADM

## 2020-10-19 RX ORDER — SUCCINYLCHOLINE CHLORIDE 20 MG/ML
INJECTION INTRAMUSCULAR; INTRAVENOUS AS NEEDED
Status: DISCONTINUED | OUTPATIENT
Start: 2020-10-19 | End: 2020-10-19 | Stop reason: HOSPADM

## 2020-10-19 RX ORDER — HYDROMORPHONE HYDROCHLORIDE 1 MG/ML
.2-.5 INJECTION, SOLUTION INTRAMUSCULAR; INTRAVENOUS; SUBCUTANEOUS
Status: DISCONTINUED | OUTPATIENT
Start: 2020-10-19 | End: 2020-10-19 | Stop reason: HOSPADM

## 2020-10-19 RX ORDER — OXYCODONE HYDROCHLORIDE 5 MG/1
5 TABLET ORAL
Qty: 20 TAB | Refills: 0 | Status: SHIPPED | OUTPATIENT
Start: 2020-10-19 | End: 2020-10-22

## 2020-10-19 RX ORDER — IBUPROFEN 800 MG/1
800 TABLET ORAL
Qty: 30 TAB | Refills: 1 | Status: SHIPPED | OUTPATIENT
Start: 2020-10-19 | End: 2021-02-15 | Stop reason: ALTCHOICE

## 2020-10-19 RX ORDER — DEXAMETHASONE SODIUM PHOSPHATE 4 MG/ML
INJECTION, SOLUTION INTRA-ARTICULAR; INTRALESIONAL; INTRAMUSCULAR; INTRAVENOUS; SOFT TISSUE AS NEEDED
Status: DISCONTINUED | OUTPATIENT
Start: 2020-10-19 | End: 2020-10-19 | Stop reason: HOSPADM

## 2020-10-19 RX ORDER — ONDANSETRON 2 MG/ML
4 INJECTION INTRAMUSCULAR; INTRAVENOUS AS NEEDED
Status: DISCONTINUED | OUTPATIENT
Start: 2020-10-19 | End: 2020-10-19 | Stop reason: HOSPADM

## 2020-10-19 RX ADMIN — MIDAZOLAM HYDROCHLORIDE 1 MG: 1 INJECTION, SOLUTION INTRAMUSCULAR; INTRAVENOUS at 07:42

## 2020-10-19 RX ADMIN — FENTANYL CITRATE 25 MCG: 50 INJECTION, SOLUTION INTRAMUSCULAR; INTRAVENOUS at 09:42

## 2020-10-19 RX ADMIN — GLYCOPYRROLATE 0.1 MG: 0.2 INJECTION, SOLUTION INTRAMUSCULAR; INTRAVENOUS at 08:24

## 2020-10-19 RX ADMIN — SODIUM CHLORIDE, SODIUM LACTATE, POTASSIUM CHLORIDE, AND CALCIUM CHLORIDE: 600; 310; 30; 20 INJECTION, SOLUTION INTRAVENOUS at 08:58

## 2020-10-19 RX ADMIN — FENTANYL CITRATE 25 MCG: 50 INJECTION, SOLUTION INTRAMUSCULAR; INTRAVENOUS at 09:31

## 2020-10-19 RX ADMIN — FENTANYL CITRATE 25 MCG: 50 INJECTION, SOLUTION INTRAMUSCULAR; INTRAVENOUS at 09:47

## 2020-10-19 RX ADMIN — ROCURONIUM BROMIDE 45 MG: 10 INJECTION INTRAVENOUS at 07:56

## 2020-10-19 RX ADMIN — Medication 3 AMPULE: at 07:29

## 2020-10-19 RX ADMIN — ROCURONIUM BROMIDE 5 MG: 10 INJECTION INTRAVENOUS at 07:51

## 2020-10-19 RX ADMIN — ROCURONIUM BROMIDE 10 MG: 10 INJECTION INTRAVENOUS at 08:39

## 2020-10-19 RX ADMIN — LIDOCAINE HYDROCHLORIDE 100 MG: 20 INJECTION, SOLUTION INTRAVENOUS at 07:51

## 2020-10-19 RX ADMIN — SUCCINYLCHOLINE CHLORIDE 180 MG: 20 INJECTION, SOLUTION INTRAMUSCULAR; INTRAVENOUS at 07:51

## 2020-10-19 RX ADMIN — FENTANYL CITRATE 25 MCG: 50 INJECTION, SOLUTION INTRAMUSCULAR; INTRAVENOUS at 09:57

## 2020-10-19 RX ADMIN — FENTANYL CITRATE 25 MCG: 50 INJECTION, SOLUTION INTRAMUSCULAR; INTRAVENOUS at 10:35

## 2020-10-19 RX ADMIN — FENTANYL CITRATE 50 MCG: 50 INJECTION, SOLUTION INTRAMUSCULAR; INTRAVENOUS at 07:51

## 2020-10-19 RX ADMIN — PROPOFOL 150 MG: 10 INJECTION, EMULSION INTRAVENOUS at 07:51

## 2020-10-19 RX ADMIN — SODIUM CHLORIDE, SODIUM LACTATE, POTASSIUM CHLORIDE, AND CALCIUM CHLORIDE 25 ML/HR: 600; 310; 30; 20 INJECTION, SOLUTION INTRAVENOUS at 07:29

## 2020-10-19 RX ADMIN — FENTANYL CITRATE 25 MCG: 50 INJECTION, SOLUTION INTRAMUSCULAR; INTRAVENOUS at 10:18

## 2020-10-19 RX ADMIN — ONDANSETRON HYDROCHLORIDE 4 MG: 2 INJECTION, SOLUTION INTRAMUSCULAR; INTRAVENOUS at 08:41

## 2020-10-19 RX ADMIN — Medication 3 MG: at 09:11

## 2020-10-19 RX ADMIN — GLYCOPYRROLATE 0.2 MG: 0.2 INJECTION, SOLUTION INTRAMUSCULAR; INTRAVENOUS at 08:13

## 2020-10-19 RX ADMIN — DEXAMETHASONE SODIUM PHOSPHATE 10 MG: 4 INJECTION, SOLUTION INTRAMUSCULAR; INTRAVENOUS at 08:12

## 2020-10-19 RX ADMIN — GLYCOPYRROLATE 0.4 MG: 0.2 INJECTION, SOLUTION INTRAMUSCULAR; INTRAVENOUS at 09:11

## 2020-10-19 RX ADMIN — FENTANYL CITRATE 25 MCG: 50 INJECTION, SOLUTION INTRAMUSCULAR; INTRAVENOUS at 10:29

## 2020-10-19 RX ADMIN — SUGAMMADEX 200 MG: 100 INJECTION, SOLUTION INTRAVENOUS at 09:18

## 2020-10-19 RX ADMIN — FENTANYL CITRATE 50 MCG: 50 INJECTION, SOLUTION INTRAMUSCULAR; INTRAVENOUS at 08:36

## 2020-10-19 RX ADMIN — ROCURONIUM BROMIDE 10 MG: 10 INJECTION INTRAVENOUS at 08:26

## 2020-10-19 RX ADMIN — PROPOFOL 50 MG: 10 INJECTION, EMULSION INTRAVENOUS at 09:13

## 2020-10-19 RX ADMIN — FENTANYL CITRATE 25 MCG: 50 INJECTION, SOLUTION INTRAMUSCULAR; INTRAVENOUS at 10:04

## 2020-10-19 NOTE — ANESTHESIA PREPROCEDURE EVALUATION
Anesthetic History   No history of anesthetic complications            Review of Systems / Medical History  Patient summary reviewed, nursing notes reviewed and pertinent labs reviewed    Pulmonary        Sleep apnea: CPAP  Smoker  Asthma     Comments: Former Smoker   Neuro/Psych         Headaches and psychiatric history    Comments: Anxiety  Depression  Migraines Cardiovascular    Hypertension        Dysrhythmias       Exercise tolerance: <4 METS  Comments: Hx Palpitations and Tachycardia            GI/Hepatic/Renal     GERD           Endo/Other    Diabetes    Morbid obesity    Comments: Hx Hyperaldosteronism   Other Findings   Comments: OVARIAN CYST AND PELVIC PAIN  Hx Hyperhidrosis   Vitamin D deficiency   AR (allergic rhinitis)            Physical Exam    Airway  Mallampati: III  TM Distance: 4 - 6 cm  Neck ROM: normal range of motion   Mouth opening: Normal     Cardiovascular    Rhythm: regular  Rate: normal         Dental  No notable dental hx       Pulmonary  Breath sounds clear to auscultation               Abdominal  Abdominal exam normal       Other Findings            Anesthetic Plan    ASA: 3  Anesthesia type: general    Monitoring Plan: BIS      Induction: Intravenous  Anesthetic plan and risks discussed with: Patient

## 2020-10-19 NOTE — OP NOTES
Gynecologic Laparoscopy Procedure Note    Name: Hailey Guillaume   Medical Record Number: [id number]   YOB: 1990  Date: 10/19/2020      Preoperative Diagnosis:   OVARIAN CYST AND PELVIC PAIN    Postoperative Diagnosis: OVARIAN CYST AND PELVIC PAIN    Surgeon: Sammie Putnam MD    Assistant: Norah Paulson     Anesthesia: General    Procedure: Laparoscopic right oopherectomy, left cystectomy    Estimated Blood Loss: Minimal cc    Pathology /Specimens:   ID Type Source Tests Collected by Time Destination   1 : left ovarian cyst Preservative Ovary  Kal Bey MD 10/19/2020 1441 Pathology   2 : right ovary Preservative Cyst  Kal Bey MD 10/19/2020 1344 Pathology       Complications: None    Findings: Markedly enlarged right ovary, dermoid cyst completely replacing any nomal right ovarian tissue. Left ovary with large bilobed cyst, about 4 cm, along antitubal surface. Normal appearing uterus. No endometriosis. Normal tubes. Procedure in Detail:  The patient was taken to the operating room where she was placed in the supine position. After undergoing adequate general endotracheal anesthesia, she was placed in the [address] [place] [address] in the dorsal lithotomy position. Both arms were tucked to the side. The patient was then prepped and draped in the usual fashion and varela catheter was placed into the bladder. Attention was first turned to the vagina where the speculum was placed in the vagina. The anterior lip of the cervix was grasped with a single-toothed tenaculum. Hulka uterine manipulator is placed. All other instruments were removed from the vagina and 's gloves were changed. Attention was then turned to the abdomen. An incision was made in the umbilicus. The Batool technique is used to expose and incise the fascia, which is then tagged with 0 vicryl. Further blunt dissection allows peritoneal access and a 10 mm port is placed.   There was no evidence of bowel injury or bleeding. Pneumoperitoneum was obtained. Two accessory 5 mm ports were placed in the left upper and lower quadrants in the same fashion under direct visualization . The above findings were noted. Ureters were identified. The left ovary is first addressed. Cystectomy is performed with care taken to avoid the tube and preserve normal ovarian tissue. The Harmonic HD is used for hemostasis. The bilobed cyst is placed in an endocatch and withdrawn. Attention is then turned to the right ovary. The uteroovarian ligament is divided with care taken to preserve the tube and The mesoovarium is divided with the HD. Finally, after care is taken to ensure the ureter is well away, the IP is divided with the HD with good hemostasis. The ovary is placed in a second endocatch and withdrawn. Drainage of the ovary is necessary for removal, and it is drained of copious sebaceous material and hair. All drainage was contained within the bag or suctioned away. None was exposed to the abdominal cavity. Copious irrigation was performed. Marilee was placed over the left cystectomy and right oopherectomy sites. Hemostasis is excellent. All instruments were removed and CO2 gas was suctioned out. Fascia at the umbilicus is closed with 0 vicryl. Skin incisions were injected with 0.5 % Marcaine with epinephrine and  were closed with subcuticular 4-0 monocril followed by Dermabond on the skin. All instruments were removed from the vagina. The patient was awakened, extubated and taken to the recovery room in good condition.

## 2020-10-19 NOTE — PERIOP NOTES
Negative covid-19 on 10/15/2020. Patient adhered to quarantine guidelines.  Denies S/S    Valuables to PACU, 1 Bag    BG=92 at bedside, voided at 0730

## 2020-10-19 NOTE — ANESTHESIA POSTPROCEDURE EVALUATION
Procedure(s):  LAPAROSCOPIC RIGHT SALPINGO-OOPHORECTOMY AND LEFT CYSTECTOMY  (LATEX ALLERGY). general    Anesthesia Post Evaluation        Patient location during evaluation: PACU  Note status: Adequate. Level of consciousness: responsive to verbal stimuli and sleepy but conscious  Pain management: satisfactory to patient  Airway patency: patent  Anesthetic complications: no  Cardiovascular status: acceptable  Respiratory status: acceptable  Hydration status: acceptable  Comments: +Post-Anesthesia Evaluation and Assessment    Patient: Debra Franco MRN: [id number]  SSN: [id number]   YOB: 1990  Age: 26 y.o. Sex: female      Cardiovascular Function/Vital Signs    BP (!) 118/56   Pulse (!) 56   Temp 36.8 °C (98.3 °F)   Resp 18   Ht 5' 3\" (1.6 m)   Wt 137.8 kg (303 lb 12.7 oz)   SpO2 100%   BMI 53.81 kg/m²     Patient is status post Procedure(s):  LAPAROSCOPIC RIGHT SALPINGO-OOPHORECTOMY AND LEFT CYSTECTOMY  (LATEX ALLERGY). Nausea/Vomiting: Controlled. Postoperative hydration reviewed and adequate. Pain:  Pain Scale 1: Numeric (0 - 10) (10/19/20 1050)  Pain Intensity 1: 5 (10/19/20 1050)   Managed. Neurological Status:   Neuro (WDL): Exceptions to WDL (10/19/20 0935)   At baseline. Mental Status and Level of Consciousness: Arousable. Pulmonary Status:   O2 Device: Room air (10/19/20 1050)   Adequate oxygenation and airway patent. Complications related to anesthesia: None    Post-anesthesia assessment completed. No concerns. Signed By: Klaudia Salmon MD    10/19/2020  Post anesthesia nausea and vomiting:  controlled      INITIAL Post-op Vital signs:   Vitals Value Taken Time   /56 10/19/2020 10:45 AM   Temp 36.8 °C (98.3 °F) 10/19/2020  9:38 AM   Pulse 65 10/19/2020 10:53 AM   Resp 0 10/19/2020 10:25 AM   SpO2 100 % 10/19/2020 10:53 AM   Vitals shown include unvalidated device data.

## 2020-10-19 NOTE — PERIOP NOTES
Handoff Report from Operating Room to PACU  9:35 AM  Report received from JUSTYN El and Vilma Saleem CRNA regarding Cornelio Fabian. Surgeon(s):  Trevon Thomas MD  And Procedure(s) (LRB):  LAPAROSCOPIC RIGHT SALPINGO-OOPHORECTOMY AND LEFT CYSTECTOMY  (LATEX ALLERGY) (Bilateral)  confirmed   with allergies and dressings discussed. Anesthesia type, drugs, patient history, complications, estimated blood loss, vital signs, intake and output, and last pain medication, lines and reversal medications were reviewed. 10:45 AM  Updated pt mother via phone    11 AM  Phase II continued by this nurse in PACU        Discharge instructions provided; Reviewed DC instructions with patient and her mother. Opportunity for questions and clarifications was provided; verbalized understanding. Follow-up appointments reviewed/written for patient. Pt stable and ambulatory for discharge; mother [facility] to provide transportation to home. Clarified all personal belongings sent with patient including eyeglasses. IV removed (without difficulty/pt tolerated well) Telemetry discontinued.

## 2020-10-19 NOTE — H&P
Pre-operative Evaluation / History & Physical    Sent From: Sent To: [address] [facility]   Phone: [phone] Fax: [phone]      Patient Information  Patient Name Claude Riis Sex F    1990 Age 33yo   Address Zahra Marsh [address] [place] [address], [address] Phone H: [phone]  M: [phone]   [facility] [address])  ID: [id number]  Group: [id number]  Policy Kirby: ROMELIA, [address] [place] [address], [address] None recorded. Pre-Op Visit and Medical History  Chief Complaint GYN problem    pre op   History of Present Illness Patient presents today for pre op for R ovary removal and removal of L ovarian cyst  10 cm dermoid R, 3 cm L   Past Medical History Past Medical History not reviewed (last reviewed 2020)  Anxiety Disorder: Y  Depression: Y  Diabetes Mellitus: Y  Gastroesophageal Reflux Disease (GERD): Y  Headaches: Y  Hypercholesterolemia (high cholesterol): Y  Hypertension (high blood pressure): Y  Migraines: Y - blurred vision with migraine  Obesity: Y   Surgical History Surgical History not reviewed (last reviewed 2020)     Hernia repair   Gastric bypass for obesity - sleeve   Tonsillectomy   Gynecological / Obstetrical History Reviewed GYN History  Date of LMP: 2020. Menses Monthly: N. Date of Last Pap Smear: 2019 (Notes: [facility]). Date of HPV testin2019 (Notes: Negative). Abnormal Pap: Yes (Notes: Abnormal pap 2008 [facility] (+) HPV (previous records)). Abnormal Pap Smear result: (Notes: 3-2019 [facility] Negative HPV). Age at Menarche: 4. HPV Vaccine: Yes (Notes: complete  (previous records)). Sexually Active?: N (Notes: not currently). STIs/STDs: Y (Notes:  genital warts- TCA treatments (previous records); HPV). Current Birth Control Method: Condoms. Date of Last Mammogram: 2020 (Notes: BIRADS 1: 6 month f/u recommended).   Kriss Smith risk assesment 30.0% lifetime risk    Social History Social History not reviewed (last reviewed 2020)  OB/GYN Social History  Chewing tobacco: none  Tobacco Smoking Status: Current some day smoker  Smoker (1/4 PPD)  Smokeless Tobacco Status: Never used smokeless tobacco  E-cigarette/Vape Status: Never used electronic cigarettes  Most Recent Tobacco Use Screenin2019  Marital status: Single  Number of children: 0  Are you working: Yes  Occupation: [employer]  On average, how many days per week do you engage in moderate to strenuous EXERCISE (like walking fast, running, jogging, dancing, swimming, biking, or other activities that cause a light or heavy sweat)?: 3   Family History Family History not reviewed (last reviewed 2020)    Mother - Diabetes mellitus     - Hypertensive disorder   Sister - Hypertensive disorder     - Malignant tumor of breast (onset age: 45)  - chemo/radiation, lumptectomy   Father - Malignant tumor of stomach   Maternal Grandfather - Carcinoma of prostate (onset age: 48) ( age: 80)  -  due to heart failure age 80 yrs     - Heart disease ( age: 80)  -  due to heart failure age 80 yrs      Allergies List Reviewed Allergies     LATEX       Medications Reviewed Medications     atorvastatin 40 mg tablet  Take 1 tablet(s) every day by oral route. 20   entered                     Emgality Pen 120 mg/mL subcutaneous pen injector  Inject every month by subcutaneous route.   Internal Note: migraine 20   entered                     gabapentin 300 mg capsule  Prescribed by non [facility] MD, start 2017   started    metoprolol succinate  mg tablet,extended release 24 hr  Prescribed by janice Vega/Agustina Zavala MD, start 2016   started    omeprazole 40 mg capsule,delayed release  Prescribed by janice Zavala MD, start 2016   started    spironolactone 25 mg tablet  Prescribed by janice Zavala MD, start 2016 04/05/16   started    Viibryd 40 mg tablet  Take 1 tablet(s) every day by oral route. Internal Note: Depression 03/26/20   entered                     Xanax 0.5 mg tablet  Take 1 tablet(s) as needed by oral route. 03/12/19   entered                        Review of Systems ROS as noted in the HPI   Vital Signs Ht:                  5 ft 3 in (160.02 cm) 10/09/2020 10:19 am Wt:                  310 lbs (140.61 kg) 10/09/2020 10:21 am BMI:                  54.9 10/09/2020 10:21 am   BP:                  126/82 10/09/2020 10:27 am          Physical Exam Patient is a 75-year-old female. Constitutional: General Appearance: well developed and nourished and pleasant. Level of Distress: no acute distress. Ambulation: ambulating normally. Head: Head: normal scalp and examination of the face and normocephalic, atraumatic, and no temporal wasting. Eyes: External Eye no discharge or eye discharge, proptosis, or ptosis. Sclera: non-icteric. Extraocular Movements extraocular movements intact. Ears, Nose, Mouth, Throat: Ears normal hearing. Nose: no external nose lesions. Neck: Appearance trachea midline. Thyroid: no enlargement. Lungs / Chest: Respiratory effort: unlabored. Auscultation: no wheezing, rales/crackles, or rhonchi. Cardiovascular: Rate And Rhythm: regular. Heart Sounds: no murmurs. Extremities: no clubbing, cyanosis, or edema. Abdomen: Inspection and Palpation: no tenderness, guarding, masses, or rebound tenderness and soft and non-distended. Hernia: none palpable. Lymphatics: General Lymphatics: no lymphadenopathy. Extremities: Extremities: no swelling or inflammation of extremities. Musculoskeletal System: General Musculoskeletal full range of motion. Skin: General Skin no rash or suspicious lesions. Neurologic: Gait and Station: normal gait and station. Cranial Nerves: grossly intact. Mental Status Exam: Orientation oriented to person, place, and time.  Affect: appropriate affect. Language and Speech: normal speech and comprehension. Lab Results    Assessment and Plan 1. Benign teratoma of ovary -    Needs laparoscopic RSO and left cystectomy. I reviewed with the patient indications, alternatives, risks, and benefits of proposed procedure, the risks of which include injury to bowel, bladder, nerves, blood vessels, or ureters, injury to any other intraabdominal structure, risk of bleeding and infection, and inherent risks of anesthesia, including death. The patient indicates understanding of these risks, and agrees to the proposed procedure. She is instructed to stay NPO after midnight the night before surgery. D27.9: Benign neoplasm of unspecified ovary      Return to Office   Mitesh Rosas MD for Surgery at [facility] on 10/19/2020 at 07:30 AM   Mitesh Rosas MD for Established Patient at [facility] on 11/23/2020 at 03:00 PM   [address] [address] for Mammogram, Diagnostic at [facility] on 02/23/2021 at 02:50 PM   Current Problems (Diagnoses) Reviewed Problems     Atypical squamous cells of undetermined significance on cervical Papanicolaou smear - Onset: 03/15/2019 - HPV negative; repeat cotesting 3/2022_____    7:30am/ [facility]/ berkle/ aparoscopic RSO and left cystectomy/ [facility] to call if PAT needed         Electronically Signed by: Rk Sweeney MD    _____________________________________________   Ordered/Documented by:   Visit Date: 10/09/2020      The History and Physical is reviewed today. The patient is seen and examined and no changes are required.   Kylee To MD  10/19/2020  7:22 AM

## 2020-10-19 NOTE — DISCHARGE INSTRUCTIONS
After Care Instructions For Your Laparoscopy      1. You may resume your usual diet once the nausea resolves. Initially, try sips of warm fluids and a bland diet. 2. Avoid heavy lifting or straining. Gradually increase your activity. First try walking and doing light activity around the house. You may resume your normal habits if no significant discomfort or bleeding develops. Most women can return to work within 2-7 days after this procedure. 3. Sexual intercourse can be resumed as soon as desired provided the discomfort following surgery has subsided. 4. You may take showers or tub baths. It is also safe to swim or use hot tubs once you feel up to it. 5. Some lower abdominal cramping typically occurs after this procedure. Tylenol, Motrin or your prescribed pain medication should relieve this discomfort. You should notice steady improvement in the pain over the next day or so. It is also not unusual to experience some discomfort under your ribs or to notice neck or shoulder soreness. This is due to gas used during the surgery to help the physicians see your pelvic organs. The gas is reabsorbed over a 24 hour course. 6. It is not unusual to have vaginal spotting lasting 2-3 days. It is difficult to predict when your next menstrual period will occur as your body has undergone a major stress. Your period should regulate itself within the first 6 weeks post-op. 7. A bruise may appear around the incision and will gradually resolve as it heals. 8. The suture used to close the incision may be visible above the skin. If so, it will be removed at your office visit. However, frequently sutures are placed below the skin and will reabsorb on their own.   There will be no need for removal.     9. Call the office at [phone] to report any of the following problems: Abdominal pain that is increasing in severity, heavy vaginal bleeding, drainage or separation of your incision site, temperature greater than 100.4 or persistent nausea and vomiting. 10. You should be seen in the office following your surgery. Call for an appointment if this has not already been arranged. At this appointment, the findings noted at the time of your procedure will be discussed. 11. You may remove the dressing from your incision after 12 hours. {Medication reconciliation information is now added to the patient's AVS automatically when it is printed. There is no need to use this SmartLink in discharge instructions. Highlight this text and delete it to clear this message}  Patient Education             How to Care for Your Wound After Its Treated With  DERMABOND* Topical Skin Adhesive  DERMABOND* Topical Skin Adhesive (2-octyl cyanoacrylate) is a sterile, liquid skin adhesive  that holds wound edges together. The film will usually remain in place for 5 to 10 days, then  naturally fall off your skin. The following will answer some of your questions and provide instructions for proper care for your  wound while it is healing:    CHECK WOUND APPEARANCE   Some swelling, redness, and pain are common with all wounds and normally will go away as the  wound heals. If swelling, redness, or pain increases or if the wound feels warm to the touch,  contact a doctor. Also contact a doctor if the wound edges reopen or separate. REPLACE BANDAGES   If your wound is bandaged, keep the bandage dry.  Replace the dressing daily until the adhesive film has fallen off or if the  bandage should become wet, unless otherwise instructed by your  physician.  When changing the dressing, do not place tape directly over the  DERMABOND adhesive film, because removing the tape later may also  remove the film. AVOID TOPICAL MEDICATIONS   Do not apply liquid or ointment medications or any other product to your wound while the  DERMABOND adhesive film is in place. These may loosen the film before your wound is healed.   KEEP WOUND DRY AND PROTECTED   You may occasionally and briefly wet your wound in the shower or bath. Do not soak or scrub  your wound, do not swim, and avoid periods of heavy perspiration until the DERMABOND  adhesive has naturally fallen off. After showering or bathing, gently blot your wound dry with a  soft towel. If a protective dressing is being used, apply a fresh, dry bandage, being sure to keep  the tape off the DERMABOND adhesive film.  Apply a clean, dry bandage over the wound if necessary to protect it.  Protect your wound from injury until the skin has had sufficient time to heal.   Do not scratch, rub, or pick at the DERMABOND adhesive film. This may loosen the film before  your wound is healed.  Protect the wound from prolonged exposure to sunlight or tanning lamps while the film is in  place. If you have any questions or concerns about this product, please consult your doctor. *Trademark ©[employer] 2002   Oxycodone, Rapid Release (ETH-Oxydose, Oxy IR, Roxicodone) - (By mouth)   Why this medicine is used:   Treats moderate to severe pain. This medicine is a narcotic pain reliever. Contact a nurse or doctor right away if you have:  · Fast or slow heart beat, shallow breathing, blue lips, skin or fingernails  · Anxiety, restlessness, fever, sweating, muscle spasms, twitching, seeing or hearing things that are not there  · Extreme weakness, shallow breathing, slow heartbeat  · Severe confusion, lightheadedness, dizziness, fainting  · Sweating or cold, clammy skin, seizures  · Severe constipation, stomach pain, nausea, vomiting     Common side effects:  · Mild constipation  · Sleepiness, tiredness  © 2017 [facility] Information is for End User's use only and may not be sold, redistributed or otherwise used for commercial purposes.

## 2020-10-20 ENCOUNTER — PATIENT OUTREACH (OUTPATIENT)
Dept: OTHER | Age: 30
End: 2020-10-20

## 2020-10-20 ENCOUNTER — VIRTUAL VISIT (OUTPATIENT)
Dept: SURGERY | Age: 30
End: 2020-10-20
Payer: COMMERCIAL

## 2020-10-20 VITALS — WEIGHT: 293 LBS | BODY MASS INDEX: 52.97 KG/M2

## 2020-10-20 DIAGNOSIS — Z09 FOLLOW-UP EXAMINATION AFTER ABDOMINAL SURGERY: Primary | ICD-10-CM

## 2020-10-20 DIAGNOSIS — E66.01 MORBID OBESITY (HCC): ICD-10-CM

## 2020-10-20 PROCEDURE — 99024 POSTOP FOLLOW-UP VISIT: CPT | Performed by: NURSE PRACTITIONER

## 2020-10-20 NOTE — PATIENT INSTRUCTIONS
Next appointment is December 15, 2020 at 11:20am Virtual Visit Solid Textured Foods What is this diet?  This phase is a slow reintroduction of textured food, starting with those easiest tolerated  Supplement with protein shakes/powders as needed What foods can I eat?  Lean Protein  Vegetables  Fruit  Low Fat Dairy Choose foods wisely. Think about the nutritional benefit of the food. Protein first and at each meal.  Include produce (vegetables and/or fruits) at each meal.   
Limit or eliminate \"filler\" foods such as breads, pasta, potatoes, rice, crackers, pretzels, and the like. Avoid eating and drinking together, there just isn't enough room! You may continue protein supplementation if needed to meet your daily protein goals. Many patients use a protein shake or bar to replace a meal.  There are a variety of protein supplements listed in your handbook. Remember, the dietician is available for additional support. For an appointment, simply call or e-mail Shala Brumfield RD at [phone] or [email or web address]. Take your vitamins every day, attend support group and keep your regular follow-up appointments. Ultimately, success depends on you. Choose to use your tool and we will guide you along the way!

## 2020-10-20 NOTE — PROGRESS NOTES
Follow up phone call to patient, two pt identifiers verified. Discussed patient's goals:   Goals      Completes follow up appointments       Educate and encourage importance of FU for prevention of complications or disease;   Assess the patient's relationship with a PCP and next FU visit scheduled;  o Good relationship with PCP   Discuss importance of adherence to treatment plan and follow up visits;   Identify any barriers in transportation or access to FU appointments.  Assist patient with making FU appointments as needed;   It's also a good idea to know your test results    10/20: F/up with Dr. Consuelo Blackburn on 11/23. Back to work on 11/2.  Demonstrates adherence to current diet plan following gastric sleeve      · Assess postop weight;  · Weight today, 10/6 was 309 lbs  · Review preop RD nutritional recommendations and diet plan;  · Working with nutritionist at her surgeon's office  · Encourage broths, soups, protein supplements, shakes;  · Encourage 5-6 small \"meals\" each day;  · Encourage slow and steady;  · Avoid carbonated beverages and drinking with straws;  10/20: 10/19: 299 lbs       Demonstrates adherence to vitamins and protein supplements within next 60 days      · Assess current use of vitamins and supplements daily;  · Discuss importance of electrolytes and minerals in the daily diet;  · Assess for barriers to taking daily vitamins and mineral supplements;  10/20: Has resumed all supplements today            Patient's primary care provider relationship reviewed with patient and modified, as applicable. Surgical/Wound Condition Focused Assessment    Skin- any open wounds or incisions? yes  Description and location of wound- 3 incisions, dermabond - no signs of infection  In the last 24 hour have you experienced;     Fever no    Low body temperature no    Chills or shaking no    Sweating no    Fast heart rate no    Fast breathing no    Dizziness/lightheadedness no    Confusion or unusual change in mental status no    Diarrhea no    Nausea no    Vomiting no    Shortness of breath or difficulty breathing no    Less urine output no    Cold, clammy, and pale skin no     Skin rash or skin color changes no  New or worsening pain? no  If yes, pain rated 0-10: 7 Location/pain characteristics: belly, achy pain - usually with getting up, using ice   New or worsening numbness or tingling? no  If yes, location of numbness and tingling: no      Activity level- moving several times a day, or as recommended? yes  Abnormal activity level reported: n/a   Bathing and showering instructions? yes  Nutrition- prescribed diet? yes - bariatric diet (can begin raw veggies now)  Tolerating food? yes  Hydration- (how much in ounces per day) 50-80 ounces  Medications- new antibiotic? no             Pain medication? yes  Does patient understand how and when to take their medications? yes  If no, instructed patient on proper medication use? no  Does patient have incentive spirometer?  No (states her mom is respiratory therapist and makes sure she is keeping her lungs clear)  If yes, how frequently is patient using incentive spirometer? n/a    Readiness to Change: []  Pre-contemplative    []  Contemplative  []  Preparation               [x]  Action                  []  Maintenance    Barriers/Challenges to Care: []  Decline in memory    []  Language barrier     []  Emotional                  []  Limited mobility  []  Lack of motivation     [] Vision, hearing or cognitive impairment []  Knowledge [] Financial Barriers []  Lack of support  []  Pain []  Other [x]  None    Key pt activities to achieve better health:   [x]  Weight loss  []  Improved diabetic control  []  Decreased cholesterol levels  []  Decreased blood pressure  []    []    Upcoming appointments: Dr. Bree Thacker on 11/23  Future Appointments   Date Time Provider Mateo Christian   2/9/2021  2:10 PM Valerie Raymundo MD RDE [facility] [address] BS AMB   3/17/2021 10:20 AM Leslee Lanes, MD PATRICIA [facility]ir BS AMB     Plan for next call: Will call on 10/30.  (Back to work on 11/2)

## 2020-10-20 NOTE — PROGRESS NOTES
I was in the office while conducting this encounter. Consent:  She and/or her healthcare decision maker is aware that this patient-initiated Telehealth encounter is a billable service, with coverage as determined by her insurance carrier. She is aware that she may receive a bill and has provided verbal consent to proceed: No - Not billable    This virtual visit was conducted via [employer]. Pursuant to the emergency declaration under the [facility] waiver authority and the [employer] Act, this Virtual  Visit was conducted to reduce the patient's risk of exposure to COVID-19 and provide continuity of care for an established patient. Services were provided through a video synchronous discussion virtually to substitute for in-person clinic visit. Due to this being a TeleHealth evaluation, many elements of the physical examination are unable to be assessed. Total Time: minutes: 11-20 minutes. Chief Complaint   Patient presents with    Surgical Follow-up     6wks s/p sleeve gastrectomy, down 27lbs/ [phone]       Nancy Calderon is 4  Weeks  status post Sleeve gastrectomy for treatment of morbid obesity . Presents today for obesity management. She is also POD 1  Laparoscopic right oopherectomy, left cystectomy. She is doing well and just a little sore. Patient has lost 27 lbs. Since surgery. Patient is satisfied with progress. No nausea or vomiting   Tolerating stage II soft   Patient is consuming about 45-50 grams of protein daily. Still using a shake every day   Patient is drinking 48 oz of fluids per day. Bowels moving ok until surgery yesterday   She is having flatus no BM as expected   Regurgitation  no  No fever or chills, chest pain or shortness of breath.   Vitamin compliance yes  Activity  Walking some   Sleep   Good     Physical Exam  Visit Vitals  Wt 299 lb (135.6 kg)   LMP 09/20/2020 (Exact Date) Comment: negative upt, 10/19/20   BMI 52.97 kg/m²     A + O x 3, appears well   Speech clear and breathing unlabored   Reports abdomen is soft and bariatric surgery incisions are well healed  ambulating independently       ICD-10-CM ICD-9-CM    1. Follow-up examination after abdominal surgery  Z09 V67.09    2. Morbid obesity ([facility] [address]  E66.01 278.01    3. BMI 50.0-59.9, adult [facility])  Z68.43 V85.43        Nancy Marvin Dural is 6 weeks  s/p Sleeve gastrectomy for treatment of morbid obesity    Diet regular texture / protein and produce   Continue vitamins and protein supplements   Activity daily walking 10 minutes every hour   Sleep hygiene reviewed   Follow-up in 8 weeks   Return to work (she is cleared from us, but now out due to GYN surgery. She will return 11/2/20)  Support group  [address] verbalized understanding and questions were answered to the best of my knowledge and ability. Diet, activity and mindfulness educational materials were provided. Reviewed medication list, problem list and allergies. 12 minutes spent virtually with patient in directed conversation with the patient to include medical condition, assessment and plan. Andreas Velasquez

## 2020-10-20 NOTE — PROGRESS NOTES
1. Have you been to the ER, urgent care clinic since your last visit? Hospitalized since your last visit? no    2. Have you seen or consulted any other health care providers outside of the [address] [place] [address] since your last visit? Include any pap smears or colon screening.  no

## 2020-10-23 ENCOUNTER — VIRTUAL VISIT (OUTPATIENT)
Dept: FAMILY MEDICINE CLINIC | Age: 30
End: 2020-10-23
Payer: COMMERCIAL

## 2020-10-23 DIAGNOSIS — G89.18 ACUTE POST-OPERATIVE PAIN: ICD-10-CM

## 2020-10-23 DIAGNOSIS — I15.0 RENOVASCULAR HYPERTENSION: Primary | ICD-10-CM

## 2020-10-23 DIAGNOSIS — G43.009 MIGRAINE WITHOUT AURA AND WITHOUT STATUS MIGRAINOSUS, NOT INTRACTABLE: Primary | ICD-10-CM

## 2020-10-23 DIAGNOSIS — Z71.89 ADVICE GIVEN ABOUT COVID-19 VIRUS INFECTION: ICD-10-CM

## 2020-10-23 PROCEDURE — 99213 OFFICE O/P EST LOW 20 MIN: CPT | Performed by: FAMILY MEDICINE

## 2020-10-23 RX ORDER — SUMATRIPTAN 50 MG/1
TABLET, FILM COATED ORAL
Qty: 9 TAB | Refills: 2 | Status: SHIPPED | OUTPATIENT
Start: 2020-10-23 | End: 2021-09-29

## 2020-10-23 NOTE — PROGRESS NOTES
HISTORY OF PRESENT ILLNESS  Yasmine Gomez is a 26 y.o. female. Chief Complaint   Patient presents with    Surgical Follow-up     10/19/2020 Right Ovary removal/ Cyst removal left ovary   S/p Gastric Sleeve on sept 8 2020, for which it went well, then few days ago pt was taken to an outpt clinic [facility], for which rt ovary removed, and a cyst removal of the cyst on the left ovary, was painful,  Post op date #4 , having bm, passing gas, urinating well was given varela cath, spotted for couple days and started her menses on 2 days ago, heavy, LMP 9/20/2020, no with post op pain, currently on oxycodone 5mg not helping, last bm was soft, stating that at rest she has no pain but the areas of the surgical repair becomes painful on getting into the car or with movement, and palpation, incision c,d,i , no discharge,     HTN  Today pt present for Bp check and++= Compliancy w/ the bp meds, having had the low salt diet ,  has been active, patien does obtain the bp at home 90/66 ,, today the pt denies Chest Pain, has no legs swelling no lightheadedness,      Current Outpatient Medications   Medication Sig Dispense Refill    SUMAtriptan (IMITREX) 50 mg tablet 1 tab at onset of headache, can repeat X 1 in 2 hrs if HA persists. Not more than 10 days/month. 9 Tab 2    cyanocobalamin, vitamin B-12, (B-12 Compliance) 1,000 mcg/mL kit Once monthly  Indications: inadequate vitamin B12 1 Kit 5    iron,carbonyl-FA-multivit-min (Opurity Multivitamin) 30 mg iron- 800 mcg chew       calcium carbonate/vitamin D3 (CALCIUM 600 + D,3, PO) Take 1 Tab by mouth two (2) times a day.  Arm Brace misc Bilateral carpal tunnel splint to be used every night 2 Each 0    ergocalciferol (ERGOCALCIFEROL) 1,250 mcg (50,000 unit) capsule Take 1 Cap by mouth every Monday, Wednesday, Friday. 36 Cap 0    nystatin-triamcinolone (MYCOLOG) 100,000-0.1 unit/gram-% ointment Apply  to affected area as needed for Skin Irritation.       ALPRAZolam Hardeep Encinas) 0.5 mg tablet Take 1 Tab by mouth nightly as needed for Anxiety or Sleep. Max Daily Amount: 0.5 mg. Indications: anxious, repeated episodes of anxiety, panic disorder 30 Tab 3    atorvastatin (LIPITOR) 40 mg tablet TAKE 1 TABLET BY MOUTH EVERY DAY (Patient taking differently: 40 mg nightly. TAKE 1 TABLET BY MOUTH EVERY DAY) 90 Tab 3    aspirin delayed-release 81 mg tablet Take 1 Tab by mouth daily. 30 Tab 11    gabapentin (NEURONTIN) 300 mg capsule Take 1 Cap by mouth three (3) times daily. Max Daily Amount: 900 mg. 270 Cap 1    spironolactone (ALDACTONE) 25 mg tablet Take 1 Tab by mouth two (2) times a day. (Patient taking differently: Take 25 mg by mouth two (2) times a day. One tablet in the am and 1/2 tablet at night) 180 Tab 3    Blood-Glucose Meter,Continuous (Dexcom G6 ) misc Change sensor every 10 days 1 Each 0    Blood-Glucose Transmitter (Dexcom G6 Transmitter) nahid Change sensor every 10 days 1 Device 3    Blood-Glucose Sensor (Dexcom G6 Sensor) nahid Change sensor every 10 days 3 Device 12    metoprolol tartrate (LOPRESSOR) 100 mg IR tablet TAKE 1 TABLET BY MOUTH TWICE A  Tab 2    omeprazole (PRILOSEC) 40 mg capsule TAKE 1 CAP BY MOUTH DAILY. 90 Cap 3    vilazodone (VIIBRYD) 40 mg tab tablet Take 1 Tab by mouth daily. 90 Tab 2    galcanezumab-gnlm (EMGALITY PEN) 120 mg/mL injection 1 mL by SubCUTAneous route every thirty (30) days. 1 mL 11    butalbital-acetaminophen-caffeine (FIORICET) -40 mg per tablet Take 0.5-1 Tabs by mouth every six (6) hours as needed for Headache. Max Daily Amount: 4 Tabs. 12 Tab 0    albuterol (PROVENTIL HFA, VENTOLIN HFA) 90 mcg/actuation inhaler Take 1 Puff by inhalation every four (4) hours as needed for Wheezing. 1 Inhaler 1    ibuprofen (MOTRIN) 800 mg tablet Take 1 Tab by mouth every eight (8) hours as needed for Pain.  30 Tab 1     Allergies   Allergen Reactions    Latex Itching     Past Medical History:   Diagnosis Date    Anxiety with depression     Arrhythmia     tachycardia    Asthma     [place] hump 2015    Carpal tunnel syndrome of right wrist 2016    Diabetes [facility]     ALIYA (generalized anxiety disorder) 2018    GERD (gastroesophageal reflux disease) 2014    HTN (hypertension) 2011    Hyperaldosteronism ([facility] 10/19/2015    Hyperhidrosis 2018    Morbid obesity ([facility]     Other ill-defined conditions(799.89)     migraines    Palpitation 2018    Sleep apnea     uses CPAP    Tachycardia 2018     Past Surgical History:   Procedure Laterality Date    HX GASTRECTOMY  2020    Gastric sleeve     HX GYN  10/19/2020    Laparoscopic right oopherectomy, left cystectomy    HX HERNIA REPAIR  2020    HX OVARIAN CYST REMOVAL  10/19/2020    HX TONSILLECTOMY      HX WISDOM TEETH EXTRACTION      OK REMOVAL OF TONSILS,<11 Y/O       Family History   Problem Relation Age of Onset    Hypertension Mother     Diabetes Mother         Type II    Anesth Problems Mother         respiratory issues - feels like she cannot breath when coming out of anesthesia    Psychiatric Disorder Father     Drug Abuse Father     Cancer Other         prostate    Hypertension Sister     Thyroid Disease Sister         \"I think\"    Breast Cancer Sister     Attention Deficit Hyperactivity Disorder Brother     Hypertension Sister      Social History     Tobacco Use    Smoking status: Former Smoker     Packs/day: 0.50     Years: 5.00     Pack years: 2.50     Last attempt to quit: 2019     Years since quittin.9    Smokeless tobacco: Never Used   Substance Use Topics    Alcohol use: Not Currently     Alcohol/week: 1.0 standard drinks     Types: 1 Shots of liquor, 1 Standard drinks or equivalent per week     Comment: socially, Monthly or less      Lab Results   Component Value Date/Time    WBC 8.4 10/01/2020 06:17 AM    HGB 11.5 10/01/2020 06:17 AM    HCT 37.9 10/01/2020 06:17 AM    PLATELET 639  06:17 AM    MCV 87.1 10/01/2020 06:17 AM     Lab Results   Component Value Date/Time    Hemoglobin A1c 6.5 (H) 10/15/2020 12:21 PM    Hemoglobin A1c 8.1 (H) 03/07/2020 09:44 AM    Hemoglobin A1c 10.9 (H) 03/20/2017 04:08 PM    Hemoglobin A1c, External 7.8 07/13/2020    Glucose 81 10/01/2020 06:17 AM    Glucose (POC) 122 (H) 10/19/2020 09:38 AM    Microalb/Creat ratio (ug/mg creat.) 16 03/07/2020 09:44 AM    LDL, calculated 48 03/07/2020 09:44 AM    LDL Chol Calc (NIH) 42 10/15/2020 12:21 PM    Creatinine 0.68 10/01/2020 06:17 AM      Lab Results   Component Value Date/Time    TSH 1.730 04/04/2018 01:17 PM         Review of Systems   Constitutional: Negative for chills, fever and malaise/fatigue. HENT: Negative for nosebleeds. Eyes: Negative for pain. Respiratory: Negative for cough and wheezing. Cardiovascular: Negative for chest pain and leg swelling. Gastrointestinal: Negative for constipation, diarrhea and nausea. Genitourinary: Negative for frequency. Musculoskeletal: Negative for joint pain and myalgias. Skin: Positive for rash. Neurological: Negative for loss of consciousness and headaches. Endo/Heme/Allergies: Does not bruise/bleed easily. Psychiatric/Behavioral: Negative for depression. The patient is not nervous/anxious and does not have insomnia. All other systems reviewed and are negative. Physical Exam  Constitutional:       Appearance: She is obese. She is not ill-appearing or toxic-appearing. HENT:      Head: Normocephalic and atraumatic. Mouth/Throat:      Mouth: Mucous membranes are moist.   Neurological:      Mental Status: She is alert and oriented to person, place, and time. Psychiatric:         Mood and Affect: Mood normal.         Behavior: Behavior normal.         Thought Content: Thought content normal.         Judgment: Judgment normal.         ASSESSMENT and PLAN  Diagnoses and all orders for this visit:    1. Renovascular hypertension    2.  Advice given about COVID-19 virus infection    3. Acute post-operative pain          Patient told mostly this is postsurgical pain need to use heat pad Tylenol as needed increase the current pain medication to 1/2 tablet every 6 hours call surgeon if the pain is not getting better increase oral hydration stool softener to prevent constipation and call primary concern    We decreased spironolactone to half a tablet in the morning half tablet at night patient was told to continue blood pressure monitoring daily keep record and call if the blood pressure is less than 90/60 or greater than 140/90 patient acknowledged understanding    Patient advised to have the mask on most of the time, social distance and handwashing avoid crowded area pursuant to the emergency declaration under the [facility]5 waiver authority and the [employer] and [employer] Act, this Virtual Visit was conducted, with patient's consent, to reduce the patient's risk of exposure to COVID-19 and provide continuity of care for an established patient  Services were provided through a Video synchronous discussion virtually to substitute for in-person appointment.

## 2020-10-23 NOTE — PROGRESS NOTES
Name and  Verified. Chief Complaint   Patient presents with    Surgical Follow-up     10/19/2020 Right Ovary removal/ Cyst removal left ovary     Patient stated she is still having abdominal tenderness. Heavy bleeding. She was advise by GYN to go to ER if worsen. Dizziness as    1. Have you been to the ER, urgent care clinic since your last visit? Hospitalized since your last visit? No        2. Have you seen or consulted any other health care providers outside of the [address] [place] [address] since your last visit? Include any pap smears or colon screening.      No

## 2020-10-30 ENCOUNTER — PATIENT OUTREACH (OUTPATIENT)
Dept: OTHER | Age: 30
End: 2020-10-30

## 2020-10-30 NOTE — PROGRESS NOTES
Follow up phone call to patient, two pt identifiers verified. Discussed patient's goals:   Goals      Completes follow up appointments       Educate and encourage importance of FU for prevention of complications or disease;   Assess the patient's relationship with a PCP and next FU visit scheduled;  o Good relationship with PCP   Discuss importance of adherence to treatment plan and follow up visits;   Identify any barriers in transportation or access to FU appointments.  Assist patient with making FU appointments as needed;   It's also a good idea to know your test results    10/20: F/up with Dr. Evelin Saunders on 11/23. Back to work on 11/2.  10/30: Has completed follow up with Dr. Marvene Phoenix, doing great. Dr. Evelin Saunders f/up on 11/23.  Demonstrates adherence to current diet plan following gastric sleeve      · Assess postop weight;  · Weight today, 10/6 was 309 lbs  · Review preop RD nutritional recommendations and diet plan;  · Working with nutritionist at her surgeon's office  · Encourage broths, soups, protein supplements, shakes;  · Encourage 5-6 small \"meals\" each day;  · Encourage slow and steady;  · Avoid carbonated beverages and drinking with straws;  10/20: 10/19: 299 lbs         Demonstrates adherence to vitamins and protein supplements within next 60 days      · Assess current use of vitamins and supplements daily;  · Discuss importance of electrolytes and minerals in the daily diet;  · Assess for barriers to taking daily vitamins and mineral supplements;  10/20: Has resumed all supplements today  10/30: Taking supplements. Able to tolerate foods, get most protein from food. Patient's primary care provider relationship reviewed with patient and modified, as applicable. Patient states she is doing well. Diet is going good, mostly food to get protein, taking supplements and tolerating well. Dr. Evelin Saunders on 11/23.     Readiness to Change: []  Pre-contemplative    []  Contemplative  [] Preparation               [x]  Action                  []  Maintenance    Barriers/Challenges to Care: []  Decline in memory    []  Language barrier     []  Emotional                  []  Limited mobility  []  Lack of motivation     [] Vision, hearing or cognitive impairment []  Knowledge [] Financial Barriers []  Lack of support  []  Pain []  Other [x]  None    Key pt activities to achieve better health:   [x]  Weight loss  []  Improved diabetic control  []  Decreased cholesterol levels  []  Decreased blood pressure  []    []    Upcoming appointments:   Future Appointments   Date Time Provider Mateo Diegoi   12/15/2020 11:20 AM Adam Travis NP [facility] BS AMB   2/9/2021  2:10 PM Wendi Gallegos MD [facility] BS AMB   3/17/2021 10:20 AM Ellis Elizondo MD [facility] BS AMB     Plan for next call: Call in two weeks, 11/13.

## 2020-11-13 ENCOUNTER — PATIENT OUTREACH (OUTPATIENT)
Dept: OTHER | Age: 30
End: 2020-11-13

## 2020-11-13 NOTE — PROGRESS NOTES
Attempt to reach patient for follow up. Discreet VM left with contact information. Call back in two weeks, 11/27.

## 2020-11-27 ENCOUNTER — PATIENT OUTREACH (OUTPATIENT)
Dept: OTHER | Age: 30
End: 2020-11-27

## 2020-11-27 NOTE — PROGRESS NOTES
Attempt to reach patient for follow up. Discreet VM left with contact information. Left message with patient's mother, Brooke Duval.

## 2020-12-04 ENCOUNTER — PATIENT OUTREACH (OUTPATIENT)
Dept: OTHER | Age: 30
End: 2020-12-04

## 2020-12-04 NOTE — PROGRESS NOTES
Attempt to reach patient for follow up. Discreet VM left with contact information. Will try back in two weeks, 12/18. May resolve at that time.

## 2020-12-04 NOTE — PROGRESS NOTES
Resolving current episode of case management. Patient has met patient stated and/or medical goals. Patient consistenly demonstrates understanding of the medical action plan through execution of plan. Appointments with key providers are scheduled and attended. Plan of care is modified and updated to address new challenges and barriers with minimal support from the CM team(proactively uses physicians and community resources) The support system remains current and has been modified as needed. Patient continues to acquire needed resources from the current support system established. Teach back demonstrates that patient understands education for self management of chronic conditions. Patient consistenly communicates understanding of signs,symptoms and complications for all major diagnoses. Patient modifies his/her lifestyle toreduce or avoid risk factors to his/her health. ECM will follow as needed and patient has ECM contact information for future needs. Patient states she is doing great. Only complaint was night sweats. She plans to reach out to her provider to get recommendations for that. Let her know she could call if she needs anything.

## 2020-12-15 ENCOUNTER — VIRTUAL VISIT (OUTPATIENT)
Dept: SURGERY | Age: 30
End: 2020-12-15
Payer: COMMERCIAL

## 2020-12-15 VITALS — WEIGHT: 289 LBS | HEIGHT: 63 IN | BODY MASS INDEX: 51.21 KG/M2

## 2020-12-15 DIAGNOSIS — L30.4 INTERTRIGO: ICD-10-CM

## 2020-12-15 DIAGNOSIS — E66.01 MORBID OBESITY (HCC): Primary | ICD-10-CM

## 2020-12-15 PROCEDURE — 99213 OFFICE O/P EST LOW 20 MIN: CPT | Performed by: NURSE PRACTITIONER

## 2020-12-15 RX ORDER — NYSTATIN 100000 U/G
CREAM TOPICAL 2 TIMES DAILY
Qty: 30 G | Refills: 3 | Status: SHIPPED | OUTPATIENT
Start: 2020-12-15 | End: 2022-02-03 | Stop reason: DRUGHIGH

## 2020-12-15 NOTE — PROGRESS NOTES
I was in the office while conducting this encounter. Consent:  She and/or her healthcare decision maker is aware that this patient-initiated Telehealth encounter is a billable service, with coverage as determined by her insurance carrier. She is aware that she may receive a bill and has provided verbal consent to proceed: Yes    This virtual visit was conducted via [employer]. Pursuant to the emergency declaration under the [facility] [place] [address] waiver authority and the [employer] and [employer] Act, this Virtual  Visit was conducted to reduce the patient's risk of exposure to COVID-19 and provide continuity of care for an established patient. Services were provided through a video synchronous discussion virtually to substitute for in-person clinic visit. Due to this being a TeleHealth evaluation, many elements of the physical examination are unable to be assessed. Total Time: minutes: 11-20 minutes. Chief Complaint   Patient presents with    Follow-up     [phone]/3 months post   Lap sleeve gastrectomy with laparoscopic hiatal hernia repair down 10 lbs, total loss 37lbs       Nancy Villalta is 4 months status post laparoscopic sleeve gastrectomy she presents today for obesity management. She says she is doing well overall and feels pretty good. She denies nausea or vomiting. Denies GERD symptoms. She is working 12 hours a day at the [facility] [employer] clinic and says she does well eating when she is there. She will have 3 meals and not as much snacking. She occasionally will have sugar-free candy and tries to limit herself to 2 pieces/day. When she is home she does not have much of an appetite and has been having too many \"bad snacks\". Not much structure when she is off and seems to be grazing throughout the day.   She is exercising last week she went to the gym 5 days  She is going to the gym tonight and plans on doing getting in 4 days this week  She said no fever no chills no chest pain no shortness of breath  Her only complaint really is hot flashes at night and irregular menses  She has added a probiotic and her bowels are moving every morning  She is having some issue with some excess skin particularly in the hanging pannus as well as her upper arms and back wings  She is under her abdomen in the skin folds it will get very musty and itchy. Visit Vitals  Ht 5' 3\" (1.6 m)   Wt 289 lb (131.1 kg)   BMI 51.19 kg/m²     She is at work during the visit and appears well  Speech is clear breathing is unlabored  Abdomen is well-healed  She is ambulating well on the visit    ICD-10-CM ICD-9-CM    1. Morbid obesity (HCC)  E66.01 278.01 nystatin (MYCOSTATIN) topical cream   2. BMI 50.0-59.9, adult ([facility]  Z68.43 V85.43    3. Intertrigo  L30.4 695.89 nystatin (MYCOSTATIN) topical cream       4-month status post laparoscopic sleeve gastrectomy  Cautioned about snacking even if they were sugar-free items  Dietitian is available at no charge and will send her that contact information  Meal planning and prep was reviewed and identified strategies to help combat emotional eating and being more mindful  Reviewed vitamins she will continue  Applauded exercise efforts and encouraged to continue vigorous exercise most days of the week  For the skin folds we will add nystatin cream and she can apply twice daily as needed particularly under the abdominal fold. Reviewed hygiene practices and keeping the skin clean and dry  She can also invest in a product such as glide to help minimize the friction and chafing. Compression garments and supportive undergarments are also helpful. Support from information will be shared with her via [employer]  She continue her vitamins daily  Plan on follow-up in 3 months sooner if necessary  Elizabethann Sacks verbalized understanding and questions were answered to the best of my knowledge and ability.     Diet, activity and skin care educational materials were provided.

## 2020-12-15 NOTE — PROGRESS NOTES
1. Have you been to the ER, urgent care clinic since your last visit? Hospitalized since your last visit? No    2. Have you seen or consulted any other health care providers outside of the [address] [place] [address] since your last visit? Include any pap smears or colon screening.  No

## 2020-12-15 NOTE — PATIENT INSTRUCTIONS
Stay on your daily vitamins Skin care:  Keep folds clean and dry Compression garments or shape wear are helpful to minimize the chaffing Glide can be used to help minimize the chaffing as well In folds of abdomen apply the nystatin cream 2x/ day as needed if musty or itchy. Keep dry If you would like to schedule an appointment with the dietician, please call or email Svitlana Witt RD at: 
 
[phone] [address] Support Group 2nd Thursday of each Month from 6-7 pm  
The next one is 1/14/21 6-7 pm  
You can access the link at [email or web address] Go to the Calendar tab and click on the date and it should go right to the link Additional Resources: 
[employer]:  [email or web address]  
 Offering online support groups and pre-recorded videos 
o Every Wednesday evening at 7:00 pm  
[employer] Facebook page  [address], Advice, and Motivation from fellow patients Bariatric Pal: [email or web address] 
[employer] has launched a podcast!  Hosted by [employer], our podcast will cover topics about obesity, pre-weight loss surgery, post-weight loss surgery, food addiction, emotional eating, myths about weight loss surgery, and more. Each episode will feature an expert in the field of weight loss and bariatric surgery who can provide a deeper insight into these topics. AC[facility]:  GroupSpaces 
 Offering discounted exercise programs (8 Week programs, On-Demand/Virtual)  See flyer  Contact Llaa Stein at [email or web address] or [phone]

## 2020-12-16 ENCOUNTER — PATIENT MESSAGE (OUTPATIENT)
Dept: PHARMACY | Age: 30
End: 2020-12-16

## 2021-01-22 DIAGNOSIS — G43.009 MIGRAINE WITHOUT AURA AND WITHOUT STATUS MIGRAINOSUS, NOT INTRACTABLE: ICD-10-CM

## 2021-01-22 DIAGNOSIS — R20.2 PARESTHESIA: ICD-10-CM

## 2021-01-23 RX ORDER — GABAPENTIN 300 MG/1
300 CAPSULE ORAL 3 TIMES DAILY
Qty: 270 CAP | Refills: 1 | Status: SHIPPED | OUTPATIENT
Start: 2021-01-23 | End: 2021-09-20 | Stop reason: SDUPTHER

## 2021-01-25 DIAGNOSIS — Z00.00 WELL WOMAN EXAM (NO GYNECOLOGICAL EXAM): ICD-10-CM

## 2021-01-25 DIAGNOSIS — F41.1 GAD (GENERALIZED ANXIETY DISORDER): ICD-10-CM

## 2021-01-25 DIAGNOSIS — I10 ESSENTIAL HYPERTENSION: ICD-10-CM

## 2021-01-25 DIAGNOSIS — E11.21 TYPE 2 DIABETES WITH NEPHROPATHY (HCC): ICD-10-CM

## 2021-01-25 NOTE — TELEPHONE ENCOUNTER
Last visit:10/23/20  Next visit:not scheduled  Previous refill 4/13/20(180+3R)    Requested Prescriptions     Pending Prescriptions Disp Refills    metoprolol tartrate (LOPRESSOR) 100 mg IR tablet 180 Tab 2     Sig: TAKE 1 TABLET BY MOUTH TWICE A DAY

## 2021-01-27 RX ORDER — METOPROLOL TARTRATE 100 MG/1
TABLET ORAL
Qty: 180 TAB | Refills: 2 | Status: SHIPPED | OUTPATIENT
Start: 2021-01-27 | End: 2021-11-18

## 2021-02-01 ENCOUNTER — TELEPHONE (OUTPATIENT)
Dept: PHARMACY | Age: 31
End: 2021-02-01

## 2021-02-01 NOTE — TELEPHONE ENCOUNTER
Called patient to schedule 2021 yearly pharmacist appointment to discuss medications for Diabetes Management Program.    No answer. Left VM on [phone] TAD: Please call back at [phone] Option #7.     [address] [facility] Specialist  Department, toll free: [phone], option 7

## 2021-02-02 ENCOUNTER — DOCUMENTATION ONLY (OUTPATIENT)
Dept: ENDOCRINOLOGY | Age: 31
End: 2021-02-02

## 2021-02-03 DIAGNOSIS — F41.1 GAD (GENERALIZED ANXIETY DISORDER): ICD-10-CM

## 2021-02-03 DIAGNOSIS — Z98.84 S/P GASTRIC BYPASS: ICD-10-CM

## 2021-02-03 DIAGNOSIS — Z00.00 WELL WOMAN EXAM (NO GYNECOLOGICAL EXAM): ICD-10-CM

## 2021-02-03 DIAGNOSIS — E11.21 TYPE 2 DIABETES WITH NEPHROPATHY (HCC): ICD-10-CM

## 2021-02-03 DIAGNOSIS — I10 ESSENTIAL HYPERTENSION: ICD-10-CM

## 2021-02-03 DIAGNOSIS — Z72.0 TOBACCO ABUSE DISORDER: ICD-10-CM

## 2021-02-03 DIAGNOSIS — Z71.89 ADVICE GIVEN ABOUT COVID-19 VIRUS INFECTION: ICD-10-CM

## 2021-02-03 DIAGNOSIS — E53.8 B12 DEFICIENCY DUE TO DIET: ICD-10-CM

## 2021-02-03 DIAGNOSIS — Z76.89 ENCOUNTER FOR SUPPORT AND COORDINATION OF TRANSITION OF CARE: ICD-10-CM

## 2021-02-03 RX ORDER — OMEPRAZOLE 40 MG/1
40 CAPSULE, DELAYED RELEASE ORAL DAILY
Qty: 90 CAP | Refills: 3 | Status: SHIPPED | OUTPATIENT
Start: 2021-02-03 | End: 2022-02-03 | Stop reason: DRUGHIGH

## 2021-02-03 NOTE — TELEPHONE ENCOUNTER
Previous refills were filled using mail order. Last Visit: 10/23/2020 with MD Cassy Mace  Next Appointment: 2/15/2021 with MD Cassy Mace  Previous Refill Encounter(s): 4/9/2020 per MD Slaughter #[phone] [id number]    Requested Prescriptions     Pending Prescriptions Disp Refills    omeprazole (PRILOSEC) 40 mg capsule 90 Cap 3     Sig: Take 1 Cap by mouth daily.

## 2021-02-05 RX ORDER — OMEPRAZOLE 40 MG/1
CAPSULE, DELAYED RELEASE ORAL
Qty: 90 CAP | Refills: 3 | Status: SHIPPED | OUTPATIENT
Start: 2021-02-05 | End: 2021-02-15 | Stop reason: SDUPTHER

## 2021-02-05 RX ORDER — CYANOCOBALAMIN, ISOPROPYL ALCOHOL 1000MCG/ML
KIT INJECTION
Qty: 1 KIT | Refills: 5 | Status: SHIPPED | OUTPATIENT
Start: 2021-02-05 | End: 2021-09-02

## 2021-02-05 RX ORDER — VILAZODONE HYDROCHLORIDE 40 MG/1
40 TABLET ORAL DAILY
Qty: 90 TAB | Refills: 2 | Status: SHIPPED | OUTPATIENT
Start: 2021-02-05 | End: 2021-08-15 | Stop reason: SDUPTHER

## 2021-02-09 ENCOUNTER — VIRTUAL VISIT (OUTPATIENT)
Dept: ENDOCRINOLOGY | Age: 31
End: 2021-02-09
Payer: COMMERCIAL

## 2021-02-09 DIAGNOSIS — I10 ESSENTIAL HYPERTENSION: ICD-10-CM

## 2021-02-09 DIAGNOSIS — E78.2 MIXED HYPERLIPIDEMIA: ICD-10-CM

## 2021-02-09 DIAGNOSIS — E11.9 TYPE 2 DIABETES MELLITUS WITHOUT COMPLICATION, WITHOUT LONG-TERM CURRENT USE OF INSULIN (HCC): ICD-10-CM

## 2021-02-09 DIAGNOSIS — E66.01 CLASS 3 SEVERE OBESITY DUE TO EXCESS CALORIES WITH SERIOUS COMORBIDITY AND BODY MASS INDEX (BMI) OF 60.0 TO 69.9 IN ADULT (HCC): ICD-10-CM

## 2021-02-09 DIAGNOSIS — E55.9 HYPOVITAMINOSIS D: ICD-10-CM

## 2021-02-09 DIAGNOSIS — E11.9 TYPE 2 DIABETES MELLITUS WITHOUT COMPLICATION, WITHOUT LONG-TERM CURRENT USE OF INSULIN (HCC): Primary | ICD-10-CM

## 2021-02-09 PROCEDURE — 99214 OFFICE O/P EST MOD 30 MIN: CPT | Performed by: INTERNAL MEDICINE

## 2021-02-09 RX ORDER — MELOXICAM 15 MG/1
15 TABLET ORAL DAILY
COMMUNITY
Start: 2020-06-26 | End: 2021-02-16

## 2021-02-09 RX ORDER — DROSPIRENONE 4 MG/1
TABLET, FILM COATED ORAL
COMMUNITY
End: 2021-10-28

## 2021-02-09 NOTE — PROGRESS NOTES
Chief Complaint   Patient presents with    Diabetes     pcp and pharmacy verified. Eye exam: 7/2020 [facility] [place] [facility]            **THIS IS A VIRTUAL VISIT VIA A VIDEO ENCOUNTER. PATIENT AGREED TO HAVE THEIR CARE DELIVERED OVER VIDEO IN PLACE OF THEIR REGULARLY SCHEDULED OFFICE VISIT**      History of Present Illness: Ruperto Salinas is a 26 y.o. female here for follow up of diabetes. She was diagnosed with diabetes \"when I was in high school\". Because pt has features concerning for cushing's her PCP checked a 24 hour urine cortisol which was not elevated (42). She also had a TSH drawn which was 2.10. In March 2016 we tested pt for hyperaldosteronism, which was negative. I tested her for evidence of PCOS, her Testosterone was 14, with DHEA-S 109, her 17-OH Prog was not elevated and an overnight dexamethasone suppression test did suppress her ACTH and cortisol. At our visit in July 2020 she was still in the boot camp and her weight was down to 338 pounds. She was taking Ozempic 1.0mg weekly, Metformin 1000mg BID and Glipizide 5mg with dinner. We had stopped the Lantus, because she was having issues of hypoglycemia. She had the Gastric Sleeve on 9/8/20. She had lost 30+ pounds and was off all DM medications, but was having some overnight low BGs. I instructed her to stop the HS popcicles and instead have 1/2 an apple with at Tbls of peanut butter before she goes to bed. On 10/19/2020 she had a right oophorectomy and left ovarian cyst removed by Dr. Gina Astorga. Pt notes she has some occasional \"pulling on the left side of the abdomen\"  \"My periods have been terrible so I had to go back on an OCP\". On 12/15/2020 her weight was down to 289 pounds, down from her pre-surgery weight of 340 pounds. She is still following with Mere Whitney with the weight loss clinic. Today her weight is down to 278 pounds. She is still off all DM medications. She is still using the DexCom CGM.    She notes her BGs are consistently in the low 100's. She notes she had one 130 when she had kate noodles. She notes that when she first switches her sensors she will have a low BG. Pt is still going to the boot camp 4 days per week and she is still working the 3-12 hours shifts per week. Pt is currently waking around 730-8AM.  She is eating every 3 hours. No history of vascular disease. No history of neuropathy, or nephropathy. Last eye exam was June 2020, no retinopathy. She is no longer taking her Vitamin D 50,000 units three days per week. She is taking Vitamin D in her MVI. She is taking a MVI and B-12 1000mcg monthly. Pt is still taking her lipitor 40mg HS. Current Outpatient Medications   Medication Sig    omeprazole (PRILOSEC) 40 mg capsule TAKE 1 CAP BY MOUTH DAILY.  vilazodone (VIIBRYD) 40 mg tab tablet Take 1 Tab by mouth daily.  cyanocobalamin, vitamin B-12, (B-12 Compliance) 1,000 mcg/mL kit Once monthly  Indications: inadequate vitamin B12    omeprazole (PRILOSEC) 40 mg capsule Take 1 Cap by mouth daily.  metoprolol tartrate (LOPRESSOR) 100 mg IR tablet TAKE 1 TABLET BY MOUTH TWICE A DAY    gabapentin (NEURONTIN) 300 mg capsule Take 1 Cap by mouth three (3) times daily. Max Daily Amount: 900 mg.    nystatin (MYCOSTATIN) topical cream Apply  to affected area two (2) times a day. Apply to abdominal folds twice daily as needed for rash    SUMAtriptan (IMITREX) 50 mg tablet 1 tab at onset of headache, can repeat X 1 in 2 hrs if HA persists. Not more than 10 days/month.  ibuprofen (MOTRIN) 800 mg tablet Take 1 Tab by mouth every eight (8) hours as needed for Pain.  iron,carbonyl-FA-multivit-min (Opurity Multivitamin) 30 mg iron- 800 mcg chew     calcium carbonate/vitamin D3 (CALCIUM 600 + D,3, PO) Take 1 Tab by mouth two (2) times a day.     Arm Brace misc Bilateral carpal tunnel splint to be used every night    ergocalciferol (ERGOCALCIFEROL) 1,250 mcg (50,000 unit) capsule Take 1 Cap by mouth every Monday, Wednesday, Friday.  nystatin-triamcinolone (MYCOLOG) 100,000-0.1 unit/gram-% ointment Apply  to affected area as needed for Skin Irritation.  ALPRAZolam (XANAX) 0.5 mg tablet Take 1 Tab by mouth nightly as needed for Anxiety or Sleep. Max Daily Amount: 0.5 mg. Indications: anxious, repeated episodes of anxiety, panic disorder    atorvastatin (LIPITOR) 40 mg tablet TAKE 1 TABLET BY MOUTH EVERY DAY (Patient taking differently: 40 mg nightly. TAKE 1 TABLET BY MOUTH EVERY DAY)    aspirin delayed-release 81 mg tablet Take 1 Tab by mouth daily.  spironolactone (ALDACTONE) 25 mg tablet Take 1 Tab by mouth two (2) times a day. (Patient taking differently: Take 25 mg by mouth two (2) times a day. One tablet in the am and 1/2 tablet at night)    Blood-Glucose Meter,Continuous (Dexcom G6 ) misc Change sensor every 10 days    Blood-Glucose Transmitter (Dexcom G6 Transmitter) nahid Change sensor every 10 days    Blood-Glucose Sensor (Dexcom G6 Sensor) nahid Change sensor every 10 days    galcanezumab-gnlm (EMGALITY PEN) 120 mg/mL injection 1 mL by SubCUTAneous route every thirty (30) days.  butalbital-acetaminophen-caffeine (FIORICET) -40 mg per tablet Take 0.5-1 Tabs by mouth every six (6) hours as needed for Headache. Max Daily Amount: 4 Tabs.  albuterol (PROVENTIL HFA, VENTOLIN HFA) 90 mcg/actuation inhaler Take 1 Puff by inhalation every four (4) hours as needed for Wheezing. No current facility-administered medications for this visit. Allergies   Allergen Reactions    Latex Itching     Review of Systems:  - Eyes: no blurry vision or double vision  - Cardiovascular: no chest pain  - Respiratory: no shortness of breath  - Musculoskeletal: no myalgias  - Neurological: no numbness/tingling in extremities    Physical Examination:  There were no vitals taken for this visit.   - GENERAL: NCAT, Appears well nourished   - EYES: EOMI, non-icteric, no proptosis   - Ear/Nose/Throat: NCAT, no visible inflammation or masses   - CARDIOVASCULAR: no cyanosis, no visible JVD   - RESPIRATORY: respiratory effort normal without any distress or labored breathing   - MUSCULOSKELETAL: Normal ROM of neck and upper extremities observed   - SKIN: No rash on face   - NEUROLOGIC:  No facial asymmetry (Cranial nerve 7 motor function), No gaze palsy   - PSYCHIATRIC: Normal affect, Normal insight and judgement     Data Reviewed:     Assessment/Plan:   1) DM > She is still off all DM medications. She is not having the frequent low BGs like she was previously. Will monitor her Dexcom readings periodically. Will order an A1C    2) HTN > Will not make any adjustments to her HTN regimen at this time. Will order a urine MA    3) HLD > Her lipid panel was at goal in October of 2020. Pt is tolerating the Atorvastatin 40mg daily. 4) Obesity > Pt is s/p sleeve gastrectomy and has had 62 pounds of weight loss. She is still working out regularly. Pt encouraged to keep up the good work. 5) Hypovitaminosis D > Pt to continue her Vitamin D daily. Will order a Vitamin D level. Pt voices understanding and agreement with the plan. RTC 6 months      Copy sent to:  Mansi Romero

## 2021-02-12 ENCOUNTER — TELEPHONE (OUTPATIENT)
Dept: PHARMACY | Age: 31
End: 2021-02-12

## 2021-02-12 NOTE — TELEPHONE ENCOUNTER
[facility] CLINICAL PHARMACY REVIEW - BE WELL WITH DIABETES  =================================================================  Char Staples is a 26 y.o. female enrolled in the [address] [place] [address] Diabetes Program.    Two attempts made to reach patient by telephone for pharmacist appointment. Left voice message for patient to return clinician's phone call at [phone] and [phone], option 7 to reschedule appointment. Will prepare MyChart message and send to patient.     Felicia Arizmendi, PharmD, [facility]  Direct: [phone]  Department, toll free [phone], option 7          For Pharmacy Admin Tracking Only    PHSO: PHSO Patient?: Yes  Total # of Interventions Recommended: Count: 0  Recommended intervention potential cost savings: 0  Total Interventions Accepted: 0  Time Spent (min): 15

## 2021-02-12 NOTE — TELEPHONE ENCOUNTER
Patient called back and rescheduled appointment for yearly pharmacist telephone visit to discuss medications for Diabetes Management Program.     Spoke to patient and appointment scheduled for 2/16/21 at 11:30am.       Drema Epley, [address]   Department, toll free: [phone], option 7

## 2021-02-15 ENCOUNTER — OFFICE VISIT (OUTPATIENT)
Dept: FAMILY MEDICINE CLINIC | Age: 31
End: 2021-02-15
Payer: COMMERCIAL

## 2021-02-15 VITALS
HEIGHT: 63 IN | HEART RATE: 65 BPM | DIASTOLIC BLOOD PRESSURE: 92 MMHG | SYSTOLIC BLOOD PRESSURE: 142 MMHG | BODY MASS INDEX: 49.49 KG/M2 | WEIGHT: 279.3 LBS | RESPIRATION RATE: 16 BRPM | OXYGEN SATURATION: 98 % | TEMPERATURE: 97.6 F

## 2021-02-15 DIAGNOSIS — M35.03 SJOGREN'S SYNDROME WITH MYOPATHY (HCC): ICD-10-CM

## 2021-02-15 DIAGNOSIS — F41.1 GAD (GENERALIZED ANXIETY DISORDER): ICD-10-CM

## 2021-02-15 DIAGNOSIS — R41.840 ATTENTION AND CONCENTRATION DEFICIT: ICD-10-CM

## 2021-02-15 DIAGNOSIS — F50.81 BINGE EATING DISORDER: ICD-10-CM

## 2021-02-15 DIAGNOSIS — F32.1 MODERATE MAJOR DEPRESSION (HCC): ICD-10-CM

## 2021-02-15 DIAGNOSIS — E11.8 CONTROLLED DIABETES MELLITUS TYPE 2 WITH COMPLICATIONS, UNSPECIFIED WHETHER LONG TERM INSULIN USE (HCC): ICD-10-CM

## 2021-02-15 DIAGNOSIS — E66.01 MORBID OBESITY (HCC): ICD-10-CM

## 2021-02-15 DIAGNOSIS — Z00.00 WELL WOMAN EXAM (NO GYNECOLOGICAL EXAM): Primary | ICD-10-CM

## 2021-02-15 PROCEDURE — 99395 PREV VISIT EST AGE 18-39: CPT | Performed by: FAMILY MEDICINE

## 2021-02-15 PROCEDURE — 99213 OFFICE O/P EST LOW 20 MIN: CPT | Performed by: FAMILY MEDICINE

## 2021-02-15 RX ORDER — DEXTROAMPHETAMINE SACCHARATE, AMPHETAMINE ASPARTATE, DEXTROAMPHETAMINE SULFATE AND AMPHETAMINE SULFATE 1.875; 1.875; 1.875; 1.875 MG/1; MG/1; MG/1; MG/1
7.5 TABLET ORAL DAILY
Qty: 30 TAB | Refills: 0 | Status: SHIPPED | OUTPATIENT
Start: 2021-02-15 | End: 2021-02-17 | Stop reason: SDUPTHER

## 2021-02-15 NOTE — PROGRESS NOTES
Chief Complaint   Patient presents with    Complete Physical     1. Have you been to the ER, urgent care clinic since your last visit? Hospitalized since your last visit? No    2. Have you seen or consulted any other health care providers outside of the [address] [place] [address] since your last visit? Include any pap smears or colon screening. No     Verified patient with two type of identifiers. stated still feeling down and anxious even though taking viibryd as prescribed.

## 2021-02-15 NOTE — PROGRESS NOTES
HISTORY OF PRESENT ILLNESS  Alexia Gaitan is a 26 y.o. female,     Patient's current medical condition is not controlled with medications,   Patient state that it isnot  getting better: pt states and reports of feeling anxius, some guilty feeling, no Hoplessness, but can do more, patient at this time has ns/nh,ni,nh, and ++ trouble with weight loss, ok ability of sleep, less ablitiy  to concentrate at work (patient is able to work 8-12 hrs per week at this time) and at home with the current medications, and all together a safe feeling at home and at work, GBP in 9/2020, was at 340 and now is at 274Lbs, does binge eats sometimes       Current Outpatient Medications   Medication Sig Dispense Refill    drospirenone, contraceptive, (Slynd) 4 mg (28) tab Slynd 4 mg (28) tablet   Take 1 tablet every day by oral route.  hydroxychloroquine sulfate (PLAQUENIL PO) 200 mg two (2) times a day.  meloxicam (MOBIC) 15 mg tablet Take 15 mg by mouth daily.  Lactobac no.41/Bifidobact no.7 (PROBIOTIC-10 PO) daily.  vilazodone (VIIBRYD) 40 mg tab tablet Take 1 Tab by mouth daily. 90 Tab 2    cyanocobalamin, vitamin B-12, (B-12 Compliance) 1,000 mcg/mL kit Once monthly  Indications: inadequate vitamin B12 1 Kit 5    omeprazole (PRILOSEC) 40 mg capsule Take 1 Cap by mouth daily. 90 Cap 3    metoprolol tartrate (LOPRESSOR) 100 mg IR tablet TAKE 1 TABLET BY MOUTH TWICE A  Tab 2    gabapentin (NEURONTIN) 300 mg capsule Take 1 Cap by mouth three (3) times daily. Max Daily Amount: 900 mg. 270 Cap 1    nystatin (MYCOSTATIN) topical cream Apply  to affected area two (2) times a day. Apply to abdominal folds twice daily as needed for rash 30 g 3    SUMAtriptan (IMITREX) 50 mg tablet 1 tab at onset of headache, can repeat X 1 in 2 hrs if HA persists. Not more than 10 days/month. 9 Tab 2    iron,carbonyl-FA-multivit-min (Opurity Multivitamin) 30 mg iron- 800 mcg chew 2 Tabs daily.       calcium carbonate/vitamin D3 (CALCIUM 600 + D,3, PO) Take 1 Tab by mouth two (2) times a day.  ALPRAZolam (XANAX) 0.5 mg tablet Take 1 Tab by mouth nightly as needed for Anxiety or Sleep. Max Daily Amount: 0.5 mg. Indications: anxious, repeated episodes of anxiety, panic disorder 30 Tab 3    atorvastatin (LIPITOR) 40 mg tablet TAKE 1 TABLET BY MOUTH EVERY DAY (Patient taking differently: 40 mg nightly. TAKE 1 TABLET BY MOUTH EVERY DAY) 90 Tab 3    aspirin delayed-release 81 mg tablet Take 1 Tab by mouth daily. 30 Tab 11    Blood-Glucose Meter,Continuous (Dexcom G6 ) misc Change sensor every 10 days 1 Each 0    Blood-Glucose Transmitter (Dexcom G6 Transmitter) nahid Change sensor every 10 days 1 Device 3    Blood-Glucose Sensor (Dexcom G6 Sensor) nahid Change sensor every 10 days 3 Device 12    galcanezumab-gnlm (EMGALITY PEN) 120 mg/mL injection 1 mL by SubCUTAneous route every thirty (30) days. 1 mL 11    albuterol (PROVENTIL HFA, VENTOLIN HFA) 90 mcg/actuation inhaler Take 1 Puff by inhalation every four (4) hours as needed for Wheezing. 1 Inhaler 1    ibuprofen (MOTRIN) 800 mg tablet Take 1 Tab by mouth every eight (8) hours as needed for Pain. 30 Tab 1    Arm Brace misc Bilateral carpal tunnel splint to be used every night 2 Each 0    ergocalciferol (ERGOCALCIFEROL) 1,250 mcg (50,000 unit) capsule Take 1 Cap by mouth every Monday, Wednesday, Friday. 36 Cap 0    nystatin-triamcinolone (MYCOLOG) 100,000-0.1 unit/gram-% ointment Apply  to affected area as needed for Skin Irritation.  spironolactone (ALDACTONE) 25 mg tablet Take 1 Tab by mouth two (2) times a day. (Patient taking differently: Take 25 mg by mouth two (2) times a day. One tablet in the am and 1/2 tablet at night) 180 Tab 3    butalbital-acetaminophen-caffeine (FIORICET) -40 mg per tablet Take 0.5-1 Tabs by mouth every six (6) hours as needed for Headache. Max Daily Amount: 4 Tabs.  12 Tab 0     Allergies   Allergen Reactions    Latex Itching Past Medical History:   Diagnosis Date    Anxiety with depression     Arrhythmia     tachycardia    Asthma     Hammon hump 2015    Carpal tunnel syndrome of right wrist 2016    Diabetes [facility]     ALIYA (generalized anxiety disorder) 2018    GERD (gastroesophageal reflux disease) 2014    HTN (hypertension) 2011    Hyperaldosteronism ([facility] 10/19/2015    Hyperhidrosis 2018    Morbid obesity ([facility]     Other ill-defined conditions(799.89)     migraines    Palpitation 2018    Sleep apnea     uses CPAP    Tachycardia 2018     Past Surgical History:   Procedure Laterality Date    HX GASTRECTOMY  2020    Gastric sleeve     HX GYN  10/19/2020    Laparoscopic right oopherectomy, left cystectomy    HX HERNIA REPAIR  2020    HX OVARIAN CYST REMOVAL  10/19/2020    HX TONSILLECTOMY      HX WISDOM TEETH EXTRACTION      PA REMOVAL OF TONSILS,<11 Y/O       Family History   Problem Relation Age of Onset    Hypertension Mother     Diabetes Mother         Type II    Anesth Problems Mother         respiratory issues - feels like she cannot breath when coming out of anesthesia    Psychiatric Disorder Father     Drug Abuse Father     Cancer Other         prostate    Hypertension Sister     Thyroid Disease Sister         \"I think\"    Breast Cancer Sister     Attention Deficit Hyperactivity Disorder Brother     Hypertension Sister      Social History     Tobacco Use    Smoking status: Former Smoker     Packs/day: 0.50     Years: 5.00     Pack years: 2.50     Quit date: 2019     Years since quittin.2    Smokeless tobacco: Never Used   Substance Use Topics    Alcohol use: Not Currently     Alcohol/week: 1.0 standard drinks     Types: 1 Shots of liquor, 1 Standard drinks or equivalent per week     Comment: socially, Monthly or less      Lab Results   Component Value Date/Time    WBC 8.4 10/01/2020 06:17 AM    HGB 11.5 10/01/2020 06:17 AM    HCT 37.9 10/01/2020 06:17 AM    PLATELET 374 49/41/7858 06:17 AM    MCV 87.1 10/01/2020 06:17 AM     Lab Results   Component Value Date/Time    TSH 1.730 04/04/2018 01:17 PM         Review of Systems   Constitutional: Positive for malaise/fatigue. Negative for chills and fever. HENT: Negative for nosebleeds. Eyes: Negative for pain. Respiratory: Negative for cough and wheezing. Cardiovascular: Negative for chest pain and leg swelling. Gastrointestinal: Negative for constipation, diarrhea and nausea. Genitourinary: Negative for frequency. Musculoskeletal: Negative for joint pain and myalgias. Skin: Negative for rash. Neurological: Negative for loss of consciousness and headaches. Endo/Heme/Allergies: Does not bruise/bleed easily. Psychiatric/Behavioral: Negative for depression. The patient is not nervous/anxious and does not have insomnia. All other systems reviewed and are negative. Physical Exam  Vitals signs and nursing note reviewed. Constitutional:       Appearance: She is well-developed. HENT:      Head: Normocephalic and atraumatic. Eyes:      Conjunctiva/sclera: Conjunctivae normal.   Neck:      Musculoskeletal: Normal range of motion and neck supple. Cardiovascular:      Rate and Rhythm: Normal rate and regular rhythm. Heart sounds: Normal heart sounds. No murmur. Pulmonary:      Effort: Pulmonary effort is normal.      Breath sounds: Normal breath sounds. Abdominal:      General: Bowel sounds are normal. There is no distension. Palpations: Abdomen is soft. Musculoskeletal: Normal range of motion. Skin:     Findings: No erythema. Neurological:      Mental Status: She is alert and oriented to person, place, and time. Psychiatric:         Behavior: Behavior normal.         ASSESSMENT and PLAN  Diagnoses and all orders for this visit:    1. Well woman exam (no gynecological exam)  Comments:  [V70.0]  Orders:  -     LIPID PANEL; Future    2.  ALIYA (generalized anxiety disorder)  -     lisdexamfetamine (Vyvanse) 30 mg capsule; Take 1 Cap by mouth every morning. Max Daily Amount: 30 mg. Indications: binge eating disorder    3. Morbid obesity ([facility]    4. [facility]ogren's syndrome with [facility]opathy ([facility]    5. Moderate major depression (HCC)  -     lisdexamfetamine (Vyvanse) 30 mg capsule; Take 1 Cap by mouth every morning. Max Daily Amount: 30 mg. Indications: binge eating disorder    6. Controlled diabetes mellitus type 2 with complications, unspecified whether long term insulin use ([facility]    7. Attention and concentration deficit    8. Binge eating disorder  -     lisdexamfetamine (Vyvanse) 30 mg capsule; Take 1 Cap by mouth every morning. Max Daily Amount: 30 mg.  Indications: binge eating disorder    Presentation currently indicating INATTENTION, indicating HYPERACTIVITY, MPULSIVITY, The behaviors will have significant impairment in functioning in either social, academic, or occupational areas,  ADHD is in a stable condition, not suicidal,refill the medications for now will reevaluate in 4 w for progression or the side effects of the medication,   and in addition of the Counseling , social support, the spiritual belonging, inc physical activity, all offered,  compliance with meds advised, was told to call for any concern, the contract reviewed and will be updated urine drug screen is also be obtained, pt agreed with todays recommendations

## 2021-02-15 NOTE — PROGRESS NOTES
Subjective:   26 y.o. female for Well Woman Check. Her gyne and breast care is done elsewhere by her Ob-Gyne physician,   Last wellness exam was few years ago  patient is currently up todate w/ all vaccination, last tetanus vaccine was   patient has had no hx of fall  + feeling depressed, and anxius no blood in the stool no tarry stool,   the depression at this age addressed pt with improvig interests and enjoy to do things, not anxious not depressed,  pt at this visit, is physically functional with nl gait  and nicely independent and walks w/out mobility device ,  mentaly is very functional,  very Alert and oriented, unfortunately,  BMI for pt's age is into obesity state,  the  abnl BMI r/w'd and information given,      No family hx of breast cancer nor ovarian cancer,    Last pap smear result was normal on     Last mammogram :   Last colon cancer screening : n/a  Last bone density screen :none    no family hx of colon cancer, patient is stating that the pt is sexaully active, uses safe sex ++physically active, father 70yo with diabetic state, mother  at age of 24yrs of unknown,   no protection single no kids no cigs, no Etoh, no illicit drugs    Current Outpatient Medications   Medication Sig Dispense Refill    drospirenone, contraceptive, (Slynd) 4 mg (28) tab Slynd 4 mg (28) tablet   Take 1 tablet every day by oral route.  hydroxychloroquine sulfate (PLAQUENIL PO) 200 mg two (2) times a day.  meloxicam (MOBIC) 15 mg tablet Take 15 mg by mouth daily.  Lactobac no.41/Bifidobact no.7 (PROBIOTIC-10 PO) daily.  vilazodone (VIIBRYD) 40 mg tab tablet Take 1 Tab by mouth daily. 90 Tab 2    cyanocobalamin, vitamin B-12, (B-12 Compliance) 1,000 mcg/mL kit Once monthly  Indications: inadequate vitamin B12 1 Kit 5    omeprazole (PRILOSEC) 40 mg capsule Take 1 Cap by mouth daily.  90 Cap 3    metoprolol tartrate (LOPRESSOR) 100 mg IR tablet TAKE 1 TABLET BY MOUTH TWICE A  Tab 2    gabapentin (NEURONTIN) 300 mg capsule Take 1 Cap by mouth three (3) times daily. Max Daily Amount: 900 mg. 270 Cap 1    nystatin (MYCOSTATIN) topical cream Apply  to affected area two (2) times a day. Apply to abdominal folds twice daily as needed for rash 30 g 3    SUMAtriptan (IMITREX) 50 mg tablet 1 tab at onset of headache, can repeat X 1 in 2 hrs if HA persists. Not more than 10 days/month. 9 Tab 2    iron,carbonyl-FA-multivit-min (Opurity Multivitamin) 30 mg iron- 800 mcg chew 2 Tabs daily.  calcium carbonate/vitamin D3 (CALCIUM 600 + D,3, PO) Take 1 Tab by mouth two (2) times a day.  ALPRAZolam (XANAX) 0.5 mg tablet Take 1 Tab by mouth nightly as needed for Anxiety or Sleep. Max Daily Amount: 0.5 mg. Indications: anxious, repeated episodes of anxiety, panic disorder 30 Tab 3    atorvastatin (LIPITOR) 40 mg tablet TAKE 1 TABLET BY MOUTH EVERY DAY (Patient taking differently: 40 mg nightly. TAKE 1 TABLET BY MOUTH EVERY DAY) 90 Tab 3    aspirin delayed-release 81 mg tablet Take 1 Tab by mouth daily. 30 Tab 11    Blood-Glucose Meter,Continuous (Dexcom G6 ) misc Change sensor every 10 days 1 Each 0    Blood-Glucose Transmitter (Dexcom G6 Transmitter) nahid Change sensor every 10 days 1 Device 3    Blood-Glucose Sensor (Dexcom G6 Sensor) nahid Change sensor every 10 days 3 Device 12    galcanezumab-gnlm (EMGALITY PEN) 120 mg/mL injection 1 mL by SubCUTAneous route every thirty (30) days. 1 mL 11    albuterol (PROVENTIL HFA, VENTOLIN HFA) 90 mcg/actuation inhaler Take 1 Puff by inhalation every four (4) hours as needed for Wheezing. 1 Inhaler 1    ibuprofen (MOTRIN) 800 mg tablet Take 1 Tab by mouth every eight (8) hours as needed for Pain. 30 Tab 1    Arm Brace misc Bilateral carpal tunnel splint to be used every night 2 Each 0    ergocalciferol (ERGOCALCIFEROL) 1,250 mcg (50,000 unit) capsule Take 1 Cap by mouth every Monday, Wednesday, Friday.  36 Cap 0    nystatin-triamcinolone (MYCOLOG) 100,000-0.1 unit/gram-% ointment Apply  to affected area as needed for Skin Irritation.  spironolactone (ALDACTONE) 25 mg tablet Take 1 Tab by mouth two (2) times a day. (Patient taking differently: Take 25 mg by mouth two (2) times a day. One tablet in the am and 1/2 tablet at night) 180 Tab 3    butalbital-acetaminophen-caffeine (FIORICET) -40 mg per tablet Take 0.5-1 Tabs by mouth every six (6) hours as needed for Headache. Max Daily Amount: 4 Tabs.  12 Tab 0     Allergies   Allergen Reactions    Latex Itching     Past Medical History:   Diagnosis Date    Anxiety with depression     Arrhythmia     tachycardia    Asthma     Andale hump 1/7/2015    Carpal tunnel syndrome of right wrist 12/20/2016    Diabetes ([address]     ALIYA (generalized anxiety disorder) 4/4/2018    GERD (gastroesophageal reflux disease) 8/13/2014    HTN (hypertension) 7/25/2011    Hyperaldosteronism ([address] 10/19/2015    Hyperhidrosis 4/4/2018    Morbid obesity ([address]     Other ill-defined conditions(799.89)     migraines    Palpitation 4/4/2018    Sleep apnea     uses CPAP    Tachycardia 4/4/2018     Past Surgical History:   Procedure Laterality Date    HX GASTRECTOMY  09/08/2020    Gastric sleeve     HX GYN  10/19/2020    Laparoscopic right oopherectomy, left cystectomy    HX HERNIA REPAIR  09/08/2020    HX OVARIAN CYST REMOVAL  10/19/2020    HX TONSILLECTOMY      HX WISDOM TEETH EXTRACTION      CA REMOVAL OF TONSILS,<11 Y/O       Family History   Problem Relation Age of Onset    Hypertension Mother     Diabetes Mother         Type II    Anesth Problems Mother         respiratory issues - feels like she cannot breath when coming out of anesthesia    Psychiatric Disorder Father     Drug Abuse Father     Cancer Other         prostate    Hypertension Sister     Thyroid Disease Sister         \"I think\"    Breast Cancer Sister     Attention Deficit Hyperactivity Disorder Brother     Hypertension Sister      Social History     Tobacco Use    Smoking status: Former Smoker     Packs/day: 0.50     Years: 5.00     Pack years: 2.50     Quit date: 2019     Years since quittin.2    Smokeless tobacco: Never Used   Substance Use Topics    Alcohol use: Not Currently     Alcohol/week: 1.0 standard drinks     Types: 1 Shots of liquor, 1 Standard drinks or equivalent per week     Comment: socially, Monthly or less        Lab Results   Component Value Date/Time    Hemoglobin A1c 6.5 (H) 10/15/2020 12:21 PM    Hemoglobin A1c 8.1 (H) 2020 09:44 AM    Hemoglobin A1c 10.9 (H) 2017 04:08 PM    Hemoglobin A1c, External 7.8 2020    Glucose 81 10/01/2020 06:17 AM    Glucose (POC) 122 (H) 10/19/2020 09:38 AM    Microalb/Creat ratio (ug/mg creat.) 16 2020 09:44 AM    LDL, calculated 42 10/15/2020 12:21 PM    LDL, calculated 48 2020 09:44 AM    Creatinine 0.68 10/01/2020 06:17 AM      Lab Results   Component Value Date/Time    Cholesterol, total 93 (L) 10/15/2020 12:21 PM    HDL Cholesterol 32 (L) 10/15/2020 12:21 PM    LDL, calculated 42 10/15/2020 12:21 PM    LDL, calculated 48 2020 09:44 AM    Triglyceride 95 10/15/2020 12:21 PM        ROS: Feeling generally well. No TIA's or unusual headaches, no dysphagia. No prolonged cough. No dyspnea or chest pain on exertion. No abdominal pain, change in bowel habits, black or bloody stools. No urinary tract symptoms. No new or unusual musculoskeletal symptoms. Specific concerns today: stress not better,   Objective: The patient appears well, alert, oriented x 3, in no distress. Visit Vitals  BP (!) 142/92   Pulse 65   Temp 97.6 °F (36.4 °C) (Temporal)   Resp 16   Ht 5' 3\" (1.6 m)   Wt 279 lb 4.8 oz (126.7 kg)   LMP 2021   SpO2 98%   BMI 49.48 kg/m²     ENT normal.  Neck supple. No adenopathy or thyromegaly. BAYRON. Lungs are clear, good air entry, no wheezes, rhonchi or rales.  S1 and S2 normal, no murmurs, regular rate and rhythm. Abdomen soft without tenderness, guarding, mass or organomegaly. Extremities show no edema, normal peripheral pulses. Neurological is normal, no focal findings. Breast and Pelvic exams are deferred. Assessment/Plan:   Well Woman  lose weight, increase physical activity, bring BP log to office visit, follow low fat diet, follow low salt diet, continue present plan, routine labs ordered, call if any problems  Diagnoses and all orders for this visit:    1. ALIYA (generalized anxiety disorder)    2. Morbid obesity ([address]    3.  Well woman exam (no gynecological exam)  Comments:  [V70.0]

## 2021-02-15 NOTE — PATIENT INSTRUCTIONS
Learning About Cervical Cancer Screening What is a cervical cancer screening test? 
  
The cervix is the lower part of the uterus that opens into the vagina. Cervical cancer screening tests check the cells on the cervix for changes that could lead to cancer. Two tests can be used to screen for cervical cancer. They may be used alone or together. A Pap test.  
This test looks for changes in the cells of the cervix. Some of these cell changes could lead to cancer. A human papillomavirus (HPV) test.  
This test looks for the HPV virus. Some high-risk types of HPV can cause cell changes that could lead to cervical cancer. The HPV test may be used with the Pap test in women ages 27 and older. When should you have a screening test? 
If you have a cervix and are at average risk for cervical cancer, your doctor will likely suggest starting screening at age 24. Ages 24 to 34 If you're in this age group, your doctor will likely suggest that you get a Pap test. If your Pap test results are normal, you can wait 3 years to have another test. 
Ages 27 to 59 Screening options for this age group may include: · A Pap test. If your results are normal, you can wait 3 years to have another test. 
· An HPV test. If your results are normal, you can wait 5 years to have another test. 
· A Pap test and an HPV test. If your results are normal, you can wait 5 years to be tested again. Ages 72 and older If you are age 72 or older, you may no longer need this screening test. Talk to your doctor. What do the results of cervical cancer screening mean? Your test results may be normal. Or the results may show minor or serious changes to the cells on your cervix. Minor changes may go away on their own, especially if you are younger than 30. You may have an abnormal test because you have an infection of the vagina or cervix or because you have low estrogen levels after menopause that are causing the cells to change.  
If you have a high-risk type of human papillomavirus (HPV) or cell changes that could turn into cancer, you may need more tests. The next step may be a colposcopy or treatment to remove or destroy the abnormal cells. If you have a Pap test, an HPV test, or both, your doctor will recommend a follow-up plan based on your results and your age. Follow-up care is a key part of your treatment and safety. Be sure to make and go to all appointments, and call your doctor if you are having problems. It's also a good idea to know your test results and keep a list of the medicines you take. Where can you learn more? Go to http://www.alcazar.com/ Enter P919 in the search box to learn more about \"Learning About Cervical Cancer Screening. \" Current as of: April 29, 2020               Content Version: 12.6 © 7993-9784 [employer]. Care instructions adapted under license by [employer] (which disclaims liability or warranty for this information). If you have questions about a medical condition or this instruction, always ask your healthcare professional. [address] any warranty or liability for your use of this information. High-Fiber Diet: Care Instructions Your Care Instructions A high-fiber diet may help you relieve constipation and feel less bloated. Your doctor and dietitian will help you make a high-fiber eating plan based on your personal needs. The plan will include the things you like to eat. It will also make sure that you get 30 grams of fiber a day. Before you make changes to the way you eat, be sure to talk with your doctor or dietitian. Follow-up care is a key part of your treatment and safety. Be sure to make and go to all appointments, and call your doctor if you are having problems. It's also a good idea to know your test results and keep a list of the medicines you take. How can you care for yourself at home?  
· You can increase how much fiber you get if you eat more of certain foods. These foods include: 
? Whole-grain breads and cereals. ? Fruits, such as pears, apples, and peaches. Eat the skins, peels, and seeds, if you can. 
? Vegetables, such as broccoli, cabbage, spinach, carrots, asparagus, and squash. ? Starchy vegetables. These include potatoes with skins, kidney beans, and lima beans. · Take a fiber supplement every day if your doctor recommends it. Examples are Benefiber, Citrucel, FiberCon, and Metamucil. Ask your doctor how much to take. · Drink plenty of fluids, enough so that your urine is light yellow or clear like water. If you have kidney, heart, or liver disease and have to limit fluids, talk with your doctor before you increase the amount of fluids you drink. · Get some exercise every day. Exercise helps stool move through the colon. It also helps prevent constipation. · Keep a food diary. Try to notice and write down what foods cause gas, pain, or other symptoms. Then you can avoid these foods. Where can you learn more? Go to http://www.gray.com/ Enter Z338 in the search box to learn more about \"High-Fiber Diet: Care Instructions. \" Current as of: August 22, 2019               Content Version: 12.6 © 9224-4331 [employer]. Care instructions adapted under license by [employer] (which disclaims liability or warranty for this information). If you have questions about a medical condition or this instruction, always ask your healthcare professional. [place] [address] any warranty or liability for your use of this information.

## 2021-02-16 ENCOUNTER — PATIENT MESSAGE (OUTPATIENT)
Dept: PHARMACY | Age: 31
End: 2021-02-16

## 2021-02-16 ENCOUNTER — TELEPHONE (OUTPATIENT)
Dept: PHARMACY | Age: 31
End: 2021-02-16

## 2021-02-16 DIAGNOSIS — F41.1 GAD (GENERALIZED ANXIETY DISORDER): ICD-10-CM

## 2021-02-16 DIAGNOSIS — F32.1 MODERATE MAJOR DEPRESSION (HCC): ICD-10-CM

## 2021-02-16 DIAGNOSIS — E11.8 CONTROLLED DIABETES MELLITUS TYPE 2 WITH COMPLICATIONS, UNSPECIFIED WHETHER LONG TERM INSULIN USE (HCC): ICD-10-CM

## 2021-02-16 DIAGNOSIS — R41.840 ATTENTION AND CONCENTRATION DEFICIT: ICD-10-CM

## 2021-02-16 DIAGNOSIS — F50.81 BINGE EATING DISORDER: ICD-10-CM

## 2021-02-16 DIAGNOSIS — M35.03 SJOGREN'S SYNDROME WITH MYOPATHY (HCC): ICD-10-CM

## 2021-02-16 DIAGNOSIS — Z00.00 WELL WOMAN EXAM (NO GYNECOLOGICAL EXAM): ICD-10-CM

## 2021-02-16 DIAGNOSIS — E66.01 MORBID OBESITY (HCC): ICD-10-CM

## 2021-02-16 NOTE — TELEPHONE ENCOUNTER
[facility] CLINICAL PHARMACY REVIEW - Be Well with Diabetes    Ronan Mehta is a 26 y.o. 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 Patient provided Hershall Cancel with verbal consent to remain in the program for this year. Medications:   Medication Sig    dextroamphetamine-amphetamine (ADDERALL) 7.5 mg tablet Take 1 Tab by mouth daily. Max Daily Amount: 7.5 mg.    drospirenone, contraceptive, (Slynd) 4 mg (28) tab Slynd 4 mg (28) tablet   Take 1 tablet every day by oral route.  hydroxychloroquine sulfate (PLAQUENIL PO) Take 200 mg by mouth two (2) times a day.  Lactobac no.41/Bifidobact no.7 (PROBIOTIC-10 PO) Take 1 Cap by mouth daily.  vilazodone (VIIBRYD) 40 mg tab tablet Take 1 Tab by mouth daily.  cyanocobalamin, vitamin B-12, (B-12 Compliance) 1,000 mcg/mL kit Once monthly  Indications: inadequate vitamin B12    omeprazole (PRILOSEC) 40 mg capsule Take 1 Cap by mouth daily.  metoprolol tartrate (LOPRESSOR) 100 mg IR tablet TAKE 1 TABLET BY MOUTH TWICE A DAY    gabapentin (NEURONTIN) 300 mg capsule Take 1 Cap by mouth three (3) times daily. Max Daily Amount: 900 mg.    nystatin (MYCOSTATIN) topical cream Apply  to affected area two (2) times a day. Apply to abdominal folds twice daily as needed for rash    SUMAtriptan (IMITREX) 50 mg tablet 1 tab at onset of headache, can repeat X 1 in 2 hrs if HA persists. Not more than 10 days/month.  iron,carbonyl-FA-multivit-min (Opurity Multivitamin) 30 mg iron- 800 mcg chew Take 2 Tabs by mouth daily.  calcium carbonate/vitamin D3 (CALCIUM 600 + D,3, PO) Take 1 Tab by mouth two (2) times a day.  nystatin-triamcinolone (MYCOLOG) 100,000-0.1 unit/gram-% ointment Apply  to affected area as needed for Skin Irritation.  ALPRAZolam (XANAX) 0.5 mg tablet Take 1 Tab by mouth nightly as needed for Anxiety or Sleep. Max Daily Amount: 0.5 mg.  Indications: anxious, repeated episodes of anxiety, panic disorder    atorvastatin (LIPITOR) 40 mg tablet  · Covered by DM program TAKE 1 TABLET BY MOUTH EVERY DAY    aspirin delayed-release 81 mg tablet  · Covered by DM program Take 1 Tab by mouth daily.  galcanezumab-gnlm (EMGALITY PEN) 120 mg/mL injection 1 mL by SubCUTAneous route every thirty (30) days.  albuterol (PROVENTIL HFA, VENTOLIN HFA) 90 mcg/actuation inhaler Take 1 Puff by inhalation every four (4) hours as needed for Wheezing. Current Pharmacy: [facility]  Current testing supplies/frequency: Dexcom G6 - informed pt she has used $307 of the benefit YTD and she will exceed the benefit after her next refill. Patient states understanding. Advised patient that if she no longer needs Dexcom, but still needs to check SMBG to call us and we can send her a free glucometer and testing supplies. Allergies:   Allergies   Allergen Reactions    Latex Itching      Vitals/Labs:  BP Readings from Last 3 Encounters:   02/15/21 (!) 142/92   10/19/20 (!) 126/57   10/01/20 105/63     Lab Results   Component Value Date/Time    Microalb/Creat ratio (ug/mg creat.) 16 03/07/2020 09:44 AM     Lab Results   Component Value Date/Time    Hemoglobin A1c 6.5 (H) 10/15/2020 12:21 PM    Hemoglobin A1c 8.1 (H) 03/07/2020 09:44 AM    Hemoglobin A1c 10.9 (H) 03/20/2017 04:08 PM    Hemoglobin A1c (POC) 8.4 12/24/2019 02:20 PM    Hemoglobin A1c (POC) 8.9 09/24/2019 08:53 AM    Hemoglobin A1c (POC) 9.9 06/21/2019 11:45 AM    Hemoglobin A1c, External 7.8 07/13/2020     Lab Results   Component Value Date/Time    Cholesterol, total 93 (L) 10/15/2020 12:21 PM    HDL Cholesterol 32 (L) 10/15/2020 12:21 PM    LDL, calculated 42 10/15/2020 12:21 PM    LDL, calculated 48 03/07/2020 09:44 AM    VLDL, calculated 19 10/15/2020 12:21 PM    VLDL, calculated 14 03/07/2020 09:44 AM    Triglyceride 95 10/15/2020 12:21 PM     ALT (SGPT)   Date Value Ref Range Status   10/01/2020 34 12 - 78 U/L Final     AST (SGOT)   Date Value Ref Range Status   10/01/2020 24 15 - 37 U/L Final     The ASCVD Risk score (Fransisca Marks, et al., 2013) failed to calculate for the following reasons:    ASCVD risk score not calculated     Lab Results   Component Value Date/Time    Creatinine 0.68 10/01/2020 06:17 AM     Estimated Creatinine Clearance: 149.5 mL/min (by C-G formula based on SCr of 0.68 mg/dL).     Lab Results   Component Value Date/Time    GFR est non-AA >60 10/01/2020 06:17 AM    GFR est AA >60 10/01/2020 06:17 AM     Immunizations:  Immunization History   Administered Date(s) Administered    COVID-19, PFIZER, MRNA, LNP-S, PF, 30MCG/0.3ML DOSE 2020, 2021    HPV (Quad) 2016    Hep B Vaccine (Adult) 10/25/2018    Influenza Vaccine 2019, 2020    Influenza Vaccine (Quad) PF (>6 Mo Flulaval, Fluarix, and >3 Yrs Afluria, Fluzone 30568) 10/19/2015, 2016, 2018    Influenza Vaccine (Quadrivalent)(>18 Yrs Flublok 42469) 2018    Influenza Vaccine PF 2013    Pneumococcal Polysaccharide (PPSV-23) 2013    TDAP Vaccine 2012      Social History:  Social History     Tobacco Use    Smoking status: Former Smoker     Packs/day: 0.50     Years: 5.00     Pack years: 2.50     Quit date: 2019     Years since quittin.2    Smokeless tobacco: Never Used   Substance Use Topics    Alcohol use: Not Currently     Alcohol/week: 1.0 standard drinks     Types: 1 Shots of liquor, 1 Standard drinks or equivalent per week     Comment: socially, Monthly or less     ASSESSMENT:  Initial Program Requirements (Y indicates has completed for the year, N indicates needs to be completed by 2021):  Yes - Provider Visit for DM (1st)  No - A1c (1st)     Ongoing Program Requirements (Y indicates has completed for the year, N indicates needs to be completed by 2021):  Yes - Provider Visit for DM (2nd)  No - ACC/diabetes educator visit (if A1c over 8%)  No - A1c (2nd)  No - Lipid panel  No - Urine microalbumin  Yes - Pneumococcal vaccination: Up to date, not needed again until 63  No - Influenza vaccination for Fall 2021  Yes - Medication adherence over 70% - n/a; not prescribed any antidiabetic medications  Yes - On statin - atorvastatin    Yes - On ACEi/ARB or contraindication(s) Normal blood pressure, urinary albumin-to-creatinine ratio, and eGFR     Formulary Medication Review:  Non-formulary or medications with cost-effective alternatives: none. Current medications eligible for copay waiver, up to $600, through [employer]:  - Atorvastatin and aspirin.   - Dexcom G6     Diabetes Care:   - Glycemic Goal: <7.0%. Is at blood glucose goal. Type 2 DM under acceptable control as evidenced by most recent A1c level down to 6.5% after gastric sleeve in Sept 2020. Patient has stopped all antidiabetic medications as a result of surgery. - Current symptoms/problems include none  - Home blood sugar records:  patient uses CGM  - Any episodes of hypoglycemia? yes - mostly at night and in the early mornings. She doesn't eat past a certain time and will dip low overnight. She will also get up early to exercise and will drink Glucerna before workouts so she doesn't dip low during exercise. - Duplicate MOA: none - not currently prescribed any antidiabetic medications  - Daily aspirin? Yes; as prescribed by surgeon  - Adherent to statin: appears adherent    Other Considerations:  - Blood Pressure Goal: BP less than 140/90 mmHg due to history of DM: Is at blood pressure goal.   - Lipids: Patient is prescribed high-intensity statin therapy. - Smoking status: Quit 1 years ago    PLAN:  - Consideration(s) for provider:   · None at this time.    - DM program gaps identified:   · Initial requirements: A1c (1st)   · Ongoing requirements: ACC/diabetes educator visit (if A1c over 8%), A1c (2nd), Lipid panel, Urine microalbumin and Influenza vaccination for [id number]   - Follow up: PCP for identified gaps or as scheduled below  - Upcoming appointments:   Future Appointments   Date Time Provider Mateo Christian   3/12/2021  1:00 PM Viry Smallwood MD AOCR [facility]   3/16/2021 11:00 AM Rajat Uribe NP [facility]   3/17/2021 10:20 AM Genaro Rosas MD [facility]   3/19/2021  8:20 AM Minda New MD [facility]   8/9/2021  2:30 PM Esme Lerner MD RDE [address]       Felicia Arizmendi, PharmD, [facility]  Direct: [phone]  Department, toll free [phone], option 7            For Pharmacy Admin Tracking Only    PHSO: PHSO Patient?: Yes  Total # of Interventions Recommended: Count: 1  - Updated Order #: 1 Updated Order Reason(s):  Other  Recommended intervention potential cost savings: 1  Total Interventions Accepted: 1  Time Spent (min): 45

## 2021-02-17 RX ORDER — DEXTROAMPHETAMINE SACCHARATE, AMPHETAMINE ASPARTATE, DEXTROAMPHETAMINE SULFATE AND AMPHETAMINE SULFATE 1.875; 1.875; 1.875; 1.875 MG/1; MG/1; MG/1; MG/1
7.5 TABLET ORAL DAILY
Qty: 30 TAB | Refills: 0 | Status: SHIPPED | OUTPATIENT
Start: 2021-02-17 | End: 2021-03-19 | Stop reason: ALTCHOICE

## 2021-02-17 NOTE — TELEPHONE ENCOUNTER
----- Message from Missael Mendez Haywesley Pérez sent at 2/17/2021 11:38 AM EST -----  Regarding: RE: Prescription Question  Contact: [phone] x1  Kimberly Cordova!,    Dr. Cassy Mace sent the new medication of Adderall to [facility]. Our insurance we can not use [facility]. I sent a request through Refills for him to send it to the [address] in [facility] at [facility]. Can you forward this to him as well. If he can't send it there, I believe insurance will cover [facility]. He can send it to the [address]. The prescription can't be transferred since it's considered a narcotic. It has to be resent. Thanks!!!    ----- Message -----  From: Jazzmine Santiago LPN  Sent: 6/40/99, 1:89 PM  To: Key Brown  Subject: RE: Prescription Question    I will forward your message to Dr Cassy Mace      ----- Message -----       From:Nancy Montilla       Sent:2/15/2021  2:16 PM EST         Juanita Grant MD    Subject:Prescription Question    Good Afternoon,     I tried to fill the Rx for Vyvanse and it's over $200.00. Can we switch the Rx to Adderall since it's cheaper. Also, if you can send it electronically to the [address] [place] [address] and they are asking for a 90 Rx as well if possible.      Thank you,     Phylicia Nevarez

## 2021-02-18 LAB
CHOLEST SERPL-MCNC: 94 MG/DL (ref 100–199)
HDLC SERPL-MCNC: 39 MG/DL
LDLC SERPL CALC-MCNC: 39 MG/DL (ref 0–99)
TRIGL SERPL-MCNC: 73 MG/DL (ref 0–149)
VLDLC SERPL CALC-MCNC: 16 MG/DL (ref 5–40)

## 2021-02-19 LAB
25(OH)D3+25(OH)D2 SERPL-MCNC: 36 NG/ML (ref 30–100)
ALBUMIN SERPL-MCNC: 3.7 G/DL (ref 3.8–4.8)
ALBUMIN/CREAT UR: 6 MG/G CREAT (ref 0–29)
ALBUMIN/GLOB SERPL: 1.3 {RATIO} (ref 1.2–2.2)
ALP SERPL-CCNC: 75 IU/L (ref 39–117)
ALT SERPL-CCNC: 16 IU/L (ref 0–32)
AST SERPL-CCNC: 14 IU/L (ref 0–40)
BILIRUB SERPL-MCNC: 0.6 MG/DL (ref 0–1.2)
BUN SERPL-MCNC: 14 MG/DL (ref 6–20)
BUN/CREAT SERPL: 18 (ref 9–23)
CALCIUM SERPL-MCNC: 9.2 MG/DL (ref 8.7–10.2)
CHLORIDE SERPL-SCNC: 105 MMOL/L (ref 96–106)
CO2 SERPL-SCNC: 21 MMOL/L (ref 20–29)
CREAT SERPL-MCNC: 0.79 MG/DL (ref 0.57–1)
CREAT UR-MCNC: 267.5 MG/DL
ERYTHROCYTE [DISTWIDTH] IN BLOOD BY AUTOMATED COUNT: 14.3 % (ref 11.7–15.4)
EST. AVERAGE GLUCOSE BLD GHB EST-MCNC: 123 MG/DL
GLOBULIN SER CALC-MCNC: 2.8 G/DL (ref 1.5–4.5)
GLUCOSE SERPL-MCNC: 80 MG/DL (ref 65–99)
HBA1C MFR BLD: 5.9 % (ref 4.8–5.6)
HCT VFR BLD AUTO: 37.2 % (ref 34–46.6)
HGB BLD-MCNC: 11.6 G/DL (ref 11.1–15.9)
MCH RBC QN AUTO: 26.5 PG (ref 26.6–33)
MCHC RBC AUTO-ENTMCNC: 31.2 G/DL (ref 31.5–35.7)
MCV RBC AUTO: 85 FL (ref 79–97)
MICROALBUMIN UR-MCNC: 17.2 UG/ML
PLATELET # BLD AUTO: 324 X10E3/UL (ref 150–450)
POTASSIUM SERPL-SCNC: 4.3 MMOL/L (ref 3.5–5.2)
PROT SERPL-MCNC: 6.5 G/DL (ref 6–8.5)
RBC # BLD AUTO: 4.37 X10E6/UL (ref 3.77–5.28)
SODIUM SERPL-SCNC: 141 MMOL/L (ref 134–144)
VIT B12 SERPL-MCNC: 992 PG/ML (ref 232–1245)
WBC # BLD AUTO: 9.4 X10E3/UL (ref 3.4–10.8)

## 2021-03-04 DIAGNOSIS — G43.719 INTRACTABLE CHRONIC MIGRAINE WITHOUT AURA AND WITHOUT STATUS MIGRAINOSUS: ICD-10-CM

## 2021-03-04 RX ORDER — GALCANEZUMAB 120 MG/ML
120 INJECTION, SOLUTION SUBCUTANEOUS
Qty: 1 ML | Refills: 11 | Status: SHIPPED | OUTPATIENT
Start: 2021-03-04

## 2021-03-12 ENCOUNTER — OFFICE VISIT (OUTPATIENT)
Dept: RHEUMATOLOGY | Age: 31
End: 2021-03-12
Payer: COMMERCIAL

## 2021-03-12 VITALS
WEIGHT: 269 LBS | BODY MASS INDEX: 47.66 KG/M2 | SYSTOLIC BLOOD PRESSURE: 122 MMHG | HEART RATE: 74 BPM | HEIGHT: 63 IN | TEMPERATURE: 97.5 F | RESPIRATION RATE: 20 BRPM | DIASTOLIC BLOOD PRESSURE: 65 MMHG | OXYGEN SATURATION: 96 %

## 2021-03-12 DIAGNOSIS — M35.9 UNDIFFERENTIATED CONNECTIVE TISSUE DISEASE (HCC): Primary | ICD-10-CM

## 2021-03-12 DIAGNOSIS — R68.89 COLD INTOLERANCE: ICD-10-CM

## 2021-03-12 DIAGNOSIS — M86.9 OSTEITIS (HCC): ICD-10-CM

## 2021-03-12 PROCEDURE — 99205 OFFICE O/P NEW HI 60 MIN: CPT | Performed by: INTERNAL MEDICINE

## 2021-03-12 RX ORDER — HYDROXYCHLOROQUINE SULFATE 200 MG/1
400 TABLET, FILM COATED ORAL 2 TIMES DAILY
Qty: 60 TAB | Refills: 11 | Status: SHIPPED
Start: 2021-03-12 | End: 2021-06-22 | Stop reason: ALTCHOICE

## 2021-03-12 NOTE — PROGRESS NOTES
REASON FOR VISIT:   Brandon Oneal is a 26 y.o. female with history of morbid obesity, hypertension, and carpal tunnel syndrome who is being referred to [facility] at the request of Dr. Kriss Martinez regarding a diagnosis of joint pain. HISTORY OF PRESENT ILLNESS    From her late 19's, had developed chronic pain in the hands, knees, and ankles. Attributed this to her weight, which peaked at 360#. In May 2020, inflammatory markers tested for widespread arthralgias and myalgias,  and CRP 35mg/dL. While on directed questioning she believes she was dealing with inguinal soft tissue infections consistent by description with hidradenitis, records at the time make no mention of this, and the only active issues noted at the time were poorly controlled diabetes, carpal tunnel syndrome, sleep apnea, and migraine headaches. Plain films of hands, knees, and feet were unremarkable. Saw Dr. Reatha Hatchet who was caring for her mother, who diagnosed her with Sjogren's after SSB tested positive (but negative SSA) in May 2020. Had started Plaquenil and felt this helped finger swellings. Has had to switch from Dr. Reatha Hatchet now as he does not take her new insurance. She has chronic dry mouth for which she keeps water nearby. No recent dental problems. Gets rare foreign body sensation in the eyes, uses Visine once or twice a month. Since gastric sleeve procedure in September 2020, had stopped the Plaquenil (hydroxychloroquine) for a few months and still remains off of meloxicam. Weight is now down to 269#. Restarted the Plaquenil (hydroxychloroquine) about 6 weeks ago when hand pains worsened and seemed to swell more--tend to swell with changes in temperature, and remain swollen most of the day. Not much day to day variation in pain, worse on cold or wet days.  Late 2020 had pain and swelling in the top of the left foot, told she had a bone spur but declined injection, wore a boot for about 2 weeks with resolution of pain. Mornings and evenings are the worst times of day. Estimates 15-30min morning stiffness. Denies punctuated flares of joint pain or swelling. Denies rashes. No chronic cough. No LE bruising. No oral ulcers. She has perimenstrual migraines better since restarting OCP's; feels Emgality has also been helpful. REVIEW OF SYSTEMS  A comprehensive review of systems was negative except for that written in the HPI. A 10-point review of systems is per the new patient questionnaire, which has been reviewed extensively and scanned into the electronic medical record for future reference. Review of systems is as above and is otherwise negative. ALLERGIES  Latex    MEDICATIONS  Current Outpatient Medications   Medication Sig    galcanezumab-gnlm (Emgality Pen) 120 mg/mL injection 1 mL by SubCUTAneous route every thirty (30) days.  dextroamphetamine-amphetamine (ADDERALL) 7.5 mg tablet Take 1 Tab by mouth daily. Max Daily Amount: 7.5 mg.    drospirenone, contraceptive, (Slynd) 4 mg (28) tab Slynd 4 mg (28) tablet   Take 1 tablet every day by oral route.  hydroxychloroquine sulfate (PLAQUENIL PO) Take 200 mg by mouth two (2) times a day.  Lactobac no.41/Bifidobact no.7 (PROBIOTIC-10 PO) Take 1 Cap by mouth daily.  vilazodone (VIIBRYD) 40 mg tab tablet Take 1 Tab by mouth daily.  cyanocobalamin, vitamin B-12, (B-12 Compliance) 1,000 mcg/mL kit Once monthly  Indications: inadequate vitamin B12    omeprazole (PRILOSEC) 40 mg capsule Take 1 Cap by mouth daily.  metoprolol tartrate (LOPRESSOR) 100 mg IR tablet TAKE 1 TABLET BY MOUTH TWICE A DAY    gabapentin (NEURONTIN) 300 mg capsule Take 1 Cap by mouth three (3) times daily. Max Daily Amount: 900 mg.    nystatin (MYCOSTATIN) topical cream Apply  to affected area two (2) times a day.  Apply to abdominal folds twice daily as needed for rash    SUMAtriptan (IMITREX) 50 mg tablet 1 tab at onset of headache, can repeat X 1 in 2 hrs if HA persists. Not more than 10 days/month.  iron,carbonyl-FA-multivit-min (Opurity Multivitamin) 30 mg iron- 800 mcg chew Take 2 Tabs by mouth daily.  calcium carbonate/vitamin D3 (CALCIUM 600 + D,3, PO) Take 1 Tab by mouth two (2) times a day.  Arm Brace misc Bilateral carpal tunnel splint to be used every night    ALPRAZolam (XANAX) 0.5 mg tablet Take 1 Tab by mouth nightly as needed for Anxiety or Sleep. Max Daily Amount: 0.5 mg. Indications: anxious, repeated episodes of anxiety, panic disorder    atorvastatin (LIPITOR) 40 mg tablet TAKE 1 TABLET BY MOUTH EVERY DAY    aspirin delayed-release 81 mg tablet Take 1 Tab by mouth daily.  Blood-Glucose Meter,Continuous (Dexcom G6 ) misc Change sensor every 10 days    Blood-Glucose Transmitter (Dexcom G6 Transmitter) nahid Change sensor every 10 days    Blood-Glucose Sensor (Dexcom G6 Sensor) nahid Change sensor every 10 days    albuterol (PROVENTIL HFA, VENTOLIN HFA) 90 mcg/actuation inhaler Take 1 Puff by inhalation every four (4) hours as needed for Wheezing.  nystatin-triamcinolone (MYCOLOG) 100,000-0.1 unit/gram-% ointment Apply  to affected area as needed for Skin Irritation. No current facility-administered medications for this visit.         PAST MEDICAL HISTORY  Past Medical History:   Diagnosis Date    Anxiety with depression     Arrhythmia     tachycardia    Asthma     [place] hump 1/7/2015    Carpal tunnel syndrome of right wrist 12/20/2016    Diabetes [facility] [address]     ALIYA (generalized anxiety disorder) 4/4/2018    GERD (gastroesophageal reflux disease) 8/13/2014    HTN (hypertension) 7/25/2011    Hyperaldosteronism ([address] 10/19/2015    Hyperhidrosis 4/4/2018    Morbid obesity ([facility] [address]     Other ill-defined conditions(799.89)     migraines    Palpitation 4/4/2018    Sleep apnea     uses CPAP    Tachycardia 4/4/2018       FAMILY HISTORY  family history includes Anesth Problems in her mother; Arthritis-rheumatoid in her mother; Attention Deficit Hyperactivity Disorder in her brother; Breast Cancer in her sister; Cancer in an other family member; Diabetes in her mother; Drug Abuse in her father; Gout in her mother; Hypertension in her mother, sister, and sister; Psychiatric Disorder in her father; Thyroid Disease in her sister. SOCIAL HISTORY  She  reports that she quit smoking about 16 months ago. She has a 2.50 pack-year smoking history. She has never used smokeless tobacco. She reports previous alcohol use of about 1.0 standard drinks of alcohol per week. She reports that she does not use drugs. Social History     Social History Narrative    Not on file       DATA  Visit Vitals  /65 (BP 1 Location: Right arm, BP Patient Position: Sitting)   Pulse 74   Temp 97.5 °F (36.4 °C) (Oral)   Resp 20   Ht 5' 3\" (1.6 m)   Wt 269 lb (122 kg)   SpO2 96%   BMI 47.65 kg/m²    Body mass index is 47.65 kg/m². No flowsheet data found. General:  The patient is pleasant, morbidly obese, alert, and in no apparent distress. Eyes: Sclera are anicteric. No conjunctival injection. HEENT:  Oropharynx is clear. No oral ulcers. Decreased salivary pooling with lingual scalloping but no mirroring or fissuring. No cervical or supraclavicular lymphadenopathy. Lungs:  Clear to auscultation bilaterally, without wheeze or stridor. Normal respiratory effort. Cor:  Regular rate and rhythm. No murmurs rubs or gallops. Abdomen: Soft, non-tender, without hepatomegaly or masses. Extremities: No calf tenderness or edema. Warm and well perfused. Skin:  No significant abnormalities. No petechial, purpuric, or psoriaform rashes  Neuro: Nonfocal, no foot or wrist drop. Musculoskeletal:    A comprehensive musculoskeletal exam was performed for all joints of each upper and lower extremity and assessed for swelling, tenderness and range of motion.  Results are documented as below:  No evidence of synovitis in the small joints of the hands, wrists, shoulders, elbows, hips, knees or ankles. Labs:  21 Vit D 36, CMP WNL (Cr 0.79), WBC 9.4, Hgb 11.4, Plt 324  20 , CRP 35 mg/dL, SSA <0.2, SSB 2.5, rest of direct JULIEN neg (incl Sm, RNP, dsDNA, ribosomal P, Karol-1, centromere, Scl70)      Imagin20 Echo: · Normal cavity size, systolic function (ejection fraction normal) and diastolic function. Mild concentric hypertrophy. Calculated left ventricular ejection fraction is 55%. Biplane method used to measure ejection fraction. No regional wall motion abnormality noted. Image quality for this study was technically difficult. ASSESSMENT AND PLAN  Ms. Renate Antoine is a 26 y.o. female with morbid obesity and poorly controlled hypertension who presents for evaluation of mixed-character arthralgias with marked acute phase reactants, mild sicca complex, and isolated SSB positivity, but no overt synovitis or current sites of infection. Isolated SSB positivity is no longer felt to be supportive of Sjogren's syndrome (PLoS One. 2017; 12(9): [id number]), and is unlikely to be clinically significant in this context. She does endorse widespread bony pain and with history of hidradenitis SAPHO (synovitis, acne, pustulosis, hyperostosis, and osteitis) could drive similarly elevated acute phase reactants. Starting with updated acute phase reactants, and bone scan for osteitis (though her xrays were done 9 months ago, they were unrevealing for the same problem). 1. Undifferentiated connective tissue disease ([facility] [address]  - SED RATE (ESR); Future  - C REACTIVE PROTEIN, QT; Future  - COMPLEMENT, C3 & C4; Future  - METABOLIC PANEL, COMPREHENSIVE; Future  - CBC WITH AUTOMATED DIFF; Future  - URINALYSIS W/ REFLEX CULTURE; Future  - PROT+CREATU (RANDOM); Future  - CK; Future  - TSH REFLEX TO T4; Future  - ANTI-NUCLEAR AB BY IFA (RDL); Future  - NM BONE SCAN [facility] BODY; Future  - hydrOXYchloroQUINE (Plaquenil) 200 mg tablet; Take 2 Tabs by mouth two (2) times a day. Dispense: 60 Tab; Refill: 11    2. Osteitis ([address]  - NM BONE SCAN [address] BODY; Future    3. Cold intolerance  - TSH REFLEX TO T4; Future    Patient Instructions   1. Your joints look pretty good to me today from an inflammatory standpoint. I'm updating some labs to make sure your inflammatory markers are going down. 2. Usually with an isolated SSB antibody, Sjogren's would be rare. . for now I'd consider this undifferentiated connective tissue disease. OK to continue Plaquenil (hydroxychloroquine) 400mg daily for now, continue following with ophthalmology at least annually. 3. Labs from [facility] at your earliest convenience. 4. Bone scan to make sure you don't have evidence of bone inflammation related to osteitis, which can happen in hidradenitis and cause high inflammatory levels like you had last year. If you don't hear from them within a week, then call to schedule. 5. For dry mouth, look for Xylimelts on [employer]. For dry eyes, continue to use preservative-free artificial tears like Refresh as needed. 6. For joint pain, tylenol 650mg up to 5x/day is safe. Some patients like Wobenzyme, which is an enzyme blend including bromelain and papain which is easier on the stomach than NSAIDs. Topical Voltaren gel can be helpful sparingly for small joint pain if it's OK with your GI physician, and herbal treatments like turmeric and glucosamine with chondroitin can be helpful for osteoarthritis. 7. Return in 6-8 weeks to review the above. Orders Placed This Encounter    NM BONE SCAN [address] [facility] BODY    SED RATE (ESR)    C REACTIVE PROTEIN, QT    COMPLEMENT, C3 & C4    METABOLIC PANEL, COMPREHENSIVE    CBC WITH AUTOMATED DIFF    URINALYSIS W/ REFLEX CULTURE    PROT+CREATU (RANDOM)    CK    TSH REFLEX TO T4    ANTI-NUCLEAR AB BY IFA (RDL)    hydrOXYchloroQUINE (Plaquenil) 200 mg tablet       Medications: I am having Nancy Yepez maintain her albuterol, Dexcom G6 , Dexcom G6 Transmitter, Dexcom G6 Sensor, ALPRAZolam, atorvastatin, aspirin delayed-release, nystatin-triamcinolone, Arm Brace, Opurity Multivitamin, (calcium carbonate/vitamin D3 (CALCIUM 600 + D,3, PO)), SUMAtriptan, nystatin, gabapentin, metoprolol tartrate, omeprazole, vilazodone, B-12 Compliance, Slynd, hydroxychloroquine sulfate (PLAQUENIL PO), Lactobac no.41/Bifidobact no.7 (PROBIOTIC-10 PO), dextroamphetamine-amphetamine, and Emgality Pen. Follow up: Return in about 8 weeks (around 5/7/2021).     Face to face time: 55 minutes  Note preparation and records review day of service: 15 minutes  Total provider time day of service: 70 minutes      Keely Chanel MD    Adult Rheumatology   [address], [address] [facility], [address] [place] [address]   Phone [phone]  Fax [phone]

## 2021-03-12 NOTE — PATIENT INSTRUCTIONS
1. Your joints look pretty good to me today from an inflammatory standpoint. I'm updating some labs to make sure your inflammatory markers are going down. 2. Usually with an isolated SSB antibody, Sjogren's would be rare. . for now I'd consider this undifferentiated connective tissue disease. OK to continue Plaquenil (hydroxychloroquine) 400mg daily for now, continue following with ophthalmology at least annually. 3. Labs from [facility] at your earliest convenience. 4. Bone scan to make sure you don't have evidence of bone inflammation related to osteitis, which can happen in hidradenitis and cause high inflammatory levels like you had last year. If you don't hear from them within a week, then call to schedule. 5. For dry mouth, look for Xylimelts on [employer]. For dry eyes, continue to use preservative-free artificial tears like Refresh as needed. 6. For joint pain, tylenol 650mg up to 5x/day is safe. Some patients like Wobenzyme, which is an enzyme blend including bromelain and papain which is easier on the stomach than NSAIDs. Topical Voltaren gel can be helpful sparingly for small joint pain if it's OK with your GI physician, and herbal treatments like turmeric and glucosamine with chondroitin can be helpful for osteoarthritis. 7. Return in 6-8 weeks to review the above.

## 2021-03-16 ENCOUNTER — VIRTUAL VISIT (OUTPATIENT)
Dept: SURGERY | Age: 31
End: 2021-03-16
Payer: COMMERCIAL

## 2021-03-16 ENCOUNTER — TELEPHONE (OUTPATIENT)
Dept: RHEUMATOLOGY | Age: 31
End: 2021-03-16

## 2021-03-16 DIAGNOSIS — E55.9 VITAMIN D DEFICIENCY: ICD-10-CM

## 2021-03-16 DIAGNOSIS — E66.01 MORBID OBESITY (HCC): Primary | ICD-10-CM

## 2021-03-16 PROCEDURE — 99213 OFFICE O/P EST LOW 20 MIN: CPT | Performed by: NURSE PRACTITIONER

## 2021-03-16 RX ORDER — ERGOCALCIFEROL 1.25 MG/1
50000 CAPSULE ORAL
Qty: 12 CAP | Refills: 3 | Status: SHIPPED | OUTPATIENT
Start: 2021-03-16 | End: 2022-02-18

## 2021-03-16 NOTE — TELEPHONE ENCOUNTER
Spoke with pharmacist. Informed them that plaquenil directions should be one tablet 2 times per day per Dr. Paddy Samayoa.

## 2021-03-16 NOTE — PROGRESS NOTES
1. Have you been to the ER, urgent care clinic since your last visit? Hospitalized since your last visit? no    2. Have you seen or consulted any other health care providers outside of the [address] [place] [address] since your last visit? Include any pap smears or colon screening.  no

## 2021-03-17 ENCOUNTER — VIRTUAL VISIT (OUTPATIENT)
Dept: NEUROLOGY | Age: 31
End: 2021-03-17
Payer: COMMERCIAL

## 2021-03-17 DIAGNOSIS — G43.719 INTRACTABLE CHRONIC MIGRAINE WITHOUT AURA AND WITHOUT STATUS MIGRAINOSUS: Primary | ICD-10-CM

## 2021-03-17 DIAGNOSIS — G47.33 OSA (OBSTRUCTIVE SLEEP APNEA): ICD-10-CM

## 2021-03-17 DIAGNOSIS — G56.21 ULNAR NEUROPATHY AT ELBOW, RIGHT: ICD-10-CM

## 2021-03-17 DIAGNOSIS — G56.03 BILATERAL CARPAL TUNNEL SYNDROME: ICD-10-CM

## 2021-03-17 DIAGNOSIS — R20.2 PARESTHESIA: ICD-10-CM

## 2021-03-17 PROCEDURE — 99214 OFFICE O/P EST MOD 30 MIN: CPT | Performed by: PSYCHIATRY & NEUROLOGY

## 2021-03-17 NOTE — PROGRESS NOTES
Neurology follow-up note       Chief complaint: Migraine    Subjective  Karina Todd is a 26 y.o. female who presented to the neurology office for management of numbness and tingling in the hands and headaches. Regarding the numbness in the hand, it started around mid May 2016 and it is bilateral.  The medial 2 fingers are involved in the right hand and all the fingers are involved in the left hand. It is gradually progressive. She does also have neck pain with radiation to the right shoulder. Her symptoms are off and on and it is more when she is working and improves when her hands and not moving. The numbness and tingling lasts for minutes but the pain around her wrist can be for a few days. She denies any weakness. EMG/nerve conduction study has been normal.  She is taking gabapentin 300 mg p.o. thrice daily and that has helped her. She uses right elbow splint and bilateral hand splints. She uses the splint intermittently. She states that the numbness in the hands are worsening. She does also noticed some weakness in her right hand. .    Regarding the headaches, she has been having migraines for a long time and her headaches starts with pain in the left eye and left side of the face and it is 10 out of 10 in severity, associated with photophobia, phonophobia and nausea but she denies any vomiting. The headache can last from 3 days to week. It is throbbing in character. She could not get it refilled and stopped taking nortritpyline. Her headaches have worsened. She does also have obstructive sleep apnea and she uses CPAP around 5 days in a week. Interval history:  Headaches are doing good with emgality. She uses Imitrex as needed for headaches  She is complaining of right hand numbness which is persistent. She uses carpal tunnel splint every night.     Headache frequency baseline: Daily  Headache frequency now: 4/month    Risk Factors for headaches  Smoking: Quit Nov 2019  Coffee: Denies cups/day  Tea: 0 cups/day  Soda: 1 cans/day  Water: 4 glasses/day  Sleeps at 9 AM and wakes up at 2 PM to 3 PM.     Medications tried  Abortive  Imitrex    Preventitive  Amitriptyline  Nortriptyline  Metoprolol  Gabapentin  Emgality    Current Outpatient Medications   Medication Sig    ergocalciferol (ERGOCALCIFEROL) 1,250 mcg (50,000 unit) capsule Take 1 Cap by mouth every seven (7) days. Indications: low vitamin D levels    hydrOXYchloroQUINE (Plaquenil) 200 mg tablet Take 2 Tabs by mouth two (2) times a day.  galcanezumab-gnlm (Emgality Pen) 120 mg/mL injection 1 mL by SubCUTAneous route every thirty (30) days.  dextroamphetamine-amphetamine (ADDERALL) 7.5 mg tablet Take 1 Tab by mouth daily. Max Daily Amount: 7.5 mg.    drospirenone, contraceptive, (Slynd) 4 mg (28) tab Slynd 4 mg (28) tablet   Take 1 tablet every day by oral route.  Lactobac no.41/Bifidobact no.7 (PROBIOTIC-10 PO) Take 1 Cap by mouth daily.  vilazodone (VIIBRYD) 40 mg tab tablet Take 1 Tab by mouth daily.  cyanocobalamin, vitamin B-12, (B-12 Compliance) 1,000 mcg/mL kit Once monthly  Indications: inadequate vitamin B12    omeprazole (PRILOSEC) 40 mg capsule Take 1 Cap by mouth daily.  metoprolol tartrate (LOPRESSOR) 100 mg IR tablet TAKE 1 TABLET BY MOUTH TWICE A DAY    gabapentin (NEURONTIN) 300 mg capsule Take 1 Cap by mouth three (3) times daily. Max Daily Amount: 900 mg.    nystatin (MYCOSTATIN) topical cream Apply  to affected area two (2) times a day. Apply to abdominal folds twice daily as needed for rash    SUMAtriptan (IMITREX) 50 mg tablet 1 tab at onset of headache, can repeat X 1 in 2 hrs if HA persists. Not more than 10 days/month.  iron,carbonyl-FA-multivit-min (Opurity Multivitamin) 30 mg iron- 800 mcg chew Take 2 Tabs by mouth daily.  calcium carbonate/vitamin D3 (CALCIUM 600 + D,3, PO) Take 1 Tab by mouth two (2) times a day.     Arm Brace misc Bilateral carpal tunnel splint to be used every night  nystatin-triamcinolone (MYCOLOG) 100,000-0.1 unit/gram-% ointment Apply  to affected area as needed for Skin Irritation.  ALPRAZolam (XANAX) 0.5 mg tablet Take 1 Tab by mouth nightly as needed for Anxiety or Sleep. Max Daily Amount: 0.5 mg. Indications: anxious, repeated episodes of anxiety, panic disorder    atorvastatin (LIPITOR) 40 mg tablet TAKE 1 TABLET BY MOUTH EVERY DAY    aspirin delayed-release 81 mg tablet Take 1 Tab by mouth daily.  Blood-Glucose Meter,Continuous (Dexcom G6 ) misc Change sensor every 10 days    Blood-Glucose Transmitter (Dexcom G6 Transmitter) nahid Change sensor every 10 days    Blood-Glucose Sensor (Dexcom G6 Sensor) nahid Change sensor every 10 days    albuterol (PROVENTIL HFA, VENTOLIN HFA) 90 mcg/actuation inhaler Take 1 Puff by inhalation every four (4) hours as needed for Wheezing. No current facility-administered medications for this visit. EXAMINATION:   There were no vitals taken for this visit. General:  Exam limited because of virtual visit. General appearance: Pt is in no acute distress     Neurological Examination:   Mental Status: AAO x3. Speech is fluent. Follows commands, has normal fund of knowledge, attention, short term recall, comprehension and insight. Cranial Nerves:  Extraocular movements are full. Facial movement intact. Hearing intact to conversation. Shoulder shrug symmetric. Tongue midline. Motor: Strength is symmetric in all 4 ext.      Sensation: Normal according to patient to light touch    Coordination/Cerebellar: Intact to finger-nose-finger     Gait: Casual gait is normal.     Laboratory review:   Results for orders placed or performed in visit on 02/15/21   LIPID PANEL   Result Value Ref Range    Cholesterol, total 94 (L) 100 - 199 mg/dL    Triglyceride 73 0 - 149 mg/dL    HDL Cholesterol 39 (L) >39 mg/dL    VLDL, calculated 16 5 - 40 mg/dL    LDL, calculated 39 0 - 99 mg/dL     Laboratory review:  12/20/2016  Glucose 352, potassium 5.3, platelet 930, FQV0O 9.3    Imaging review:  9/17/2020  EMG/nerve conduction study  This is an abnormal study. 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 7/27/2014  CT soft tissue neck with contrast   No evidence of neck mass, abscess or lymphadenopathy    3/16/2017  EMG/nerve conduction study  Nerve conduction studies of the bilateral upper extremities were unremarkable. Needle electrode examination of the right upper extremity was unremarkable. This is a normal study. There is no electrophysiological evidence of median neuropathy at the wrist or and ulnar neuropathy at the elbow on either side. There is no electrophysiological evidence of right cervical radiculopathy. 4/14/2017  MRI of the brain without contrast  Normal    MRI cervical spine  Straightening of the usual cervical lordosis but otherwise normal study    Documentation review:  None    Assessment/Plan:   Justine Borjas is a 26 y.o. female who presented to the neurology office for management of migraines and clinically does have right ulnar neuropathy and bilateral carpal tunnel syndrome. She does also have obstructive sleep apnea and has quit smoking. Since her last visit, patient did have EMG/nerve conduction study which did show bilateral carpal tunnel syndrome. She continues to have right hand numbness despite using wrist brace every night. We will proceed with right carpal tunnel injection. She states that the gabapentin has helped her with the paresthesia in her hands. She is taking gabapentin 300 mg p.o. 2-3 times daily. For her headaches, she has tried amitriptyline, nortriptyline, metoprolol and gabapentin. She is now on Emgality and that has helped her significantly. We will continue the same . She uses Imitrex as needed and that helps her. She does also have obstructive sleep apnea.         Follow-up in 3 months ICD-10-CM ICD-9-CM    1. Intractable chronic migraine without aura and without status migrainosus  G43.719 346.71    2. Ulnar neuropathy at elbow, right  G56.21 354.2    3. PETE (obstructive sleep apnea)  G47.33 327.23    4. Paresthesia  R20.2 782.0    5. Bilateral carpal tunnel syndrome  G56.03 354.0        Thank you for allowing me to participate in the care of Ms. Viktoriya Helms. Please feel free to contact me if you have any questions. Bryn Bui MD  Neurologist and Clinical Neurophysiologist    CC: Julianna Ellington MD  Fax: [phone]    This note will not be viewable in [address] Shana Henderson was seen by synchronous (real-time) audio-video technology on 03/17/21. Consent:  She and/or her healthcare decision maker is aware that this patient-initiated Telehealth encounter is a billable service, with coverage as determined by her insurance carrier. She is aware that she may receive a bill and has provided verbal consent to proceed: Yes    I was in the office while conducting this encounter. Pursuant to the emergency declaration under the [facility] waiver authority and the [employer] Act, this Virtual  Visit was conducted, with patient's consent, to reduce the patient's risk of exposure to COVID-19 and provide continuity of care for an established patient. Services were provided through a video synchronous discussion virtually to substitute for in-person clinic visit.

## 2021-03-19 ENCOUNTER — OFFICE VISIT (OUTPATIENT)
Dept: FAMILY MEDICINE CLINIC | Age: 31
End: 2021-03-19
Payer: COMMERCIAL

## 2021-03-19 VITALS
BODY MASS INDEX: 47.98 KG/M2 | DIASTOLIC BLOOD PRESSURE: 68 MMHG | HEART RATE: 66 BPM | HEIGHT: 63 IN | OXYGEN SATURATION: 98 % | SYSTOLIC BLOOD PRESSURE: 108 MMHG | WEIGHT: 270.8 LBS | RESPIRATION RATE: 16 BRPM | TEMPERATURE: 97 F

## 2021-03-19 DIAGNOSIS — Z00.00 WELL WOMAN EXAM (NO GYNECOLOGICAL EXAM): ICD-10-CM

## 2021-03-19 DIAGNOSIS — F41.1 GAD (GENERALIZED ANXIETY DISORDER): ICD-10-CM

## 2021-03-19 DIAGNOSIS — R41.840 ATTENTION AND CONCENTRATION DEFICIT: ICD-10-CM

## 2021-03-19 DIAGNOSIS — F50.81 BINGE EATING DISORDER: ICD-10-CM

## 2021-03-19 DIAGNOSIS — M35.03 SJOGREN'S SYNDROME WITH MYOPATHY (HCC): ICD-10-CM

## 2021-03-19 DIAGNOSIS — F32.1 MODERATE MAJOR DEPRESSION (HCC): ICD-10-CM

## 2021-03-19 DIAGNOSIS — E11.8 CONTROLLED DIABETES MELLITUS TYPE 2 WITH COMPLICATIONS, UNSPECIFIED WHETHER LONG TERM INSULIN USE (HCC): ICD-10-CM

## 2021-03-19 DIAGNOSIS — E66.01 MORBID OBESITY (HCC): ICD-10-CM

## 2021-03-19 PROCEDURE — 99395 PREV VISIT EST AGE 18-39: CPT | Performed by: FAMILY MEDICINE

## 2021-03-19 PROCEDURE — 99213 OFFICE O/P EST LOW 20 MIN: CPT | Performed by: FAMILY MEDICINE

## 2021-03-19 RX ORDER — DEXTROAMPHETAMINE SACCHARATE, AMPHETAMINE ASPARTATE, DEXTROAMPHETAMINE SULFATE AND AMPHETAMINE SULFATE 3.75; 3.75; 3.75; 3.75 MG/1; MG/1; MG/1; MG/1
15 TABLET ORAL DAILY
Qty: 30 TAB | Refills: 0 | Status: SHIPPED | OUTPATIENT
Start: 2021-03-19 | End: 2021-04-16 | Stop reason: DRUGHIGH

## 2021-03-19 NOTE — PROGRESS NOTES
HISTORY OF PRESENT ILLNESS  Kai Richey is a 26 y.o. female. ADHD, pt states that current med has been helping this serious medical condition, having one kid, able the pt to do perform at the  job, and be functional at work and at home, and enabling the pt of providing care for the family,   Pt states that the INATTENTION, HYPERACTIVITY, IMPULSIVITY all are not getting better and are not better with the current meds, patient currently requesting to be on stimulating despite their side effects, patient states that when the was put on such medication, Pt reported of less Difficulty and was able to sustain attention to reading or paperwork, was less distracted and forgetful , having a better concentration , a better time management and did not not Misplaces things ,   Socially, has had noTrouble with the Law, No hx of cardiac dz,no recent seizure, Not wanted to heart self or others  Current Outpatient Medications   Medication Sig Dispense Refill    dextroamphetamine-amphetamine (ADDERALL) 15 mg tablet Take 1 Tab by mouth daily. Max Daily Amount: 15 mg. 30 Tab 0    ergocalciferol (ERGOCALCIFEROL) 1,250 mcg (50,000 unit) capsule Take 1 Cap by mouth every seven (7) days. Indications: low vitamin D levels 12 Cap 3    hydrOXYchloroQUINE (Plaquenil) 200 mg tablet Take 2 Tabs by mouth two (2) times a day. 60 Tab 11    galcanezumab-gnlm (Emgality Pen) 120 mg/mL injection 1 mL by SubCUTAneous route every thirty (30) days. 1 mL 11    drospirenone, contraceptive, (Slynd) 4 mg (28) tab Slynd 4 mg (28) tablet   Take 1 tablet every day by oral route.  Lactobac no.41/Bifidobact no.7 (PROBIOTIC-10 PO) Take 1 Cap by mouth daily.  vilazodone (VIIBRYD) 40 mg tab tablet Take 1 Tab by mouth daily. 90 Tab 2    cyanocobalamin, vitamin B-12, (B-12 Compliance) 1,000 mcg/mL kit Once monthly  Indications: inadequate vitamin B12 1 Kit 5    omeprazole (PRILOSEC) 40 mg capsule Take 1 Cap by mouth daily.  90 Cap 3    metoprolol tartrate (LOPRESSOR) 100 mg IR tablet TAKE 1 TABLET BY MOUTH TWICE A  Tab 2    gabapentin (NEURONTIN) 300 mg capsule Take 1 Cap by mouth three (3) times daily. Max Daily Amount: 900 mg. 270 Cap 1    nystatin (MYCOSTATIN) topical cream Apply  to affected area two (2) times a day. Apply to abdominal folds twice daily as needed for rash 30 g 3    SUMAtriptan (IMITREX) 50 mg tablet 1 tab at onset of headache, can repeat X 1 in 2 hrs if HA persists. Not more than 10 days/month. 9 Tab 2    iron,carbonyl-FA-multivit-min (Opurity Multivitamin) 30 mg iron- 800 mcg chew Take 2 Tabs by mouth daily.  calcium carbonate/vitamin D3 (CALCIUM 600 + D,3, PO) Take 1 Tab by mouth two (2) times a day.  Arm Brace misc Bilateral carpal tunnel splint to be used every night 2 Each 0    nystatin-triamcinolone (MYCOLOG) 100,000-0.1 unit/gram-% ointment Apply  to affected area as needed for Skin Irritation.  ALPRAZolam (XANAX) 0.5 mg tablet Take 1 Tab by mouth nightly as needed for Anxiety or Sleep. Max Daily Amount: 0.5 mg. Indications: anxious, repeated episodes of anxiety, panic disorder 30 Tab 3    atorvastatin (LIPITOR) 40 mg tablet TAKE 1 TABLET BY MOUTH EVERY DAY 90 Tab 3    aspirin delayed-release 81 mg tablet Take 1 Tab by mouth daily. 30 Tab 11    Blood-Glucose Meter,Continuous (Dexcom G6 ) misc Change sensor every 10 days 1 Each 0    Blood-Glucose Transmitter (Dexcom G6 Transmitter) nahid Change sensor every 10 days 1 Device 3    Blood-Glucose Sensor (Dexcom G6 Sensor) nahid Change sensor every 10 days 3 Device 12    albuterol (PROVENTIL HFA, VENTOLIN HFA) 90 mcg/actuation inhaler Take 1 Puff by inhalation every four (4) hours as needed for Wheezing.  1 Inhaler 1     Allergies   Allergen Reactions    Latex Itching     Past Medical History:   Diagnosis Date    Anxiety with depression     Arrhythmia     tachycardia    Asthma     [place] hum 1/7/2015    Carpal tunnel syndrome of right wrist 2016    Diabetes ([facility]     ALIYA (generalized anxiety disorder) 2018    GERD (gastroesophageal reflux disease) 2014    HTN (hypertension) 2011    Hyperaldosteronism ([facility] 10/19/2015    Hyperhidrosis 2018    Morbid obesity ([facility]     Other ill-defined conditions(799.89)     migraines    Palpitation 2018    Sleep apnea     uses CPAP    Tachycardia 2018     Past Surgical History:   Procedure Laterality Date    HX GASTRECTOMY  2020    Gastric sleeve     HX GYN  10/19/2020    Laparoscopic right oopherectomy, left cystectomy    HX HERNIA REPAIR  2020    HX OVARIAN CYST REMOVAL  10/19/2020    HX TONSILLECTOMY      HX WISDOM TEETH EXTRACTION      [place] REMOVAL OF TONSILS,<11 Y/O       Family History   Problem Relation Age of Onset    Hypertension Mother     Diabetes Mother         Type II    Anesth Problems Mother         respiratory issues - feels like she cannot breath when coming out of anesthesia   [facility] Arthritis-rheumatoid Mother    [facility] Gout Mother     Psychiatric Disorder Father     Drug Abuse Father     Cancer Other         prostate    Hypertension Sister     Thyroid Disease Sister         \"I think\"   [facility] Breast Cancer Sister     Attention Deficit Hyperactivity Disorder Brother     Hypertension Sister      Social History     Tobacco Use    Smoking status: Former Smoker     Packs/day: 0.50     Years: 5.00     Pack years: 2.50     Quit date: 2019     Years since quittin.3    Smokeless tobacco: Never Used   Substance Use Topics    Alcohol use: Not Currently     Alcohol/week: 1.0 standard drinks     Types: 1 Shots of liquor, 1 Standard drinks or equivalent per week     Comment: socially, Monthly or less      Lab Results   Component Value Date/Time    ALT (SGPT) 16 2021 12:58 PM    Alk.  phosphatase 75 2021 12:58 PM    Bilirubin, total 0.6 2021 12:58 PM    Albumin 3.7 (L) 2021 12:58 PM    Protein, total 6.5 2021 12:58 PM    PLATELET 517 54/69/2180 12:58 PM        Review of Systems   Constitutional: Positive for malaise/fatigue. Negative for chills and fever. HENT: Negative for nosebleeds. Eyes: Negative for pain. Respiratory: Negative for cough and wheezing. Cardiovascular: Negative for chest pain and leg swelling. Gastrointestinal: Negative for constipation, diarrhea and nausea. Genitourinary: Negative for frequency. Musculoskeletal: Negative for joint pain and myalgias. Skin: Negative for rash. Neurological: Positive for headaches. Negative for loss of consciousness. Endo/Heme/Allergies: Does not bruise/bleed easily. Psychiatric/Behavioral: Negative for depression. The patient is not nervous/anxious and does not have insomnia. All other systems reviewed and are negative. Physical Exam  Vitals signs and nursing note reviewed. Constitutional:       Appearance: She is well-developed. HENT:      Head: Normocephalic and atraumatic. Eyes:      Conjunctiva/sclera: Conjunctivae normal.   Neck:      Musculoskeletal: Normal range of motion and neck supple. Cardiovascular:      Rate and Rhythm: Normal rate and regular rhythm. Heart sounds: Normal heart sounds. No murmur. Pulmonary:      Effort: Pulmonary effort is normal.      Breath sounds: Normal breath sounds. Abdominal:      General: Bowel sounds are normal. There is no distension. Palpations: Abdomen is soft. Musculoskeletal: Normal range of motion. Lymphadenopathy:      Cervical: No cervical adenopathy. Skin:     Findings: No erythema. Neurological:      Mental Status: She is alert and oriented to person, place, and time. Psychiatric:         Behavior: Behavior normal.         ASSESSMENT and PLAN  Diagnoses and all orders for this visit:    1. Well woman exam (no gynecological exam)  Comments:  [V70.0]  Orders:  -     dextroamphetamine-amphetamine (ADDERALL) 15 mg tablet; Take 1 Tab by mouth daily.  Max Daily Amount: 15 mg.    2. ALIYA (generalized anxiety disorder)  -     dextroamphetamine-amphetamine (ADDERALL) 15 mg tablet; Take 1 Tab by mouth daily. Max Daily Amount: 15 mg.    3. Morbid obesity (HCC)  -     dextroamphetamine-amphetamine (ADDERALL) 15 mg tablet; Take 1 Tab by mouth daily. Max Daily Amount: 15 mg.    4. Sjogren's syndrome with myopathy (HCC)  -     dextroamphetamine-amphetamine (ADDERALL) 15 mg tablet; Take 1 Tab by mouth daily. Max Daily Amount: 15 mg.    5. Moderate major depression (HCC)  -     dextroamphetamine-amphetamine (ADDERALL) 15 mg tablet; Take 1 Tab by mouth daily. Max Daily Amount: 15 mg.    6. Controlled diabetes mellitus type 2 with complications, unspecified whether long term insulin use (HCC)  -     dextroamphetamine-amphetamine (ADDERALL) 15 mg tablet; Take 1 Tab by mouth daily. Max Daily Amount: 15 mg.    7. Attention and concentration deficit  -     dextroamphetamine-amphetamine (ADDERALL) 15 mg tablet; Take 1 Tab by mouth daily. Max Daily Amount: 15 mg.    8. Binge eating disorder  -     dextroamphetamine-amphetamine (ADDERALL) 15 mg tablet; Take 1 Tab by mouth daily. Max Daily Amount: 15 mg.

## 2021-03-19 NOTE — PROGRESS NOTES
Chief Complaint   Patient presents with    Behavioral Problem     1. Have you been to the ER, urgent care clinic since your last visit? Hospitalized since your last visit? No    2. Have you seen or consulted any other health care providers outside of the [address] [place] [address] since your last visit? Include any pap smears or colon screening. No    Verified patient with two type of identifiers.   requesting to change dosage of adderal.  Makes sleepy and unable to concentrate

## 2021-03-19 NOTE — PROGRESS NOTES
I was in the office while conducting this encounter. Consent:  She and/or her healthcare decision maker is aware that this patient-initiated Telehealth encounter is a billable service, with coverage as determined by her insurance carrier. She is aware that she may receive a bill and has provided verbal consent to proceed: Yes    This virtual visit was conducted via [employer]. Pursuant to the emergency declaration under the [facility], [facility] waiver authority and the [employer] and [employer] Act, this Virtual  Visit was conducted to reduce the patient's risk of exposure to COVID-19 and provide continuity of care for an established patient. Services were provided through a video synchronous discussion virtually to substitute for in-person clinic visit. Due to this being a TeleHealth evaluation, many elements of the physical examination are unable to be assessed. Total Time: minutes: 11-20 minutes. Chief Complaint   Patient presents with    Surgical Follow-up     6 month s/p lap sleeve gastrectomy down 61 pounds lost 24. [phone]. Nancy Marleylalo Kurt 6-month status post laparoscopic sleeve gastrectomy for treatment of morbid obesity. She presents today for obesity management. She is lost about 61 pounds. She is having a lot of issues with joint stiffness and pain. She said she is scheduled for a bone scan the end of the month. She is having some night sweats in the evening so sleep is not always that great.   She is working with rheumatology  Some increased anxiety and she has been working with a counselor  She has no GERD symptoms  Feels like she is doing pretty well with her diet trying to focus on protein and low-carb  She is exercising by doing a boot camp 4-5 times a week and goes to [employer] 3 times a week  Bowels are moving most days  She is taking her bariatric vitamins  There were no vitals taken for this visit.  She appears well  Speech is clear breathing is unlabored  Abdomen is well-healed      Co-Morbid(s)     Was anti coagulation initiated for presumed / confirmed DVT/PE? NO    Was an incisional hernia noted on exam?       NO      COMORBIDITY     SLEEP APNEA                 NO        GERD  (req.meds)           NO  HYPERLIPIDEMIA           NO  HYPERTENSION             NO       IF YES, # OF HTN MEDICATIONS 0  DIABETES                        NO      IF YES, 0 NON-INSULIN   0 INSULIN       ICD-10-CM ICD-9-CM    1. Morbid obesity ([facility]  E66.01 278.01    2. BMI 45.0-49.9, adult ([facility])  Z68.42 V85.42    3. Vitamin D deficiency  E55.9 268.9 ergocalciferol (ERGOCALCIFEROL) 1,250 mcg (50,000 unit) capsule     6-month status post laparoscopic sleeve gastrectomy for treatment of morbid obesity  Reviewed diet high-protein low-carb  Continue bariatric vitamins and will renew prescription vitamin D as this may be contributing to her chronic pain  Support group information provided  Dietitian is available  Sleep hygiene reviewed  Plan on follow-up in 3 months sooner if necessary  Aura [address] Dr verbalized understanding and questions were answered to the best of my knowledge and ability. Diet and activity  educational materials were provided.

## 2021-03-19 NOTE — PATIENT INSTRUCTIONS
Great job with exercise, keep up the good work Stay on your gastric sleeve vitamins and get back on the weekly vitamin D. I sent the prescription to your pharmacy. Continue to focus on high-protein low-carb diet Treat mostly water and aim for 2 L a day or more if you can Zoom Support Group 2nd Thursday of each Month from 6-7 pm  
The next one is 4/8/21 6-7 pm  
You can access the link at [email or web address] Go to the Calendar tab and click on the date and it should go right to the link Additional Resources: 
[employer]:  [email or web address]  
 Offering online support groups and pre-recorded videos 
o Every Wednesday evening at 7:00 pm  
[employer] Facebook page  [address], Advice, and Motivation from fellow patients Bariatric Pal: [email or web address] 
[facility] has launched a podcast!  Hosted by Amado Vega, our podcast will cover topics about obesity, pre-weight loss surgery, post-weight loss surgery, food addiction, emotional eating, myths about weight loss surgery, and more. Each episode will feature an expert in the field of weight loss and bariatric surgery who can provide a deeper insight into these topics. [employer]:  [employer] 
 Offering discounted exercise programs (8 Week programs, On-Demand/Virtual)  See flyer  Contact Estrada Confer at [email or web address] or [phone]

## 2021-03-23 ENCOUNTER — TELEPHONE (OUTPATIENT)
Dept: NEUROLOGY | Age: 31
End: 2021-03-23

## 2021-03-23 NOTE — TELEPHONE ENCOUNTER
Carpal tunnel injection     Called [facility], s/w [facility]ON[facility]   [phone]  No PA required, allows up to 3 per calendar year.      Call Ref [id number]

## 2021-03-25 LAB
ALBUMIN SERPL-MCNC: 4 G/DL (ref 3.8–4.8)
ALBUMIN/GLOB SERPL: 1.5 {RATIO} (ref 1.2–2.2)
ALP SERPL-CCNC: 84 IU/L (ref 39–117)
ALT SERPL-CCNC: 20 IU/L (ref 0–32)
ANA SER QL IF: POSITIVE
ANA SPECKLED TITR SER: ABNORMAL {TITER}
APPEARANCE UR: CLEAR
AST SERPL-CCNC: 18 IU/L (ref 0–40)
BACTERIA #/AREA URNS HPF: NORMAL /[HPF]
BASOPHILS # BLD AUTO: 0.1 X10E3/UL (ref 0–0.2)
BASOPHILS NFR BLD AUTO: 1 %
BILIRUB SERPL-MCNC: 0.7 MG/DL (ref 0–1.2)
BILIRUB UR QL STRIP: NEGATIVE
BUN SERPL-MCNC: 10 MG/DL (ref 6–20)
BUN/CREAT SERPL: 14 (ref 9–23)
C3 SERPL-MCNC: 158 MG/DL (ref 82–167)
C4 SERPL-MCNC: 36 MG/DL (ref 12–38)
CALCIUM SERPL-MCNC: 9.6 MG/DL (ref 8.7–10.2)
CASTS URNS QL MICRO: NORMAL /LPF
CHLORIDE SERPL-SCNC: 103 MMOL/L (ref 96–106)
CK SERPL-CCNC: 70 U/L (ref 32–182)
CO2 SERPL-SCNC: 24 MMOL/L (ref 20–29)
COLOR UR: YELLOW
CREAT SERPL-MCNC: 0.69 MG/DL (ref 0.57–1)
CREAT UR-MCNC: 115.5 MG/DL
CRP SERPL-MCNC: 8 MG/L (ref 0–10)
EOSINOPHIL # BLD AUTO: 0.1 X10E3/UL (ref 0–0.4)
EOSINOPHIL NFR BLD AUTO: 1 %
EPI CELLS #/AREA URNS HPF: NORMAL /HPF (ref 0–10)
ERYTHROCYTE [DISTWIDTH] IN BLOOD BY AUTOMATED COUNT: 14.5 % (ref 11.7–15.4)
ERYTHROCYTE [SEDIMENTATION RATE] IN BLOOD BY WESTERGREN METHOD: 24 MM/HR (ref 0–32)
GLOBULIN SER CALC-MCNC: 2.6 G/DL (ref 1.5–4.5)
GLUCOSE SERPL-MCNC: 83 MG/DL (ref 65–99)
GLUCOSE UR QL: NEGATIVE
HCT VFR BLD AUTO: 39.4 % (ref 34–46.6)
HGB BLD-MCNC: 12.1 G/DL (ref 11.1–15.9)
HGB UR QL STRIP: NEGATIVE
IMM GRANULOCYTES # BLD AUTO: 0 X10E3/UL (ref 0–0.1)
IMM GRANULOCYTES NFR BLD AUTO: 0 %
KETONES UR QL STRIP: NEGATIVE
LEUKOCYTE ESTERASE UR QL STRIP: NEGATIVE
LYMPHOCYTES # BLD AUTO: 3 X10E3/UL (ref 0.7–3.1)
LYMPHOCYTES NFR BLD AUTO: 35 %
MCH RBC QN AUTO: 25.6 PG (ref 26.6–33)
MCHC RBC AUTO-ENTMCNC: 30.7 G/DL (ref 31.5–35.7)
MCV RBC AUTO: 84 FL (ref 79–97)
MICRO URNS: NORMAL
MICRO URNS: NORMAL
MONOCYTES # BLD AUTO: 0.7 X10E3/UL (ref 0.1–0.9)
MONOCYTES NFR BLD AUTO: 8 %
NEUTROPHILS # BLD AUTO: 4.6 X10E3/UL (ref 1.4–7)
NEUTROPHILS NFR BLD AUTO: 55 %
NITRITE UR QL STRIP: NEGATIVE
NOTE, 018286: ABNORMAL
PH UR STRIP: 6 [PH] (ref 5–7.5)
PLATELET # BLD AUTO: 347 X10E3/UL (ref 150–450)
POTASSIUM SERPL-SCNC: 4.9 MMOL/L (ref 3.5–5.2)
PROT SERPL-MCNC: 6.6 G/DL (ref 6–8.5)
PROT UR QL STRIP: NEGATIVE
PROT UR-MCNC: 9 MG/DL
PROT/CREAT UR: 78 MG/G CREAT (ref 0–200)
RBC # BLD AUTO: 4.72 X10E6/UL (ref 3.77–5.28)
RBC #/AREA URNS HPF: NORMAL /HPF (ref 0–2)
SODIUM SERPL-SCNC: 139 MMOL/L (ref 134–144)
SP GR UR: 1.01 (ref 1–1.03)
TSH SERPL DL<=0.005 MIU/L-ACNC: 1.54 UIU/ML (ref 0.45–4.5)
URINALYSIS REFLEX, 377202: NORMAL
UROBILINOGEN UR STRIP-MCNC: 0.2 MG/DL (ref 0.2–1)
WBC # BLD AUTO: 8.4 X10E3/UL (ref 3.4–10.8)
WBC #/AREA URNS HPF: NORMAL /HPF (ref 0–5)

## 2021-03-31 ENCOUNTER — HOSPITAL ENCOUNTER (OUTPATIENT)
Dept: NUCLEAR MEDICINE | Age: 31
Discharge: HOME | End: 2021-03-31
Attending: INTERNAL MEDICINE
Payer: COMMERCIAL

## 2021-03-31 DIAGNOSIS — M86.9 OSTEITIS (HCC): ICD-10-CM

## 2021-03-31 DIAGNOSIS — M35.9 UNDIFFERENTIATED CONNECTIVE TISSUE DISEASE (HCC): ICD-10-CM

## 2021-03-31 PROCEDURE — 78306 BONE IMAGING WHOLE BODY: CPT

## 2021-04-05 ENCOUNTER — TELEPHONE (OUTPATIENT)
Dept: NEUROLOGY | Age: 31
End: 2021-04-05

## 2021-04-06 ENCOUNTER — CLINICAL SUPPORT (OUTPATIENT)
Dept: SURGERY | Age: 31
End: 2021-04-06

## 2021-04-06 DIAGNOSIS — E66.01 MORBID OBESITY (HCC): Primary | ICD-10-CM

## 2021-04-06 NOTE — PROGRESS NOTES
Post-Operative Bariatric Nutrition Counseling - Phone Consult     Benny Dupont M.D. Name: Ken Dumont  : 1990        Reason for visit: Follow up nutrition education and counseling post sleeve gastrectomy      ASSESSMENT:  Date of surgery:7 months ago       Medications/Supplements:   Prior to Admission medications    Medication Sig Start Date End Date Taking? Authorizing Provider   dextroamphetamine-amphetamine (ADDERALL) 15 mg tablet Take 1 Tab by mouth daily. Max Daily Amount: 15 mg. 3/19/21   Rosa Harris MD   ergocalciferol (ERGOCALCIFEROL) 1,250 mcg (50,000 unit) capsule Take 1 Cap by mouth every seven (7) days. Indications: low vitamin D levels 3/16/21   Mihai Means NP   hydrOXYchloroQUINE (Plaquenil) 200 mg tablet Take 2 Tabs by mouth two (2) times a day. 3/12/21   Rosanna Alvarado MD   galcanezumab-gnlm (Emgality Pen) 120 mg/mL injection 1 mL by SubCUTAneous route every thirty (30) days. 3/4/21   Rhonda Verduzco MD   drospirenone, contraceptive, (Slynd) 4 mg (28) tab Slynd 4 mg (28) tablet   Take 1 tablet every day by oral route. Provider, Historical   Lactobac no.41/Bifidobact no.7 (PROBIOTIC-10 PO) Take 1 Cap by mouth daily. Provider, Historical   vilazodone (VIIBRYD) 40 mg tab tablet Take 1 Tab by mouth daily. 21   Rosa Harris MD   cyanocobalamin, vitamin B-12, (B-12 Compliance) 1,000 mcg/mL kit Once monthly  Indications: inadequate vitamin B12 21   Rosa Harris MD   omeprazole (PRILOSEC) 40 mg capsule Take 1 Cap by mouth daily. 2/3/21   Rosa Harris MD   metoprolol tartrate (LOPRESSOR) 100 mg IR tablet TAKE 1 TABLET BY MOUTH TWICE A DAY 21   Rosa Harris MD   gabapentin (NEURONTIN) 300 mg capsule Take 1 Cap by mouth three (3) times daily. Max Daily Amount: 900 mg. 21   Rhonda Verduzco MD   nystatin (MYCOSTATIN) topical cream Apply  to affected area two (2) times a day.  Apply to abdominal folds twice daily as needed for rash 12/15/20   Amilcar Nguyễn, MARTÍNEZ   SUMAtriptan (IMITREX) 50 mg tablet 1 tab at onset of headache, can repeat X 1 in 2 hrs if HA persists. Not more than 10 days/month. 10/23/20   Imer Branham MD   iron,carbonyl-FA-multivit-min (Opurity Multivitamin) 30 mg iron- 800 mcg chew Take 2 Tabs by mouth daily. 8/25/20   Provider, Historical   calcium carbonate/vitamin D3 (CALCIUM 600 + D,3, PO) Take 1 Tab by mouth two (2) times a day. 8/25/20   Provider, Historical   Arm Brace misc Bilateral carpal tunnel splint to be used every night 9/17/20   Imer Branham MD   nystatin-triamcinolone [facility]) 100,000-0.1 unit/gram-% ointment Apply  to affected area as needed for Skin Irritation. Provider, Historical   ALPRAZolam (XANAX) 0.5 mg tablet Take 1 Tab by mouth nightly as needed for Anxiety or Sleep. Max Daily Amount: 0.5 mg. Indications: anxious, repeated episodes of anxiety, panic disorder 5/14/20   Sydney Dennis MD   atorvastatin (LIPITOR) 40 mg tablet TAKE 1 TABLET BY MOUTH EVERY DAY 5/14/20   Sydney Dennis MD   aspirin delayed-release 81 mg tablet Take 1 Tab by mouth daily.  5/14/20   Sydney Dennis MD   Blood-Glucose Meter,Continuous (Dexcom G6 ) misc Change sensor every 10 days 4/10/20   Vance Peters MD   Blood-Glucose Transmitter (Dexcom G6 Transmitter) nahid Change sensor every 10 days 4/10/20   Vance Peters MD   Blood-Glucose Sensor (Dexcom G6 Sensor) nahid Change sensor every 10 days 4/10/20   Vance Peters MD   albuterol (PROVENTIL HFA, VENTOLIN HFA) 90 mcg/actuation inhaler Take 1 Puff by inhalation every four (4) hours as needed for Wheezing. 9/27/13   Sydney Dennis MD       Pt takes recommended supplements as prescribed:   Multivitamin: yes - [employer] tablet    Calcium Citrate:yes    Vitamin D3: yes   Vitamin B12: yes      Food Allergies/Intolerances: none    Anthropometrics:     Ht: 63\"     Recent Office Wt :270#     Pre-op wt: 340#     Net Wt Loss: 70# (20%)   IBW: 115#  %IBW: 234%   BMI: 47 Category: obesity III     Reported wt history: Pt with sleeve gastrectomy about 7 months. Reports losing from 340# down to 264#. Has recently hit a plateau in wt loss and seeking nutrition counseling to promote continued wt loss. Is exercising and has recently incorporated new forms of exercise (rowing). Pt does report ongoing issues with constipation. Overall is very compliant with nutrition guidelines and has even maintained healthy habits while on vacation. Exercise/Physical Actvity: boot camp 5 times per week (30 minutes) for the past 8 months, rowing for 45 minutes (2-3 times per week) for the past month - 240 minutes per week    Reported Diet History: A post op nutrition checklist and 24 hour diet recall were obtained from patient;       24 Hour Diet Recall  Breakfast  Protein shake (pre-workout at 5:30 am); 7:30 am 2 turkey sausage patties and 3 tbsp corn beef hash; sometimes honey wheat bread with scrambled egg with cheese and bologna    Lunch  Tuna fish with light carter and relish, 1 boiled egg or leftovers from dinner    Dinner  Protein (3 oz) + 2 oz vegetables/greens   Snacks  1 pack of goldfish or sugar free/no sugar added ice cream, yogurt    Beverages  Protein water or Gatorade Zero, water          NUTRITION DIAGNOSIS:  1. Obesity r/t multiple etiologies evidenced by BMI 47. NUTRITION INTERVENTION:  Nutrition education for sleeve gastrectomy to promote continued weight loss. Commended pt for commitment to healthy lifestyle changes. Encouraged pt to continue on 3 meals a day with lean protein and produce at each meal. Meal portion should be 1/2 to 3/4 cup per meal at this stage. Focus on solid textured foods for satiety and fullness. Discussed option for adding in more fruits and vegetables as \"good carb\" option and source of fiber to help with constipation. Continue to choose high protein snack options as needed.  Continue to drink plenty of calorie-free fluids and drinking separate from meals. Continue vitamins. Continue current exercise regimen. Weekly weight checks. NUTRITION MONITORING AND EVALUATION:    The following goals were established with patient:  1. Maintain current level of exercise as tolerated. Increase intensity or change type of exercise in the future as needed. 2. Continue to eat 3 meals a day with lean protein and produce as the focus. 3. Measure meals to 1/2 cup to 3/4 cup per meal. Focus on solid textured foods to provide fullness. 4. Limit weight checks to once per week. 5. Follow up with RD PRN. Specific tips and techniques to facilitate compliance with above recommendations were provided and discussed. Pt was strongly encouraged to begin making necessary changes now, attend support group, and re-visit the dietitian prn. If further details are desired please feel free to contact me at [phone]. This phone number was also provided to the patient for any further questions or concerns.              Perry Phillips RD

## 2021-04-13 ENCOUNTER — OFFICE VISIT (OUTPATIENT)
Dept: URGENT CARE | Age: 31
End: 2021-04-13
Payer: COMMERCIAL

## 2021-04-13 VITALS
DIASTOLIC BLOOD PRESSURE: 67 MMHG | SYSTOLIC BLOOD PRESSURE: 146 MMHG | RESPIRATION RATE: 16 BRPM | TEMPERATURE: 98.2 F | HEART RATE: 72 BPM

## 2021-04-13 DIAGNOSIS — L23.2 ALLERGIC CONTACT DERMATITIS DUE TO COSMETICS: Primary | ICD-10-CM

## 2021-04-13 PROCEDURE — 96372 THER/PROPH/DIAG INJ SC/IM: CPT

## 2021-04-13 PROCEDURE — S9083 URGENT CARE CENTER GLOBAL: HCPCS | Performed by: NURSE PRACTITIONER

## 2021-04-13 NOTE — PROGRESS NOTES
This patient was seen at [address] [place] [address] [facility] while in their vehicle due to COVID-19 pandemic with PPE and focused examination in order to decrease community viral transmission. The patient/guardian gave verbal consent to treat. Rash   The history is provided by the patient. This is a new problem. Episode onset: about 7 days. The problem has been gradually worsening. Associated with: new body soap. There has been no fever. Affected Location: diffuse. The patient is experiencing no pain. Associated symptoms include itching. Pertinent negatives include no blisters, no pain, no weeping and no hives. She has tried steroid cream for the symptoms. The treatment provided no relief.         Past Medical History:   Diagnosis Date    Anxiety with depression     Arrhythmia     tachycardia    Asthma     Lenoir hump 1/7/2015    Carpal tunnel syndrome of right wrist 12/20/2016    Diabetes [facility] [address]     ALIYA (generalized anxiety disorder) 4/4/2018    GERD (gastroesophageal reflux disease) 8/13/2014    HTN (hypertension) 7/25/2011    Hyperaldosteronism ([address] 10/19/2015    Hyperhidrosis 4/4/2018    Morbid obesity ([facility] [address]     Other ill-defined conditions(799.89)     migraines    Palpitation 4/4/2018    Sleep apnea     uses CPAP    Tachycardia 4/4/2018        Past Surgical History:   Procedure Laterality Date    HX GASTRECTOMY  09/08/2020    Gastric sleeve     HX GYN  10/19/2020    Laparoscopic right oopherectomy, left cystectomy    HX HERNIA REPAIR  09/08/2020    HX OVARIAN CYST REMOVAL  10/19/2020    HX TONSILLECTOMY      HX WISDOM TEETH EXTRACTION      OK REMOVAL OF TONSILS,<11 Y/O           Family History   Problem Relation Age of Onset    Hypertension Mother     Diabetes Mother         Type II    Anesth Problems Mother         respiratory issues - feels like she cannot breath when coming out of anesthesia    Arthritis-rheumatoid Mother    Toro Smith Gout Mother     Psychiatric Disorder Father    Toro Smith Drug Abuse Father     Cancer Other         prostate    Hypertension Sister     Thyroid Disease Sister         \"I think\"   Jesusita Cushing Breast Cancer Sister     Attention Deficit Hyperactivity Disorder Brother     Hypertension Sister         Social History     Socioeconomic History    Marital status: SINGLE     Spouse name: Not on file    Number of children: Not on file    Years of education: Not on file    Highest education level: Not on file   Occupational History    Occupation: customer ser and student     Comment: [employer]   Social Needs    Financial resource strain: Not on file    Food insecurity     Worry: Not on file     Inability: Not on file   [employer] needs     Medical: Not on file     Non-medical: Not on file   Tobacco Use    Smoking status: Former Smoker     Packs/day: 0.50     Years: 5.00     Pack years: 2.50     Quit date: 2019     Years since quittin.4    Smokeless tobacco: Never Used   Substance and Sexual Activity    Alcohol use: Not Currently     Alcohol/week: 1.0 standard drinks     Types: 1 Shots of liquor, 1 Standard drinks or equivalent per week     Comment: socially, Monthly or less    Drug use: No    Sexual activity: Not Currently     Partners: Male     Birth control/protection: Condom   Lifestyle    Physical activity     Days per week: Not on file     Minutes per session: Not on file    Stress: Not on file   Relationships    Social connections     Talks on phone: Not on file     Gets together: Not on file     Attends [employer] service: Not on file     Active member of club or organization: Not on file     Attends meetings of clubs or organizations: Not on file     Relationship status: Not on file    Intimate partner violence     Fear of current or ex partner: Not on file     Emotionally abused: Not on file     Physically abused: Not on file     Forced sexual activity: Not on file   Other Topics Concern   [address] Service Not Asked    Blood Transfusions Not Asked  Caffeine Concern Not Asked    Occupational Exposure Not Asked    Hobby Hazards Not Asked    Sleep Concern Not Asked    Stress Concern Not Asked    Weight Concern Not Asked    Special Diet Not Asked    Back Care Not Asked    Exercise Not Asked    Bike Helmet Not Asked   Boyds Not Asked    Self-Exams Not Asked   Social History Narrative    Not on file                ALLERGIES: Latex    Review of Systems   Constitutional: Negative for fatigue and fever. HENT: Negative for sore throat. Respiratory: Negative for chest tightness, shortness of breath and wheezing. Gastrointestinal: Negative for nausea and vomiting. Musculoskeletal: Negative for arthralgias and myalgias. Skin: Positive for itching and rash. Neurological: Negative for light-headedness and headaches. Vitals:    04/13/21 1322   BP: (!) 146/67   Pulse: 72   Resp: 16   Temp: 98.2 °F (36.8 °C)       Physical Exam  Vitals signs and nursing note reviewed. Constitutional:       General: She is not in acute distress. Appearance: Normal appearance. She is not ill-appearing. HENT:      Head: Normocephalic and atraumatic. Mouth/Throat:      Mouth: Mucous membranes are moist.   Eyes:      Conjunctiva/sclera: Conjunctivae normal.      Pupils: Pupils are equal, round, and reactive to light. Neck:      Musculoskeletal: Normal range of motion. Cardiovascular:      Rate and Rhythm: Normal rate. Pulmonary:      Effort: Pulmonary effort is normal.   Musculoskeletal: Normal range of motion. Skin:     General: Skin is warm and dry. Findings: Rash present. Comments: Diffuse skin toned, rough raised rash on body- hands, feet, neck etc, small areas of excoriation noted. No increased warmth, swelling or redness   Neurological:      Mental Status: She is alert and oriented to person, place, and time.    Psychiatric:         Mood and Affect: Mood normal.         Behavior: Behavior normal. MDM    Procedures      ICD-10-CM ICD-9-CM   1. Allergic contact dermatitis due to cosmetics  L23.2 692.81       Orders Placed This Encounter    methylPREDNISolone (PF) (Solu-MEDROL) injection 125 mg      Encouraged patient to continue use of OTC antihistamine and topical steroid. IM solu-medrol administered in office today to decrease reaction and itching. Avoid use of new soap in the future. The patient is to follow up with PCP PRN. If signs and symptoms become worse the pt is to go to the ER.      Signed By: Ayesha Bey NP     April 13, 2021

## 2021-04-16 ENCOUNTER — OFFICE VISIT (OUTPATIENT)
Dept: FAMILY MEDICINE CLINIC | Age: 31
End: 2021-04-16
Payer: COMMERCIAL

## 2021-04-16 VITALS
OXYGEN SATURATION: 98 % | WEIGHT: 274.7 LBS | BODY MASS INDEX: 48.67 KG/M2 | DIASTOLIC BLOOD PRESSURE: 71 MMHG | HEIGHT: 63 IN | HEART RATE: 78 BPM | TEMPERATURE: 97.7 F | RESPIRATION RATE: 16 BRPM | SYSTOLIC BLOOD PRESSURE: 103 MMHG

## 2021-04-16 DIAGNOSIS — R41.840 ATTENTION AND CONCENTRATION DEFICIT: ICD-10-CM

## 2021-04-16 DIAGNOSIS — F41.1 GAD (GENERALIZED ANXIETY DISORDER): Primary | ICD-10-CM

## 2021-04-16 DIAGNOSIS — F32.1 MODERATE MAJOR DEPRESSION (HCC): ICD-10-CM

## 2021-04-16 DIAGNOSIS — F50.81 BINGE EATING DISORDER: ICD-10-CM

## 2021-04-16 DIAGNOSIS — M35.03 SJOGREN'S SYNDROME WITH MYOPATHY (HCC): ICD-10-CM

## 2021-04-16 DIAGNOSIS — E66.01 MORBID OBESITY (HCC): ICD-10-CM

## 2021-04-16 PROCEDURE — 99214 OFFICE O/P EST MOD 30 MIN: CPT | Performed by: FAMILY MEDICINE

## 2021-04-16 RX ORDER — DEXTROAMPHETAMINE SACCHARATE, AMPHETAMINE ASPARTATE MONOHYDRATE, DEXTROAMPHETAMINE SULFATE AND AMPHETAMINE SULFATE 3.75; 3.75; 3.75; 3.75 MG/1; MG/1; MG/1; MG/1
15 CAPSULE, EXTENDED RELEASE ORAL
Qty: 30 CAP | Refills: 0 | Status: SHIPPED | OUTPATIENT
Start: 2021-04-16 | End: 2021-04-16 | Stop reason: SDUPTHER

## 2021-04-16 RX ORDER — DEXTROAMPHETAMINE SACCHARATE, AMPHETAMINE ASPARTATE MONOHYDRATE, DEXTROAMPHETAMINE SULFATE AND AMPHETAMINE SULFATE 3.75; 3.75; 3.75; 3.75 MG/1; MG/1; MG/1; MG/1
15 CAPSULE, EXTENDED RELEASE ORAL
Qty: 30 CAP | Refills: 0 | Status: SHIPPED | OUTPATIENT
Start: 2021-05-16 | End: 2021-04-16 | Stop reason: SDUPTHER

## 2021-04-16 RX ORDER — DEXTROAMPHETAMINE SACCHARATE, AMPHETAMINE ASPARTATE, DEXTROAMPHETAMINE SULFATE AND AMPHETAMINE SULFATE 3.75; 3.75; 3.75; 3.75 MG/1; MG/1; MG/1; MG/1
15 TABLET ORAL DAILY
Qty: 30 TAB | Refills: 0 | Status: CANCELLED | OUTPATIENT
Start: 2021-04-16

## 2021-04-16 RX ORDER — DEXTROAMPHETAMINE SACCHARATE, AMPHETAMINE ASPARTATE MONOHYDRATE, DEXTROAMPHETAMINE SULFATE AND AMPHETAMINE SULFATE 3.75; 3.75; 3.75; 3.75 MG/1; MG/1; MG/1; MG/1
15 CAPSULE, EXTENDED RELEASE ORAL
Qty: 30 CAP | Refills: 0 | Status: SHIPPED | OUTPATIENT
Start: 2021-06-16 | End: 2021-07-12 | Stop reason: ALTCHOICE

## 2021-04-16 NOTE — PATIENT INSTRUCTIONS
Learning About Stimulant Medicines for Attention Deficit Hyperactivity Disorder (ADHD) How are stimulant medicines used to treat ADHD? Stimulant medicines affect certain chemicals in the brain. They can help a person with ADHD to focus better. And they can make the person less hyperactive and impulsive. ADHD is treated with medicines and behavior therapy. Stimulants are the medicines used most. 
What are some types of these medicines? Stimulant medicines may include: · Dexmethylphenidate (Focalin). · Lisdexamfetamine (Vyvanse). · Methylphenidate (Concerta, Daytrana, Metadate CD, Methylin, Ritalin). · Mixed salts amphetamine (Adderall). How do you take them safely? · Take your medicines exactly as prescribed. Call your doctor if you think you are having a problem with your medicine. You will get more details on the specific medicines your doctor prescribes. · Do not take \"make-up\" doses. If you miss a dose, and if it's not too late in the day, it's okay to take it. But don't double up doses. · Do not misuse your medicine. Some medicines for ADHD can be misused. Some people may take a larger dose than prescribed. They may take them for their non-medical effects. Or they may share or sell them. Misuse can lead to a stimulant use disorder. · Keep close track of your medicine. Don't sell or give medicine to other people. What are the side effects? Common side effects include loss of appetite, a headache, and an upset stomach. You may also have mood changes or sleep problems. Or you may feel nervous. Some stimulant medicines can cause a dry mouth. If these medicines have bothersome side effects or don't work for you, your doctor might prescribe another type of medicine. How long can you expect to use these medicines? Most doctors prescribe a low dose of stimulant medicines at first. Your doctor may have you slowly increase the dose until your symptoms are managed.  Or you might get a different medicine or treatment. This can take several weeks. Some doctors may advise taking a break from the medicine over some weekends or during holidays. But this depends on the type of symptoms you have. You may need to take medicine for ADHD for a long time. But your doctor will check now and then to see if you can take a lower dose and still get the benefits of the medicine. If you want to stop or reduce your use of the medicine, talk to your doctor first. Some signs that you may be able to lower or stop your dose include: 
· You have no symptoms for more than a year while you take the medicine. · You are doing better at the same dose. · Your behavior is appropriate even if you miss a dose or two. · You are newly able to concentrate. Follow-up care is a key part of your treatment and safety. Be sure to make and go to all appointments, and call your doctor if you are having problems. It's also a good idea to know your test results and keep a list of the medicines you take. Where can you learn more? Go to http://www.gray.com/ Enter S155 in the search box to learn more about \"Learning About Stimulant Medicines for Attention Deficit Hyperactivity Disorder (ADHD). \" Current as of: September 23, 2020               Content Version: 12.8 © 2006-2021 [employer]wise[employer]. Care instructions adapted under license by [employer] (which disclaims liability or warranty for this information). If you have questions about a medical condition or this instruction, always ask your healthcare professional. [place] [address] any warranty or liability for your use of this information. Learning About Stimulant Medicines for Attention Deficit Hyperactivity Disorder (ADHD) How are stimulant medicines used to treat ADHD? Stimulant medicines affect certain chemicals in the brain. They can help a person with ADHD to focus better.  And they can make the person less hyperactive and impulsive. ADHD is treated with medicines and behavior therapy. Stimulants are the medicines used most. 
What are some types of these medicines? Stimulant medicines may include: · Dexmethylphenidate (Focalin). · Lisdexamfetamine (Vyvanse). · Methylphenidate (Concerta, Daytrana, Metadate CD, Methylin, Ritalin). · Mixed salts amphetamine (Adderall). How do you take them safely? · Take your medicines exactly as prescribed. Call your doctor if you think you are having a problem with your medicine. You will get more details on the specific medicines your doctor prescribes. · Do not take \"make-up\" doses. If you miss a dose, and if it's not too late in the day, it's okay to take it. But don't double up doses. · Do not misuse your medicine. Some medicines for ADHD can be misused. Some people may take a larger dose than prescribed. They may take them for their non-medical effects. Or they may share or sell them. Misuse can lead to a stimulant use disorder. · Keep close track of your medicine. Don't sell or give medicine to other people. What are the side effects? Common side effects include loss of appetite, a headache, and an upset stomach. You may also have mood changes or sleep problems. Or you may feel nervous. Some stimulant medicines can cause a dry mouth. If these medicines have bothersome side effects or don't work for you, your doctor might prescribe another type of medicine. How long can you expect to use these medicines? Most doctors prescribe a low dose of stimulant medicines at first. Your doctor may have you slowly increase the dose until your symptoms are managed. Or you might get a different medicine or treatment. This can take several weeks. Some doctors may advise taking a break from the medicine over some weekends or during holidays. But this depends on the type of symptoms you have. You may need to take medicine for ADHD for a long time.  But your doctor will check now and then to see if you can take a lower dose and still get the benefits of the medicine. If you want to stop or reduce your use of the medicine, talk to your doctor first. Some signs that you may be able to lower or stop your dose include: 
· You have no symptoms for more than a year while you take the medicine. · You are doing better at the same dose. · Your behavior is appropriate even if you miss a dose or two. · You are newly able to concentrate. Follow-up care is a key part of your treatment and safety. Be sure to make and go to all appointments, and call your doctor if you are having problems. It's also a good idea to know your test results and keep a list of the medicines you take. Where can you learn more? Go to http://www.gray.com/ Enter S155 in the search box to learn more about \"Learning About Stimulant Medicines for Attention Deficit Hyperactivity Disorder (ADHD). \" Current as of: September 23, 2020               Content Version: 12.8 © 2006-2021 Healthwise[employer]. Care instructions adapted under license by [employer] (which disclaims liability or warranty for this information). If you have questions about a medical condition or this instruction, always ask your healthcare professional. [place] [address] any warranty or liability for your use of this information.

## 2021-04-16 NOTE — PROGRESS NOTES
HISTORY OF PRESENT ILLNESS  Mark Anthony Padilla is a 26 y.o. female. ADHD  Pt states that the INATTENTION, HYPERACTIVITY, IMPULSIVITY all are getting better with the current meds, pt aware of the side effect of the stimulant medication,  which could be damaging for the long term, pt is in agreement that the pt was advised regarding such meds side effects, pt was also advised that it can not be stopped suddenly and it needs to be weaned off, as per the presentation, so far no side effects noted and pt willing to cont on with the tx,   Pt reports of less Difficulty sustaining attention to reading or paperwork, less Easily distracted and forgetful , having a better concentration , a better time management , does not Misplaces things ,  Does not have Difficulty finishing tasks   +Showing less Restlessness, does not Feels overwhelmed, +Talks less excessively  Does not Drives too fast, +Shows irritability or quickness to anger   At Work not Frequently Late ,  And does not Miss Work More than Average  At Home, there is no increased Accidents--Automobile and Around the [employer], has not had any Trouble with the Law   No hx of cardiac dz,no recent seizure,  Not wanted to heart self or others  Current Outpatient Medications   Medication Sig Dispense Refill    [START ON 6/16/2021] amphetamine-dextroamphetamine XR (ADDERALL XR) 15 mg XR capsule Take 1 Cap by mouth every morning for 30 days. Max Daily Amount: 15 mg. 30 Cap 0    ergocalciferol (ERGOCALCIFEROL) 1,250 mcg (50,000 unit) capsule Take 1 Cap by mouth every seven (7) days. Indications: low vitamin D levels 12 Cap 3    hydrOXYchloroQUINE (Plaquenil) 200 mg tablet Take 2 Tabs by mouth two (2) times a day. 60 Tab 11    galcanezumab-gnlm (Emgality Pen) 120 mg/mL injection 1 mL by SubCUTAneous route every thirty (30) days. 1 mL 11    drospirenone, contraceptive, (Slynd) 4 mg (28) tab Slynd 4 mg (28) tablet   Take 1 tablet every day by oral route.      Yoanna Esters no.41/Bifidobact no.7 (PROBIOTIC-10 PO) Take 1 Cap by mouth daily.  vilazodone (VIIBRYD) 40 mg tab tablet Take 1 Tab by mouth daily. 90 Tab 2    cyanocobalamin, vitamin B-12, (B-12 Compliance) 1,000 mcg/mL kit Once monthly  Indications: inadequate vitamin B12 1 Kit 5    omeprazole (PRILOSEC) 40 mg capsule Take 1 Cap by mouth daily. 90 Cap 3    metoprolol tartrate (LOPRESSOR) 100 mg IR tablet TAKE 1 TABLET BY MOUTH TWICE A  Tab 2    gabapentin (NEURONTIN) 300 mg capsule Take 1 Cap by mouth three (3) times daily. Max Daily Amount: 900 mg. 270 Cap 1    nystatin (MYCOSTATIN) topical cream Apply  to affected area two (2) times a day. Apply to abdominal folds twice daily as needed for rash 30 g 3    SUMAtriptan (IMITREX) 50 mg tablet 1 tab at onset of headache, can repeat X 1 in 2 hrs if HA persists. Not more than 10 days/month. 9 Tab 2    iron,carbonyl-FA-multivit-min (Opurity Multivitamin) 30 mg iron- 800 mcg chew Take 2 Tabs by mouth daily.  calcium carbonate/vitamin D3 (CALCIUM 600 + D,3, PO) Take 1 Tab by mouth two (2) times a day.  Arm Brace misc Bilateral carpal tunnel splint to be used every night 2 Each 0    ALPRAZolam (XANAX) 0.5 mg tablet Take 1 Tab by mouth nightly as needed for Anxiety or Sleep. Max Daily Amount: 0.5 mg. Indications: anxious, repeated episodes of anxiety, panic disorder 30 Tab 3    atorvastatin (LIPITOR) 40 mg tablet TAKE 1 TABLET BY MOUTH EVERY DAY 90 Tab 3    aspirin delayed-release 81 mg tablet Take 1 Tab by mouth daily. 30 Tab 11    Blood-Glucose Meter,Continuous (Dexcom G6 ) misc Change sensor every 10 days 1 Each 0    Blood-Glucose Transmitter (Dexcom G6 Transmitter) nahid Change sensor every 10 days 1 Device 3    Blood-Glucose Sensor (Dexcom G6 Sensor) nahid Change sensor every 10 days 3 Device 12    albuterol (PROVENTIL HFA, VENTOLIN HFA) 90 mcg/actuation inhaler Take 1 Puff by inhalation every four (4) hours as needed for Wheezing.  1 Inhaler 1    nystatin-triamcinolone (MYCOLOG) 100,000-0.1 unit/gram-% ointment Apply  to affected area as needed for Skin Irritation.        Allergies   Allergen Reactions    Latex Itching     Past Medical History:   Diagnosis Date    Anxiety with depression     Arrhythmia     tachycardia    Asthma     [place] hump 2015    Carpal tunnel syndrome of right wrist 2016    [facility] [address]     ALIYA (generalized anxiety disorder) 2018    GERD (gastroesophageal reflux disease) 2014    HTN (hypertension) 2011    Hyperaldosteronism ([address] 10/19/2015    Hyperhidrosis 2018    Morbid obesity ([facility] [address]     Other ill-defined conditions(799.89)     migraines    Palpitation 2018    Sleep apnea     uses CPAP    Tachycardia 2018     Past Surgical History:   Procedure Laterality Date    HX GASTRECTOMY  2020    Gastric sleeve     HX GYN  10/19/2020    Laparoscopic right oopherectomy, left cystectomy    HX HERNIA REPAIR  2020    HX OVARIAN CYST REMOVAL  10/19/2020    HX TONSILLECTOMY      HX WISDOM TEETH EXTRACTION      MO REMOVAL OF TONSILS,<13 Y/O       Family History   Problem Relation Age of Onset    Hypertension Mother     Diabetes Mother         Type II    Anesth Problems Mother         respiratory issues - feels like she cannot breath when coming out of anesthesia   [facility] Arthritis-rheumatoid Mother    [facility] Gout Mother     Psychiatric Disorder Father     Drug Abuse Father     Cancer Other         prostate    Hypertension Sister     Thyroid Disease Sister         \"I think\"    Breast Cancer Sister     Attention Deficit Hyperactivity Disorder Brother     Hypertension Sister      Social History     Tobacco Use    Smoking status: Former Smoker     Packs/day: 0.50     Years: 5.00     Pack years: 2.50     Quit date: 2019     Years since quittin.4    Smokeless tobacco: Never Used   Substance Use Topics    Alcohol use: Not Currently     Alcohol/week: 1.0 standard drinks     Types: 1 Shots of liquor, 1 Standard drinks or equivalent per week     Comment: socially, Monthly or less      Lab Results   Component Value Date/Time    WBC 8.4 03/19/2021 09:58 AM    HGB 12.1 03/19/2021 09:58 AM    HCT 39.4 03/19/2021 09:58 AM    PLATELET 870 18/09/9546 09:58 AM    MCV 84 03/19/2021 09:58 AM     Lab Results   Component Value Date/Time    GFR est non- 03/19/2021 09:58 AM    GFR est  03/19/2021 09:58 AM    Creatinine 0.69 03/19/2021 09:58 AM    BUN 10 03/19/2021 09:58 AM    Sodium 139 03/19/2021 09:58 AM    Potassium 4.9 03/19/2021 09:58 AM    Chloride 103 03/19/2021 09:58 AM    CO2 24 03/19/2021 09:58 AM     Lab Results   Component Value Date/Time    TSH 1.540 03/19/2021 09:58 AM    TSH 1.730 04/04/2018 01:17 PM         Review of Systems   Constitutional: Negative for chills and fever. HENT: Negative for congestion and nosebleeds. Eyes: Negative for blurred vision and pain. Respiratory: Negative for cough, shortness of breath and wheezing. Cardiovascular: Negative for chest pain and leg swelling. Gastrointestinal: Negative for constipation, diarrhea, nausea and vomiting. Genitourinary: Negative for dysuria and frequency. Musculoskeletal: Negative for joint pain and myalgias. Skin: Negative for itching and rash. Neurological: Negative for dizziness, loss of consciousness and headaches. Psychiatric/Behavioral: Negative for depression. The patient is not nervous/anxious and does not have insomnia. Physical Exam  Vitals signs and nursing note reviewed. Constitutional:       Appearance: She is well-developed. HENT:      Head: Normocephalic and atraumatic. Eyes:      Conjunctiva/sclera: Conjunctivae normal.      Pupils: Pupils are equal, round, and reactive to light. Neck:      Thyroid: No thyromegaly. Vascular: No JVD. Cardiovascular:      Rate and Rhythm: Normal rate and regular rhythm. Heart sounds: Normal heart sounds. No murmur. No friction rub. No gallop. Pulmonary:      Effort: Pulmonary effort is normal. No respiratory distress. Breath sounds: Normal breath sounds. No stridor. No wheezing or rales. Abdominal:      General: Bowel sounds are normal. There is no distension. Palpations: Abdomen is soft. There is no mass. Tenderness: There is no abdominal tenderness. Musculoskeletal: Normal range of motion. General: No tenderness. Lymphadenopathy:      Cervical: No cervical adenopathy. Skin:     Findings: No erythema or rash. Neurological:      Mental Status: She is alert and oriented to person, place, and time. Cranial Nerves: No cranial nerve deficit. Deep Tendon Reflexes: Reflexes are normal and symmetric. Psychiatric:         Behavior: Behavior normal.         ASSESSMENT and PLAN  Diagnoses and all orders for this visit:    1. ALIYA (generalized anxiety disorder)  -     amphetamine-dextroamphetamine XR (ADDERALL XR) 15 mg XR capsule; Take 1 Cap by mouth every morning for 30 days. Max Daily Amount: 15 mg.    2. Morbid obesity (HCC)  -     amphetamine-dextroamphetamine XR (ADDERALL XR) 15 mg XR capsule; Take 1 Cap by mouth every morning for 30 days. Max Daily Amount: 15 mg.    3. Sjogren's syndrome with myopathy (HCC)  -     amphetamine-dextroamphetamine XR (ADDERALL XR) 15 mg XR capsule; Take 1 Cap by mouth every morning for 30 days. Max Daily Amount: 15 mg.    4. Moderate major depression (HCC)  -     amphetamine-dextroamphetamine XR (ADDERALL XR) 15 mg XR capsule; Take 1 Cap by mouth every morning for 30 days. Max Daily Amount: 15 mg.    5. Attention and concentration deficit  -     amphetamine-dextroamphetamine XR (ADDERALL XR) 15 mg XR capsule; Take 1 Cap by mouth every morning for 30 days. Max Daily Amount: 15 mg.    6. Binge eating disorder  -     amphetamine-dextroamphetamine XR (ADDERALL XR) 15 mg XR capsule; Take 1 Cap by mouth every morning for 30 days.  Max Daily Amount: 15 mg.      Follow-up and Dispositions    · Return in about 3 months (around 7/12/2021), or if symptoms worsen or fail to improve.             Presentation currently indicating INATTENTION, indicating HYPERACTIVITY, MPULSIVITY, The behaviors will have significant impairment in functioning in either social, academic, or occupational areas,  ADHD is in a stable condition, not suicidal,refill the medications for now will reevaluate in 4 w for progression or the side effects of the medication,   and in addition of the Counseling , social support, the spiritual belonging, inc physical activity, all offered,  compliance with meds advised, was told to call for any concern, the contract reviewed and will be updated urine drug screen is also be obtained, pt agreed with todays recommendations

## 2021-04-16 NOTE — PROGRESS NOTES
Chief Complaint   Patient presents with    Behavioral Problem     1. Have you been to the ER, urgent care clinic since your last visit? Hospitalized since your last visit? No    2. Have you seen or consulted any other health care providers outside of the [address] [place] [address] since your last visit? Include any pap smears or colon screening. No    . Verified patient with two type of identifiers.  denies c/o at present  Blood sugar this morning 87 before breakfast

## 2021-04-27 ENCOUNTER — PATIENT MESSAGE (OUTPATIENT)
Dept: SURGERY | Age: 31
End: 2021-04-27

## 2021-04-27 ENCOUNTER — PATIENT MESSAGE (OUTPATIENT)
Dept: NEUROLOGY | Age: 31
End: 2021-04-27

## 2021-04-27 DIAGNOSIS — Z98.84 S/P LAPAROSCOPIC SLEEVE GASTRECTOMY: ICD-10-CM

## 2021-04-27 DIAGNOSIS — K21.9 GASTROESOPHAGEAL REFLUX DISEASE, UNSPECIFIED WHETHER ESOPHAGITIS PRESENT: Primary | ICD-10-CM

## 2021-04-27 RX ORDER — FAMOTIDINE 40 MG/1
40 TABLET, FILM COATED ORAL 2 TIMES DAILY
Qty: 60 TAB | Refills: 1 | Status: SHIPPED | OUTPATIENT
Start: 2021-04-27 | End: 2021-07-14 | Stop reason: ALTCHOICE

## 2021-04-27 NOTE — TELEPHONE ENCOUNTER
From: Brenden Silva  To: Alek Ashley NP  Sent: 4/27/2021 9:02 AM EDT  Subject: Non-Urgent Medical Question    Good Morning,     I hope all is well. I have started experiencing increased acid reflux today. I'm not sure what I ate to have it flair up. It even hurts to drink water and feels like there is something stuck in my chest. I have taken my omeprazole as usual this morning and everyday, I also had some chewable tums (2 Tablets) this morning. Is there anything else I can take for it? It's not too bad as I'm at work but it's uncomfortable and feels like something is sitting on my chest. Do I need to call the GI doctor?     Chapincito Garcia

## 2021-04-27 NOTE — TELEPHONE ENCOUNTER
From: Ken Dumont  To: Fazal Henson MD  Sent: 4/27/2021 8:58 AM EDT  Subject: Non-Urgent Medical Question    Good Morning,     I have an appt on 04/30/21 to get an injection in my hand for carpal tunnel. I need to reschedule as I have to work that work. 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 Starting in May I will have Monday and Friday's off. Can I reschedule for a Monday or Friday in May. Also instead of just the right hand, can I get the injection in the left hand as well? The right hand is the worse but the left hand is starting to get more tingling and numb.      Thank you,     Raghu Medina

## 2021-05-07 ENCOUNTER — OFFICE VISIT (OUTPATIENT)
Dept: RHEUMATOLOGY | Age: 31
End: 2021-05-07
Payer: COMMERCIAL

## 2021-05-07 VITALS
HEIGHT: 63 IN | RESPIRATION RATE: 20 BRPM | OXYGEN SATURATION: 98 % | HEART RATE: 61 BPM | DIASTOLIC BLOOD PRESSURE: 74 MMHG | BODY MASS INDEX: 47.84 KG/M2 | WEIGHT: 270 LBS | SYSTOLIC BLOOD PRESSURE: 151 MMHG | TEMPERATURE: 98.6 F

## 2021-05-07 DIAGNOSIS — M15.9 PRIMARY OSTEOARTHRITIS INVOLVING MULTIPLE JOINTS: ICD-10-CM

## 2021-05-07 DIAGNOSIS — M19.90 CHRONIC INFLAMMATORY ARTHRITIS: Primary | ICD-10-CM

## 2021-05-07 DIAGNOSIS — M35.9 UNDIFFERENTIATED CONNECTIVE TISSUE DISEASE (HCC): ICD-10-CM

## 2021-05-07 PROCEDURE — 99215 OFFICE O/P EST HI 40 MIN: CPT | Performed by: INTERNAL MEDICINE

## 2021-05-07 NOTE — PROGRESS NOTES
Chief Complaint   Patient presents with    Other     connective tissue disease    Joint Pain     \"all over\"     1. Have you been to the ER, urgent care clinic since your last visit? Hospitalized since your last visit? Yes [facility]    2. Have you seen or consulted any other health care providers outside of the [address] [place] [address] since your last visit? Include any pap smears or colon screening.  No

## 2021-05-07 NOTE — PROGRESS NOTES
REASON FOR VISIT:   Lata Salazar is a 26 y.o. female with history of morbid obesity, hidradenitis, and carpal tunnel syndrome who is returning for followup of undifferentiated connective tissue disease manifest with photosensitive rash, sicca complex, mixed character arthralgias,  and CRP 35mg/dL, and isolated SSB positivity. HISTORY OF PRESENT ILLNESS    Had an episode of dermatitis on the arms, hands, and face while in [facility], pruritic. Lasted about 2 weeks total. Went to [facility] and was given a Medrol shot which cleared up the rash. Repeat labs showed improved ESR to 24. No GI upset with the Plaquenil (hydroxychloroquine). Hasn't yet noticed any improvement. Saw ophthalmologist, no problems with being on Plaquenil (hydroxychloroquine). Disease History:  Initial visit 3/12/2021. From her late 19's, had developed chronic pain in the hands, knees, and ankles. Attributed this to her weight, which peaked at 360#. In May 2020, inflammatory markers tested for widespread arthralgias and myalgias,  and CRP 35mg/dL. While on directed questioning she believes she was dealing with inguinal soft tissue infections consistent by description with hidradenitis, records at the time make no mention of this, and the only active issues noted at the time were poorly controlled diabetes, carpal tunnel syndrome, sleep apnea, and migraine headaches. Plain films of hands, knees, and feet were unremarkable. Saw Dr. Joe Whitt who was caring for her mother, who diagnosed her with Sjogren's after SSB tested positive (but negative SSA) in May 2020. Had started Plaquenil and felt this helped finger swellings. Has had to switch from Dr. Joe Whtit now as he does not take her new insurance. Since gastric sleeve procedure in September 2020, had stopped the Plaquenil (hydroxychloroquine) for a few months and still remains off of meloxicam. Weight is now down to 269#.  Restarted the Plaquenil (hydroxychloroquine) about 6 weeks ago when hand pains worsened and seemed to swell more--tend to swell with changes in temperature, and remain swollen most of the day. Not much day to day variation in pain, worse on cold or wet days. Mornings and evenings are the worst times of day. Estimates 15-30min morning stiffness. Late 2020 had pain and swelling in the top of the left foot, told she had a bone spur but declined injection, wore a boot for about 2 weeks with resolution of pain. She has perimenstrual migraines better since restarting OCP's; feels Emgality has also been helpful. REVIEW OF SYSTEMS  A comprehensive review of systems was negative except for that written in the HPI. A 10-point review of systems is per the new patient questionnaire, which has been reviewed extensively and scanned into the electronic medical record for future reference. Review of systems is as above and is otherwise negative. ALLERGIES  Latex    MEDICATIONS  Current Outpatient Medications   Medication Sig    famotidine (PEPCID) 40 mg tablet Take 1 Tab by mouth two (2) times a day.  [START ON 6/16/2021] amphetamine-dextroamphetamine XR (ADDERALL XR) 15 mg XR capsule Take 1 Cap by mouth every morning for 30 days. Max Daily Amount: 15 mg.    ergocalciferol (ERGOCALCIFEROL) 1,250 mcg (50,000 unit) capsule Take 1 Cap by mouth every seven (7) days. Indications: low vitamin D levels    hydrOXYchloroQUINE (Plaquenil) 200 mg tablet Take 2 Tabs by mouth two (2) times a day.  galcanezumab-gnlm (Emgality Pen) 120 mg/mL injection 1 mL by SubCUTAneous route every thirty (30) days.  drospirenone, contraceptive, (Slynd) 4 mg (28) tab Slynd 4 mg (28) tablet   Take 1 tablet every day by oral route.  vilazodone (VIIBRYD) 40 mg tab tablet Take 1 Tab by mouth daily.     cyanocobalamin, vitamin B-12, (B-12 Compliance) 1,000 mcg/mL kit Once monthly  Indications: inadequate vitamin B12    omeprazole (PRILOSEC) 40 mg capsule Take 1 Cap by mouth daily.  metoprolol tartrate (LOPRESSOR) 100 mg IR tablet TAKE 1 TABLET BY MOUTH TWICE A DAY    gabapentin (NEURONTIN) 300 mg capsule Take 1 Cap by mouth three (3) times daily. Max Daily Amount: 900 mg.    SUMAtriptan (IMITREX) 50 mg tablet 1 tab at onset of headache, can repeat X 1 in 2 hrs if HA persists. Not more than 10 days/month.  iron,carbonyl-FA-multivit-min (Opurity Multivitamin) 30 mg iron- 800 mcg chew Take 2 Tabs by mouth daily.  calcium carbonate/vitamin D3 (CALCIUM 600 + D,3, PO) Take 1 Tab by mouth two (2) times a day.  ALPRAZolam (XANAX) 0.5 mg tablet Take 1 Tab by mouth nightly as needed for Anxiety or Sleep. Max Daily Amount: 0.5 mg. Indications: anxious, repeated episodes of anxiety, panic disorder    atorvastatin (LIPITOR) 40 mg tablet TAKE 1 TABLET BY MOUTH EVERY DAY    aspirin delayed-release 81 mg tablet Take 1 Tab by mouth daily.  Blood-Glucose Transmitter (Dexcom G6 Transmitter) nahid Change sensor every 10 days    Blood-Glucose Sensor (Dexcom G6 Sensor) nahid Change sensor every 10 days    albuterol (PROVENTIL HFA, VENTOLIN HFA) 90 mcg/actuation inhaler Take 1 Puff by inhalation every four (4) hours as needed for Wheezing.  Lactobac no.41/Bifidobact no.7 (PROBIOTIC-10 PO) Take 1 Cap by mouth daily.  nystatin (MYCOSTATIN) topical cream Apply  to affected area two (2) times a day. Apply to abdominal folds twice daily as needed for rash    Arm Brace misc Bilateral carpal tunnel splint to be used every night    nystatin-triamcinolone (MYCOLOG) 100,000-0.1 unit/gram-% ointment Apply  to affected area as needed for Skin Irritation.  Blood-Glucose Meter,Continuous (Dexcom G6 ) misc Change sensor every 10 days     No current facility-administered medications for this visit.         PAST MEDICAL HISTORY  Past Medical History:   Diagnosis Date    Anxiety with depression     Arrhythmia     tachycardia    Asthma     Norman hum 1/7/2015    Carpal tunnel syndrome of right wrist 12/20/2016    Diabetes ([facility]     ALIYA (generalized anxiety disorder) 4/4/2018    GERD (gastroesophageal reflux disease) 8/13/2014    HTN (hypertension) 7/25/2011    Hyperaldosteronism ([facility] 10/19/2015    Hyperhidrosis 4/4/2018    Morbid obesity ([facility]     Other ill-defined conditions(799.89)     migraines    Palpitation 4/4/2018    Sleep apnea     uses CPAP    Tachycardia 4/4/2018       FAMILY HISTORY  family history includes Anesth Problems in her mother; Arthritis-rheumatoid in her mother; Attention Deficit Hyperactivity Disorder in her brother; Breast Cancer in her sister; Cancer in an other family member; Diabetes in her mother; Drug Abuse in her father; Gout in her mother; Hypertension in her mother, sister, and sister; Psychiatric Disorder in her father; Thyroid Disease in her sister. SOCIAL HISTORY  She  reports that she quit smoking about 18 months ago. She has a 2.50 pack-year smoking history. She has never used smokeless tobacco. She reports previous alcohol use of about 1.0 standard drinks of alcohol per week. She reports that she does not use drugs. Social History     Social History Narrative    Not on file       DATA  Visit Vitals  BP (!) 151/74 (BP 1 Location: Right arm, BP Patient Position: Sitting)   Pulse 61   Temp 98.6 °F (37 °C) (Oral)   Resp 20   Ht 5' 3\" (1.6 m)   Wt 270 lb (122.5 kg)   SpO2 98%   BMI 47.83 kg/m²    Body mass index is 47.83 kg/m². No flowsheet data found. General:  The patient is pleasant, morbidly obese, alert, and in no apparent distress. Eyes: Sclera are anicteric. No conjunctival injection. HEENT:  Oropharynx is clear. No oral ulcers. Decreased salivary pooling with lingual scalloping but no mirroring or fissuring. No cervical or supraclavicular lymphadenopathy. Lungs:  Clear to auscultation bilaterally, without wheeze or stridor. Normal respiratory effort. Cor:  Regular rate and rhythm. No murmurs rubs or gallops. Abdomen: Soft, non-tender, without hepatomegaly or masses. Extremities: No calf tenderness or edema. Warm and well perfused. Skin:  No significant abnormalities. No petechial, purpuric, or psoriaform rashes  Neuro: Nonfocal, no foot or wrist drop. Musculoskeletal:    A comprehensive musculoskeletal exam was performed for all joints of each upper and lower extremity and assessed for swelling, tenderness and range of motion. Results are documented as below:  No evidence of synovitis in the small joints of the hands, wrists, shoulders, elbows, hips, knees or ankles. Labs:  3/19/21: JULIEN 1:40 (negative), C3 and C4 WNL, CMP WNL, CBC WNL, ESR 24; CRP 8mg/L  2/17/21 Vit D 36, CMP WNL (Cr 0.79), WBC 9.4, Hgb 11.4, Plt 324  5/6/20 , CRP 35 mg/dL, SSA <0.2, SSB 2.5, rest of direct JULIEN neg (incl Sm, RNP, dsDNA, ribosomal P, Karol-1, centromere, Scl70)      Imaging:  3/31/21: NM Bone scan: IMPRESSION  Symmetric uptake in the joints as described above may be degenerative in nature, however other arthritides should be considered. Thoracolumbar degenerative changes are noted as well. No evidence of acute  abnormality. 7/2/20 Echo: · Normal cavity size, systolic function (ejection fraction normal) and diastolic function. Mild concentric hypertrophy. Calculated left ventricular ejection fraction is 55%. Biplane method used to measure ejection fraction. No regional wall motion abnormality noted. Image quality for this study was technically difficult. ASSESSMENT AND PLAN  Ms. Naveed Paez is a 26 y.o. female with morbid obesity and poorly controlled hypertension who returns for followup of undifferentiated connective tissue disease manifest with mixed-character arthralgias with marked acute phase reactants, mild sicca complex, and isolated SSB positivity.  Reassuringly JULIEN is negative and acute phase reactants have normalized, and no e/o SAPHO from bone scan; by history suspect this was hidradenitis driving the acute phase reactants at the time. Obtaining MRI of the knee to clarify whether bone scan uptake relates to OA changes or underlying synovitis. If no synovitis on this, risk may outweigh benefit of Plaquenil (hydroxychloroquine). 1. Chronic inflammatory arthritis  - MRI KNEE LT WO CONT; Future    2. Primary osteoarthritis involving multiple joints  - MRI KNEE LT WO CONT; Future    3. Undifferentiated connective tissue disease (HCC)  - Cont Plaquenil (hydroxychloroquine) for now, primarily for improvement in photosensitive rash and possible inflammatory arthritis  - MRI KNEE LT WO CONT; Future      Patient Instructions   1. Good news is that the inflammatory markers are down. For the continued pain though, let's make sure the bone uptake is not from autoimmune inflammation in the knees. 2. If you don't get a call within the next 1-2 weeks to schedule your knee MRI, call the same number to schedule an open MRI of the left knee. 3. Schedule a phone/telehealth visit in 4 weeks and we'll go over the result and next steps. Orders Placed This Encounter    MRI KNEE LT WO CONT       Medications: I am having Nancy MAGDALENA Nicole Cancel maintain her albuterol, Dexcom G6 , Dexcom G6 Transmitter, Dexcom G6 Sensor, ALPRAZolam, atorvastatin, aspirin delayed-release, nystatin-triamcinolone, Arm Brace, Opurity Multivitamin, (calcium carbonate/vitamin D3 (CALCIUM 600 + D,3, PO)), SUMAtriptan, nystatin, gabapentin, metoprolol tartrate, omeprazole, vilazodone, B-12 Compliance, Slynd, Lactobac no.41/Bifidobact no.7 (PROBIOTIC-10 PO), Emgality Pen, hydrOXYchloroQUINE, ergocalciferol, amphetamine-dextroamphetamine XR, and famotidine. Follow up: Return in about 4 weeks (around 6/4/2021).     Face to face time: 25 minutes  Note preparation and records review day of service: 15 minutes  Total provider time day of service: 40 minutes      Shelli Avila MD    Adult Rheumatology   [address] [facility], [address] [place] [address], [address] [place] [address]   Phone [phone]  Fax [phone]

## 2021-05-07 NOTE — PATIENT INSTRUCTIONS
1. Good news is that the inflammatory markers are down. For the continued pain though, let's make sure the bone uptake is not from autoimmune inflammation in the knees. 2. If you don't get a call within the next 1-2 weeks to schedule your knee MRI, call the same number to schedule an open MRI of the left knee. 3. Schedule a phone/telehealth visit in 4 weeks and we'll go over the result and next steps.

## 2021-05-10 ENCOUNTER — VIRTUAL VISIT (OUTPATIENT)
Dept: SURGERY | Age: 31
End: 2021-05-10
Payer: COMMERCIAL

## 2021-05-10 VITALS — BODY MASS INDEX: 46.6 KG/M2 | WEIGHT: 263 LBS | HEIGHT: 63 IN

## 2021-05-10 DIAGNOSIS — E66.01 MORBID OBESITY (HCC): Primary | ICD-10-CM

## 2021-05-10 DIAGNOSIS — K21.9 CHRONIC GERD: ICD-10-CM

## 2021-05-10 DIAGNOSIS — M25.50 PAIN IN JOINT INVOLVING MULTIPLE SITES: ICD-10-CM

## 2021-05-10 PROCEDURE — 99213 OFFICE O/P EST LOW 20 MIN: CPT | Performed by: NURSE PRACTITIONER

## 2021-05-10 RX ORDER — BLOOD-GLUCOSE SENSOR
EACH MISCELLANEOUS
Qty: 3 DEVICE | Refills: 12 | Status: SHIPPED | OUTPATIENT
Start: 2021-05-10 | End: 2021-09-29

## 2021-05-10 RX ORDER — DOCUSATE SODIUM 100 MG/1
100 CAPSULE, LIQUID FILLED ORAL 2 TIMES DAILY
COMMUNITY
End: 2021-06-22 | Stop reason: ALTCHOICE

## 2021-05-10 RX ORDER — GLUCOSAMINE/CHONDR SU A SOD 750-600 MG
10000 TABLET ORAL DAILY
COMMUNITY
End: 2021-09-17 | Stop reason: CLARIF

## 2021-05-10 RX ORDER — BLOOD-GLUCOSE TRANSMITTER
EACH MISCELLANEOUS
Qty: 1 DEVICE | Refills: 3 | Status: SHIPPED | OUTPATIENT
Start: 2021-05-10 | End: 2021-09-29

## 2021-05-10 NOTE — PATIENT INSTRUCTIONS
Stop the Tumeric as it is really hard on the stomach and can cause reflux / heartburn Ok to take glucosamine chondrotin 1500 mg every day for your joints For constipation- take (2) of the kirti colace + the fiber every day and adjust as needed Continue to track using the baron Follow up 8/9/21 at 8:20 am virtual with [facility] Zoom Support Group 2nd Thursday of each Month from 6-7 pm  
The next one is 5/13/21 6-7 pm  
You can access the link at [email or web address] Go to the Calendar tab and click on the date and it should go right to the link Additional Resources: 
Unjury:  [email or web address]  
 Offering online support groups and pre-recorded videos 
o Every Wednesday evening at 7:00 pm  
[employer] Facebook page  [address], Advice, and Motivation from fellow patients Bariatric Pal: [email or web address] 
[employer] has launched a podcast!  Hosted by Boone Alexander, our podcast will cover topics about obesity, pre-weight loss surgery, post-weight loss surgery, food addiction, emotional eating, myths about weight loss surgery, and more. Each episode will feature an expert in the field of weight loss and bariatric surgery who can provide a deeper insight into these topics. ACAC:  Perk 
 Offering discounted exercise programs (8 Week programs, On-Demand/Virtual)  See flyer  Contact Geri Thakkar at [email or web address] or [phone]

## 2021-05-10 NOTE — PROGRESS NOTES
1. Have you been to the ER, urgent care clinic since your last visit? Hospitalized since your last visit? No    2. Have you seen or consulted any other health care providers outside of the [address] [place] [address] since your last visit? Include any pap smears or colon screening.  No

## 2021-05-10 NOTE — PROGRESS NOTES
I was in the office while conducting this encounter. Consent:  She and/or her healthcare decision maker is aware that this patient-initiated Telehealth encounter is a billable service, with coverage as determined by her insurance carrier. She is aware that she may receive a bill and has provided verbal consent to proceed: Yes    This virtual visit was conducted via Anagran. Pursuant to the emergency declaration under the [facility], [facility] waiver authority and the [employer] Act, this Virtual  Visit was conducted to reduce the patient's risk of exposure to COVID-19 and provide continuity of care for an established patient. Services were provided through a video synchronous discussion virtually to substitute for in-person clinic visit. Due to this being a TeleHealth evaluation, many elements of the physical examination are unable to be assessed. Total Time: minutes: 11-20 minutes. Chief Complaint   Patient presents with    Morbid Obesity     8 months s/p sleeve. [phone]    Weight Loss       Nancy Pitts 8-month status post sleeve gastrectomy for treatment of morbid obesity. She presents today with worsening GERD. She said last week was \"terrible\" because of reflux. She has been taking omeprazole 40 mg p.o. daily and famotidine 40 mg p.o. twice daily. I asked her about new medications and she recently started taking turmeric to help with her joint pain. The starting of the turmeric coincided with the reflux. No hematemesis and no black or bloody stools  She does have some constipation and has been taking Briana-Colace 1 tablet and she just added Bene fiber. She says her stools are hard sometimes and feels stuck. She said she felt like she had a weight loss stall until this past couple of days and she is lost a little bit more weight.     Preop weight 338 pounds  Current 263 pounds    She is taking her bariatric vitamins every day  She has been using the bariatric baron to track her food intake  She is drinking mostly water  Denies alcohol, tobacco and NSAIDs  She has been doing some meal prep  She did reach out to Ole Cordoba, the dietitian about 2 weeks ago. She plans on attending the Zoom support group this week  She continues to exercise vigorously usually 4-5 times a week for at least an hour    Visit Vitals  Ht 5' 3\" (1.6 m)   Wt 263 lb (119.3 kg)   BMI 46.59 kg/m²     Appears well  Speech is clear breathing is unlabored  Mucous membranes appear moist      ICD-10-CM ICD-9-CM    1. Morbid obesity ([address]  E66.01 278.01    2. BMI 45.0-49.9, adult ([facility])  Z68.42 V85.42    3. Chronic GERD  K21.9 530.81    4. Pain in joint involving multiple sites  M25.50 719.49      8-month status post laparoscopic sleeve gastrectomy for treatment of morbid obesity with a bout of severe GERD suspect related to the addition of turmeric to her medication regimen  Advised to discontinue the turmeric  She may add glucosamine/chondroitin 1500 mg daily to her regimen and that should not cause any GI upset  Continue omeprazole 40 mg p.o. daily and Pepcid 40 mg p.o. twice daily as needed  She did ask about starting meloxicam for her joint pain I have advised her to get off the turmeric first and wait a couple of weeks. She can then try the meloxicam on an as-needed basis but if she starts having any GI upset she is to discontinue  Avoid eating within 2 hours of going to bed  Avoid NSAIDs, tobacco and alcohol  For the constipation can increase Briana-Colace to 2 tabs daily as needed continue Bene fiber supplement  Continue with high-protein, low-fat, no added sugar, low-carb diet  Drink mostly water  Continue bariatric vitamins daily  Follow-up in 3 months  Nancy Amaral verbalized understanding and questions were answered to the best of my knowledge and ability. Diet and medication educational materials were provided.

## 2021-05-11 ENCOUNTER — TELEPHONE (OUTPATIENT)
Dept: NEUROLOGY | Age: 31
End: 2021-05-11

## 2021-05-11 NOTE — TELEPHONE ENCOUNTER
Emgality [place] renewal sent to [address]     (Key: [id number]) - [address]      ID. [id number]

## 2021-05-11 NOTE — TELEPHONE ENCOUNTER
Emgality approval     The request has been approved. The authorization is effective for a maximum of 12 fills from 05/11/2021 to 05/10/2022, as long as the member is enrolled in their current health plan. The request was approved as submitted. This request was approved for 1mL per 30 days. A written notification letter will follow with additional details. Faxed to [facility]    MsMary Grace Radha Pope! Your Emgality has been approved. Please see below: The request has been approved. The authorization is effective for a maximum of 12 fills from 05/11/2021 to 05/10/2022. Approval faxed to [facility].      Thank you,   Moiseparesh Chang

## 2021-05-14 ENCOUNTER — HOSPITAL ENCOUNTER (OUTPATIENT)
Dept: MRI IMAGING | Age: 31
Discharge: HOME | End: 2021-05-14
Attending: INTERNAL MEDICINE
Payer: COMMERCIAL

## 2021-05-14 DIAGNOSIS — M19.90 CHRONIC INFLAMMATORY ARTHRITIS: ICD-10-CM

## 2021-05-14 DIAGNOSIS — M15.9 PRIMARY OSTEOARTHRITIS INVOLVING MULTIPLE JOINTS: ICD-10-CM

## 2021-05-14 DIAGNOSIS — M35.9 UNDIFFERENTIATED CONNECTIVE TISSUE DISEASE (HCC): ICD-10-CM

## 2021-05-14 PROCEDURE — 73721 MRI JNT OF LWR EXTRE W/O DYE: CPT

## 2021-06-09 DIAGNOSIS — M25.562 LEFT KNEE PAIN, UNSPECIFIED CHRONICITY: ICD-10-CM

## 2021-06-09 DIAGNOSIS — M25.561 RIGHT KNEE PAIN, UNSPECIFIED CHRONICITY: ICD-10-CM

## 2021-06-09 DIAGNOSIS — M25.552 LEFT HIP PAIN: Primary | ICD-10-CM

## 2021-06-10 ENCOUNTER — OFFICE VISIT (OUTPATIENT)
Dept: NEUROLOGY | Age: 31
End: 2021-06-10
Payer: COMMERCIAL

## 2021-06-10 VITALS
DIASTOLIC BLOOD PRESSURE: 84 MMHG | HEART RATE: 85 BPM | SYSTOLIC BLOOD PRESSURE: 132 MMHG | OXYGEN SATURATION: 98 % | RESPIRATION RATE: 15 BRPM | TEMPERATURE: 97.8 F

## 2021-06-10 DIAGNOSIS — G56.03 BILATERAL CARPAL TUNNEL SYNDROME: Primary | ICD-10-CM

## 2021-06-10 PROCEDURE — 20526 THER INJECTION CARP TUNNEL: CPT | Performed by: PSYCHIATRY & NEUROLOGY

## 2021-06-10 NOTE — PROCEDURES
Carpal tunnel injection procedure note    Informed consent was obtained from the patient. The volar aspect of the wrist was prepared with alcohol. The palmaris longus tendon was identified and marked and so was the distal wrist crease. A 27 gauge needle was used to enter the carpal tunnel, approximately 1/2 inch proximal to the distal wrist crease and ulnar to the palmaris longus tendon. The needle was advanced through the carpal tunnel without any difficulty. 1 mL of marcaine and 1 ml of depo-medrol was injected after aspiration. The medications flowed freely without any difficulty. The procedure was performed bilateral.    After the procedure, the patient clenched and unclenched the fingers of both hands for a period of two minutes to distribute the medication. There were no complications throughout the procedure and good relief of symptoms following the procedure. POST PROCEDURE INSTRUCTIONS: The patient has been asked to report to us any redness, swelling, inflammation, or fevers. The patient has been asked to restrict the use of the  extremity for the next 24 hours.

## 2021-06-16 ENCOUNTER — OFFICE VISIT (OUTPATIENT)
Dept: ORTHOPEDIC SURGERY | Age: 31
End: 2021-06-16

## 2021-06-16 VITALS
BODY MASS INDEX: 47.84 KG/M2 | HEART RATE: 70 BPM | DIASTOLIC BLOOD PRESSURE: 72 MMHG | TEMPERATURE: 97.4 F | SYSTOLIC BLOOD PRESSURE: 108 MMHG | WEIGHT: 270 LBS | OXYGEN SATURATION: 99 % | HEIGHT: 63 IN

## 2021-06-16 DIAGNOSIS — M17.0 BILATERAL PRIMARY OSTEOARTHRITIS OF KNEE: ICD-10-CM

## 2021-06-16 DIAGNOSIS — M76.51 PATELLAR TENDINITIS OF BOTH KNEES: Primary | ICD-10-CM

## 2021-06-16 DIAGNOSIS — M16.0 BILATERAL PRIMARY OSTEOARTHRITIS OF HIP: ICD-10-CM

## 2021-06-16 DIAGNOSIS — M76.52 PATELLAR TENDINITIS OF BOTH KNEES: Primary | ICD-10-CM

## 2021-06-16 PROCEDURE — 99203 OFFICE O/P NEW LOW 30 MIN: CPT | Performed by: ORTHOPAEDIC SURGERY

## 2021-06-16 RX ORDER — TRAMADOL HYDROCHLORIDE 50 MG/1
50 TABLET ORAL
Qty: 45 TABLET | Refills: 0 | Status: SHIPPED | OUTPATIENT
Start: 2021-06-16 | End: 2021-07-12 | Stop reason: ALTCHOICE

## 2021-06-16 NOTE — LETTER
6/19/2021    Patient: Joan Lynne   YOB: 1990   Date of Visit: 6/16/2021     Jordy Ramirez MD  [address] [place]    Dear Jordy Ramirez MD,      Thank you for referring Ms. Doris Ortiz to [facility] for evaluation. My notes for this consultation are attached. If you have questions, please do not hesitate to call me. I look forward to following your patient along with you.       Sincerely,    Erika Chandler, DO

## 2021-06-16 NOTE — PROGRESS NOTES
Identified pt with two pt identifiers (name and ). Reviewed chart in preparation for visit and have obtained necessary documentation. Kun Combs is a 33 y.o. female  Chief Complaint   Patient presents with    Hip Pain     LT hip    Knee Pain     Bilateral Knee     Visit Vitals  /72 (BP 1 Location: Right arm, BP Patient Position: Sitting, BP Cuff Size: Large adult)   Pulse 70   Temp 97.4 °F (36.3 °C) (Tympanic)   Ht 5' 3\" (1.6 m)   Wt 270 lb (122.5 kg)   SpO2 99%   BMI 47.83 kg/m²     1. Have you been to the ER, urgent care clinic since your last visit? Hospitalized since your last visit? No    2. Have you seen or consulted any other health care providers outside of the [address] [place] [address] since your last visit? Include any pap smears or colon screening.  No

## 2021-06-19 NOTE — PROGRESS NOTES
6/19/2021    Chief Complaint: Bilateral knee pain, bilateral hip pain    HPI: This is a 33 y.o. female who complains of bilateral knee pain. Neither side is worse than the other side. Onset was gradual.  The patient has had activity dependent pain for years, she also has hip pain, more on the left side. .    The patient has tried activity modification, physical therapy exercises, injections have never been attempted. The pain is in the medial knees, lateral hip, it is severe in intensity. The patient feels unstable with the knee, fears falling, and has significant limitation with activities of daily living, recreation, and walks with a limp. Past Medical History:   Diagnosis Date    Anxiety with depression     Arrhythmia     tachycardia    Asthma     Lyon hump 1/7/2015    Carpal tunnel syndrome of right wrist 12/20/2016    Diabetes ([facility] [facility]     ALIYA (generalized anxiety disorder) 4/4/2018    GERD (gastroesophageal reflux disease) 8/13/2014    HTN (hypertension) 7/25/2011    Hyperaldosteronism ([facility] [address] 10/19/2015    Hyperhidrosis 4/4/2018    Morbid obesity ([facility] [address] [facility]     Other ill-defined conditions(799.89)     migraines    Palpitation 4/4/2018    Sleep apnea     uses CPAP    Tachycardia 4/4/2018       Past Surgical History:   Procedure Laterality Date    HX GASTRECTOMY  09/08/2020    Gastric sleeve     HX GYN  10/19/2020    Laparoscopic right oopherectomy, left cystectomy    HX HERNIA REPAIR  09/08/2020    HX OVARIAN CYST REMOVAL  10/19/2020    HX TONSILLECTOMY      HX WISDOM TEETH EXTRACTION      MS REMOVAL OF TONSILS,<11 Y/O         Current Outpatient Medications on File Prior to Visit   Medication Sig Dispense Refill    Blood-Glucose Transmitter (Dexcom G6 Transmitter) nahid USE AS DIRECTED 1 Device 3    Blood-Glucose Sensor (Dexcom G6 Sensor) nahid CHANGE EVERY 10 DAYS 3 Device 12    Biotin 2,500 mcg cap Take 10,000 mcg by mouth.  BENEFIBER, GUAR GUM, PO Take 1 Packet by mouth.       famotidine (PEPCID) 40 mg tablet Take 1 Tab by mouth two (2) times a day. 60 Tab 1    ergocalciferol (ERGOCALCIFEROL) 1,250 mcg (50,000 unit) capsule Take 1 Cap by mouth every seven (7) days. Indications: low vitamin D levels 12 Cap 3    galcanezumab-gnlm (Emgality Pen) 120 mg/mL injection 1 mL by SubCUTAneous route every thirty (30) days. 1 mL 11    drospirenone, contraceptive, (Slynd) 4 mg (28) tab Slynd 4 mg (28) tablet   Take 1 tablet every day by oral route.  vilazodone (VIIBRYD) 40 mg tab tablet Take 1 Tab by mouth daily. 90 Tab 2    cyanocobalamin, vitamin B-12, (B-12 Compliance) 1,000 mcg/mL kit Once monthly  Indications: inadequate vitamin B12 1 Kit 5    omeprazole (PRILOSEC) 40 mg capsule Take 1 Cap by mouth daily. 90 Cap 3    metoprolol tartrate (LOPRESSOR) 100 mg IR tablet TAKE 1 TABLET BY MOUTH TWICE A  Tab 2    gabapentin (NEURONTIN) 300 mg capsule Take 1 Cap by mouth three (3) times daily. Max Daily Amount: 900 mg. 270 Cap 1    nystatin (MYCOSTATIN) topical cream Apply  to affected area two (2) times a day. Apply to abdominal folds twice daily as needed for rash 30 g 3    SUMAtriptan (IMITREX) 50 mg tablet 1 tab at onset of headache, can repeat X 1 in 2 hrs if HA persists. Not more than 10 days/month. 9 Tab 2    iron,carbonyl-FA-multivit-min (Opurity Multivitamin) 30 mg iron- 800 mcg chew Take 2 Tabs by mouth daily.  calcium carbonate/vitamin D3 (CALCIUM 600 + D,3, PO) Take 1 Tab by mouth two (2) times a day.  ALPRAZolam (XANAX) 0.5 mg tablet Take 1 Tab by mouth nightly as needed for Anxiety or Sleep. Max Daily Amount: 0.5 mg. Indications: anxious, repeated episodes of anxiety, panic disorder 30 Tab 3    atorvastatin (LIPITOR) 40 mg tablet TAKE 1 TABLET BY MOUTH EVERY DAY 90 Tab 3    aspirin delayed-release 81 mg tablet Take 1 Tab by mouth daily.  30 Tab 11    Blood-Glucose Meter,Continuous (Dexcom G6 ) misc Change sensor every 10 days 1 Each 0    albuterol (PROVENTIL HFA, VENTOLIN HFA) 90 mcg/actuation inhaler Take 1 Puff by inhalation every four (4) hours as needed for Wheezing. 1 Inhaler 1    docusate sodium (Colace) 100 mg capsule Take 100 mg by mouth two (2) times a day. (Patient not taking: Reported on 6/16/2021)      amphetamine-dextroamphetamine XR (ADDERALL XR) 15 mg XR capsule Take 1 Cap by mouth every morning for 30 days. Max Daily Amount: 15 mg. (Patient not taking: Reported on 6/16/2021) 30 Cap 0    hydrOXYchloroQUINE (Plaquenil) 200 mg tablet Take 2 Tabs by mouth two (2) times a day. (Patient not taking: Reported on 6/16/2021) 60 Tab 11    Lactobac no.41/Bifidobact no.7 (PROBIOTIC-10 PO) Take 1 Cap by mouth daily.  Arm Brace misc Bilateral carpal tunnel splint to be used every night 2 Each 0    nystatin-triamcinolone (MYCOLOG) 100,000-0.1 unit/gram-% ointment Apply  to affected area as needed for Skin Irritation. No current facility-administered medications on file prior to visit.        Allergies   Allergen Reactions    Latex Itching       Family History   Problem Relation Age of Onset    Hypertension Mother     Diabetes Mother         Type II   Jori Mcgraw Anesth Problems Mother         respiratory issues - feels like she cannot breath when coming out of anesthesia   Jori Mcgraw Arthritis-rheumatoid Mother    Jori Mcgraw Gout Mother     Psychiatric Disorder Father     Drug Abuse Father     Cancer Other         prostate    Hypertension Sister     Thyroid Disease Sister         \"I think\"   Jori Mcgraw Breast Cancer Sister     Attention Deficit Hyperactivity Disorder Brother     Hypertension Sister        Social History     Socioeconomic History    Marital status: SINGLE     Spouse name: Not on file    Number of children: Not on file    Years of education: Not on file    Highest education level: Not on file   Occupational History    Occupation: customer ser and student     Comment: [employer]   Tobacco Use    Smoking status: Former Smoker     Packs/day: 0.50     Years: 5.00     Pack years: 2.50     Quit date: 2019     Years since quittin.6    Smokeless tobacco: Never Used   Vaping Use    Vaping Use: Never used   Substance and Sexual Activity    Alcohol use: Not Currently     Alcohol/week: 1.0 standard drinks     Types: 1 Shots of liquor, 1 Standard drinks or equivalent per week     Comment: socially, Monthly or less    Drug use: No    Sexual activity: Not Currently     Partners: Male     Birth control/protection: Condom   Other Topics Concern     Social Determinants of Health     Financial Resource Strain:     Difficulty of Paying Living Expenses:    Food Insecurity:     Worried About Running Out of Food in the Last Year:     Ran Out of Food in the Last Year:    Transportation Needs:     Lack of Transportation (Medical):  Lack of Transportation (Non-Medical):    Physical Activity:     Days of Exercise per Week:     Minutes of Exercise per Session:    Stress:     Feeling of Stress :    Social Connections:     Frequency of Communication with Friends and Family:     Frequency of Social Gatherings with Friends and Family:     Attends Taoism Services:     Active Member of Clubs or Organizations:     Attends Club or Organization Meetings:     Marital Status:          Review of Systems:       General: Denies headache, lethargy, fever, weight loss  Ears/Nose/Throat: Denies ear discharge, drainage, nosebleeds, hoarse voice, dental problems  Cardiovascular: Denies chest pain, shortness of breath  Lungs: Denies chest pain, breathing problems, wheezing, pneumonia  Stomach: Denies stomach pain, heartburn, constipation, irritable bowel  Skin: Denies rash, sores, open wounds  Musculoskeletal: Admits to bilateral knee pain, no deformity. Genitourinary: Denies dysuria, hematuria, polyuria  Gastrointestinal: Denies constipation, obstipation, diarrhea  Neurological: Denies changes in sight, smell, hearing, taste, seizures.  Denies loss of consciousness. Psychiatric: Denies depression, sleep pattern changes, anxiety, change in personality  Endocrine: Denies mood swings, heat or cold intolerance  Hematologic/Lymphatic: Denies anemia, purpura, petechia  Allergic/Immunologic: Denies swelling of throat, pain or swelling at lymph nodes      Physical Examination:    Visit Vitals  /72 (BP 1 Location: Right arm, BP Patient Position: Sitting, BP Cuff Size: Large adult)   Pulse 70   Temp 97.4 °F (36.3 °C) (Tympanic)   Ht 5' 3\" (1.6 m)   Wt 270 lb (122.5 kg)   SpO2 99%   BMI 47.83 kg/m²        General: AOX3, no apparent distress  Psychiatric: mood and affect appropriate  Lungs: breathing is symmetric and unlabored bilaterally  Heart: regular rate and rhythm  Abdomen: no guarding  Head: normocephalic, atraumatic  Skin: No significant abnormalities, good turgor  Sensation intact to light touch: L1-S1 dermatomes  Muscular exam: 5/5 strength in all major muscle groups unless noted in specialty exam.    Extremities:      Left upper extremity: Full active and passive range of motion without pain, deformity, no open wound, strength 5/5 in all major muscle groups. Right upper extremity: Full active and passive range of motion without pain, deformity, no open wound, strength 5/5 in all major muscle groups. Right lower extremity: No deformity is noted. Range of motion of the knee is 0-1 20. Ligamentous testing of the knee indicates stability of the the MCL, LCL, PCL, and ACL. Lachman's, anterior and posterior drawer tests are specifically negative. Positive medial joint line tenderness to palpation is noted. Popliteal area is unremarkable. 1+  for effusion. Positive patellar crepitus. Patella tracks centrally. Pivot shift is negative. Strength testing is indicative of 5/5 strength at hip flexion, extension, knee flexion and extension, tibialis anterior, EHL, and FHL. Sensation is intact to light touch in the L1-S1 dermatomes.   Capillary refill is less than 2 seconds in the toes. Left lower extremity:  No deformity is noted. Range of motion of the knee is 0-1 20. Ligamentous testing of the knee indicates stability of the the MCL, LCL, PCL, and ACL. Lachman's, anterior and posterior drawer tests are specifically negative. Medial joint line tenderness to palpation is noted. Popliteal area is unremarkable. 1+  for effusion. Positive patellar crepitus. Patella tracks centrally. Pivot shift is negative. Strength testing is indicative of 5/5 strength at hip flexion, extension, knee flexion and extension, tibialis anterior, EHL, and FHL. Sensation is intact to light touch in the L1-S1 dermatomes. Capillary refill is less than 2 seconds in the toes. Diagnostics:    Pertinent Diagnostics: X-rays are available to bilateral knees bilateral hips, moderate degenerative changes in the medial compartment of both knees, mild degenerative changes of the bilateral hips. Assessment: Osteoarthritis bilateral knee, osteoarthritis bilateral hips. Tendinitis of the patellar tendons bilaterally    Plan: This patient and I did discuss the many options in treating knee osteoarthritis. We did discuss that we could continue to seek out nonoperative modalities, such as: NSAIDs, oral and topical analgesics, knee injections, knee braces, physical therapy, stretching, strengthening, and weight loss strategies, activity modification, ambulatory assistive devices. The patient stated their understanding with this, but and would like to proceed with nonsurgical management in the form of weight loss exercises, physical therapy, tramadol for pain control, follow-up in approximately 4 to 6 weeks. .      Procedures: none    Ms. Evens Loo has a reminder for a \"due or due soon\" health maintenance. I have asked that she contact her primary care provider for follow-up on this health maintenance.

## 2021-06-22 ENCOUNTER — VIRTUAL VISIT (OUTPATIENT)
Dept: NEUROLOGY | Age: 31
End: 2021-06-22
Payer: COMMERCIAL

## 2021-06-22 DIAGNOSIS — G56.03 BILATERAL CARPAL TUNNEL SYNDROME: Primary | ICD-10-CM

## 2021-06-22 DIAGNOSIS — G43.719 INTRACTABLE CHRONIC MIGRAINE WITHOUT AURA AND WITHOUT STATUS MIGRAINOSUS: ICD-10-CM

## 2021-06-22 DIAGNOSIS — G47.33 OSA (OBSTRUCTIVE SLEEP APNEA): ICD-10-CM

## 2021-06-22 DIAGNOSIS — G56.21 ULNAR NEUROPATHY AT ELBOW, RIGHT: ICD-10-CM

## 2021-06-22 PROCEDURE — 99214 OFFICE O/P EST MOD 30 MIN: CPT | Performed by: PSYCHIATRY & NEUROLOGY

## 2021-06-22 RX ORDER — RIMEGEPANT SULFATE 75 MG/75MG
TABLET, ORALLY DISINTEGRATING ORAL
Qty: 16 TABLET | Refills: 2 | Status: SHIPPED | OUTPATIENT
Start: 2021-06-22 | End: 2021-11-30 | Stop reason: SDUPTHER

## 2021-06-22 NOTE — PROGRESS NOTES
Neurology follow-up note       Chief complaint: Migraine    Subjective  Israel Tracy is a 33 y.o. female who presented to the neurology office for management of numbness and tingling in the hands and headaches. Regarding the numbness in the hand, it started around mid May 2016 and it is bilateral.  The medial 2 fingers are involved in the right hand and all the fingers are involved in the left hand. It is gradually progressive. She does also have neck pain with radiation to the right shoulder. Her symptoms are off and on and it is more when she is working and improves when her hands and not moving. The numbness and tingling lasts for minutes but the pain around her wrist can be for a few days. She denies any weakness. EMG/nerve conduction study has been normal.  She is taking gabapentin 300 mg p.o. thrice daily and that has helped her. She uses right elbow splint and bilateral hand splints. She uses the splint intermittently. She states that the numbness in the hands are worsening. She does also noticed some weakness in her right hand. .    Regarding the headaches, she has been having migraines for a long time and her headaches starts with pain in the left eye and left side of the face and it is 10 out of 10 in severity, associated with photophobia, phonophobia and nausea but she denies any vomiting. The headache can last from 3 days to week. It is throbbing in character. She could not get it refilled and stopped taking nortritpyline. Her headaches have worsened. She does also have obstructive sleep apnea and she uses CPAP around 5 days in a week. Interval history:  Headaches are doing good with emgality although over the last 1 to 2 months she has been having 2 headaches every week. .  She uses Imitrex as needed for headaches  She is complaining of right hand numbness which is persistent. EMG/nerve conduction study did show right carpal tunnel syndrome.   Perform bilateral carpal tunnel injections in both hands and it helped her significantly in the right hand. Headache frequency baseline: Daily  Headache frequency now: 8/month    Risk Factors for headaches  Smoking: Quit Nov 2019  Coffee: Denies cups/day  Tea: 0 cups/day  Soda: 1 cans/day  Water: 4 glasses/day  Sleeps at 9 AM and wakes up at 2 PM to 3 PM.     Medications tried  Abortive  Imitrex    Preventitive  Amitriptyline  Nortriptyline  Metoprolol  Gabapentin  Emgality    Current Outpatient Medications   Medication Sig    traMADoL (ULTRAM) 50 mg tablet Take 1 Tablet by mouth every six (6) hours as needed for Pain for up to 42 days. Max Daily Amount: 200 mg.  Blood-Glucose Transmitter (Dexcom G6 Transmitter) nahid USE AS DIRECTED    Blood-Glucose Sensor (Dexcom G6 Sensor) nahid CHANGE EVERY 10 DAYS    Biotin 2,500 mcg cap Take 10,000 mcg by mouth.  BENEFIBER, GUAR GUM, PO Take 1 Packet by mouth.  famotidine (PEPCID) 40 mg tablet Take 1 Tab by mouth two (2) times a day.  amphetamine-dextroamphetamine XR (ADDERALL XR) 15 mg XR capsule Take 1 Cap by mouth every morning for 30 days. Max Daily Amount: 15 mg.    ergocalciferol (ERGOCALCIFEROL) 1,250 mcg (50,000 unit) capsule Take 1 Cap by mouth every seven (7) days. Indications: low vitamin D levels    galcanezumab-gnlm (Emgality Pen) 120 mg/mL injection 1 mL by SubCUTAneous route every thirty (30) days.  drospirenone, contraceptive, (Slynd) 4 mg (28) tab Slynd 4 mg (28) tablet   Take 1 tablet every day by oral route.  vilazodone (VIIBRYD) 40 mg tab tablet Take 1 Tab by mouth daily.  cyanocobalamin, vitamin B-12, (B-12 Compliance) 1,000 mcg/mL kit Once monthly  Indications: inadequate vitamin B12    omeprazole (PRILOSEC) 40 mg capsule Take 1 Cap by mouth daily.  metoprolol tartrate (LOPRESSOR) 100 mg IR tablet TAKE 1 TABLET BY MOUTH TWICE A DAY    gabapentin (NEURONTIN) 300 mg capsule Take 1 Cap by mouth three (3) times daily.  Max Daily Amount: 900 mg.    nystatin (MYCOSTATIN) topical cream Apply  to affected area two (2) times a day. Apply to abdominal folds twice daily as needed for rash    SUMAtriptan (IMITREX) 50 mg tablet 1 tab at onset of headache, can repeat X 1 in 2 hrs if HA persists. Not more than 10 days/month.  iron,carbonyl-FA-multivit-min (Opurity Multivitamin) 30 mg iron- 800 mcg chew Take 2 Tabs by mouth daily.  calcium carbonate/vitamin D3 (CALCIUM 600 + D,3, PO) Take 1 Tab by mouth two (2) times a day.  ALPRAZolam (XANAX) 0.5 mg tablet Take 1 Tab by mouth nightly as needed for Anxiety or Sleep. Max Daily Amount: 0.5 mg. Indications: anxious, repeated episodes of anxiety, panic disorder    atorvastatin (LIPITOR) 40 mg tablet TAKE 1 TABLET BY MOUTH EVERY DAY    aspirin delayed-release 81 mg tablet Take 1 Tab by mouth daily.  Blood-Glucose Meter,Continuous (Dexcom G6 ) misc Change sensor every 10 days    albuterol (PROVENTIL HFA, VENTOLIN HFA) 90 mcg/actuation inhaler Take 1 Puff by inhalation every four (4) hours as needed for Wheezing. No current facility-administered medications for this visit. EXAMINATION:   There were no vitals taken for this visit. General:  Exam limited because of virtual visit. General appearance: Pt is in no acute distress     Neurological Examination:   Mental Status: AAO x3. Speech is fluent. Follows commands, has normal fund of knowledge, attention, short term recall, comprehension and insight. Cranial Nerves:  Extraocular movements are full. Facial movement intact. Hearing intact to conversation. Shoulder shrug symmetric. Tongue midline. Motor: Strength is symmetric in all 4 ext.      Sensation: Normal according to patient to light touch    Coordination/Cerebellar: Intact to finger-nose-finger     Gait: Casual gait is normal.     Laboratory review:   Results for orders placed or performed in visit on 03/12/21   SED RATE (ESR)   Result Value Ref Range    Sed rate (ESR) 24 0 - 32 mm/hr C REACTIVE PROTEIN, QT   Result Value Ref Range    C-Reactive Protein, Qt 8 0 - 10 mg/L   COMPLEMENT, C3 & C4   Result Value Ref Range    C3 Complement 158 82 - 167 mg/dL    C4 Complement 36 12 - 38 mg/dL   METABOLIC PANEL, COMPREHENSIVE   Result Value Ref Range    Glucose 83 65 - 99 mg/dL    BUN 10 6 - 20 mg/dL    Creatinine 0.69 0.57 - 1.00 mg/dL    GFR est non- >59 mL/min/1.73    GFR est  >59 mL/min/1.73    BUN/Creatinine ratio 14 9 - 23    Sodium 139 134 - 144 mmol/L    Potassium 4.9 3.5 - 5.2 mmol/L    Chloride 103 96 - 106 mmol/L    CO2 24 20 - 29 mmol/L    Calcium 9.6 8.7 - 10.2 mg/dL    Protein, total 6.6 6.0 - 8.5 g/dL    Albumin 4.0 3.8 - 4.8 g/dL    GLOBULIN, TOTAL 2.6 1.5 - 4.5 g/dL    A-G Ratio 1.5 1.2 - 2.2    Bilirubin, total 0.7 0.0 - 1.2 mg/dL    Alk. phosphatase 84 39 - 117 IU/L    AST (SGOT) 18 0 - 40 IU/L    ALT (SGPT) 20 0 - 32 IU/L   CBC WITH AUTOMATED DIFF   Result Value Ref Range    WBC 8.4 3.4 - 10.8 x10E3/uL    RBC 4.72 3.77 - 5.28 x10E6/uL    HGB 12.1 11.1 - 15.9 g/dL    HCT 39.4 34.0 - 46.6 %    MCV 84 79 - 97 fL    MCH 25.6 (L) 26.6 - 33.0 pg    MCHC 30.7 (L) 31.5 - 35.7 g/dL    RDW 14.5 11.7 - 15.4 %    PLATELET 991 072 - 011 x10E3/uL    NEUTROPHILS 55 Not Estab. %    Lymphocytes 35 Not Estab. %    MONOCYTES 8 Not Estab. %    EOSINOPHILS 1 Not Estab. %    BASOPHILS 1 Not Estab. %    ABS. NEUTROPHILS 4.6 1.4 - 7.0 x10E3/uL    Abs Lymphocytes 3.0 0.7 - 3.1 x10E3/uL    ABS. MONOCYTES 0.7 0.1 - 0.9 x10E3/uL    ABS. EOSINOPHILS 0.1 0.0 - 0.4 x10E3/uL    ABS. BASOPHILS 0.1 0.0 - 0.2 x10E3/uL    IMMATURE GRANULOCYTES 0 Not Estab. %    ABS. IMM.  GRANS. 0.0 0.0 - 0.1 x10E3/uL   URINALYSIS W/ REFLEX CULTURE    Specimen: Urine   Result Value Ref Range    Specific Gravity 1.015 1.005 - 1.030    pH (UA) 6.0 5.0 - 7.5    Color Yellow Yellow    Appearance Clear Clear    Leukocyte Esterase Negative Negative    Protein Negative Negative/Trace    Glucose Negative Negative    Ketone Negative Negative    Blood Negative Negative    Bilirubin Negative Negative    Urobilinogen 0.2 0.2 - 1.0 mg/dL    Nitrites Negative Negative    Microscopic Examination Comment     Microscopic exam See additional order     URINALYSIS REFLEX Comment    PROT+CREATU (RANDOM)   Result Value Ref Range    Creatinine, urine 115.5 Not Estab. mg/dL    Protein total, urine 9.0 Not Estab. mg/dL    Protein/Creat Ratio 78 0 - 200 mg/g creat   CK   Result Value Ref Range    Creatine Kinase,Total 70 32 - 182 U/L   TSH REFLEX TO T4   Result Value Ref Range    TSH 1.540 0.450 - 4.500 uIU/mL   ANTI-NUCLEAR AB BY IFA (RDL)   Result Value Ref Range    Anti-Nuclear Ab by IFA (RDL) Positive (A) Negative   MICROSCOPIC EXAMINATION   Result Value Ref Range    WBC None seen 0 - 5 /hpf    RBC 0-2 0 - 2 /hpf    Epithelial cells 0-10 0 - 10 /hpf    Casts None seen None seen /lpf    Bacteria Few None seen/Few   JULIEN, TITER AND PATTERN   Result Value Ref Range    Speckled Pattern 1:40 (H) <1:40    Note: Comment      Laboratory review:  12/20/2016  Glucose 352, potassium 5.3, platelet 388, FQG4R 9.3    Imaging review:  9/17/2020  EMG/nerve conduction study  This is an abnormal study. 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 7/27/2014  CT soft tissue neck with contrast   No evidence of neck mass, abscess or lymphadenopathy    3/16/2017  EMG/nerve conduction study  Nerve conduction studies of the bilateral upper extremities were unremarkable. Needle electrode examination of the right upper extremity was unremarkable. This is a normal study. There is no electrophysiological evidence of median neuropathy at the wrist or and ulnar neuropathy at the elbow on either side. There is no electrophysiological evidence of right cervical radiculopathy.     4/14/2017  MRI of the brain without contrast  Normal    MRI cervical spine  Straightening of the usual cervical lordosis but otherwise normal study    Documentation review:  None    Assessment/Plan:   Kun Combs is a 33 y.o. female who presented to the neurology office for management of migraines and clinically does have right ulnar neuropathy and bilateral carpal tunnel syndrome. She does also have obstructive sleep apnea and has quit smoking. Since her last visit, patient did have EMG/nerve conduction study which did show bilateral carpal tunnel syndrome. She continues to have right hand numbness despite using wrist brace every night. We performed bilateral carpal tunnel injections and states that it helped her right hand significantly and mild improvement in the left hand. I have asked her to continue to use the splints for the next 3 months. She states that the gabapentin has helped her with the paresthesia in her hands. She is taking gabapentin 300 mg p.o. 2-3 times daily. For her headaches, she has tried amitriptyline, nortriptyline, metoprolol and gabapentin. She is now on Emgality and that has helped her significantly but over the last 1 to 2 months she has been having around 2 headaches per week. I am going to add Nurtec every other day. She does also have obstructive sleep apnea. Follow-up in 3 months      ICD-10-CM ICD-9-CM    1. Bilateral carpal tunnel syndrome  G56.03 354.0    2. Intractable chronic migraine without aura and without status migrainosus  G43.719 346.71    3. Ulnar neuropathy at elbow, right  G56.21 354.2    4. PETE (obstructive sleep apnea)  G47.33 327.23        Thank you for allowing me to participate in the care of Ms. Rodolfo Amaro. Please feel free to contact me if you have any questions. Melissa Leonardo MD  Neurologist and Clinical Neurophysiologist    CC: Jl Villalta MD  Fax: [phone]    This note will not be viewable in [address] Kun Combs was seen by synchronous (real-time) audio-video technology on 06/22/21.      Consent:  She and/or her healthcare decision maker is aware that this patient-initiated Telehealth encounter is a billable service, with coverage as determined by her insurance carrier. She is aware that she may receive a bill and has provided verbal consent to proceed: Yes    I was in the office while conducting this encounter. Pursuant to the emergency declaration under the [facility], [facility] waiver authority and the [employer] Act, this Virtual  Visit was conducted, with patient's consent, to reduce the patient's risk of exposure to COVID-19 and provide continuity of care for an established patient. Services were provided through a video synchronous discussion virtually to substitute for in-person clinic visit.

## 2021-06-29 ENCOUNTER — TELEPHONE (OUTPATIENT)
Dept: PHARMACY | Age: 31
End: 2021-06-29

## 2021-06-29 NOTE — TELEPHONE ENCOUNTER
As of 6/15/21 patient has used $544 of the maximum of $600 a year in waived co pays for specific medications and pharmacy-related supplies through the [facility] for the DM Program.    Patient currently enrolled in the DM Program and is nearing the maximum of $600 a year in waived co pays for specific medications and pharmacy-related supplies through the [facility]. Courtesy call placed to patient at mobile number to advise them of the above information. Spoke to patient's mother and advised her of the above information to tell patient.            Dilip Braga, [address] [place] [address]   Direct: [phone]  Department, toll free: [phone], option 7

## 2021-06-30 ENCOUNTER — OFFICE VISIT (OUTPATIENT)
Dept: SURGERY | Age: 31
End: 2021-06-30
Payer: COMMERCIAL

## 2021-06-30 VITALS
OXYGEN SATURATION: 99 % | HEART RATE: 71 BPM | BODY MASS INDEX: 48.78 KG/M2 | WEIGHT: 275.3 LBS | DIASTOLIC BLOOD PRESSURE: 66 MMHG | SYSTOLIC BLOOD PRESSURE: 122 MMHG | RESPIRATION RATE: 18 BRPM | HEIGHT: 63 IN | TEMPERATURE: 99.1 F

## 2021-06-30 DIAGNOSIS — E11.9 TYPE 2 DIABETES MELLITUS WITHOUT COMPLICATION, WITHOUT LONG-TERM CURRENT USE OF INSULIN (HCC): ICD-10-CM

## 2021-06-30 DIAGNOSIS — E66.01 MORBID OBESITY (HCC): Primary | ICD-10-CM

## 2021-06-30 DIAGNOSIS — I10 HYPERTENSION, UNSPECIFIED TYPE: ICD-10-CM

## 2021-06-30 PROCEDURE — 99203 OFFICE O/P NEW LOW 30 MIN: CPT | Performed by: INTERNAL MEDICINE

## 2021-06-30 NOTE — PROGRESS NOTES
Discuss weight loss. 1. Have you been to the ER, urgent care clinic since your last visit? Hospitalized since your last visit? No    2. Have you seen or consulted any other health care providers outside of the [address] [place] [address] since your last visit? Include any pap smears or colon screening.  No     BMI - 48.4

## 2021-07-01 NOTE — PROGRESS NOTES
INITIAL VISIT FOR MEDICAL WEIGHTLOSS PROGRAM  HISTORY OF PRESENT ILLNESS  Venkatesh Vincent is a 33 y.o. female. Patient was seen to establish care at the weight loss program here at . Patient has a history of a gastric sleeve, DM, HTN. Has a1c was 5.6. Reports that her highest weight was 396. She has the gastric sleeve 4 years ago. Would like her ideal weight to be low 200's are below. She continues to see bariatric surgery and dietician for directions. Patient last of 100 lbs since surgery in September 2020. Reports that she eats about 700-1000 calories daily for the last several months now. Finds her self in a plateau. Has been on programs like [facility] in the past and lost about 30 lbs. Reports using phentermine in the past but no benefit. Reports she can get anywhere from 4-6 hours of sleep. Only sleeps in for about 6 hours on the weekend. No apparent barriers. Does go to the gym for a boot camp 4-5 times a week for the last month. Reports no distress.    Visit Vitals  /66 (BP 1 Location: Right arm, BP Patient Position: Sitting, BP Cuff Size: Adult long)   Pulse 71   Temp 99.1 °F (37.3 °C) (Oral)   Resp 18   Ht 5' 3\" (1.6 m)   Wt 275 lb 4.8 oz (124.9 kg)   SpO2 99%   BMI 48.77 kg/m²     Past Medical History:   Diagnosis Date    Anxiety with depression     Arrhythmia     tachycardia    Asthma     [place] hump 1/7/2015    Carpal tunnel syndrome of right wrist 12/20/2016    [facility]     ALIYA (generalized anxiety disorder) 4/4/2018    GERD (gastroesophageal reflux disease) 8/13/2014    HTN (hypertension) 7/25/2011    Hyperaldosteronism ([address] 10/19/2015    Hyperhidrosis 4/4/2018    Morbid obesity ([address]     Other ill-defined conditions(799.89)     migraines    Palpitation 4/4/2018    Sleep apnea     uses CPAP    Tachycardia 4/4/2018     Past Surgical History:   Procedure Laterality Date    HX GASTRECTOMY  09/08/2020    Gastric sleeve     HX GYN  10/19/2020    Laparoscopic right oopherectomy, left cystectomy    HX HERNIA REPAIR  09/08/2020    HX OVARIAN CYST REMOVAL  10/19/2020    HX TONSILLECTOMY      HX WISDOM TEETH EXTRACTION      DC REMOVAL OF TONSILS,<13 Y/O       Family History   Problem Relation Age of Onset    Hypertension Mother     Diabetes Mother         Type II    Anesth Problems Mother         respiratory issues - feels like she cannot breath when coming out of anesthesia   [facility] Arthritis-rheumatoid Mother    [facility] Gout Mother     Psychiatric Disorder Father     Drug Abuse Father     Cancer Other         prostate    Hypertension Sister     Thyroid Disease Sister         \"I think\"    Breast Cancer Sister     Attention Deficit Hyperactivity Disorder Brother     Hypertension Sister      Outpatient Encounter Medications as of 6/30/2021   Medication Sig Dispense Refill    rimegepant (Nurtec ODT) 75 mg disintegrating tablet 1 tablet oral every other day 16 Tablet 2    traMADoL (ULTRAM) 50 mg tablet Take 1 Tablet by mouth every six (6) hours as needed for Pain for up to 42 days. Max Daily Amount: 200 mg. 45 Tablet 0    Blood-Glucose Transmitter (Dexcom G6 Transmitter) nahid USE AS DIRECTED 1 Device 3    Blood-Glucose Sensor (Dexcom G6 Sensor) nahid CHANGE EVERY 10 DAYS 3 Device 12    Biotin 2,500 mcg cap Take 10,000 mcg by mouth daily.  BENEFIBER, GUAR GUM, PO Take 1 Packet by mouth daily.  famotidine (PEPCID) 40 mg tablet Take 1 Tab by mouth two (2) times a day. (Patient taking differently: Take 40 mg by mouth as needed.) 60 Tab 1    ergocalciferol (ERGOCALCIFEROL) 1,250 mcg (50,000 unit) capsule Take 1 Cap by mouth every seven (7) days. Indications: low vitamin D levels 12 Cap 3    galcanezumab-gnlm (Emgality Pen) 120 mg/mL injection 1 mL by SubCUTAneous route every thirty (30) days.  1 mL 11  drospirenone, contraceptive, (Slynd) 4 mg (28) tab Slynd 4 mg (28) tablet   Take 1 tablet every day by oral route.  vilazodone (VIIBRYD) 40 mg tab tablet Take 1 Tab by mouth daily. 90 Tab 2    cyanocobalamin, vitamin B-12, (B-12 Compliance) 1,000 mcg/mL kit Once monthly  Indications: inadequate vitamin B12 1 Kit 5    omeprazole (PRILOSEC) 40 mg capsule Take 1 Cap by mouth daily. 90 Cap 3    metoprolol tartrate (LOPRESSOR) 100 mg IR tablet TAKE 1 TABLET BY MOUTH TWICE A  Tab 2    gabapentin (NEURONTIN) 300 mg capsule Take 1 Cap by mouth three (3) times daily. Max Daily Amount: 900 mg. 270 Cap 1    nystatin (MYCOSTATIN) topical cream Apply  to affected area two (2) times a day. Apply to abdominal folds twice daily as needed for rash 30 g 3    SUMAtriptan (IMITREX) 50 mg tablet 1 tab at onset of headache, can repeat X 1 in 2 hrs if HA persists. Not more than 10 days/month. 9 Tab 2    iron,carbonyl-FA-multivit-min (Opurity Multivitamin) 30 mg iron- 800 mcg chew Take 2 Tabs by mouth daily.  calcium carbonate/vitamin D3 (CALCIUM 600 + D,3, PO) Take 1 Tab by mouth two (2) times a day.  ALPRAZolam (XANAX) 0.5 mg tablet Take 1 Tab by mouth nightly as needed for Anxiety or Sleep. Max Daily Amount: 0.5 mg. Indications: anxious, repeated episodes of anxiety, panic disorder 30 Tab 3    atorvastatin (LIPITOR) 40 mg tablet TAKE 1 TABLET BY MOUTH EVERY DAY 90 Tab 3    aspirin delayed-release 81 mg tablet Take 1 Tab by mouth daily. 30 Tab 11    Blood-Glucose Meter,Continuous (Dexcom G6 ) misc Change sensor every 10 days 1 Each 0    albuterol (PROVENTIL HFA, VENTOLIN HFA) 90 mcg/actuation inhaler Take 1 Puff by inhalation every four (4) hours as needed for Wheezing. 1 Inhaler 1    amphetamine-dextroamphetamine XR (ADDERALL XR) 15 mg XR capsule Take 1 Cap by mouth every morning for 30 days. Max Daily Amount: 15 mg.  (Patient not taking: Reported on 6/30/2021) 30 Cap 0     No facility-administered encounter medications on file as of 6/30/2021. HPI    Review of Systems   Constitutional: Negative. Respiratory: Negative. Cardiovascular: Negative. Gastrointestinal: Negative. Genitourinary: Negative. Musculoskeletal: Positive for joint pain. Neurological: Negative. Psychiatric/Behavioral: Negative. Physical Exam  Vitals and nursing note reviewed. Constitutional:       Appearance: She is obese. HENT:      Head: Normocephalic. Mouth/Throat:      Mouth: Mucous membranes are moist.   Cardiovascular:      Rate and Rhythm: Normal rate and regular rhythm. Pulmonary:      Effort: Pulmonary effort is normal.      Breath sounds: Normal breath sounds. Abdominal:      General: Bowel sounds are normal.      Palpations: Abdomen is soft. Musculoskeletal:         General: Normal range of motion. Skin:     General: Skin is warm. Neurological:      Mental Status: She is alert and oriented to person, place, and time. Psychiatric:         Behavior: Behavior normal.         ASSESSMENT and PLAN  Diagnoses and all orders for this visit:    1. Morbid obesity ([address]  - will continue to follow up with bariatric medicine and dietician. Reviewed BMR calculators with goals. Will go up by 100-200 calories for 14-21 days to hopefully break her plateau. Will add additional protein and vegetables to diet. Will include small meals and water intake with at least 2-3 L daily. Encouraged patient to continue boot camp as tolerated for at least 45 minutes to an hour 4 times a week. Will continue to log her food choices in her baron. Discussed meal replacements as a good source of nutrients. Reviewed that her sleep patterns needed to improve with the goal of 7-8 hours daily. 2. Type 2 diabetes mellitus without complication, without long-term current use of insulin ([address]  - will continue to see endocrine as directed.   3. Hypertension, unspecified type    Labs were reviewed are are stable. rheumatology has ordered b12, SED, CRP, a1c, lipids, CMP, CBC, TSH, JULIEN, CK.        Follow-up and Dispositions    · Return in about 1 month (around 7/30/2021), or if symptoms worsen or fail to improve, for Follow up.       lab results and schedule of future lab studies reviewed with patient  reviewed diet, exercise and weight control  cardiovascular risk and specific lipid/LDL goals reviewed  reviewed medications and side effects in detail

## 2021-07-08 DIAGNOSIS — E11.21 TYPE 2 DIABETES WITH NEPHROPATHY (HCC): ICD-10-CM

## 2021-07-08 DIAGNOSIS — F41.1 GAD (GENERALIZED ANXIETY DISORDER): ICD-10-CM

## 2021-07-09 RX ORDER — ATORVASTATIN CALCIUM 40 MG/1
TABLET, FILM COATED ORAL
Qty: 90 TABLET | Refills: 3 | Status: SHIPPED | OUTPATIENT
Start: 2021-07-09 | End: 2022-04-27 | Stop reason: DRUGHIGH

## 2021-07-12 ENCOUNTER — OFFICE VISIT (OUTPATIENT)
Dept: FAMILY MEDICINE CLINIC | Age: 31
End: 2021-07-12
Payer: COMMERCIAL

## 2021-07-12 VITALS
SYSTOLIC BLOOD PRESSURE: 115 MMHG | OXYGEN SATURATION: 99 % | HEART RATE: 69 BPM | RESPIRATION RATE: 16 BRPM | HEIGHT: 63 IN | DIASTOLIC BLOOD PRESSURE: 57 MMHG | BODY MASS INDEX: 48.27 KG/M2 | TEMPERATURE: 98.4 F | WEIGHT: 272.4 LBS

## 2021-07-12 DIAGNOSIS — F41.1 GAD (GENERALIZED ANXIETY DISORDER): ICD-10-CM

## 2021-07-12 DIAGNOSIS — R76.8 ELEVATED ANTINUCLEAR ANTIBODY (ANA) LEVEL: Primary | ICD-10-CM

## 2021-07-12 DIAGNOSIS — R53.83 FATIGUE, UNSPECIFIED TYPE: ICD-10-CM

## 2021-07-12 DIAGNOSIS — E66.01 MORBID OBESITY (HCC): ICD-10-CM

## 2021-07-12 PROCEDURE — 96372 THER/PROPH/DIAG INJ SC/IM: CPT | Performed by: FAMILY MEDICINE

## 2021-07-12 PROCEDURE — 99214 OFFICE O/P EST MOD 30 MIN: CPT | Performed by: FAMILY MEDICINE

## 2021-07-12 RX ORDER — PHENTERMINE HYDROCHLORIDE 37.5 MG/1
37.5 TABLET ORAL
Qty: 30 TABLET | Refills: 0 | Status: SHIPPED | OUTPATIENT
Start: 2021-07-12 | End: 2021-08-15 | Stop reason: SDUPTHER

## 2021-07-12 RX ORDER — CYANOCOBALAMIN 1000 UG/ML
1000 INJECTION, SOLUTION INTRAMUSCULAR; SUBCUTANEOUS ONCE
Status: CANCELLED | OUTPATIENT
Start: 2021-07-12 | End: 2021-07-12

## 2021-07-12 NOTE — PROGRESS NOTES
1. Have you been to the ER, urgent care clinic since your last visit? Hospitalized since your last visit? No    2. Have you seen or consulted any other health care providers outside of the [address] [place] [address] since your last visit? Include any pap smears or colon screening. Yes [facility]     Chief Complaint   Patient presents with    Follow-up    Anxiety     Pt states she stopped taking her adderall, because she felt like it was not working. B12 Injection:  NDC #: [id number]  Lot #: [id number]  Expiration Date: 04/22  Left deltoid. Pt tolerated well.

## 2021-07-12 NOTE — PROGRESS NOTES
HISTORY OF PRESENT ILLNESS  Karine Jack is a 33 y.o. female, has a form to be completed at this visit a well,    Depression with the anxiety and panic states of mind    augustoley controlled with the current meds, not able to tolerate any SSRI or other recommended antidepressive or antianxiety meds except for the current SEGUNDO enhancers,   Patient state that it is getting better: pt states and reports of feeling less anxius, less guilty feeling,  less Hoplessness,ns/nh,ni,nh, less trouble with weight gain or loss, less tendency of etoh or illicit drug use, more ability of sleep, more ablitiy  to concentrate at work( she is only able to work 8-12 hrs per week at this time) and at home with the current medications,and all together a safe feeling at home and at work    b12 def   Currently  mostly B12 injection, previously patient was taking oral B12 and patient states that the oral medication was not helping, the injection has been providing the patient more stamina patient is also feeling less fatigued more active, no metformin no etoh intake, on ++ multivitamin daily    Allergies   Allergen Reactions    Latex Itching     Past Medical History:   Diagnosis Date    Anxiety with depression     Arrhythmia     tachycardia    Asthma     [place] hump 1/7/2015    Carpal tunnel syndrome of right wrist 12/20/2016    Diabetes ([address]     ALIYA (generalized anxiety disorder) 4/4/2018    GERD (gastroesophageal reflux disease) 8/13/2014    HTN (hypertension) 7/25/2011    Hyperaldosteronism ([address] 10/19/2015    Hyperhidrosis 4/4/2018    Morbid obesity ([address]     Other ill-defined conditions(799.89)     migraines    Palpitation 4/4/2018    Sleep apnea     uses CPAP    Tachycardia 4/4/2018     Past Surgical History:   Procedure Laterality Date    HX GASTRECTOMY  09/08/2020    Gastric sleeve     HX GYN  10/19/2020    Laparoscopic right oopherectomy, left cystectomy    HX HERNIA REPAIR  09/08/2020    HX OVARIAN CYST REMOVAL 10/19/2020    HX TONSILLECTOMY      HX WISDOM TEETH EXTRACTION      MT REMOVAL OF TONSILS,<12 [de-identified]       Family History   Problem Relation Age of Onset    Hypertension Mother     Diabetes Mother         Type II    Anesth Problems Mother         respiratory issues - feels like she cannot breath when coming out of anesthesia   [employer] Arthritis-rheumatoid Mother    [employer] Gout Mother     Psychiatric Disorder Father     Drug Abuse Father     Cancer Other         prostate    Hypertension Sister     Thyroid Disease Sister         \"I think\"    Breast Cancer Sister     Attention Deficit Hyperactivity Disorder Brother     Hypertension Sister      Social History     Tobacco Use    Smoking status: Former Smoker     Packs/day: 0.50     Years: 5.00     Pack years: 2.50     Quit date: 2019     Years since quittin.6    Smokeless tobacco: Never Used   Substance Use Topics    Alcohol use: Yes     Alcohol/week: 1.0 standard drinks     Types: 1 Shots of liquor, 1 Standard drinks or equivalent per week     Comment: socially, Monthly or less      Lab Results   Component Value Date/Time    Cholesterol, total 94 (L) 2021 12:59 PM    HDL Cholesterol 39 (L) 2021 12:59 PM    LDL, calculated 39 2021 12:59 PM    LDL, calculated 48 2020 09:44 AM    Triglyceride 73 2021 12:59 PM     Lab Results   Component Value Date/Time    ALT (SGPT) 20 2021 09:58 AM    Alk. phosphatase 84 2021 09:58 AM    Bilirubin, total 0.7 2021 09:58 AM    Albumin 4.0 2021 09:58 AM    Protein, total 6.6 2021 09:58 AM    PLATELET 224  09:58 AM        Review of Systems   Constitutional: Negative for chills, fever and malaise/fatigue. HENT: Negative for nosebleeds. Eyes: Negative for pain. Respiratory: Negative for cough and wheezing. Cardiovascular: Negative for chest pain and leg swelling. Gastrointestinal: Negative for constipation, diarrhea and nausea.    Genitourinary: Negative for frequency. Musculoskeletal: Negative for joint pain and myalgias. Skin: Negative for rash. Neurological: Negative for loss of consciousness. Endo/Heme/Allergies: Does not bruise/bleed easily. Psychiatric/Behavioral: Negative for depression. The patient is not nervous/anxious and does not have insomnia. All other systems reviewed and are negative. Physical Exam  Vitals and nursing note reviewed. Constitutional:       Appearance: She is well-developed. HENT:      Head: Normocephalic and atraumatic. Eyes:      Conjunctiva/sclera: Conjunctivae normal.   Cardiovascular:      Rate and Rhythm: Normal rate and regular rhythm. Heart sounds: Normal heart sounds. No murmur heard. Pulmonary:      Effort: Pulmonary effort is normal.      Breath sounds: Normal breath sounds. Abdominal:      General: Bowel sounds are normal. There is no distension. Palpations: Abdomen is soft. Musculoskeletal:         General: Normal range of motion. Cervical back: Normal range of motion and neck supple. Lymphadenopathy:      Cervical: No cervical adenopathy. Skin:     Findings: No erythema. Neurological:      Mental Status: She is alert and oriented to person, place, and time. Psychiatric:         Behavior: Behavior normal.         ASSESSMENT and PLAN  Diagnoses and all orders for this visit:    1. Elevated antinuclear antibody (JULIEN) level  -     REFERRAL TO RHEUMATOLOGY  -     VITAMIN B12 INJECTION  -     THER/PROPH/DIAG INJECTION, SUBCUT/IM    2. Morbid obesity (HCC)  -     phentermine (ADIPEX-P) 37.5 mg tablet; Take 1 Tablet by mouth every morning. Max Daily Amount: 37.5 mg.  -     VITAMIN B12 INJECTION  -     THER/PROPH/DIAG INJECTION, SUBCUT/IM    3. ALIYA (generalized anxiety disorder)  -     phentermine (ADIPEX-P) 37.5 mg tablet; Take 1 Tablet by mouth every morning.  Max Daily Amount: 37.5 mg.  -     VITAMIN B12 INJECTION  -     THER/PROPH/DIAG INJECTION, SUBCUT/IM    4. Fatigue, unspecified type  -     phentermine (ADIPEX-P) 37.5 mg tablet; Take 1 Tablet by mouth every morning. Max Daily Amount: 37.5 mg.  -     VITAMIN B12 INJECTION  -     THER/PROPH/DIAG INJECTION, SUBCUT/IM  -     cyanocobalamin (Vitamin B-12) 1,000 mcg/mL injection; 1 mL by IntraMUSCular route once for 1 dose.     Patient has no contraindication to use this medication no history of malabsorption syndrome patient has no history of eating disorder patient has tried various of weight loss treatment plan unfortunately failed nutritional counseling exercise regimen and calorie and fat restricted diet regardless patient was advised to continue with nutritional counseling exercise regimen calorie and fat restricted diet for the better outcome

## 2021-07-14 RX ORDER — CYANOCOBALAMIN 1000 UG/ML
1000 INJECTION, SOLUTION INTRAMUSCULAR; SUBCUTANEOUS ONCE
Qty: 1 ML | Refills: 0
Start: 2021-07-14 | End: 2021-07-14

## 2021-07-16 ENCOUNTER — OFFICE VISIT (OUTPATIENT)
Dept: ORTHOPEDIC SURGERY | Age: 31
End: 2021-07-16
Payer: COMMERCIAL

## 2021-07-16 VITALS
TEMPERATURE: 96.7 F | WEIGHT: 275 LBS | HEIGHT: 63 IN | BODY MASS INDEX: 48.73 KG/M2 | SYSTOLIC BLOOD PRESSURE: 133 MMHG | HEART RATE: 74 BPM | OXYGEN SATURATION: 100 % | DIASTOLIC BLOOD PRESSURE: 84 MMHG

## 2021-07-16 DIAGNOSIS — M17.0 BILATERAL PRIMARY OSTEOARTHRITIS OF KNEE: ICD-10-CM

## 2021-07-16 DIAGNOSIS — M76.51 PATELLAR TENDINITIS OF BOTH KNEES: ICD-10-CM

## 2021-07-16 DIAGNOSIS — M76.52 PATELLAR TENDINITIS OF BOTH KNEES: ICD-10-CM

## 2021-07-16 DIAGNOSIS — M16.0 BILATERAL PRIMARY OSTEOARTHRITIS OF HIP: ICD-10-CM

## 2021-07-16 DIAGNOSIS — M25.561 RIGHT KNEE PAIN, UNSPECIFIED CHRONICITY: Primary | ICD-10-CM

## 2021-07-16 PROCEDURE — 99212 OFFICE O/P EST SF 10 MIN: CPT | Performed by: ORTHOPAEDIC SURGERY

## 2021-07-16 NOTE — PROGRESS NOTES
Identified pt with two pt identifiers (name and ). Reviewed chart in preparation for visit and have obtained necessary documentation. Nando Palma is a 33 y.o. female  Chief Complaint   Patient presents with    Follow-up     Bilateral knee and hip     Visit Vitals  /84 (BP 1 Location: Left upper arm, BP Patient Position: Sitting, BP Cuff Size: Large adult)   Pulse 74   Temp (!) 96.7 °F (35.9 °C) (Tympanic)   Ht 5' 3\" (1.6 m)   Wt 275 lb (124.7 kg)   SpO2 100%   BMI 48.71 kg/m²     1. Have you been to the ER, urgent care clinic since your last visit? Hospitalized since your last visit? No    2. Have you seen or consulted any other health care providers outside of the [address] [place] [address] since your last visit? Include any pap smears or colon screening.  No

## 2021-07-16 NOTE — PROGRESS NOTES
7/16/2021      CC: Bilateral knee pain, left hip pain    HPI:      This is a 33y.o. year old female who presents for a follow up visit. The patient was last seen and diagnosed with osteoarthritis of the bilateral knees and osteoarthritis of the bilateral knees and left hip. The patient's treatments since the most recent visit have comprised of TENS unit, she has not been to physical therapy and she has not filled her tramadol prescription. The patient has had minimal relief of the chief complaint. PMH:  Past Medical History:   Diagnosis Date    Anxiety with depression     Arrhythmia     tachycardia    Asthma     [place] hump 1/7/2015    Carpal tunnel syndrome of right wrist 12/20/2016    [facility] [address]     ALIYA (generalized anxiety disorder) 4/4/2018    GERD (gastroesophageal reflux disease) 8/13/2014    HTN (hypertension) 7/25/2011    Hyperaldosteronism ([facility] [address] 10/19/2015    Hyperhidrosis 4/4/2018    Morbid obesity ([facility] [address]     Other ill-defined conditions(799.89)     migraines    Palpitation 4/4/2018    Sleep apnea     uses CPAP    Tachycardia 4/4/2018       PSxHx:  Past Surgical History:   Procedure Laterality Date    HX GASTRECTOMY  09/08/2020    Gastric sleeve     HX GYN  10/19/2020    Laparoscopic right oopherectomy, left cystectomy    HX HERNIA REPAIR  09/08/2020    HX OVARIAN CYST REMOVAL  10/19/2020    HX TONSILLECTOMY      HX WISDOM TEETH EXTRACTION      [place] REMOVAL OF TONSILS,<11 Y/O         Meds:    Current Outpatient Medications:     phentermine (ADIPEX-P) 37.5 mg tablet, Take 1 Tablet by mouth every morning.  Max Daily Amount: 37.5 mg., Disp: 30 Tablet, Rfl: 0    atorvastatin (LIPITOR) 40 mg tablet, TAKE 1 TABLET BY MOUTH EVERY DAY, Disp: 90 Tablet, Rfl: 3    rimegepant (Nurtec ODT) 75 mg disintegrating tablet, 1 tablet oral every other day, Disp: 16 Tablet, Rfl: 2    Blood-Glucose Transmitter (Dexcom G6 Transmitter) nahid, USE AS DIRECTED, Disp: 1 Device, Rfl: 3   Blood-Glucose Sensor (Dexcom G6 Sensor) nahid, CHANGE EVERY 10 DAYS, Disp: 3 Device, Rfl: 12    Biotin 2,500 mcg cap, Take 10,000 mcg by mouth daily. , Disp: , Rfl:     BENEFIBER, GUAR GUM, PO, Take 1 Packet by mouth daily. , Disp: , Rfl:     ergocalciferol (ERGOCALCIFEROL) 1,250 mcg (50,000 unit) capsule, Take 1 Cap by mouth every seven (7) days. Indications: low vitamin D levels, Disp: 12 Cap, Rfl: 3    galcanezumab-gnlm (Emgality Pen) 120 mg/mL injection, 1 mL by SubCUTAneous route every thirty (30) days. , Disp: 1 mL, Rfl: 11    drospirenone, contraceptive, (Slynd) 4 mg (28) tab, Slynd 4 mg (28) tablet  Take 1 tablet every day by oral route., Disp: , Rfl:     vilazodone (VIIBRYD) 40 mg tab tablet, Take 1 Tab by mouth daily. , Disp: 90 Tab, Rfl: 2    cyanocobalamin, vitamin B-12, (B-12 Compliance) 1,000 mcg/mL kit, Once monthly  Indications: inadequate vitamin B12, Disp: 1 Kit, Rfl: 5    omeprazole (PRILOSEC) 40 mg capsule, Take 1 Cap by mouth daily. , Disp: 90 Cap, Rfl: 3    metoprolol tartrate (LOPRESSOR) 100 mg IR tablet, TAKE 1 TABLET BY MOUTH TWICE A DAY, Disp: 180 Tab, Rfl: 2    gabapentin (NEURONTIN) 300 mg capsule, Take 1 Cap by mouth three (3) times daily. Max Daily Amount: 900 mg., Disp: 270 Cap, Rfl: 1    nystatin (MYCOSTATIN) topical cream, Apply  to affected area two (2) times a day. Apply to abdominal folds twice daily as needed for rash, Disp: 30 g, Rfl: 3    SUMAtriptan (IMITREX) 50 mg tablet, 1 tab at onset of headache, can repeat X 1 in 2 hrs if HA persists. Not more than 10 days/month., Disp: 9 Tab, Rfl: 2    iron,carbonyl-FA-multivit-min (Opurity Multivitamin) 30 mg iron- 800 mcg chew, Take 2 Tabs by mouth daily. , Disp: , Rfl:     calcium carbonate/vitamin D3 (CALCIUM 600 + D,3, PO), Take 1 Tab by mouth two (2) times a day., Disp: , Rfl:     ALPRAZolam (XANAX) 0.5 mg tablet, Take 1 Tab by mouth nightly as needed for Anxiety or Sleep. Max Daily Amount: 0.5 mg.  Indications: anxious, repeated episodes of anxiety, panic disorder, Disp: 30 Tab, Rfl: 3    aspirin delayed-release 81 mg tablet, Take 1 Tab by mouth daily. , Disp: 30 Tab, Rfl: 11    Blood-Glucose Meter,Continuous (Dexcom G6 ) misc, Change sensor every 10 days, Disp: 1 Each, Rfl: 0    albuterol (PROVENTIL HFA, VENTOLIN HFA) 90 mcg/actuation inhaler, Take 1 Puff by inhalation every four (4) hours as needed for Wheezing., Disp: 1 Inhaler, Rfl: 1    All: Allergies   Allergen Reactions    Latex Itching       Social Hx:  Social History     Socioeconomic History    Marital status: SINGLE     Spouse name: Not on file    Number of children: Not on file    Years of education: Not on file    Highest education level: Not on file   Occupational History    Occupation: customer ser and student     Comment: [employer]   Tobacco Use    Smoking status: Former Smoker     Packs/day: 0.50     Years: 5.00     Pack years: 2.50     Quit date: 2019     Years since quittin.7    Smokeless tobacco: Never Used   Vaping Use    Vaping Use: Never used   Substance and Sexual Activity    Alcohol use: Yes     Alcohol/week: 1.0 standard drinks     Types: 1 Shots of liquor, 1 Standard drinks or equivalent per week     Comment: socially, Monthly or less    Drug use: No    Sexual activity: Not Currently     Partners: Male     Birth control/protection: Condom   Other Topics Concern     Social Determinants of Health     Financial Resource Strain:     Difficulty of Paying Living Expenses:    Food Insecurity:     Worried About Running Out of Food in the Last Year:     Ran Out of Food in the Last Year:    Transportation Needs:     Lack of Transportation (Medical):      Lack of Transportation (Non-Medical):    Physical Activity:     Days of Exercise per Week:     Minutes of Exercise per Session:    Stress:     Feeling of Stress :    Social Connections:     Frequency of Communication with Friends and Family:     Frequency of Social Gatherings with Friends and Family:     Attends Hoahaoism Services:     Active Member of Clubs or Organizations:     Attends Club or Organization Meetings:     Marital Status:        Family Hx:  Family History   Problem Relation Age of Onset    Hypertension Mother     Diabetes Mother         Type II    Anesth Problems Mother         respiratory issues - feels like she cannot breath when coming out of anesthesia   [employer] Arthritis-rheumatoid Mother     Gout Mother     Psychiatric Disorder Father     Drug Abuse Father     Cancer Other         prostate    Hypertension Sister     Thyroid Disease Sister         \"I think\"    Breast Cancer Sister     Attention Deficit Hyperactivity Disorder Brother     Hypertension Sister          Review of Systems:       General: Denies headache, lethargy, fever, weight loss  Ears/Nose/Throat: Denies ear discharge, drainage, nosebleeds, hoarse voice, dental problems  Cardiovascular: Denies chest pain, shortness of breath  Lungs: Denies chest pain, breathing problems, wheezing, pneumonia  Stomach: Denies stomach pain, heartburn, constipation, irritable bowel  Skin: Denies rash, sores, open wounds  Musculoskeletal: Bilateral knee pain, left hip pain  Genitourinary: Denies dysuria, hematuria, polyuria  Gastrointestinal: Denies constipation, obstipation, diarrhea  Neurological: Denies changes in sight, smell, hearing, taste, seizures. Denies loss of consciousness.   Psychiatric: Denies depression, sleep pattern changes, anxiety, change in personality  Endocrine: Denies mood swings, heat or cold intolerance  Hematologic/Lymphatic: Denies anemia, purpura, petechia  Allergic/Immunologic: Denies swelling of throat, pain or swelling at lymph nodes      Physical Examination:    Visit Vitals  /84 (BP 1 Location: Left upper arm, BP Patient Position: Sitting, BP Cuff Size: Large adult)   Pulse 74   Temp (!) 96.7 °F (35.9 °C) (Tympanic)   Ht 5' 3\" (1.6 m)   Wt 124.7 kg (275 lb)   SpO2 100%   BMI 48.71 kg/m²        General: AOX3, no apparent distress  Psychiatric: mood and affect appropriate  Lungs: breathing is symmetric and unlabored bilaterally  Heart: regular rate and rhythm  Abdomen: no guarding  Head: normocephalic, atraumatic  Skin: No significant abnormalities, good turgor  Sensation intact to light touch: C5-T1 dermatomes  Muscular exam: 5/5 strength in all major muscle groups unless noted in specialty exam.    Extremities        Right lower extremity: Extremity is examined, no evidence of gross deformity, no gross motor or sensory deficit. Full active and passive range of motion is noted. Muscle tone and bulk is within normal expected limits. Capillary refill is less than 2 seconds distally. Left lower extremity: Extremity is examined, no evidence of gross deformity, no gross motor or sensory deficit. Full active and passive range of motion is noted. Muscle tone and bulk is within normal expected limits. Capillary refill is less than 2 seconds distally. Diagnostics:    Pertinent Diagnostics: None      Assessment:, Bilateral knees and left hip  Plan: This patient and I had a thorough discussion regarding the reasoning for her pain as well as the treatment options going forward, she does have certainly some on utilized treatments available at this point, she has elected to proceed with continued conservative treatments with the plan to proceed with continued weight loss exercises, range of motion exercises and increased activities to tolerance. She will fill her tramadol, she will continue to work on her stretches and follow-up with me on an as-needed basis. She stated her understanding and satisfaction. Ms. Edilma Sneed has a reminder for a \"due or due soon\" health maintenance. I have asked that she contact her primary care provider for follow-up on this health maintenance.

## 2021-07-26 ENCOUNTER — OFFICE VISIT (OUTPATIENT)
Dept: URGENT CARE | Age: 31
End: 2021-07-26
Payer: COMMERCIAL

## 2021-07-26 VITALS
HEART RATE: 75 BPM | OXYGEN SATURATION: 99 % | DIASTOLIC BLOOD PRESSURE: 63 MMHG | SYSTOLIC BLOOD PRESSURE: 134 MMHG | TEMPERATURE: 98.3 F | RESPIRATION RATE: 18 BRPM

## 2021-07-26 DIAGNOSIS — Z20.822 ENCOUNTER FOR LABORATORY TESTING FOR COVID-19 VIRUS: Primary | ICD-10-CM

## 2021-07-26 LAB — SARS-COV-2 POC: NEGATIVE

## 2021-07-26 PROCEDURE — S9083 URGENT CARE CENTER GLOBAL: HCPCS | Performed by: NURSE PRACTITIONER

## 2021-07-26 PROCEDURE — 87426 SARSCOV CORONAVIRUS AG IA: CPT | Performed by: NURSE PRACTITIONER

## 2021-07-26 NOTE — PROGRESS NOTES
Patient presents with a request for COVID Testing. She has not been exposed to a COVID positive person. She is symptomatic with allergy symptoms which are normal for her at this time of year. Her employer is asking for COVID test.      Patient takes Zyrtec for her allergies daily. This has not been very effective. She has used Flonase in the past.  She states that she has intermittent nosebleeds which are self-limiting and not problematic. This patient was seen in [facility] at [address] [place] [address] [facility] while in their vehicle due to COVID-19 pandemic with PPE and focused examination in order to decrease community viral transmission. The patient/guardian gave verbal consent to treat. The history is provided by the patient.         Past Medical History:   Diagnosis Date    Anxiety with depression     Arrhythmia     tachycardia    Asthma     [place] hump 1/7/2015    Carpal tunnel syndrome of right wrist 12/20/2016    Diabetes ([facility] [address]     ALIYA (generalized anxiety disorder) 4/4/2018    GERD (gastroesophageal reflux disease) 8/13/2014    HTN (hypertension) 7/25/2011    Hyperaldosteronism ([facility] [address] 10/19/2015    Hyperhidrosis 4/4/2018    Morbid obesity ([facility] [address]     Other ill-defined conditions(799.89)     migraines    Palpitation 4/4/2018    Sleep apnea     uses CPAP    Tachycardia 4/4/2018        Past Surgical History:   Procedure Laterality Date    HX GASTRECTOMY  09/08/2020    Gastric sleeve     HX GYN  10/19/2020    Laparoscopic right oopherectomy, left cystectomy    HX HERNIA REPAIR  09/08/2020    HX OVARIAN CYST REMOVAL  10/19/2020    HX TONSILLECTOMY      HX WISDOM TEETH EXTRACTION      [place] REMOVAL OF TONSILS,<11 Y/O           Family History   Problem Relation Age of Onset    Hypertension Mother     Diabetes Mother         Type II    Anesth Problems Mother         respiratory issues - feels like she cannot breath when coming out of anesthesia    Arthritis-rheumatoid Mother Nazia Valentine Gout Mother     Psychiatric Disorder Father     Drug Abuse Father     Cancer Other         prostate    Hypertension Sister     Thyroid Disease Sister         \"I think\"    Breast Cancer Sister     Attention Deficit Hyperactivity Disorder Brother     Hypertension Sister         Social History     Socioeconomic History    Marital status: SINGLE     Spouse name: Not on file    Number of children: Not on file    Years of education: Not on file    Highest education level: Not on file   Occupational History    Occupation: customer ser and student     Comment: [employer]   Tobacco Use    Smoking status: Former Smoker     Packs/day: 0.50     Years: 5.00     Pack years: 2.50     Quit date: 2019     Years since quittin.7    Smokeless tobacco: Never Used   Vaping Use    Vaping Use: Never used   Substance and Sexual Activity    Alcohol use: Yes     Alcohol/week: 1.0 standard drinks     Types: 1 Shots of liquor, 1 Standard drinks or equivalent per week     Comment: socially, Monthly or less    Drug use: No    Sexual activity: Not Currently     Partners: Male     Birth control/protection: Condom   Other Topics Concern     Service Not Asked    Blood Transfusions Not Asked    Caffeine Concern Not Asked    Occupational Exposure Not Asked    Hobby Hazards Not Asked    Sleep Concern Not Asked    Stress Concern Not Asked    Weight Concern Not Asked    Special Diet Not Asked    Back Care Not Asked    Exercise Not Asked    Bike Helmet Not Asked    [address] Not Asked    Self-Exams Not Asked   Social History Narrative    Not on file     Social Determinants of Health     Financial Resource Strain:     Difficulty of Paying Living Expenses:    Food Insecurity:     Worried About Running Out of Food in the Last Year:     [address] in the Last Year:    Transportation Needs:     Lack of Transportation (Medical):      Lack of Transportation (Non-Medical):    Physical Activity:     Days of Exercise per Week:     Minutes of Exercise per Session:    Stress:     Feeling of Stress :    Social Connections:     Frequency of Communication with Friends and Family:     Frequency of Social Gatherings with Friends and Family:     Attends Hindu Services:     Active Member of Clubs or Organizations:     Attends Club or Organization Meetings:     Marital Status:    Intimate Partner Violence:     Fear of Current or Ex-Partner:     Emotionally Abused:     Physically Abused:     Sexually Abused: ALLERGIES: Latex    Review of Systems   Constitutional: Negative for activity change, appetite change, chills, fatigue and fever. HENT: Positive for ear pain (L>R), postnasal drip and sore throat. Negative for congestion, rhinorrhea, sinus pressure and sinus pain. Respiratory: Negative for cough, chest tightness and shortness of breath. Cardiovascular: Negative for chest pain. Gastrointestinal: Negative for diarrhea, nausea and vomiting. All other systems reviewed and are negative. Vitals:    07/26/21 1635   BP: 134/63   Pulse: 75   Resp: 18   Temp: 98.3 °F (36.8 °C)   SpO2: 99%       Physical Exam  Constitutional:       General: She is not in acute distress. Appearance: She is well-developed. She is not ill-appearing or diaphoretic. HENT:      Right Ear: Tympanic membrane and ear canal normal.      Left Ear: Tympanic membrane and ear canal normal.      Nose: Congestion present. Mouth/Throat:      Mouth: Mucous membranes are moist.      Pharynx: No oropharyngeal exudate or posterior oropharyngeal erythema. Neurological:      Mental Status: She is alert. Psychiatric:         Behavior: Behavior is cooperative. MDM    ICD-10-CM ICD-9-CM    1. Encounter for laboratory testing for COVID-19 virus  Z20.822 V01.79 AMB POC SARS-COV-2     No orders of the defined types were placed in this encounter.     Results for orders placed or performed in visit on 07/26/21 AMB POC SARS-COV-2   Result Value Ref Range    SARS-COV-2 POC Negative Negative     The patients condition was discussed with the patient and they understand. The patient is to follow up with primary care doctor. If signs and symptoms become worse the pt is to go to the ER. The patient is to take medications as prescribed. Patient to continue utilizing her over-the-counter medications. Covid test was negative. Discussed with patient nasal lubricants but not needed at this time.      Procedures

## 2021-07-27 ENCOUNTER — TELEPHONE (OUTPATIENT)
Dept: RHEUMATOLOGY | Age: 31
End: 2021-07-27

## 2021-07-28 ENCOUNTER — OFFICE VISIT (OUTPATIENT)
Dept: SURGERY | Age: 31
End: 2021-07-28
Payer: COMMERCIAL

## 2021-07-28 VITALS
WEIGHT: 272.5 LBS | RESPIRATION RATE: 18 BRPM | SYSTOLIC BLOOD PRESSURE: 142 MMHG | HEIGHT: 63 IN | HEART RATE: 72 BPM | OXYGEN SATURATION: 98 % | BODY MASS INDEX: 48.28 KG/M2 | DIASTOLIC BLOOD PRESSURE: 84 MMHG

## 2021-07-28 DIAGNOSIS — E11.9 TYPE 2 DIABETES MELLITUS WITHOUT COMPLICATION, WITHOUT LONG-TERM CURRENT USE OF INSULIN (HCC): ICD-10-CM

## 2021-07-28 DIAGNOSIS — I10 HYPERTENSION, UNSPECIFIED TYPE: ICD-10-CM

## 2021-07-28 DIAGNOSIS — E66.01 MORBID OBESITY (HCC): Primary | ICD-10-CM

## 2021-07-28 PROCEDURE — 99214 OFFICE O/P EST MOD 30 MIN: CPT | Performed by: INTERNAL MEDICINE

## 2021-07-28 RX ORDER — MELOXICAM 15 MG/1
15 TABLET ORAL AS NEEDED
COMMUNITY
Start: 2021-07-26 | End: 2021-09-29

## 2021-07-28 NOTE — PROGRESS NOTES
[address] [place] [address], [address] [place] [address] 22.  [phone] o  [phone] f    PHYSICIAN FOLLOW UP NOTE  Method of Delivery:   Face to Face    PCP:  Wendy Espinoza MD      HISTORY OF PRESENT ILLNESS  Agree with nurse and registered dietician notes. Taryn Mansfield is a 33 y.o. female with Obesity Class  Body mass index is 48.27 kg/m². and associated health concerns presents for Weight Management      Patient has been participating in the [employer] Management Program using the [address]. Has lost an additional 3 lbs since starting. Patient goals for participation in this weight management program:   Around 200 lbs      Current Anthropometric Measures  Weight Metrics 7/28/2021 7/28/2021 7/16/2021 7/12/2021 6/30/2021 6/30/2021 6/16/2021   Weight - 272 lb 8 oz 275 lb 272 lb 6.4 oz - 275 lb 4.8 oz 270 lb   Neck Circ (inches) 15.75 - - - 16.5 - -   Waist Measure Inches 51 - - - 54 - -   Body Fat % 46.9 - - - 47.1 - -   BMI - 48.27 kg/m2 48.71 kg/m2 48.25 kg/m2 - 48.77 kg/m2 47.83 kg/m2       Weight 272 of 275pounds since last appointment in our Center. Contributing factors to weight : past lifestyle   Total pounds loss since starting this therapeutic approach: 3lbs. Is patient satisfied with his/her progress since the last appointment? Patient is very excited to have broken her stall    What makes it difficult to adhere to this plan of care: no, has had some past things come up mentally but is working through them. Eating Habits  Total calories consumed per day:  Attempts 1000 kcal      Number of Meal replacements consumed per day:  2 meals and 2 snacks   Negative Side Effects:  none      Does get full fast and sometimes is unable to eat the 1000 calories.      Drinking Habits  Average amount of water consumed daily: 62 oz/day  (Goal 2 L or 67 oz daily, minimally)     Social History Tobacco Use    Smoking status: Former Smoker     Packs/day: 0.50     Years: 5.00     Pack years: 2.50     Quit date: 2019     Years since quittin.7    Smokeless tobacco: Never Used   Vaping Use    Vaping Use: Never used   Substance Use Topics    Alcohol use: Yes     Alcohol/week: 1.0 standard drinks     Types: 1 Shots of liquor, 1 Standard drinks or equivalent per week     Comment: socially, Monthly or less    Drug use: No         Sleep Habits  Total hours of sleep nightly: 5 hours (Goal 7-8 hours/nightly)  Physical Activity History  Type of physical activity:  Attends a boot [employer]     Mobile Apps used for meal planning, calorie counting. Weight Loss Medication History  Current weight loss medication:  Phentermine daily    Are appetite, cravings and hunger controlled with its use:  Just started it this week       Outpatient Medications Marked as Taking for the 21 encounter (Office Visit) with Vivi Litten, NP   Medication Sig Dispense Refill    meloxicam (MOBIC) 15 mg tablet       phentermine (ADIPEX-P) 37.5 mg tablet Take 1 Tablet by mouth every morning. Max Daily Amount: 37.5 mg. 30 Tablet 0    atorvastatin (LIPITOR) 40 mg tablet TAKE 1 TABLET BY MOUTH EVERY DAY 90 Tablet 3    rimegepant (Nurtec ODT) 75 mg disintegrating tablet 1 tablet oral every other day 16 Tablet 2    Biotin 2,500 mcg cap Take 10,000 mcg by mouth daily.  BENEFIBER, GUAR GUM, PO Take 1 Packet by mouth daily.  ergocalciferol (ERGOCALCIFEROL) 1,250 mcg (50,000 unit) capsule Take 1 Cap by mouth every seven (7) days. Indications: low vitamin D levels 12 Cap 3    galcanezumab-gnlm (Emgality Pen) 120 mg/mL injection 1 mL by SubCUTAneous route every thirty (30) days. 1 mL 11    drospirenone, contraceptive, (Slynd) 4 mg (28) tab Slynd 4 mg (28) tablet   Take 1 tablet every day by oral route.  vilazodone (VIIBRYD) 40 mg tab tablet Take 1 Tab by mouth daily.  90 Tab 2    cyanocobalamin, vitamin B-12, (B-12 Compliance) 1,000 mcg/mL kit Once monthly  Indications: inadequate vitamin B12 1 Kit 5    omeprazole (PRILOSEC) 40 mg capsule Take 1 Cap by mouth daily. 90 Cap 3    metoprolol tartrate (LOPRESSOR) 100 mg IR tablet TAKE 1 TABLET BY MOUTH TWICE A  Tab 2    gabapentin (NEURONTIN) 300 mg capsule Take 1 Cap by mouth three (3) times daily. Max Daily Amount: 900 mg. 270 Cap 1    nystatin (MYCOSTATIN) topical cream Apply  to affected area two (2) times a day. Apply to abdominal folds twice daily as needed for rash 30 g 3    SUMAtriptan (IMITREX) 50 mg tablet 1 tab at onset of headache, can repeat X 1 in 2 hrs if HA persists. Not more than 10 days/month. 9 Tab 2    iron,carbonyl-FA-multivit-min (Opurity Multivitamin) 30 mg iron- 800 mcg chew Take 2 Tabs by mouth daily.  calcium carbonate/vitamin D3 (CALCIUM 600 + D,3, PO) Take 1 Tab by mouth two (2) times a day.  aspirin delayed-release 81 mg tablet Take 1 Tab by mouth daily.  30 Tab 11       Allergies   Allergen Reactions    Latex Itching       Surgical History  Previous Weight Loss Surgery: 9/2020 Date of Surgery and Surgeon:  Dr. Mikhail Chanel  Initial Weight Loss after Bariatric Surgery:  Sept last year       Past Surgical History:   Procedure Laterality Date    HX GASTRECTOMY  09/08/2020    Gastric sleeve     HX GYN  10/19/2020    Laparoscopic right oopherectomy, left cystectomy    HX HERNIA REPAIR  09/08/2020    HX OVARIAN CYST REMOVAL  10/19/2020    HX TONSILLECTOMY      HX WISDOM TEETH EXTRACTION      NE REMOVAL OF TONSILS,<11 Y/O         Behavioral Health History  DEPRESSION SCREENING:    3 most recent PHQ Screens 3/19/2021   PHQ Not Done -   Little interest or pleasure in doing things Not at all   Feeling down, depressed, irritable, or hopeless Not at all   Total Score PHQ 2 0   Trouble falling or staying asleep, or sleeping too much -   Feeling tired or having little energy -   Poor appetite, weight loss, or overeating -   Feeling bad about yourself - or that you are a failure or have let yourself or your family down -   Trouble concentrating on things such as school, work, reading, or watching TV -   Moving or speaking so slowly that other people could have noticed; or the opposite being so fidgety that others notice -   Thoughts of being better off dead, or hurting yourself in some way -   PHQ 9 Score -     Do you have any current major lifestyle changes or stressors: is doing ok. Has the support at home. Has some past trauma and this is around the time of the occurrence      Associated Medical Conditions    Past Medical History:   Diagnosis Date    Anxiety with depression     Arrhythmia     tachycardia    Asthma     [place] hump 1/7/2015    Carpal tunnel syndrome of right wrist 12/20/2016    Diabetes [facility]     ALIYA (generalized anxiety disorder) 4/4/2018    GERD (gastroesophageal reflux disease) 8/13/2014    HTN (hypertension) 7/25/2011    Hyperaldosteronism ([address] 10/19/2015    Hyperhidrosis 4/4/2018    Morbid obesity ([facility]     Other ill-defined conditions(799.89)     migraines    Palpitation 4/4/2018    Sleep apnea     uses CPAP    Tachycardia 4/4/2018       Family History   Problem Relation Age of Onset    Hypertension Mother     Diabetes Mother         Type II    Anesth Problems Mother         respiratory issues - feels like she cannot breath when coming out of anesthesia    Arthritis-rheumatoid Mother    [facility] Gout Mother     Psychiatric Disorder Father     Drug Abuse Father     Cancer Other         prostate    Hypertension Sister     Thyroid Disease Sister         \"I think\"    Breast Cancer Sister     Attention Deficit Hyperactivity Disorder Brother     Hypertension Sister          OB/GYN History (For Female Patients)  Social History     Substance and Sexual Activity   Sexual Activity Not Currently    Partners: Male    Birth control/protection: Condom     OB History    No obstetric history on file.      Patient will also notify the dietician for recommendations during travel. Immunization History   Administered Date(s) Administered    COVID-19, PFIZER, MRNA, LNP-S, PF, 30MCG/0.3ML DOSE 12/22/2020, 12/22/2020, 01/12/2021, 01/12/2021    HPV (Quad) 04/05/2016    Hep B Vaccine (Adult) 10/25/2018    Influenza Vaccine 11/01/2019, 11/02/2020    Influenza Vaccine (Quad) PF (>6 Mo Flulaval, Fluarix, and >3 Yrs Afluria, Fluzone [id number]) 10/19/2015, 12/20/2016, 01/05/2018    Influenza Vaccine (Quadrivalent)(>18 Yrs Flublok [id number]) 01/05/2018    Influenza Vaccine PF 09/27/2013    Pneumococcal Polysaccharide (PPSV-23) 09/27/2013    TDAP Vaccine 08/27/2012     ROS    Pt denies hunger, cravings, lack of focus, fatigue, feeling weak, headaches, dizziness, light headedness, nausea, vomiting, diarrhea, constipation, indigestion, rapid heart rate, SOB, low blood sugar, feeling cold, hair loss, rash, fluid retention, leg aches, difficulty sleeping, irritability, mood swings, or other associated symptoms. Review of Systems negative except as noted above in HPI. PHYSICAL EXAMINATION    Visit Vitals  BP (!) 142/84 (BP 1 Location: Left arm, BP Patient Position: Sitting)   Pulse 72   Resp 18   Ht 5' 3\" (1.6 m)   Wt 272 lb 8 oz (123.6 kg)   SpO2 98%   BMI 48.27 kg/m²           Weight Metrics 7/28/2021 7/28/2021 7/16/2021 7/12/2021 6/30/2021 6/30/2021 6/16/2021   Weight - 272 lb 8 oz 275 lb 272 lb 6.4 oz - 275 lb 4.8 oz 270 lb   Neck Circ (inches) 15.75 - - - 16.5 - -   Waist Measure Inches 51 - - - 54 - -   Body Fat % 46.9 - - - 47.1 - -   BMI - 48.27 kg/m2 48.71 kg/m2 48.25 kg/m2 - 48.77 kg/m2 47.83 kg/m2          Neck Circumference:  Acceptable range for M >16 inches, F>15 inches  Waist Circumference:  Acceptable range for M> 40 inches/102 cm, F > 35 inches, 88 cm  Body Fat: Acceptable range for M 18-24%, F 25-31%    General appearance - Well nourished. Well appearing. Well developed. No acute distress. Obese.    Head - Normocephalic. Atraumatic. Eyes - pupils equal and reactive. Ears - Hearing is grossly normal bilaterally. Nose - normal and patent. No polyps noted. No erythema. No discharge. Mouth - mucous membranes with adequate moisture. Neck - supple. Midline trachea. No carotid bruits noted bilaterally. Chest - clear to auscultation bilaterally anteriorly and posteriorly  Heart - normal rate. Regular rhythm. Normal S1, S2.   Abdomen - soft and distended. Bowel sounds normal x 4 quadrants. No tenderness noted. Neurological - awake, alert and oriented to person, place, and time and event. Heme/Lymph - peripheral pulses normal x 4 extremities. No peripheral edema is noted. Musculoskeletal - Intact x 4 extremities. Full ROM x 4 extremities. No pain with movement. Back exam - normal range of motion. Skin - no rashes, erythema, ecchymosis, lacerations, abrasions, suspicious moles noted. Psychological -   normal behavior, dress and thought processes. DATA REVIEWED    Lab Results   Component Value Date/Time    WBC 8.4 03/19/2021 09:58 AM    HGB 12.1 03/19/2021 09:58 AM    HCT 39.4 03/19/2021 09:58 AM    PLATELET 399 36/25/0594 09:58 AM    MCV 84 03/19/2021 09:58 AM     Lab Results   Component Value Date/Time    Sodium 139 03/19/2021 09:58 AM    Potassium 4.9 03/19/2021 09:58 AM    Chloride 103 03/19/2021 09:58 AM    CO2 24 03/19/2021 09:58 AM    Anion gap 8 10/01/2020 06:17 AM    Glucose 83 03/19/2021 09:58 AM    BUN 10 03/19/2021 09:58 AM    Creatinine 0.69 03/19/2021 09:58 AM    BUN/Creatinine ratio 14 03/19/2021 09:58 AM    GFR est  03/19/2021 09:58 AM    GFR est non- 03/19/2021 09:58 AM    Calcium 9.6 03/19/2021 09:58 AM    Bilirubin, total 0.7 03/19/2021 09:58 AM    Alk.  phosphatase 84 03/19/2021 09:58 AM    Protein, total 6.6 03/19/2021 09:58 AM    Albumin 4.0 03/19/2021 09:58 AM    Globulin 4.3 (H) 10/01/2020 06:17 AM    A-G Ratio 1.5 03/19/2021 09:58 AM    ALT (SGPT) 20 03/19/2021 09:58 AM    AST (SGOT) 18 03/19/2021 09:58 AM     Lab Results   Component Value Date/Time    Hemoglobin A1c 5.9 (H) 02/17/2021 12:58 PM    Hemoglobin A1c (POC) 8.4 12/24/2019 02:20 PM    Hemoglobin A1c, External 7.8 07/13/2020 12:00 AM     Lab Results   Component Value Date/Time    TSH 1.540 03/19/2021 09:58 AM    TSH 1.730 04/04/2018 01:17 PM     No results found for: URICA, UAU1  No results found for: MG  Lab Results   Component Value Date/Time    Vitamin B12 992 02/17/2021 12:58 PM     Lab Results   Component Value Date/Time    VITAMIN D, 25-HYDROXY 36.0 02/17/2021 12:58 PM     Lab Results   Component Value Date/Time    Iron 44 08/14/2020 11:25 AM     Lab Results   Component Value Date/Time    Cholesterol, total 94 (L) 02/17/2021 12:59 PM    HDL Cholesterol 39 (L) 02/17/2021 12:59 PM    LDL, calculated 39 02/17/2021 12:59 PM    LDL, calculated 48 03/07/2020 09:44 AM    VLDL, calculated 16 02/17/2021 12:59 PM    VLDL, calculated 14 03/07/2020 09:44 AM    Triglyceride 73 02/17/2021 12:59 PM     No results found for this or any previous visit. Reviewed labs result. SEE SCANNED DOCUMENT FOR COMPLETE PANEL RESULTS. Diagnoses and all orders for this visit:    1. Morbid obesity ([address]       -  Will continue of current regimen. Will attempt to get in 1000 calories a day. Continue exercise program and discussed the importance of sleep with at least 7-9 hours a day. offered support. Encouraged water intake to be at least 1/2 body weight daily. Will start phentermine daily. 2. Type 2 diabetes mellitus without complication, without long-term current use of insulin ([address]    3. Hypertension, unspecified type        ASSESSMENT     We discussed the expected course, resolution and complications of the diagnosis(es) in detail. WEIGHT MANAGEMENT PLAN    Chart was reviewed and updated during the office visit today.      Elvis Severance has fulfilled [facility] requirements this month by completing homework forms, attending educational classes, nurse triage visits and monthly provider appointments as agreed and remains in good standing. Reviewed and appreciate registered dietician note for nutritional counseling. Patient has attended all requirements of the program over the past month and is in good standing. Reviewed and appreciate weekly/biweekly nurse visits and agree with documentation. Followed up on any patient concerns today. Emphasized the importance of the patient continuing care from current primary care provider and other specialists while participating in this weight management program.  Patient has full access to all our office notes, orders, lab results on [employer] and can provide them copies, if desired. Advised patient to follow up with pcp for any acute symptoms and Health Maintenance. Reviewed office notes from other health care providers:  Magnus Ramirez . Continue follow up as directed by these providers. Referrals given as noted above. Recommended the patient work on the following therapeutic goals by next office visit:  1 month     Continue current medications as directed by prescribers. Weight loss medication was prescribed today as noted above and sent electronically to the pharmacy on file after discussing risks, benefits, costs, interactions, alternatives, contraindications and potential side effects. If a controlled substance was prescribed today, the [place]  was reviewed and patient was found to be in compliance before prescribing it. A Controlled Substance Agreement was reviewed, signed and copy given to patient today. SEE SCANNED DOCUMENT. Identified and discussed medications patient is taking that can cause weight gain or weight loss as recorded on patient's medication list.  Advised patient not to stop any use without discussing with the prescribing provider. Ask prescribing providers to consider more weight neutral or weight negative options.  Will monitor patient's weight and health with continued use. Supplement recommendations: Take OTC Magnesium 400 mg po at night prn sleep, muscle cramping, constipation. Take OTC vit B12 1000 mcg daily orally if taking Metformin or experiencing numbness and tingling. Take OTC Fish Oil 1000 mg with EPA and -1,000 mg daily if experiencing dry skin, low HDL. Use with caution if history of heartburn exists. Increase fiber products (I.e. fiber tea, fiber shakes) or try OTC Fiber products (I.e. Benefiber, Metamucil, psyllium, etc) if experiencing constipation. Take OTC Lactaid with meal replacements, if lactose intolerance history exists or symptoms begin. Refer to [facility] Patient Manual for additional anecdotal considerations to possible dietary side effects. If patient has had bariatric surgery, Hank Morrisz was advised to take the vitamin regimen and follow dietary recommendations as directed by [facility] bariatric surgical team.  Get annual labs to re-evaluate vitamin levels. Avoid NSAIDS. Discussed the patient's BMI, anthropometric measures and goals with patient. The weight management follow up plan is as follows:     Consume a minimum of 2 L (67 oz) of water daily up to half your body weight in ounces of water while utilizing this dietary approach. Avoid sugar-sweetened beverages. Limit caffeine, will allow 1 8 oz cup of black coffee or green tea (to stimulate release of Adiponectin and promote fat burning.)    Referred patient to [facility] Patient Manual for recommended antidotes, if any new symptoms or side effects have been experienced once dietary approach is initiated. Advised patient to notify our office if this occurs. Exercise Prescription: Advised minimal, gradual increase and as tolerated especially if using VLCD. If already exercising, reduce it to half the time and intensity while determining the level of tolerance for the amount of caloric intake.   A goal of 30 minutes physical activity daily is recommended for health benefit and at least 60 minutes daily to prevent weight regain. During weight loss phase, no less than 75% of this time needs to be performing aerobic (i.e. Walking) activity with no more than 25% of the time performing resistance exercises (i.e. Weight lifting, strengthening, toning). During weight maintenance phase, 50% of the physical activity time needs to be spent performing aerobic activity with 50% of the total time performing resistance exercises. Emphasized importance of physical activity and reducing sedentary time. Sleep Prescription: A goal of 7-9 hours nightly of uninterrupted sleep is recommended to turn off the Grehlin hormone to be released from the stomach and triggers appetite while promoting weight gain. Proper rest turns on Leptin hormone to be released from white adipose tissue and promotes weight loss. Discussed snoring, symptoms of Sleep Apnea and improvements with weight loss. Strongly encouraged compliance with CPAP, if Sleep Apnea has been diagnosed. Advised patient to follow up with sleep specialist for every 25 pounds lost to see if CPAP settings need to be adjusted. Avoid caffeine 6-12 hours before bedtime. Immunizations noted. Covid vaccines recommended, if patient has not had it. Obesity may increase risk for severe illness and death from Covid-19 disease. Offered empathy, encouragement, legitimation, prayers, partnership to patient. Praised patient for successes. Patient was offered a choice/choices in the treatment plan today. Patient expressed understanding with the diagnoses and the plan and agrees with recommendations. No orders of the defined types were placed in this encounter.       Total time:  40 minutes  mins spent face to face with patient today in counseling, coordinating care, discussing results, completing relevant documentation, and discussing treatment plans in reference to There were no encounter diagnoses. [facility], [address] [place] [address] MD Aidee FOR ALLOWING ME THE PRIVILEGE TO PARTICIPATE IN THE CARE OF OUR MUTUAL PATIENT, Ms. Jeniffer Satnley. Johnathon Villegas NP      There are no Patient Instructions on file for this visit.

## 2021-08-03 PROBLEM — E66.01 MORBID OBESITY (HCC): Status: RESOLVED | Noted: 2020-09-08 | Resolved: 2021-08-03

## 2021-08-09 ENCOUNTER — VIRTUAL VISIT (OUTPATIENT)
Dept: SURGERY | Age: 31
End: 2021-08-09
Payer: COMMERCIAL

## 2021-08-09 ENCOUNTER — OFFICE VISIT (OUTPATIENT)
Dept: ENDOCRINOLOGY | Age: 31
End: 2021-08-09

## 2021-08-09 VITALS
HEIGHT: 63 IN | DIASTOLIC BLOOD PRESSURE: 55 MMHG | BODY MASS INDEX: 47.98 KG/M2 | HEART RATE: 61 BPM | WEIGHT: 270.8 LBS | SYSTOLIC BLOOD PRESSURE: 119 MMHG

## 2021-08-09 VITALS — WEIGHT: 263 LBS | BODY MASS INDEX: 46.6 KG/M2 | HEIGHT: 63 IN

## 2021-08-09 DIAGNOSIS — E55.9 HYPOVITAMINOSIS D: ICD-10-CM

## 2021-08-09 DIAGNOSIS — E66.01 MORBID OBESITY (HCC): Primary | ICD-10-CM

## 2021-08-09 DIAGNOSIS — E66.01 CLASS 3 SEVERE OBESITY DUE TO EXCESS CALORIES WITH SERIOUS COMORBIDITY AND BODY MASS INDEX (BMI) OF 60.0 TO 69.9 IN ADULT (HCC): ICD-10-CM

## 2021-08-09 DIAGNOSIS — K21.9 CHRONIC GERD: ICD-10-CM

## 2021-08-09 DIAGNOSIS — E88.81 INSULIN RESISTANCE: ICD-10-CM

## 2021-08-09 DIAGNOSIS — E11.9 TYPE 2 DIABETES MELLITUS WITHOUT COMPLICATION, WITHOUT LONG-TERM CURRENT USE OF INSULIN (HCC): Primary | ICD-10-CM

## 2021-08-09 DIAGNOSIS — E78.2 MIXED HYPERLIPIDEMIA: ICD-10-CM

## 2021-08-09 DIAGNOSIS — I10 ESSENTIAL HYPERTENSION: ICD-10-CM

## 2021-08-09 LAB — HBA1C MFR BLD HPLC: 5.5 %

## 2021-08-09 PROCEDURE — 99213 OFFICE O/P EST LOW 20 MIN: CPT | Performed by: NURSE PRACTITIONER

## 2021-08-09 PROCEDURE — 99214 OFFICE O/P EST MOD 30 MIN: CPT | Performed by: INTERNAL MEDICINE

## 2021-08-09 PROCEDURE — 83036 HEMOGLOBIN GLYCOSYLATED A1C: CPT | Performed by: INTERNAL MEDICINE

## 2021-08-09 RX ORDER — ACETAMINOPHEN AND CODEINE PHOSPHATE 120; 12 MG/5ML; MG/5ML
SOLUTION ORAL
COMMUNITY
Start: 2021-08-05

## 2021-08-09 NOTE — PROGRESS NOTES
I was in the office while conducting this encounter. Consent:  She and/or her healthcare decision maker is aware that this patient-initiated Telehealth encounter is a billable service, with coverage as determined by her insurance carrier. She is aware that she may receive a bill and has provided verbal consent to proceed: Yes    This virtual visit was conducted via [employer]. Pursuant to the emergency declaration under the [facility], [facility] waiver authority and the [employer] Act, this Virtual  Visit was conducted to reduce the patient's risk of exposure to COVID-19 and provide continuity of care for an established patient. Services were provided through a video synchronous discussion virtually to substitute for in-person clinic visit. Due to this being a TeleHealth evaluation, many elements of the physical examination are unable to be assessed. Total Time: minutes: 11-20 minutes. Chief Complaint   Patient presents with    Morbid Obesity     11 months s/p sleeve. Down  75lbs. Loss 0lbs [phone]    Weight Loss       Nancy Resendiz 11-month status post laparoscopic sleeve gastrectomy for treatment of morbid obesity. She is frustrated that she is or not really lost any weight since May. She said her low was 263 pounds. She creep back up to about 270 pounds and was able to get those 7 pounds off again. Her primary care put her on phentermine but she says that just seems to exacerbate her insomnia. She does not really struggle with hunger. She has been on Ozempic in the past.  She is seeing her endocrinologist today. She still has some reflux especially when she takes meloxicam.  Rheumatology put her back on meloxicam for her pain. She said she just now takes it once a week and it seems to give her relief. She continues to take acid suppression.   She typically does a Glucerna shake after she works out in the morning, midmorning will have a cup of fruit then her lunch and dinner meal and does not eat after 7 PM.  She is trying to follow a high-protein low-carb  Constipation has been an issue she is added a digestive enzyme and Colace which has helped  She is vigorously exercising 4 to 6 days a week  Sleep is poor and she averages about 4 to 5 hours per night. She said this is always been a problem. She is taking her bariatric vitamins and is added biotin. Co-Morbid(s)     Was anti coagulation initiated for presumed / confirmed DVT/PE? NO    Was an incisional hernia noted on exam?       NO      COMORBIDITY     SLEEP APNEA                 NO        GERD  (req.meds)           YES  HYPERLIPIDEMIA           NO  HYPERTENSION             NO       IF YES, # OF HTN MEDICATIONS 0  DIABETES                        NO      IF YES, 0 NON-INSULIN   0 INSULIN     Visit Vitals  Ht 5' 3\" (1.6 m)   Wt 263 lb (119.3 kg)   BMI 46.59 kg/m²     She appears well  Speech is clear breathing is unlabored  Mucous membranes appear moist    ICD-10-CM ICD-9-CM    1. Morbid obesity ([facility] [address]  E66.01 278.01    2. BMI 45.0-49.9, adult ([facility])  Z68.42 V85.42    3. Chronic GERD  K21.9 530.81    4. Insulin resistance  E88.81 277.7      11-month status post laparoscopic sleeve gastrectomy for treatment of morbid obesity  39% excess weight loss  She is meeting with endocrinology today and I have suggested she talk to him about going on Victoza to help with insulin resistance and obesity management. Her insurance does not cover Saxenda or any of the other obesity management medications. Continue acid suppression daily  Caution with NSAIDs  Suggested she follow-up with a dietitian and keep a journal for at least 1 week and have available when she meets with a dietitian  Continue with high-protein, low-carb, low-fat, no added sugar  2 L of water per day  Improving sleep quality can also benefit more effective weight management.   Talked about sleep hygiene and some techniques to wind down in the evening. Suggested yoga and/or a mindfulness practice of some sort  Continue bariatric vitamins daily  Follow-up in 3 months an appointment was made for 11/8/2021 at 8 AM in the office  [address] verbalized understanding and questions were answered to the best of my knowledge and ability. Diet and activity / sleep  educational materials were provided.

## 2021-08-09 NOTE — PROGRESS NOTES
1. Have you been to the ER, urgent care clinic since your last visit? Hospitalized since your last visit? No    2. Have you seen or consulted any other health care providers outside of the [address] [place] [address] since your last visit? Include any pap smears or colon screening.  No

## 2021-08-09 NOTE — PROGRESS NOTES
Chief Complaint   Patient presents with    Diabetes     pcp and pharmacy verified       History of Present Illness: Jeanie Bernard is a 33 y.o. female here for follow up of diabetes. She was diagnosed with diabetes \"when I was in high school\". Because pt has features concerning for cushing's her PCP checked a 24 hour urine cortisol which was not elevated (42). She also had a TSH drawn which was 2.10. In March 2016 we tested pt for hyperaldosteronism, which was negative. I tested her for evidence of PCOS, her Testosterone was 14, with DHEA-S 109, her 17-OH Prog was not elevated and an overnight dexamethasone suppression test did suppress her ACTH and cortisol. She had the Gastric Sleeve on 9/8/20. At our last visit in February 2021 she was off all DM medications, her A1C was 5.9%, but she was not having any issues of hypoglycemia. Her weight was down to 278 pounds. Pt was instructed to stay DM meds. Her A1C today was 5.5%. Reviewing her DexCom readings, she has not been having issues of low BGs. At our visit in July 2020 she was still in the boot camp and her weight was down to 338 pounds. She was taking Ozempic 1.0mg weekly, Metformin 1000mg BID and Glipizide 5mg with dinner. We had stopped the Lantus, because she was having issues of hypoglycemia. She had lost 30+ pounds and was off all DM medications, but was having some overnight low BGs. I instructed her to stop the HS popcicles and instead have 1/2 an apple with at Tbls of peanut butter before she goes to bed. On 10/19/2020 she had a right oophorectomy and left ovarian cyst removed by Dr. Nick Stevens. Pt notes she has some occasional \"pulling on the left side of the abdomen\"  \"My periods have been terrible so I had to go back on an OCP\". On 12/15/2020 her weight was down to 289 pounds, down from her pre-surgery weight of 340 pounds. She is still following with Rodriguez Hummel with the weight loss clinic.     Today her weight is down to 270 pounds. She is still off all DM medications. She is still using the DexCom CGM. She notes her BGs are consistently in the low 100's. She notes she had one 130 when she had kate noodles. She notes that when she first switches her sensors she will have a low BG. \"The surgeon and nutritionist thinks I have insulin resistance and that is why I am not having more weight loss\". Pt is still going to the boot camp 4 days per week and doing rowing. She wears a FitBit and gets 10,000+ steps per day. Pt is currently waking around 5-530AM. She works out a 6AM.  She has breakfast on the way to the gym at 545AM, she will have a glucerna shake. Around 10AM she will have some cherries or other fruit. She has lunch around 1PM, today she had 1/2 a chicken breast, pasta and water. She has more cherries around 430PM  She has dinner around 8-830PM, last night she had 4-1/2 oz of steak, string beans, and water. She is not snacking after dinner. No history of vascular disease. No history of neuropathy, or nephropathy. Last eye exam was June 2020, no retinopathy. She is taking Vitamin D 50,000 units weekly. She is taking a MVI and B-12 1000mcg monthly. Pt is still taking her lipitor 40mg HS. Current Outpatient Medications   Medication Sig    semaglutide (OZEMPIC) 0.25 mg or 0.5 mg/dose (2 mg/1.5 ml) subq pen 0.5 mg by SubCUTAneous route every seven (7) days.  meloxicam (MOBIC) 15 mg tablet as needed.  phentermine (ADIPEX-P) 37.5 mg tablet Take 1 Tablet by mouth every morning. Max Daily Amount: 37.5 mg.    atorvastatin (LIPITOR) 40 mg tablet TAKE 1 TABLET BY MOUTH EVERY DAY    rimegepant (Nurtec ODT) 75 mg disintegrating tablet 1 tablet oral every other day    Blood-Glucose Transmitter (Dexcom G6 Transmitter) nahid USE AS DIRECTED    Blood-Glucose Sensor (Dexcom G6 Sensor) nahid CHANGE EVERY 10 DAYS    Biotin 2,500 mcg cap Take 10,000 mcg by mouth daily.     BENEFIBER, GUAR GUM, PO Take 1 Packet by mouth daily.  ergocalciferol (ERGOCALCIFEROL) 1,250 mcg (50,000 unit) capsule Take 1 Cap by mouth every seven (7) days. Indications: low vitamin D levels    galcanezumab-gnlm (Emgality Pen) 120 mg/mL injection 1 mL by SubCUTAneous route every thirty (30) days.  drospirenone, contraceptive, (Slynd) 4 mg (28) tab Slynd 4 mg (28) tablet   Take 1 tablet every day by oral route.  vilazodone (VIIBRYD) 40 mg tab tablet Take 1 Tab by mouth daily.  cyanocobalamin, vitamin B-12, (B-12 Compliance) 1,000 mcg/mL kit Once monthly  Indications: inadequate vitamin B12    omeprazole (PRILOSEC) 40 mg capsule Take 1 Cap by mouth daily.  metoprolol tartrate (LOPRESSOR) 100 mg IR tablet TAKE 1 TABLET BY MOUTH TWICE A DAY    gabapentin (NEURONTIN) 300 mg capsule Take 1 Cap by mouth three (3) times daily. Max Daily Amount: 900 mg.    nystatin (MYCOSTATIN) topical cream Apply  to affected area two (2) times a day. Apply to abdominal folds twice daily as needed for rash    SUMAtriptan (IMITREX) 50 mg tablet 1 tab at onset of headache, can repeat X 1 in 2 hrs if HA persists. Not more than 10 days/month.  iron,carbonyl-FA-multivit-min (Opurity Multivitamin) 30 mg iron- 800 mcg chew Take 2 Tabs by mouth daily.  calcium carbonate/vitamin D3 (CALCIUM 600 + D,3, PO) Take 1 Tab by mouth two (2) times a day.  ALPRAZolam (XANAX) 0.5 mg tablet Take 1 Tab by mouth nightly as needed for Anxiety or Sleep. Max Daily Amount: 0.5 mg. Indications: anxious, repeated episodes of anxiety, panic disorder    aspirin delayed-release 81 mg tablet Take 1 Tab by mouth daily.  Blood-Glucose Meter,Continuous (Dexcom G6 ) misc Change sensor every 10 days    albuterol (PROVENTIL HFA, VENTOLIN HFA) 90 mcg/actuation inhaler Take 1 Puff by inhalation every four (4) hours as needed for Wheezing.     norethindrone (MICRONOR) 0.35 mg tab  (Patient not taking: Reported on 8/9/2021)     No current facility-administered medications for this visit. Allergies   Allergen Reactions    Latex Itching     Review of Systems:  - Eyes: no blurry vision or double vision  - Cardiovascular: no chest pain  - Respiratory: no shortness of breath  - Musculoskeletal: no myalgias  - Neurological: no numbness/tingling in extremities    Physical Examination:  Blood pressure (!) 119/55, pulse 61, height 5' 3\" (1.6 m), weight 270 lb 12.8 oz (122.8 kg). - General: pleasant, no distress, good eye contact   - Neck: no carotid bruits  - Cardiovascular: regular, normal rate, nl s1 and s2, no m/r/g, 2+ DP pulses   - Respiratory: clear bilaterally  - Integumentary: no edema, no foot ulcers  - Psychiatric: normal mood and affect    Diabetic foot exam:     Left Foot:   Visual Exam: normal    Pulse DP: 2+ (normal)   Filament test: normal sensation    Vibratory sensation: normal      Right Foot:   Visual Exam: normal    Pulse DP: 2+ (normal)   Filament test: normal sensation    Vibratory sensation: normal        Data Reviewed:   Component      Latest Ref Rng & Units 3/19/2021 3/19/2021 3/19/2021 2/17/2021           9:58 AM  9:58 AM  9:58 AM 12:59 PM   WBC      3.4 - 10.8 x10E3/uL  8.4     RBC      3.77 - 5.28 x10E6/uL  4.72     HGB      11.1 - 15.9 g/dL  12.1     HCT      34.0 - 46.6 %  39.4     MCV      79 - 97 fL  84     MCH      26.6 - 33.0 pg  25.6 (L)     MCHC      31.5 - 35.7 g/dL  30.7 (L)     RDW      11.7 - 15.4 %  14.5     PLATELET      542 - 546 x10E3/uL  347     NEUTROPHILS      Not Estab. %  55     Lymphocytes      Not Estab. %  35     MONOCYTES      Not Estab. %  8     EOSINOPHILS      Not Estab. %  1     BASOPHILS      Not Estab. %  1     ABS. NEUTROPHILS      1.4 - 7.0 x10E3/uL  4.6     Abs Lymphocytes      0.7 - 3.1 x10E3/uL  3.0     ABS. MONOCYTES      0.1 - 0.9 x10E3/uL  0.7     ABS. EOSINOPHILS      0.0 - 0.4 x10E3/uL  0.1     ABS.  BASOPHILS      0.0 - 0.2 x10E3/uL  0.1     IMMATURE GRANULOCYTES      Not Estab. %  0     ABS. IMM. GRANS.      0.0 - 0.1 x10E3/uL  0.0     Glucose      65 - 99 mg/dL   83    BUN      6 - 20 mg/dL   10    Creatinine      0.57 - 1.00 mg/dL   0.69    GFR est non-AA      >59 mL/min/1.73   116    GFR est AA      >59 mL/min/1.73   133    BUN/Creatinine ratio      9 - 23   14    Sodium      134 - 144 mmol/L   139    Potassium      3.5 - 5.2 mmol/L   4.9    Chloride      96 - 106 mmol/L   103    CO2      20 - 29 mmol/L   24    Calcium      8.7 - 10.2 mg/dL   9.6    Protein, total      6.0 - 8.5 g/dL   6.6    Albumin      3.8 - 4.8 g/dL   4.0    GLOBULIN, TOTAL      1.5 - 4.5 g/dL   2.6    A-G Ratio      1.2 - 2.2   1.5    Bilirubin, total      0.0 - 1.2 mg/dL   0.7    Alk. phosphatase      39 - 117 IU/L   84    AST      0 - 40 IU/L   18    ALT      0 - 32 IU/L   20    Cholesterol, total      100 - 199 mg/dL    94 (L)   Triglyceride      0 - 149 mg/dL    73   HDL Cholesterol      >39 mg/dL    39 (L)   VLDL, calculated      5 - 40 mg/dL    16   LDL, calculated      0 - 99 mg/dL    39   TSH      0.450 - 4.500 uIU/mL 1.540        Her A1C today was 5.5%. Assessment/Plan:   1) DM > Her A1C today was 5.5%. She reports occasional low BGs if she goes too long without eating. To help with this, likely due to insulin insensitivity, we discussed GLP-1 agents, she has been on the Ozempic in the past and tolerated well. Will restart the Ozempic at 0.5mg weekly. She is still off all DM medications. She is not having the frequent low BGs like she was previously. Will monitor her Dexcom readings periodically. Will order an A1C    2) HTN > Her BP today was 120/55. Will not make any adjustments to her HTN regimen at this time. She is only taking a B-Blocker for her elevated HR. 3) HLD > Her lipid panel was at goal in October of 2020. Pt is tolerating the Atorvastatin 40mg daily. 4) Obesity > Pt is s/p sleeve gastrectomy and her weight is down to 270 pounds. She is still working out regularly.  Pt encouraged to keep up the good work. See #1. 5) Hypovitaminosis D > Pt to continue her Vitamin D daily. Follow-up and Dispositions    · Return in about 4 months (around 12/9/2021). Pt voices understanding and agreement with the plan. RTC 4 months      Copy sent to:  Mansi Celeste

## 2021-08-09 NOTE — PATIENT INSTRUCTIONS
Your next appt is 11/8/21 at 8 am in the office with CORRINE Garcia  If you need to reschedule or switch to a virtual visit just call [phone]. Talk to Dr. Ibis Cerda about Victoza     Keep a journal for 1 week and have available when you meet with the dietician     To make an appointment with one of the bariatric dietitians, please call the bariatric line at [phone]. Appointments are offered in person and virtual at no charge. Learning About Mindfulness for Stress  What are mindfulness and stress? Stress is what you feel when you have to handle more than you are used to. A lot of things can cause stress. You may feel stress when you go on a job interview, take a test, or run a race. This kind of short-term stress is normal and even useful. It can help you if you need to work hard or react quickly. Stress also can last a long time. Long-term stress is caused by stressful situations or events. Examples of long-term stress include long-term health problems, ongoing problems at work, and conflicts in your family. Long-term stress can harm your health. Mindfulness is a focus only on things happening in the present moment. It's a process of purposefully paying attention to and being aware of your surroundings, your emotions, your thoughts, and how your body feels. You are aware of these things, but you aren't judging these experiences as \"good\" or \"bad. \" Mindfulness can help you learn to calm your mind and body to help you cope with illness, pain, and stress. How does mindfulness help to relieve stress? Mindfulness can help quiet your mind and relax your body. Studies show that it can help some people sleep better, feel less anxious, and bring their blood pressure down. And it's been shown to help some people live and cope better with certain health problems like heart disease, depression, chronic pain, and cancer. How do you practice mindfulness?   To be mindful is to pay attention, to be present, and to be accepting. · When you're mindful, you do just one thing and you pay close attention to that one thing. For example, you may sit quietly and notice your emotions or how your food tastes and smells. · When you're present, you focus on the things that are happening right now. You let go of your thoughts about the past and the future. When you dwell on the past or the future, you miss moments that can heal and strengthen you. You may miss moments like hearing a child laugh or seeing a friendly face when you think you're all alone. · When you're accepting, you don't  the present moment. Instead you accept your thoughts and feelings as they come. You can practice anytime, anywhere, and in any way you choose. You can practice in many ways. Here are a few ideas:  · While doing your chores, like washing the dishes, let your mind focus on what's in your hand. What does the dish feel like? Is the water warm or cold? · Go outside and take a few deep breaths. What is the air like? Is it warm or cold? · When you can, take some time at the start of your day to sit alone and think. · Take a slow walk by yourself. Count your steps while you breathe in and out. · Try yoga breathing exercises, stretches, and poses to strengthen and relax your muscles. · At work, if you can, try to stop for a few moments each hour. Note how your body feels. Let yourself regroup and let your mind settle before you return to what you were doing. · If you struggle with anxiety or \"worry thoughts,\" imagine your mind as a blue gab and your worry thoughts as clouds. Now imagine those worry thoughts floating across your mind's gab. Just let them pass by as you watch. Follow-up care is a key part of your treatment and safety. Be sure to make and go to all appointments, and call your doctor if you are having problems. It's also a good idea to know your test results and keep a list of the medicines you take.   Where can you learn more? Go to http://www.gray.com/  Enter M676 in the search box to learn more about \"Learning About Mindfulness for Stress. \"  Current as of: August 31, 2020               Content Version: 12.8  © 2006-2021 Healthwise, [facility]. Care instructions adapted under license by [employer] (which disclaims liability or warranty for this information). If you have questions about a medical condition or this instruction, always ask your healthcare professional. [address] any warranty or liability for your use of this information.

## 2021-08-12 ENCOUNTER — VIRTUAL VISIT (OUTPATIENT)
Dept: FAMILY MEDICINE CLINIC | Age: 31
End: 2021-08-12
Payer: COMMERCIAL

## 2021-08-12 DIAGNOSIS — R53.83 OTHER FATIGUE: ICD-10-CM

## 2021-08-12 DIAGNOSIS — F41.1 GAD (GENERALIZED ANXIETY DISORDER): ICD-10-CM

## 2021-08-12 DIAGNOSIS — Z71.89 ADVICE GIVEN ABOUT COVID-19 VIRUS INFECTION: ICD-10-CM

## 2021-08-12 DIAGNOSIS — R53.83 FATIGUE, UNSPECIFIED TYPE: ICD-10-CM

## 2021-08-12 DIAGNOSIS — R53.83 LOW ENERGY: ICD-10-CM

## 2021-08-12 DIAGNOSIS — E11.21 TYPE 2 DIABETES WITH NEPHROPATHY (HCC): ICD-10-CM

## 2021-08-12 DIAGNOSIS — E66.01 MORBID OBESITY (HCC): ICD-10-CM

## 2021-08-12 PROCEDURE — 99213 OFFICE O/P EST LOW 20 MIN: CPT | Performed by: FAMILY MEDICINE

## 2021-08-12 NOTE — PROGRESS NOTES
Chief Complaint   Patient presents with    Weight Management     1. Have you been to the ER, urgent care clinic since your last visit? Hospitalized since your last visit? No    2. Have you seen or consulted any other health care providers outside of the [address] [place] [address] since your last visit? Include any pap smears or colon screening. No    Call placed to pt. Verified patient with two type of identifiers. stated phentermine is helping her appetite but giving her insomnia.      Blood sugar 103 this morning

## 2021-08-12 NOTE — PROGRESS NOTES
HISTORY OF PRESENT ILLNESS  [facility] Xavier Rubi is a 33 y.o. female. Pt's main concerns were provided on virtual visit, a telemed format, COVID-19 vaccinated pt is w/ comorbid medical history and unaware of been exposed to covid-19 individual, Pt Have been trying to stay safe  ,  pt has no fever no cough no dyspnea, no ha, not dizzy, nl smell nl taste, no N/V/D, no body ache    263lbs,      Current Outpatient Medications   Medication Sig Dispense Refill    norethindrone (MICRONOR) 0.35 mg tab       semaglutide (OZEMPIC) 0.25 mg or 0.5 mg/dose (2 mg/1.5 ml) subq pen 0.5 mg by SubCUTAneous route every seven (7) days. 3 Box 3    meloxicam (MOBIC) 15 mg tablet Take 15 mg by mouth as needed.  phentermine (ADIPEX-P) 37.5 mg tablet Take 1 Tablet by mouth every morning. Max Daily Amount: 37.5 mg. 30 Tablet 0    atorvastatin (LIPITOR) 40 mg tablet TAKE 1 TABLET BY MOUTH EVERY DAY 90 Tablet 3    rimegepant (Nurtec ODT) 75 mg disintegrating tablet 1 tablet oral every other day 16 Tablet 2    Blood-Glucose Transmitter (Dexcom G6 Transmitter) nahid USE AS DIRECTED 1 Device 3    Blood-Glucose Sensor (Dexcom G6 Sensor) nahid CHANGE EVERY 10 DAYS 3 Device 12    Biotin 2,500 mcg cap Take 10,000 mcg by mouth daily.  BENEFIBER, GUAR GUM, PO Take 1 Packet by mouth daily. As needed      ergocalciferol (ERGOCALCIFEROL) 1,250 mcg (50,000 unit) capsule Take 1 Cap by mouth every seven (7) days. Indications: low vitamin D levels 12 Cap 3    galcanezumab-gnlm (Emgality Pen) 120 mg/mL injection 1 mL by SubCUTAneous route every thirty (30) days. 1 mL 11    vilazodone (VIIBRYD) 40 mg tab tablet Take 1 Tab by mouth daily. 90 Tab 2    cyanocobalamin, vitamin B-12, (B-12 Compliance) 1,000 mcg/mL kit Once monthly  Indications: inadequate vitamin B12 1 Kit 5    omeprazole (PRILOSEC) 40 mg capsule Take 1 Cap by mouth daily. 90 Cap 3    gabapentin (NEURONTIN) 300 mg capsule Take 1 Cap by mouth three (3) times daily.  Max Daily Amount: 900 mg. 270 Cap 1    nystatin (MYCOSTATIN) topical cream Apply  to affected area two (2) times a day. Apply to abdominal folds twice daily as needed for rash 30 g 3    SUMAtriptan (IMITREX) 50 mg tablet 1 tab at onset of headache, can repeat X 1 in 2 hrs if HA persists. Not more than 10 days/month. 9 Tab 2    iron,carbonyl-FA-multivit-min (Opurity Multivitamin) 30 mg iron- 800 mcg chew Take 2 Tabs by mouth daily.  calcium carbonate/vitamin D3 (CALCIUM 600 + D,3, PO) Take 1 Tab by mouth two (2) times a day.  ALPRAZolam (XANAX) 0.5 mg tablet Take 1 Tab by mouth nightly as needed for Anxiety or Sleep. Max Daily Amount: 0.5 mg. Indications: anxious, repeated episodes of anxiety, panic disorder 30 Tab 3    aspirin delayed-release 81 mg tablet Take 1 Tab by mouth daily. 30 Tab 11    Blood-Glucose Meter,Continuous (Dexcom G6 ) misc Change sensor every 10 days 1 Each 0    albuterol (PROVENTIL HFA, VENTOLIN HFA) 90 mcg/actuation inhaler Take 1 Puff by inhalation every four (4) hours as needed for Wheezing. 1 Inhaler 1    drospirenone, contraceptive, (Slynd) 4 mg (28) tab Slynd 4 mg (28) tablet   Take 1 tablet every day by oral route.  (Patient not taking: Reported on 8/12/2021)      metoprolol tartrate (LOPRESSOR) 100 mg IR tablet TAKE 1 TABLET BY MOUTH TWICE A  Tab 2     Allergies   Allergen Reactions    Latex Itching     Past Medical History:   Diagnosis Date    Anxiety with depression     Arrhythmia     tachycardia    Asthma     [place] hump 1/7/2015    Carpal tunnel syndrome of right wrist 12/20/2016    Diabetes ([facility]     ALIYA (generalized anxiety disorder) 4/4/2018    GERD (gastroesophageal reflux disease) 8/13/2014    HTN (hypertension) 7/25/2011    Hyperaldosteronism ([address] 10/19/2015    Hyperhidrosis 4/4/2018    Morbid obesity ([facility]     Other ill-defined conditions(799.89)     migraines    Palpitation 4/4/2018    Sleep apnea     uses CPAP    Tachycardia 4/4/2018 Past Surgical History:   Procedure Laterality Date    HX GASTRECTOMY  2020    Gastric sleeve     HX GYN  10/19/2020    Laparoscopic right oopherectomy, left cystectomy    HX HERNIA REPAIR  2020    HX OVARIAN CYST REMOVAL  10/19/2020    HX TONSILLECTOMY      HX WISDOM TEETH EXTRACTION      SC REMOVAL OF TONSILS,<13 Y/O       Family History   Problem Relation Age of Onset    Hypertension Mother     Diabetes Mother         Type II    Anesth Problems Mother         respiratory issues - feels like she cannot breath when coming out of anesthesia   Ashlyn Carlin Arthritis-rheumatoid Mother    Ashlyn Carlin Gout Mother     Psychiatric Disorder Father     Drug Abuse Father     Cancer Other         prostate    Hypertension Sister     Thyroid Disease Sister         \"I think\"    Breast Cancer Sister     Attention Deficit Hyperactivity Disorder Brother     Hypertension Sister      Social History     Tobacco Use    Smoking status: Former Smoker     Packs/day: 0.50     Years: 5.00     Pack years: 2.50     Quit date: 2019     Years since quittin.7    Smokeless tobacco: Never Used   Substance Use Topics    Alcohol use: Yes     Alcohol/week: 1.0 standard drinks     Types: 1 Shots of liquor, 1 Standard drinks or equivalent per week     Comment: socially, Monthly or less      Lab Results   Component Value Date/Time    WBC 8.4 2021 09:58 AM    HGB 12.1 2021 09:58 AM    HCT 39.4 2021 09:58 AM    PLATELET 650  09:58 AM    MCV 84 2021 09:58 AM     Lab Results   Component Value Date/Time    GFR est non- 2021 09:58 AM    GFR est  2021 09:58 AM    Creatinine 0.69 2021 09:58 AM    BUN 10 2021 09:58 AM    Sodium 139 2021 09:58 AM    Potassium 4.9 2021 09:58 AM    Chloride 103 2021 09:58 AM    CO2 24 2021 09:58 AM        Review of Systems   Constitutional: Positive for malaise/fatigue. Negative for chills and fever.    HENT: Negative for nosebleeds. Eyes: Negative for pain. Respiratory: Negative for cough and wheezing. Cardiovascular: Negative for chest pain and leg swelling. Gastrointestinal: Negative for constipation, diarrhea and nausea. Genitourinary: Negative for frequency. Musculoskeletal: Negative for joint pain and myalgias. Skin: Negative for rash. Neurological: Negative for loss of consciousness and headaches. Endo/Heme/Allergies: Does not bruise/bleed easily. Psychiatric/Behavioral: Negative for depression. The patient is not nervous/anxious and does not have insomnia. All other systems reviewed and are negative. Physical Exam  Constitutional:       Appearance: She is obese. She is not ill-appearing or toxic-appearing. HENT:      Head: Normocephalic and atraumatic. Mouth/Throat:      Mouth: Mucous membranes are moist.   Neurological:      Mental Status: She is alert and oriented to person, place, and time. Psychiatric:         Mood and Affect: Mood normal.         Behavior: Behavior normal.         Thought Content: Thought content normal.         Judgment: Judgment normal.         ASSESSMENT and PLAN  Diagnoses and all orders for this visit:    1. BMI 45.0-49.9, adult (HCC)  -     vilazodone (VIIBRYD) 40 mg tab tablet; Take 1 Tablet by mouth daily. -     phentermine (ADIPEX-P) 37.5 mg tablet; Take 1 Tablet by mouth every morning. Max Daily Amount: 37.5 mg.    2. Other fatigue  -     vilazodone (VIIBRYD) 40 mg tab tablet; Take 1 Tablet by mouth daily. -     phentermine (ADIPEX-P) 37.5 mg tablet; Take 1 Tablet by mouth every morning. Max Daily Amount: 37.5 mg.    3. Low energy  -     vilazodone (VIIBRYD) 40 mg tab tablet; Take 1 Tablet by mouth daily. -     phentermine (ADIPEX-P) 37.5 mg tablet; Take 1 Tablet by mouth every morning. Max Daily Amount: 37.5 mg.    4. Advice given about COVID-19 virus infection  -     vilazodone (VIIBRYD) 40 mg tab tablet; Take 1 Tablet by mouth daily.   - phentermine (ADIPEX-P) 37.5 mg tablet; Take 1 Tablet by mouth every morning. Max Daily Amount: 37.5 mg.    5. ALIYA (generalized anxiety disorder)  -     vilazodone (VIIBRYD) 40 mg tab tablet; Take 1 Tablet by mouth daily. -     phentermine (ADIPEX-P) 37.5 mg tablet; Take 1 Tablet by mouth every morning. Max Daily Amount: 37.5 mg.    6. Type 2 diabetes with nephropathy (HCC)  -     vilazodone (VIIBRYD) 40 mg tab tablet; Take 1 Tablet by mouth daily. -     phentermine (ADIPEX-P) 37.5 mg tablet; Take 1 Tablet by mouth every morning. Max Daily Amount: 37.5 mg.    7. Morbid obesity (HCC)  -     vilazodone (VIIBRYD) 40 mg tab tablet; Take 1 Tablet by mouth daily. -     phentermine (ADIPEX-P) 37.5 mg tablet; Take 1 Tablet by mouth every morning. Max Daily Amount: 37.5 mg.    8. Fatigue, unspecified type  -     vilazodone (VIIBRYD) 40 mg tab tablet; Take 1 Tablet by mouth daily. -     phentermine (ADIPEX-P) 37.5 mg tablet; Take 1 Tablet by mouth every morning. Max Daily Amount: 37.5 mg.        Concern abdout COVID-19 addressed and detailed, pt was told that the best way to prevent illness is by protection with vaccination, and to Wear a facemask , having social distance, and to get tested if possible,   Pt was also told if develop dyspnea needs to call 911 or go to er, call for Critical access hospitaler advise, pt agreed with todays recommendations,       Half a tablet

## 2021-08-15 RX ORDER — PHENTERMINE HYDROCHLORIDE 37.5 MG/1
37.5 TABLET ORAL
Qty: 30 TABLET | Refills: 0
Start: 2021-08-15 | End: 2021-09-03 | Stop reason: ALTCHOICE

## 2021-08-15 RX ORDER — VILAZODONE HYDROCHLORIDE 40 MG/1
40 TABLET ORAL DAILY
Qty: 90 TABLET | Refills: 2
Start: 2021-08-15 | End: 2021-10-28

## 2021-08-17 ENCOUNTER — TELEPHONE (OUTPATIENT)
Dept: NEUROLOGY | Age: 31
End: 2021-08-17

## 2021-08-17 NOTE — TELEPHONE ENCOUNTER
Re: [id number] 94 for 30     Sent to [address]  pending     [id number] ALFRED LEÓN     Rel to sub: 01 - Self

## 2021-08-18 ENCOUNTER — OFFICE VISIT (OUTPATIENT)
Dept: URGENT CARE | Age: 31
End: 2021-08-18
Payer: COMMERCIAL

## 2021-08-18 DIAGNOSIS — Z20.822 SUSPECTED COVID-19 VIRUS INFECTION: Primary | ICD-10-CM

## 2021-08-18 DIAGNOSIS — J01.01 ACUTE RECURRENT MAXILLARY SINUSITIS: ICD-10-CM

## 2021-08-18 LAB — SARS-COV-2 POC: NEGATIVE

## 2021-08-18 PROCEDURE — S9083 URGENT CARE CENTER GLOBAL: HCPCS | Performed by: FAMILY MEDICINE

## 2021-08-18 PROCEDURE — 87426 SARSCOV CORONAVIRUS AG IA: CPT | Performed by: FAMILY MEDICINE

## 2021-08-18 RX ORDER — FLUCONAZOLE 150 MG/1
150 TABLET ORAL DAILY
Qty: 1 TABLET | Refills: 1 | Status: SHIPPED | OUTPATIENT
Start: 2021-08-18 | End: 2021-08-19

## 2021-08-18 RX ORDER — AMOXICILLIN 875 MG/1
875 TABLET, FILM COATED ORAL 2 TIMES DAILY
Qty: 20 TABLET | Refills: 0 | Status: SHIPPED | OUTPATIENT
Start: 2021-08-18 | End: 2021-08-28

## 2021-08-18 NOTE — PROGRESS NOTES
This patient was seen at [address] [place] [address] Urgent Care while in their vehicle due to COVID-19 pandemic with PPE and focused examination in order to decrease community viral transmission. The patient/guardian gave verbal consent to treat. The history is provided by the patient. Nasal Congestion  This is a new problem. The current episode started more than 1 week ago. The problem occurs constantly. The problem has been rapidly worsening. Associated symptoms include headaches. Associated symptoms comments: sinus congestion and post nasal drainage . Nothing aggravates the symptoms. Nothing relieves the symptoms.  Treatments tried: flonase and zyrtec         Past Medical History:   Diagnosis Date    Anxiety with depression     Arrhythmia     tachycardia    Asthma     [place] hump 1/7/2015    Carpal tunnel syndrome of right wrist 12/20/2016    [facility]     ALIYA (generalized anxiety disorder) 4/4/2018    GERD (gastroesophageal reflux disease) 8/13/2014    HTN (hypertension) 7/25/2011    Hyperaldosteronism ([address] 10/19/2015    Hyperhidrosis 4/4/2018    Morbid obesity ([facility]     Other ill-defined conditions(799.89)     migraines    Palpitation 4/4/2018    Sleep apnea     uses CPAP    Tachycardia 4/4/2018        Past Surgical History:   Procedure Laterality Date    HX GASTRECTOMY  09/08/2020    Gastric sleeve     HX GYN  10/19/2020    Laparoscopic right oopherectomy, left cystectomy    HX HERNIA REPAIR  09/08/2020    HX OVARIAN CYST REMOVAL  10/19/2020    HX TONSILLECTOMY      HX WISDOM TEETH EXTRACTION      AL REMOVAL OF TONSILS,<11 Y/O           Family History   Problem Relation Age of Onset    Hypertension Mother     Diabetes Mother         Type II    Anesth Problems Mother         respiratory issues - feels like she cannot breath when coming out of anesthesia   [employer] Arthritis-rheumatoid Mother    [employer] Gout Mother     Psychiatric Disorder Father     Drug Abuse Father     Cancer Other prostate    Hypertension Sister     Thyroid Disease Sister         \"I think\"    Breast Cancer Sister     Attention Deficit Hyperactivity Disorder Brother     Hypertension Sister         Social History     Socioeconomic History    Marital status: SINGLE     Spouse name: Not on file    Number of children: Not on file    Years of education: Not on file    Highest education level: Not on file   Occupational History    Occupation: customer ser and student     Comment: [employer]   Tobacco Use    Smoking status: Former Smoker     Packs/day: 0.50     Years: 5.00     Pack years: 2.50     Quit date: 2019     Years since quittin.7    Smokeless tobacco: Never Used   Vaping Use    Vaping Use: Never used   Substance and Sexual Activity    Alcohol use: Yes     Alcohol/week: 1.0 standard drinks     Types: 1 Shots of liquor, 1 Standard drinks or equivalent per week     Comment: socially, Monthly or less    Drug use: No    Sexual activity: Not Currently     Partners: Male     Birth control/protection: Condom   Other Topics Concern     Service Not Asked    Blood Transfusions Not Asked    Caffeine Concern Not Asked    Occupational Exposure Not Asked    Hobby Hazards Not Asked    Sleep Concern Not Asked    Stress Concern Not Asked    Weight Concern Not Asked    Special Diet Not Asked    Back Care Not Asked    Exercise Not Asked    Bike Helmet Not Asked    [address] Not Asked    Self-Exams Not Asked   Social History Narrative    Not on file     Social Determinants of Health     Financial Resource Strain:     Difficulty of Paying Living Expenses:    Food Insecurity:     Worried About Running Out of Food in the Last Year:     [address] in the Last Year:    Transportation Needs:     Lack of Transportation (Medical):      Lack of Transportation (Non-Medical):    Physical Activity:     Days of Exercise per Week:     Minutes of Exercise per Session:    Stress:     Feeling of Stress :    Social Connections:     Frequency of Communication with Friends and Family:     Frequency of Social Gatherings with Friends and Family:     Attends Holiness Services:     Active Member of Clubs or Organizations:     Attends Club or Organization Meetings:     Marital Status:    Intimate Partner Violence:     Fear of Current or Ex-Partner:     Emotionally Abused:     Physically Abused:     Sexually Abused: ALLERGIES: Latex    Review of Systems   HENT: Positive for congestion, postnasal drip, sinus pressure and sinus pain. Neurological: Positive for headaches. All other systems reviewed and are negative. There were no vitals filed for this visit. Physical Exam  Vitals and nursing note reviewed. Constitutional:       General: She is not in acute distress. Appearance: She is not ill-appearing. Pulmonary:      Effort: Pulmonary effort is normal. No respiratory distress. Breath sounds: No wheezing. MDM    Procedures      ICD-10-CM ICD-9-CM    1. Suspected COVID-19 virus infection  Z20.822 V01.79 NOVEL CORONAVIRUS (COVID-19)      AMB POC SARS-COV-2   2. Acute recurrent maxillary sinusitis  J01.01 461.0        Take zyrtec D bid and flonase  Sinus rinse  Start AB if sxs worsen / not better in 1 week. Medications Ordered Today   Medications    amoxicillin (AMOXIL) 875 mg tablet     Sig: Take 1 Tablet by mouth two (2) times a day for 10 days. Dispense:  20 Tablet     Refill:  0    fluconazole (DIFLUCAN) 150 mg tablet     Sig: Take 1 Tablet by mouth daily for 1 day. FDA advises cautious prescribing of oral fluconazole in pregnancy. Dispense:  1 Tablet     Refill:  1     No results found for any visits on 08/18/21. The patients condition was discussed with the patient and they understand. The patient is to follow up with primary care doctor. If signs and symptoms become worse the pt is to go to the ER.  The patient is to take medications as prescribed.

## 2021-08-18 NOTE — PATIENT INSTRUCTIONS
Saline Nasal Washes: Care Instructions  Your Care Instructions     Saline nasal washes help keep the nasal passages open by washing out thick or dried mucus. This simple remedy can help relieve symptoms of allergies, sinusitis, and colds. It also can make the nose feel more comfortable by keeping the mucous membranes moist. You may notice a little burning sensation in your nose the first few times you use the solution, but this usually gets better in a few days. Follow-up care is a key part of your treatment and safety. Be sure to make and go to all appointments, and call your doctor if you are having problems. It's also a good idea to know your test results and keep a list of the medicines you take. How can you care for yourself at home? · You can buy premixed saline solution in a squeeze bottle or other sinus rinse products at a drugstore. Read and follow the instructions on the label. · You also can make your own saline solution by adding 1 teaspoon of salt and 1 teaspoon of baking soda to 2 cups of distilled water. · If you use a homemade solution, pour a small amount into a clean bowl. Using a rubber bulb syringe, squeeze the syringe and place the tip in the salt water. Pull a small amount of the salt water into the syringe by relaxing your hand. · Sit down with your head tilted slightly back. Do not lie down. Put the tip of the bulb syringe or the squeeze bottle a little way into one of your nostrils. Gently drip or squirt a few drops into the nostril. Repeat with the other nostril. Some sneezing and gagging are normal at first.  · Gently blow your nose. · Wipe the syringe or bottle tip clean after each use. · Repeat this 2 or 3 times a day. · Use nasal washes gently if you have nosebleeds often. When should you call for help? Watch closely for changes in your health, and be sure to contact your doctor if:    · You often get nosebleeds.     · You have problems doing the nasal washes.    Where can you learn more? Go to http://www.gray.com/  Enter B784 in the search box to learn more about \"Saline Nasal Washes: Care Instructions. \"  Current as of: December 2, 2020               Content Version: 12.8  © 2745-2810 [employer]. Care instructions adapted under license by [employer] (which disclaims liability or warranty for this information). If you have questions about a medical condition or this instruction, always ask your healthcare professional. [place] [address] any warranty or liability for your use of this information.

## 2021-08-20 LAB
SARS-COV-2, NAA 2 DAY TAT: NORMAL
SARS-COV-2, NAA: NOT DETECTED

## 2021-08-23 ENCOUNTER — DOCUMENTATION ONLY (OUTPATIENT)
Dept: INTERNAL MEDICINE CLINIC | Age: 31
End: 2021-08-23

## 2021-08-23 ENCOUNTER — TRANSCRIBE ORDER (OUTPATIENT)
Dept: SCHEDULING | Age: 31
End: 2021-08-23

## 2021-08-23 DIAGNOSIS — Z79.1 NSAID LONG-TERM USE: ICD-10-CM

## 2021-08-23 DIAGNOSIS — Z98.84 BARIATRIC SURGERY STATUS: ICD-10-CM

## 2021-08-23 DIAGNOSIS — R11.2 NON-INTRACTABLE VOMITING WITH NAUSEA, UNSPECIFIED VOMITING TYPE: Primary | ICD-10-CM

## 2021-08-23 DIAGNOSIS — K21.9 GASTROESOPHAGEAL REFLUX DISEASE: ICD-10-CM

## 2021-08-23 DIAGNOSIS — R11.2 NAUSEA WITH VOMITING: Primary | ICD-10-CM

## 2021-08-23 DIAGNOSIS — R07.9 CHEST PAIN: ICD-10-CM

## 2021-08-24 LAB
BASOPHILS # BLD: 0.1 K/UL (ref 0–0.1)
BASOPHILS NFR BLD: 1 % (ref 0–1)
DIFFERENTIAL METHOD BLD: ABNORMAL
EOSINOPHIL # BLD: 0.3 K/UL (ref 0–0.4)
EOSINOPHIL NFR BLD: 3 % (ref 0–7)
ERYTHROCYTE [DISTWIDTH] IN BLOOD BY AUTOMATED COUNT: 14.2 % (ref 11.5–14.5)
HCT VFR BLD AUTO: 41.3 % (ref 35–47)
HGB BLD-MCNC: 12.7 G/DL (ref 11.5–16)
IMM GRANULOCYTES # BLD AUTO: 0 K/UL (ref 0–0.04)
IMM GRANULOCYTES NFR BLD AUTO: 0 % (ref 0–0.5)
LYMPHOCYTES # BLD: 4.8 K/UL (ref 0.8–3.5)
LYMPHOCYTES NFR BLD: 40 % (ref 12–49)
MCH RBC QN AUTO: 28.2 PG (ref 26–34)
MCHC RBC AUTO-ENTMCNC: 30.8 G/DL (ref 30–36.5)
MCV RBC AUTO: 91.8 FL (ref 80–99)
MONOCYTES # BLD: 1 K/UL (ref 0–1)
MONOCYTES NFR BLD: 8 % (ref 5–13)
NEUTS SEG # BLD: 5.8 K/UL (ref 1.8–8)
NEUTS SEG NFR BLD: 48 % (ref 32–75)
NRBC # BLD: 0 K/UL (ref 0–0.01)
NRBC BLD-RTO: 0 PER 100 WBC
PLATELET # BLD AUTO: 330 K/UL (ref 150–400)
PMV BLD AUTO: 11.3 FL (ref 8.9–12.9)
RBC # BLD AUTO: 4.5 M/UL (ref 3.8–5.2)
WBC # BLD AUTO: 12 K/UL (ref 3.6–11)

## 2021-09-02 DIAGNOSIS — I10 ESSENTIAL HYPERTENSION: ICD-10-CM

## 2021-09-02 DIAGNOSIS — Z76.89 ENCOUNTER FOR SUPPORT AND COORDINATION OF TRANSITION OF CARE: ICD-10-CM

## 2021-09-02 DIAGNOSIS — E53.8 B12 DEFICIENCY DUE TO DIET: ICD-10-CM

## 2021-09-02 DIAGNOSIS — Z71.89 ADVICE GIVEN ABOUT COVID-19 VIRUS INFECTION: ICD-10-CM

## 2021-09-02 DIAGNOSIS — Z98.84 S/P GASTRIC BYPASS: ICD-10-CM

## 2021-09-02 RX ORDER — CYANOCOBALAMIN 1000 UG/ML
INJECTION, SOLUTION INTRAMUSCULAR; SUBCUTANEOUS
Qty: 1 ML | Refills: 5 | Status: SHIPPED | OUTPATIENT
Start: 2021-09-02

## 2021-09-03 ENCOUNTER — VIRTUAL VISIT (OUTPATIENT)
Dept: FAMILY MEDICINE CLINIC | Age: 31
End: 2021-09-03
Payer: COMMERCIAL

## 2021-09-03 DIAGNOSIS — F41.1 GAD (GENERALIZED ANXIETY DISORDER): Primary | ICD-10-CM

## 2021-09-03 DIAGNOSIS — Z71.89 ADVICE GIVEN ABOUT COVID-19 VIRUS INFECTION: ICD-10-CM

## 2021-09-03 PROCEDURE — 99213 OFFICE O/P EST LOW 20 MIN: CPT | Performed by: FAMILY MEDICINE

## 2021-09-03 NOTE — PROGRESS NOTES
HISTORY OF PRESENT ILLNESS  Geraldine Heard is a 33 y.o. female. Pt's main concerns were provided on virtual visit, a telemed format, COVID-19 vaccinated pt is w/ comorbid medical history and unaware of been exposed to covid-19 individual, Pt Have been trying to stay safe  ,  pt has no fever no cough no dyspnea, no ha, not dizzy, nl smell nl taste, no N/V/D, no body ache      Pt has been feeling more anxious recently and will become jittery, started few weeks ago, not sleeping well , does not have tiredness and does not feel depressed feels very energetic currently taking Viibryd 40 mg phentermine daily no s no h ideation no illusion no hallucination, pt  realizes, and recognizes that these fears of getting another anxiety attacks are excessive or unreasonable, resting does not get rid of the tiredness,  No cigs no drugs, +Etoh socially, not feeling gulilty not been hopeless, not in an abusive relationship, The condition significantly interferes with the person's normal routine, occupational functioning, or social activities or relationships,           Current Outpatient Medications   Medication Sig Dispense Refill    cyanocobalamin (VITAMIN B12) 1,000 mcg/mL injection INJECT 1ML INTO THE MUSCLE MONTHLY 1 mL 5    vilazodone (VIIBRYD) 40 mg tab tablet Take 1 Tablet by mouth daily. 90 Tablet 2    norethindrone (MICRONOR) 0.35 mg tab       semaglutide (OZEMPIC) 0.25 mg or 0.5 mg/dose (2 mg/1.5 ml) subq pen 0.5 mg by SubCUTAneous route every seven (7) days. 3 Box 3    meloxicam (MOBIC) 15 mg tablet Take 15 mg by mouth as needed.       atorvastatin (LIPITOR) 40 mg tablet TAKE 1 TABLET BY MOUTH EVERY DAY 90 Tablet 3    rimegepant (Nurtec ODT) 75 mg disintegrating tablet 1 tablet oral every other day 16 Tablet 2    Blood-Glucose Transmitter (Dexcom G6 Transmitter) nahid USE AS DIRECTED 1 Device 3    Blood-Glucose Sensor (Dexcom G6 Sensor) nahid CHANGE EVERY 10 DAYS 3 Device 12    Biotin 2,500 mcg cap Take 10,000 mcg by mouth daily.  BENEFIBER, GUAR GUM, PO Take 1 Packet by mouth daily. As needed      ergocalciferol (ERGOCALCIFEROL) 1,250 mcg (50,000 unit) capsule Take 1 Cap by mouth every seven (7) days. Indications: low vitamin D levels 12 Cap 3    galcanezumab-gnlm (Emgality Pen) 120 mg/mL injection 1 mL by SubCUTAneous route every thirty (30) days. 1 mL 11    drospirenone, contraceptive, (Slynd) 4 mg (28) tab Slynd 4 mg (28) tablet   Take 1 tablet every day by oral route.  omeprazole (PRILOSEC) 40 mg capsule Take 1 Cap by mouth daily. 90 Cap 3    metoprolol tartrate (LOPRESSOR) 100 mg IR tablet TAKE 1 TABLET BY MOUTH TWICE A  Tab 2    gabapentin (NEURONTIN) 300 mg capsule Take 1 Cap by mouth three (3) times daily. Max Daily Amount: 900 mg. 270 Cap 1    nystatin (MYCOSTATIN) topical cream Apply  to affected area two (2) times a day. Apply to abdominal folds twice daily as needed for rash 30 g 3    SUMAtriptan (IMITREX) 50 mg tablet 1 tab at onset of headache, can repeat X 1 in 2 hrs if HA persists. Not more than 10 days/month. 9 Tab 2    iron,carbonyl-FA-multivit-min (Opurity Multivitamin) 30 mg iron- 800 mcg chew Take 2 Tabs by mouth daily.  calcium carbonate/vitamin D3 (CALCIUM 600 + D,3, PO) Take 1 Tab by mouth two (2) times a day.  ALPRAZolam (XANAX) 0.5 mg tablet Take 1 Tab by mouth nightly as needed for Anxiety or Sleep. Max Daily Amount: 0.5 mg. Indications: anxious, repeated episodes of anxiety, panic disorder 30 Tab 3    aspirin delayed-release 81 mg tablet Take 1 Tab by mouth daily. 30 Tab 11    Blood-Glucose Meter,Continuous (Dexcom G6 ) misc Change sensor every 10 days 1 Each 0    albuterol (PROVENTIL HFA, VENTOLIN HFA) 90 mcg/actuation inhaler Take 1 Puff by inhalation every four (4) hours as needed for Wheezing.  1 Inhaler 1     Allergies   Allergen Reactions    Latex Itching     Past Medical History:   Diagnosis Date    Anxiety with depression     Arrhythmia tachycardia    Asthma     [place] hump 2015    Carpal tunnel syndrome of right wrist 2016    Diabetes ([facility]     ALIYA (generalized anxiety disorder) 2018    GERD (gastroesophageal reflux disease) 2014    HTN (hypertension) 2011    [facility] 10/19/2015    Hyperhidrosis 2018    Morbid obesity ([facility]     Other ill-defined conditions(799.89)     migraines    Palpitation 2018    Sleep apnea     uses CPAP    Tachycardia 2018     Past Surgical History:   Procedure Laterality Date    HX GASTRECTOMY  2020    Gastric sleeve     HX GYN  10/19/2020    Laparoscopic right oopherectomy, left cystectomy    HX HERNIA REPAIR  2020    HX OVARIAN CYST REMOVAL  10/19/2020    HX TONSILLECTOMY      HX WISDOM TEETH EXTRACTION      [place] REMOVAL OF TONSILS,<13 Y/O       Family History   Problem Relation Age of Onset    Hypertension Mother     Diabetes Mother         Type II    Anesth Problems Mother         respiratory issues - feels like she cannot breath when coming out of anesthesia   Jasbir Sandeep Arthritis-rheumatoid Mother    Richardsonrel Sandeep Gout Mother     Psychiatric Disorder Father     Drug Abuse Father     Cancer Other         prostate    Hypertension Sister     Thyroid Disease Sister         \"I think\"    Breast Cancer Sister     Attention Deficit Hyperactivity Disorder Brother     Hypertension Sister      Social History     Tobacco Use    Smoking status: Former Smoker     Packs/day: 0.50     Years: 5.00     Pack years: 2.50     Quit date: 2019     Years since quittin.8    Smokeless tobacco: Never Used   Substance Use Topics    Alcohol use:  Yes     Alcohol/week: 1.0 standard drinks     Types: 1 Shots of liquor, 1 Standard drinks or equivalent per week     Comment: socially, Monthly or less      Lab Results   Component Value Date/Time    WBC 12.0 (H) 2021 02:22 PM    HGB 12.7 2021 02:22 PM    HCT 41.3 2021 02:22 PM    PLATELET 180 08/23/2021 02:22 PM    MCV 91.8 08/23/2021 02:22 PM     Lab Results   Component Value Date/Time    TSH 1.540 03/19/2021 09:58 AM    TSH 1.730 04/04/2018 01:17 PM         Review of Systems   Constitutional: Negative for chills and fever. HENT: Negative for congestion and nosebleeds. Eyes: Negative for blurred vision and pain. Respiratory: Negative for cough, shortness of breath and wheezing. Cardiovascular: Negative for chest pain and leg swelling. Gastrointestinal: Negative for constipation, diarrhea, nausea and vomiting. Genitourinary: Negative for dysuria and frequency. Musculoskeletal: Negative for joint pain and myalgias. Skin: Negative for itching and rash. Neurological: Negative for dizziness, loss of consciousness and headaches. Psychiatric/Behavioral: Negative for depression. The patient is nervous/anxious and has insomnia. Physical Exam  Constitutional:       Appearance: She is obese. She is not ill-appearing or toxic-appearing. HENT:      Head: Normocephalic and atraumatic. Mouth/Throat:      Mouth: Mucous membranes are moist.   Neurological:      Mental Status: She is alert and oriented to person, place, and time. Psychiatric:         Mood and Affect: Mood normal.         Behavior: Behavior normal.         Thought Content: Thought content normal.         Judgment: Judgment normal.         ASSESSMENT and PLAN  Diagnoses and all orders for this visit:    1. ALIYA (generalized anxiety disorder)    2.  Advice given about COVID-19 virus infection        At this time patient was told that continue with Viibryd 40 mg once daily stop phentermine and use Xanax as needed as needed return to clinic in 3 weeks for further evaluation patient acknowledged and agreed with today's recommendation  Patient advised to have the vaccination mask on most of the time, social distance and handwashing avoid crowded area pursuant to the emergency declaration under the [employer] and [employer] Emergencies Act, 1135 waiver authority and the Coronavirus Preparedness and Response Supplemental Appropriations Act, this Virtual Visit was conducted, with patient's consent, to reduce the patient's risk of exposure to COVID-19 and provide continuity of care for an established patient  Services were provided through a Video synchronous discussion virtually to substitute for in-person appointment.

## 2021-09-03 NOTE — PROGRESS NOTES
Chief Complaint   Patient presents with    Depression     follow up     1. Have you been to the ER, urgent care clinic since your last visit? Hospitalized since your last visit? No    2. Have you seen or consulted any other health care providers outside of the [address] [place] [address] since your last visit? Include any pap smears or colon screening. No    Call placed to pt. Verified patient with two type of identifiers.   c/o increased anxiety, does not think viibryd is working,  Can't sleep,

## 2021-09-15 ENCOUNTER — OFFICE VISIT (OUTPATIENT)
Dept: ORTHOPEDIC SURGERY | Age: 31
End: 2021-09-15
Payer: COMMERCIAL

## 2021-09-15 VITALS
HEART RATE: 75 BPM | SYSTOLIC BLOOD PRESSURE: 118 MMHG | WEIGHT: 268 LBS | OXYGEN SATURATION: 99 % | BODY MASS INDEX: 47.48 KG/M2 | TEMPERATURE: 96.8 F | HEIGHT: 63 IN | DIASTOLIC BLOOD PRESSURE: 77 MMHG

## 2021-09-15 DIAGNOSIS — G56.01 CARPAL TUNNEL SYNDROME ON RIGHT: Primary | ICD-10-CM

## 2021-09-15 PROCEDURE — 20526 THER INJECTION CARP TUNNEL: CPT | Performed by: ORTHOPAEDIC SURGERY

## 2021-09-15 PROCEDURE — 99213 OFFICE O/P EST LOW 20 MIN: CPT | Performed by: ORTHOPAEDIC SURGERY

## 2021-09-15 RX ORDER — BETAMETHASONE SODIUM PHOSPHATE AND BETAMETHASONE ACETATE 3; 3 MG/ML; MG/ML
6 INJECTION, SUSPENSION INTRA-ARTICULAR; INTRALESIONAL; INTRAMUSCULAR; SOFT TISSUE ONCE
Status: COMPLETED | OUTPATIENT
Start: 2021-09-15 | End: 2021-09-15

## 2021-09-15 RX ADMIN — BETAMETHASONE SODIUM PHOSPHATE AND BETAMETHASONE ACETATE 6 MG: 3; 3 INJECTION, SUSPENSION INTRA-ARTICULAR; INTRALESIONAL; INTRAMUSCULAR; SOFT TISSUE at 16:30

## 2021-09-15 NOTE — PROGRESS NOTES
Identified pt with two pt identifiers (name and ). Reviewed chart in preparation for visit and have obtained necessary documentation. Shivam Ahumada is a 33 y.o. female  Chief Complaint   Patient presents with    Joint Or Tendon Injection     RT hand     Visit Vitals  /77 (BP 1 Location: Right arm, BP Patient Position: Sitting, BP Cuff Size: Large adult)   Pulse 75   Temp 96.8 °F (36 °C) (Tympanic)   Ht 5' 3\" (1.6 m)   Wt 268 lb (121.6 kg)   SpO2 99%   PF (!) 5 L/min   BMI 47.47 kg/m²     1. Have you been to the ER, urgent care clinic since your last visit? Hospitalized since your last visit? No    2. Have you seen or consulted any other health care providers outside of the [address] [place] [address] since your last visit? Include any pap smears or colon screening.  No

## 2021-09-15 NOTE — PROGRESS NOTES
9/15/2021      CC: Right hand pain    HPI:      This is a 33y.o. year old female who complains of pain and numbness in the right hand. This pain is associated with nighttime. Provocative activities include: working, driving, sleeping. The patient has attempted injections as treatment for this issue. PMH:  Past Medical History:   Diagnosis Date    Anxiety with depression     Arrhythmia     tachycardia    Asthma     [place] hump 1/7/2015    Carpal tunnel syndrome of right wrist 12/20/2016    [facility] ([facility]     ALIYA (generalized anxiety disorder) 4/4/2018    GERD (gastroesophageal reflux disease) 8/13/2014    HTN (hypertension) 7/25/2011    Hyperaldosteronism ([facility] 10/19/2015    Hyperhidrosis 4/4/2018    Morbid obesity ([facility]     Other ill-defined conditions(799.89)     migraines    Palpitation 4/4/2018    Sleep apnea     uses CPAP    Tachycardia 4/4/2018       PSxHx:  Past Surgical History:   Procedure Laterality Date    HX GASTRECTOMY  09/08/2020    Gastric sleeve     HX GYN  10/19/2020    Laparoscopic right oopherectomy, left cystectomy    HX HERNIA REPAIR  09/08/2020    HX OVARIAN CYST REMOVAL  10/19/2020    HX TONSILLECTOMY      HX WISDOM TEETH EXTRACTION      [place] REMOVAL OF TONSILS,<11 Y/O         Meds:    Current Outpatient Medications:     cyanocobalamin (VITAMIN B12) 1,000 mcg/mL injection, INJECT 1ML INTO THE MUSCLE MONTHLY, Disp: 1 mL, Rfl: 5    vilazodone (VIIBRYD) 40 mg tab tablet, Take 1 Tablet by mouth daily. , Disp: 90 Tablet, Rfl: 2    norethindrone (MICRONOR) 0.35 mg tab, , Disp: , Rfl:     semaglutide (OZEMPIC) 0.25 mg or 0.5 mg/dose (2 mg/1.5 ml) subq pen, 0.5 mg by SubCUTAneous route every seven (7) days. , Disp: 3 Box, Rfl: 3    meloxicam (MOBIC) 15 mg tablet, Take 15 mg by mouth as needed. , Disp: , Rfl:     atorvastatin (LIPITOR) 40 mg tablet, TAKE 1 TABLET BY MOUTH EVERY DAY, Disp: 90 Tablet, Rfl: 3    rimegepant (Nurtec ODT) 75 mg disintegrating tablet, 1 tablet oral every other day, Disp: 16 Tablet, Rfl: 2    Blood-Glucose Transmitter (Dexcom G6 Transmitter) nahid, USE AS DIRECTED, Disp: 1 Device, Rfl: 3    Blood-Glucose Sensor (Dexcom G6 Sensor) nahid, CHANGE EVERY 10 DAYS, Disp: 3 Device, Rfl: 12    Biotin 2,500 mcg cap, Take 10,000 mcg by mouth daily. , Disp: , Rfl:     BENEFIBER, GUAR GUM, PO, Take 1 Packet by mouth daily. As needed, Disp: , Rfl:     ergocalciferol (ERGOCALCIFEROL) 1,250 mcg (50,000 unit) capsule, Take 1 Cap by mouth every seven (7) days. Indications: low vitamin D levels, Disp: 12 Cap, Rfl: 3    galcanezumab-gnlm (Emgality Pen) 120 mg/mL injection, 1 mL by SubCUTAneous route every thirty (30) days. , Disp: 1 mL, Rfl: 11    drospirenone, contraceptive, (Slynd) 4 mg (28) tab, Slynd 4 mg (28) tablet  Take 1 tablet every day by oral route., Disp: , Rfl:     omeprazole (PRILOSEC) 40 mg capsule, Take 1 Cap by mouth daily. , Disp: 90 Cap, Rfl: 3    metoprolol tartrate (LOPRESSOR) 100 mg IR tablet, TAKE 1 TABLET BY MOUTH TWICE A DAY, Disp: 180 Tab, Rfl: 2    gabapentin (NEURONTIN) 300 mg capsule, Take 1 Cap by mouth three (3) times daily. Max Daily Amount: 900 mg., Disp: 270 Cap, Rfl: 1    nystatin (MYCOSTATIN) topical cream, Apply  to affected area two (2) times a day. Apply to abdominal folds twice daily as needed for rash, Disp: 30 g, Rfl: 3    SUMAtriptan (IMITREX) 50 mg tablet, 1 tab at onset of headache, can repeat X 1 in 2 hrs if HA persists. Not more than 10 days/month., Disp: 9 Tab, Rfl: 2    iron,carbonyl-FA-multivit-min (Opurity Multivitamin) 30 mg iron- 800 mcg chew, Take 2 Tabs by mouth daily. , Disp: , Rfl:     calcium carbonate/vitamin D3 (CALCIUM 600 + D,3, PO), Take 1 Tab by mouth two (2) times a day., Disp: , Rfl:     ALPRAZolam (XANAX) 0.5 mg tablet, Take 1 Tab by mouth nightly as needed for Anxiety or Sleep. Max Daily Amount: 0.5 mg.  Indications: anxious, repeated episodes of anxiety, panic disorder, Disp: 30 Tab, Rfl: 3   aspirin delayed-release 81 mg tablet, Take 1 Tab by mouth daily. , Disp: 30 Tab, Rfl: 11    Blood-Glucose Meter,Continuous (Dexcom G6 ) misc, Change sensor every 10 days, Disp: 1 Each, Rfl: 0    albuterol (PROVENTIL HFA, VENTOLIN HFA) 90 mcg/actuation inhaler, Take 1 Puff by inhalation every four (4) hours as needed for Wheezing., Disp: 1 Inhaler, Rfl: 1  No current facility-administered medications for this visit. All:  Allergies   Allergen Reactions    Latex Itching       Social Hx:  Social History     Socioeconomic History    Marital status: SINGLE     Spouse name: Not on file    Number of children: Not on file    Years of education: Not on file    Highest education level: Not on file   Occupational History    Occupation: customer ser and student     Comment: [employer]   Tobacco Use    Smoking status: Former Smoker     Packs/day: 0.50     Years: 5.00     Pack years: 2.50     Quit date: 2019     Years since quittin.8    Smokeless tobacco: Never Used   Vaping Use    Vaping Use: Never used   Substance and Sexual Activity    Alcohol use: Yes     Alcohol/week: 1.0 standard drinks     Types: 1 Shots of liquor, 1 Standard drinks or equivalent per week     Comment: socially, Monthly or less    Drug use: No    Sexual activity: Not Currently     Partners: Male     Birth control/protection: Condom   Other Topics Concern     Social Determinants of Health     Financial Resource Strain:     Difficulty of Paying Living Expenses:    Food Insecurity:     Worried About Running Out of Food in the Last Year:     Ran Out of Food in the Last Year:    Transportation Needs:     Lack of Transportation (Medical):      Lack of Transportation (Non-Medical):    Physical Activity:     Days of Exercise per Week:     Minutes of Exercise per Session:    Stress:     Feeling of Stress :    Social Connections:     Frequency of Communication with Friends and Family:     Frequency of Social Gatherings with Friends and Family:     Attends Sabianism Services:     Active Member of Clubs or Organizations:     Attends Club or Organization Meetings:     Marital Status:        Family Hx:  Family History   Problem Relation Age of Onset    Hypertension Mother     Diabetes Mother         Type II    Anesth Problems Mother         respiratory issues - feels like she cannot breath when coming out of anesthesia   [facility] Arthritis-rheumatoid Mother     Gout Mother     Psychiatric Disorder Father     Drug Abuse Father     Cancer Other         prostate    Hypertension Sister     Thyroid Disease Sister         \"I think\"    Breast Cancer Sister     Attention Deficit Hyperactivity Disorder Brother     Hypertension Sister          Review of Systems:       General: Denies headache, lethargy, fever, weight loss  Ears/Nose/Throat: Denies ear discharge, drainage, nosebleeds, hoarse voice, dental problems  Cardiovascular: Denies chest pain, shortness of breath  Lungs: Denies chest pain, breathing problems, wheezing, pneumonia  Stomach: Denies stomach pain, heartburn, constipation, irritable bowel  Skin: Denies rash, sores, open wounds  Musculoskeletal: right hand numbness and pain  Genitourinary: Denies dysuria, hematuria, polyuria  Gastrointestinal: Denies constipation, obstipation, diarrhea  Neurological: Denies changes in sight, smell, hearing, taste, seizures. Denies loss of consciousness.   Psychiatric: Denies depression, sleep pattern changes, anxiety, change in personality  Endocrine: Denies mood swings, heat or cold intolerance  Hematologic/Lymphatic: Denies anemia, purpura, petechia  Allergic/Immunologic: Denies swelling of throat, pain or swelling at lymph nodes      Physical Examination:    Visit Vitals  /77 (BP 1 Location: Right arm, BP Patient Position: Sitting, BP Cuff Size: Large adult)   Pulse 75   Temp 96.8 °F (36 °C) (Tympanic)   Ht 5' 3\" (1.6 m)   Wt 268 lb (121.6 kg)   SpO2 99%   PF (!) 5 L/min   BMI 47.47 kg/m²        General: AOX3, no apparent distress  Psychiatric: mood and affect appropriate  Lungs: breathing is symmetric and unlabored bilaterally  Heart: regular rate and rhythm  Abdomen: no guarding  Head: normocephalic, atraumatic  Skin: No significant abnormalities, good turgor  Sensation intact to light touch: C5-T1 dermatomes  Muscular exam: 5/5 strength in all major muscle groups unless noted in specialty exam.    Extremities      Right upper extremity: No gross deformity. No restriction to range of motion of the shoulder, elbow, wrist.  Neck range of motion is full and pain free. Muscle bulk is appropriate without wasting. Sensation is intact to light touch in the C5-T1 dermatomes. + Meng's, Tinel's, and Phalen's Maneuver at the wrist.  Capillary refill is less than 2 seconds in the fingers. Strength testing is 5/5 at the major muscle groups of the shoulder, elbow, and wrist.      Left upper extremity:  No gross deformity. No restriction to range of motion of the shoulder, elbow, wrist.  Neck range of motion is full and pain free. Muscle bulk is appropriate without wasting. Sensation is intact to light touch in the C5-T1 dermatomes. Capillary refill is less than 2 seconds in the fingers. Strength testing is 5/5 at the major muscle groups of the shoulder, elbow, and wrist.      Right lower extremity: no exam  Left lower extremity:  no exam      Diagnostics:    Pertinent Diagnostics: none today    Assessment: Right carpal tunnel syndrome  Plan: This patient and I have had a long discussion about the normal anatomy of the wrist, as well as the pathology associated with carpal tunnel syndrome. We discussed that the normal treatment would be conservative in nature: bracing, stretching exercises, activity modification, and possibly injections into the carpal tunnel. If this fails, carpal tunnel release is the next phase of treatment.   Surgical treatment would be more definitive, but does carry risks, such as: palmar cutaneous nerve injury, recurrent branch of the median nerve injury, thumb pain, palmar pain with ,  strength loss, We did discuss the other general risks of surgery which include but are not limited to infection, nerve or blood vessel damage, failure of fixation, need for reoperation, postoperative pain and swelling, need for reoperation, death, disability, organ dysfunction, wound healing issues, DVT, PE, and the need for further procedures. The patient has stated their understanding and would like to proceed with: injection      Procedures:  Procedure Date: 9/15/2021    Procedure Note:    After consent was obtained, the right wrist was prepped in the standard fashion using a chlorhexidine scrub. After appropriate timeout,  1% lidocaine was introduced to the skin on the volar aspect of the wrist at approximately 1 centimeter proximal to the most distal wrist crease just ulnar to the wrist midline. The needle was aimed at a 30 degree angle distally toward the middle and ring finger. Care was taken to avoid injection if pain or symptoms were noted in the median nerve distribution. Once adequate anesthesia was confirmed, a 22 gauge needle was used to inject a combination of 6 mg of celestone and 1 cc of 1% lidocaine into the carpal tunnel. Needle removed, sterile dressing applied. There were no immediate complications. Patient tolerated well, and post injection instructions were given. Ms. Salina Byers has a reminder for a \"due or due soon\" health maintenance. I have asked that she contact her primary care provider for follow-up on this health maintenance.

## 2021-09-16 ENCOUNTER — HOSPITAL ENCOUNTER (OUTPATIENT)
Dept: PREADMISSION TESTING | Age: 31
Discharge: HOME | End: 2021-09-16
Payer: COMMERCIAL

## 2021-09-16 LAB
SARS-COV-2, XPLCVT: NOT DETECTED
SOURCE, COVRS: NORMAL

## 2021-09-16 PROCEDURE — U0005 INFEC AGEN DETEC AMPLI PROBE: HCPCS

## 2021-09-20 ENCOUNTER — HOSPITAL ENCOUNTER (OUTPATIENT)
Age: 31
Setting detail: OUTPATIENT SURGERY
Discharge: HOME | End: 2021-09-20
Attending: INTERNAL MEDICINE | Admitting: INTERNAL MEDICINE
Payer: COMMERCIAL

## 2021-09-20 ENCOUNTER — ANESTHESIA (OUTPATIENT)
Dept: ENDOSCOPY | Age: 31
End: 2021-09-20
Payer: COMMERCIAL

## 2021-09-20 ENCOUNTER — ANESTHESIA EVENT (OUTPATIENT)
Dept: ENDOSCOPY | Age: 31
End: 2021-09-20
Payer: COMMERCIAL

## 2021-09-20 VITALS
DIASTOLIC BLOOD PRESSURE: 95 MMHG | WEIGHT: 265 LBS | BODY MASS INDEX: 46.95 KG/M2 | SYSTOLIC BLOOD PRESSURE: 150 MMHG | OXYGEN SATURATION: 100 % | HEIGHT: 63 IN | HEART RATE: 70 BPM | TEMPERATURE: 98 F | RESPIRATION RATE: 17 BRPM

## 2021-09-20 DIAGNOSIS — G43.009 MIGRAINE WITHOUT AURA AND WITHOUT STATUS MIGRAINOSUS, NOT INTRACTABLE: ICD-10-CM

## 2021-09-20 DIAGNOSIS — R20.2 PARESTHESIA: ICD-10-CM

## 2021-09-20 LAB — HCG UR QL: NEGATIVE

## 2021-09-20 PROCEDURE — 81025 URINE PREGNANCY TEST: CPT

## 2021-09-20 PROCEDURE — 76040000019: Performed by: INTERNAL MEDICINE

## 2021-09-20 PROCEDURE — 74011250636 HC RX REV CODE- 250/636: Performed by: NURSE ANESTHETIST, CERTIFIED REGISTERED

## 2021-09-20 PROCEDURE — 74011000250 HC RX REV CODE- 250: Performed by: NURSE ANESTHETIST, CERTIFIED REGISTERED

## 2021-09-20 PROCEDURE — 74011250636 HC RX REV CODE- 250/636: Performed by: INTERNAL MEDICINE

## 2021-09-20 PROCEDURE — 76060000031 HC ANESTHESIA FIRST 0.5 HR: Performed by: INTERNAL MEDICINE

## 2021-09-20 PROCEDURE — 77030019988 HC FCPS ENDOSC DISP BSC -B: Performed by: INTERNAL MEDICINE

## 2021-09-20 PROCEDURE — 2709999900 HC NON-CHARGEABLE SUPPLY: Performed by: INTERNAL MEDICINE

## 2021-09-20 PROCEDURE — 88305 TISSUE EXAM BY PATHOLOGIST: CPT

## 2021-09-20 PROCEDURE — 77030039825 HC MSK NSL PAP SUPERNO2VA VYRM -B: Performed by: NURSE ANESTHETIST, CERTIFIED REGISTERED

## 2021-09-20 PROCEDURE — 88342 IMHCHEM/IMCYTCHM 1ST ANTB: CPT

## 2021-09-20 RX ORDER — SODIUM CHLORIDE 9 MG/ML
25 INJECTION, SOLUTION INTRAVENOUS CONTINUOUS
Status: DISCONTINUED | OUTPATIENT
Start: 2021-09-20 | End: 2021-09-20 | Stop reason: HOSPADM

## 2021-09-20 RX ORDER — LIDOCAINE HYDROCHLORIDE 20 MG/ML
INJECTION, SOLUTION EPIDURAL; INFILTRATION; INTRACAUDAL; PERINEURAL AS NEEDED
Status: DISCONTINUED | OUTPATIENT
Start: 2021-09-20 | End: 2021-09-20 | Stop reason: HOSPADM

## 2021-09-20 RX ORDER — GLYCOPYRROLATE 0.2 MG/ML
INJECTION INTRAMUSCULAR; INTRAVENOUS AS NEEDED
Status: DISCONTINUED | OUTPATIENT
Start: 2021-09-20 | End: 2021-09-20 | Stop reason: HOSPADM

## 2021-09-20 RX ORDER — PROPOFOL 10 MG/ML
INJECTION, EMULSION INTRAVENOUS AS NEEDED
Status: DISCONTINUED | OUTPATIENT
Start: 2021-09-20 | End: 2021-09-20 | Stop reason: HOSPADM

## 2021-09-20 RX ORDER — ATROPINE SULFATE 0.1 MG/ML
0.5 INJECTION INTRAVENOUS
Status: CANCELLED | OUTPATIENT
Start: 2021-09-20 | End: 2021-09-21

## 2021-09-20 RX ORDER — SODIUM CHLORIDE 0.9 % (FLUSH) 0.9 %
5-40 SYRINGE (ML) INJECTION EVERY 8 HOURS
Status: CANCELLED | OUTPATIENT
Start: 2021-09-20

## 2021-09-20 RX ORDER — SODIUM CHLORIDE 0.9 % (FLUSH) 0.9 %
5-40 SYRINGE (ML) INJECTION AS NEEDED
Status: CANCELLED | OUTPATIENT
Start: 2021-09-20

## 2021-09-20 RX ORDER — EPINEPHRINE 0.1 MG/ML
1 INJECTION INTRACARDIAC; INTRAVENOUS
Status: CANCELLED | OUTPATIENT
Start: 2021-09-20 | End: 2021-09-21

## 2021-09-20 RX ORDER — NALOXONE HYDROCHLORIDE 0.4 MG/ML
0.4 INJECTION, SOLUTION INTRAMUSCULAR; INTRAVENOUS; SUBCUTANEOUS
Status: CANCELLED | OUTPATIENT
Start: 2021-09-20 | End: 2021-09-20

## 2021-09-20 RX ORDER — KETAMINE HYDROCHLORIDE 10 MG/ML
INJECTION, SOLUTION INTRAMUSCULAR; INTRAVENOUS AS NEEDED
Status: DISCONTINUED | OUTPATIENT
Start: 2021-09-20 | End: 2021-09-20 | Stop reason: HOSPADM

## 2021-09-20 RX ORDER — SODIUM CHLORIDE 9 MG/ML
INJECTION, SOLUTION INTRAVENOUS
Status: DISCONTINUED | OUTPATIENT
Start: 2021-09-20 | End: 2021-09-20 | Stop reason: HOSPADM

## 2021-09-20 RX ORDER — DEXTROMETHORPHAN/PSEUDOEPHED 2.5-7.5/.8
1.2 DROPS ORAL
Status: CANCELLED | OUTPATIENT
Start: 2021-09-20

## 2021-09-20 RX ORDER — FLUMAZENIL 0.1 MG/ML
0.2 INJECTION INTRAVENOUS
Status: CANCELLED | OUTPATIENT
Start: 2021-09-20 | End: 2021-09-20

## 2021-09-20 RX ORDER — KETAMINE HYDROCHLORIDE 50 MG/ML
INJECTION, SOLUTION INTRAMUSCULAR; INTRAVENOUS
Status: DISCONTINUED
Start: 2021-09-20 | End: 2021-09-20 | Stop reason: HOSPADM

## 2021-09-20 RX ADMIN — SODIUM CHLORIDE 25 ML/HR: 9 INJECTION, SOLUTION INTRAVENOUS at 12:23

## 2021-09-20 RX ADMIN — PROPOFOL 50 MG: 10 INJECTION, EMULSION INTRAVENOUS at 13:13

## 2021-09-20 RX ADMIN — GLYCOPYRROLATE 0.1 MG: 0.2 INJECTION, SOLUTION INTRAMUSCULAR; INTRAVENOUS at 13:08

## 2021-09-20 RX ADMIN — PROPOFOL 50 MG: 10 INJECTION, EMULSION INTRAVENOUS at 13:11

## 2021-09-20 RX ADMIN — PROPOFOL 50 MG: 10 INJECTION, EMULSION INTRAVENOUS at 13:08

## 2021-09-20 RX ADMIN — PROPOFOL 50 MG: 10 INJECTION, EMULSION INTRAVENOUS at 13:18

## 2021-09-20 RX ADMIN — SODIUM CHLORIDE: 900 INJECTION, SOLUTION INTRAVENOUS at 13:03

## 2021-09-20 RX ADMIN — LIDOCAINE HYDROCHLORIDE 10 MG: 20 INJECTION, SOLUTION EPIDURAL; INFILTRATION; INTRACAUDAL; PERINEURAL at 13:08

## 2021-09-20 RX ADMIN — KETAMINE HYDROCHLORIDE 5 MG: 10 INJECTION, SOLUTION INTRAMUSCULAR; INTRAVENOUS at 13:15

## 2021-09-20 RX ADMIN — PROPOFOL 50 MG: 10 INJECTION, EMULSION INTRAVENOUS at 13:16

## 2021-09-20 RX ADMIN — KETAMINE HYDROCHLORIDE 5 MG: 10 INJECTION, SOLUTION INTRAMUSCULAR; INTRAVENOUS at 13:09

## 2021-09-20 NOTE — PROCEDURES
Bed: 21  Expected date:   Expected time:   Means of arrival: [facility]  Comments:     Nafisa García RN  09/26/18 2042 NAME:  Hakan Noel   :   1990   MRN:   [id number]     Date/Time:  2021 1:07 PM    Esophagogastroduodenoscopy (EGD) Procedure Note    : Carlo Mcclellan MD    Staff: Endoscopy RN-1: Sydney Rodriguez RN  Endoscopy RN-2: Chu Smith     Referring Provider:  Idania Muhammad MD    Anethesia/Sedation:  MAC anesthesia Propofol    Preoperative diagnosis: Nausea and vomiting in adult [R11.2]  Gastroesophageal reflux disease, unspecified whether esophagitis present [K21.9]  NSAID long-term use [Z79.1]  Chest pain, unspecified type [R07.9]  Bariatric surgery status [Z98.84]    Postoperative diagnosis: * No post-op diagnosis entered *    Procedure Details     After infom consent was obtained for the procedure, with all risks and benefits of procedure explained the patient was taken to the endoscopy suite and placed in the left lateral decubitus position. Following sequential administration of sedation as per above, the [id number] gastroscope was inserted into the mouth and advanced under direct vision to second portion of the duodenum. A careful inspection was made as the gastroscope was withdrawn, including a retroflexed view of the proximal stomach; findings and interventions are described below. Findings:  Esophagus: Normal mucosa of prox / mid / distal esophagus. Z-line at 39cm and was irregular with salmon tongue extending 1cm proximally. Biopsied w/ cold forceps to confirm Shelby's (C0M1). No ulcers appreciated. No stenosis. No hiatal hernia. Stomach:Evidence of previous sleeve gastrectomy. Scar well healed. Entire stomach was congested with erythema. No erosions or ulcers. Biopsied in multiple quadrants with cold forceps to r/o H pylori. Duodenum/jejunum:normal         EBL: minimal    Complications:   None; patient tolerated the procedure well. Impression:    See Postoperative diagnosis above    Recommendations:  -Continue acid suppression with OPEN capsules. , -Await pathology.     Discharge disposition:  Home in the company of  when able to ambulate    Kike Joy MD

## 2021-09-20 NOTE — TELEPHONE ENCOUNTER
I was seeing Dr. Shanell Latham and my next appt is with Dr. Reanna Raymundo on 09/24/21. I have completely out of gabapentin for my carpel tunnel. I need a refill for 300mg TID as I take 1 tablet in the morning and 2 tablets at night. Can I please get a 90 day refill sent to [facility] on file please.  I'm starting to have shoot pain from my wrist to my elbow and it is worse at night.     Last refill 1/23/2021

## 2021-09-20 NOTE — DISCHARGE INSTRUCTIONS
Jeanie Bernard  [id number]  1990    It was my pleasure seeing you for your procedure. You will also receive a summary report with the findings from this procedure and any further recommendations. If you had polyps removed or biopsies taken during your procedure, you will receive a separate letter from me within the next 2 weeks. If you don't receive this letter or if you have any questions, please call my office [phone]. Please take note of the post procedure instructions listed below. Best Wishes,    Dr. Nahomi Cabral    These instructions give you information on caring for yourself after your procedure. Call your doctor if you have any problems or questions after your procedure. HOME CARE  · Walk if you have belly cramping or gas. Walking will help get rid of the air and reduce the bloated feeling in your belly (abdomen). · Your IV site (where you received drugs) may be tender to touch. Place warm towels on the site; keep your arm up on two pillows if you have any swelling or soreness in the area. · You may shower. ACTIVITY:  · Take frequent rest periods and move at a slower pace for the next 24 hours. .  · You may resume your regular activity tomorrow if you are feeling back to normal.  · Do not drive or ride a bicycle for at least 24 hours (because of the medicine (anesthesia) used during the test). · Do not sign any important legal documents or use or operate any machinery for 24 hours  · Do not take sleeping medicines/nerve drugs for 24 hours unless the doctor tells you. · You can return to work/school tomorrow unless otherwise instructed. NUTRITION:  · Drink plenty of fluids to keep your pee (urine) clear or pale yellow  · Begin with a light meal and progress to your normal diet. Heavy or fried foods are harder to digest and may make you feel sick to your stomach (nauseated).   · Once you are feeling back to normal, you may resume your normal diet as instructed by your doctor. · Avoid alcoholic beverages for 24 hours or as instructed. IF YOU HAD BIOPSIES TAKEN OR POLYPS REMOVED DURING THE PROCEDURE:  · For the next 7 days, avoid all non-steroidal antiinflammatory medications such as Ibuprofen, Motrin, Advil, Alleve, Lorin-seltzer, Goody's powder, BC powder. · If you do not have an heart condition that requires you to take a daily aspirin, you should avoid taking aspirin for 7 days. · Eat a soft diet for 24 hours. · Monitor your stools for any blood or dark black, tar-like, stools as this may be a sign of bleeding and if you see any blood, notify your doctor immediately. GET HELP RIGHT AWAY AND SEEK IMMEDIATE MEDICAL CARE IF:  · You have more than a spotting of blood in your stool. · You pass clumps of tissue (blood clots) or fill the toilet with blood. · Your belly is painfully swollen or puffy (abdominal distention). · You throw up (vomit). · You have a fever. · You have redness, pain or swelling at the IV site that last greater than two days. · You have abdominal pain or discomfort that is severe or gets worse throughout the day. Post-procedure diagnosis:  Gastritis, Reflux Esophagitis, Post Surgical Anatomy    Post-procedure recommendations:    Continue acid suppression with OPEN capsules. , -Await pathology      Learning About Coronavirus (COVID-19)  Coronavirus (COVID-19): Overview  What is coronavirus ([id number])? The coronavirus disease (COVID-19) is caused by a virus. It is an illness that was first found in [place], [place], in December 2019. It has since spread worldwide. The virus can cause fever, cough, and trouble breathing. In severe cases, it can cause pneumonia and make it hard to breathe without help. It can cause death. Coronaviruses are a large group of viruses. They cause the common cold.  They also cause more serious illnesses like Middle East respiratory syndrome (MERS) and severe acute respiratory syndrome (SARS). COVID-19 is caused by a novel coronavirus. That means it's a new type that has not been seen in people before. This virus spreads person-to-person through droplets from coughing and sneezing. It can also spread when you are close to someone who is infected. And it can spread when you touch something that has the virus on it, such as a doorknob or a tabletop. What can you do to protect yourself from coronavirus (COVID-19)? The best way to protect yourself from getting sick is to:  · Avoid areas where there is an outbreak. · Avoid contact with people who may be infected. · Wash your hands often with soap or alcohol-based hand sanitizers. · Avoid crowds and try to stay at least 6 feet away from other people. · Wash your hands often, especially after you cough or sneeze. Use soap and water, and scrub for at least 20 seconds. If soap and water aren't available, use an alcohol-based hand . · Avoid touching your mouth, nose, and eyes. What can you do to avoid spreading the virus to others? To help avoid spreading the virus to others:  · Cover your mouth with a tissue when you cough or sneeze. Then throw the tissue in the trash. · Use a disinfectant to clean things that you touch often. · Stay home if you are sick or have been exposed to the virus. Don't go to school, work, or public areas. And don't use public transportation. · If you are sick:  ? Leave your home only if you need to get medical care. But call the doctor's office first so they know you're coming. And wear a face mask, if you have one.  ? If you have a face mask, wear it whenever you're around other people. It can help stop the spread of the virus when you cough or sneeze. ? Clean and disinfect your home every day. Use household  and disinfectant wipes or sprays. Take special care to clean things that you grab with your hands.  These include doorknobs, remote controls, phones, and handles on your refrigerator and microwave. And don't forget countertops, tabletops, bathrooms, and computer keyboards. When to call for help  Call 911 anytime you think you may need emergency care. For example, call if:  · You have severe trouble breathing. (You can't talk at all.)  · You have constant chest pain or pressure. · You are severely dizzy or lightheaded. · You are confused or can't think clearly. · Your face and lips have a blue color. · You pass out (lose consciousness) or are very hard to wake up. Call your doctor now if you develop symptoms such as:  · Shortness of breath. · Fever. · Cough. If you need to get care, call ahead to the doctor's office for instructions before you go. Make sure you wear a face mask, if you have one, to prevent exposing other people to the virus. Where can you get the latest information? The following health organizations are tracking and studying this virus. Their websites contain the most up-to-date information. Marcellus Batters also learn what to do if you think you may have been exposed to the virus. · U.S. Centers for Disease Control and Prevention (CDC): The CDC provides updated news about the disease and travel advice. The website also tells you how to prevent the spread of infection. www.cdc.gov  · World Health Organization [facility]): WHO offers information about the virus outbreaks. WHO also has travel advice. www.who.int  Current as of: April 1, 2020               Content Version: 12.4  © 2006-2020 [employer]orporated. Care instructions adapted under license by your healthcare professional. If you have questions about a medical condition or this instruction, always ask your healthcare professional. [address] any warranty or liability for your use of this information.

## 2021-09-20 NOTE — H&P
[address] [facility]  Outpatient History and Physical    Patient: Wilson Gracia    Physician: Kike Joy MD    Vital Signs: Blood pressure (!) 143/84, pulse 84, temperature 98 °F (36.7 °C), resp. rate 25, height 5' 3\" (1.6 m), weight 120.2 kg (265 lb), SpO2 100 %, not currently breastfeeding. Allergies: Allergies   Allergen Reactions    Latex Itching       Chief Complaint: vomiting, reflux, abd pain     History of Present Illness: Mrs. Cleo Fournier is a 30yo  lady with morbid obesity s/p sleeve gastrectomy 9/2020 with progressive nausea, vomiting, epigastric pain, reflux, here for EGD.     History:  Past Medical History:   Diagnosis Date    Anxiety with depression     Arrhythmia     tachycardia    Asthma     [facility] hump 1/7/2015    Carpal tunnel syndrome of right wrist 12/20/2016    Diabetes ([address]     ALIYA (generalized anxiety disorder) 4/4/2018    GERD (gastroesophageal reflux disease) 8/13/2014    HTN (hypertension) 7/25/2011    Hyperaldosteronism ([address] 10/19/2015    Hyperhidrosis 4/4/2018    Morbid obesity ([address]     Other ill-defined conditions(799.89)     migraines    Palpitation 4/4/2018    Sleep apnea     uses CPAP    Tachycardia 4/4/2018      Past Surgical History:   Procedure Laterality Date    HX GASTRECTOMY  09/08/2020    Gastric sleeve     HX GYN  10/19/2020    Laparoscopic right oopherectomy, left cystectomy    HX HERNIA REPAIR  09/08/2020    HX OVARIAN CYST REMOVAL  10/19/2020    HX TONSILLECTOMY      HX WISDOM TEETH EXTRACTION      SC REMOVAL OF TONSILS,<11 Y/O        Social History     Socioeconomic History    Marital status: SINGLE     Spouse name: Not on file    Number of children: Not on file    Years of education: Not on file    Highest education level: Not on file   Occupational History    Occupation: customer ser and student     Comment: [employer]   Tobacco Use    Smoking status: Former Smoker     Packs/day: 0.50     Years: 5.00     Pack years: 2.50     Quit date: 2019     Years since quittin.8    Smokeless tobacco: Never Used   Vaping Use    Vaping Use: Never used   Substance and Sexual Activity    Alcohol use: Yes     Alcohol/week: 1.0 standard drinks     Types: 1 Shots of liquor, 1 Standard drinks or equivalent per week     Comment: socially, Monthly or less    Drug use: No    Sexual activity: Not Currently     Partners: Male     Birth control/protection: Condom   Other Topics Concern     Social Determinants of Health     Financial Resource Strain:     Difficulty of Paying Living Expenses:    Food Insecurity:     Worried About Running Out of Food in the Last Year:     Ran Out of Food in the Last Year:    Transportation Needs:     Lack of Transportation (Medical):  Lack of Transportation (Non-Medical):    Physical Activity:     Days of Exercise per Week:     Minutes of Exercise per Session:    Stress:     Feeling of Stress :    Social Connections:     Frequency of Communication with Friends and Family:     Frequency of Social Gatherings with Friends and Family:     Attends Buddhist Services:     Active Member of Clubs or Organizations:     Attends Club or Organization Meetings:     Marital Status:       Family History   Problem Relation Age of Onset    Hypertension Mother     Diabetes Mother         Type II    Anesth Problems Mother         respiratory issues - feels like she cannot breath when coming out of anesthesia    Arthritis-rheumatoid Mother     Gout Mother     Psychiatric Disorder Father     Drug Abuse Father     Cancer Other         prostate    Hypertension Sister     Thyroid Disease Sister         \"I think\"    Breast Cancer Sister     Attention Deficit Hyperactivity Disorder Brother     Hypertension Sister        Medications:   Prior to Admission medications    Medication Sig Start Date End Date Taking?  Authorizing Provider   linaCLOtide (Linzess) 145 mcg cap capsule Take 145 mcg by mouth Daily (before breakfast). Yes Provider, Historical   vilazodone (VIIBRYD) 40 mg tab tablet Take 1 Tablet by mouth daily. 8/15/21  Yes Dewayne Lara MD   meloxicam (MOBIC) 15 mg tablet Take 15 mg by mouth as needed. 7/26/21  Yes Provider, Historical   atorvastatin (LIPITOR) 40 mg tablet TAKE 1 TABLET BY MOUTH EVERY DAY 7/9/21  Yes Dewayne Lara MD   rimegepant (Nurtec ODT) 75 mg disintegrating tablet 1 tablet oral every other day 6/22/21  Yes David Villalobos MD   Blood-Glucose Transmitter (Dexcom G6 Transmitter) nahid USE AS DIRECTED 5/10/21  Yes Ankita Nava MD   Blood-Glucose Sensor (Dexcom G6 Sensor) nahid CHANGE EVERY 10 DAYS 5/10/21  Yes Ankita Nava MD   drospirenone, contraceptive, (Slynd) 4 mg (28) tab Slynd 4 mg (28) tablet   Take 1 tablet every day by oral route. Yes Provider, Historical   omeprazole (PRILOSEC) 40 mg capsule Take 1 Cap by mouth daily. Patient taking differently: Take 40 mg by mouth two (2) times a day. 2/3/21  Yes Dewayne Lara MD   metoprolol tartrate (LOPRESSOR) 100 mg IR tablet TAKE 1 TABLET BY MOUTH TWICE A DAY 1/27/21  Yes Dewayne Lara MD   gabapentin (NEURONTIN) 300 mg capsule Take 1 Cap by mouth three (3) times daily. Max Daily Amount: 900 mg. 1/23/21  Yes David Villalobos MD   nystatin (MYCOSTATIN) topical cream Apply  to affected area two (2) times a day. Apply to abdominal folds twice daily as needed for rash 12/15/20  Yes Jody Leggett NP   SUMAtriptan (IMITREX) 50 mg tablet 1 tab at onset of headache, can repeat X 1 in 2 hrs if HA persists. Not more than 10 days/month. 10/23/20  Yes David Villalobos MD   iron,carbonyl-FA-multivit-min (Opurity Multivitamin) 30 mg iron- 800 mcg chew Take 2 Tabs by mouth daily. 8/25/20  Yes Provider, Historical   calcium carbonate/vitamin D3 (CALCIUM 600 + D,3, PO) Take 1 Tab by mouth two (2) times a day.  8/25/20  Yes Provider, Historical   ALPRAZolam (XANAX) 0.5 mg tablet Take 1 Tab by mouth nightly as needed for Anxiety or Sleep. Max Daily Amount: 0.5 mg. Indications: anxious, repeated episodes of anxiety, panic disorder 5/14/20  Yes Karen Brandon MD   aspirin delayed-release 81 mg tablet Take 1 Tab by mouth daily. 5/14/20  Yes Karen Brandon MD   Blood-Glucose Meter,Continuous (Dexcom G6 ) misc Change sensor every 10 days 4/10/20  Yes Ora MD Felicitas   cyanocobalamin (VITAMIN B12) 1,000 mcg/mL injection INJECT 1ML INTO THE MUSCLE MONTHLY 9/2/21   Karen Brandon MD   norethindrone (MICRONOR) 0.35 mg tab  8/5/21   Provider, Historical   semaglutide (OZEMPIC) 0.25 mg or 0.5 mg/dose (2 mg/1.5 ml) subq pen 0.5 mg by SubCUTAneous route every seven (7) days. 8/9/21   Ora Bolus, MD   ergocalciferol (ERGOCALCIFEROL) 1,250 mcg (50,000 unit) capsule Take 1 Cap by mouth every seven (7) days. Indications: low vitamin D levels 3/16/21   Gonzalo Hernández NP   galcanezumab-gnlm (Emgality Pen) 120 mg/mL injection 1 mL by SubCUTAneous route every thirty (30) days. 3/4/21   Max Kirkland MD       Physical Exam:   General: alert, no distress   HEENT: Head: Normocephalic, no lesions, without obvious abnormality. Heart: regular rate and rhythm, S1, S2 normal, no murmur, click, rub or gallop   Lungs: chest clear, no wheezing, rales, normal symmetric air entry   Abdominal: Bowel sounds are normal, liver is not enlarged, spleen is not enlarged   Neurological: Grossly normal   Extremities: extremities normal, atraumatic, no cyanosis or edema       Plan of Care/Planned Procedure: EGD  The heart, lungs and mental status were satisfactory for the administration of MAC sedation and for the procedure. Informed consent was obtained for the procedure, including sedation. Risks of perforation, hemorrhage, adverse drug reaction, and aspiration were discussed.   The risks, benefits and alternatives were again reiterated to the patient to include the risk of infection, bleeding, medication reaction, aspiration, perforation which could require immediate surgery, cardiopulmonary complication, issues with anesthesia and death. The patient was counseled at length about the risks of roberto carlos Covid-19 in the kirti-operative and post-operative states including the recovery window of their procedure. The patient was made aware that roberto carlos Covid-19 after a surgical procedure may worsen their prognosis for recovering from the virus and lend to a higher morbidity and or mortality risk. The patient was given the options of postponing their procedure. All of the risks, benefits, and alternatives were discussed. The patient does  wish to proceed with the procedure.

## 2021-09-20 NOTE — ROUTINE PROCESS
Shivam Ahumada  1990  [id number]    Situation:  Verbal report received from: Geremias Ji  Procedure: Procedure(s):  ESOPHAGOGASTRODUODENOSCOPY (EGD)  ESOPHAGOGASTRODUODENAL (EGD) BIOPSY    Background:    Preoperative diagnosis: Nausea and vomiting in adult [R11.2]  Gastroesophageal reflux disease, unspecified whether esophagitis present [K21.9]  NSAID long-term use [Z79.1]  Chest pain, unspecified type [R07.9]  Bariatric surgery status [Z98.84]  Postoperative diagnosis: Gastritis, Reflux Esophagitis, Post Surgical Anatomy    :  Dr. Shanell Tenorio  Assistant(s): Endoscopy RN-1: Bridgette Vazquez RN  Endoscopy RN-2: Darryl Carey    Specimens:   ID Type Source Tests Collected by Time Destination   1 : Gastric bx Preservative Gastric  Sarai Mae MD 9/20/2021 1311 Pathology   2 : GE Junction bx Preservative GE Junction  Sarai Mae MD 9/20/2021 1317 Pathology     H. Pylori  no    Assessment:  Intra-procedure medications       Anesthesia gave intra-procedure sedation and medications, see anesthesia flow sheet yes    Intravenous fluids: NS@ KVO     Vital signs stable     Abdominal assessment: round and soft     Recommendation:  Discharge patient per MD order. Family   Permission to share finding with family or friend yes    Endoscopy discharge instructions have been reviewed and given to patient. The patient verbalized understanding and acceptance of instructions.

## 2021-09-20 NOTE — ANESTHESIA POSTPROCEDURE EVALUATION
Procedure(s):  ESOPHAGOGASTRODUODENOSCOPY (EGD)  ESOPHAGOGASTRODUODENAL (EGD) BIOPSY. general, total IV anesthesia    Anesthesia Post Evaluation      Multimodal analgesia: multimodal analgesia used between 6 hours prior to anesthesia start to PACU discharge  Patient location during evaluation: bedside  Patient participation: complete - patient participated  Level of consciousness: awake  Pain management: adequate  Airway patency: patent  Anesthetic complications: no  Cardiovascular status: acceptable  Respiratory status: acceptable  Hydration status: acceptable  Post anesthesia nausea and vomiting:  controlled  Final Post Anesthesia Temperature Assessment:  Normothermia (36.0-37.5 degrees C)      INITIAL Post-op Vital signs:   Vitals Value Taken Time   /97 09/20/21 1345   Temp 36.7 °C (98 °F) 09/20/21 1333   Pulse 69 09/20/21 1345   Resp 13 09/20/21 1345   SpO2 100 % 09/20/21 1345   Vitals shown include unvalidated device data.

## 2021-09-22 ENCOUNTER — TRANSCRIBE ORDER (OUTPATIENT)
Dept: SCHEDULING | Age: 31
End: 2021-09-22

## 2021-09-22 DIAGNOSIS — R11.2 NAUSEA WITH VOMITING, UNSPECIFIED: ICD-10-CM

## 2021-09-22 DIAGNOSIS — R10.13 EPIGASTRIC PAIN: Primary | ICD-10-CM

## 2021-09-22 RX ORDER — GABAPENTIN 300 MG/1
300 CAPSULE ORAL 3 TIMES DAILY
Qty: 270 CAPSULE | Refills: 1 | Status: SHIPPED | OUTPATIENT
Start: 2021-09-22 | End: 2022-04-13 | Stop reason: SDUPTHER

## 2021-09-23 ENCOUNTER — TRANSCRIBE ORDER (OUTPATIENT)
Dept: SCHEDULING | Age: 31
End: 2021-09-23

## 2021-09-23 DIAGNOSIS — R10.13 EPIGASTRIC PAIN: Primary | ICD-10-CM

## 2021-09-23 DIAGNOSIS — R11.2 NAUSEA WITH VOMITING: ICD-10-CM

## 2021-09-24 ENCOUNTER — VIRTUAL VISIT (OUTPATIENT)
Dept: NEUROLOGY | Age: 31
End: 2021-09-24
Payer: COMMERCIAL

## 2021-09-24 DIAGNOSIS — G56.03 BILATERAL CARPAL TUNNEL SYNDROME: ICD-10-CM

## 2021-09-24 DIAGNOSIS — G47.33 OSA (OBSTRUCTIVE SLEEP APNEA): ICD-10-CM

## 2021-09-24 DIAGNOSIS — G43.009 MIGRAINE WITHOUT AURA AND WITHOUT STATUS MIGRAINOSUS, NOT INTRACTABLE: Primary | ICD-10-CM

## 2021-09-24 DIAGNOSIS — J45.909 ASTHMA DUE TO SEASONAL ALLERGIES: ICD-10-CM

## 2021-09-24 PROCEDURE — 99214 OFFICE O/P EST MOD 30 MIN: CPT | Performed by: PSYCHIATRY & NEUROLOGY

## 2021-09-24 NOTE — PROGRESS NOTES
Follow-up Visit    Name Amauri Lazar Age 33 y.o. MRN [id number]  1990       Chief Complaint: Headaches    Headaches are well controlled on Emgality. She has received carpal tunnel injections in both hands and has had no improvement. She continues to have numbing and tingling in both hands. We discussed the pros and cons of surgery. She is compliant with her CPAP. She has allergies but is not on regular allergy medications. I advised her to continue using Zyrtec on a daily basis. Assesment and Plan  1. Migraine without aura and without status migrainosus, not intractable  Continue Emgality. Significant reduction in headaches. 2. Bilateral carpal tunnel syndrome  Consider carpal tunnel surgery    3. PETE (obstructive sleep apnea)  Continue CPAP usage    4. seasonal allergies  Continue Zyrtec. Use daily      Allergies  Latex     Medications  Current Outpatient Medications   Medication Sig    gabapentin (NEURONTIN) 300 mg capsule Take 1 Capsule by mouth three (3) times daily. Max Daily Amount: 900 mg.    linaCLOtide (Linzess) 145 mcg cap capsule Take 145 mcg by mouth Daily (before breakfast).  cyanocobalamin (VITAMIN B12) 1,000 mcg/mL injection INJECT 1ML INTO THE MUSCLE MONTHLY    vilazodone (VIIBRYD) 40 mg tab tablet Take 1 Tablet by mouth daily.  norethindrone (MICRONOR) 0.35 mg tab     semaglutide (OZEMPIC) 0.25 mg or 0.5 mg/dose (2 mg/1.5 ml) subq pen 0.5 mg by SubCUTAneous route every seven (7) days.  meloxicam (MOBIC) 15 mg tablet Take 15 mg by mouth as needed.  atorvastatin (LIPITOR) 40 mg tablet TAKE 1 TABLET BY MOUTH EVERY DAY    rimegepant (Nurtec ODT) 75 mg disintegrating tablet 1 tablet oral every other day    Blood-Glucose Transmitter (Dexcom G6 Transmitter) nahid USE AS DIRECTED    Blood-Glucose Sensor (Dexcom G6 Sensor) nahid CHANGE EVERY 10 DAYS    ergocalciferol (ERGOCALCIFEROL) 1,250 mcg (50,000 unit) capsule Take 1 Cap by mouth every seven (7) days. Indications: low vitamin D levels    galcanezumab-gnlm (Emgality Pen) 120 mg/mL injection 1 mL by SubCUTAneous route every thirty (30) days.  drospirenone, contraceptive, (Slynd) 4 mg (28) tab Slynd 4 mg (28) tablet   Take 1 tablet every day by oral route.  omeprazole (PRILOSEC) 40 mg capsule Take 1 Cap by mouth daily. (Patient taking differently: Take 40 mg by mouth two (2) times a day.)    metoprolol tartrate (LOPRESSOR) 100 mg IR tablet TAKE 1 TABLET BY MOUTH TWICE A DAY    nystatin (MYCOSTATIN) topical cream Apply  to affected area two (2) times a day. Apply to abdominal folds twice daily as needed for rash    SUMAtriptan (IMITREX) 50 mg tablet 1 tab at onset of headache, can repeat X 1 in 2 hrs if HA persists. Not more than 10 days/month.  iron,carbonyl-FA-multivit-min (Opurity Multivitamin) 30 mg iron- 800 mcg chew Take 2 Tabs by mouth daily.  calcium carbonate/vitamin D3 (CALCIUM 600 + D,3, PO) Take 1 Tab by mouth two (2) times a day.  ALPRAZolam (XANAX) 0.5 mg tablet Take 1 Tab by mouth nightly as needed for Anxiety or Sleep. Max Daily Amount: 0.5 mg. Indications: anxious, repeated episodes of anxiety, panic disorder    aspirin delayed-release 81 mg tablet Take 1 Tab by mouth daily.  Blood-Glucose Meter,Continuous (Dexcom G6 ) misc Change sensor every 10 days     No current facility-administered medications for this visit.         Medical History  Past Medical History:   Diagnosis Date    Anxiety with depression     Arrhythmia     tachycardia    Asthma     Hendry hump 1/7/2015    Carpal tunnel syndrome of right wrist 12/20/2016    [facility]     ALIYA (generalized anxiety disorder) 4/4/2018    GERD (gastroesophageal reflux disease) 8/13/2014    HTN (hypertension) 7/25/2011    Hyperaldosteronism ([facility] [address] 10/19/2015    Hyperhidrosis 4/4/2018    Morbid obesity ([facility] [address]     Other ill-defined conditions(799.89)     migraines    Palpitation 4/4/2018    Sleep apnea     uses CPAP    Tachycardia 4/4/2018       Review of Systems   Eyes: Negative for blurred vision and double vision. Respiratory: Negative for cough and shortness of breath. Cardiovascular: Negative for chest pain, palpitations and orthopnea. Gastrointestinal: Negative for nausea and vomiting. Neurological: Negative for dizziness and headaches. Psychiatric/Behavioral: Positive for depression. The patient is nervous/anxious. Exam:       General: Well developed, well nourished. Patient in no apparent distress   Head: Normocephalic, atraumatic, anicteric sclera   Neck Normal ROM   Lungs:  Normal effort   Ext: No pedal edema   Skin: Supple no rash     NeurologicExam:  Mental Status: Alert and oriented to person place and time   Speech: Fluent no aphasia or dysarthria. Cranial Nerves:   II-XII Intact    Motor:  Full and symmetric strength of upper and lower ext . Normal bulk. Sensory:   Symmetric and intact    Gait:  Gait is balanced    Tremor:   No tremor noted. Cerebellar:  Coordination intact.              Lab Review  Lab Results   Component Value Date/Time    WBC 12.0 (H) 08/23/2021 02:22 PM    HCT 41.3 08/23/2021 02:22 PM    HGB 12.7 08/23/2021 02:22 PM    PLATELET 643 68/45/8244 02:22 PM       Lab Results   Component Value Date/Time    Sodium 139 03/19/2021 09:58 AM    Potassium 4.9 03/19/2021 09:58 AM    Chloride 103 03/19/2021 09:58 AM    CO2 24 03/19/2021 09:58 AM    Glucose 83 03/19/2021 09:58 AM    BUN 10 03/19/2021 09:58 AM    Creatinine 0.69 03/19/2021 09:58 AM    Calcium 9.6 03/19/2021 09:58 AM           Lab Results   Component Value Date/Time    Hemoglobin A1c 5.9 (H) 02/17/2021 12:58 PM    Hemoglobin A1c (POC) 5.5 08/09/2021 02:50 PM    Hemoglobin A1c, External 7.8 07/13/2020 12:00 AM        Lab Results   Component Value Date/Time    Vitamin B12 992 02/17/2021 12:58 PM       Lab Results   Component Value Date/Time    Cholesterol, total 94 (L) 02/17/2021 12:59 PM    HDL Cholesterol 39 (L) 02/17/2021 12:59 PM    LDL, calculated 39 02/17/2021 12:59 PM    LDL, calculated 48 03/07/2020 09:44 AM    VLDL, calculated 16 02/17/2021 12:59 PM    VLDL, calculated 14 03/07/2020 09:44 AM    Triglyceride 73 02/17/2021 12:59 PM     Nancy Galindo was seen by synchronous (real-time) audio-video technology on 09/24/21. Consent:  She and/or her healthcare decision maker is aware that this patient-initiated Telehealth encounter is a billable service, with coverage as determined by her insurance carrier. She is aware that she may receive a bill and has provided verbal consent to proceed: Yes    I was in the office while conducting this encounter. Pursuant to the emergency declaration under the [facility], [facility] waiver authority and the [employer] and [employer] General Act, this Virtual  Visit was conducted, with patient's consent, to reduce the patient's risk of exposure to COVID-19 and provide continuity of care for an established patient. Services were provided through a video synchronous discussion virtually to substitute for in-person clinic visit.

## 2021-09-28 ENCOUNTER — TELEPHONE (OUTPATIENT)
Dept: SURGERY | Age: 31
End: 2021-09-28

## 2021-09-29 ENCOUNTER — OFFICE VISIT (OUTPATIENT)
Dept: SURGERY | Age: 31
End: 2021-09-29

## 2021-09-29 ENCOUNTER — OFFICE VISIT (OUTPATIENT)
Dept: SURGERY | Age: 31
End: 2021-09-29
Payer: COMMERCIAL

## 2021-09-29 VITALS
BODY MASS INDEX: 48.2 KG/M2 | HEIGHT: 63 IN | SYSTOLIC BLOOD PRESSURE: 123 MMHG | RESPIRATION RATE: 18 BRPM | WEIGHT: 272 LBS | TEMPERATURE: 98.8 F | OXYGEN SATURATION: 97 % | DIASTOLIC BLOOD PRESSURE: 79 MMHG | HEART RATE: 98 BPM

## 2021-09-29 VITALS
HEART RATE: 90 BPM | SYSTOLIC BLOOD PRESSURE: 128 MMHG | RESPIRATION RATE: 18 BRPM | OXYGEN SATURATION: 99 % | DIASTOLIC BLOOD PRESSURE: 64 MMHG | BODY MASS INDEX: 48.2 KG/M2 | WEIGHT: 272 LBS | HEIGHT: 63 IN

## 2021-09-29 DIAGNOSIS — K80.50 BILIARY COLIC: ICD-10-CM

## 2021-09-29 DIAGNOSIS — I10 HYPERTENSION, UNSPECIFIED TYPE: ICD-10-CM

## 2021-09-29 DIAGNOSIS — E66.01 MORBID OBESITY (HCC): Primary | ICD-10-CM

## 2021-09-29 DIAGNOSIS — K21.9 GASTROESOPHAGEAL REFLUX DISEASE, UNSPECIFIED WHETHER ESOPHAGITIS PRESENT: ICD-10-CM

## 2021-09-29 DIAGNOSIS — E11.9 TYPE 2 DIABETES MELLITUS WITHOUT COMPLICATION, WITHOUT LONG-TERM CURRENT USE OF INSULIN (HCC): ICD-10-CM

## 2021-09-29 PROCEDURE — 99212 OFFICE O/P EST SF 10 MIN: CPT | Performed by: INTERNAL MEDICINE

## 2021-09-29 PROCEDURE — 99214 OFFICE O/P EST MOD 30 MIN: CPT | Performed by: SURGERY

## 2021-09-29 NOTE — PROGRESS NOTES
[facility] is a 33 y.o. female. Patient was seen for a follow up. Has gained a few lbs since the last visit. Reports that she may get close to 1000 calories in a day. Patient also reports that she works out at a [email or web address] at least 4-5 times a week and walks throughout the day burning around 500 calories. Patient also documents her foods and hydration daily in an baron. Patient reports that she is tired and has very little energy as well. 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 She got a sleeve done a year ago. Is getting about 80 g of protein a day and vegetable intake is stable. Carbohydrates and starches are at a minimal. hydration is about 4-5, 16 oz bottle of water a day. Sleep continues to be an ongoing issue. Reports about 5 hrs a a day. All labs stable. Patient continues to be treated by PCP for DM and HTN.  Uses a CPAP for machine   Visit Vitals  /64 (BP 1 Location: Left arm, BP Patient Position: Sitting)   Pulse 90   Resp 18   Ht 5' 3\" (1.6 m)   Wt 272 lb (123.4 kg)   SpO2 99%   BMI 48.18 kg/m²     Past Medical History:   Diagnosis Date    Anxiety with depression     Arrhythmia     tachycardia    Asthma     Bradshaw hump 1/7/2015    Carpal tunnel syndrome of right wrist 12/20/2016    Diabetes ([address]     ALIYA (generalized anxiety disorder) 4/4/2018    GERD (gastroesophageal reflux disease) 8/13/2014    HTN (hypertension) 7/25/2011    Hyperaldosteronism ([address] 10/19/2015    Hyperhidrosis 4/4/2018    Morbid obesity ([address]     Other ill-defined conditions(799.89)     migraines    Palpitation 4/4/2018    Sleep apnea     uses CPAP    Tachycardia 4/4/2018     Past Surgical History:   Procedure Laterality Date    HX GASTRECTOMY  09/08/2020    Gastric sleeve     HX GYN  10/19/2020    Laparoscopic right oopherectomy, left cystectomy    HX HERNIA REPAIR 09/08/2020    HX OVARIAN CYST REMOVAL  10/19/2020    HX TONSILLECTOMY      HX WISDOM TEETH EXTRACTION      NV REMOVAL OF TONSILS,<13 Y/O       Family History   Problem Relation Age of Onset    Hypertension Mother     Diabetes Mother         Type II    Anesth Problems Mother         respiratory issues - feels like she cannot breath when coming out of anesthesia   [facility] Arthritis-rheumatoid Mother    [facility] Gout Mother     Psychiatric Disorder Father     Drug Abuse Father     Cancer Other         prostate    Hypertension Sister     Thyroid Disease Sister         \"I think\"    Breast Cancer Sister     Attention Deficit Hyperactivity Disorder Brother     Hypertension Sister      Outpatient Encounter Medications as of 9/29/2021   Medication Sig Dispense Refill    gabapentin (NEURONTIN) 300 mg capsule Take 1 Capsule by mouth three (3) times daily. Max Daily Amount: 900 mg. 270 Capsule 1    linaCLOtide (Linzess) 145 mcg cap capsule Take 145 mcg by mouth Daily (before breakfast).  cyanocobalamin (VITAMIN B12) 1,000 mcg/mL injection INJECT 1ML INTO THE MUSCLE MONTHLY 1 mL 5    vilazodone (VIIBRYD) 40 mg tab tablet Take 1 Tablet by mouth daily. 90 Tablet 2    semaglutide (OZEMPIC) 0.25 mg or 0.5 mg/dose (2 mg/1.5 ml) subq pen 0.5 mg by SubCUTAneous route every seven (7) days. 3 Box 3    atorvastatin (LIPITOR) 40 mg tablet TAKE 1 TABLET BY MOUTH EVERY DAY 90 Tablet 3    rimegepant (Nurtec ODT) 75 mg disintegrating tablet 1 tablet oral every other day 16 Tablet 2    ergocalciferol (ERGOCALCIFEROL) 1,250 mcg (50,000 unit) capsule Take 1 Cap by mouth every seven (7) days. Indications: low vitamin D levels 12 Cap 3    galcanezumab-gnlm (Emgality Pen) 120 mg/mL injection 1 mL by SubCUTAneous route every thirty (30) days. 1 mL 11    drospirenone, contraceptive, (Slynd) 4 mg (28) tab Slynd 4 mg (28) tablet   Take 1 tablet every day by oral route.  omeprazole (PRILOSEC) 40 mg capsule Take 1 Cap by mouth daily. (Patient taking differently: Take 40 mg by mouth two (2) times a day.) 90 Cap 3    metoprolol tartrate (LOPRESSOR) 100 mg IR tablet TAKE 1 TABLET BY MOUTH TWICE A  Tab 2    nystatin (MYCOSTATIN) topical cream Apply  to affected area two (2) times a day. Apply to abdominal folds twice daily as needed for rash 30 g 3    iron,carbonyl-FA-multivit-min (Opurity Multivitamin) 30 mg iron- 800 mcg chew Take 2 Tabs by mouth daily.  calcium carbonate/vitamin D3 (CALCIUM 600 + D,3, PO) Take 1 Tab by mouth two (2) times a day.  aspirin delayed-release 81 mg tablet Take 1 Tab by mouth daily. 30 Tab 11    norethindrone (MICRONOR) 0.35 mg tab  (Patient not taking: Reported on 9/29/2021)      [DISCONTINUED] meloxicam (MOBIC) 15 mg tablet Take 15 mg by mouth as needed. (Patient not taking: Reported on 9/29/2021)      [DISCONTINUED] Blood-Glucose Transmitter (Dexcom G6 Transmitter) nahid USE AS DIRECTED (Patient not taking: Reported on 9/29/2021) 1 Device 3    [DISCONTINUED] Blood-Glucose Sensor (Dexcom G6 Sensor) nahid CHANGE EVERY 10 DAYS (Patient not taking: Reported on 9/29/2021) 3 Device 12    [DISCONTINUED] SUMAtriptan (IMITREX) 50 mg tablet 1 tab at onset of headache, can repeat X 1 in 2 hrs if HA persists. Not more than 10 days/month. (Patient not taking: Reported on 9/29/2021) 9 Tab 2    [DISCONTINUED] ALPRAZolam (XANAX) 0.5 mg tablet Take 1 Tab by mouth nightly as needed for Anxiety or Sleep. Max Daily Amount: 0.5 mg. Indications: anxious, repeated episodes of anxiety, panic disorder (Patient not taking: Reported on 9/29/2021) 30 Tab 3    [DISCONTINUED] Blood-Glucose Meter,Continuous (Dexcom G6 ) misc Change sensor every 10 days (Patient not taking: Reported on 9/29/2021) 1 Each 0     No facility-administered encounter medications on file as of 9/29/2021. HPI    Review of Systems   Constitutional: Negative. Respiratory: Negative. Cardiovascular: Negative.     Gastrointestinal: Negative. Genitourinary: Negative. Musculoskeletal: Negative. Neurological: Negative. Psychiatric/Behavioral: Negative. Physical Exam  Vitals and nursing note reviewed. Constitutional:       Appearance: She is obese. Cardiovascular:      Rate and Rhythm: Normal rate and regular rhythm. Pulmonary:      Effort: Pulmonary effort is normal.      Breath sounds: Normal breath sounds. Abdominal:      Palpations: Abdomen is soft. Musculoskeletal:      Right lower leg: No edema. Skin:     General: Skin is warm. Neurological:      Mental Status: She is alert and oriented to person, place, and time. Psychiatric:         Behavior: Behavior normal.         ASSESSMENT and PLAN  Diagnoses and all orders for this visit:    1. Morbid obesity ([facility] [address]  - will increase caloric intake to 1200 calories on most days with health fats, and protein. Can also use vegetable as in take  - increase sleep as much as possible. - if working out on certain days needs to ensure calories are increased by 200 calories  -continue water intake with the intention of a gallon daily   -contacting nutrition for their input     2. Type 2 diabetes mellitus without complication, without long-term current use of insulin (HCC)  - to be managed by PCP  3.  Hypertension, unspecified type      Follow-up and Dispositions    · Return in about 1 month (around 10/29/2021), or if symptoms worsen or fail to improve, for Follow up.       lab results and schedule of future lab studies reviewed with patient  reviewed medications and side effects in detail

## 2021-09-29 NOTE — LETTER
9/30/2021    Patient: Sun Caruso   YOB: 1990   Date of Visit: 9/29/2021     Nitesh Velazquez MD  [address] [place] [address]    Dear Nitesh Velazquez MD,      Thank you for referring Ms. Armendariz  to [address] [facility] for evaluation. My notes for this consultation are attached. If you have questions, please do not hesitate to call me. I look forward to following your patient along with you.       Sincerely,    Yudi Fields MD

## 2021-09-29 NOTE — PROGRESS NOTES
1. Have you been to the ER, urgent care clinic since your last visit? Hospitalized since your last visit? No    2. Have you seen or consulted any other health care providers outside of the [address] [place] [address] since your last visit? Include any pap smears or colon screening.  No

## 2021-09-30 NOTE — PROGRESS NOTES
[address] [facility] at [facility] Surgery History and Physical    History of Present Illness:      Sisi Mattson is a 33 y.o. female who is status post laparoscopic sleeve gastrectomy with hiatal hernia repair done about a year ago. Since surgery she has lost about 60 to 70 pounds. She has been having some periodic abdominal pain. The abdominal pain has been in the right upper quadrant with some radiation to the right back. Sometimes the pain is epigastric. Sometimes the pain is related to a meal.  She generally does not eat fatty foods after her sleeve gastrectomy. Sometimes the pain is random. The pain she has it is a 3-4 out of 10 and may last few hours. She denies any nausea and vomiting. She is not having any oral intake issues related to her sleeve. She is taking in plenty of protein. She is not exactly satisfied with her weight loss and would like to lose more it is seeing the medical weight loss program here at [facility].     Past Medical History:   Diagnosis Date    Anxiety with depression     Arrhythmia     tachycardia    Asthma     [place] hump 1/7/2015    Carpal tunnel syndrome of right wrist 12/20/2016    Diabetes ([address]     ALIYA (generalized anxiety disorder) 4/4/2018    GERD (gastroesophageal reflux disease) 8/13/2014    HTN (hypertension) 7/25/2011    Hyperaldosteronism ([address] 10/19/2015    Hyperhidrosis 4/4/2018    Morbid obesity ([address]     Other ill-defined conditions(799.89)     migraines    Palpitation 4/4/2018    Sleep apnea     uses CPAP    Tachycardia 4/4/2018       Past Surgical History:   Procedure Laterality Date    HX GASTRECTOMY  09/08/2020    Gastric sleeve     HX GYN  10/19/2020    Laparoscopic right oopherectomy, left cystectomy    HX HERNIA REPAIR  09/08/2020    HX OVARIAN CYST REMOVAL  10/19/2020    HX TONSILLECTOMY      HX WISDOM TEETH EXTRACTION      LA REMOVAL OF TONSILS,<11 Y/O           Current Outpatient Medications:     gabapentin (NEURONTIN) 300 mg capsule, Take 1 Capsule by mouth three (3) times daily. Max Daily Amount: 900 mg., Disp: 270 Capsule, Rfl: 1    linaCLOtide (Linzess) 145 mcg cap capsule, Take 145 mcg by mouth Daily (before breakfast). , Disp: , Rfl:     cyanocobalamin (VITAMIN B12) 1,000 mcg/mL injection, INJECT 1ML INTO THE MUSCLE MONTHLY, Disp: 1 mL, Rfl: 5    vilazodone (VIIBRYD) 40 mg tab tablet, Take 1 Tablet by mouth daily. , Disp: 90 Tablet, Rfl: 2    semaglutide (OZEMPIC) 0.25 mg or 0.5 mg/dose (2 mg/1.5 ml) subq pen, 0.5 mg by SubCUTAneous route every seven (7) days. , Disp: 3 Box, Rfl: 3    atorvastatin (LIPITOR) 40 mg tablet, TAKE 1 TABLET BY MOUTH EVERY DAY, Disp: 90 Tablet, Rfl: 3    rimegepant (Nurtec ODT) 75 mg disintegrating tablet, 1 tablet oral every other day, Disp: 16 Tablet, Rfl: 2    ergocalciferol (ERGOCALCIFEROL) 1,250 mcg (50,000 unit) capsule, Take 1 Cap by mouth every seven (7) days. Indications: low vitamin D levels, Disp: 12 Cap, Rfl: 3    galcanezumab-gnlm (Emgality Pen) 120 mg/mL injection, 1 mL by SubCUTAneous route every thirty (30) days. , Disp: 1 mL, Rfl: 11    drospirenone, contraceptive, (Slynd) 4 mg (28) tab, Slynd 4 mg (28) tablet  Take 1 tablet every day by oral route., Disp: , Rfl:     omeprazole (PRILOSEC) 40 mg capsule, Take 1 Cap by mouth daily. (Patient taking differently: Take 40 mg by mouth two (2) times a day.), Disp: 90 Cap, Rfl: 3    metoprolol tartrate (LOPRESSOR) 100 mg IR tablet, TAKE 1 TABLET BY MOUTH TWICE A DAY, Disp: 180 Tab, Rfl: 2    nystatin (MYCOSTATIN) topical cream, Apply  to affected area two (2) times a day. Apply to abdominal folds twice daily as needed for rash, Disp: 30 g, Rfl: 3    iron,carbonyl-FA-multivit-min (Opurity Multivitamin) 30 mg iron- 800 mcg chew, Take 2 Tabs by mouth daily. , Disp: , Rfl:     calcium carbonate/vitamin D3 (CALCIUM 600 + D,3, PO), Take 1 Tab by mouth two (2) times a day., Disp: , Rfl:     aspirin delayed-release 81 mg tablet, Take 1 Tab by mouth daily. , Disp: 30 Tab, Rfl: 11    norethindrone (MICRONOR) 0.35 mg tab, , Disp: , Rfl:     Allergies   Allergen Reactions    Latex Itching       Social History     Socioeconomic History    Marital status: SINGLE     Spouse name: Not on file    Number of children: Not on file    Years of education: Not on file    Highest education level: Not on file   Occupational History    Occupation: customer ser and student     Comment: [employer]   Tobacco Use    Smoking status: Former Smoker     Packs/day: 0.50     Years: 5.00     Pack years: 2.50     Quit date: 2019     Years since quittin.9    Smokeless tobacco: Never Used   Vaping Use    Vaping Use: Never used   Substance and Sexual Activity    Alcohol use: Yes     Alcohol/week: 1.0 standard drinks     Types: 1 Shots of liquor, 1 Standard drinks or equivalent per week     Comment: socially, Monthly or less    Drug use: No    Sexual activity: Not Currently     Partners: Male     Birth control/protection: Condom   Other Topics Concern     Service Not Asked    Blood Transfusions Not Asked    Caffeine Concern Not Asked    Occupational Exposure Not Asked    Hobby Hazards Not Asked    Sleep Concern Not Asked    Stress Concern Not Asked    Weight Concern Not Asked    Special Diet Not Asked    Back Care Not Asked    Exercise Not Asked    Bike Helmet Not Asked    [address] Not Asked    Self-Exams Not Asked   Social History Narrative    Not on file     Social Determinants of Health     Financial Resource Strain:     Difficulty of Paying Living Expenses:    Food Insecurity:     Worried About Running Out of Food in the Last Year:     [address] in the Last Year:    Transportation Needs:     Lack of Transportation (Medical):      Lack of Transportation (Non-Medical):    Physical Activity:     Days of Exercise per Week:     Minutes of Exercise per Session:    Stress:     Feeling of Stress :    Social Connections:     Frequency of Communication with Friends and Family:     Frequency of Social Gatherings with Friends and Family:     Attends Denominational Services:     Active Member of Clubs or Organizations:     Attends Club or Organization Meetings:     Marital Status:    Intimate Partner Violence:     Fear of Current or Ex-Partner:     Emotionally Abused:     Physically Abused:     Sexually Abused:        Family History   Problem Relation Age of Onset    Hypertension Mother     Diabetes Mother         Type II    Anesth Problems Mother         respiratory issues - feels like she cannot breath when coming out of anesthesia   Robert Archuleta Arthritis-rheumatoid Mother     Gout Mother     Psychiatric Disorder Father     Drug Abuse Father     Cancer Other         prostate    Hypertension Sister     Thyroid Disease Sister         \"I think\"   Vijayaa Geremias Breast Cancer Sister     Attention Deficit Hyperactivity Disorder Brother     Hypertension Sister        ROS   Constitutional: negative  Ears, Nose, Mouth, Throat, and Face: negative  Respiratory: negative  Cardiovascular: negative  Gastrointestinal: positive for abdominal pain  Genitourinary:negative  Integument/Breast: negative  Hematologic/Lymphatic: negative  Behavioral/Psychiatric: negative  Allergic/Immunologic: negative      Physical Exam:     Visit Vitals  /79 (BP 1 Location: Right arm, BP Patient Position: Sitting, BP Cuff Size: Large adult)   Pulse 98   Temp 98.8 °F (37.1 °C) (Oral)   Resp 18   Ht 5' 3\" (1.6 m)   Wt 272 lb (123.4 kg)   SpO2 97%   BMI 48.18 kg/m²       General - alert and oriented, no apparent distress  HEENT - no jaundice, no hearing imparement  Pulm - CTAB, no C/W/R  CV - RRR, no M/R/G  Abd -soft, nondistended, bowel sounds present, nontender to palpation  Ext - pulses intact in UE and LE bilaterally, no edema  Skin - supple, no rashes  Psychiatric - normal affect, good mood    Labs  Lab Results   Component Value Date/Time    Sodium 139 03/19/2021 09:58 AM    Potassium 4.9 03/19/2021 09:58 AM    Chloride 103 03/19/2021 09:58 AM    CO2 24 03/19/2021 09:58 AM    Anion gap 8 10/01/2020 06:17 AM    Glucose 83 03/19/2021 09:58 AM    BUN 10 03/19/2021 09:58 AM    Creatinine 0.69 03/19/2021 09:58 AM    BUN/Creatinine ratio 14 03/19/2021 09:58 AM    GFR est  03/19/2021 09:58 AM    GFR est non- 03/19/2021 09:58 AM    Calcium 9.6 03/19/2021 09:58 AM    Bilirubin, total 0.7 03/19/2021 09:58 AM    Alk. phosphatase 84 03/19/2021 09:58 AM    Protein, total 6.6 03/19/2021 09:58 AM    Albumin 4.0 03/19/2021 09:58 AM    Globulin 4.3 (H) 10/01/2020 06:17 AM    A-G Ratio 1.5 03/19/2021 09:58 AM    ALT (SGPT) 20 03/19/2021 09:58 AM    AST (SGOT) 18 03/19/2021 09:58 AM     Lab Results   Component Value Date/Time    WBC 12.0 (H) 08/23/2021 02:22 PM    HGB 12.7 08/23/2021 02:22 PM    HCT 41.3 08/23/2021 02:22 PM    PLATELET 647 88/05/3650 02:22 PM    MCV 91.8 08/23/2021 02:22 PM         Imaging  Right upper quadrant imaging from outside system-gallbladder sludge present, otherwise normal-appearing gallbladder, normal common bile duct, no gallbladder wall thickening  I have reviewed and agree with all of the pertinent images    EGD 9/21-Findings:  Esophagus: Normal mucosa of prox / mid / distal esophagus. Z-line at 39cm and was irregular with salmon tongue extending 1cm proximally. Biopsied w/ cold forceps to confirm Shelby's (C0M1). No ulcers appreciated. No stenosis. No hiatal hernia. Stomach:Evidence of previous sleeve gastrectomy. Scar well healed. Entire stomach was congested with erythema. No erosions or ulcers. Biopsied in multiple quadrants with cold forceps to r/o H pylori. Duodenum/jejunum:normal         EBL: minimal     Complications:   None; patient tolerated the procedure well. Impression:    See Postoperative diagnosis above     Recommendations:  -Continue acid suppression with OPEN capsules. , -Await pathology.     Discharge disposition:  Home in the company of  when able to ambulate    Assessment:     Hakan Noel is a 33 y.o. female with biliary colic, status post sleeve gastrectomy    Recommendations:     1. She appears to have biliary colic based on her right upper quadrant abdominal pain and gallstones present. I do believe she will need laparoscopic cholecystectomy. I will schedule her for surgery here soon. She also seeing the medical weight loss program and will continue with this to maximize her weight loss before considering any revision to gastric bypass. It may come to this in the future but right now I think she needs to try the medical weight loss first.  We will schedule her for surgery for laparoscopic cholecystectomy. I have discussed the above procedure with the patient in detail. We reviewed the benefits and possible complications of the surgery which include bleeding, infection, damage to adjacent organs, venous thromboembolism, need for repeat surgery, death and other unforseen complications. The patient agreed to proceed with the surgery. Randall Villatoro MD    Greater than half of the time: 30 minutes was used in counciling the patient about diagnosis and treatment plan    Ms. Sheyla De Guzman has a reminder for a \"due or due soon\" health maintenance. I have asked that she contact her primary care provider for follow-up on this health maintenance.

## 2021-10-03 DIAGNOSIS — R00.2 PALPITATION: ICD-10-CM

## 2021-10-03 DIAGNOSIS — E11.21 TYPE 2 DIABETES WITH NEPHROPATHY (HCC): ICD-10-CM

## 2021-10-03 DIAGNOSIS — R61 HYPERHIDROSIS: ICD-10-CM

## 2021-10-03 DIAGNOSIS — R00.0 TACHYCARDIA: ICD-10-CM

## 2021-10-03 DIAGNOSIS — R79.82 ELEVATED C-REACTIVE PROTEIN (CRP): ICD-10-CM

## 2021-10-03 DIAGNOSIS — Z71.89 ADVICE GIVEN ABOUT COVID-19 VIRUS INFECTION: ICD-10-CM

## 2021-10-03 DIAGNOSIS — F41.1 GAD (GENERALIZED ANXIETY DISORDER): ICD-10-CM

## 2021-10-03 DIAGNOSIS — M35.03 SJOGREN'S SYNDROME WITH MYOPATHY (HCC): ICD-10-CM

## 2021-10-14 ENCOUNTER — TRANSCRIBE ORDER (OUTPATIENT)
Dept: REGISTRATION | Age: 31
End: 2021-10-14

## 2021-10-14 DIAGNOSIS — Z01.812 PRE-PROCEDURE LAB EXAM: Primary | ICD-10-CM

## 2021-10-15 ENCOUNTER — PATIENT OUTREACH (OUTPATIENT)
Dept: OTHER | Age: 31
End: 2021-10-15

## 2021-10-15 NOTE — PROGRESS NOTES
Patient on report as eligible for Case Management ([facility]OP[facility] list)  Left discreet message on voicemail with this CM contact information. Will attempt to contact again to offer [phone] Management services. Per chart review, patient scheduled for laparoscopic cholecystectomy on 11/11 with Dr. Ayesha Ding.

## 2021-10-15 NOTE — PROGRESS NOTES
Patient returned my phone call. Explained why I was calling, states she has no needs at this time. She did inform me that she is scheduled to have lap cholecystectomy on 11/11 with Dr. Cedric Peters. Asked her if I could call her for post op care, she agreed. Let her know she could reach out to me for any needs between now and her surgery date. She was appreciative.

## 2021-10-21 ENCOUNTER — DOCUMENTATION ONLY (OUTPATIENT)
Dept: SURGERY | Age: 31
End: 2021-10-21

## 2021-10-21 ENCOUNTER — TRANSCRIBE ORDER (OUTPATIENT)
Dept: REGISTRATION | Age: 31
End: 2021-10-21

## 2021-10-21 DIAGNOSIS — Z01.812 PRE-PROCEDURE LAB EXAM: Primary | ICD-10-CM

## 2021-10-21 NOTE — PROGRESS NOTES
Faxed [facility] paperwork to [address] [facility] with confirmation received release form.

## 2021-10-25 RX ORDER — ASPIRIN 81 MG/1
81 TABLET ORAL DAILY
Qty: 90 TABLET | Refills: 3 | Status: SHIPPED | OUTPATIENT
Start: 2021-10-25

## 2021-10-28 ENCOUNTER — HOSPITAL ENCOUNTER (OUTPATIENT)
Dept: PREADMISSION TESTING | Age: 31
Discharge: HOME | End: 2021-10-28
Payer: COMMERCIAL

## 2021-10-28 VITALS
OXYGEN SATURATION: 96 % | SYSTOLIC BLOOD PRESSURE: 126 MMHG | WEIGHT: 275.57 LBS | DIASTOLIC BLOOD PRESSURE: 79 MMHG | BODY MASS INDEX: 48.83 KG/M2 | TEMPERATURE: 98.4 F | HEIGHT: 63 IN | HEART RATE: 88 BPM

## 2021-10-28 LAB
ANION GAP SERPL CALC-SCNC: 3 MMOL/L (ref 5–15)
ATRIAL RATE: 76 BPM
BASOPHILS # BLD: 0.1 K/UL (ref 0–0.1)
BASOPHILS NFR BLD: 1 % (ref 0–1)
BUN SERPL-MCNC: 8 MG/DL (ref 6–20)
BUN/CREAT SERPL: 11 (ref 12–20)
CALCIUM SERPL-MCNC: 8.7 MG/DL (ref 8.5–10.1)
CALCULATED P AXIS, ECG09: 35 DEGREES
CALCULATED R AXIS, ECG10: 73 DEGREES
CALCULATED T AXIS, ECG11: 26 DEGREES
CHLORIDE SERPL-SCNC: 109 MMOL/L (ref 97–108)
CO2 SERPL-SCNC: 28 MMOL/L (ref 21–32)
CREAT SERPL-MCNC: 0.72 MG/DL (ref 0.55–1.02)
DIAGNOSIS, 93000: NORMAL
DIFFERENTIAL METHOD BLD: ABNORMAL
EOSINOPHIL # BLD: 0.3 K/UL (ref 0–0.4)
EOSINOPHIL NFR BLD: 4 % (ref 0–7)
ERYTHROCYTE [DISTWIDTH] IN BLOOD BY AUTOMATED COUNT: 14.3 % (ref 11.5–14.5)
EST. AVERAGE GLUCOSE BLD GHB EST-MCNC: 108 MG/DL
GLUCOSE SERPL-MCNC: 108 MG/DL (ref 65–100)
HBA1C MFR BLD: 5.4 % (ref 4–5.6)
HCT VFR BLD AUTO: 35.9 % (ref 35–47)
HGB BLD-MCNC: 11.1 G/DL (ref 11.5–16)
IMM GRANULOCYTES # BLD AUTO: 0 K/UL (ref 0–0.04)
IMM GRANULOCYTES NFR BLD AUTO: 0 % (ref 0–0.5)
LYMPHOCYTES # BLD: 2.5 K/UL (ref 0.8–3.5)
LYMPHOCYTES NFR BLD: 36 % (ref 12–49)
MCH RBC QN AUTO: 28.4 PG (ref 26–34)
MCHC RBC AUTO-ENTMCNC: 30.9 G/DL (ref 30–36.5)
MCV RBC AUTO: 91.8 FL (ref 80–99)
MONOCYTES # BLD: 0.7 K/UL (ref 0–1)
MONOCYTES NFR BLD: 10 % (ref 5–13)
NEUTS SEG # BLD: 3.4 K/UL (ref 1.8–8)
NEUTS SEG NFR BLD: 49 % (ref 32–75)
NRBC # BLD: 0 K/UL (ref 0–0.01)
NRBC BLD-RTO: 0 PER 100 WBC
P-R INTERVAL, ECG05: 152 MS
PLATELET # BLD AUTO: 290 K/UL (ref 150–400)
PMV BLD AUTO: 10.9 FL (ref 8.9–12.9)
POTASSIUM SERPL-SCNC: 3.9 MMOL/L (ref 3.5–5.1)
Q-T INTERVAL, ECG07: 368 MS
QRS DURATION, ECG06: 74 MS
QTC CALCULATION (BEZET), ECG08: 414 MS
RBC # BLD AUTO: 3.91 M/UL (ref 3.8–5.2)
SODIUM SERPL-SCNC: 140 MMOL/L (ref 136–145)
VENTRICULAR RATE, ECG03: 76 BPM
WBC # BLD AUTO: 7 K/UL (ref 3.6–11)

## 2021-10-28 PROCEDURE — 85025 COMPLETE CBC W/AUTO DIFF WBC: CPT

## 2021-10-28 PROCEDURE — 80048 BASIC METABOLIC PNL TOTAL CA: CPT

## 2021-10-28 PROCEDURE — 36415 COLL VENOUS BLD VENIPUNCTURE: CPT

## 2021-10-28 PROCEDURE — 83036 HEMOGLOBIN GLYCOSYLATED A1C: CPT

## 2021-10-28 PROCEDURE — 93005 ELECTROCARDIOGRAM TRACING: CPT

## 2021-10-28 RX ORDER — VENLAFAXINE HYDROCHLORIDE 75 MG/1
75 CAPSULE, EXTENDED RELEASE ORAL
COMMUNITY
End: 2022-02-03

## 2021-11-05 ENCOUNTER — HOSPITAL ENCOUNTER (OUTPATIENT)
Dept: PREADMISSION TESTING | Age: 31
Discharge: HOME | End: 2021-11-05
Payer: COMMERCIAL

## 2021-11-05 DIAGNOSIS — Z01.812 PRE-PROCEDURE LAB EXAM: ICD-10-CM

## 2021-11-05 PROCEDURE — U0005 INFEC AGEN DETEC AMPLI PROBE: HCPCS

## 2021-11-06 LAB
SARS-COV-2, XPLCVT: NOT DETECTED
SOURCE, COVRS: NORMAL

## 2021-11-08 RX ORDER — CYCLOBENZAPRINE HCL 5 MG
5 TABLET ORAL
Qty: 30 TABLET | Refills: 0 | Status: SHIPPED | OUTPATIENT
Start: 2021-11-08 | End: 2022-02-03 | Stop reason: ALTCHOICE

## 2021-11-09 ENCOUNTER — ANESTHESIA EVENT (OUTPATIENT)
Dept: SURGERY | Age: 31
End: 2021-11-09
Payer: COMMERCIAL

## 2021-11-10 ENCOUNTER — ANESTHESIA (OUTPATIENT)
Dept: SURGERY | Age: 31
End: 2021-11-10
Payer: COMMERCIAL

## 2021-11-10 ENCOUNTER — HOSPITAL ENCOUNTER (OUTPATIENT)
Age: 31
Setting detail: OUTPATIENT SURGERY
Discharge: HOME | End: 2021-11-10
Attending: SURGERY | Admitting: SURGERY
Payer: COMMERCIAL

## 2021-11-10 VITALS
RESPIRATION RATE: 27 BRPM | WEIGHT: 275.57 LBS | BODY MASS INDEX: 48.83 KG/M2 | TEMPERATURE: 98 F | DIASTOLIC BLOOD PRESSURE: 73 MMHG | SYSTOLIC BLOOD PRESSURE: 115 MMHG | HEIGHT: 63 IN | HEART RATE: 88 BPM | OXYGEN SATURATION: 99 %

## 2021-11-10 DIAGNOSIS — K80.50 BILIARY COLIC: Primary | ICD-10-CM

## 2021-11-10 LAB
GLUCOSE BLD STRIP.AUTO-MCNC: 112 MG/DL (ref 65–117)
HCG UR QL: NEGATIVE
SERVICE CMNT-IMP: NORMAL

## 2021-11-10 PROCEDURE — 77030040361 HC SLV COMPR DVT MDII -B: Performed by: SURGERY

## 2021-11-10 PROCEDURE — 77030008756 HC TU IRR SUC STRY -B: Performed by: SURGERY

## 2021-11-10 PROCEDURE — 74011000250 HC RX REV CODE- 250: Performed by: NURSE ANESTHETIST, CERTIFIED REGISTERED

## 2021-11-10 PROCEDURE — 74011250636 HC RX REV CODE- 250/636: Performed by: NURSE ANESTHETIST, CERTIFIED REGISTERED

## 2021-11-10 PROCEDURE — 77030008684 HC TU ET CUF COVD -B: Performed by: NURSE ANESTHETIST, CERTIFIED REGISTERED

## 2021-11-10 PROCEDURE — 77030004813 HC CATH CHOLGM TELE -B: Performed by: SURGERY

## 2021-11-10 PROCEDURE — 74011250636 HC RX REV CODE- 250/636: Performed by: ANESTHESIOLOGY

## 2021-11-10 PROCEDURE — 81025 URINE PREGNANCY TEST: CPT

## 2021-11-10 PROCEDURE — 77030037032 HC INSRT SCIS CLICKLLINE DISP STOR -B: Performed by: SURGERY

## 2021-11-10 PROCEDURE — 77030040922 HC BLNKT HYPOTHRM STRY -A

## 2021-11-10 PROCEDURE — 77030007955 HC PCH ENDOSC SPEC J&J -B: Performed by: SURGERY

## 2021-11-10 PROCEDURE — 77030020829: Performed by: SURGERY

## 2021-11-10 PROCEDURE — 77030008608 HC TRCR ENDOSC SMTH AMR -B: Performed by: SURGERY

## 2021-11-10 PROCEDURE — 77030020053 HC ELECTRD LAPSCP COVD -B: Performed by: SURGERY

## 2021-11-10 PROCEDURE — 77030002933 HC SUT MCRYL J&J -A: Performed by: SURGERY

## 2021-11-10 PROCEDURE — 77030019908 HC STETH ESOPH SIMS -A: Performed by: NURSE ANESTHETIST, CERTIFIED REGISTERED

## 2021-11-10 PROCEDURE — 47562 LAPAROSCOPIC CHOLECYSTECTOMY: CPT | Performed by: SURGERY

## 2021-11-10 PROCEDURE — 77030010513 HC APPL CLP LIG J&J -C: Performed by: SURGERY

## 2021-11-10 PROCEDURE — 77030026438 HC STYL ET INTUB CARD -A: Performed by: NURSE ANESTHETIST, CERTIFIED REGISTERED

## 2021-11-10 PROCEDURE — 82962 GLUCOSE BLOOD TEST: CPT

## 2021-11-10 PROCEDURE — 2709999900 HC NON-CHARGEABLE SUPPLY: Performed by: SURGERY

## 2021-11-10 PROCEDURE — 77030031139 HC SUT VCRL2 J&J -A: Performed by: SURGERY

## 2021-11-10 PROCEDURE — 77030002895 HC DEV VASC CLOSR COVD -B: Performed by: SURGERY

## 2021-11-10 PROCEDURE — 88304 TISSUE EXAM BY PATHOLOGIST: CPT

## 2021-11-10 PROCEDURE — 76210000006 HC OR PH I REC 0.5 TO 1 HR: Performed by: SURGERY

## 2021-11-10 PROCEDURE — 76010000153 HC OR TIME 1.5 TO 2 HR: Performed by: SURGERY

## 2021-11-10 PROCEDURE — 77030012770 HC TRCR OPT FX AMR -B: Performed by: SURGERY

## 2021-11-10 PROCEDURE — 74011250637 HC RX REV CODE- 250/637: Performed by: ANESTHESIOLOGY

## 2021-11-10 PROCEDURE — 76060000034 HC ANESTHESIA 1.5 TO 2 HR: Performed by: SURGERY

## 2021-11-10 PROCEDURE — 74011000250 HC RX REV CODE- 250: Performed by: SURGERY

## 2021-11-10 PROCEDURE — 74011250636 HC RX REV CODE- 250/636: Performed by: SURGERY

## 2021-11-10 PROCEDURE — 77030010507 HC ADH SKN DERMBND J&J -B: Performed by: SURGERY

## 2021-11-10 PROCEDURE — 76210000021 HC REC RM PH II 0.5 TO 1 HR: Performed by: SURGERY

## 2021-11-10 RX ORDER — OXYCODONE HYDROCHLORIDE 5 MG/1
5 TABLET ORAL AS NEEDED
Status: DISCONTINUED | OUTPATIENT
Start: 2021-11-10 | End: 2021-11-10 | Stop reason: HOSPADM

## 2021-11-10 RX ORDER — FENTANYL CITRATE 50 UG/ML
25 INJECTION, SOLUTION INTRAMUSCULAR; INTRAVENOUS
Status: DISCONTINUED | OUTPATIENT
Start: 2021-11-10 | End: 2021-11-10 | Stop reason: HOSPADM

## 2021-11-10 RX ORDER — PROPOFOL 10 MG/ML
INJECTION, EMULSION INTRAVENOUS AS NEEDED
Status: DISCONTINUED | OUTPATIENT
Start: 2021-11-10 | End: 2021-11-10 | Stop reason: HOSPADM

## 2021-11-10 RX ORDER — DIPHENHYDRAMINE HYDROCHLORIDE 50 MG/ML
12.5 INJECTION, SOLUTION INTRAMUSCULAR; INTRAVENOUS AS NEEDED
Status: DISCONTINUED | OUTPATIENT
Start: 2021-11-10 | End: 2021-11-10 | Stop reason: HOSPADM

## 2021-11-10 RX ORDER — ONDANSETRON 4 MG/1
4 TABLET, ORALLY DISINTEGRATING ORAL
Qty: 20 TABLET | Refills: 0 | Status: SHIPPED | OUTPATIENT
Start: 2021-11-10 | End: 2021-12-13

## 2021-11-10 RX ORDER — GLYCOPYRROLATE 0.2 MG/ML
INJECTION INTRAMUSCULAR; INTRAVENOUS AS NEEDED
Status: DISCONTINUED | OUTPATIENT
Start: 2021-11-10 | End: 2021-11-10 | Stop reason: HOSPADM

## 2021-11-10 RX ORDER — FENTANYL CITRATE 50 UG/ML
INJECTION, SOLUTION INTRAMUSCULAR; INTRAVENOUS AS NEEDED
Status: DISCONTINUED | OUTPATIENT
Start: 2021-11-10 | End: 2021-11-10 | Stop reason: HOSPADM

## 2021-11-10 RX ORDER — MORPHINE SULFATE 2 MG/ML
2 INJECTION, SOLUTION INTRAMUSCULAR; INTRAVENOUS
Status: DISCONTINUED | OUTPATIENT
Start: 2021-11-10 | End: 2021-11-10 | Stop reason: HOSPADM

## 2021-11-10 RX ORDER — LIDOCAINE HYDROCHLORIDE AND EPINEPHRINE 20; 10 MG/ML; UG/ML
INJECTION, SOLUTION INFILTRATION; PERINEURAL AS NEEDED
Status: DISCONTINUED | OUTPATIENT
Start: 2021-11-10 | End: 2021-11-10 | Stop reason: HOSPADM

## 2021-11-10 RX ORDER — DEXMEDETOMIDINE HYDROCHLORIDE 100 UG/ML
INJECTION, SOLUTION INTRAVENOUS AS NEEDED
Status: DISCONTINUED | OUTPATIENT
Start: 2021-11-10 | End: 2021-11-10 | Stop reason: HOSPADM

## 2021-11-10 RX ORDER — SUCCINYLCHOLINE CHLORIDE 20 MG/ML
INJECTION INTRAMUSCULAR; INTRAVENOUS AS NEEDED
Status: DISCONTINUED | OUTPATIENT
Start: 2021-11-10 | End: 2021-11-10 | Stop reason: HOSPADM

## 2021-11-10 RX ORDER — ROPIVACAINE HYDROCHLORIDE 5 MG/ML
150 INJECTION, SOLUTION EPIDURAL; INFILTRATION; PERINEURAL AS NEEDED
Status: DISCONTINUED | OUTPATIENT
Start: 2021-11-10 | End: 2021-11-10 | Stop reason: HOSPADM

## 2021-11-10 RX ORDER — NEOSTIGMINE METHYLSULFATE 1 MG/ML
INJECTION, SOLUTION INTRAVENOUS AS NEEDED
Status: DISCONTINUED | OUTPATIENT
Start: 2021-11-10 | End: 2021-11-10 | Stop reason: HOSPADM

## 2021-11-10 RX ORDER — ACETAMINOPHEN 325 MG/1
650 TABLET ORAL ONCE
Status: COMPLETED | OUTPATIENT
Start: 2021-11-10 | End: 2021-11-10

## 2021-11-10 RX ORDER — EPHEDRINE SULFATE/0.9% NACL/PF 50 MG/5 ML
5 SYRINGE (ML) INTRAVENOUS AS NEEDED
Status: DISCONTINUED | OUTPATIENT
Start: 2021-11-10 | End: 2021-11-10 | Stop reason: HOSPADM

## 2021-11-10 RX ORDER — ONDANSETRON 2 MG/ML
INJECTION INTRAMUSCULAR; INTRAVENOUS AS NEEDED
Status: DISCONTINUED | OUTPATIENT
Start: 2021-11-10 | End: 2021-11-10 | Stop reason: HOSPADM

## 2021-11-10 RX ORDER — MIDAZOLAM HYDROCHLORIDE 1 MG/ML
0.5 INJECTION, SOLUTION INTRAMUSCULAR; INTRAVENOUS
Status: DISCONTINUED | OUTPATIENT
Start: 2021-11-10 | End: 2021-11-10 | Stop reason: HOSPADM

## 2021-11-10 RX ORDER — MIDAZOLAM HYDROCHLORIDE 1 MG/ML
INJECTION, SOLUTION INTRAMUSCULAR; INTRAVENOUS AS NEEDED
Status: DISCONTINUED | OUTPATIENT
Start: 2021-11-10 | End: 2021-11-10 | Stop reason: HOSPADM

## 2021-11-10 RX ORDER — FENTANYL CITRATE 50 UG/ML
50 INJECTION, SOLUTION INTRAMUSCULAR; INTRAVENOUS AS NEEDED
Status: DISCONTINUED | OUTPATIENT
Start: 2021-11-10 | End: 2021-11-10 | Stop reason: HOSPADM

## 2021-11-10 RX ORDER — SODIUM CHLORIDE, SODIUM LACTATE, POTASSIUM CHLORIDE, CALCIUM CHLORIDE 600; 310; 30; 20 MG/100ML; MG/100ML; MG/100ML; MG/100ML
1000 INJECTION, SOLUTION INTRAVENOUS CONTINUOUS
Status: DISCONTINUED | OUTPATIENT
Start: 2021-11-10 | End: 2021-11-10 | Stop reason: HOSPADM

## 2021-11-10 RX ORDER — SODIUM CHLORIDE, SODIUM LACTATE, POTASSIUM CHLORIDE, CALCIUM CHLORIDE 600; 310; 30; 20 MG/100ML; MG/100ML; MG/100ML; MG/100ML
INJECTION, SOLUTION INTRAVENOUS
Status: DISCONTINUED | OUTPATIENT
Start: 2021-11-10 | End: 2021-11-10 | Stop reason: HOSPADM

## 2021-11-10 RX ORDER — ROCURONIUM BROMIDE 10 MG/ML
INJECTION, SOLUTION INTRAVENOUS AS NEEDED
Status: DISCONTINUED | OUTPATIENT
Start: 2021-11-10 | End: 2021-11-10 | Stop reason: HOSPADM

## 2021-11-10 RX ORDER — DEXAMETHASONE SODIUM PHOSPHATE 4 MG/ML
INJECTION, SOLUTION INTRA-ARTICULAR; INTRALESIONAL; INTRAMUSCULAR; INTRAVENOUS; SOFT TISSUE AS NEEDED
Status: DISCONTINUED | OUTPATIENT
Start: 2021-11-10 | End: 2021-11-10 | Stop reason: HOSPADM

## 2021-11-10 RX ORDER — SODIUM CHLORIDE 9 MG/ML
25 INJECTION, SOLUTION INTRAVENOUS CONTINUOUS
Status: DISCONTINUED | OUTPATIENT
Start: 2021-11-10 | End: 2021-11-10 | Stop reason: HOSPADM

## 2021-11-10 RX ORDER — SODIUM CHLORIDE, SODIUM LACTATE, POTASSIUM CHLORIDE, CALCIUM CHLORIDE 600; 310; 30; 20 MG/100ML; MG/100ML; MG/100ML; MG/100ML
100 INJECTION, SOLUTION INTRAVENOUS CONTINUOUS
Status: DISCONTINUED | OUTPATIENT
Start: 2021-11-10 | End: 2021-11-10 | Stop reason: HOSPADM

## 2021-11-10 RX ORDER — HYDROMORPHONE HYDROCHLORIDE 2 MG/ML
INJECTION, SOLUTION INTRAMUSCULAR; INTRAVENOUS; SUBCUTANEOUS AS NEEDED
Status: DISCONTINUED | OUTPATIENT
Start: 2021-11-10 | End: 2021-11-10 | Stop reason: HOSPADM

## 2021-11-10 RX ORDER — ONDANSETRON 2 MG/ML
4 INJECTION INTRAMUSCULAR; INTRAVENOUS AS NEEDED
Status: DISCONTINUED | OUTPATIENT
Start: 2021-11-10 | End: 2021-11-10 | Stop reason: HOSPADM

## 2021-11-10 RX ORDER — HYDROMORPHONE HYDROCHLORIDE 1 MG/ML
0.2 INJECTION, SOLUTION INTRAMUSCULAR; INTRAVENOUS; SUBCUTANEOUS
Status: ACTIVE | OUTPATIENT
Start: 2021-11-10 | End: 2021-11-10

## 2021-11-10 RX ORDER — LIDOCAINE HYDROCHLORIDE 10 MG/ML
0.1 INJECTION, SOLUTION EPIDURAL; INFILTRATION; INTRACAUDAL; PERINEURAL AS NEEDED
Status: DISCONTINUED | OUTPATIENT
Start: 2021-11-10 | End: 2021-11-10 | Stop reason: HOSPADM

## 2021-11-10 RX ORDER — MIDAZOLAM HYDROCHLORIDE 1 MG/ML
1 INJECTION, SOLUTION INTRAMUSCULAR; INTRAVENOUS AS NEEDED
Status: DISCONTINUED | OUTPATIENT
Start: 2021-11-10 | End: 2021-11-10 | Stop reason: HOSPADM

## 2021-11-10 RX ORDER — HYDROMORPHONE HYDROCHLORIDE 2 MG/1
2 TABLET ORAL
Qty: 30 TABLET | Refills: 0 | Status: SHIPPED | OUTPATIENT
Start: 2021-11-10 | End: 2021-11-17 | Stop reason: ALTCHOICE

## 2021-11-10 RX ORDER — LIDOCAINE HYDROCHLORIDE 20 MG/ML
INJECTION, SOLUTION EPIDURAL; INFILTRATION; INTRACAUDAL; PERINEURAL AS NEEDED
Status: DISCONTINUED | OUTPATIENT
Start: 2021-11-10 | End: 2021-11-10 | Stop reason: HOSPADM

## 2021-11-10 RX ADMIN — NEOSTIGMINE METHYLSULFATE 3 MG: 1 INJECTION, SOLUTION INTRAVENOUS at 08:49

## 2021-11-10 RX ADMIN — MIDAZOLAM HYDROCHLORIDE 2 MG: 1 INJECTION, SOLUTION INTRAMUSCULAR; INTRAVENOUS at 07:39

## 2021-11-10 RX ADMIN — PROPOFOL 200 MG: 10 INJECTION, EMULSION INTRAVENOUS at 07:41

## 2021-11-10 RX ADMIN — HYDROMORPHONE HYDROCHLORIDE 1 MG: 2 INJECTION, SOLUTION INTRAMUSCULAR; INTRAVENOUS; SUBCUTANEOUS at 09:18

## 2021-11-10 RX ADMIN — DEXAMETHASONE SODIUM PHOSPHATE 8 MG: 4 INJECTION, SOLUTION INTRAMUSCULAR; INTRAVENOUS at 07:50

## 2021-11-10 RX ADMIN — HYDROMORPHONE HYDROCHLORIDE 0.5 MG: 2 INJECTION, SOLUTION INTRAMUSCULAR; INTRAVENOUS; SUBCUTANEOUS at 09:09

## 2021-11-10 RX ADMIN — LIDOCAINE HYDROCHLORIDE 100 MG: 20 INJECTION, SOLUTION EPIDURAL; INFILTRATION; INTRACAUDAL; PERINEURAL at 07:41

## 2021-11-10 RX ADMIN — ROCURONIUM BROMIDE 10 MG: 10 SOLUTION INTRAVENOUS at 07:41

## 2021-11-10 RX ADMIN — ONDANSETRON HYDROCHLORIDE 4 MG: 2 INJECTION, SOLUTION INTRAMUSCULAR; INTRAVENOUS at 07:50

## 2021-11-10 RX ADMIN — DEXMEDETOMIDINE HYDROCHLORIDE 10 MCG: 100 INJECTION, SOLUTION, CONCENTRATE INTRAVENOUS at 08:55

## 2021-11-10 RX ADMIN — HYDROMORPHONE HYDROCHLORIDE 0.5 MG: 2 INJECTION, SOLUTION INTRAMUSCULAR; INTRAVENOUS; SUBCUTANEOUS at 08:52

## 2021-11-10 RX ADMIN — ROCURONIUM BROMIDE 30 MG: 10 SOLUTION INTRAVENOUS at 07:44

## 2021-11-10 RX ADMIN — CEFAZOLIN 3 G: 1 INJECTION, POWDER, FOR SOLUTION INTRAMUSCULAR; INTRAVENOUS; PARENTERAL at 07:50

## 2021-11-10 RX ADMIN — FENTANYL CITRATE 100 MCG: 50 INJECTION, SOLUTION INTRAMUSCULAR; INTRAVENOUS at 07:41

## 2021-11-10 RX ADMIN — ACETAMINOPHEN 650 MG: 325 TABLET ORAL at 07:15

## 2021-11-10 RX ADMIN — MIDAZOLAM HYDROCHLORIDE 3 MG: 1 INJECTION, SOLUTION INTRAMUSCULAR; INTRAVENOUS at 07:36

## 2021-11-10 RX ADMIN — PROPOFOL 100 MG: 10 INJECTION, EMULSION INTRAVENOUS at 07:44

## 2021-11-10 RX ADMIN — SUCCINYLCHOLINE CHLORIDE 160 MG: 20 INJECTION, SOLUTION INTRAMUSCULAR; INTRAVENOUS at 07:41

## 2021-11-10 RX ADMIN — GLYCOPYRROLATE 0.4 MG: 0.2 INJECTION, SOLUTION INTRAMUSCULAR; INTRAVENOUS at 08:49

## 2021-11-10 RX ADMIN — SODIUM CHLORIDE, POTASSIUM CHLORIDE, SODIUM LACTATE AND CALCIUM CHLORIDE: 600; 310; 30; 20 INJECTION, SOLUTION INTRAVENOUS at 07:36

## 2021-11-10 RX ADMIN — DEXMEDETOMIDINE HYDROCHLORIDE 10 MCG: 100 INJECTION, SOLUTION, CONCENTRATE INTRAVENOUS at 09:09

## 2021-11-10 RX ADMIN — SODIUM CHLORIDE, POTASSIUM CHLORIDE, SODIUM LACTATE AND CALCIUM CHLORIDE 1000 ML: 600; 310; 30; 20 INJECTION, SOLUTION INTRAVENOUS at 07:22

## 2021-11-10 RX ADMIN — OXYCODONE 5 MG: 5 TABLET ORAL at 10:39

## 2021-11-10 NOTE — H&P
Pending sale to [facility] Surgical Specialists at [facility] Surgery History and Physical     History of Present Illness:      Deshawn Wall is a 33 y.o. female who is status post laparoscopic sleeve gastrectomy with hiatal hernia repair done about a year ago. Since surgery she has lost about 60 to 70 pounds. She has been having some periodic abdominal pain. The abdominal pain has been in the right upper quadrant with some radiation to the right back. Sometimes the pain is epigastric. Sometimes the pain is related to a meal.  She generally does not eat fatty foods after her sleeve gastrectomy. Sometimes the pain is random. The pain she has it is a 3-4 out of 10 and may last few hours. She denies any nausea and vomiting. She is not having any oral intake issues related to her sleeve. She is taking in plenty of protein.   She is not exactly satisfied with her weight loss and would like to lose more it is seeing the medical weight loss program here at [facility].          Past Medical History:   Diagnosis Date    Anxiety with depression      Arrhythmia       tachycardia    Asthma      [place] hump 1/7/2015    Carpal tunnel syndrome of right wrist 12/20/2016    Diabetes ([address]      AILYA (generalized anxiety disorder) 4/4/2018    GERD (gastroesophageal reflux disease) 8/13/2014    HTN (hypertension) 7/25/2011    Hyperaldosteronism ([address] 10/19/2015    Hyperhidrosis 4/4/2018    Morbid obesity ([address]      Other ill-defined conditions(799.89)       migraines    Palpitation 4/4/2018    Sleep apnea       uses CPAP    Tachycardia 4/4/2018               Past Surgical History:   Procedure Laterality Date    HX GASTRECTOMY   09/08/2020     Gastric sleeve     HX GYN   10/19/2020     Laparoscopic right oopherectomy, left cystectomy    HX HERNIA REPAIR   09/08/2020    HX OVARIAN CYST REMOVAL   10/19/2020    HX TONSILLECTOMY        HX WISDOM TEETH EXTRACTION        LA REMOVAL OF TONSILS,<11 Y/O                Current Outpatient Medications:     gabapentin (NEURONTIN) 300 mg capsule, Take 1 Capsule by mouth three (3) times daily. Max Daily Amount: 900 mg., Disp: 270 Capsule, Rfl: 1    linaCLOtide (Linzess) 145 mcg cap capsule, Take 145 mcg by mouth Daily (before breakfast). , Disp: , Rfl:     cyanocobalamin (VITAMIN B12) 1,000 mcg/mL injection, INJECT 1ML INTO THE MUSCLE MONTHLY, Disp: 1 mL, Rfl: 5    vilazodone (VIIBRYD) 40 mg tab tablet, Take 1 Tablet by mouth daily. , Disp: 90 Tablet, Rfl: 2    semaglutide (OZEMPIC) 0.25 mg or 0.5 mg/dose (2 mg/1.5 ml) subq pen, 0.5 mg by SubCUTAneous route every seven (7) days. , Disp: 3 Box, Rfl: 3    atorvastatin (LIPITOR) 40 mg tablet, TAKE 1 TABLET BY MOUTH EVERY DAY, Disp: 90 Tablet, Rfl: 3    rimegepant (Nurtec ODT) 75 mg disintegrating tablet, 1 tablet oral every other day, Disp: 16 Tablet, Rfl: 2    ergocalciferol (ERGOCALCIFEROL) 1,250 mcg (50,000 unit) capsule, Take 1 Cap by mouth every seven (7) days. Indications: low vitamin D levels, Disp: 12 Cap, Rfl: 3    galcanezumab-gnlm (Emgality Pen) 120 mg/mL injection, 1 mL by SubCUTAneous route every thirty (30) days. , Disp: 1 mL, Rfl: 11    drospirenone, contraceptive, (Slynd) 4 mg (28) tab, Slynd 4 mg (28) tablet  Take 1 tablet every day by oral route., Disp: , Rfl:     omeprazole (PRILOSEC) 40 mg capsule, Take 1 Cap by mouth daily. (Patient taking differently: Take 40 mg by mouth two (2) times a day.), Disp: 90 Cap, Rfl: 3    metoprolol tartrate (LOPRESSOR) 100 mg IR tablet, TAKE 1 TABLET BY MOUTH TWICE A DAY, Disp: 180 Tab, Rfl: 2    nystatin (MYCOSTATIN) topical cream, Apply  to affected area two (2) times a day. Apply to abdominal folds twice daily as needed for rash, Disp: 30 g, Rfl: 3    iron,carbonyl-FA-multivit-min (Opurity Multivitamin) 30 mg iron- 800 mcg chew, Take 2 Tabs by mouth daily. , Disp: , Rfl:     calcium carbonate/vitamin D3 (CALCIUM 600 + D,3, PO), Take 1 Tab by mouth two (2) times a day., Disp: , Rfl:   aspirin delayed-release 81 mg tablet, Take 1 Tab by mouth daily. , Disp: 30 Tab, Rfl: 11    norethindrone (MICRONOR) 0.35 mg tab, , Disp: , Rfl:           Allergies   Allergen Reactions    Latex Itching         Social History            Socioeconomic History    Marital status: SINGLE       Spouse name: Not on file    Number of children: Not on file    Years of education: Not on file    Highest education level: Not on file   Occupational History    Occupation: customer ser and student       Comment: [employer]   Tobacco Use    Smoking status: Former Smoker       Packs/day: 0.50       Years: 5.00       Pack years: 2.50       Quit date: 2019       Years since quittin.9    Smokeless tobacco: Never Used   Vaping Use    Vaping Use: Never used   Substance and Sexual Activity    Alcohol use: Yes       Alcohol/week: 1.0 standard drinks       Types: 1 Shots of liquor, 1 Standard drinks or equivalent per week       Comment: socially, Monthly or less    Drug use: No    Sexual activity: Not Currently       Partners: Male       Birth control/protection: Condom   Other Topics Concern     Service Not Asked    Blood Transfusions Not Asked    Caffeine Concern Not Asked    Occupational Exposure Not Asked    Hobby Hazards Not Asked    Sleep Concern Not Asked    Stress Concern Not Asked    Weight Concern Not Asked    Special Diet Not Asked    Back Care Not Asked    Exercise Not Asked    Bike Helmet Not Asked    Seat Belt Not Asked    Self-Exams Not Asked   Social History Narrative    Not on file      Social Determinants of Health          Financial Resource Strain:     Difficulty of Paying Living Expenses:    Food Insecurity:     Worried About Running Out of Food in the Last Year:     Ran Out of Food in the Last Year:    Transportation Needs:     Lack of Transportation (Medical):      Lack of Transportation (Non-Medical):    Physical Activity:     Days of Exercise per Week:     Minutes of Exercise per Session:    Stress:     Feeling of Stress :    Social Connections:     Frequency of Communication with Friends and Family:     Frequency of Social Gatherings with Friends and Family:     Attends Alevism Services:     Active Member of Clubs or Organizations:     Attends Club or Organization Meetings:     Marital Status:    Intimate Partner Violence:     Fear of Current or Ex-Partner:     Emotionally Abused:     Physically Abused:     Sexually Abused:                Family History   Problem Relation Age of Onset    Hypertension Mother      Diabetes Mother           Type II    Anesth Problems Mother           respiratory issues - feels like she cannot breath when coming out of anesthesia    Arthritis-rheumatoid Mother      Gout Mother      Psychiatric Disorder Father      Drug Abuse Father      Cancer Other           prostate    Hypertension Sister      Thyroid Disease Sister           \"I think\"    Breast Cancer Sister      Attention Deficit Hyperactivity Disorder Brother      Hypertension Sister           ROS   Constitutional: negative  Ears, Nose, Mouth, Throat, and Face: negative  Respiratory: negative  Cardiovascular: negative  Gastrointestinal: positive for abdominal pain  Genitourinary:negative  Integument/Breast: negative  Hematologic/Lymphatic: negative  Behavioral/Psychiatric: negative  Allergic/Immunologic: negative        Physical Exam:      Visit Vitals  /79 (BP 1 Location: Right arm, BP Patient Position: Sitting, BP Cuff Size: Large adult)   Pulse 98   Temp 98.8 °F (37.1 °C) (Oral)   Resp 18   Ht 5' 3\" (1.6 m)   Wt 272 lb (123.4 kg)   SpO2 97%   BMI 48.18 kg/m²         General - alert and oriented, no apparent distress  HEENT - no jaundice, no hearing imparement  Pulm - CTAB, no C/W/R  CV - RRR, no M/R/G  Abd -soft, nondistended, bowel sounds present, nontender to palpation  Ext - pulses intact in UE and LE bilaterally, no edema  Skin - supple, no rashes  Psychiatric - normal affect, good mood     Labs        Lab Results   Component Value Date/Time     Sodium 139 03/19/2021 09:58 AM     Potassium 4.9 03/19/2021 09:58 AM     Chloride 103 03/19/2021 09:58 AM     CO2 24 03/19/2021 09:58 AM     Anion gap 8 10/01/2020 06:17 AM     Glucose 83 03/19/2021 09:58 AM     BUN 10 03/19/2021 09:58 AM     Creatinine 0.69 03/19/2021 09:58 AM     BUN/Creatinine ratio 14 03/19/2021 09:58 AM     GFR est  03/19/2021 09:58 AM     GFR est non- 03/19/2021 09:58 AM     Calcium 9.6 03/19/2021 09:58 AM     Bilirubin, total 0.7 03/19/2021 09:58 AM     Alk. phosphatase 84 03/19/2021 09:58 AM     Protein, total 6.6 03/19/2021 09:58 AM     Albumin 4.0 03/19/2021 09:58 AM     Globulin 4.3 (H) 10/01/2020 06:17 AM     A-G Ratio 1.5 03/19/2021 09:58 AM     ALT (SGPT) 20 03/19/2021 09:58 AM     AST (SGOT) 18 03/19/2021 09:58 AM            Lab Results   Component Value Date/Time     WBC 12.0 (H) 08/23/2021 02:22 PM     HGB 12.7 08/23/2021 02:22 PM     HCT 41.3 08/23/2021 02:22 PM     PLATELET 808 96/61/0708 02:22 PM     MCV 91.8 08/23/2021 02:22 PM            Imaging  Right upper quadrant imaging from outside system-gallbladder sludge present, otherwise normal-appearing gallbladder, normal common bile duct, no gallbladder wall thickening  I have reviewed and agree with all of the pertinent images     EGD 9/21-Findings:  Esophagus: Normal mucosa of prox / mid / distal esophagus. Z-line at 39cm and was irregular with salmon tongue extending 1cm proximally. Biopsied w/ cold forceps to confirm Shelby's (C0M1). No ulcers appreciated. No stenosis. No hiatal hernia. Stomach:Evidence of previous sleeve gastrectomy. Scar well healed. Entire stomach was congested with erythema. No erosions or ulcers. Biopsied in multiple quadrants with cold forceps to r/o H pylori.   Duodenum/jejunum:normal         EBL: minimal     Complications:   None; patient tolerated the procedure well.           Impression:    See Postoperative diagnosis above     Recommendations:  -Continue acid suppression with OPEN capsules. , -Await pathology.     Discharge disposition:  Home in the company of  when able to ambulate     Assessment:      Ivy Ibarra is a 33 y.o. female with biliary colic, status post sleeve gastrectomy     Recommendations:      1. She appears to have biliary colic based on her right upper quadrant abdominal pain and gallstones present. I do believe she will need laparoscopic cholecystectomy. I will schedule her for surgery here soon. She also seeing the medical weight loss program and will continue with this to maximize her weight loss before considering any revision to gastric bypass. It may come to this in the future but right now I think she needs to try the medical weight loss first.  We will schedule her for surgery for laparoscopic cholecystectomy. I have discussed the above procedure with the patient in detail. We reviewed the benefits and possible complications of the surgery which include bleeding, infection, damage to adjacent organs, venous thromboembolism, need for repeat surgery, death and other unforseen complications.   The patient agreed to proceed with the surgery.          Urszula De Los Santos MD

## 2021-11-10 NOTE — BRIEF OP NOTE
Brief Postoperative Note    Patient: Zain Granado  YOB: 1990  MRN: [id number]    Date of Procedure: 11/10/2021     Pre-Op Diagnosis: BILIARY COLIC    Post-Op Diagnosis: Same as preoperative diagnosis.       Procedure(s):  LAPAROSCOPIC CHOLECYSTECTOMY    Surgeon(s):  Rupert Sarah MD    Surgical Assistant: Surg Asst-1: April Dolan    Anesthesia: General     Estimated Blood Loss (mL): Minimal    Complications: None    Specimens:   ID Type Source Tests Collected by Time Destination   1 : Gallbladder Fresh Abdomen  Rupert Sarah MD 11/10/2021 0830 Pathology        Implants: * No implants in log *    Drains: * No LDAs found *    Findings: normal appearing GB, no inflammation seen    Electronically Signed by Cary Caro MD on 11/10/2021 at 8:48 AM

## 2021-11-10 NOTE — ANESTHESIA POSTPROCEDURE EVALUATION
Post-Anesthesia Evaluation and Assessment    Patient: Gissell Mcgill MRN: [id number]  SSN: [id number]    YOB: 1990  Age: 33 y.o. Sex: female      I have evaluated the patient and they are stable and ready for discharge from the PACU. Cardiovascular Function/Vital Signs  Visit Vitals  BP (!) 103/59   Pulse (!) 54   Temp 36.6 °C (97.9 °F)   Resp 15   Ht 5' 3\" (1.6 m)   Wt 125 kg (275 lb 9.2 oz)   SpO2 98%   BMI 48.82 kg/m²       Patient is status post General anesthesia for Procedure(s):  LAPAROSCOPIC CHOLECYSTECTOMY. Nausea/Vomiting: None    Postoperative hydration reviewed and adequate. Pain:  Pain Scale 1: Visual (11/10/21 0915)  Pain Intensity 1: 9 (11/10/21 0915)   Managed    Neurological Status:   Neuro (WDL): Exceptions to WDL (11/10/21 0915)  Neuro  Neurologic State: Anesthetized; Eyes open to voice (11/10/21 0915)  LUE Motor Response: Purposeful (11/10/21 0915)  LLE Motor Response: Purposeful (11/10/21 0915)  RUE Motor Response: Purposeful (11/10/21 0915)  RLE Motor Response: Purposeful (11/10/21 0915)   At baseline    Mental Status, Level of Consciousness: Alert and  oriented to person, place, and time    Pulmonary Status:   O2 Device: Nasal cannula (11/10/21 0920)   Adequate oxygenation and airway patent    Complications related to anesthesia: None    Post-anesthesia assessment completed. No concerns    Signed By: Jessica Cullen MD     November 10, 2021              Procedure(s):  LAPAROSCOPIC CHOLECYSTECTOMY. general    <BSHSIANPOST>    INITIAL Post-op Vital signs:   Vitals Value Taken Time   /59 11/10/21 1000   Temp 36.6 °C (97.9 °F) 11/10/21 0920   Pulse 54 11/10/21 1005   Resp 15 11/10/21 1005   SpO2 99 % 11/10/21 1005   Vitals shown include unvalidated device data.

## 2021-11-10 NOTE — DISCHARGE INSTRUCTIONS
Laparoscopic cholecystectomy    DISCHARGE SUMMARY from Nurse    The following personal items collected during your admission are returned to you:   Dental Appliance: Dental Appliances: None  Vision:    Hearing Aid:    Jewelry: Jewelry: None  Clothing: Clothing: Other (comment) (CLOTHING & SHOES TO PACU)  Other Valuables: Other Valuables: Eyeglasses (GLASSES TO PACU)  Valuables sent to safe: ALL CLOTHING/VALUABLES RETURNED TO PATIENT BEFORE D/C HOME    PATIENT INSTRUCTIONS:     The first meal should be something that is light; not greasy or spicy. If this is tolerated, then advance to regular diet as tolerated. Anesthesia Discharge Instructions    After general anesthesia or intervenous sedation, for 24 hours or while taking prescription Narcotics:  · Limit your activities  · Do not drive or operate hazardous machinery  · If you have not urinated within 8 hours after discharge, please contact your surgeon on call. · Do not make important personal or business decisions  · Do not drink alcoholic beverages    Report the following to your surgeon:  · Excessive pain, swelling, redness or odor of or around the surgical area  · Temperature over 100.5 degrees  · Nausea and vomiting lasting longer than 4 hours or if unable to take medication  · Any signs of decreased circulation or nerve impairment to extremity:  Change in color, persistent numbness, tingling, coldness or increased pain. · Any questions    Pain  · Take pain medication as directed by your doctor  ·  Call your doctor if pain is NOT relieved by medication  · DO NOT take aspirin or blood thinners until directed by your doctor       Follow-up Appointments   Procedures    FOLLOW UP VISIT Appointment in: Two Weeks     Standing Status:   Standing     Number of Occurrences:   1     Order Specific Question:   Appointment in     Answer:    Two Weeks         Patient Discharge Instructions    Kimberly Rios / [id number] : 1990    Admitted 11/10/2021 Discharged: 11/10/2021       PATIENT INSTRUCTIONS  GALLBLADDER SURGERY  (CHOLECYSTECTOMY)    FOLLOW-UP:  Please make an appointment with your physician in 10 - 14 day(s). Call your physician immediately if you have any fevers greater than 101.5, drainage from your wound that is not clear or looks infected, persistent bleeding, increasing abdominal pain, problems urinating, or persistent nausea/vomiting. You should be aware that you may have right shoulder pain after surgery and that this will progressively go away. This is called 'referred pain' and is from the area of the gallbladder. It can also be caused by gas that may be trapped under the diaphragm from the surgery, especially if it was performed laparoscopically through mini-incisions. This gas will progressively get reabsorbed by your body. WOUND CARE INSTRUCTIONS:   You may shower at home. If clothing rubs against the wound or causes irritation and the wound is not draining you may cover it with a dry dressing during the daytime. Try to keep the wound dry and avoid ointments on the wound unless directed to do so. If the wound becomes bright red and painful or starts to drain infected material that is not clear, please contact your physician immediately. You should also call if you begin to drain fluid that is thin and greenish-brown from the wound and appears to look like bile. If the wound though is mildly pink and has a thick firm ridge underneath it, this is normal, and is referred to as a healing ridge. This will resolve over the next 4-6 weeks. Place an ice pack on the navel incision for the next 48 hours. After that, you may use a heating pad if you feel muscle tightening or pulling. DIET:  You may eat any foods that you can tolerate. It is a good idea to eat a high fiber diet and take in plenty of fluids to prevent constipation.   If you do become constipated you may want to take a mild laxative or take ducolax tablets on a daily basis until your bowel habits are regular. Constipation can be very uncomfortable, along with straining, after recent abdominal surgery. ACTIVITY:  You are encouraged to cough and deep breath or use your incentive spirometer if you were given one, every 15-30 minutes when awake. This will help prevent respiratory complications and low grade fevers post-operatively. You may want to hug a pillow when coughing and sneezing to add additional support to the surgical area(s) which will decrease pain during these times. You are encouraged to walk and engage in light activity for the next two weeks. You should not lift more than 20 pounds during this time frame as it could put you at increased risk for a post-operative hernia. Twenty pounds is roughly equivalent to a plastic bag of groceries. · Most people are able to return to work within 1 to 2 weeks after surgery. · You may shower 24 hours after surgery. Pat the cut (incision) dry. Do not take a bath for the first week. · Your doctor will tell you when you can have sex again. MEDICATIONS:  Try to take narcotic medications and anti-inflammatory medications, such as tylenol, ibuprofen, naprosyn, etc., with food. This will minimize stomach upset from the medication. Should you develop nausea and vomiting from the pain medication, or develop a rash, please discontinue the medication and contact your physician. You should not drive, make important decisions, or operate machinery when taking narcotic pain medication. · Take  hydromorphone (Dilaudid) as needed for severe pain. QUESTIONS:  Please feel free to call Dr. Grant Lock office ([phone]) if you have any questions, and they will be glad to assist you. Follow-up with Dr. Richard Nuñez in 2 week(s). Call the office to schedule your appointment. Information obtained by :    I understand that if any problems occur once I am at home I am to contact my physician.     I understand and acknowledge receipt of the instructions indicated above.                                                                                                                                            Physician's or R.N.'s Signature                                                                  Date/Time                                                                                                                                              Patient or Representative Signature                                                          Date/Time

## 2021-11-10 NOTE — ANESTHESIA PREPROCEDURE EVALUATION
Anesthetic History   No history of anesthetic complications            Review of Systems / Medical History  Patient summary reviewed, nursing notes reviewed and pertinent labs reviewed    Pulmonary        Sleep apnea: CPAP  Smoker  Asthma     Comments: Former Smoker   Neuro/Psych         Headaches and psychiatric history    Comments: Anxiety  Depression  Migraines Cardiovascular    Hypertension        Dysrhythmias       Exercise tolerance: <4 METS  Comments: Hx Palpitations and Tachycardia            GI/Hepatic/Renal     GERD           Endo/Other    Diabetes    Morbid obesity and arthritis    Comments: Hx Hyperaldosteronism   Other Findings   Comments: OVARIAN CYST AND PELVIC PAIN  Hx Hyperhidrosis   Vitamin D deficiency   AR (allergic rhinitis)            Physical Exam    Airway  Mallampati: II  TM Distance: 4 - 6 cm  Neck ROM: normal range of motion   Mouth opening: Normal     Cardiovascular    Rhythm: regular  Rate: normal         Dental  No notable dental hx       Pulmonary  Breath sounds clear to auscultation               Abdominal  Abdominal exam normal       Other Findings            Anesthetic Plan    ASA: 3  Anesthesia type: general          Induction: Intravenous  Anesthetic plan and risks discussed with: Patient

## 2021-11-11 ENCOUNTER — PATIENT OUTREACH (OUTPATIENT)
Dept: OTHER | Age: 31
End: 2021-11-11

## 2021-11-11 NOTE — OP NOTES
[address]   OPERATIVE REPORT    Name:  Jorje Arthur  MR#:  [id number]  :  1990  ACCOUNT #:  [de-identified]  DATE OF SERVICE:  11/10/2021    PREOPERATIVE DIAGNOSIS:  Biliary colic. POSTOPERATIVE DIAGNOSIS:  Biliary colic. PROCEDURE PERFORMED:  Laparoscopic cholecystectomy. SURGEON:  Sisi Issa MD    ASSISTANT:  Moise Li SA    ANESTHESIA:  General.    COMPLICATIONS:  None. SPECIMENS REMOVED:  Gallbladder. IMPLANTS:  None. ESTIMATED BLOOD LOSS:  Minimal.    DRAINS:  None. FINDINGS:  Normal-appearing gallbladder. No inflammation seen. INDICATIONS FOR OPERATION:  The patient is a 26-year-old female who has a history of biliary colic and a history of sleeve gastrectomy with hiatal hernia repair, who is having symptoms of biliary colic and needing laparoscopic cholecystectomy. PROCEDURE:  The patient was met in the preoperative holding area. H and P was updated. Consent was signed. All risks and benefits were explained to the patient prior to the start of the operation. She was taken back to the operating room. She was lying in a supine position. The abdomen was prepped and draped in standard sterile fashion. Time-out was called. Antibiotics were given. SCDs were on lower extremities. Started the operation by making a 5 mm incision to the right upper quadrant, inserting a VisiPort trocar into intra-abdominal cavity, insufflating to 15 mmHg. I then placed a 12 mm trocar superior and to the right of the umbilicus at her previous sleeve 15 mm port site. I placed a 5 mm subxiphoid trocar site and then a 5 mm right lateral trocar site. We looked in the abdominal cavity. We could see a small red spot on the hepatic flexure of the colon. We did not see any sign of any colotomy or any bowel injury, but the area looked a little bit red. It may have been scraped by the trocar placement, but there was certainly no tear in it.   I did oversew this area with a 3-0 Vicryl suture in interrupted single stitch fashion. With that area oversewn, we then continued on with the gallbladder, removed our needle and then pushed the gallbladder up and over the liver, dissected down to the infundibulum of the gallbladder, dissecting out the cystic duct and cystic artery, identifying both structures clearly going into the gallbladder with no intervening structures in between. Our critical view of safety was obtained. We could see the junction of the cystic duct and common bile duct as well. We then placed two clips on the cystic artery on the patient's side, one on the distal side and cut in between. We dissected out the cystic duct a little bit further and then placed three clips on the cystic duct on the patient's side and one on the distal side and cut in between. There was also a small posterior artery branch that was clipped and divided, and then we removed the gallbladder from the gallbladder fossa with hook cautery, cauterizing any bleeding from the liver bed along the way. The gallbladder was removed from an Endo Catch bag from the 12 mm port site. It came out very easily. We passed it off the field. We then looked back at our gallbladder fossa, finding it to be hemostatic. Our clips were all in place. There was no bleeding. The area of the colon that we oversewed looked good. There was no spillage of any bile during the case. We then closed our 12 mm port fascial defect with an Endo Close suture passing device in interrupted figure-of-eight fashion with 0 Vicryl suture. We then desufflated the abdominal cavity, removed the trocars, and closed the skin with 4-0 Monocryl and Dermabond to complete the operation. Dr. Sherry Roberts was present and scrubbed during the entire operation. The counts were correct.       Sharmila Blackwell MD      [id number]  D:  11/10/2021 8:53  T:  11/10/2021 18:07  JOB #:  [id number]

## 2021-11-11 NOTE — PROGRESS NOTES
[facility] progress note    Patient eligible for Cally Baron 994 care management    Received notification of discharge from [facility] on 11/10 for lina tinoco. Contacted patient to discuss post discharge needs and offer care management services. Two patient identifiers verified. Discussed the care management program.  Patient agrees to care management services at this time. PMH:   Past Medical History:   Diagnosis Date    Anxiety with depression     Arrhythmia     tachycardia    Arthritis     Asthma     Alfalfa hump 2015    Carpal tunnel syndrome of right wrist 2016    Diabetes [facility]     ALIYA (generalized anxiety disorder) 2018    GERD (gastroesophageal reflux disease) 2014    HTN (hypertension) 2011    Hyperaldosteronism ([facility] 10/19/2015    Hyperhidrosis 2018    Morbid obesity ([facility]     Other ill-defined conditions(799.89)     migraines    Palpitation 2018    Sleep apnea     uses CPAP    Tachycardia 2018       Social History:   Social History     Socioeconomic History    Marital status: SINGLE     Spouse name: Not on file    Number of children: Not on file    Years of education: Not on file    Highest education level: Not on file   Occupational History    Occupation: customer ser and student     Comment: [employer]   Tobacco Use    Smoking status: Former Smoker     Packs/day: 0.50     Years: 5.00     Pack years: 2.50     Quit date: 2019     Years since quittin.0    Smokeless tobacco: Never Used   Vaping Use    Vaping Use: Never used   Substance and Sexual Activity    Alcohol use:  Yes     Alcohol/week: 1.0 standard drink     Types: 1 Shots of liquor, 1 Standard drinks or equivalent per week     Comment: socially, Monthly or less    Drug use: No    Sexual activity: Not Currently     Partners: Male     Birth control/protection: Condom   Other Topics Concern     Service Not Asked    Blood Transfusions Not Asked    Caffeine Concern Not Asked  Occupational Exposure Not Asked   Maria Elena Ayah Hazards Not Asked    Sleep Concern Not Asked    Stress Concern Not Asked    Weight Concern Not Asked    Special Diet Not Asked    Back Care Not Asked    Exercise Not Asked    Bike Helmet Not Asked   [address] Not Asked    Self-Exams Not Asked   Social History Narrative    Not on file     Social Determinants of Health     Financial Resource Strain:     Difficulty of Paying Living Expenses: Not on file   Food Insecurity:     Worried About Running Out of Food in the Last Year: Not on file    Yaritza of Food in the Last Year: Not on file   Transportation Needs:     Lack of Transportation (Medical): Not on file    Lack of Transportation (Non-Medical): Not on file   Physical Activity:     Days of Exercise per Week: Not on file    Minutes of Exercise per Session: Not on file   Stress:     Feeling of Stress : Not on file   Social Connections:     Frequency of Communication with Friends and Family: Not on file    Frequency of Social Gatherings with Friends and Family: Not on file    Attends Christian Services: Not on file    Active Member of [address] [place] [address] or Organizations: Not on file    Attends Club or Organization Meetings: Not on file    Marital Status: Not on file   Intimate Partner Violence:     Fear of Current or Ex-Partner: Not on file    Emotionally Abused: Not on file    Physically Abused: Not on file    Sexually Abused: Not on file   Housing Stability:     Unable to Pay for Housing in the Last Year: Not on file    Number of [place] in the Last Year: Not on file    Unstable Housing in the Last Year: Not on file         Patient's primary care provider relationship reviewed with patient and modified, as applicable. Care management assessment completed:    Surgical/Wound Condition Focused Assessment    Skin- any open wounds or incisions?  yes  Description and location of wound- 4 incision, with dermabond  In the last 24 hour have you experienced; Fever no    Low body temperature no    Chills or shaking no    Sweating no    Fast heart rate no    Fast breathing no    Dizziness/lightheadedness no    Confusion or unusual change in mental status no    Diarrhea no    Nausea no    Vomiting no    Shortness of breath or difficulty breathing no    Less urine output no    Cold, clammy, and pale skin no     Skin rash or skin color changes no  New or worsening pain? no  If yes, pain rated 0-10: 3 Location/pain characteristics: abdominal soreness   New or worsening numbness or tingling? no  If yes, location of numbness and tingling: n/a    Activity level- moving several times a day, or as recommended? yes  Abnormal activity level reported: n/a   Bathing and showering instructions? yes  Nutrition- prescribed diet? no   Tolerating food? yes  Hydration- (how much in ounces per day) 60 ounces/day  Medications- new antibiotic? no             Pain medication? yes  Does patient understand how and when to take their medications? yes  If no, instructed patient on proper medication use? n/a  Does patient have incentive spirometer? no  If yes, how frequently is patient using incentive spirometer? n/a    Red Flags:  Please make an appointment with your physician in 10 - 14 day(s). Call your physician immediately if you have any fevers greater than 101.5, drainage from your wound that is not clear or looks infected, persistent bleeding,  increasing abdominal pain, problems urinating, or persistent nausea/vomiting. Medications:  New Medications at Discharge: dilaudid, 2 mg tablet every 4 hrs as needed, zofran 4 mg 1 tablet every 8 hours  Changed Medications at Discharge: none  Discontinued Medications at Discharge: none  Current Outpatient Medications   Medication Sig    cyclobenzaprine (FLEXERIL) 5 mg tablet Take 1 Tablet by mouth nightly.  HYDROmorphone (DILAUDID) 2 mg tablet Take 1 Tablet by mouth every four (4) hours as needed for Pain for up to 7 days.  Max Daily Amount: 12 mg.    ondansetron (ZOFRAN ODT) 4 mg disintegrating tablet Take 1 Tablet by mouth every eight (8) hours as needed for Nausea or Vomiting.  venlafaxine-SR (Effexor XR) 75 mg capsule Take 75 mg by mouth nightly.  aspirin delayed-release 81 mg tablet Take 1 Tablet by mouth daily.  gabapentin (NEURONTIN) 300 mg capsule Take 1 Capsule by mouth three (3) times daily. Max Daily Amount: 900 mg.    linaCLOtide (Linzess) 145 mcg cap capsule Take 290 mcg by mouth Daily (before breakfast).  cyanocobalamin (VITAMIN B12) 1,000 mcg/mL injection INJECT 1ML INTO THE MUSCLE MONTHLY    norethindrone (MICRONOR) 0.35 mg tab     semaglutide (OZEMPIC) 0.25 mg or 0.5 mg/dose (2 mg/1.5 ml) subq pen 0.5 mg by SubCUTAneous route every seven (7) days.  atorvastatin (LIPITOR) 40 mg tablet TAKE 1 TABLET BY MOUTH EVERY DAY (Patient taking differently: nightly. TAKE 1 TABLET BY MOUTH EVERY DAY)    rimegepant (Nurtec ODT) 75 mg disintegrating tablet 1 tablet oral every other day    ergocalciferol (ERGOCALCIFEROL) 1,250 mcg (50,000 unit) capsule Take 1 Cap by mouth every seven (7) days. Indications: low vitamin D levels    galcanezumab-gnlm (Emgality Pen) 120 mg/mL injection 1 mL by SubCUTAneous route every thirty (30) days.  omeprazole (PRILOSEC) 40 mg capsule Take 1 Cap by mouth daily. (Patient taking differently: Take 40 mg by mouth two (2) times a day.)    metoprolol tartrate (LOPRESSOR) 100 mg IR tablet TAKE 1 TABLET BY MOUTH TWICE A DAY    nystatin (MYCOSTATIN) topical cream Apply  to affected area two (2) times a day. Apply to abdominal folds twice daily as needed for rash    iron,carbonyl-FA-multivit-min (Opurity Multivitamin) 30 mg iron- 800 mcg chew Take 2 Tabs by mouth daily.  calcium carbonate/vitamin D3 (CALCIUM 600 + D,3, PO) Take 1 Tab by mouth two (2) times a day. No current facility-administered medications for this visit. There are no discontinued medications.     Performed medication reconciliation with patient, and patient verbalizes understanding of administration of home medications. There were no barriers to obtaining medications identified at this time. Preventive Care     Health Maintenance   Topic Date Due    Hepatitis C Screening  Never done    Cervical cancer screen  Never done    MICROALBUMIN Q1  02/17/2022    Lipid Screen  02/17/2022    Eye Exam Retinal or Dilated  06/27/2022    Foot Exam Q1  08/09/2022    DTaP/Tdap/Td series (2 - Td or Tdap) 08/27/2022    A1C test (Diabetic or Prediabetic)  10/28/2022    Flu Vaccine  Completed    COVID-19 Vaccine  Completed    Pneumococcal 0-64 years  Aged Out         Healthy Habits    Nutrition: Regular    Movement: Boot camp 4 days a week (30 minutes)    CM Identified  Problems   1. Potential Knowledge Deficit  2. Impaired Skin Integrity    Patient plans to go back to work on 11/22. Barriers/Support system:  patient, mother and sister    Barriers/Challenges to Care: []  Decline in memory    []  Language barrier     []  Emotional                  []  Limited mobility  []  Lack of motivation     [] Vision, hearing or cognitive impairment []  Knowledge [] Financial Barriers []  Lack of support  []  Pain []  Other [x]  None    PCP/Specialist follow up: Patient scheduled to follow up with Ernie Portillo MD on 11/15. Future Appointments   Date Time Provider Mateo Christian   11/15/2021  4:00 PM Ernie Portillo MD [facility] BS AMB   11/17/2021  1:30 PM Ramya Morejon NP [facility] BS AMB   11/29/2021  7:40 AM Raquel Braxton NP [facility] BS AMB   12/13/2021  4:10 PM Lamar Burnham MD RDE [address] AMB      Reviewed red flags with patient, and patient verbalizes understanding. Patient given an opportunity to ask questions. No other clinical/social/functional needs noted. The patient agrees to contact the PCP office for questions related to their healthcare.   The patient expressed thanks, offered no additional questions and ended the call. Plan for next call: Call on 11/18 for check in.

## 2021-11-12 ENCOUNTER — TELEPHONE (OUTPATIENT)
Dept: SURGERY | Age: 31
End: 2021-11-12

## 2021-11-12 ENCOUNTER — NURSE TRIAGE (OUTPATIENT)
Dept: OTHER | Facility: CLINIC | Age: 31
End: 2021-11-12

## 2021-11-12 ENCOUNTER — HOSPITAL ENCOUNTER (EMERGENCY)
Age: 31
Discharge: HOME | End: 2021-11-12
Attending: EMERGENCY MEDICINE
Payer: COMMERCIAL

## 2021-11-12 ENCOUNTER — APPOINTMENT (OUTPATIENT)
Dept: CT IMAGING | Age: 31
End: 2021-11-12
Attending: EMERGENCY MEDICINE
Payer: COMMERCIAL

## 2021-11-12 VITALS
TEMPERATURE: 99 F | BODY MASS INDEX: 47.13 KG/M2 | DIASTOLIC BLOOD PRESSURE: 96 MMHG | HEIGHT: 63 IN | SYSTOLIC BLOOD PRESSURE: 147 MMHG | WEIGHT: 266 LBS | HEART RATE: 65 BPM | RESPIRATION RATE: 17 BRPM | OXYGEN SATURATION: 99 %

## 2021-11-12 DIAGNOSIS — I10 HYPERTENSION, UNSPECIFIED TYPE: ICD-10-CM

## 2021-11-12 DIAGNOSIS — R07.9 CHEST PAIN, UNSPECIFIED TYPE: Primary | ICD-10-CM

## 2021-11-12 LAB
ALBUMIN SERPL-MCNC: 3.4 G/DL (ref 3.5–5)
ALBUMIN/GLOB SERPL: 0.9 {RATIO} (ref 1.1–2.2)
ALP SERPL-CCNC: 78 U/L (ref 45–117)
ALT SERPL-CCNC: 30 U/L (ref 12–78)
ANION GAP SERPL CALC-SCNC: 5 MMOL/L (ref 5–15)
AST SERPL-CCNC: 25 U/L (ref 15–37)
ATRIAL RATE: 73 BPM
BASOPHILS # BLD: 0.1 K/UL (ref 0–0.1)
BASOPHILS NFR BLD: 1 % (ref 0–1)
BILIRUB SERPL-MCNC: 0.6 MG/DL (ref 0.2–1)
BUN SERPL-MCNC: 11 MG/DL (ref 6–20)
BUN/CREAT SERPL: 16 (ref 12–20)
CALCIUM SERPL-MCNC: 9.4 MG/DL (ref 8.5–10.1)
CALCULATED P AXIS, ECG09: 47 DEGREES
CALCULATED R AXIS, ECG10: 83 DEGREES
CALCULATED T AXIS, ECG11: 26 DEGREES
CHLORIDE SERPL-SCNC: 106 MMOL/L (ref 97–108)
CO2 SERPL-SCNC: 28 MMOL/L (ref 21–32)
COMMENT, HOLDF: NORMAL
CREAT SERPL-MCNC: 0.68 MG/DL (ref 0.55–1.02)
DIAGNOSIS, 93000: NORMAL
DIFFERENTIAL METHOD BLD: ABNORMAL
EOSINOPHIL # BLD: 0.2 K/UL (ref 0–0.4)
EOSINOPHIL NFR BLD: 2 % (ref 0–7)
ERYTHROCYTE [DISTWIDTH] IN BLOOD BY AUTOMATED COUNT: 14.1 % (ref 11.5–14.5)
GLOBULIN SER CALC-MCNC: 4 G/DL (ref 2–4)
GLUCOSE SERPL-MCNC: 81 MG/DL (ref 65–100)
HCT VFR BLD AUTO: 41.8 % (ref 35–47)
HGB BLD-MCNC: 12.7 G/DL (ref 11.5–16)
IMM GRANULOCYTES # BLD AUTO: 0 K/UL (ref 0–0.04)
IMM GRANULOCYTES NFR BLD AUTO: 0 % (ref 0–0.5)
LYMPHOCYTES # BLD: 3.6 K/UL (ref 0.8–3.5)
LYMPHOCYTES NFR BLD: 44 % (ref 12–49)
MCH RBC QN AUTO: 27.9 PG (ref 26–34)
MCHC RBC AUTO-ENTMCNC: 30.4 G/DL (ref 30–36.5)
MCV RBC AUTO: 91.9 FL (ref 80–99)
MONOCYTES # BLD: 0.8 K/UL (ref 0–1)
MONOCYTES NFR BLD: 10 % (ref 5–13)
NEUTS SEG # BLD: 3.5 K/UL (ref 1.8–8)
NEUTS SEG NFR BLD: 43 % (ref 32–75)
NRBC # BLD: 0 K/UL (ref 0–0.01)
NRBC BLD-RTO: 0 PER 100 WBC
P-R INTERVAL, ECG05: 140 MS
PLATELET # BLD AUTO: 302 K/UL (ref 150–400)
PMV BLD AUTO: 10.2 FL (ref 8.9–12.9)
POTASSIUM SERPL-SCNC: 4 MMOL/L (ref 3.5–5.1)
PROT SERPL-MCNC: 7.4 G/DL (ref 6.4–8.2)
Q-T INTERVAL, ECG07: 368 MS
QRS DURATION, ECG06: 78 MS
QTC CALCULATION (BEZET), ECG08: 405 MS
RBC # BLD AUTO: 4.55 M/UL (ref 3.8–5.2)
SAMPLES BEING HELD,HOLD: NORMAL
SODIUM SERPL-SCNC: 139 MMOL/L (ref 136–145)
TROPONIN-HIGH SENSITIVITY: 7 NG/L (ref 0–51)
TROPONIN-HIGH SENSITIVITY: 7 NG/L (ref 0–51)
VENTRICULAR RATE, ECG03: 73 BPM
WBC # BLD AUTO: 8.1 K/UL (ref 3.6–11)

## 2021-11-12 PROCEDURE — 74011000636 HC RX REV CODE- 636: Performed by: STUDENT IN AN ORGANIZED HEALTH CARE EDUCATION/TRAINING PROGRAM

## 2021-11-12 PROCEDURE — 80053 COMPREHEN METABOLIC PANEL: CPT

## 2021-11-12 PROCEDURE — 36415 COLL VENOUS BLD VENIPUNCTURE: CPT

## 2021-11-12 PROCEDURE — 85025 COMPLETE CBC W/AUTO DIFF WBC: CPT

## 2021-11-12 PROCEDURE — 84484 ASSAY OF TROPONIN QUANT: CPT

## 2021-11-12 PROCEDURE — 93005 ELECTROCARDIOGRAM TRACING: CPT

## 2021-11-12 PROCEDURE — 99284 EMERGENCY DEPT VISIT MOD MDM: CPT

## 2021-11-12 PROCEDURE — 71275 CT ANGIOGRAPHY CHEST: CPT

## 2021-11-12 RX ADMIN — IOPAMIDOL 100 ML: 755 INJECTION, SOLUTION INTRAVENOUS at 18:54

## 2021-11-12 NOTE — TELEPHONE ENCOUNTER
Returned call to Ms Kali Tee verified all PHI. Reviewed notes patient had Laparoscopic cholecystectomy on 11/10/21. Patient states she has mid chest pain that radiates to her back 9/10. She denies any SOB. She took a hydromorphone a hr ago with no relief. She has not had a BM since Tuesday. She is passing gas. She denies any reflux. She ate a bite of hash brown with sausage this morning. I suggested patient go to the ER for evaluation. Patient states she really don't want to go to the ER. Patient placed on hold. Gave report to Mary Burrows NP who agrees patient needs to go to the ER for evaluation. Patient made aware of recommendation will be going to [facility].

## 2021-11-12 NOTE — ED PROVIDER NOTES
The history is provided by the patient. Chest Pain (Angina)   This is a new problem. The current episode started 6 to 12 hours ago (10 am). The problem has been gradually worsening. Associated with: started at rest. The pain is at a severity of 9/10. The quality of the pain is described as sharp. The pain radiates to the left shoulder. Exacerbated by: nothing. Associated symptoms include nausea and shortness of breath. Pertinent negatives include no abdominal pain, no cough, no diaphoresis, no dizziness, no fever, no hemoptysis, no leg pain, no lower extremity edema, no sputum production, no vomiting and no weakness. She has tried nothing for the symptoms. Risk factors include recent surgery. Her past medical history is significant for HTN. Her past medical history does not include DM, DVT or PE.         Past Medical History:   Diagnosis Date    Anxiety with depression     Arrhythmia     tachycardia    Arthritis     Asthma     Rowan hump 1/7/2015    Carpal tunnel syndrome of right wrist 12/20/2016    Diabetes ([address]     ALIYA (generalized anxiety disorder) 4/4/2018    GERD (gastroesophageal reflux disease) 8/13/2014    HTN (hypertension) 7/25/2011    Hyperaldosteronism ([address] 10/19/2015    Hyperhidrosis 4/4/2018    Morbid obesity ([address]     Other ill-defined conditions(799.89)     migraines    Palpitation 4/4/2018    Sleep apnea     uses CPAP    Tachycardia 4/4/2018       Past Surgical History:   Procedure Laterality Date    HX GASTRECTOMY  09/08/2020    Gastric sleeve     HX GYN  10/19/2020    Laparoscopic right oopherectomy, left cystectomy    HX HERNIA REPAIR  09/08/2020    HX OVARIAN CYST REMOVAL  10/19/2020    HX TONSILLECTOMY      HX WISDOM TEETH EXTRACTION      [place] REMOVAL OF TONSILS,<11 Y/O           Family History:   Problem Relation Age of Onset    Hypertension Mother     Diabetes Mother         Type II    Anesth Problems Mother         respiratory issues - feels like she cannot breath when coming out of anesthesia    Arthritis-rheumatoid Mother     Gout Mother     Psychiatric Disorder Father     Drug Abuse Father     Cancer Other         prostate    Hypertension Sister     Thyroid Disease Sister         \"I think\"   [facility] Breast Cancer Sister     Attention Deficit Hyperactivity Disorder Brother     Hypertension Sister        Social History     Socioeconomic History    Marital status: SINGLE     Spouse name: Not on file    Number of children: Not on file    Years of education: Not on file    Highest education level: Not on file   Occupational History    Occupation: customer ser and student     Comment: [employer]   Tobacco Use    Smoking status: Former Smoker     Packs/day: 0.50     Years: 5.00     Pack years: 2.50     Quit date: 2019     Years since quittin.0    Smokeless tobacco: Never Used   Vaping Use    Vaping Use: Never used   Substance and Sexual Activity    Alcohol use:  Yes     Alcohol/week: 1.0 standard drink     Types: 1 Shots of liquor, 1 Standard drinks or equivalent per week     Comment: socially, Monthly or less    Drug use: No    Sexual activity: Not Currently     Partners: Male     Birth control/protection: Condom   Other Topics Concern     Service Not Asked    Blood Transfusions Not Asked    Caffeine Concern Not Asked    Occupational Exposure Not Asked    Hobby Hazards Not Asked    Sleep Concern Not Asked    Stress Concern Not Asked    Weight Concern Not Asked    Special Diet Not Asked    Back Care Not Asked    Exercise Not Asked    Bike Helmet Not Asked    [address] Not Asked    Self-Exams Not Asked   Social History Narrative    Not on file     Social Determinants of Health     Financial Resource Strain:     Difficulty of Paying Living Expenses: Not on file   Food Insecurity:     Worried About Running Out of Food in the Last Year: Not on file    Yaritza of Food in the Last Year: Not on file   Transportation Needs:     Lack of Transportation (Medical): Not on file    Lack of Transportation (Non-Medical): Not on file   Physical Activity:     Days of Exercise per Week: Not on file    Minutes of Exercise per Session: Not on file   Stress:     Feeling of Stress : Not on file   Social Connections:     Frequency of Communication with Friends and Family: Not on file    Frequency of Social Gatherings with Friends and Family: Not on file    Attends Christian Services: Not on file    Active Member of [address] [place] [address] or Organizations: Not on file    Attends Club or Organization Meetings: Not on file    Marital Status: Not on file   Intimate Partner Violence:     Fear of Current or Ex-Partner: Not on file    Emotionally Abused: Not on file    Physically Abused: Not on file    Sexually Abused: Not on file   Housing Stability:     Unable to Pay for Housing in the Last Year: Not on file    Number of [place] in the Last Year: Not on file    Unstable Housing in the Last Year: Not on file         ALLERGIES: Latex    Review of Systems   Constitutional: Negative for diaphoresis and fever. Respiratory: Positive for shortness of breath. Negative for cough, hemoptysis and sputum production. Cardiovascular: Positive for chest pain. Gastrointestinal: Positive for nausea. Negative for abdominal pain and vomiting. Neurological: Negative for dizziness and weakness. All other systems reviewed and are negative. Vitals:    11/12/21 1417   BP: (!) 206/105   Pulse: 72   Resp: 18   Temp: 97.4 °F (36.3 °C)   SpO2: 97%            Physical Exam  Vitals and nursing note reviewed. Constitutional:       General: She is not in acute distress. Appearance: She is obese. She is not ill-appearing, toxic-appearing or diaphoretic. HENT:      Head: Normocephalic and atraumatic. Mouth/Throat:      Mouth: Mucous membranes are moist.   Eyes:      General: No scleral icterus.      Conjunctiva/sclera: Conjunctivae normal.      Pupils: Pupils are equal, round, and reactive to light. Neck:      Trachea: No tracheal deviation. Cardiovascular:      Rate and Rhythm: Normal rate and regular rhythm. Pulmonary:      Effort: Pulmonary effort is normal. No respiratory distress. Breath sounds: No wheezing or rales. Chest:      Chest wall: Tenderness (Right-sided) present. Abdominal:      General: There is no distension. Palpations: Abdomen is soft. Tenderness: There is no abdominal tenderness. Genitourinary:     Comments: deferred  Musculoskeletal:         General: No deformity. Cervical back: Neck supple. Right lower leg: No edema. Left lower leg: No edema. Skin:     General: Skin is warm and dry. Comments: Laparoscopy incisions clean dry and intact   Neurological:      General: No focal deficit present. Mental Status: She is alert. Psychiatric:         Mood and Affect: Mood normal.          MDM       Procedures          9:42 PM  Patient re-evaluated. All questions answered. Patient appropriate for discharge. Given return precautions and follow up instructions. LABORATORY TESTS:  Labs Reviewed   CBC WITH AUTOMATED DIFF - Abnormal; Notable for the following components:       Result Value    ABS. LYMPHOCYTES 3.6 (*)     All other components within normal limits   METABOLIC PANEL, COMPREHENSIVE - Abnormal; Notable for the following components:    Albumin 3.4 (*)     A-G Ratio 0.9 (*)     All other components within normal limits   SAMPLES BEING HELD   TROPONIN-HIGH SENSITIVITY   TROPONIN-HIGH SENSITIVITY       IMAGING RESULTS:  CTA CHEST W OR W WO CONT   Final Result   No pulmonary embolism nor other acute finding in the chest.                MEDICATIONS GIVEN:  Medications   iopamidoL (ISOVUE-370) 76 % injection 100 mL (100 mL IntraVENous Given 11/12/21 8174)       IMPRESSION:  1. Chest pain, unspecified type    2. Hypertension, unspecified type        PLAN:  1. Current Discharge Medication List        2. Follow-up Information     Follow up With Specialties Details Why Contact Info    Shanell Nobles MD Family Medicine Schedule an appointment as soon as possible for a visit   [address]       [place] [address] [facility] Emergency Medicine  If symptoms worsen or new concerns [address] [id number]  [phone]        3. Return to ED for new or worsening symptoms       Rebecca Pollack. Kaitlin Pepe MD        Please note that this dictation was completed with [employer], the computer voice recognition software. Quite often unanticipated grammatical, syntax, homophones, and other interpretive errors are inadvertently transcribed by the computer software. Please disregard these errors. Please excuse any errors that have escaped final proofreading.

## 2021-11-12 NOTE — ED TRIAGE NOTES
TRIAGE NOTE:   Patient arrives ambulatory with c/o chest pain that radiates into her back. Patient reports pain \"takes her breath away\". Patient had gall bladder removed on Wednesday.

## 2021-11-12 NOTE — TELEPHONE ENCOUNTER
Patient called stating she had her gallbladder removed on 11/10 and is now experiencing chest and back pains. Patient is concerned. Please advise.

## 2021-11-13 NOTE — ED NOTES
Pt reports 10/10 pain from incisions in chest, which feels worse when laying down. Pt had gallbladder removed 2 days ago. Pt states she has been more gassy than usual today. Pt states she ate something for the first time today - 2 bites of hash brown and sausage. Pt reports chest pain and reported it to her surgeon Dr Sonam Liao who told her to go to ED and get checked for blood clots.

## 2021-11-13 NOTE — ED NOTES
Pt is requesting tylenol. Pt is in tears complaining of 10/10 RLQ pain. Pt reports she would like to be d/c soon. MD Mcarthur notified at this time.

## 2021-11-13 NOTE — ED NOTES
Discharge instructions given to patient by provider and nurse. Patient has verbalized understanding on medication use and discharge instuctions. IV discontinued. Pt ambulated off of unit, denied wheelchair, and is showing no apparent signs of distress.

## 2021-11-15 ENCOUNTER — PATIENT OUTREACH (OUTPATIENT)
Dept: OTHER | Age: 31
End: 2021-11-15

## 2021-11-15 ENCOUNTER — VIRTUAL VISIT (OUTPATIENT)
Dept: FAMILY MEDICINE CLINIC | Age: 31
End: 2021-11-15
Payer: COMMERCIAL

## 2021-11-15 DIAGNOSIS — Z02.0 SCHOOL PHYSICAL EXAM: ICD-10-CM

## 2021-11-15 DIAGNOSIS — I10 PRIMARY HYPERTENSION: Primary | ICD-10-CM

## 2021-11-15 DIAGNOSIS — Z71.89 ADVICE GIVEN ABOUT COVID-19 VIRUS INFECTION: ICD-10-CM

## 2021-11-15 PROCEDURE — 99213 OFFICE O/P EST LOW 20 MIN: CPT | Performed by: FAMILY MEDICINE

## 2021-11-15 NOTE — PROGRESS NOTES
HISTORY OF PRESENT ILLNESS  Chinyere Tee is a 33 y.o. female. On FMLA going to see him on 11/29/20221,  Today visit mostly is a rigo postoperativelyfor rigo to make sure that there is medication compliance, that there is no sign or sxs of DVT, nor sign and sxs for infection, gallbladder removed,    patient is feeling well, and doing well, denies fever chills no night sweat, no breathing problem on inspiration or expiration, no leg pain nor swelling, having no dyspnea no chest pain incision clean dry intact will be seen the surgeon soon,  having no other concern, and complaint today,not nauseated bowl/ appetite is back    BP: 147/96 Abnormal   as of 11/12/2021 147/96 Abnormal   206/105 Abnormal  115/73 103/56 Abnormal  103/59 Abnormal          pt has hd no c, was advised to do ambulation, elevation, call for any sign of bleeding, fever or chills,or any worsening pain or leg swelling including problem with cp, and Sob  Current Outpatient Medications   Medication Sig Dispense Refill    cyclobenzaprine (FLEXERIL) 5 mg tablet Take 1 Tablet by mouth nightly. (Patient taking differently: Take 5 mg by mouth nightly. As needed) 30 Tablet 0    venlafaxine-SR (Effexor XR) 75 mg capsule Take 75 mg by mouth nightly.  aspirin delayed-release 81 mg tablet Take 1 Tablet by mouth daily. 90 Tablet 3    gabapentin (NEURONTIN) 300 mg capsule Take 1 Capsule by mouth three (3) times daily. Max Daily Amount: 900 mg. 270 Capsule 1    linaCLOtide (Linzess) 145 mcg cap capsule Take 290 mcg by mouth Daily (before breakfast).  cyanocobalamin (VITAMIN B12) 1,000 mcg/mL injection INJECT 1ML INTO THE MUSCLE MONTHLY 1 mL 5    norethindrone (MICRONOR) 0.35 mg tab       atorvastatin (LIPITOR) 40 mg tablet TAKE 1 TABLET BY MOUTH EVERY DAY (Patient taking differently: nightly.  TAKE 1 TABLET BY MOUTH EVERY DAY) 90 Tablet 3    rimegepant (Nurtec ODT) 75 mg disintegrating tablet 1 tablet oral every other day 16 Tablet 2    ergocalciferol (ERGOCALCIFEROL) 1,250 mcg (50,000 unit) capsule Take 1 Cap by mouth every seven (7) days. Indications: low vitamin D levels 12 Cap 3    galcanezumab-gnlm (Emgality Pen) 120 mg/mL injection 1 mL by SubCUTAneous route every thirty (30) days. 1 mL 11    metoprolol tartrate (LOPRESSOR) 100 mg IR tablet TAKE 1 TABLET BY MOUTH TWICE A  Tab 2    iron,carbonyl-FA-multivit-min (Opurity Multivitamin) 30 mg iron- 800 mcg chew Take 2 Tabs by mouth daily.  calcium carbonate/vitamin D3 (CALCIUM 600 + D,3, PO) Take 1 Tablet by mouth daily.  HYDROmorphone (DILAUDID) 2 mg tablet Take 1 Tablet by mouth every four (4) hours as needed for Pain for up to 7 days. Max Daily Amount: 12 mg. (Patient not taking: Reported on 11/15/2021) 30 Tablet 0    ondansetron (ZOFRAN ODT) 4 mg disintegrating tablet Take 1 Tablet by mouth every eight (8) hours as needed for Nausea or Vomiting. 20 Tablet 0    semaglutide (OZEMPIC) 0.25 mg or 0.5 mg/dose (2 mg/1.5 ml) subq pen 0.5 mg by SubCUTAneous route every seven (7) days. 3 Box 3    omeprazole (PRILOSEC) 40 mg capsule Take 1 Cap by mouth daily. (Patient taking differently: Take 40 mg by mouth two (2) times a day.) 90 Cap 3    nystatin (MYCOSTATIN) topical cream Apply  to affected area two (2) times a day. Apply to abdominal folds twice daily as needed for rash (Patient taking differently: Apply  to affected area two (2) times daily as needed.  Apply to abdominal folds twice daily as needed for rash) 30 g 3     Allergies   Allergen Reactions    Latex Itching     Past Medical History:   Diagnosis Date    Anxiety with depression     Arrhythmia     tachycardia    Arthritis     Asthma     Cropwell hump 1/7/2015    Carpal tunnel syndrome of right wrist 12/20/2016    Diabetes [facility]     ALIYA (generalized anxiety disorder) 4/4/2018    GERD (gastroesophageal reflux disease) 8/13/2014    HTN (hypertension) 7/25/2011    Hyperaldosteronism ([facility] 10/19/2015    Hyperhidrosis 2018    Morbid obesity ([address]     Other ill-defined conditions(799.89)     migraines    Palpitation 2018    Sleep apnea     uses CPAP    Tachycardia 2018     Past Surgical History:   Procedure Laterality Date    HX CHOLECYSTECTOMY  11/10/2021    Dr Kenneth Hi     HX GASTRECTOMY  2020    Gastric sleeve     HX GYN  10/19/2020    Laparoscopic right oopherectomy, left cystectomy    HX HERNIA REPAIR  2020    HX OVARIAN CYST REMOVAL  10/19/2020    HX TONSILLECTOMY      HX WISDOM TEETH EXTRACTION      MA REMOVAL OF TONSILS,<11 Y/O       Family History   Problem Relation Age of Onset    Hypertension Mother     Diabetes Mother         Type II    Anesth Problems Mother         respiratory issues - feels like she cannot breath when coming out of anesthesia   [employer] Arthritis-rheumatoid Mother    [employer] Gout Mother     Psychiatric Disorder Father     Drug Abuse Father     Cancer Other         prostate    Hypertension Sister     Thyroid Disease Sister         \"I think\"   [employer] Breast Cancer Sister     Attention Deficit Hyperactivity Disorder Brother     Hypertension Sister      Social History     Tobacco Use    Smoking status: Former Smoker     Packs/day: 0.50     Years: 5.00     Pack years: 2.50     Quit date: 2019     Years since quittin.0    Smokeless tobacco: Never Used   Substance Use Topics    Alcohol use: Yes     Alcohol/week: 1.0 standard drink     Types: 1 Shots of liquor, 1 Standard drinks or equivalent per week     Comment: socially, Monthly or less      Lab Results   Component Value Date/Time    Cholesterol, total 94 (L) 2021 12:59 PM    HDL Cholesterol 39 (L) 2021 12:59 PM    LDL, calculated 39 2021 12:59 PM    LDL, calculated 48 2020 09:44 AM    Triglyceride 73 2021 12:59 PM     Lab Results   Component Value Date/Time    ALT (SGPT) 30 2021 03:08 PM    Alk.  phosphatase 78 2021 03:08 PM    Bilirubin, total 0.6 11/12/2021 03:08 PM    Albumin 3.4 (L) 11/12/2021 03:08 PM    Protein, total 7.4 11/12/2021 03:08 PM    PLATELET 685 52/49/2492 03:08 PM        Review of Systems   Constitutional: Negative for chills, fever and malaise/fatigue. HENT: Negative for nosebleeds. Eyes: Negative for pain. Respiratory: Negative for cough and wheezing. Cardiovascular: Negative for chest pain and leg swelling. Gastrointestinal: Negative for constipation, diarrhea and nausea. Genitourinary: Negative for frequency. Musculoskeletal: Negative for joint pain and myalgias. Skin: Negative for rash. Neurological: Negative for loss of consciousness and headaches. Endo/Heme/Allergies: Does not bruise/bleed easily. Psychiatric/Behavioral: Negative for depression. The patient is not nervous/anxious and does not have insomnia. All other systems reviewed and are negative. Physical Exam  Constitutional:       Appearance: She is obese. She is not ill-appearing or toxic-appearing. HENT:      Head: Normocephalic and atraumatic. Mouth/Throat:      Mouth: Mucous membranes are moist.   Neurological:      Mental Status: She is alert and oriented to person, place, and time. Psychiatric:         Mood and Affect: Mood normal.         Behavior: Behavior normal.         Thought Content: Thought content normal.         Judgment: Judgment normal.         ASSESSMENT and PLAN  Diagnoses and all orders for this visit:    1. Primary hypertension    2.  Advice given about COVID-19 virus infection    HTN controlled, no change of bp meds at this time,  Discussed sodium,restriction, high k rich diet,  Patient advised to have the mask on most of the time, social distance and handwashing avoid crowded area pursuant to the emergency declaration under the [employer] and [facility], 113 waiver authority and the [employer] and [employer] Act, this Virtual Visit was conducted, with patient's consent, to reduce the patient's risk of exposure to COVID-19 and provide continuity of care for an established patient  Services were provided through a Video synchronous discussion virtually to substitute for in-person appointment.

## 2021-11-15 NOTE — PATIENT INSTRUCTIONS
Gallbladder Removal Surgery: What to Expect at Home  Your Recovery  After your surgery, you will likely feel weak and tired for several days after you return home. Your belly may be swollen. If you had laparoscopic surgery, you may also have pain in your shoulder for about 24 hours. You may have gas or need to burp a lot at first. A few people get diarrhea. The diarrhea usually goes away in 2 to 4 weeks, but it may last longer. How quickly you recover depends on whether you had a laparoscopic or open surgery. · For a laparoscopic surgery, most people can go back to work or their normal routine in 1 to 2 weeks. But it may take longer, depending on the type of work you do. · For an open surgery, it will probably take 4 to 6 weeks before you get back to your normal routine. This care sheet gives you a general idea about how long it will take for you to recover. However, each person recovers at a different pace. Follow the steps below to get better as quickly as possible. How can you care for yourself at home? Activity    · Rest when you feel tired. Getting enough sleep will help you recover.     · Try to walk each day. Start out by walking a little more than you did the day before. Gradually increase the amount you walk. Walking boosts blood flow and helps prevent pneumonia and constipation.     · For about 2 to 4 weeks, avoid lifting anything that would make you strain. This may include a child, heavy grocery bags and milk containers, a heavy briefcase or backpack, cat litter or dog food bags, or a vacuum .     · Avoid strenuous activities, such as biking, jogging, weightlifting, and aerobic exercise, until your doctor says it is okay.     · You may shower 24 to 48 hours after surgery, if your doctor okays it. Pat the cut (incision) dry.  Do not take a bath for the first 2 weeks, or until your doctor tells you it is okay.     · You may drive when you are no longer taking pain medicine and can quickly move your foot from the gas pedal to the brake. You must also be able to sit comfortably for a long period of time, even if you do not plan to go far. You might get caught in traffic.     · For a laparoscopic surgery, most people can go back to work or their normal routine in 1 to 2 weeks, but it may take longer. For an open surgery, it will probably take 4 to 6 weeks before you get back to your normal routine.     · Your doctor will tell you when you can have sex again. Diet    · When you feel like eating, start with small amounts of food. Avoid eating fatty foods for about 1 month. Fatty foods include hamburger, whole milk, cheese, and many snack foods.     · Drink plenty of fluids (unless your doctor tells you not to).   · If you have diarrhea, watch to see if specific foods cause it, and stop eating them. If the diarrhea continues for more than 2 weeks, talk to your doctor.     · You may notice that your bowel movements are not regular right after your surgery. This is common. Try to avoid constipation and straining with bowel movements. You may want to take a fiber supplement every day. If you have not had a bowel movement after a couple of days, ask your doctor about taking a mild laxative. Medicines    · Your doctor will tell you if and when you can restart your medicines. He or she will also give you instructions about taking any new medicines.     · If you take aspirin or some other blood thinner, ask your doctor if and when to start taking it again. Make sure that you understand exactly what your doctor wants you to do.     · Take pain medicines exactly as directed. ? If the doctor gave you a prescription medicine for pain, take it as prescribed. ? If you are not taking a prescription pain medicine, take an over-the-counter medicine such as acetaminophen (Tylenol), ibuprofen (Advil, Motrin), or naproxen (Aleve). Read and follow all instructions on the label.   ? Do not take two or more pain medicines at the same time unless the doctor told you to. Many pain medicines contain acetaminophen, which is Tylenol. Too much Tylenol can be harmful.     · If you think your pain medicine is making you sick to your stomach:  ? Take your medicine after meals (unless your doctor tells you not to). ? Ask your doctor for a different pain medicine.     · If your doctor prescribed antibiotics, take them as directed. Do not stop taking them just because you feel better. You need to take the full course of antibiotics. Incision care    · If you have strips of tape on the incision, or cut, leave the tape on for a week or until it falls off.     · After 24 to 48 hours, wash the area daily with warm, soapy water, and pat it dry.     · You may have staples to hold the cut together. Keep them dry until your doctor takes them out. This is usually in 7 to 10 days.     · Keep the area clean and dry. You may cover it with a gauze bandage if it weeps or rubs against clothing. Change the bandage every day. Ice    · To reduce swelling and pain, put ice or a cold pack on your belly for 10 to 20 minutes at a time. Do this every 1 to 2 hours. Put a thin cloth between the ice and your skin. Follow-up care is a key part of your treatment and safety. Be sure to make and go to all appointments, and call your doctor if you are having problems. It's also a good idea to know your test results and keep a list of the medicines you take. When should you call for help? Call 911 anytime you think you may need emergency care. For example, call if:    · You passed out (lost consciousness).     · You are short of breath. .   Call your doctor now or seek immediate medical care if:    · You are sick to your stomach and cannot drink fluids.     · You have pain that does not get better when you take your pain medicine.     · You cannot pass stools or gas.     · You have signs of infection, such as:  ? Increased pain, swelling, warmth, or redness. ? Red streaks leading from the incision. ? Pus draining from the incision. ? A fever.     · Bright red blood has soaked through the bandage over your incision.     · You have loose stitches, or your incision comes open.     · You have signs of a blood clot in your leg (called a deep vein thrombosis), such as:  ? Pain in your calf, back of knee, thigh, or groin. ? Redness and swelling in your leg or groin. Watch closely for any changes in your health, and be sure to contact your doctor if you have any problems. Where can you learn more? Go to http://www.gray.com/  Enter F357 in the search box to learn more about \"Gallbladder Removal Surgery: What to Expect at Home. \"  Current as of: February 10, 2021               Content Version: 13.0  © 9745-2182 [employer]. Care instructions adapted under license by [employer] (which disclaims liability or warranty for this information). If you have questions about a medical condition or this instruction, always ask your healthcare professional. [place] [address] any warranty or liability for your use of this information.

## 2021-11-15 NOTE — PROGRESS NOTES
Patient returned my call. ED visit to [facility] on 11/12 for chest pain, was referred to ED by her surgeon. Patient also called the nurse access line. States she was disappointed with the care she received in the ED. Reflected on her call bell ringing and nurses standing across from her room. She did acknowledge that they had 3 STEMIs and 1 Code. Offered her the number for the patient advocate, which she declined. Let her know that they may call or send a survey regarding her visit. Her chest pain is better, only took one dilaudid yesterday. Incisions are sore but now have a dull pain. No further concerns at this time. Follow up phone call to patient, two pt identifiers verified. Discussed patient's goals:   Goals      Completes all follow-up appointments within 30 days of ED visit       Educate and encourage importance of FU for prevention of complications or disease;   Assess the patient's relationship with a PCP and next FU visit scheduled;  o PCP f/up on 11/15   Discuss importance of adherence to treatment plan and follow up visits;   Identify any barriers in transportation or access to FU appointments.  Assist patient with making FU appointments as needed;    It's also a good idea to know your test results.  Keep a list of the medicines you take. 11/15: Patient has appt with her PCP on 11/15 and her dietician on 11/17.  Demonstrates no return to ED or red flags within 30 days;      · Assess patient knowledge of how to contact the provider for questions if needed;  · Educate patient on importance of monitoring for any red flags;  · Review importance of reporting any changes, red flags to the provider and/or PCP;  · Assess patient's knowledge of discharge instructions and any restrictions;  · Educate patient when to call 911;  · Assess for any barriers with safety at home  · Discuss use of nurse access line and location of number on front of insurance card.   11/15: Patient had an ED visit to [facility] on 11/12 for chest pain. She called her surgeon to report symptoms and was advised to go to the ED. She did contact the nurse access line, as well.  Supportive resources in place to maintain patient in the community (ie. Home Health, DME equipment, refer to, medication assistant plan, etc.)      · [facility] # [phone]  · [facility] 24/7 (virtual visits 24/7)  · [employer] [facility] # [phone] PW: [facility] for associates  · Nurse Access line 24/7 # [phone]  · [facility] (www."[employer]  · [address] [phone]  · [address] [facility] [phone]  · [employer] [facility]  [place]   · [employer]  · IT - [phone]  · MyChart Help - 1- [phone]  · [facility] for advice and direction, by calling [phone] and pressing 1            Patient's primary care provider relationship reviewed with patient and modified, as applicable. Readiness to Change: []  Pre-contemplative    []  Contemplative  []  Preparation               [x]  Action                  []  Maintenance    Barriers/Challenges to Care: []  Decline in memory    []  Language barrier     []  Emotional                  []  Limited mobility  []  Lack of motivation     [] Vision, hearing or cognitive impairment []  Knowledge [] Financial Barriers []  Lack of support  []  Pain []  Other [x]  None    Key pt activities to achieve better health:   [x]  Weight loss  []  Improved diabetic control  []  Decreased cholesterol levels  []  Decreased blood pressure  []    []    Upcoming appointments:   Future Appointments   Date Time Provider Mateo Christian   11/15/2021  4:00 PM Shabbir Lundberg MD [facility] BS AMB   11/17/2021  1:30 PM Femi Barreto NP [facility] BS AMB   11/29/2021  7:40 AM Mak Farah NP [facility] BS AMB   12/3/2021  9:00 AM Shabbir Lundberg MD [facility] BS AMB   12/13/2021  4:10 PM Armin Ying MD [facility] BS AMB     Plan for next call: Call on 11/19.

## 2021-11-15 NOTE — PROGRESS NOTES
Chief Complaint   Patient presents with   [facility] Follow Up     gallbladder removed/      1. Have you been to the ER, urgent care clinic since your last visit? Hospitalized since your last visit? Yes When: 11/12/21 Where: [facility]  Reason for visit: chest pain    2. Have you seen or consulted any other health care providers outside of the [address] [place] [address] since your last visit? Include any pap smears or colon screening. No    Call placed to pt. Verified patient with two type of identifiers.  Lap earnest on 11/11/21 by Dr Michael Trevino,  Incisions dry and intact x 4,  Having lower back pain,   Advised to follow up with pcp,  Has follow up scheduled for 11/29/21 with surgeon

## 2021-11-15 NOTE — PROGRESS NOTES
Risk score increased to 54%, transitioned patient to CCM. Attempt to reach patient for follow up. Discreet VM left with contact information. Patient had an ED visit on 11/12 to [facility] for chest pain. Contacted her surgeon and was advised to go to the ED, also called the nurse access line. Patient has follow up with her PCP today, 11/15. Will attempt another outreach on 11/16 if I do not hear back from her.

## 2021-11-17 ENCOUNTER — OFFICE VISIT (OUTPATIENT)
Dept: SURGERY | Age: 31
End: 2021-11-17
Payer: COMMERCIAL

## 2021-11-17 VITALS
HEIGHT: 63 IN | RESPIRATION RATE: 18 BRPM | OXYGEN SATURATION: 98 % | WEIGHT: 271.3 LBS | SYSTOLIC BLOOD PRESSURE: 108 MMHG | DIASTOLIC BLOOD PRESSURE: 62 MMHG | BODY MASS INDEX: 48.07 KG/M2 | TEMPERATURE: 98.1 F | HEART RATE: 81 BPM

## 2021-11-17 DIAGNOSIS — E66.01 OBESITY, MORBID (MORE THAN 100 LBS OVER IDEAL WEIGHT OR BMI > 40) (HCC): Primary | ICD-10-CM

## 2021-11-17 DIAGNOSIS — E11.9 TYPE 2 DIABETES MELLITUS WITHOUT COMPLICATION, UNSPECIFIED WHETHER LONG TERM INSULIN USE (HCC): ICD-10-CM

## 2021-11-17 DIAGNOSIS — I10 HYPERTENSION, UNSPECIFIED TYPE: ICD-10-CM

## 2021-11-17 DIAGNOSIS — G47.30 SLEEP APNEA IN ADULT: ICD-10-CM

## 2021-11-17 PROBLEM — E11.22 TYPE 2 DIABETES MELLITUS WITH CHRONIC KIDNEY DISEASE (HCC): Status: ACTIVE | Noted: 2021-11-17

## 2021-11-17 PROCEDURE — 99214 OFFICE O/P EST MOD 30 MIN: CPT | Performed by: INTERNAL MEDICINE

## 2021-11-17 RX ORDER — TRAZODONE HYDROCHLORIDE 100 MG/1
100 TABLET ORAL
COMMUNITY
Start: 2021-09-23 | End: 2022-04-13 | Stop reason: ALTCHOICE

## 2021-11-17 NOTE — PROGRESS NOTES
[facility] Matthieu Pressley is a 33 y.o. female. patient was seen for a follow up. Is still having difficultly losing the weight. Had lost a few lbs, but is continuing to plateau again. Reports that she did increase her calories on most day by 200, but often could not fit this in. Did have a gastric sleeve done just over a year ago. Had her gallbladder removed just last week. All went well, but continues to be in a lot of pain. Was seen in the ER for CP a days post surgery. All was stable. Reports that she is usually getting around 800 calories a day. Tracks this is eats a lot of protein. occassionally will eat sugar or fast food, but not too often. Works on getting in  [employer] intake. Works a few jobs and often does not sleep more than a few hours.  Has PETE and was CPAP  Has tried phentermine and this did not help her  Visit Vitals  /62 (BP 1 Location: Right arm, BP Patient Position: Sitting, BP Cuff Size: Adult long)   Pulse 81   Temp 98.1 °F (36.7 °C) (Oral)   Resp 18   Ht 5' 3\" (1.6 m)   Wt 271 lb 4.8 oz (123.1 kg)   LMP 10/01/2020   SpO2 98%   BMI 48.06 kg/m²     Past Medical History:   Diagnosis Date    Anxiety with depression     Arrhythmia     tachycardia    Arthritis     Asthma     [place] hum 1/7/2015    Carpal tunnel syndrome of right wrist 12/20/2016    Diabetes ([address]     ALIYA (generalized anxiety disorder) 4/4/2018    GERD (gastroesophageal reflux disease) 8/13/2014    HTN (hypertension) 7/25/2011    Hyperaldosteronism ([address] 10/19/2015    Hyperhidrosis 4/4/2018    Morbid obesity ([address]     Other ill-defined conditions(799.89)     migraines    Palpitation 4/4/2018    Sleep apnea     uses CPAP    Tachycardia 4/4/2018     Past Surgical History:   Procedure Laterality Date    HX CHOLECYSTECTOMY  11/10/2021    Dr Guevara Marking  09/08/2020    Gastric sleeve     HX GYN 10/19/2020    Laparoscopic right oopherectomy, left cystectomy    HX HERNIA REPAIR  09/08/2020    HX OVARIAN CYST REMOVAL  10/19/2020    HX TONSILLECTOMY      HX WISDOM TEETH EXTRACTION      [place] REMOVAL OF TONSILS,<11 Y/O       Family History   Problem Relation Age of Onset    Hypertension Mother     Diabetes Mother         Type II    Anesth Problems Mother         respiratory issues - feels like she cannot breath when coming out of anesthesia   Jenniffer Jacob Arthritis-rheumatoid Mother    Jenniffer Jacob Gout Mother     Psychiatric Disorder Father     Drug Abuse Father     Cancer Other         prostate    Hypertension Sister     Thyroid Disease Sister         \"I think\"    Breast Cancer Sister     Attention Deficit Hyperactivity Disorder Brother     Hypertension Sister      Outpatient Encounter Medications as of 11/17/2021   Medication Sig Dispense Refill    traZODone (DESYREL) 100 mg tablet Take 100 mg by mouth nightly as needed.  ondansetron (ZOFRAN ODT) 4 mg disintegrating tablet Take 1 Tablet by mouth every eight (8) hours as needed for Nausea or Vomiting. 20 Tablet 0    cyclobenzaprine (FLEXERIL) 5 mg tablet Take 1 Tablet by mouth nightly. (Patient taking differently: Take 5 mg by mouth nightly. As needed) 30 Tablet 0    venlafaxine-SR (Effexor XR) 75 mg capsule Take 75 mg by mouth nightly.  aspirin delayed-release 81 mg tablet Take 1 Tablet by mouth daily. 90 Tablet 3    gabapentin (NEURONTIN) 300 mg capsule Take 1 Capsule by mouth three (3) times daily. Max Daily Amount: 900 mg. 270 Capsule 1    linaCLOtide (Linzess) 145 mcg cap capsule Take 290 mcg by mouth Daily (before breakfast).  cyanocobalamin (VITAMIN B12) 1,000 mcg/mL injection INJECT 1ML INTO THE MUSCLE MONTHLY 1 mL 5    norethindrone (MICRONOR) 0.35 mg tab       semaglutide (OZEMPIC) 0.25 mg or 0.5 mg/dose (2 mg/1.5 ml) subq pen 0.5 mg by SubCUTAneous route every seven (7) days.  3 Box 3    atorvastatin (LIPITOR) 40 mg tablet TAKE 1 TABLET BY MOUTH EVERY DAY (Patient taking differently: nightly. TAKE 1 TABLET BY MOUTH EVERY DAY) 90 Tablet 3    rimegepant (Nurtec ODT) 75 mg disintegrating tablet 1 tablet oral every other day 16 Tablet 2    ergocalciferol (ERGOCALCIFEROL) 1,250 mcg (50,000 unit) capsule Take 1 Cap by mouth every seven (7) days. Indications: low vitamin D levels 12 Cap 3    galcanezumab-gnlm (Emgality Pen) 120 mg/mL injection 1 mL by SubCUTAneous route every thirty (30) days. 1 mL 11    omeprazole (PRILOSEC) 40 mg capsule Take 1 Cap by mouth daily. (Patient taking differently: Take 40 mg by mouth two (2) times a day.) 90 Cap 3    metoprolol tartrate (LOPRESSOR) 100 mg IR tablet TAKE 1 TABLET BY MOUTH TWICE A  Tab 2    nystatin (MYCOSTATIN) topical cream Apply  to affected area two (2) times a day. Apply to abdominal folds twice daily as needed for rash (Patient taking differently: Apply  to affected area two (2) times daily as needed. Apply to abdominal folds twice daily as needed for rash) 30 g 3    iron,carbonyl-FA-multivit-min (Opurity Multivitamin) 30 mg iron- 800 mcg chew Take 2 Tabs by mouth daily.  calcium carbonate/vitamin D3 (CALCIUM 600 + D,3, PO) Take 1 Tablet by mouth daily.  [DISCONTINUED] HYDROmorphone (DILAUDID) 2 mg tablet Take 1 Tablet by mouth every four (4) hours as needed for Pain for up to 7 days. Max Daily Amount: 12 mg. (Patient not taking: Reported on 11/15/2021) 30 Tablet 0     No facility-administered encounter medications on file as of 11/17/2021. HPI    Review of Systems   All other systems reviewed and are negative. Physical Exam  Vitals and nursing note reviewed. Constitutional:       Appearance: She is obese. Cardiovascular:      Rate and Rhythm: Normal rate and regular rhythm. Pulmonary:      Effort: Pulmonary effort is normal.      Breath sounds: Normal breath sounds. Abdominal:      Palpations: Abdomen is soft. Skin:     General: Skin is warm. Neurological:      Mental Status: She is alert and oriented to person, place, and time. Psychiatric:         Behavior: Behavior normal.         ASSESSMENT and PLAN  Diagnoses and all orders for this visit:    1. Obesity, morbid (more than 100 lbs over ideal weight or BMI > 40) ([facility])  -     INSULIN; Future  -     NMR LIPOPROFILE WITH LIPIDS (WITHOUT GRAPH); Future        -     Will check these labs and if ok, consider increasing the ozempic   2. Type 2 diabetes mellitus without complication, unspecified whether long term insulin use ([facility]        -     Continue plan with this. a1c stable  3. Hypertension, unspecified type    4. Sleep apnea in adult       -    encourage more sleep.  Use of CPAP    Follow-up and Dispositions    · Return in about 1 month (around 12/17/2021), or if symptoms worsen or fail to improve.       lab results and schedule of future lab studies reviewed with patient  reviewed diet, exercise and weight control  cardiovascular risk and specific lipid/LDL goals reviewed  reviewed medications and side effects in detail  specific diabetic recommendations: home glucose monitoring emphasized and all medications, side effects and compliance discussed carefully

## 2021-11-17 NOTE — PROGRESS NOTES
1 month follow up. Weight management. 1. Have you been to the ER, urgent care clinic since your last visit? Hospitalized since your last visit? [facility] for CP on 11/12. 2. Have you seen or consulted any other health care providers outside of the [address] [place] [address] since your last visit? Include any pap smears or colon screening.  No     BMI - 47.7

## 2021-11-18 DIAGNOSIS — F41.1 GAD (GENERALIZED ANXIETY DISORDER): ICD-10-CM

## 2021-11-18 DIAGNOSIS — Z00.00 WELL WOMAN EXAM (NO GYNECOLOGICAL EXAM): ICD-10-CM

## 2021-11-18 DIAGNOSIS — E11.21 TYPE 2 DIABETES WITH NEPHROPATHY (HCC): ICD-10-CM

## 2021-11-18 DIAGNOSIS — I10 ESSENTIAL HYPERTENSION: ICD-10-CM

## 2021-11-18 RX ORDER — METOPROLOL TARTRATE 100 MG/1
TABLET ORAL
Qty: 180 TABLET | Refills: 2 | Status: SHIPPED | OUTPATIENT
Start: 2021-11-18 | End: 2022-07-08 | Stop reason: SDUPTHER

## 2021-11-19 ENCOUNTER — PATIENT OUTREACH (OUTPATIENT)
Dept: OTHER | Age: 31
End: 2021-11-19

## 2021-11-19 NOTE — PROGRESS NOTES
Follow up phone call to patient, two pt identifiers verified. Discussed patient's goals:   Goals      Completes all follow-up appointments within 30 days of ED visit       Educate and encourage importance of FU for prevention of complications or disease;   Assess the patient's relationship with a PCP and next FU visit scheduled;  o PCP f/up on 11/15   Discuss importance of adherence to treatment plan and follow up visits;   Identify any barriers in transportation or access to FU appointments.  Assist patient with making FU appointments as needed;    It's also a good idea to know your test results.  Keep a list of the medicines you take. 11/15: Patient has appt with her PCP on 11/15 and her dietician on 11/17.  11/19: Had appt with PCP, will go back to work on 11/29. Her dietician may increase her ozempic because she thinks she has no fat reserves left. Surgery f/up on 11/29.  Demonstrates no return to ED or red flags within 30 days;      · Assess patient knowledge of how to contact the provider for questions if needed;  · Educate patient on importance of monitoring for any red flags;  · Review importance of reporting any changes, red flags to the provider and/or PCP;  · Assess patient's knowledge of discharge instructions and any restrictions;  · Educate patient when to call 911;  · Assess for any barriers with safety at home  · Discuss use of nurse access line and location of number on front of insurance card. 11/15: Patient had an ED visit to [facility] on 11/12 for chest pain. She called her surgeon to report symptoms and was advised to go to the ED. She did contact the nurse access line, as well.  11/19: No red flags. Patient states she is feeling a lot better. Stated she is cleaning.  Supportive resources in place to maintain patient in the community (ie.  Home Health, DME equipment, refer to, medication assistant plan, etc.)      · [employer] - # [phone]  · [facility] 24/7 (virtual visits 24/7)  · [facility] Matters # [phone] PW: [facility] for associates  · Nurse Access line 24/7 # [phone]  · Be Well (www.[email or web address])  · [address] [phone]  · [address] [facility] [phone]  · HR Service Now  Iris   · [employer] Work[employer]  · IT - [phone]  · MyChart Help - 1- [phone]  · Associate Services for advice and direction, by calling [phone] and pressing 1            Patient's primary care provider relationship reviewed with patient and modified, as applicable. Patient plans to go back to work on 11/22. Readiness to Change: []  Pre-contemplative    []  Contemplative  []  Preparation               [x]  Action                  []  Maintenance    Barriers/Challenges to Care: []  Decline in memory    []  Language barrier     []  Emotional                  []  Limited mobility  []  Lack of motivation     [] Vision, hearing or cognitive impairment []  Knowledge [] Financial Barriers []  Lack of support  []  Pain []  Other [x]  None    Key pt activities to achieve better health:   [x]  Weight loss  [x]  Improved diabetic control  []  Decreased cholesterol levels  []  Decreased blood pressure  []    []    Upcoming appointments:   Future Appointments   Date Time Provider Mateo Christian   11/29/2021  7:40 AM King Hurd NP [facility] BS AMB   12/3/2021  9:00 AM Daphne Gregg MD [facility] BS AMB   12/13/2021  4:10 PM Gigi Hurd MD RDE [facility] BS AMB     Plan for next call: F/up on 11/30.

## 2021-11-20 LAB
CHOLEST SERPL-MCNC: 105 MG/DL (ref 100–199)
HDL SERPL-SCNC: 32.7 UMOL/L
HDLC SERPL-MCNC: 45 MG/DL
INSULIN SERPL-ACNC: 37.4 UIU/ML (ref 2.6–24.9)
LDL SERPL QN: 21 NM
LDL SERPL-SCNC: 517 NMOL/L
LDL SMALL SERPL-SCNC: 284 NMOL/L
LDLC SERPL CALC-MCNC: 46 MG/DL (ref 0–99)
LP-IR SCORE SERPL: 32
TRIGL SERPL-MCNC: 62 MG/DL (ref 0–149)

## 2021-11-22 LAB
MEV IGG SER IA-ACNC: <13.5 AU/ML
MUV IGG SER IA-ACNC: 111 AU/ML
RUBV IGG SERPL IA-ACNC: 10.4 INDEX
VZV IGG SER IA-ACNC: 345 INDEX
VZV IGM SER IA-ACNC: <0.91 INDEX (ref 0–0.9)

## 2021-11-22 RX ORDER — METFORMIN HYDROCHLORIDE 500 MG/1
500 TABLET ORAL
Qty: 30 TABLET | Refills: 0 | Status: SHIPPED | OUTPATIENT
Start: 2021-11-22 | End: 2021-12-13 | Stop reason: SDUPTHER

## 2021-11-23 RX ORDER — BLOOD-GLUCOSE TRANSMITTER
EACH MISCELLANEOUS
Qty: 1 EACH | Refills: 3 | Status: SHIPPED | OUTPATIENT
Start: 2021-11-23

## 2021-11-23 RX ORDER — BLOOD-GLUCOSE SENSOR
EACH MISCELLANEOUS
Qty: 3 EACH | Refills: 11 | Status: SHIPPED | OUTPATIENT
Start: 2021-11-23 | End: 2022-09-27

## 2021-11-29 ENCOUNTER — OFFICE VISIT (OUTPATIENT)
Dept: SURGERY | Age: 31
End: 2021-11-29
Payer: COMMERCIAL

## 2021-11-29 VITALS
OXYGEN SATURATION: 99 % | TEMPERATURE: 98.1 F | SYSTOLIC BLOOD PRESSURE: 145 MMHG | BODY MASS INDEX: 48.62 KG/M2 | HEART RATE: 87 BPM | HEIGHT: 63 IN | DIASTOLIC BLOOD PRESSURE: 83 MMHG | WEIGHT: 274.4 LBS

## 2021-11-29 DIAGNOSIS — Z90.49 S/P CHOLECYSTECTOMY: ICD-10-CM

## 2021-11-29 DIAGNOSIS — E66.01 MORBID OBESITY WITH BMI OF 45.0-49.9, ADULT (HCC): ICD-10-CM

## 2021-11-29 DIAGNOSIS — Z09 FOLLOW-UP EXAMINATION AFTER ABDOMINAL SURGERY: Primary | ICD-10-CM

## 2021-11-29 PROCEDURE — 99024 POSTOP FOLLOW-UP VISIT: CPT | Performed by: NURSE PRACTITIONER

## 2021-11-29 RX ORDER — BUSPIRONE HYDROCHLORIDE 5 MG/1
TABLET ORAL 2 TIMES DAILY
COMMUNITY
Start: 2021-11-18

## 2021-11-29 NOTE — PROGRESS NOTES
1. Have you been to the ER, urgent care clinic since your last visit? Hospitalized since your last visit? No    2. Have you seen or consulted any other health care providers outside of the [address] [place] [address] since your last visit? Include any pap smears or colon screening.  Yes, Psychiatrist

## 2021-11-29 NOTE — PATIENT INSTRUCTIONS
Recommendations after abdominal surgery:    -  Avoid heavy lifting and or straining anything > about 20 lbs for another 2 weeks     -  Avoid abdominal exercises for another 2 weeks     -  Daily walking and resuming your normal day to day activities is encouraged and as tolerated      -  Ok to bath as normal and you may get in a swimming pool     -  Use sun block on exposed skin and ok to moisturize the incisions as desired       Add papaya enzymes for stomach upset       Keys to long term weight loss / management     -  Sleep is critical and aim for 7 hours / night     -  Drink mostly water and lots of it     -  Eat \"clean\" and avoid processed foods (ie eating out of a box or bag)     -  Move every day = walk, swim, bike, yard work, etc     -  Journal what you eat (this is a proven tool to keep us on track)     Zoom Support Group   2nd Thursday of each Month from 6-7 pm   The next one is 12/9/21 6-7 pm   You can access the link at [email or web address]  Go to the Calendar tab and click on the date and it should go right to the link     Additional Resources:  [employer]:  [email or web address]    Offering online support groups and pre-recorded videos  o Every Wednesday evening at 7:00 pm   [employer] Facebook page - [address], Advice, and Motivation from fellow patients  Bariatric Pal: [email or web address]  BariatricPal has launched a podcast!  Hosted by Panda Cruz, our podcast will cover topics about obesity, pre-weight loss surgery, post-weight loss surgery, food addiction, emotional eating, myths about weight loss surgery, and more. Each episode will feature an expert in the field of weight loss and bariatric surgery who can provide a deeper insight into these topics.   ACAC:  [employer]   Offering discounted exercise programs (8 Week programs, On-Demand/Virtual)   See flyer   Contact Micky Doherty at [email or web address] or [phone]

## 2021-11-29 NOTE — PROGRESS NOTES
Chief Complaint   Patient presents with    Post OP Follow Up     2 weeks s/p Lap Maeve       Nancy Pena is 2 weeks status post lap cholecystectomy and 1 year s/p sleeve gastrectomy. Presents today for surgical follow up and obesity managemenrt. No weight loss the past 3 months. She feels like she is in a stall. She did have her gallbladder out 2 weeks ago. She has had no fever, chills, chest pain or shortness of breath. No nausea or vomiting. She does have reflux for which she takes omeprazole daily and an occasional famotidine. She says twice a week she will drink ginger ale to burp. 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     NO    Was an incisional hernia noted on exam?       NO      COMORBIDITY     SLEEP APNEA                 NO        GERD  (req.meds)           YES  HYPERLIPIDEMIA           NO  HYPERTENSION             YES       IF YES, # OF HTN MEDICATIONS 1  DIABETES                        NO      IF YES, 0 NON-INSULIN   0 INSULIN     Physical Exam  Visit Vitals  BP (!) 145/83   Pulse 87   Temp 98.1 °F (36.7 °C)   Ht 5' 3\" (1.6 m)   Wt 274 lb 6.4 oz (124.5 kg)   SpO2 99%   BMI 48.61 kg/m²     A + O x 3  Chest  CTA, unlabored   COR  RRR  ABD Soft, obese, lap sites C/D/I no erythema or induration, some keloid scarring NT/ND, no masses or hernias   EXT No edema; ambulating independently       ICD-10-CM ICD-9-CM    1. Follow-up examination after abdominal surgery  Z09 V67.09    2. S/P cholecystectomy  Z90.49 V45.79    3. Morbid obesity with BMI of 45.0-49.9, adult ([facility]  E66.01 278.01     Z68.42 V85.42        Nancy Tee is 2 weeks status post laparoscopic cholecystectomy in 1 year status post laparoscopic sleeve gastrectomy for treatment of morbid obesity  39% excess weight loss  Pathology from cholecystectomy was reviewed  Continue with metabolic weight loss center. Of asked her to check with her insurance as of January to see if any additional coverage has been added for obesity medications. I have encouraged her to work on getting more sleep and aim for at least 7 hours per night  Meal prep and planning  She can add papaya enzymes to help with some dyspepsia versus drinking a ginger ale  continue vitamins and protein supplements   Activity daily walking 10,000 steps a day and/or 150 minutes a week. Activity restrictions for another 2 weeks of no lifting pushing or pulling more than about 20 pounds and no abdominal exercises  Follow-up in 6 months  Return to work she is back at work and said she did need a note  Support group  Aura Kingston Dr verbalized understanding and questions were answered to the best of my knowledge and ability. Diet, activity and mindfulness educational materials were provided. 20 minutes spent in face to face with Aura Kingston Dr > 50% counseling.

## 2021-11-30 ENCOUNTER — PATIENT OUTREACH (OUTPATIENT)
Dept: OTHER | Age: 31
End: 2021-11-30

## 2021-11-30 DIAGNOSIS — G43.719 INTRACTABLE CHRONIC MIGRAINE WITHOUT AURA AND WITHOUT STATUS MIGRAINOSUS: ICD-10-CM

## 2021-11-30 LAB
25(OH)D3+25(OH)D2 SERPL-MCNC: 41.4 NG/ML (ref 30–100)
ALBUMIN SERPL-MCNC: 4.1 G/DL (ref 3.8–4.8)
ALBUMIN/CREAT UR: 4 MG/G CREAT (ref 0–29)
ALBUMIN/GLOB SERPL: 1.6 {RATIO} (ref 1.2–2.2)
ALP SERPL-CCNC: 86 IU/L (ref 44–121)
ALT SERPL-CCNC: 10 IU/L (ref 0–32)
AST SERPL-CCNC: 11 IU/L (ref 0–40)
BILIRUB SERPL-MCNC: 0.5 MG/DL (ref 0–1.2)
BUN SERPL-MCNC: 15 MG/DL (ref 6–20)
BUN/CREAT SERPL: 24 (ref 9–23)
CALCIUM SERPL-MCNC: 9.4 MG/DL (ref 8.7–10.2)
CHLORIDE SERPL-SCNC: 105 MMOL/L (ref 96–106)
CHOLEST SERPL-MCNC: 106 MG/DL (ref 100–199)
CO2 SERPL-SCNC: 20 MMOL/L (ref 20–29)
CREAT SERPL-MCNC: 0.62 MG/DL (ref 0.57–1)
CREAT UR-MCNC: 163.7 MG/DL
EST. AVERAGE GLUCOSE BLD GHB EST-MCNC: 111 MG/DL
GLOBULIN SER CALC-MCNC: 2.6 G/DL (ref 1.5–4.5)
GLUCOSE SERPL-MCNC: 127 MG/DL (ref 65–99)
HBA1C MFR BLD: 5.5 % (ref 4.8–5.6)
HDLC SERPL-MCNC: 42 MG/DL
IMP & REVIEW OF LAB RESULTS: NORMAL
LDLC SERPL CALC-MCNC: 44 MG/DL (ref 0–99)
MICROALBUMIN UR-MCNC: 6.2 UG/ML
POTASSIUM SERPL-SCNC: 4.4 MMOL/L (ref 3.5–5.2)
PROT SERPL-MCNC: 6.7 G/DL (ref 6–8.5)
SODIUM SERPL-SCNC: 139 MMOL/L (ref 134–144)
TRIGL SERPL-MCNC: 110 MG/DL (ref 0–149)
VLDLC SERPL CALC-MCNC: 20 MG/DL (ref 5–40)

## 2021-11-30 RX ORDER — RIMEGEPANT SULFATE 75 MG/75MG
TABLET, ORALLY DISINTEGRATING ORAL
Qty: 16 TABLET | Refills: 2 | Status: SHIPPED | OUTPATIENT
Start: 2021-11-30 | End: 2021-12-01 | Stop reason: SDUPTHER

## 2021-11-30 NOTE — PROGRESS NOTES
Follow up phone call to patient, two pt identifiers verified. Discussed patient's goals:   Goals      Completes all follow-up appointments within 30 days of ED visit       Educate and encourage importance of FU for prevention of complications or disease;   Assess the patient's relationship with a PCP and next FU visit scheduled;  o PCP f/up on 11/15   Discuss importance of adherence to treatment plan and follow up visits;   Identify any barriers in transportation or access to FU appointments.  Assist patient with making FU appointments as needed;    It's also a good idea to know your test results.  Keep a list of the medicines you take. 11/15: Patient has appt with her PCP on 11/15 and her dietician on 11/17.  11/19: Had appt with PCP, will go back to work on 11/29. Her dietician may increase her ozempic because she thinks she has no fat reserves left. Surgery f/up on 11/29.    11/30: Patient completed follow up with surgeon.  Demonstrates no return to ED or red flags within 30 days;      · Assess patient knowledge of how to contact the provider for questions if needed;  · Educate patient on importance of monitoring for any red flags;  · Review importance of reporting any changes, red flags to the provider and/or PCP;  · Assess patient's knowledge of discharge instructions and any restrictions;  · Educate patient when to call 911;  · Assess for any barriers with safety at home  · Discuss use of nurse access line and location of number on front of insurance card. 11/15: Patient had an ED visit to [facility] on 11/12 for chest pain. She called her surgeon to report symptoms and was advised to go to the ED. She did contact the nurse access line, as well.  11/19: No red flags. Patient states she is feeling a lot better. Stated she is cleaning. 11/30: No red flags. Patient states she is doing well.  Supportive resources in place to maintain patient in the community (ie.  Home Health, DME equipment, refer to, medication assistant plan, etc.)      · [employer] - # [phone]  · [employer] 24/7 (virtual visits 24/7)  · Life Matters # [phone] PW: [facility] for associates  · Nurse Access line 24/7 # [phone]  · Be Well (www.Media Redefined)  · [address] [phone]  · [address] [facility] [phone]  · HR Service Now  Iris   · [employer] Workday  · IT - 8-698-133-829-514-9191  · [employer] Help - 1- [phone]  · Associate Services for advice and direction, by calling [phone] and pressing 1            Patient's primary care provider relationship reviewed with patient and modified, as applicable. Readiness to Change: []  Pre-contemplative    []  Contemplative  []  Preparation               [x]  Action                  []  Maintenance    Barriers/Challenges to Care: []  Decline in memory    []  Language barrier     []  Emotional                  []  Limited mobility  []  Lack of motivation     [] Vision, hearing or cognitive impairment []  Knowledge [] Financial Barriers []  Lack of support  []  Pain []  Other [x]  None    Key pt activities to achieve better health:   [x]  Weight loss  []  Improved diabetic control  []  Decreased cholesterol levels  []  Decreased blood pressure  []    []    Upcoming appointments:   Future Appointments   Date Time Provider Mateo Christian   12/3/2021  9:00 AM Shabbir Lundberg MD [facility] BS AMB   12/13/2021  4:10 PM Armin Ying MD RDE [facility] BS AMB     Plan for next call: Call in three weeks, 12/21.

## 2021-12-01 DIAGNOSIS — I10 ESSENTIAL HYPERTENSION: ICD-10-CM

## 2021-12-01 DIAGNOSIS — E11.9 TYPE 2 DIABETES MELLITUS WITHOUT COMPLICATION, WITHOUT LONG-TERM CURRENT USE OF INSULIN (HCC): ICD-10-CM

## 2021-12-01 DIAGNOSIS — G43.719 INTRACTABLE CHRONIC MIGRAINE WITHOUT AURA AND WITHOUT STATUS MIGRAINOSUS: ICD-10-CM

## 2021-12-01 DIAGNOSIS — E66.01 CLASS 3 SEVERE OBESITY DUE TO EXCESS CALORIES WITH SERIOUS COMORBIDITY AND BODY MASS INDEX (BMI) OF 60.0 TO 69.9 IN ADULT (HCC): ICD-10-CM

## 2021-12-01 DIAGNOSIS — E78.2 MIXED HYPERLIPIDEMIA: ICD-10-CM

## 2021-12-01 DIAGNOSIS — E55.9 HYPOVITAMINOSIS D: ICD-10-CM

## 2021-12-03 ENCOUNTER — OFFICE VISIT (OUTPATIENT)
Dept: FAMILY MEDICINE CLINIC | Age: 31
End: 2021-12-03
Payer: COMMERCIAL

## 2021-12-03 VITALS
DIASTOLIC BLOOD PRESSURE: 83 MMHG | BODY MASS INDEX: 48.69 KG/M2 | WEIGHT: 274.8 LBS | OXYGEN SATURATION: 99 % | HEART RATE: 85 BPM | RESPIRATION RATE: 18 BRPM | HEIGHT: 63 IN | SYSTOLIC BLOOD PRESSURE: 133 MMHG

## 2021-12-03 DIAGNOSIS — Z23 ENCOUNTER FOR IMMUNIZATION: ICD-10-CM

## 2021-12-03 DIAGNOSIS — Z02.89 ENCOUNTER FOR COMPLETION OF FORM WITH PATIENT: Primary | ICD-10-CM

## 2021-12-03 PROCEDURE — 90715 TDAP VACCINE 7 YRS/> IM: CPT | Performed by: FAMILY MEDICINE

## 2021-12-03 PROCEDURE — 90471 IMMUNIZATION ADMIN: CPT | Performed by: FAMILY MEDICINE

## 2021-12-03 PROCEDURE — 99080 SPECIAL REPORTS OR FORMS: CPT | Performed by: FAMILY MEDICINE

## 2021-12-03 PROCEDURE — 90746 HEPB VACCINE 3 DOSE ADULT IM: CPT | Performed by: FAMILY MEDICINE

## 2021-12-03 NOTE — PATIENT INSTRUCTIONS
Vaccine Information Statement    Hepatitis B Vaccine: What You Need to Know    Many Vaccine Information Statements are available in Welsh and other languages. See www.immunize.org/vis  Hojas de información sobre vacunas están disponibles en español y en muchos otros idiomas. Visite www.immunize.org/vis    1. Why get vaccinated? Hepatitis B vaccine can prevent hepatitis B. Hepatitis B is a liver disease that can cause mild illness lasting a few weeks, or it can lead to a serious, lifelong illness.  Acute hepatitis B infection is a short-term illness that can lead to fever, fatigue, loss of appetite, nausea, vomiting, jaundice (yellow skin or eyes, dark urine, mariama-colored bowel movements), and pain in the muscles, joints, and stomach.  Chronic hepatitis B infection is a long-term illness that occurs when the hepatitis B virus remains in a persons body. Most people who go on to develop chronic hepatitis B do not have symptoms, but it is still very serious and can lead to liver damage (cirrhosis), liver cancer, and death. Chronically-infected people can spread hepatitis B virus to others, even if they do not feel or look sick themselves. Hepatitis B is spread when blood, semen, or other body fluid infected with the hepatitis B virus enters the body of a person who is not infected. People can become infected through:  [employer] (if a mother has hepatitis B, her baby can become infected)   Sharing items such as razors or toothbrushes with an infected person   Contact with the blood or open sores of an infected person   Sex with an infected partner   Sharing needles, syringes, or other drug-injection equipment   Exposure to blood from needlesticks or other sharp instruments    Most people who are vaccinated with hepatitis B vaccine are immune for life. 2. Hepatitis B vaccine    Hepatitis B vaccine is usually given as 2, 3, or 4 shots.     Infants should get their first dose of hepatitis B vaccine at birth and will usually complete the series at 7 months of age (sometimes it will take longer than 6 months to complete the series). Children and adolescents younger than 23years of age who have not yet gotten the vaccine should also be vaccinated. Hepatitis B vaccine is also recommended for certain unvaccinated adults:     People whose sex partners have hepatitis B   Sexually active persons who are not in a long-term monogamous relationship   Persons seeking evaluation or treatment for a sexually transmitted disease   Men who have sexual contact with other men   People who share needles, syringes, or other drug-injection equipment   People who have household contact with someone infected with the hepatitis B virus  [address] [facility] [address] care and public safety workers at risk for exposure to blood or body fluids   Residents and staff of facilities for developmentally disabled persons   Persons in correctional facilities   Victims of sexual assault or abuse   Travelers to regions with increased rates of hepatitis B   People with chronic liver disease, kidney disease, HIV infection, infection with hepatitis C, or diabetes   Anyone who wants to be protected from hepatitis B    Hepatitis B vaccine may be given at the same time as other vaccines. 3. Talk with your health care provider    Tell your vaccine provider if the person getting the vaccine:   Has had an allergic reaction after a previous dose of hepatitis B vaccine, or has any severe, life-threatening allergies. In some cases, your health care provider may decide to postpone hepatitis B vaccination to a future visit. People with minor illnesses, such as a cold, may be vaccinated. People who are moderately or severely ill should usually wait until they recover before getting hepatitis B vaccine. Your health care provider can give you more information.     4. Risks of a vaccine reaction     Soreness where the shot is given or fever can happen after hepatitis B vaccine. People sometimes faint after medical procedures, including vaccination. Tell your provider if you feel dizzy or have vision changes or ringing in the ears. As with any medicine, there is a very remote chance of a vaccine causing a severe allergic reaction, other serious injury, or death. 5. What if there is a serious problem? An allergic reaction could occur after the vaccinated person leaves the clinic. If you see signs of a severe allergic reaction (hives, swelling of the face and throat, difficulty breathing, a fast heartbeat, dizziness, or weakness), call 9-1-1 and get the person to the nearest hospital.    For other signs that concern you, call your health care provider. Adverse reactions should be reported to the Vaccine Adverse Event Reporting System (VAERS). Your health care provider will usually file this report, or you can do it yourself. Visit the VAERS website at www.vaers. hhs.gov or call 8-408.259.1135. VAERS is only for reporting reactions, and VAERS staff do not give medical advice. 6. The National Vaccine Injury Compensation Program    The [facility] Vaccine Injury Compensation Program (VICP) is a federal program that was created to compensate people who may have been injured by certain vaccines. Visit the VICP website at www.hrsa.gov/vaccinecompensation or call 2-506.582.3398 to learn about the program and about filing a claim. There is a time limit to file a claim for compensation. 7. How can I learn more?  Ask your health care provider.  Call your local or state health department.  Contact the Centers for Disease Control and Prevention (CDC):  - Call 8-585.147.4036 (1-800-CDC-INFO) or  - Visit CDCs website at www.cdc.gov/vaccines    Vaccine Information Statement (Interim)  Hepatitis B Vaccine   8/15/2019  42 U. Starlet Levi [id number]   Department of Health and Human Services  Centers for Disease Control and Prevention    Office Use Only    Vaccine Information Statement    Tdap (Tetanus, Diphtheria, Pertussis) Vaccine: What You Need to Know     Many vaccine information statements are available in Citizen of [place] and other languages. See www.immunize.org/vis. Hojas de información sobre vacunas están disponibles en español y en muchos otros idiomas. Visite www.immunize.org/vis. 1. Why get vaccinated? Tdap vaccine can prevent tetanus, diphtheria, and pertussis. Diphtheria and pertussis spread from person to person. Tetanus enters the body through cuts or wounds.  TETANUS (T) causes painful stiffening of the muscles. Tetanus can lead to serious health problems, including being unable to open the mouth, having trouble swallowing and breathing, or death.  DIPHTHERIA (D) can lead to difficulty breathing, heart failure, paralysis, or death.  PERTUSSIS (aP), also known as whooping cough, can cause uncontrollable, violent coughing that makes it hard to breathe, eat, or drink. Pertussis can be extremely serious especially in babies and young children, causing pneumonia, convulsions, brain damage, or death. In teens and adults, it can cause weight loss, loss of bladder control, passing out, and rib fractures from severe coughing. 2. Tdap vaccine     Tdap is only for children 7 years and older, adolescents, and adults. Adolescents should receive a single dose of Tdap, preferably at age 6 or 15 years. Pregnant people should get a dose of Tdap during every pregnancy, preferably during the early part of the third trimester, to help protect the  from pertussis. Infants are most at risk for severe, life-threatening complications from pertussis. Adults who have never received Tdap should get a dose of Tdap.       Also, adults should receive a booster dose of either Tdap or Td (a different vaccine that protects against tetanus and diphtheria but not pertussis) every 10 years, or after 5 years in the case of a severe or dirty wound or burn.     Tdap may be given at the same time as other vaccines. 3. Talk with your health care provider    Tell your vaccination provider if the person getting the vaccine:   Has had an allergic reaction after a previous dose of any vaccine that protects against tetanus, diphtheria, or pertussis, or has any severe, life-threatening allergies    Has had a coma, decreased level of consciousness, or prolonged seizures within 7 days after a previous dose of any pertussis vaccine (DTP, DTaP, or Tdap)   Has seizures or another nervous system problem   Has ever had Guillain-Barré Syndrome (also called GBS)   Has had severe pain or swelling after a previous dose of any vaccine that protects against tetanus or diphtheria    In some cases, your health care provider may decide to postpone Tdap vaccination until a future visit. People with minor illnesses, such as a cold, may be vaccinated. People who are moderately or severely ill should usually wait until they recover before getting Tdap vaccine. Your health care provider can give you more information. 4. Risks of a vaccine reaction     Pain, redness, or swelling where the shot was given, mild fever, headache, feeling tired, and nausea, vomiting, diarrhea, or stomachache sometimes happen after Tdap vaccination. People sometimes faint after medical procedures, including vaccination. Tell your provider if you feel dizzy or have vision changes or ringing in the ears. As with any medicine, there is a very remote chance of a vaccine causing a severe allergic reaction, other serious injury, or death. 5. What if there is a serious problem? An allergic reaction could occur after the vaccinated person leaves the clinic.  If you see signs of a severe allergic reaction (hives, swelling of the face and throat, difficulty breathing, a fast heartbeat, dizziness, or weakness), call 9-1-1 and get the person to the nearest hospital.    For other signs that concern you, call your health care provider. Adverse reactions should be reported to the Vaccine Adverse Event Reporting System (VAERS). Your health care provider will usually file this report, or you can do it yourself. Visit the VAERS website at www.vaers. hhs.gov or call 0-538.364.5942. VAERS is only for reporting reactions, and VAERS staff members do not give medical advice. 6. The National Vaccine Injury Compensation Program    The [facility] Vaccine Injury Compensation Program (VICP) is a federal program that was created to compensate people who may have been injured by certain vaccines. Claims regarding alleged injury or death due to vaccination have a time limit for filing, which may be as short as two years. Visit the VICP website at www.[facility].gov/vaccinecompensation or call 6-709.716.7758 to learn about the program and about filing a claim. 7. How can I learn more?  Ask your health care provider.  Call your local or state health department.  Visit the website of the Food and Drug Administration (FDA) for vaccine package inserts and additional information at www.fda.gov/vaccines-blood-biologics/vaccines.  Contact the Centers for Disease Control and Prevention (CDC):  - Call 5-467.507.7372 (1-800-CDC-INFO) or  - Visit CDCs website at www.cdc.gov/vaccines. Vaccine Information Statement   Tdap (Tetanus, Diphtheria, Pertussis) Vaccine  8/6/2021  42 STEPAN Priyanka Tellowood [id number]   Department of Health and Human Services  Centers for Disease Control and Prevention    Office Use Only

## 2021-12-03 NOTE — LETTER
Name: Benjamín Harding   Sex: female   : 1990   [address] [facility]  [phone] (home)     Current Immunizations:  Immunization History   Administered Date(s) Administered    COVID-19, PFIZER, MRNA, LNP-S, PF, 30MCG/0.3ML DOSE 2020, 2021    HPV (Quad) 2016, 2016    Hep B Vaccine (Adult) 10/25/2018, 2021    Influenza Vaccine 2019, 2020, 10/29/2021    Influenza Vaccine (Quad) PF (>6 Mo Flulaval, Fluarix, and >3 Yrs Afluria, Fluzone [id number]) 10/19/2015, 2016, 2018, 2018    Influenza Vaccine (Quadrivalent)(>18 Yrs Flublok [id number]) 2018    Influenza Vaccine PF 2013    Pneumococcal Polysaccharide (PPSV-23) 2013    TDAP Vaccine 2012    Tdap 2021       Allergies:   Allergies as of 2021 - Fully Reviewed 2021   Allergen Reaction Noted    Latex Itching 2014

## 2021-12-07 RX ORDER — RIMEGEPANT SULFATE 75 MG/75MG
TABLET, ORALLY DISINTEGRATING ORAL
Qty: 16 TABLET | Refills: 2 | Status: SHIPPED | OUTPATIENT
Start: 2021-12-07 | End: 2022-04-28 | Stop reason: SDUPTHER

## 2021-12-13 ENCOUNTER — OFFICE VISIT (OUTPATIENT)
Dept: ENDOCRINOLOGY | Age: 31
End: 2021-12-13
Payer: COMMERCIAL

## 2021-12-13 VITALS
HEART RATE: 86 BPM | BODY MASS INDEX: 49.43 KG/M2 | DIASTOLIC BLOOD PRESSURE: 65 MMHG | WEIGHT: 279 LBS | SYSTOLIC BLOOD PRESSURE: 131 MMHG | HEIGHT: 63 IN

## 2021-12-13 DIAGNOSIS — E55.9 HYPOVITAMINOSIS D: ICD-10-CM

## 2021-12-13 DIAGNOSIS — E11.9 TYPE 2 DIABETES MELLITUS WITHOUT COMPLICATION, WITHOUT LONG-TERM CURRENT USE OF INSULIN (HCC): Primary | ICD-10-CM

## 2021-12-13 DIAGNOSIS — E66.01 CLASS 3 SEVERE OBESITY DUE TO EXCESS CALORIES WITH SERIOUS COMORBIDITY AND BODY MASS INDEX (BMI) OF 60.0 TO 69.9 IN ADULT (HCC): ICD-10-CM

## 2021-12-13 DIAGNOSIS — E78.2 MIXED HYPERLIPIDEMIA: ICD-10-CM

## 2021-12-13 DIAGNOSIS — I10 ESSENTIAL HYPERTENSION: ICD-10-CM

## 2021-12-13 DIAGNOSIS — E66.01 OBESITY, MORBID (MORE THAN 100 LBS OVER IDEAL WEIGHT OR BMI > 40) (HCC): ICD-10-CM

## 2021-12-13 PROCEDURE — 99214 OFFICE O/P EST MOD 30 MIN: CPT | Performed by: INTERNAL MEDICINE

## 2021-12-13 RX ORDER — SEMAGLUTIDE 1.34 MG/ML
1 INJECTION, SOLUTION SUBCUTANEOUS
Qty: 12 EACH | Refills: 3 | Status: SHIPPED | OUTPATIENT
Start: 2021-12-13 | End: 2022-07-06 | Stop reason: DRUGHIGH

## 2021-12-13 RX ORDER — METFORMIN HYDROCHLORIDE 500 MG/1
500 TABLET ORAL 2 TIMES DAILY WITH MEALS
Qty: 180 TABLET | Refills: 1 | Status: SHIPPED | OUTPATIENT
Start: 2021-12-13 | End: 2022-04-27

## 2021-12-13 NOTE — PROGRESS NOTES
HISTORY OF PRESENT ILLNESS  Meredith Haddad is a 33 y.o. female. Today's Covid vaccinated Pt main concerns were provided in the clinic,    pt is w/ comorbid history and   Pt has been trying to wear mask most of the times,  pt has no fever no cough no dyspnea, no ha, not dizzy, nl smell nl taste, no N/V/D, has no orbital pain,  no body ache. Last Physical ws in [address] [facility], doing well has forms to be completed and finalized denies other concerns      Current Outpatient Medications   Medication Sig Dispense Refill    rimegepant (Nurtec ODT) 75 mg disintegrating tablet 1 tablet oral every other day 16 Tablet 2    busPIRone (BUSPAR) 5 mg tablet       Blood-Glucose Transmitter (Dexcom G6 Transmitter) nahid USE AS DIRECTED 1 Each 3    Blood-Glucose Sensor (Dexcom G6 Sensor) nahid CHANGE EVERY 10 DAYS 3 Each 11    metFORMIN (GLUCOPHAGE) 500 mg tablet Take 1 Tablet by mouth daily (with breakfast). 30 Tablet 0    metoprolol tartrate (LOPRESSOR) 100 mg IR tablet TAKE 1 TABLET BY MOUTH 2 TIMES A DAY GENERIC FOR LOPRESSOR 180 Tablet 2    traZODone (DESYREL) 100 mg tablet Take 100 mg by mouth nightly as needed.  ondansetron (ZOFRAN ODT) 4 mg disintegrating tablet Take 1 Tablet by mouth every eight (8) hours as needed for Nausea or Vomiting. 20 Tablet 0    cyclobenzaprine (FLEXERIL) 5 mg tablet Take 1 Tablet by mouth nightly. (Patient taking differently: Take 5 mg by mouth nightly. As needed) 30 Tablet 0    venlafaxine-SR (Effexor XR) 75 mg capsule Take 75 mg by mouth nightly.  aspirin delayed-release 81 mg tablet Take 1 Tablet by mouth daily. 90 Tablet 3    gabapentin (NEURONTIN) 300 mg capsule Take 1 Capsule by mouth three (3) times daily. Max Daily Amount: 900 mg. 270 Capsule 1    linaCLOtide (Linzess) 145 mcg cap capsule Take 290 mcg by mouth Daily (before breakfast).       cyanocobalamin (VITAMIN B12) 1,000 mcg/mL injection INJECT 1ML INTO THE MUSCLE MONTHLY 1 mL 5    norethindrone (MICRONOR) 0.35 mg tab       semaglutide (OZEMPIC) 0.25 mg or 0.5 mg/dose (2 mg/1.5 ml) subq pen 0.5 mg by SubCUTAneous route every seven (7) days. 3 Box 3    atorvastatin (LIPITOR) 40 mg tablet TAKE 1 TABLET BY MOUTH EVERY DAY (Patient taking differently: nightly. TAKE 1 TABLET BY MOUTH EVERY DAY) 90 Tablet 3    ergocalciferol (ERGOCALCIFEROL) 1,250 mcg (50,000 unit) capsule Take 1 Cap by mouth every seven (7) days. Indications: low vitamin D levels 12 Cap 3    galcanezumab-gnlm (Emgality Pen) 120 mg/mL injection 1 mL by SubCUTAneous route every thirty (30) days. 1 mL 11    omeprazole (PRILOSEC) 40 mg capsule Take 1 Cap by mouth daily. (Patient taking differently: Take 40 mg by mouth two (2) times a day.) 90 Cap 3    nystatin (MYCOSTATIN) topical cream Apply  to affected area two (2) times a day. Apply to abdominal folds twice daily as needed for rash (Patient taking differently: Apply  to affected area two (2) times daily as needed. Apply to abdominal folds twice daily as needed for rash) 30 g 3    iron,carbonyl-FA-multivit-min (Opurity Multivitamin) 30 mg iron- 800 mcg chew Take 2 Tabs by mouth daily.  calcium carbonate/vitamin D3 (CALCIUM 600 + D,3, PO) Take 1 Tablet by mouth daily.        Allergies   Allergen Reactions    Latex Itching     Past Medical History:   Diagnosis Date    Anxiety with depression     Arrhythmia     tachycardia    Arthritis     Asthma     [place] hump 1/7/2015    Carpal tunnel syndrome of right wrist 12/20/2016    Diabetes ([facility] [address]     ALIYA (generalized anxiety disorder) 4/4/2018    GERD (gastroesophageal reflux disease) 8/13/2014    HTN (hypertension) 7/25/2011    Hyperaldosteronism ([address] 10/19/2015    Hyperhidrosis 4/4/2018    Morbid obesity ([facility] [address]     Other ill-defined conditions(799.89)     migraines    Palpitation 4/4/2018    Sleep apnea     uses CPAP    Tachycardia 4/4/2018     Past Surgical History:   Procedure Laterality Date    HX CHOLECYSTECTOMY  11/10/2021    Dr Dalton Smith Saúl     HX GASTRECTOMY  2020    Gastric sleeve     HX GYN  10/19/2020    Laparoscopic right oopherectomy, left cystectomy    HX HERNIA REPAIR  2020    HX OVARIAN CYST REMOVAL  10/19/2020    HX TONSILLECTOMY      HX WISDOM TEETH EXTRACTION      ID REMOVAL OF TONSILS,<13 Y/O       Family History   Problem Relation Age of Onset    Hypertension Mother     Diabetes Mother         Type II    Anesth Problems Mother         respiratory issues - feels like she cannot breath when coming out of anesthesia   Zak Mckeon Arthritis-rheumatoid Mother    Zak Mckeon Gout Mother     Psychiatric Disorder Father     Drug Abuse Father     Cancer Other         prostate    Hypertension Sister     Thyroid Disease Sister         \"I think\"    Breast Cancer Sister     Attention Deficit Hyperactivity Disorder Brother     Hypertension Sister      Social History     Tobacco Use    Smoking status: Former Smoker     Packs/day: 0.50     Years: 5.00     Pack years: 2.50     Quit date: 2019     Years since quittin.1    Smokeless tobacco: Never Used   Substance Use Topics    Alcohol use:  Yes     Alcohol/week: 1.0 standard drink     Types: 1 Shots of liquor, 1 Standard drinks or equivalent per week     Comment: socially, Monthly or less      Lab Results   Component Value Date/Time    WBC 8.1 2021 03:08 PM    HGB 12.7 2021 03:08 PM    HCT 41.8 2021 03:08 PM    PLATELET 519  03:08 PM    MCV 91.9 2021 03:08 PM     Lab Results   Component Value Date/Time    Hemoglobin A1c 5.5 2021 12:00 AM    Hemoglobin A1c 5.4 10/28/2021 02:29 PM    Hemoglobin A1c 5.9 (H) 2021 12:58 PM    Hemoglobin A1c, External 7.8 2020 12:00 AM    Glucose 127 (H) 2021 12:00 AM    Glucose (POC) 112 11/10/2021 09:58 AM    Microalb/Creat ratio (ug/mg creat.) 4 2021 12:00 AM    LDL, calculated 44 2021 12:00 AM    LDL, calculated 48 2020 09:44 AM    Creatinine 0.62 2021 12:00 AM Review of Systems   Constitutional: Negative for chills and fever. HENT: Negative for congestion and nosebleeds. Eyes: Negative for blurred vision and pain. Respiratory: Negative for cough, shortness of breath and wheezing. Cardiovascular: Negative for chest pain and leg swelling. Gastrointestinal: Negative for constipation, diarrhea, nausea and vomiting. Genitourinary: Negative for dysuria and frequency. Musculoskeletal: Negative for joint pain and myalgias. Skin: Negative for itching and rash. Neurological: Negative for dizziness, loss of consciousness and headaches. Psychiatric/Behavioral: Negative for depression. The patient is not nervous/anxious and does not have insomnia. Physical Exam  Vitals and nursing note reviewed. Constitutional:       Appearance: She is well-developed. HENT:      Head: Normocephalic and atraumatic. Eyes:      Conjunctiva/sclera: Conjunctivae normal.      Pupils: Pupils are equal, round, and reactive to light. Neck:      Thyroid: No thyromegaly. Vascular: No JVD. Cardiovascular:      Rate and Rhythm: Normal rate and regular rhythm. Heart sounds: Normal heart sounds. No murmur heard. No friction rub. No gallop. Pulmonary:      Effort: Pulmonary effort is normal. No respiratory distress. Breath sounds: Normal breath sounds. No stridor. No wheezing or rales. Abdominal:      General: Bowel sounds are normal. There is no distension. Palpations: Abdomen is soft. There is no mass. Tenderness: There is no abdominal tenderness. Musculoskeletal:         General: No tenderness. Normal range of motion. Lymphadenopathy:      Cervical: No cervical adenopathy. Skin:     Findings: No erythema or rash. Neurological:      Mental Status: She is alert and oriented to person, place, and time. Cranial Nerves: No cranial nerve deficit. Deep Tendon Reflexes: Reflexes are normal and symmetric.    Psychiatric: Behavior: Behavior normal.         ASSESSMENT and PLAN  Diagnoses and all orders for this visit:    1. Encounter for immunization  -     HEPATITIS B VACCINE, ADULT DOSAGE, IM  -     HI IMMUNIZ ADMIN,1 SINGLE/COMB VAC/TOXOID  -     TETANUS, DIPHTHERIA TOXOIDS AND ACELLULAR PERTUSSIS VACCINE (TDAP), IN INDIVIDS. >=7, IM    2.  Encounter for completion of form with patient          Secondary to the patient chronic medical conditions,   form was completed, w/ with multiple questions answered to the best knowledge will keep a copy in the chart for further correspondence patient was given signed completed patient was informed about the safety and  regulations regarding this condition patient was also advised to follow-up with HR for further guidelines patient acknowledged understanding and agreed with today's recommendations   Concern abdout COVID-19 addressed and detailed, pt was told that the best way to prevent illness is by protection with vaccination, and to Wear a facemask , having social distance, and to get tested if possible,   Pt was also told if develop dyspnea needs to call 911 or go to er, call for Psychiatric hospitaler advise, pt agreed with todays recommendations,

## 2021-12-13 NOTE — PROGRESS NOTES
Chief Complaint   Patient presents with    Diabetes     pcp and pharmacy verified       History of Present Illness: Mehrdad Wade is a 33 y.o. female here for follow up of diabetes. She was diagnosed with diabetes \"when I was in high school\". Because pt has features concerning for cushing's her PCP checked a 24 hour urine cortisol which was not elevated (42). She also had a TSH drawn which was 2.10. In March 2016 we tested pt for hyperaldosteronism, which was negative. I tested her for evidence of PCOS, her Testosterone was 14, with DHEA-S 109, her 17-OH Prog was not elevated and an overnight dexamethasone suppression test did suppress her ACTH and cortisol. She had the Gastric Sleeve on 9/8/20. At our last visit in August 2021 she was off all DM medications, her A1C was down to 5.5% and she was not having the issues of hypoglycemia. Pt was instructed to stay off the DM medications. In October 2021 her A1C was 5.4%. In November 2021 her PCP ordered an insulin level and it was high at 37 in the face of a BG in the 80s. \"The surgeon and nutritionist think I have insulin resistance and that is why I am not having more weight loss\". She is currently taking Metformin 500mg once daily and Ozempic 0.5mg. She has been on the Metformin 500mg for a month. Her weight today is 279 pounds. Pt had a cholecystectomy on 11/10/21. Pt has received all three COVID vaccinations ([employer]). She is still monitoring her BGs using the [facility]. Reviewing her Clarity report she is having low BGs overnight, on occasions, but no low BGs during the day. The low BGs are not waking her up at night. \"I try to stop eating around 8-9PM.     Pt is still going to the boot camp 4 days per week. She wears a FitBit and gets 10,000+ steps per day. Pt is currently waking around 5-530AM. She works out a 6AM.  She has breakfast at work around [employer], this AM she had yogurt and water.   She is not having a mid-morning snack. She has lunch around 1PM, today she had 6 chicken nuggets and water. She has dinner around 8-830PM, last night she had a bologna sandwich, no side items and gatorade zero. She is not snacking after dinner. No history of vascular disease. No history of neuropathy, or nephropathy. Last eye exam was June 2020, no retinopathy. She is taking Vitamin D 50,000 units weekly. She is taking a MVI and B-12 1000mcg monthly. Pt is still taking her lipitor 40mg HS. Current Outpatient Medications   Medication Sig    semaglutide (Ozempic) 1 mg/dose (4 mg/3 mL) pnij 1 mg by SubCUTAneous route every seven (7) days.  metFORMIN (GLUCOPHAGE) 500 mg tablet Take 1 Tablet by mouth two (2) times daily (with meals).  rimegepant (Nurtec ODT) 75 mg disintegrating tablet 1 tablet oral every other day    busPIRone (BUSPAR) 5 mg tablet two (2) times a day.  Blood-Glucose Transmitter (Dexcom G6 Transmitter) nahid USE AS DIRECTED    Blood-Glucose Sensor (Dexcom G6 Sensor) nahid CHANGE EVERY 10 DAYS    metoprolol tartrate (LOPRESSOR) 100 mg IR tablet TAKE 1 TABLET BY MOUTH 2 TIMES A DAY GENERIC FOR LOPRESSOR    traZODone (DESYREL) 100 mg tablet Take 100 mg by mouth nightly as needed.  cyclobenzaprine (FLEXERIL) 5 mg tablet Take 1 Tablet by mouth nightly. (Patient taking differently: Take 5 mg by mouth nightly. As needed)    venlafaxine-SR (Effexor XR) 75 mg capsule Take 75 mg by mouth nightly.  aspirin delayed-release 81 mg tablet Take 1 Tablet by mouth daily.  gabapentin (NEURONTIN) 300 mg capsule Take 1 Capsule by mouth three (3) times daily. Max Daily Amount: 900 mg.    linaCLOtide (Linzess) 145 mcg cap capsule Take 290 mcg by mouth Daily (before breakfast).     cyanocobalamin (VITAMIN B12) 1,000 mcg/mL injection INJECT 1ML INTO THE MUSCLE MONTHLY    norethindrone (MICRONOR) 0.35 mg tab     atorvastatin (LIPITOR) 40 mg tablet TAKE 1 TABLET BY MOUTH EVERY DAY (Patient taking differently: nightly. TAKE 1 TABLET BY MOUTH EVERY DAY)    ergocalciferol (ERGOCALCIFEROL) 1,250 mcg (50,000 unit) capsule Take 1 Cap by mouth every seven (7) days. Indications: low vitamin D levels    galcanezumab-gnlm (Emgality Pen) 120 mg/mL injection 1 mL by SubCUTAneous route every thirty (30) days.  omeprazole (PRILOSEC) 40 mg capsule Take 1 Cap by mouth daily. (Patient taking differently: Take 40 mg by mouth two (2) times a day.)    nystatin (MYCOSTATIN) topical cream Apply  to affected area two (2) times a day. Apply to abdominal folds twice daily as needed for rash (Patient taking differently: Apply  to affected area two (2) times daily as needed. Apply to abdominal folds twice daily as needed for rash)    iron,carbonyl-FA-multivit-min (Opurity Multivitamin) 30 mg iron- 800 mcg chew Take 2 Tabs by mouth daily.  calcium carbonate/vitamin D3 (CALCIUM 600 + D,3, PO) Take 1 Tablet by mouth daily. No current facility-administered medications for this visit. Allergies   Allergen Reactions    Latex Itching     Review of Systems:  - Eyes: no blurry vision or double vision  - Cardiovascular: no chest pain  - Respiratory: no shortness of breath  - Musculoskeletal: no myalgias  - Neurological: no numbness/tingling in extremities    Physical Examination:  Blood pressure 131/65, pulse 86, height 5' 3\" (1.6 m), weight 279 lb (126.6 kg).   - General: pleasant, no distress, good eye contact   - Neck: no carotid bruits  - Cardiovascular: regular, normal rate, nl s1 and s2, no m/r/g, 2+ DP pulses   - Respiratory: clear bilaterally  - Integumentary: no edema, no foot ulcers  - Psychiatric: normal mood and affect    Diabetic foot exam:     Left Foot:   Visual Exam: normal    Pulse DP: 2+ (normal)   Filament test: normal sensation    Vibratory sensation: normal      Right Foot:   Visual Exam: normal    Pulse DP: 2+ (normal)   Filament test: normal sensation    Vibratory sensation: normal        Data Reviewed: Component      Latest Ref Rng & Units 11/18/2021 11/12/2021 10/28/2021   WBC      3.6 - 11.0 K/uL  8.1    RBC      3.80 - 5.20 M/uL  4.55    HGB      11.5 - 16.0 g/dL  12.7    HCT      35.0 - 47.0 %  41.8    MCV      80.0 - 99.0 FL  91.9    MCH      26.0 - 34.0 PG  27.9    MCHC      30.0 - 36.5 g/dL  30.4    RDW      11.5 - 14.5 %  14.1    PLATELET      315 - 873 K/uL  302    MPV      8.9 - 12.9 FL  10.2    NRBC      0  WBC  0.0    ABSOLUTE NRBC      0.00 - 0.01 K/uL  0.00    NEUTROPHILS      32 - 75 %  43    LYMPHOCYTES      12 - 49 %  44    MONOCYTES      5 - 13 %  10    EOSINOPHILS      0 - 7 %  2    BASOPHILS      0 - 1 %  1    IMMATURE GRANULOCYTES      0.0 - 0.5 %  0    ABS. NEUTROPHILS      1.8 - 8.0 K/UL  3.5    ABS. LYMPHOCYTES      0.8 - 3.5 K/UL  3.6 (H)    ABS. MONOCYTES      0.0 - 1.0 K/UL  0.8    ABS. EOSINOPHILS      0.0 - 0.4 K/UL  0.2    ABS. BASOPHILS      0.0 - 0.1 K/UL  0.1    ABS. IMM. GRANS.      0.00 - 0.04 K/UL  0.0    DF        AUTOMATED    Sodium      136 - 145 mmol/L  139    Potassium      3.5 - 5.1 mmol/L  4.0    Chloride      97 - 108 mmol/L  106    CO2      21 - 32 mmol/L  28    Anion gap      5 - 15 mmol/L  5    Glucose      65 - 100 mg/dL  81    BUN      6 - 20 MG/DL  11    Creatinine      0.55 - 1.02 MG/DL  0.68    BUN/Creatinine ratio      12 - 20    16    GFR est AA      >60 ml/min/1.73m2  >60    GFR est non-AA      >60 ml/min/1.73m2  >60    Calcium      8.5 - 10.1 MG/DL  9.4    Bilirubin, total      0.2 - 1.0 MG/DL  0.6    ALT      12 - 78 U/L  30    AST      15 - 37 U/L  25    Alk.  phosphatase      45 - 117 U/L  78    Protein, total      6.4 - 8.2 g/dL  7.4    Albumin      3.5 - 5.0 g/dL  3.4 (L)    Globulin      2.0 - 4.0 g/dL  4.0    A-G Ratio      1.1 - 2.2    0.9 (L)    Hemoglobin A1c, (calculated)      4.0 - 5.6 %   5.4   Est. average glucose      mg/dL   108   Insulin      2.6 - 24.9 uIU/mL 37.4 (H)       Component      Latest Ref Rng & Units 11/29/2021 11/29/2021 11/29/2021 11/29/2021          12:00 AM 12:00 AM 12:00 AM 12:00 AM   Cholesterol, total      100 - 199 mg/dL   106    Triglyceride      0 - 149 mg/dL   110    HDL Cholesterol      >39 mg/dL   42    VLDL, calculated      5 - 40 mg/dL   20    LDL, calculated      0 - 99 mg/dL   44    Creatinine, urine      Not Estab. mg/dL  163.7     Microalbumin, urine      Not Estab. ug/mL  6.2     Microalbumin/Creat. Ratio      0 - 29 mg/g creat  4     Hemoglobin A1c, (calculated)      4.8 - 5.6 %    5.5   Estimated average glucose      mg/dL    111   VITAMIN D, 25-HYDROXY      30.0 - 100.0 ng/mL 41.4          Assessment/Plan:   1) DM > Her A1C in October 2021 was 5.4%. Her insulin level in November by her PCP was 37.4 in the face of a BG in the 80s. She was started her on Metformin. Will increase her Metformin to 500mg BID and the Ozempic 1.0mg weekly    2) HTN > Her BP today was 131/65*. Will not make any adjustments to her HTN regimen at this time. She is only taking a B-Blocker for her elevated HR. 3) HLD > Her lipid panel was at goal in November 2021. Pt is tolerating the Atorvastatin 40mg daily. Since her Lipid level is so go we will try decreasing her dose of the Atorvastatin to 20mg daily. 4) Obesity > Pt is s/p sleeve gastrectomy and her weight is down to 270 pounds. She is still working out regularly. Pt encouraged to keep up the good work. See #1. 5) Hypovitaminosis D > Pt to continue her Vitamin D. Her Vitamin D last week was 41.4. Follow-up and Dispositions    · Return in about 4 months (around 4/13/2022). Pt voices understanding and agreement with the plan. RTC 4 months      Copy sent to:  Mansi Dozier

## 2021-12-14 DIAGNOSIS — M76.51 PATELLAR TENDINITIS OF BOTH KNEES: ICD-10-CM

## 2021-12-14 DIAGNOSIS — M76.52 PATELLAR TENDINITIS OF BOTH KNEES: ICD-10-CM

## 2021-12-15 RX ORDER — TRAMADOL HYDROCHLORIDE 50 MG/1
50 TABLET ORAL
Qty: 45 TABLET | Refills: 0 | Status: SHIPPED | OUTPATIENT
Start: 2021-12-15 | End: 2022-01-26

## 2021-12-21 ENCOUNTER — PATIENT OUTREACH (OUTPATIENT)
Dept: OTHER | Age: 31
End: 2021-12-21

## 2021-12-21 NOTE — PROGRESS NOTES
Resolving current episode of case management. Patient has met patient stated and/or medical goals. Patient consistenly demonstrates understanding of the medical action plan through execution of plan. Appointments with key providers are scheduled and attended. Plan of care is modified and updated to address new challenges and barriers with minimal support from the CM team(proactively uses physicians and community resources) The support system remains current and has been modified as needed. Patient continues to acquire needed resources from the current support system established. Teach back demonstrates that patient understands education for self management of chronic conditions. Patient consistenly communicates understanding of signs,symptoms and complications for all major diagnoses. Patient modifies his/her lifestyle toreduce or avoid risk factors to his/her health. ECM will follow as needed and patient has ECM contact information for future needs. Patient states she is doing well. No further needs at this time. 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 Provided encouragement!

## 2022-01-04 ENCOUNTER — CLINICAL SUPPORT (OUTPATIENT)
Dept: INTERNAL MEDICINE CLINIC | Age: 32
End: 2022-01-04

## 2022-01-04 DIAGNOSIS — Z23 ENCOUNTER FOR IMMUNIZATION: Primary | ICD-10-CM

## 2022-01-04 PROCEDURE — 90471 IMMUNIZATION ADMIN: CPT | Performed by: INTERNAL MEDICINE

## 2022-01-04 PROCEDURE — 90746 HEPB VACCINE 3 DOSE ADULT IM: CPT | Performed by: INTERNAL MEDICINE

## 2022-01-05 ENCOUNTER — PATIENT MESSAGE (OUTPATIENT)
Dept: FAMILY MEDICINE CLINIC | Age: 32
End: 2022-01-05

## 2022-01-07 ENCOUNTER — DOCUMENTATION ONLY (OUTPATIENT)
Dept: FAMILY MEDICINE CLINIC | Age: 32
End: 2022-01-07

## 2022-01-19 ENCOUNTER — OFFICE VISIT (OUTPATIENT)
Dept: INTERNAL MEDICINE CLINIC | Age: 32
End: 2022-01-19
Payer: COMMERCIAL

## 2022-01-19 VITALS
HEIGHT: 63 IN | TEMPERATURE: 99.1 F | HEART RATE: 99 BPM | RESPIRATION RATE: 16 BRPM | SYSTOLIC BLOOD PRESSURE: 121 MMHG | DIASTOLIC BLOOD PRESSURE: 79 MMHG | OXYGEN SATURATION: 99 % | BODY MASS INDEX: 49.43 KG/M2 | WEIGHT: 279 LBS

## 2022-01-19 DIAGNOSIS — B37.9 YEAST INFECTION: ICD-10-CM

## 2022-01-19 DIAGNOSIS — H66.90 ACUTE OTITIS MEDIA, UNSPECIFIED OTITIS MEDIA TYPE: Primary | ICD-10-CM

## 2022-01-19 PROCEDURE — 99213 OFFICE O/P EST LOW 20 MIN: CPT | Performed by: INTERNAL MEDICINE

## 2022-01-19 RX ORDER — AMOXICILLIN AND CLAVULANATE POTASSIUM 875; 125 MG/1; MG/1
1 TABLET, FILM COATED ORAL EVERY 12 HOURS
Qty: 14 TABLET | Refills: 0 | Status: SHIPPED | OUTPATIENT
Start: 2022-01-19 | End: 2022-01-26

## 2022-01-19 RX ORDER — FLUCONAZOLE 150 MG/1
150 TABLET ORAL DAILY
Qty: 1 TABLET | Refills: 0 | Status: SHIPPED | OUTPATIENT
Start: 2022-01-19 | End: 2022-01-20

## 2022-01-19 NOTE — PROGRESS NOTES
HISTORY OF PRESENT ILLNESS  Bhumi Vail is a 33 y.o. female. HPI     Patient has 2days left ear pain and pressure lymph node left side of the neck hurts as well no sore throat no cough no fevers no chills symptoms have been stable over the last 2 days has not taken anything for her symptoms    No respiratory symptoms no sinus drainage no allergy symptoms no sinus pressure      Of note patient gets yeast infections with antibiotics      Patient Active Problem List   Diagnosis Code    HTN (hypertension) I10    Obesity, morbid (more than 100 lbs over ideal weight or BMI > 40) ([facility]) E66.01    Fatigue R53.83    Vitamin D deficiency E55.9    AR (allergic rhinitis) J30.9    Obesity E66.9    Increased pulse rate R00.0    Acute laryngitis J04.0    Viral pneumonia, unspecified J12.9    Migraine headache G43.909    GERD (gastroesophageal reflux disease) K21.9    [place] hump E65    Type 2 diabetes mellitus without complication ([facility]) V55.9    Acute pharyngitis J02.9    Carpal tunnel syndrome of right wrist G56.01    Sleep apnea in adult G47.30    ALIYA (generalized anxiety disorder) F41.1    Hyperhidrosis R61    Palpitation R00.2    Tachycardia R00.0    Type 2 diabetes mellitus with diabetic neuropathy ([facility]) E11.40    Class 3 severe obesity due to excess calories with serious comorbidity and body mass index (BMI) of 60.0 to 69.9 in adult ([facility]) E66.01, Z68.44    Type 2 diabetes with nephropathy ([facility]) E11.21    Dysthymia F34.1    Type 2 diabetes mellitus with chronic kidney disease ([facility]) E11.22     Current Outpatient Medications   Medication Sig Dispense Refill    liraglutide, weight loss, (SAXENDA) 3 mg/0.5 mL (18 mg/3 mL) pen 0.5 mL by SubCUTAneous route daily. 1 Each 4    traMADoL (ULTRAM) 50 mg tablet Take 1 Tablet by mouth every six (6) hours as needed for Pain for up to 42 days.  Max Daily Amount: 200 mg. 45 Tablet 0    semaglutide (Ozempic) 1 mg/dose (4 mg/3 mL) pnij 1 mg by SubCUTAneous route every seven (7) days. 12 Each 3    metFORMIN (GLUCOPHAGE) 500 mg tablet Take 1 Tablet by mouth two (2) times daily (with meals). 180 Tablet 1    rimegepant (Nurtec ODT) 75 mg disintegrating tablet 1 tablet oral every other day 16 Tablet 2    busPIRone (BUSPAR) 5 mg tablet two (2) times a day.  Blood-Glucose Transmitter (Dexcom G6 Transmitter) nahid USE AS DIRECTED 1 Each 3    Blood-Glucose Sensor (Dexcom G6 Sensor) nahid CHANGE EVERY 10 DAYS 3 Each 11    metoprolol tartrate (LOPRESSOR) 100 mg IR tablet TAKE 1 TABLET BY MOUTH 2 TIMES A DAY GENERIC FOR LOPRESSOR 180 Tablet 2    traZODone (DESYREL) 100 mg tablet Take 100 mg by mouth nightly as needed.  cyclobenzaprine (FLEXERIL) 5 mg tablet Take 1 Tablet by mouth nightly. (Patient taking differently: Take 5 mg by mouth nightly. As needed) 30 Tablet 0    venlafaxine-SR (Effexor XR) 75 mg capsule Take 75 mg by mouth nightly.  aspirin delayed-release 81 mg tablet Take 1 Tablet by mouth daily. 90 Tablet 3    gabapentin (NEURONTIN) 300 mg capsule Take 1 Capsule by mouth three (3) times daily. Max Daily Amount: 900 mg. 270 Capsule 1    linaCLOtide (Linzess) 145 mcg cap capsule Take 290 mcg by mouth Daily (before breakfast).  cyanocobalamin (VITAMIN B12) 1,000 mcg/mL injection INJECT 1ML INTO THE MUSCLE MONTHLY 1 mL 5    norethindrone (MICRONOR) 0.35 mg tab       atorvastatin (LIPITOR) 40 mg tablet TAKE 1 TABLET BY MOUTH EVERY DAY (Patient taking differently: nightly. TAKE 1 TABLET BY MOUTH EVERY DAY) 90 Tablet 3    ergocalciferol (ERGOCALCIFEROL) 1,250 mcg (50,000 unit) capsule Take 1 Cap by mouth every seven (7) days. Indications: low vitamin D levels 12 Cap 3    galcanezumab-gnlm (Emgality Pen) 120 mg/mL injection 1 mL by SubCUTAneous route every thirty (30) days. 1 mL 11    omeprazole (PRILOSEC) 40 mg capsule Take 1 Cap by mouth daily.  (Patient taking differently: Take 40 mg by mouth two (2) times a day.) 90 Cap 3    nystatin (MYCOSTATIN) topical cream Apply  to affected area two (2) times a day. Apply to abdominal folds twice daily as needed for rash (Patient taking differently: Apply  to affected area two (2) times daily as needed. Apply to abdominal folds twice daily as needed for rash) 30 g 3    iron,carbonyl-FA-multivit-min (Opurity Multivitamin) 30 mg iron- 800 mcg chew Take 2 Tabs by mouth daily.  calcium carbonate/vitamin D3 (CALCIUM 600 + D,3, PO) Take 1 Tablet by mouth daily. Lab Results   Component Value Date/Time    GFR est non- 11/29/2021 12:00 AM    GFR est  11/29/2021 12:00 AM    Creatinine 0.62 11/29/2021 12:00 AM    BUN 15 11/29/2021 12:00 AM    Sodium 139 11/29/2021 12:00 AM    Potassium 4.4 11/29/2021 12:00 AM    Chloride 105 11/29/2021 12:00 AM    CO2 20 11/29/2021 12:00 AM        Review of Systems   Constitutional: Negative for chills and fever. Respiratory: Negative for shortness of breath. Physical Exam  Constitutional:       General: She is not in acute distress. Appearance: She is well-developed. She is not diaphoretic. HENT:      Head: Normocephalic and atraumatic. Ears:      Comments: Mild erythema on the left tympanic membrane compared to the right mild fullness bilaterally  Eyes:      General:         Right eye: No discharge. Left eye: No discharge. Extraocular Movements: Extraocular movements intact. Conjunctiva/sclera: Conjunctivae normal.   Musculoskeletal:         General: No tenderness or deformity. Normal range of motion. Cervical back: Normal range of motion and neck supple. Lymphadenopathy:      Cervical: No cervical adenopathy. Skin:     General: Skin is warm and dry. Coloration: Skin is not pale. Findings: No erythema or rash. Neurological:      Mental Status: She is alert and oriented to person, place, and time.       Coordination: Coordination normal.   Psychiatric:         Behavior: Behavior normal.         ASSESSMENT and PLAN    ICD-10-CM ICD-9-CM    1. Acute otitis media, unspecified otitis media type       We will treat with Augmentin twice daily for 7 days    Flonase daily     H66.90 382.9    2.  Yeast infection     Diflucan x1 after antibiotics B37.9 112.9

## 2022-01-25 ENCOUNTER — TELEPHONE (OUTPATIENT)
Dept: PHARMACY | Age: 32
End: 2022-01-25

## 2022-01-25 NOTE — TELEPHONE ENCOUNTER
For [place] in place: No   Recommendation Provided To: Patient/Caregiver: 1 via Telephone   Intervention Detail: Scheduled Appointment   Gap Closed?: Yes   Intervention Accepted By: Patient/Caregiver: 1   Time Spent (min): 10

## 2022-01-25 NOTE — TELEPHONE ENCOUNTER
Left message on voicemail for patient to call us back at [phone] option 3 to schedule your 2022 [facility] annual pharmacist telephone appointment.

## 2022-02-01 ENCOUNTER — OFFICE VISIT (OUTPATIENT)
Dept: NEUROLOGY | Age: 32
End: 2022-02-01
Payer: COMMERCIAL

## 2022-02-01 VITALS
BODY MASS INDEX: 49.96 KG/M2 | HEART RATE: 83 BPM | WEIGHT: 282 LBS | SYSTOLIC BLOOD PRESSURE: 140 MMHG | DIASTOLIC BLOOD PRESSURE: 90 MMHG | OXYGEN SATURATION: 98 % | RESPIRATION RATE: 16 BRPM | HEIGHT: 63 IN

## 2022-02-01 DIAGNOSIS — G43.719 INTRACTABLE CHRONIC MIGRAINE WITHOUT AURA AND WITHOUT STATUS MIGRAINOSUS: Primary | ICD-10-CM

## 2022-02-01 DIAGNOSIS — G56.03 BILATERAL CARPAL TUNNEL SYNDROME: ICD-10-CM

## 2022-02-01 DIAGNOSIS — R41.840 ATTENTION DEFICIT: ICD-10-CM

## 2022-02-01 DIAGNOSIS — G43.009 MIGRAINE WITHOUT AURA AND WITHOUT STATUS MIGRAINOSUS, NOT INTRACTABLE: ICD-10-CM

## 2022-02-01 PROCEDURE — 99215 OFFICE O/P EST HI 40 MIN: CPT | Performed by: PSYCHIATRY & NEUROLOGY

## 2022-02-01 RX ORDER — SYRINGE WITH NEEDLE, 1 ML 25GX5/8"
SYRINGE, EMPTY DISPOSABLE MISCELLANEOUS
COMMUNITY
Start: 2021-12-21

## 2022-02-01 RX ORDER — DULOXETIN HYDROCHLORIDE 30 MG/1
30 CAPSULE, DELAYED RELEASE ORAL
COMMUNITY
Start: 2022-01-24

## 2022-02-01 NOTE — PROGRESS NOTES
Neurology Note    Patient ID:  Patrick Brunson  [id number]  46 y.o.  1990      Date of Consultation:  February 1, 2022            Assessment and Plan:    The patient is a 40-year-old female with multiple medical conditions who presents for return to the clinic with her chronic episodic migraines and carpal tunnel syndrome. This is my first encounter with the patient. Chronic intractable episodic migraines:  She will continue with Emgality monthly injections. Continue with Nurtec every other day medication. Listing of medication which she has taken in the past are documented below. This current medication regime has reduced her frequency and intensity of headaches significantly. Risk factors for migraines were reviewed with the patient. These include lifestyle modifications, improving exercise, receiving adequate sleep, and monitoring for food triggers. A patient log of migraine frequency, intensity, and possible triggers should be recorded. Carpal tunnel syndrome:  She does have an EMG performed which reveals bilateral carpal tunnel syndrome. She has been hesitant to see a hand surgeon. I did encourage her to get this scheduled. She states she does not need a referral.  She will continue gabapentin 300 mg 3 times a day for neuropathic pain relief. Patient is concerned about ADHD:  I recommended a referral to for neuropsychological testing. She would like to wait until the new neuropsychologist starts             Subjective: I have headaches       History of Present Illness:   Patrick Brunson is a 33 y.o. female who returns to the neurology clinic at [facility] for an evaluation. She was last seen in the clinic on September 24, 2021 by a former neurologist at this location. She was being followed for migraines and was on Emgality. She also did have a history of obstructive sleep apnea, seasonal allergies, and bilateral carpal tunnel surgery.   From reviewing prior notes, it appears the patient did have numbness in her hands dating back to 2016. She did have an EMG/nerve conduction study performed in 2020 which revealed bilateral carpal tunnel syndrome. She has had migraine headaches for a long time. Here headaches typically start behind the left eye and left-sided face and are 10 out of 10 severity associated with photophobia, sonophobia, nausea but no vomiting. Headache can last up to 3 days. They are throbbing in intensity. She used to take nortriptyline but this medicine was stopped. Since being on her current medication regime, her headaches are occurring much less frequently. She is only had 1-2 severe headaches in a month. This is much improved and she is currently pleased with her level of headache control. There was previous documentation that she had taken amitriptyline, nortriptyline, metoprolol, gabapentin. Currently she is taking Emgality monthly, Nurtec every other day, and gabapentin 300 mg a day. This is predominantly to help with her neuropathic symptoms in her hands. It has been recommended that she see a hand surgeon in the past but she has not done so    Patient has just begun nursing school and she is very concerned about if she has ADHD. she has noticed difficulty with studying and focusing.     Past Medical History:   Diagnosis Date    Anxiety with depression     Arrhythmia     tachycardia    Arthritis     Asthma     [place] hump 1/7/2015    Carpal tunnel syndrome of right wrist 12/20/2016    Diabetes ([address]     ALIYA (generalized anxiety disorder) 4/4/2018    GERD (gastroesophageal reflux disease) 8/13/2014    HTN (hypertension) 7/25/2011    Hyperaldosteronism ([address] 10/19/2015    Hyperhidrosis 4/4/2018    Morbid obesity ([address]     Other ill-defined conditions(799.89)     migraines    Palpitation 4/4/2018    Sleep apnea     uses CPAP    Tachycardia 4/4/2018        Past Surgical History:   Procedure Laterality Date    HX CHOLECYSTECTOMY  11/10/2021    Dr Dario Monroe     HX GASTRECTOMY  2020    Gastric sleeve     HX GYN  10/19/2020    Laparoscopic right oopherectomy, left cystectomy    HX HERNIA REPAIR  2020    HX OVARIAN CYST REMOVAL  10/19/2020    HX TONSILLECTOMY      HX WISDOM TEETH EXTRACTION      CA REMOVAL OF TONSILS,<11 Y/O          Family History   Problem Relation Age of Onset    Hypertension Mother     Diabetes Mother         Type II    Anesth Problems Mother         respiratory issues - feels like she cannot breath when coming out of anesthesia   Mcgill Arthritis-rheumatoid Mother    Mcgill Gout Mother     Psychiatric Disorder Father     Drug Abuse Father     Cancer Other         prostate    Hypertension Sister     Thyroid Disease Sister         \"I think\"    Breast Cancer Sister     Attention Deficit Hyperactivity Disorder Brother     Hypertension Sister         Social History     Tobacco Use    Smoking status: Former Smoker     Packs/day: 0.50     Years: 5.00     Pack years: 2.50     Quit date: 2019     Years since quittin.2    Smokeless tobacco: Never Used   Substance Use Topics    Alcohol use: Yes     Alcohol/week: 1.0 standard drink     Types: 1 Shots of liquor, 1 Standard drinks or equivalent per week     Comment: socially, Monthly or less        Allergies   Allergen Reactions    Latex Itching        Prior to Admission medications    Medication Sig Start Date End Date Taking? Authorizing Provider   DULoxetine (CYMBALTA) 30 mg capsule  22  Yes Provider, Historical   BD Luer-Greg Syringe 3 mL 22 gauge x 1\" syrg  21  Yes Provider, Historical   semaglutide (Ozempic) 1 mg/dose (4 mg/3 mL) pnij 1 mg by SubCUTAneous route every seven (7) days. 21  Yes Mario Garcia MD   metFORMIN (GLUCOPHAGE) 500 mg tablet Take 1 Tablet by mouth two (2) times daily (with meals).  21  Yes Mario Garcia MD   rimegepant (Nurtec ODT) 75 mg disintegrating tablet 1 tablet oral every other day 12/7/21  Yes Nano Pablo MD   busPIRone (BUSPAR) 5 mg tablet two (2) times a day. 11/18/21  Yes Provider, Historical   Blood-Glucose Transmitter (Dexcom G6 Transmitter) nahid USE AS DIRECTED 11/23/21  Yes Eve Jenkins MD   Blood-Glucose Sensor (Dexcom G6 Sensor) nahid CHANGE EVERY 10 DAYS 11/23/21  Yes Eve Jenkins MD   metoprolol tartrate (LOPRESSOR) 100 mg IR tablet TAKE 1 TABLET BY MOUTH 2 TIMES A DAY GENERIC FOR LOPRESSOR 11/18/21  Yes Yael Escalera MD   traZODone (DESYREL) 100 mg tablet Take 100 mg by mouth nightly as needed. 9/23/21  Yes Provider, Historical   aspirin delayed-release 81 mg tablet Take 1 Tablet by mouth daily. 10/25/21  Yes Yael Escalera MD   gabapentin (NEURONTIN) 300 mg capsule Take 1 Capsule by mouth three (3) times daily. Max Daily Amount: 900 mg. 9/22/21  Yes Nano Pablo MD   linaCLOtide (Linzess) 145 mcg cap capsule Take 290 mcg by mouth Daily (before breakfast). Yes Provider, Historical   cyanocobalamin (VITAMIN B12) 1,000 mcg/mL injection INJECT 1ML INTO THE MUSCLE MONTHLY 9/2/21  Yes Yael Escalera MD   norethindrone (MICRONOR) 0.35 mg tab  8/5/21  Yes Provider, Historical   atorvastatin (LIPITOR) 40 mg tablet TAKE 1 TABLET BY MOUTH EVERY DAY  Patient taking differently: nightly. TAKE 1 TABLET BY MOUTH EVERY DAY 7/9/21  Yes Yael Escalera MD   ergocalciferol (ERGOCALCIFEROL) 1,250 mcg (50,000 unit) capsule Take 1 Cap by mouth every seven (7) days. Indications: low vitamin D levels 3/16/21  Yes Lakshmi Echevarria NP   galcanezumab-gnlm (Emgality Pen) 120 mg/mL injection 1 mL by SubCUTAneous route every thirty (30) days. 3/4/21  Yes Stephen Lui MD   omeprazole (PRILOSEC) 40 mg capsule Take 1 Cap by mouth daily. Patient taking differently: Take 40 mg by mouth two (2) times a day. 2/3/21  Yes Yael Escalera MD   nystatin (MYCOSTATIN) topical cream Apply  to affected area two (2) times a day.  Apply to abdominal folds twice daily as needed for rash  Patient taking differently: Apply  to affected area two (2) times daily as needed. Apply to abdominal folds twice daily as needed for rash 12/15/20  Yes Brandon Price NP   iron,carbonyl-FA-multivit-min (Opurity Multivitamin) 30 mg iron- 800 mcg chew Take 2 Tabs by mouth daily. 8/25/20  Yes Provider, Historical   calcium carbonate/vitamin D3 (CALCIUM 600 + D,3, PO) Take 1 Tablet by mouth daily. 8/25/20  Yes Provider, Historical   liraglutide, weight loss, (SAXENDA) 3 mg/0.5 mL (18 mg/3 mL) pen 0.5 mL by SubCUTAneous route daily. Patient not taking: Reported on 2/1/2022 1/5/22   Jenn Rosas MD   cyclobenzaprine (FLEXERIL) 5 mg tablet Take 1 Tablet by mouth nightly. Patient not taking: Reported on 2/1/2022 11/8/21   Alecia Becerra MD   venlafaxine-SR (Effexor XR) 75 mg capsule Take 75 mg by mouth nightly.   Patient not taking: Reported on 2/1/2022    Provider, Historical       Review of Systems:    General, constitutional: negative  Eyes, vision: negative  Ears, nose, throat: negative  Cardiovascular, heart: negative  Respiratory: negative  Gastrointestinal: negative  Genitourinary: negative  Musculoskeletal: negative  Skin and integumentary: negative  Psychiatric: negative  Endocrine: negative  Neurological: negative, except for HPI  Hematologic/lymphatic: negative  Allergy/immunology: negative      Objective:     Visit Vitals  BP (!) 140/90 (BP 1 Location: Left arm, BP Patient Position: Sitting, BP Cuff Size: Adult)   Pulse 83   Resp 16   Ht 5' 3\" (1.6 m)   Wt 282 lb (127.9 kg)   SpO2 98%   BMI 49.95 kg/m²       Physical Exam:      General:  appears well nourished in no acute distress  Neck: no carotid bruits  Lungs: clear to auscultation  Heart:  no murmurs, regular rate  Lower extremity: peripheral pulses palpable and no edema  Skin: intact    Neurological exam:    Awake, alert, oriented to person, place and time  Recent and remote memory were normal  Attention and concentration were intact  Language was intact. There was no aphasia  Speech: no dysarthria  Fund of knowledge was preserved    Cranial nerves:   II-XII were tested    Perrrla  Visual fields were full  Eomi, no evidence of nystagmus  Facial sensation:  normal and symmetric  Facial motor: normal and symmetric  Hearing intact  SCM strength intact  Tongue: midline without fasciculations    Motor: Tone normal    No evidence of fasciculations    Strength testing:   deltoid triceps biceps Wrist ext. Wrist flex. intrinsics Hip flex. Hip ext. Knee ext. Knee flex Dorsi flex Plantar flex   Right 5 5 5 5 5 5 5 5 5 5 5 5   Left 5 5 5 5 5 5 5 5 5 5 5 5         Sensory:  Upper extremity: Sensory loss in the median distribution bilaterally  Lower extremity: intact to pp, light touch, and vibration > 10 seconds  Tinel's and Phalen testing was present bilaterally right greater than left    Reflexes:    Right Left  Biceps  2 2  Triceps 2 2  Brachiorad. 2 2  Patella  2 2  Achilles 2 2    Plantar response:  flexor bilaterally    Cerebellar testing:  no tremor apparent, finger/nose and carissa were intact    Romberg: absent    Gait: steady. Labs:     Lab Results   Component Value Date/Time    Hemoglobin A1c 5.5 11/29/2021 12:00 AM    Sodium 139 11/29/2021 12:00 AM    Potassium 4.4 11/29/2021 12:00 AM    Chloride 105 11/29/2021 12:00 AM    Glucose 127 (H) 11/29/2021 12:00 AM    BUN 15 11/29/2021 12:00 AM    Creatinine 0.62 11/29/2021 12:00 AM    Calcium 9.4 11/29/2021 12:00 AM    WBC 8.1 11/12/2021 03:08 PM    HCT 41.8 11/12/2021 03:08 PM    HGB 12.7 11/12/2021 03:08 PM    PLATELET 879 29/32/1890 03:08 PM    Hemoglobin A1c, External 7.8 07/13/2020 12:00 AM       Imaging:    Results from Hospital Encounter encounter on 05/14/21    MRI KNEE LT WO CONT    Narrative  EXAM: MRI KNEE LT WO CONT    INDICATION: Chronic inflammatory arthritis. Primary osteoarthritis involving  multiple joints. Undifferentiated connective tissue disease. High ESR.  Uptake  kidneys on bone scan. Knee pain. COMPARISON: Bone scan 3/31/2021    TECHNIQUE: Axial T2 fat-saturated and proton density fat-saturated; coronal T1  and proton density fat-saturated; and sagittal T2 fat-saturated, proton density  fat-saturated, and gradient echo MRI of the left knee . CONTRAST: None. FINDINGS: Bone marrow: Subchondral reactive bony signal at the lateral trochlear  facet . Bone signal otherwise normal.    Joint fluid: Small knee effusion. Trace fluid in gastrocnemius semimembranosus  bursa. Collateral ligaments and posterior, lateral corner: Intact. Medial meniscus: Intact. Lateral meniscus: Intact. ACL and PCL: Intact. Tendons: Intact. Muscles: Within normal limits. Patellofemoral alignment: There is an appearance of lateral patellar subluxation  and tilting with lateralization of the lateral margin of the patellar tendon  relative to the lateral and should of the lateral facet of the femoral trochlea. The trochlear groove does not appear hypoplastic. There is patella brenda. TT-TG  distance: 13 mm. . TT-PCL distance: 17 mm. Articular cartilage: There is a 4 mm grade 4 chondral defect of the superior  lateral trochlear facet. Soft tissue mass: None. Impression  1. Patella brenda. Lateral subluxation of patella and patellar tendon. 4 mm sized  grade 4 chondral defect of superior lateral trochlear facet with underlying  subchondral stress reaction. 2. No meniscal or ligamentous derangement demonstrated. 3. Small knee effusion. Results from [place] encounter on 11/12/21    CTA CHEST W OR W WO CONT    Narrative  EXAM:  CTA CHEST W OR W WO CONT    INDICATION: Chest pain, recent surgery    COMPARISON: CTA chest of 9/11/2020    TECHNIQUE: Helical thin section chest CT following uneventful intravenous  administration of nonionic contrast 80 mL of isovue 370 according to  departmental PE protocol. Coronal and sagittal reformats were performed.  3D post  processing was performed. CT dose reduction was achieved through the use of a  standardized protocol tailored for this examination and automatic exposure  control for dose modulation. FINDINGS: This is a good quality study for the evaluation of pulmonary embolism  to the first subsegmental arterial level. There is no pulmonary embolism to this  level. THYROID: No nodule. MEDIASTINUM: No mass or lymphadenopathy. DUYEN: No mass or lymphadenopathy. THORACIC AORTA: No aneurysm. HEART: Normal in size. ESOPHAGUS: No wall thickening or dilatation. TRACHEA/BRONCHI: Patent. PLEURA: No effusion or pneumothorax. LUNGS: No nodule, mass, or airspace disease. UPPER ABDOMEN: Partially imaged. Cholecystectomy. Gastric sleeve. Soft tissue  gas in the right anterior abdominal wall, likely postsurgical.  BONES: No aggressive bone lesion or fracture. Degenerative changes in the spine.     Impression  No pulmonary embolism nor other acute finding in the chest.             Patient Active Problem List   Diagnosis Code    HTN (hypertension) I10    Obesity, morbid (more than 100 lbs over ideal weight or BMI > 40) ([facility]) E66.01    Fatigue R53.83    Vitamin D deficiency E55.9    AR (allergic rhinitis) J30.9    Obesity E66.9    Increased pulse rate R00.0    Acute laryngitis J04.0    Viral pneumonia, unspecified J12.9    Migraine headache G43.909    GERD (gastroesophageal reflux disease) K21.9    [place] hump E65    Type 2 diabetes mellitus without complication ([facility]) I31.4    Acute pharyngitis J02.9    Carpal tunnel syndrome of right wrist G56.01    Sleep apnea in adult G47.30    ALIYA (generalized anxiety disorder) F41.1    Hyperhidrosis R61    Palpitation R00.2    Tachycardia R00.0    Type 2 diabetes mellitus with diabetic neuropathy ([facility]) E11.40    Class 3 severe obesity due to excess calories with serious comorbidity and body mass index (BMI) of 60.0 to 69.9 in adult ([facility]) E66.01, Z68.44    Type 2 diabetes with nephropathy ([facility]) E11.21    Dysthymia F34.1    Type 2 diabetes mellitus with chronic kidney disease ([facility] E11.22        The patient should return to clinic in 1 year    Renewed medication: She will continue on Emgality, Nurtec, gabapentin. I spent  40  minutes on the day of the encounter preparing the office visit by reviewing medical records, obtaining a history, performing examination, counseling and educating the patient on diagnosis, documenting in the clinical medical record, and coordinating the care for the patient. The patient had the ability to ask questions and all questions were answered.                Signed By:  Liat Navarrete DO FAAJULIANO    February 1, 2022

## 2022-02-03 ENCOUNTER — TELEPHONE (OUTPATIENT)
Dept: PHARMACY | Age: 32
End: 2022-02-03

## 2022-02-03 RX ORDER — NYSTATIN 100000 U/G
CREAM TOPICAL
COMMUNITY

## 2022-02-03 RX ORDER — BLOOD-GLUCOSE,RECEIVER,CONT
EACH MISCELLANEOUS
Qty: 1 EACH | Refills: 0 | Status: SHIPPED | OUTPATIENT
Start: 2022-02-03 | End: 2022-07-15

## 2022-02-03 RX ORDER — BUPROPION HYDROCHLORIDE 150 MG/1
150 TABLET ORAL DAILY
COMMUNITY
End: 2022-10-24

## 2022-02-03 RX ORDER — OMEPRAZOLE 40 MG/1
40 CAPSULE, DELAYED RELEASE ORAL 2 TIMES DAILY
COMMUNITY

## 2022-02-03 NOTE — TELEPHONE ENCOUNTER
[facility] CLINICAL PHARMACY REVIEW - Be Well with Diabetes    Jyotsna White is a 33 y.o. female enrolled in the [address] [place], [place] [address] [facility] Employee Diabetes Program. Patient provided Soy Wagner with verbal consent to remain in the program for this year. Patient enrolled 4/1/20. [address] [place] [address],[address] [place] [address]     Had gastric sleeve in 2020    Medications:   Current Outpatient Medications   Medication Sig    DULoxetine (CYMBALTA) 30 mg capsule     BD Luer-Greg Syringe 3 mL 22 gauge x 1\" syrg     liraglutide, weight loss, (SAXENDA) 3 mg/0.5 mL (18 mg/3 mL) pen 0.5 mL by SubCUTAneous route daily. (Patient not taking: Reported on 2/1/2022)    semaglutide (Ozempic) 1 mg/dose (4 mg/3 mL) pnij 1 mg by SubCUTAneous route every seven (7) days.  metFORMIN (GLUCOPHAGE) 500 mg tablet Take 1 Tablet by mouth two (2) times daily (with meals).  rimegepant (Nurtec ODT) 75 mg disintegrating tablet 1 tablet oral every other day    busPIRone (BUSPAR) 5 mg tablet two (2) times a day.  Blood-Glucose Transmitter (Dexcom G6 Transmitter) nahid USE AS DIRECTED    Blood-Glucose Sensor (Dexcom G6 Sensor) nahid CHANGE EVERY 10 DAYS    metoprolol tartrate (LOPRESSOR) 100 mg IR tablet TAKE 1 TABLET BY MOUTH 2 TIMES A DAY GENERIC FOR LOPRESSOR    traZODone (DESYREL) 100 mg tablet Take 100 mg by mouth nightly as needed.  cyclobenzaprine (FLEXERIL) 5 mg tablet Take 1 Tablet by mouth nightly. (Patient not taking: Reported on 2/1/2022)    venlafaxine-SR (Effexor XR) 75 mg capsule Take 75 mg by mouth nightly. (Patient not taking: Reported on 2/1/2022)    aspirin delayed-release 81 mg tablet Take 1 Tablet by mouth daily.  gabapentin (NEURONTIN) 300 mg capsule Take 1 Capsule by mouth three (3) times daily. Max Daily Amount: 900 mg.    linaCLOtide (Linzess) 145 mcg cap capsule Take 290 mcg by mouth Daily (before breakfast).     cyanocobalamin (VITAMIN B12) 1,000 mcg/mL injection INJECT 1ML INTO THE MUSCLE MONTHLY    norethindrone (MICRONOR) 0.35 mg tab     atorvastatin (LIPITOR) 40 mg tablet TAKE 1 TABLET BY MOUTH EVERY DAY (Patient taking differently: nightly. TAKE 1 TABLET BY MOUTH EVERY DAY)  - was instructed to take 20 mg daily due to ldl in 40's    ergocalciferol (ERGOCALCIFEROL) 1,250 mcg (50,000 unit) capsule Take 1 Cap by mouth every seven (7) days. Indications: low vitamin D levels    galcanezumab-gnlm (Emgality Pen) 120 mg/mL injection 1 mL by SubCUTAneous route every thirty (30) days.  omeprazole (PRILOSEC) 40 mg capsule Take 1 Cap by mouth daily. (Patient taking differently: Take 40 mg by mouth two (2) times a day.)    nystatin (MYCOSTATIN) topical cream Apply  to affected area two (2) times a day. Apply to abdominal folds twice daily as needed for rash (Patient taking differently: Apply  to affected area two (2) times daily as needed. Apply to abdominal folds twice daily as needed for rash)    iron,carbonyl-FA-multivit-min (Opurity Multivitamin) 30 mg iron- 800 mcg chew Take 2 Tabs by mouth daily.  calcium carbonate/vitamin D3 (CALCIUM 600 + D,3, PO) Take 1 Tablet by mouth daily. Current Pharmacy: [facility]  Current testing supplies/frequency: Dexcom G6 - just looks at it if it alarms or she does not feel right. Discussed [employer] due to cost, she has tried [employer] as well but was told she had to use dexcom on our insurance. She likes the dexcom and will just stick with this for now. Pen needles/syringes: na    Allergies:   Allergies   Allergen Reactions    Latex Itching      Vitals/Labs:  BP Readings from Last 3 Encounters:   02/01/22 (!) 140/90   01/19/22 121/79   12/13/21 131/65     Lab Results   Component Value Date/Time    Microalb/Creat ratio (ug/mg creat.) 4 11/29/2021 12:00 AM     Lab Results   Component Value Date/Time    Hemoglobin A1c 5.5 11/29/2021 12:00 AM    Hemoglobin A1c 5.4 10/28/2021 02:29 PM    Hemoglobin A1c 5.9 (H) 02/17/2021 12:58 PM    Hemoglobin A1c (POC) 5.5 08/09/2021 02:50 PM    Hemoglobin A1c (POC) 8.4 12/24/2019 02:20 PM    Hemoglobin A1c (POC) 8.9 09/24/2019 08:53 AM    Hemoglobin A1c, External 7.8 07/13/2020 12:00 AM     Lab Results   Component Value Date/Time    Cholesterol, total 106 11/29/2021 12:00 AM    HDL Cholesterol 42 11/29/2021 12:00 AM    LDL, calculated 44 11/29/2021 12:00 AM    LDL, calculated 48 03/07/2020 09:44 AM    VLDL, calculated 20 11/29/2021 12:00 AM    VLDL, calculated 14 03/07/2020 09:44 AM    Triglyceride 110 11/29/2021 12:00 AM     ALT (SGPT)   Date Value Ref Range Status   11/29/2021 10 0 - 32 IU/L Final     AST (SGOT)   Date Value Ref Range Status   11/29/2021 11 0 - 40 IU/L Final     The ASCVD Risk score (Jeralene Brunner., et al., 2013) failed to calculate for the following reasons: The 2013 ASCVD risk score is only valid for ages 36 to 78     Lab Results   Component Value Date/Time    Creatinine 0.62 11/29/2021 12:00 AM     Estimated Creatinine Clearance: 149.1 mL/min (by C-G formula based on SCr of 0.62 mg/dL).     Lab Results   Component Value Date/Time    GFR est non- 11/29/2021 12:00 AM    GFR est  11/29/2021 12:00 AM     Immunizations:  Immunization History   Administered Date(s) Administered    COVID-19, Pfizer Purple top, DILUTE for use, 12+ yrs, 30mcg/0.3mL dose 12/22/2020, 01/12/2021, 09/29/2021    HPV (Quad) 04/05/2016, 04/05/2016    Hep B Vaccine (Adult) 10/25/2018, 12/03/2021, 01/04/2022    Influenza Vaccine 11/01/2019, 11/02/2020, 10/29/2021    Influenza Vaccine (Quad) PF (>6 Mo Flulaval, Fluarix, and >3 Yrs Afluria, Fluzone [id number]) 10/19/2015, 12/20/2016, 01/05/2018, 01/05/2018    Influenza Vaccine (Quadrivalent)(>18 Yrs Flublok [id number]) 01/05/2018    Influenza Vaccine PF 09/27/2013    Pneumococcal Polysaccharide (PPSV-23) 09/27/2013    TDAP Vaccine 08/27/2012    Tdap 12/03/2021      Social History:  Social History     Tobacco Use    Smoking status: Former Smoker     Packs/day: 0.50     Years: 5.00 Pack years: 2.50     Quit date: 2019     Years since quittin.2    Smokeless tobacco: Never Used   Substance Use Topics    Alcohol use: Yes     Alcohol/week: 1.0 standard drink     Types: 1 Shots of liquor, 1 Standard drinks or equivalent per week     Comment: socially, Monthly or less     ASSESSMENT:  Initial Program Requirements (Y indicates has completed for the year, N indicates needs to be completed by 2022): No - Provider Visit for DM (1st)  No - A1c (1st)     Ongoing Program Requirements (Y indicates has completed for the year, N indicates needs to be completed by 2022): No - Provider Visit for DM (2nd)  No - ACC/diabetes educator visit (if A1c over 8%)  No - A1c (2nd)  No - Lipid panel  No - Urine microalbumin  Yes - Pneumococcal vaccination: [id number]  No - Influenza vaccination for 2022  No - Medication adherence over 70%  Yes - On statin - atorvastatin  Yes - On ACEi/ARB or contraindication(s) Normal blood pressure, urinary albumin-to-creatinine ratio, and eGFR     Current medications eligible for copay waiver, up to $600, through [address]:  - aspirin, atorvastatin, metformin, ozempic  - Dexcom G6     Diabetes Care:   - Glycemic Goal: <7.0% and directed by provider. Is at blood glucose goal. Type 2 DM under excellent control as evidenced by < 6.  - Current symptoms/problems include none, on gabapentin for hand issue  - Home blood sugar records:  7 day avg 87, 90 day 89  - Any episodes of hypoglycemia? Yes, that is why got PA for dexcom, goes low at night- 5% at night in 90 days. She usually sleeps through it unless mom wakes her up and she will eat something. Stops eating around 8pm. Educated patient to adjust alarm sound and volume. - Known diabetic complications: none  - Eye exam current (within one year): yes  - Foot exam current (within one year): yes  - Daily aspirin?  Yes  - Medication compliance: compliant all of the time  - Diet compliance: compliant most of the time. Had gastric sleeve, they actually told her to eat more since working out. Did loose 85 pounds since surgery but has not lost any since May 2021.   - Current exercise: bootcamp 30 min per 3-4 days per week.     Other Considerations:  - Blood Pressure Goal: BP less than 140/90 mmHg due to history of DM: Is at blood pressure goal.   - Lipids: < 37 yo but is tolerating atorvastatin 20 mg  - Smoking status: former smoker    PLAN:  - Consideration(s) for provider:   · Trying ozempic to help with weight loss and metformin added for some insulin resistance and help manage weight as well  · Monitoring her low's  · Still sees weight loss clinic  - DM program gaps identified:   · Initial requirements: Provider visit with provider for DM (1st) and A1c (1st)   · Ongoing requirements: Provider visit for DM (2nd), A1c (2nd), Lipid panel, Urine microalbumin, Influenza vaccination for [id number] and Medication adherence over 70%   - Education to patient: Discussed general issues about diabetes pathophysiology and management., Addressed diet and exercise and Reminded to get yearly retinal exam.   - Follow up: PCP for identified gaps or as scheduled below and Endocrinologist for identified gaps or as scheduled below  - Upcoming appointments:   Future Appointments   Date Time Provider Mateo Christian   2/3/2022  8:30 AM Romie GUSMAN   4/27/2022  3:50 PM Vanessa Felton MD RDE [facility] [address] Goncalves, PharmD, [address] Pharmacist  Department: [phone]    For Pharmacy Admin Tracking Only     Gap Closed?: Yes   Time Spent (min): 30

## 2022-02-16 DIAGNOSIS — E55.9 VITAMIN D DEFICIENCY: ICD-10-CM

## 2022-02-18 ENCOUNTER — DOCUMENTATION ONLY (OUTPATIENT)
Dept: ENDOCRINOLOGY | Age: 32
End: 2022-02-18

## 2022-02-18 RX ORDER — ERGOCALCIFEROL 1.25 MG/1
CAPSULE ORAL
Qty: 12 CAPSULE | Refills: 3 | Status: SHIPPED | OUTPATIENT
Start: 2022-02-18

## 2022-02-28 ENCOUNTER — TELEPHONE (OUTPATIENT)
Dept: ENDOCRINOLOGY | Age: 32
End: 2022-02-28

## 2022-02-28 NOTE — TELEPHONE ENCOUNTER
2/28/2022  1:18 PM    Patient called to let dr. Lal Bachelor know that she cant keep her blood sugar up and she is light headed and sleepy- please call her back

## 2022-02-28 NOTE — TELEPHONE ENCOUNTER
Spoke with pt after reviewing her DexCom readings. Her BGs are running low overnight, but she notes that her BG today was in the 60s. She notes she has had the overnight low BGs since she had the gastric sleeve. I recommended she stop the Metformin, continue the Ozempic and let us know if she continues to have low BGs.

## 2022-02-28 NOTE — TELEPHONE ENCOUNTER
Patient called back at 1:43 pm stating that she has a Dexcom that keeps alarming when her blood sugar goes below 60. She said it kept going off all during the night stating that her blood sugar is dropping and action is needed. She takes Ozempic and Metformin. She did not eat much breakfast because she was not hungry, but did eat a scoop of BBQ chicken and Mac and cheese .

## 2022-03-18 PROBLEM — E11.21 TYPE 2 DIABETES WITH NEPHROPATHY (HCC): Status: ACTIVE | Noted: 2019-01-13

## 2022-03-18 PROBLEM — R00.2 PALPITATION: Status: ACTIVE | Noted: 2018-04-04

## 2022-03-18 PROBLEM — R00.0 TACHYCARDIA: Status: ACTIVE | Noted: 2018-04-04

## 2022-03-19 PROBLEM — E66.813 CLASS 3 SEVERE OBESITY DUE TO EXCESS CALORIES WITH SERIOUS COMORBIDITY AND BODY MASS INDEX (BMI) OF 60.0 TO 69.9 IN ADULT: Status: ACTIVE | Noted: 2018-08-09

## 2022-03-19 PROBLEM — G47.30 SLEEP APNEA IN ADULT: Status: ACTIVE | Noted: 2017-03-20

## 2022-03-19 PROBLEM — F34.1 DYSTHYMIA: Status: ACTIVE | Noted: 2020-04-21

## 2022-03-19 PROBLEM — F41.1 GAD (GENERALIZED ANXIETY DISORDER): Status: ACTIVE | Noted: 2018-04-04

## 2022-03-19 PROBLEM — R61 HYPERHIDROSIS: Status: ACTIVE | Noted: 2018-04-04

## 2022-03-19 PROBLEM — E11.40 TYPE 2 DIABETES MELLITUS WITH DIABETIC NEUROPATHY (HCC): Status: ACTIVE | Noted: 2018-04-25

## 2022-03-19 PROBLEM — E66.01 CLASS 3 SEVERE OBESITY DUE TO EXCESS CALORIES WITH SERIOUS COMORBIDITY AND BODY MASS INDEX (BMI) OF 60.0 TO 69.9 IN ADULT (HCC): Status: ACTIVE | Noted: 2018-08-09

## 2022-03-20 PROBLEM — E11.22 TYPE 2 DIABETES MELLITUS WITH CHRONIC KIDNEY DISEASE (HCC): Status: ACTIVE | Noted: 2021-11-17

## 2022-04-06 ENCOUNTER — APPOINTMENT (OUTPATIENT)
Dept: INTERNAL MEDICINE CLINIC | Age: 32
End: 2022-04-06

## 2022-04-06 DIAGNOSIS — E11.9 TYPE 2 DIABETES MELLITUS WITHOUT COMPLICATION, WITHOUT LONG-TERM CURRENT USE OF INSULIN (HCC): ICD-10-CM

## 2022-04-06 DIAGNOSIS — E78.2 MIXED HYPERLIPIDEMIA: ICD-10-CM

## 2022-04-11 ENCOUNTER — TELEPHONE (OUTPATIENT)
Dept: NEUROLOGY | Age: 32
End: 2022-04-11

## 2022-04-11 NOTE — TELEPHONE ENCOUNTER
Nurtec PA [facility] Key: [id number]     [id number] Ava Reveal L      Status pending

## 2022-04-13 ENCOUNTER — VIRTUAL VISIT (OUTPATIENT)
Dept: FAMILY MEDICINE CLINIC | Age: 32
End: 2022-04-13
Payer: COMMERCIAL

## 2022-04-13 DIAGNOSIS — G43.009 MIGRAINE WITHOUT AURA AND WITHOUT STATUS MIGRAINOSUS, NOT INTRACTABLE: ICD-10-CM

## 2022-04-13 DIAGNOSIS — R20.2 PARESTHESIA: ICD-10-CM

## 2022-04-13 DIAGNOSIS — Z71.89 ADVICE GIVEN ABOUT COVID-19 VIRUS INFECTION: ICD-10-CM

## 2022-04-13 DIAGNOSIS — F32.1 MODERATE MAJOR DEPRESSION (HCC): ICD-10-CM

## 2022-04-13 DIAGNOSIS — R53.83 OTHER FATIGUE: ICD-10-CM

## 2022-04-13 DIAGNOSIS — E11.21 TYPE 2 DIABETES WITH NEPHROPATHY (HCC): ICD-10-CM

## 2022-04-13 DIAGNOSIS — F51.04 PSYCHOPHYSIOLOGICAL INSOMNIA: ICD-10-CM

## 2022-04-13 DIAGNOSIS — I95.2 HYPOTENSION DUE TO DRUGS: ICD-10-CM

## 2022-04-13 DIAGNOSIS — F41.1 GAD (GENERALIZED ANXIETY DISORDER): ICD-10-CM

## 2022-04-13 DIAGNOSIS — M35.9 UNDIFFERENTIATED CONNECTIVE TISSUE DISEASE (HCC): Primary | ICD-10-CM

## 2022-04-13 DIAGNOSIS — E66.01 MORBID OBESITY WITH BMI OF 45.0-49.9, ADULT (HCC): ICD-10-CM

## 2022-04-13 PROCEDURE — 99214 OFFICE O/P EST MOD 30 MIN: CPT | Performed by: FAMILY MEDICINE

## 2022-04-13 RX ORDER — NORTRIPTYLINE HYDROCHLORIDE 25 MG/1
25 CAPSULE ORAL
Qty: 30 CAPSULE | Refills: 3 | Status: SHIPPED | OUTPATIENT
Start: 2022-04-13 | End: 2022-07-15

## 2022-04-13 NOTE — TELEPHONE ENCOUNTER
I have rain out of gabapentin. Can I please get a refill sent to [address] [place] [address] at [facility]. I thought there was an Rx on file from my last visit but they told me it was not.  I ran out last night.      Thanks,      Last office visit2/1/2022  Last med refill 9/22/2021

## 2022-04-13 NOTE — PROGRESS NOTES
Chief Complaint   Patient presents with    Hypotension    Fatigue     1. Have you been to the ER, urgent care clinic since your last visit? Hospitalized since your last visit? No    2. Have you seen or consulted any other health care providers outside of the [address] [place] [address] since your last visit? Include any pap smears or colon screening. No    Call placed to pt. Verified patient with two type of identifiers. c/o low blood pressure x 1-2 weeks. sbp <100,   Feeling increased fatigue.

## 2022-04-13 NOTE — PROGRESS NOTES
HISTORY OF PRESENT ILLNESS  Fina Riggs is a 33 y.o. female,            Pt's main concerns were provided on virtual video format visit, a telemed format, COVID-19 vaccinated pt is w/ comorbid medical history ,  Have been trying to stay safe  ,  pt has no fever no cough no dyspnea, no ha,    with hx of diabetic state, sees [facility], GBP compliant with diet, and chronic Insomnia with restlessness, for which has no suicidal, no homicidal ideations, pt has been given trazodone nightly but not helping,  pt has No hx of sleep apnea, no daily fatigue no trouble with TSH, not an anxious person,   HTN   pt also present for Bp check , compliant w/ the bp meds, currently taking metorprolol 100 mg bid, and having a low salt diet, an  active life style, patient does obtain the bp at home and the average of <90/60 today the pt denies Chest Pain, has no legs swelling no lightheadedness,      Current Outpatient Medications   Medication Sig Dispense Refill    Vitamin D2 1,250 mcg (50,000 unit) capsule TAKE 1 CAPSULE BY MOUTH ONE TIME WEEKLY 12 Capsule 3    omeprazole (PRILOSEC) 40 mg capsule Take 40 mg by mouth two (2) times a day.  nystatin (MYCOSTATIN) topical cream Apply topically as needed to affected area twice a day      buPROPion XL (Wellbutrin XL) 150 mg tablet Take 150 mg by mouth daily.  Blood-Glucose Meter,Continuous (Dexcom G6 ) misc Use with Dexcom CGM 1 Each 0    DULoxetine (CYMBALTA) 30 mg capsule Take 30 mg by mouth daily.  BD Luer-Greg Syringe 3 mL 22 gauge x 1\" syrg       semaglutide (Ozempic) 1 mg/dose (4 mg/3 mL) pnij 1 mg by SubCUTAneous route every seven (7) days. 12 Each 3    metFORMIN (GLUCOPHAGE) 500 mg tablet Take 1 Tablet by mouth two (2) times daily (with meals). 180 Tablet 1    rimegepant (Nurtec ODT) 75 mg disintegrating tablet 1 tablet oral every other day 16 Tablet 2    busPIRone (BUSPAR) 5 mg tablet two (2) times a day.       Blood-Glucose Transmitter (Dexcom G6 Transmitter) nahid USE AS DIRECTED 1 Each 3    Blood-Glucose Sensor (Dexcom G6 Sensor) nahid CHANGE EVERY 10 DAYS 3 Each 11    metoprolol tartrate (LOPRESSOR) 100 mg IR tablet TAKE 1 TABLET BY MOUTH 2 TIMES A DAY GENERIC FOR LOPRESSOR 180 Tablet 2    aspirin delayed-release 81 mg tablet Take 1 Tablet by mouth daily. 90 Tablet 3    gabapentin (NEURONTIN) 300 mg capsule Take 1 Capsule by mouth three (3) times daily. Max Daily Amount: 900 mg. 270 Capsule 1    linaCLOtide (Linzess) 145 mcg cap capsule Take 290 mcg by mouth Daily (before breakfast).  cyanocobalamin (VITAMIN B12) 1,000 mcg/mL injection INJECT 1ML INTO THE MUSCLE MONTHLY 1 mL 5    galcanezumab-gnlm (Emgality Pen) 120 mg/mL injection 1 mL by SubCUTAneous route every thirty (30) days. 1 mL 11    iron,carbonyl-FA-multivit-min (Opurity Multivitamin) 30 mg iron- 800 mcg chew Take 2 Tabs by mouth daily.  calcium carbonate/vitamin D3 (CALCIUM 600 + D,3, PO) Take 1 Tablet by mouth daily.  norethindrone (MICRONOR) 0.35 mg tab       atorvastatin (LIPITOR) 40 mg tablet TAKE 1 TABLET BY MOUTH EVERY DAY (Patient taking differently: Take 20 mg by mouth daily.  TAKE 1 TABLET BY MOUTH EVERY DAY) 90 Tablet 3     Allergies   Allergen Reactions    Latex Itching     Past Medical History:   Diagnosis Date    Anxiety with depression     Arrhythmia     tachycardia    Arthritis     Asthma     [place] hump 1/7/2015    Carpal tunnel syndrome of right wrist 12/20/2016    Diabetes ([facility] [address]     ALIYA (generalized anxiety disorder) 4/4/2018    GERD (gastroesophageal reflux disease) 8/13/2014    HTN (hypertension) 7/25/2011    Hyperaldosteronism ([address] 10/19/2015    Hyperhidrosis 4/4/2018    Morbid obesity ([facility] [address]     Other ill-defined conditions(799.89)     migraines    Palpitation 4/4/2018    Sleep apnea     uses CPAP    Tachycardia 4/4/2018     Past Surgical History:   Procedure Laterality Date    HX CHOLECYSTECTOMY  11/10/2021    Dr Javi Rojas GASTRECTOMY  2020    Gastric sleeve     HX GYN  10/19/2020    Laparoscopic right oopherectomy, left cystectomy    HX HERNIA REPAIR  2020    HX OVARIAN CYST REMOVAL  10/19/2020    HX TONSILLECTOMY      HX WISDOM TEETH EXTRACTION      OK REMOVAL OF TONSILS,<11 Y/O       Family History   Problem Relation Age of Onset    Hypertension Mother     Diabetes Mother         Type II    Anesth Problems Mother         respiratory issues - feels like she cannot breath when coming out of anesthesia   [facility] Arthritis-rheumatoid Mother    [facility] Gout Mother     Psychiatric Disorder Father     Drug Abuse Father     Cancer Other         prostate    Hypertension Sister     Thyroid Disease Sister         \"I think\"    Breast Cancer Sister     Attention Deficit Hyperactivity Disorder Brother     Hypertension Sister      Social History     Tobacco Use    Smoking status: Former Smoker     Packs/day: 0.50     Years: 5.00     Pack years: 2.50     Quit date: 2019     Years since quittin.4    Smokeless tobacco: Never Used   Substance Use Topics    Alcohol use:  Yes     Alcohol/week: 1.0 standard drink     Types: 1 Shots of liquor, 1 Standard drinks or equivalent per week     Comment: socially, Monthly or less      Lab Results   Component Value Date/Time    WBC 8.1 2021 03:08 PM    HGB 12.7 2021 03:08 PM    HCT 41.8 2021 03:08 PM    PLATELET 361  03:08 PM    MCV 91.9 2021 03:08 PM     Lab Results   Component Value Date/Time    Hemoglobin A1c 5.5 2021 12:00 AM    Hemoglobin A1c 5.4 10/28/2021 02:29 PM    Hemoglobin A1c 5.9 (H) 2021 12:58 PM    Hemoglobin A1c, External 7.8 2020 12:00 AM    Glucose 127 (H) 2021 12:00 AM    Glucose (POC) 112 11/10/2021 09:58 AM    Microalb/Creat ratio (ug/mg creat.) 4 2021 12:00 AM    LDL, calculated 44 2021 12:00 AM    LDL, calculated 48 2020 09:44 AM    Creatinine 0.62 2021 12:00 AM         Review of Systems   Constitutional: Positive for malaise/fatigue. Negative for chills and fever. HENT: Negative for congestion and nosebleeds. Eyes: Negative for blurred vision and pain. Respiratory: Negative for cough, shortness of breath and wheezing. Cardiovascular: Negative for chest pain and leg swelling. Gastrointestinal: Negative for constipation, diarrhea, nausea and vomiting. Genitourinary: Negative for dysuria and frequency. Musculoskeletal: Negative for joint pain and myalgias. Skin: Negative for itching and rash. Neurological: Positive for weakness. Negative for dizziness, loss of consciousness and headaches. Psychiatric/Behavioral: Negative for depression. The patient is nervous/anxious and has insomnia. Physical Exam  Constitutional:       Appearance: She is obese. She is not ill-appearing or toxic-appearing. HENT:      Head: Normocephalic and atraumatic. Mouth/Throat:      Mouth: Mucous membranes are moist.   Neurological:      Mental Status: She is alert and oriented to person, place, and time. Psychiatric:         Attention and Perception: Attention and perception normal. She is attentive. Mood and Affect: Mood is depressed. Mood is not anxious or elated. Affect is flat. Affect is not blunt or angry. Speech: Speech normal.         Behavior: Behavior normal. Behavior is cooperative. Thought Content: Thought content normal. Thought content is not paranoid or delusional. Thought content does not include suicidal ideation. Cognition and Memory: Cognition normal.         Judgment: Judgment normal.         ASSESSMENT and PLAN  Diagnoses and all orders for this visit:    1. Undifferentiated connective tissue disease (HCC)  -     nortriptyline (PAMELOR) 25 mg capsule; Take 1 Capsule by mouth nightly. 2. Moderate major depression (HCC)  -     nortriptyline (PAMELOR) 25 mg capsule;  Take 1 Capsule by mouth nightly.  -     SLEEP MEDICINE REFERRAL    3. Type 2 diabetes with nephropathy (HCC)  -     nortriptyline (PAMELOR) 25 mg capsule; Take 1 Capsule by mouth nightly. 4. ALIYA (generalized anxiety disorder)  -     nortriptyline (PAMELOR) 25 mg capsule; Take 1 Capsule by mouth nightly.  -     SLEEP MEDICINE REFERRAL    5. Other fatigue  -     nortriptyline (PAMELOR) 25 mg capsule; Take 1 Capsule by mouth nightly. 6. Hypotension due to drugs  -     nortriptyline (PAMELOR) 25 mg capsule; Take 1 Capsule by mouth nightly. 7. Psychophysiological insomnia  -     nortriptyline (PAMELOR) 25 mg capsule; Take 1 Capsule by mouth nightly. 8. Morbid obesity with BMI of 45.0-49.9, adult ([facility] [address]    9.  Advice given about COVID-19 virus infection        will decrease bblocker to 50mg bid cont monitoring bp and HR at home call if <90/60 or >150/90,      pt was advised about the covid protection with vaccination, and to Wear a facemask , having social distance, and to get tested if possible,     patient acknowledged understanding and agreed with today's recommendations,

## 2022-04-14 RX ORDER — GABAPENTIN 300 MG/1
300 CAPSULE ORAL 3 TIMES DAILY
Qty: 270 CAPSULE | Refills: 1 | Status: SHIPPED | OUTPATIENT
Start: 2022-04-14

## 2022-04-27 ENCOUNTER — VIRTUAL VISIT (OUTPATIENT)
Dept: ENDOCRINOLOGY | Age: 32
End: 2022-04-27
Payer: COMMERCIAL

## 2022-04-27 DIAGNOSIS — E11.9 TYPE 2 DIABETES MELLITUS WITHOUT COMPLICATION, WITHOUT LONG-TERM CURRENT USE OF INSULIN (HCC): Primary | ICD-10-CM

## 2022-04-27 DIAGNOSIS — E78.2 MIXED HYPERLIPIDEMIA: ICD-10-CM

## 2022-04-27 DIAGNOSIS — E55.9 HYPOVITAMINOSIS D: ICD-10-CM

## 2022-04-27 DIAGNOSIS — I10 ESSENTIAL HYPERTENSION: ICD-10-CM

## 2022-04-27 DIAGNOSIS — E66.01 CLASS 3 SEVERE OBESITY DUE TO EXCESS CALORIES WITH SERIOUS COMORBIDITY AND BODY MASS INDEX (BMI) OF 60.0 TO 69.9 IN ADULT (HCC): ICD-10-CM

## 2022-04-27 PROCEDURE — 99214 OFFICE O/P EST MOD 30 MIN: CPT | Performed by: INTERNAL MEDICINE

## 2022-04-27 RX ORDER — ATORVASTATIN CALCIUM 10 MG/1
10 TABLET, FILM COATED ORAL DAILY
Qty: 90 TABLET | Refills: 2 | Status: SHIPPED | OUTPATIENT
Start: 2022-04-27 | End: 2022-10-24

## 2022-04-27 RX ORDER — OXCARBAZEPINE 300 MG/1
300 TABLET, FILM COATED ORAL DAILY
COMMUNITY
Start: 2022-04-26 | End: 2022-10-24

## 2022-04-27 NOTE — PROGRESS NOTES
Chief Complaint   Patient presents with    Diabetes            **THIS IS A VIRTUAL VISIT VIA A VIDEO ENCOUNTER. PATIENT AGREED TO HAVE THEIR CARE DELIVERED OVER VIDEO IN PLACE OF THEIR REGULARLY SCHEDULED OFFICE VISIT**         Nancy Resendiz, was evaluated through a synchronous (real-time) audio-video encounter. The patient (or guardian if applicable) is aware that this is a billable service, which includes applicable co-pays. This Virtual Visit was conducted with patient's (and/or legal guardian's) consent. The visit was conducted pursuant to the emergency declaration under the [facility], [facility] authority and the [employer] Act. Patient identification was verified, and a caregiver was present when appropriate. The patient was located in a state where the provider was licensed to provide care. History of Present Illness: Marguerite Edwards is a 33 y.o. female here for follow up of diabetes. She was diagnosed with diabetes \"when I was in high school\". Because pt has features concerning for cushing's her PCP checked a 24 hour urine cortisol which was not elevated (42). She also had a TSH drawn which was 2.10. In March 2016 we tested pt for hyperaldosteronism, which was negative. I tested her for evidence of PCOS, her Testosterone was 14, with DHEA-S 109, her 17-OH Prog was not elevated and an overnight dexamethasone suppression test did suppress her ACTH and cortisol. She had the Gastric Sleeve on 9/8/20. Pt had a cholecystectomy on 11/10/21. Since our last visit she was having low BGs so we stopped the Metformin, but she is still taking the Ozempic 1.0mg daily. She notes her PCP lowered her Metoprolol to 50mg BID. She has not been having anymore low BGs since stopping the Metformin. She notes her weight is still in the 270s range.     At our last visit in August 2021 she was off all DM medications, her A1C was down to 5.5% and she was not having the issues of hypoglycemia. Pt was instructed to stay off the DM medications. Pt has received all three COVID vaccinations (Romie Bebeto). She is still monitoring her BGs using the [facility]. Reviewing her Clarity report she is not having low BGs overnight since stopping the Metformin. Pt is currently waking around 6AM. She is now working out in the afternoon. She is not typically eating breakfast in the morning. She will have some applesauce around 7AM.  She notes she is snacking on chocolate and popcorn in the morning. She has lunch around 1230-1PM, today she had 1/2 a chimichanga, some rice and bean and water. She has dinner around 6-7PM, last night she had steak, pork and beans and diet ginger ale. She is not snacking after dinner. She is now in her first semester of nursing school  Pt is still going to the boot camp 4 days per week. She wears a FitBit and gets 7500-10,000 steps per day. No history of vascular disease. No history of neuropathy, or nephropathy. Last eye exam was June 2020, no retinopathy. She is taking Vitamin D 50,000 units weekly. She is taking a MVI and B-12 1000mcg monthly. Pt is still taking her lipitor 40mg HS. Current Outpatient Medications   Medication Sig    OXcarbazepine (TRILEPTAL) 300 mg tablet Take 300 mg by mouth daily.  atorvastatin (LIPITOR) 10 mg tablet Take 1 Tablet by mouth daily.  gabapentin (NEURONTIN) 300 mg capsule Take 1 Capsule by mouth three (3) times daily. Max Daily Amount: 900 mg.    nortriptyline (PAMELOR) 25 mg capsule Take 1 Capsule by mouth nightly.  Vitamin D2 1,250 mcg (50,000 unit) capsule TAKE 1 CAPSULE BY MOUTH ONE TIME WEEKLY    omeprazole (PRILOSEC) 40 mg capsule Take 40 mg by mouth two (2) times a day.     nystatin (MYCOSTATIN) topical cream Apply topically as needed to affected area twice a day    buPROPion XL (Wellbutrin XL) 150 mg tablet Take 150 mg by mouth daily.  Blood-Glucose Meter,Continuous (Dexcom G6 ) misc Use with Dexcom CGM    DULoxetine (CYMBALTA) 30 mg capsule Take 30 mg by mouth daily.  BD Luer-Greg Syringe 3 mL 22 gauge x 1\" syrg     semaglutide (Ozempic) 1 mg/dose (4 mg/3 mL) pnij 1 mg by SubCUTAneous route every seven (7) days.  rimegepant (Nurtec ODT) 75 mg disintegrating tablet 1 tablet oral every other day    busPIRone (BUSPAR) 5 mg tablet two (2) times a day.  Blood-Glucose Transmitter (Dexcom G6 Transmitter) nahid USE AS DIRECTED    Blood-Glucose Sensor (Dexcom G6 Sensor) nahid CHANGE EVERY 10 DAYS    metoprolol tartrate (LOPRESSOR) 100 mg IR tablet TAKE 1 TABLET BY MOUTH 2 TIMES A DAY GENERIC FOR LOPRESSOR (Patient taking differently: Take 50 mg by mouth two (2) times a day.)    aspirin delayed-release 81 mg tablet Take 1 Tablet by mouth daily.  cyanocobalamin (VITAMIN B12) 1,000 mcg/mL injection INJECT 1ML INTO THE MUSCLE MONTHLY    norethindrone (MICRONOR) 0.35 mg tab     galcanezumab-gnlm (Emgality Pen) 120 mg/mL injection 1 mL by SubCUTAneous route every thirty (30) days.  iron,carbonyl-FA-multivit-min (Opurity Multivitamin) 30 mg iron- 800 mcg chew Take 2 Tabs by mouth daily.  calcium carbonate/vitamin D3 (CALCIUM 600 + D,3, PO) Take 1 Tablet by mouth daily. No current facility-administered medications for this visit. Allergies   Allergen Reactions    Latex Itching     Review of Systems:  - Eyes: no blurry vision or double vision  - Cardiovascular: no chest pain  - Respiratory: no shortness of breath  - Musculoskeletal: no myalgias  - Neurological: no numbness/tingling in extremities    Physical Examination:  There were no vitals taken for this visit.   - GENERAL: NCAT, Appears well nourished   - EYES: EOMI, non-icteric, no proptosis   - Ear/Nose/Throat: NCAT, no visible inflammation or masses   - CARDIOVASCULAR: no cyanosis, no visible JVD   - RESPIRATORY: respiratory effort normal without any distress or labored breathing   - MUSCULOSKELETAL: Normal ROM of neck and upper extremities observed   - SKIN: No rash on face   - NEUROLOGIC:  No facial asymmetry (Cranial nerve 7 motor function), No gaze palsy   - PSYCHIATRIC: Normal affect, Normal insight and judgement           Data Reviewed:   A1C 5.5%  Lipid 114/76/47/51.8    Assessment/Plan:   1) DM > Her A1C in April 2022 was 5.5%. Pt to continue the Ozempic 1.0mg weekly. She has not been having the low BGs since stopping the metformin. 2) HTN > Will not make any adjustments to her HTN regimen at this time. She is only taking Metoprolol 50mg BID. 3) HLD > Her lipid panel was at goal in April 2022. She is still taking the Atorvastatin 20mg daily. Since her levels are so good, we will decrease the dose to 10mg daily. 4) Obesity > Pt is s/p sleeve gastrectomy. She is still working out regularly. Pt encouraged to keep up the good work. See #1. 5) Hypovitaminosis D > Pt to continue her Vitamin D. Her Vitamin D in November 2021 was 41.4. Follow-up and Dispositions    · Return in about 6 months (around 10/27/2022). Pt voices understanding and agreement with the plan. RTC 6 months      Copy sent to:  Mansi Duran

## 2022-04-27 NOTE — LETTER
4/27/2022 3:59 PM    Patient:  Michael Jin   YOB: 1990  Date of Visit: 4/27/2022      Dear Tone Villa MD  [address] [address]  Via In Anselmo sandra MD  [address] [place] [id number]  Via Fax: [address] [place], [address] Right [address]  [facility] [address]  Via Fax: [phone]: Thank you for referring Ms. Wilton Valladares to me for evaluation/treatment. Below are the relevant portions of my assessment and plan of care. If you have questions, please do not hesitate to call me. I look forward to following Ms. Cary Fan along with you. Chief Complaint   Patient presents with    Diabetes       History of Present Illness: Michael Jin is a 33 y.o. female here for follow up of diabetes. She was diagnosed with diabetes \"when I was in high school\". Because pt has features concerning for cushing's her PCP checked a 24 hour urine cortisol which was not elevated (42). She also had a TSH drawn which was 2.10. In March 2016 we tested pt for hyperaldosteronism, which was negative. I tested her for evidence of PCOS, her Testosterone was 14, with DHEA-S 109, her 17-OH Prog was not elevated and an overnight dexamethasone suppression test did suppress her ACTH and cortisol. She had the Gastric Sleeve on 9/8/20. Pt had a cholecystectomy on 11/10/21. Since our last visit she was having low BGs so we stopped the Metformin, but she is still taking the Ozempic 1.0mg daily. She notes her PCP lowered her Metoprolol to 50mg BID. She has not been having anymore low BGs since stopping the Metformin. She notes her weight is still in the 270s range. At our last visit in August 2021 she was off all DM medications, her A1C was down to 5.5% and she was not having the issues of hypoglycemia. Pt was instructed to stay off the DM medications. Pt has received all three COVID vaccinations (Romie Tate).     She is still monitoring her BGs using the [facility] Clarity. Reviewing her Clarity report she is not having low BGs overnight since stopping the Metformin. Pt is currently waking around 6AM. She is now working out in the afternoon. She is not typically eating breakfast in the morning. She will have some applesauce around 7AM.  She notes she is snacking on chocolate and popcorn in the morning. She has lunch around 1230-1PM, today she had 1/2 a chimichanga, some rice and bean and water. She has dinner around 6-7PM, last night she had steak, pork and beans and diet ginger ale. She is not snacking after dinner. She is now in her first semester of nursing school  Pt is still going to the boot camp 4 days per week. She wears a FitBit and gets 7500-10,000 steps per day. No history of vascular disease. No history of neuropathy, or nephropathy. Last eye exam was June 2020, no retinopathy. She is taking Vitamin D 50,000 units weekly. She is taking a MVI and B-12 1000mcg monthly. Pt is still taking her lipitor 40mg HS. Current Outpatient Medications   Medication Sig    OXcarbazepine (TRILEPTAL) 300 mg tablet Take 300 mg by mouth daily.  gabapentin (NEURONTIN) 300 mg capsule Take 1 Capsule by mouth three (3) times daily. Max Daily Amount: 900 mg.    nortriptyline (PAMELOR) 25 mg capsule Take 1 Capsule by mouth nightly.  Vitamin D2 1,250 mcg (50,000 unit) capsule TAKE 1 CAPSULE BY MOUTH ONE TIME WEEKLY    omeprazole (PRILOSEC) 40 mg capsule Take 40 mg by mouth two (2) times a day.  nystatin (MYCOSTATIN) topical cream Apply topically as needed to affected area twice a day    buPROPion XL (Wellbutrin XL) 150 mg tablet Take 150 mg by mouth daily.  Blood-Glucose Meter,Continuous (Dexcom G6 ) misc Use with Dexcom CGM    DULoxetine (CYMBALTA) 30 mg capsule Take 30 mg by mouth daily.     BD Luer-Greg Syringe 3 mL 22 gauge x 1\" syrg     semaglutide (Ozempic) 1 mg/dose (4 mg/3 mL) pnij 1 mg by SubCUTAneous route every seven (7) days.  rimegepant (Nurtec ODT) 75 mg disintegrating tablet 1 tablet oral every other day    busPIRone (BUSPAR) 5 mg tablet two (2) times a day.  Blood-Glucose Transmitter (Dexcom G6 Transmitter) nahid USE AS DIRECTED    Blood-Glucose Sensor (Dexcom G6 Sensor) nahid CHANGE EVERY 10 DAYS    metoprolol tartrate (LOPRESSOR) 100 mg IR tablet TAKE 1 TABLET BY MOUTH 2 TIMES A DAY GENERIC FOR LOPRESSOR (Patient taking differently: Take 50 mg by mouth two (2) times a day.)    aspirin delayed-release 81 mg tablet Take 1 Tablet by mouth daily.  cyanocobalamin (VITAMIN B12) 1,000 mcg/mL injection INJECT 1ML INTO THE MUSCLE MONTHLY    norethindrone (MICRONOR) 0.35 mg tab     atorvastatin (LIPITOR) 40 mg tablet TAKE 1 TABLET BY MOUTH EVERY DAY (Patient taking differently: Take 20 mg by mouth daily. TAKE 1 TABLET BY MOUTH EVERY DAY)    galcanezumab-gnlm (Emgality Pen) 120 mg/mL injection 1 mL by SubCUTAneous route every thirty (30) days.  iron,carbonyl-FA-multivit-min (Opurity Multivitamin) 30 mg iron- 800 mcg chew Take 2 Tabs by mouth daily.  calcium carbonate/vitamin D3 (CALCIUM 600 + D,3, PO) Take 1 Tablet by mouth daily. No current facility-administered medications for this visit. Allergies   Allergen Reactions    Latex Itching     Review of Systems:  - Eyes: no blurry vision or double vision  - Cardiovascular: no chest pain  - Respiratory: no shortness of breath  - Musculoskeletal: no myalgias  - Neurological: no numbness/tingling in extremities    Physical Examination:  There were no vitals taken for this visit.   - General: pleasant, no distress, good eye contact   - Neck: no carotid bruits  - Cardiovascular: regular, normal rate, nl s1 and s2, no m/r/g, 2+ DP pulses   - Respiratory: clear bilaterally  - Integumentary: no edema, no foot ulcers  - Psychiatric: normal mood and affect    Diabetic foot exam:     Left Foot:   Visual Exam: normal    Pulse DP: 2+ (normal)   Filament test: normal sensation    Vibratory sensation: normal      Right Foot:   Visual Exam: normal    Pulse DP: 2+ (normal)   Filament test: normal sensation    Vibratory sensation: normal        Data Reviewed:   A1C 5.5%  Lipid 114/76/47/51.8    Assessment/Plan:   1) DM > Her A1C in April 2022 was 5.5%. Pt to continue the Ozempic 1.0mg weekly. She has not been having the low BGs since stopping the metformin. 2) HTN > Will not make any adjustments to her HTN regimen at this time. She is only taking Metoprolol 50mg BID. 3) HLD > Her lipid panel was at goal in April 2022. She is still taking the Atorvastatin 20mg daily. Since her levels are so good, we will decrease the dose to 10mg daily. 4) Obesity > Pt is s/p sleeve gastrectomy. She is still working out regularly. Pt encouraged to keep up the good work. See #1. 5) Hypovitaminosis D > Pt to continue her Vitamin D. Her Vitamin D in November 2021 was 41.4. Pt voices understanding and agreement with the plan. RTC 6 months      Copy sent to:  Mansi Celeste      Sincerely,      Claritza Gonzalez MD

## 2022-04-28 DIAGNOSIS — G43.719 INTRACTABLE CHRONIC MIGRAINE WITHOUT AURA AND WITHOUT STATUS MIGRAINOSUS: ICD-10-CM

## 2022-04-28 RX ORDER — RIMEGEPANT SULFATE 75 MG/75MG
TABLET, ORALLY DISINTEGRATING ORAL
Qty: 16 TABLET | Refills: 2 | Status: SHIPPED | OUTPATIENT
Start: 2022-04-28 | End: 2022-07-15

## 2022-04-28 NOTE — TELEPHONE ENCOUNTER
I received in the mail that Nurte was not approved due to missing information from the provider and that I am on Emgality. Is there an alternative I can take or an a appeal be done? I like the regimen I am on because I do not give many migraines, maybe once a month now. Last script written to [id number] [employer] should now go to [employer] for employees.    Last office visit 2/1/2022 james    Last med refill 12/7/2021

## 2022-05-11 ENCOUNTER — VIRTUAL VISIT (OUTPATIENT)
Dept: FAMILY MEDICINE CLINIC | Age: 32
End: 2022-05-11
Payer: COMMERCIAL

## 2022-05-11 DIAGNOSIS — Z02.89 ENCOUNTER TO OBTAIN EXCUSE FROM WORK: ICD-10-CM

## 2022-05-11 DIAGNOSIS — Z20.822 CONTACT WITH AND (SUSPECTED) EXPOSURE TO COVID-19: Primary | ICD-10-CM

## 2022-05-11 DIAGNOSIS — J02.8 ACUTE PHARYNGITIS DUE TO OTHER SPECIFIED ORGANISMS: ICD-10-CM

## 2022-05-11 PROCEDURE — 99213 OFFICE O/P EST LOW 20 MIN: CPT | Performed by: FAMILY MEDICINE

## 2022-05-11 RX ORDER — METHYLPREDNISOLONE 4 MG/1
TABLET ORAL
Qty: 1 DOSE PACK | Refills: 0 | Status: SHIPPED | OUTPATIENT
Start: 2022-05-11 | End: 2022-07-15 | Stop reason: ALTCHOICE

## 2022-05-11 RX ORDER — CETIRIZINE HCL 10 MG
10 TABLET ORAL
Qty: 30 TABLET | Refills: 2 | Status: SHIPPED | OUTPATIENT
Start: 2022-05-11 | End: 2022-10-24

## 2022-05-11 RX ORDER — AZITHROMYCIN 250 MG/1
TABLET, FILM COATED ORAL
Qty: 6 TABLET | Refills: 0 | Status: SHIPPED | OUTPATIENT
Start: 2022-05-11 | End: 2022-06-01 | Stop reason: ALTCHOICE

## 2022-05-11 NOTE — PROGRESS NOTES
Chief Complaint   Patient presents with    Cold Symptoms     1. Have you been to the ER, urgent care clinic since your last visit? Hospitalized since your last visit? No    2. Have you seen or consulted any other health care providers outside of the [address] [place] [address] since your last visit? Include any pap smears or colon screening. No       Call placed to pt. Verified patient with two type of identifiers.  C/o flu like symptoms since yesterday,  + chills, unsure of fever,   Mother tested positive ,  covid tested yesterday-negative

## 2022-05-11 NOTE — PROGRESS NOTES
HISTORY OF PRESENT ILLNESS  Saloni Christine is a 33 y.o. female, Today's Pt main concerns were provided on virtual visit and Video telemed format,  Present on VV for the concern of the current medical conditions,  pt is w/ comorbid history and  the pt has been exposed to covid-19 mother and today not feeling well at all, and has been tested once with neg results but she feels like something wrong, with a bad sore throat, pt also been vaccianted Pt Have been working for couple of days pt has + low grade fever with no dry cough no dyspnea, no ha, not dizzy, nl smell nl taste, no N/V/D, no body ache , and no orbital pain, no rash,  have a good family support at this time,         Current Outpatient Medications   Medication Sig Dispense Refill    azithromycin (ZITHROMAX) 250 mg tablet 2 first day then one tab daily till finished 6 Tablet 0    methylPREDNISolone (MEDROL DOSEPACK) 4 mg tablet As directed for 6 days one package 1 Dose Pack 0    cetirizine (ZYRTEC) 10 mg tablet Take 1 Tablet by mouth nightly. 30 Tablet 2    rimegepant (Nurtec ODT) 75 mg disintegrating tablet 1 tablet oral every other day 16 Tablet 2    OXcarbazepine (TRILEPTAL) 300 mg tablet Take 300 mg by mouth daily.  atorvastatin (LIPITOR) 10 mg tablet Take 1 Tablet by mouth daily. 90 Tablet 2    gabapentin (NEURONTIN) 300 mg capsule Take 1 Capsule by mouth three (3) times daily. Max Daily Amount: 900 mg. 270 Capsule 1    nortriptyline (PAMELOR) 25 mg capsule Take 1 Capsule by mouth nightly. 30 Capsule 3    Vitamin D2 1,250 mcg (50,000 unit) capsule TAKE 1 CAPSULE BY MOUTH ONE TIME WEEKLY 12 Capsule 3    omeprazole (PRILOSEC) 40 mg capsule Take 40 mg by mouth two (2) times a day.  nystatin (MYCOSTATIN) topical cream Apply topically as needed to affected area twice a day      buPROPion XL (Wellbutrin XL) 150 mg tablet Take 150 mg by mouth daily.       Blood-Glucose Meter,Continuous (Dexcom G6 ) misc Use with Dexcom CGM 1 Each 0  DULoxetine (CYMBALTA) 30 mg capsule Take 30 mg by mouth daily.  BD Luer-Greg Syringe 3 mL 22 gauge x 1\" syrg       semaglutide (Ozempic) 1 mg/dose (4 mg/3 mL) pnij 1 mg by SubCUTAneous route every seven (7) days. 12 Each 3    busPIRone (BUSPAR) 5 mg tablet two (2) times a day.  Blood-Glucose Transmitter (Dexcom G6 Transmitter) nahid USE AS DIRECTED 1 Each 3    Blood-Glucose Sensor (Dexcom G6 Sensor) nahid CHANGE EVERY 10 DAYS 3 Each 11    metoprolol tartrate (LOPRESSOR) 100 mg IR tablet TAKE 1 TABLET BY MOUTH 2 TIMES A DAY GENERIC FOR LOPRESSOR (Patient taking differently: Take 50 mg by mouth two (2) times a day.) 180 Tablet 2    aspirin delayed-release 81 mg tablet Take 1 Tablet by mouth daily. 90 Tablet 3    cyanocobalamin (VITAMIN B12) 1,000 mcg/mL injection INJECT 1ML INTO THE MUSCLE MONTHLY 1 mL 5    norethindrone (MICRONOR) 0.35 mg tab       galcanezumab-gnlm (Emgality Pen) 120 mg/mL injection 1 mL by SubCUTAneous route every thirty (30) days. 1 mL 11    iron,carbonyl-FA-multivit-min (Opurity Multivitamin) 30 mg iron- 800 mcg chew Take 2 Tabs by mouth daily.  calcium carbonate/vitamin D3 (CALCIUM 600 + D,3, PO) Take 1 Tablet by mouth daily.        Allergies   Allergen Reactions    Latex Itching     Past Medical History:   Diagnosis Date    Anxiety with depression     Arrhythmia     tachycardia    Arthritis     Asthma     Nubieber hump 1/7/2015    Carpal tunnel syndrome of right wrist 12/20/2016    Diabetes [facility] [address]     ALIYA (generalized anxiety disorder) 4/4/2018    GERD (gastroesophageal reflux disease) 8/13/2014    HTN (hypertension) 7/25/2011    Hyperaldosteronism ([address] 10/19/2015    Hyperhidrosis 4/4/2018    Morbid obesity ([facility] [address]     Other ill-defined conditions(799.89)     migraines    Palpitation 4/4/2018    Sleep apnea     uses CPAP    Tachycardia 4/4/2018     Past Surgical History:   Procedure Laterality Date    HX CHOLECYSTECTOMY  11/10/2021    Dr Mayuri Salguero HX GASTRECTOMY  2020    Gastric sleeve     HX GYN  10/19/2020    Laparoscopic right oopherectomy, left cystectomy    HX HERNIA REPAIR  2020    HX OVARIAN CYST REMOVAL  10/19/2020    HX TONSILLECTOMY      HX WISDOM TEETH EXTRACTION      ND REMOVAL OF TONSILS,<11 Y/O       Family History   Problem Relation Age of Onset    Hypertension Mother     Diabetes Mother         Type II    Anesth Problems Mother         respiratory issues - feels like she cannot breath when coming out of anesthesia   [facility] Arthritis-rheumatoid Mother    [facility] Gout Mother     Psychiatric Disorder Father     Drug Abuse Father     Cancer Other         prostate    Hypertension Sister     Thyroid Disease Sister         \"I think\"    Breast Cancer Sister     Attention Deficit Hyperactivity Disorder Brother     Hypertension Sister      Social History     Tobacco Use    Smoking status: Former Smoker     Packs/day: 0.50     Years: 5.00     Pack years: 2.50     Quit date: 2019     Years since quittin.5    Smokeless tobacco: Never Used   Substance Use Topics    Alcohol use:  Yes     Alcohol/week: 1.0 standard drink     Types: 1 Shots of liquor, 1 Standard drinks or equivalent per week     Comment: socially, Monthly or less      Lab Results   Component Value Date/Time    WBC 8.1 2021 03:08 PM    HGB 12.7 2021 03:08 PM    HCT 41.8 2021 03:08 PM    PLATELET 061  03:08 PM    MCV 91.9 2021 03:08 PM     Lab Results   Component Value Date/Time    Hemoglobin A1c 5.5 2021 12:00 AM    Hemoglobin A1c 5.4 10/28/2021 02:29 PM    Hemoglobin A1c 5.9 (H) 2021 12:58 PM    Hemoglobin A1c, External 5.5 2022 12:00 AM    Hemoglobin A1c, External 7.8 2020 12:00 AM    Glucose 127 (H) 2021 12:00 AM    Glucose (POC) 112 11/10/2021 09:58 AM    Microalb/Creat ratio (ug/mg creat.) 4 2021 12:00 AM    LDL, calculated 44 2021 12:00 AM    LDL, calculated 48 2020 09:44 AM Creatinine 0.62 11/29/2021 12:00 AM         Review of Systems   Constitutional: Positive for malaise/fatigue and weight loss. Negative for chills and fever. HENT: Positive for congestion, sinus pain and sore throat. Negative for nosebleeds. Eyes: Negative for blurred vision and pain. Respiratory: Negative for cough, shortness of breath and wheezing. Cardiovascular: Negative for chest pain and leg swelling. Gastrointestinal: Negative for constipation, diarrhea, nausea and vomiting. Genitourinary: Negative for dysuria and frequency. Musculoskeletal: Negative for joint pain and myalgias. Skin: Negative for itching and rash. Neurological: Negative for dizziness, loss of consciousness and headaches. Psychiatric/Behavioral: Negative for depression. The patient is not nervous/anxious and does not have insomnia. Physical Exam  Constitutional:       Appearance: She is obese. She is ill-appearing. She is not toxic-appearing. HENT:      Head: Normocephalic and atraumatic. Mouth/Throat:      Mouth: Mucous membranes are moist.   Neurological:      Mental Status: She is alert and oriented to person, place, and time. Psychiatric:         Mood and Affect: Mood normal.         Behavior: Behavior normal.         Thought Content: Thought content normal.         Judgment: Judgment normal.         ASSESSMENT and PLAN  Diagnoses and all orders for this visit:    1. Contact with and (suspected) exposure to covid-19  -     azithromycin (ZITHROMAX) 250 mg tablet; 2 first day then one tab daily till finished  -     methylPREDNISolone (MEDROL DOSEPACK) 4 mg tablet; As directed for 6 days one package  -     cetirizine (ZYRTEC) 10 mg tablet; Take 1 Tablet by mouth nightly. 2. Encounter to obtain excuse from work  -     azithromycin (ZITHROMAX) 250 mg tablet; 2 first day then one tab daily till finished  -     methylPREDNISolone (MEDROL DOSEPACK) 4 mg tablet;  As directed for 6 days one package  - cetirizine (ZYRTEC) 10 mg tablet; Take 1 Tablet by mouth nightly. 3. Acute pharyngitis due to other specified organisms  -     azithromycin (ZITHROMAX) 250 mg tablet; 2 first day then one tab daily till finished  -     methylPREDNISolone (MEDROL DOSEPACK) 4 mg tablet; As directed for 6 days one package  -     cetirizine (ZYRTEC) 10 mg tablet; Take 1 Tablet by mouth nightly. Concern abdout COVID-19 addressed and detailed, pt was told that the best way to prevent illness is by vaccination and protection, to Wear a facemask , having social distance, and to get tested and re-tested, with contact tracing,     Current treatment is aimed to relieve sxs such as pain relievers no ibuprofen but mostly acetaminophen,   plenty of Rest and a lot of oral hydration. Isolation and Contact tracing at this time     Also was advised to stay in isolation for 5-10 days at this time with cold sxs presentation, Pt was also told if develop dyspnea needs to call 911 or go to er, call for mini advise, pt agreed with today's visit. Pursuant to the emergency declaration under the [address] and [facility] waiver authority and the [employer] Act, this Virtual Visit was conducted, with patient's consent, to reduce the patient's risk of exposure to COVID-19 and provide continuity of care for an established patient. Today's recommendations, Services were provided through a Video synchronous discussion virtually to substitute for in-person appointment.

## 2022-05-11 NOTE — LETTER
NOTIFICATION RETURN TO WORK / SCHOOL    5/11/2022 2:25 PM    Ms. Dariusz Ramírez [id number]      To Whom It May Concern:    Yifan Garces is currently under the care of [facility]. She will return to work/school on:  5/16/2022    If there are questions or concerns please have the patient contact our office.         Sincerely,      Chato Chu MD

## 2022-05-12 ENCOUNTER — OFFICE VISIT (OUTPATIENT)
Dept: URGENT CARE | Age: 32
End: 2022-05-12
Payer: COMMERCIAL

## 2022-05-12 VITALS
RESPIRATION RATE: 16 BRPM | WEIGHT: 281 LBS | HEART RATE: 96 BPM | OXYGEN SATURATION: 99 % | TEMPERATURE: 99.1 F | DIASTOLIC BLOOD PRESSURE: 82 MMHG | BODY MASS INDEX: 49.78 KG/M2 | SYSTOLIC BLOOD PRESSURE: 156 MMHG

## 2022-05-12 DIAGNOSIS — R05.9 COUGH: Primary | ICD-10-CM

## 2022-05-12 DIAGNOSIS — Z20.822 EXPOSURE TO COVID-19 VIRUS: ICD-10-CM

## 2022-05-12 DIAGNOSIS — J02.9 SORE THROAT: ICD-10-CM

## 2022-05-12 LAB
FLUAV+FLUBV AG NOSE QL IA.RAPID: NEGATIVE
FLUAV+FLUBV AG NOSE QL IA.RAPID: NEGATIVE
S PYO AG THROAT QL: NEGATIVE
SARS-COV-2 PCR, POC: NEGATIVE
VALID INTERNAL CONTROL?: YES
VALID INTERNAL CONTROL?: YES

## 2022-05-12 PROCEDURE — 87804 INFLUENZA ASSAY W/OPTIC: CPT | Performed by: FAMILY MEDICINE

## 2022-05-12 PROCEDURE — 99213 OFFICE O/P EST LOW 20 MIN: CPT | Performed by: FAMILY MEDICINE

## 2022-05-12 PROCEDURE — 87635 SARS-COV-2 COVID-19 AMP PRB: CPT | Performed by: FAMILY MEDICINE

## 2022-05-12 PROCEDURE — 87880 STREP A ASSAY W/OPTIC: CPT | Performed by: FAMILY MEDICINE

## 2022-05-12 NOTE — PROGRESS NOTES
Hudson Cummings is a 33 y.o. female who presents with ST, HA, body aches, cough, fatigue x 2-3 days. Was exposed to COVID-19 by mother who lives in the same household. Denies SOB, N/V/D. The history is provided by the patient.         Past Medical History:   Diagnosis Date    Anxiety with depression     Arrhythmia     tachycardia    Arthritis     Asthma     [place] hump 1/7/2015    Carpal tunnel syndrome of right wrist 12/20/2016    Diabetes ([facility] [address]     ALIYA (generalized anxiety disorder) 4/4/2018    GERD (gastroesophageal reflux disease) 8/13/2014    HTN (hypertension) 7/25/2011    Hyperaldosteronism ([facility] [address] 10/19/2015    Hyperhidrosis 4/4/2018    Morbid obesity ([facility] [address] [facility]     Other ill-defined conditions(799.89)     migraines    Palpitation 4/4/2018    Sleep apnea     uses CPAP    Tachycardia 4/4/2018        Past Surgical History:   Procedure Laterality Date    HX CHOLECYSTECTOMY  11/10/2021    Dr Gary Hyatt  09/08/2020    Gastric sleeve     HX GYN  10/19/2020    Laparoscopic right oopherectomy, left cystectomy    HX HERNIA REPAIR  09/08/2020    HX OVARIAN CYST REMOVAL  10/19/2020    HX TONSILLECTOMY      HX WISDOM TEETH EXTRACTION      CT REMOVAL OF TONSILS,<13 Y/O           Family History   Problem Relation Age of Onset    Hypertension Mother     Diabetes Mother         Type II    Anesth Problems Mother         respiratory issues - feels like she cannot breath when coming out of anesthesia   [facility] Arthritis-rheumatoid Mother    [facility] Gout Mother     Psychiatric Disorder Father     Drug Abuse Father     Cancer Other         prostate    Hypertension Sister     Thyroid Disease Sister         \"I think\"    Breast Cancer Sister     Attention Deficit Hyperactivity Disorder Brother     Hypertension Sister         Social History     Socioeconomic History    Marital status: SINGLE     Spouse name: Not on file    Number of children: Not on file    Years of education: Not on file  Highest education level: Not on file   Occupational History    Occupation: customer ser and student     Comment: [employer]   Tobacco Use    Smoking status: Former Smoker     Packs/day: 0.50     Years: 5.00     Pack years: 2.50     Quit date: 2019     Years since quittin.5    Smokeless tobacco: Never Used   Vaping Use    Vaping Use: Never used   Substance and Sexual Activity    Alcohol use: Yes     Alcohol/week: 1.0 standard drink     Types: 1 Shots of liquor, 1 Standard drinks or equivalent per week     Comment: socially, Monthly or less    Drug use: No    Sexual activity: Not Currently     Partners: Male     Birth control/protection: Condom   Other Topics Concern     Service Not Asked    Blood Transfusions Not Asked    Caffeine Concern Not Asked    Occupational Exposure Not Asked    Hobby Hazards Not Asked    Sleep Concern Not Asked    Stress Concern Not Asked    Weight Concern Not Asked    Special Diet Not Asked    Back Care Not Asked    Exercise Not Asked    Bike Helmet Not Asked    [address] Not Asked    Self-Exams Not Asked   Social History Narrative    Not on file     Social Determinants of Health     Financial Resource Strain:     Difficulty of Paying Living Expenses: Not on file   Food Insecurity:     Worried About Running Out of Food in the Last Year: Not on file    Yaritza of Food in the Last Year: Not on file   Transportation Needs:     Lack of Transportation (Medical): Not on file    Lack of Transportation (Non-Medical):  Not on file   Physical Activity:     Days of Exercise per Week: Not on file    Minutes of Exercise per Session: Not on file   Stress:     Feeling of Stress : Not on file   Social Connections:     Frequency of Communication with Friends and Family: Not on file    Frequency of Social Gatherings with Friends and Family: Not on file    Attends Church Services: Not on file    Active Member of Clubs or Organizations: Not on file   [facility] Attends Club or Organization Meetings: Not on file    Marital Status: Not on file   Intimate Partner Violence:     Fear of Current or Ex-Partner: Not on file    Emotionally Abused: Not on file    Physically Abused: Not on file    Sexually Abused: Not on file   Housing Stability:     Unable to Pay for Housing in the Last Year: Not on file    Number of Ashelymogriselda in the Last Year: Not on file    Unstable Housing in the Last Year: Not on file                ALLERGIES: Latex    Review of Systems   Constitutional: Positive for activity change, appetite change, fatigue and fever. HENT: Positive for sore throat. Respiratory: Positive for cough. Negative for shortness of breath. Gastrointestinal: Negative for diarrhea, nausea and vomiting. Musculoskeletal: Positive for myalgias. Neurological: Positive for headaches. Vitals:    05/12/22 1811   BP: (!) 156/82   Pulse: 96   Resp: 16   Temp: 99.1 °F (37.3 °C)   SpO2: 99%   Weight: 281 lb (127.5 kg)       Physical Exam  Vitals and nursing note reviewed. Constitutional:       General: She is not in acute distress. Appearance: She is well-developed. She is not diaphoretic. HENT:      Right Ear: Tympanic membrane, ear canal and external ear normal.      Left Ear: Tympanic membrane, ear canal and external ear normal.      Nose: Congestion present. Right Sinus: Maxillary sinus tenderness and frontal sinus tenderness present. Left Sinus: Maxillary sinus tenderness and frontal sinus tenderness present. Mouth/Throat:      Pharynx: Posterior oropharyngeal erythema present. No oropharyngeal exudate. Tonsils: No tonsillar abscesses. Cardiovascular:      Rate and Rhythm: Normal rate and regular rhythm. Heart sounds: Normal heart sounds. Pulmonary:      Effort: Pulmonary effort is normal. No respiratory distress. Breath sounds: Normal breath sounds. No stridor. No wheezing, rhonchi or rales.    Lymphadenopathy: Cervical: No cervical adenopathy. Neurological:      Mental Status: She is alert. Psychiatric:         Behavior: Behavior normal.         Thought Content: Thought content normal.         Judgment: Judgment normal.         MDM    ICD-10-CM ICD-9-CM   1. Cough  R05.9 786.2   2. Sore throat  J02.9 462   3. Exposure to COVID-19 virus  Z20.822 V01.79       Orders Placed This Encounter    AMB POC RAPID STREP A    AMB POC NADEGE INFLUENZA A/B TEST    POCT COVID-19, SARS-COV-2, PCR     Order Specific Question:   Is this test for diagnosis or screening? Answer:   Diagnosis of ill patient     Order Specific Question:   Symptomatic for COVID-19 as defined by CDC? Answer:   Yes     Order Specific Question:   Date of Symptom Onset     Answer:   5/9/2022     Order Specific Question:   Hospitalized for COVID-19? Answer:   No     Order Specific Question:   Admitted to ICU for COVID-19? Answer:   No     Order Specific Question:   Employed in healthcare setting? Answer:   Yes     Order Specific Question:   Resident in a congregate (group) care setting? Answer:   No     Order Specific Question:   Pregnant? Answer:   No     Order Specific Question:   Previously tested for COVID-19? Answer:   Yes        Quarantine given sx and exposure x 5 days from sx onset then wear well fitting mask for an additional 5 days thereafter  Deep breathing exercises, ambulation  Tylenol prn  Increase fluids with electrolytes     If signs and symptoms become worse the pt is to go to the ER.        Results for orders placed or performed in visit on 05/12/22   AMB POC RAPID STREP A   Result Value Ref Range    VALID INTERNAL CONTROL POC Yes     Group A Strep Ag Negative Negative   AMB POC NADEGE INFLUENZA A/B TEST   Result Value Ref Range    VALID INTERNAL CONTROL POC Yes     Influenza A Ag POC Negative Negative    Influenza B Ag POC Negative Negative   POCT COVID-19, SARS-COV-2, PCR   Result Value Ref Range    SARS-COV-2 PCR, POC Negative Negative     Procedures

## 2022-05-16 ENCOUNTER — VIRTUAL VISIT (OUTPATIENT)
Dept: FAMILY MEDICINE CLINIC | Age: 32
End: 2022-05-16
Payer: COMMERCIAL

## 2022-05-16 DIAGNOSIS — J02.8 ACUTE PHARYNGITIS DUE TO OTHER SPECIFIED ORGANISMS: Primary | ICD-10-CM

## 2022-05-16 DIAGNOSIS — Z71.89 ADVICE GIVEN ABOUT COVID-19 VIRUS INFECTION: ICD-10-CM

## 2022-05-16 DIAGNOSIS — U07.1 COVID-19 VIRUS INFECTION: ICD-10-CM

## 2022-05-16 PROCEDURE — 99213 OFFICE O/P EST LOW 20 MIN: CPT | Performed by: FAMILY MEDICINE

## 2022-05-16 RX ORDER — PROMETHAZINE HYDROCHLORIDE AND CODEINE PHOSPHATE 6.25; 1 MG/5ML; MG/5ML
5 SOLUTION ORAL
Qty: 75 ML | Refills: 0 | Status: SHIPPED | OUTPATIENT
Start: 2022-05-16 | End: 2022-05-21

## 2022-05-16 RX ORDER — ALBUTEROL SULFATE 90 UG/1
1 AEROSOL, METERED RESPIRATORY (INHALATION)
Qty: 1 EACH | Refills: 2 | Status: SHIPPED | OUTPATIENT
Start: 2022-05-16 | End: 2022-10-24

## 2022-05-16 NOTE — PROGRESS NOTES
Chief Complaint   Patient presents with    Positive For Covid-19     1. Have you been to the ER, urgent care clinic since your last visit? Hospitalized since your last visit? Yes When: 5/15/22 [facility]  Reason for visit: covid exposure     2. Have you seen or consulted any other health care providers outside of the [address] [place] [address] since your last visit? Include any pap smears or colon screening. No    Call placed to pt. Verified patient with two type of identifiers. tested positive yesterday for covid at [facility], c/oa productive cough ,fatigue and a sore throat. requesting Paxlovid?

## 2022-05-16 NOTE — PROGRESS NOTES
HISTORY OF PRESENT ILLNESS  Siva Everett is a 33 y.o. female. Today's Pt main concerns were provided on virtual visit and Video telemed format,  Present on VV for the concern of the current medical conditions,  pt is w/ comorbid history and  the pt has been exposed to covid-19 individual, and has been diagnose with ++test resultsPt Have been staying at home for couple of days pt has some low grade fever with dry cough bad sore throat that she went to ER for it, no dyspnea, no ha, not dizzy, nl smell nl taste, no N/V/D, no body ache. over all stating that things are getting better have a good family support at this time, who brings food for the pt       Current Outpatient Medications   Medication Sig Dispense Refill    cetirizine (ZYRTEC) 10 mg tablet Take 1 Tablet by mouth nightly. 30 Tablet 2    rimegepant (Nurtec ODT) 75 mg disintegrating tablet 1 tablet oral every other day 16 Tablet 2    OXcarbazepine (TRILEPTAL) 300 mg tablet Take 300 mg by mouth daily.  atorvastatin (LIPITOR) 10 mg tablet Take 1 Tablet by mouth daily. 90 Tablet 2    gabapentin (NEURONTIN) 300 mg capsule Take 1 Capsule by mouth three (3) times daily. Max Daily Amount: 900 mg. 270 Capsule 1    nortriptyline (PAMELOR) 25 mg capsule Take 1 Capsule by mouth nightly. 30 Capsule 3    Vitamin D2 1,250 mcg (50,000 unit) capsule TAKE 1 CAPSULE BY MOUTH ONE TIME WEEKLY 12 Capsule 3    omeprazole (PRILOSEC) 40 mg capsule Take 40 mg by mouth two (2) times a day.  nystatin (MYCOSTATIN) topical cream Apply topically as needed to affected area twice a day      buPROPion XL (Wellbutrin XL) 150 mg tablet Take 150 mg by mouth daily.  Blood-Glucose Meter,Continuous (Dexcom G6 ) misc Use with Dexcom CGM 1 Each 0    DULoxetine (CYMBALTA) 30 mg capsule Take 30 mg by mouth daily.  BD Luer-Greg Syringe 3 mL 22 gauge x 1\" syrg       semaglutide (Ozempic) 1 mg/dose (4 mg/3 mL) pnij 1 mg by SubCUTAneous route every seven (7) days. 12 Each 3    busPIRone (BUSPAR) 5 mg tablet two (2) times a day.  Blood-Glucose Transmitter (Dexcom G6 Transmitter) nahid USE AS DIRECTED 1 Each 3    Blood-Glucose Sensor (Dexcom G6 Sensor) nahid CHANGE EVERY 10 DAYS 3 Each 11    metoprolol tartrate (LOPRESSOR) 100 mg IR tablet TAKE 1 TABLET BY MOUTH 2 TIMES A DAY GENERIC FOR LOPRESSOR (Patient taking differently: Take 50 mg by mouth two (2) times a day.) 180 Tablet 2    aspirin delayed-release 81 mg tablet Take 1 Tablet by mouth daily. 90 Tablet 3    cyanocobalamin (VITAMIN B12) 1,000 mcg/mL injection INJECT 1ML INTO THE MUSCLE MONTHLY 1 mL 5    norethindrone (MICRONOR) 0.35 mg tab       galcanezumab-gnlm (Emgality Pen) 120 mg/mL injection 1 mL by SubCUTAneous route every thirty (30) days. 1 mL 11    iron,carbonyl-FA-multivit-min (Opurity Multivitamin) 30 mg iron- 800 mcg chew Take 2 Tabs by mouth daily.  calcium carbonate/vitamin D3 (CALCIUM 600 + D,3, PO) Take 1 Tablet by mouth daily.       azithromycin (ZITHROMAX) 250 mg tablet 2 first day then one tab daily till finished 6 Tablet 0    methylPREDNISolone (MEDROL DOSEPACK) 4 mg tablet As directed for 6 days one package 1 Dose Pack 0     Allergies   Allergen Reactions    Latex Itching     Past Medical History:   Diagnosis Date    Anxiety with depression     Arrhythmia     tachycardia    Arthritis     Asthma     [place] hump 1/7/2015    Carpal tunnel syndrome of right wrist 12/20/2016    Diabetes [facility] [address]     ALIYA (generalized anxiety disorder) 4/4/2018    GERD (gastroesophageal reflux disease) 8/13/2014    HTN (hypertension) 7/25/2011    Hyperaldosteronism ([facility] [address] 10/19/2015    Hyperhidrosis 4/4/2018    Morbid obesity ([facility] [address]     Other ill-defined conditions(799.89)     migraines    Palpitation 4/4/2018    Sleep apnea     uses CPAP    Tachycardia 4/4/2018     Past Surgical History:   Procedure Laterality Date    HX CHOLECYSTECTOMY  11/10/2021    Dr John Red 2020    Gastric sleeve     HX GYN  10/19/2020    Laparoscopic right oopherectomy, left cystectomy    HX HERNIA REPAIR  2020    HX OVARIAN CYST REMOVAL  10/19/2020    HX TONSILLECTOMY      HX WISDOM TEETH EXTRACTION      [place] REMOVAL OF TONSILS,<13 Y/O       Family History   Problem Relation Age of Onset    Hypertension Mother     Diabetes Mother         Type II    Anesth Problems Mother         respiratory issues - feels like she cannot breath when coming out of anesthesia   [facility] Arthritis-rheumatoid Mother    [facility] Gout Mother     Psychiatric Disorder Father     Drug Abuse Father     Cancer Other         prostate    Hypertension Sister     Thyroid Disease Sister         \"I think\"    Breast Cancer Sister     Attention Deficit Hyperactivity Disorder Brother     Hypertension Sister      Social History     Tobacco Use    Smoking status: Former Smoker     Packs/day: 0.50     Years: 5.00     Pack years: 2.50     Quit date: 2019     Years since quittin.5    Smokeless tobacco: Never Used   Substance Use Topics    Alcohol use: Yes     Alcohol/week: 1.0 standard drink     Types: 1 Shots of liquor, 1 Standard drinks or equivalent per week     Comment: socially, Monthly or less      Lab Results   Component Value Date/Time    Cholesterol, total 106 2021 12:00 AM    HDL Cholesterol 42 2021 12:00 AM    LDL, calculated 44 2021 12:00 AM    LDL, calculated 48 2020 09:44 AM    LDL-C, External 51.8 2022 12:00 AM    Triglyceride 110 2021 12:00 AM     Lab Results   Component Value Date/Time    ALT (SGPT) 10 2021 12:00 AM    Alk. phosphatase 86 2021 12:00 AM    Bilirubin, total 0.5 2021 12:00 AM    Albumin 4.1 2021 12:00 AM    Protein, total 6.7 2021 12:00 AM    PLATELET 474  03:08 PM        Review of Systems   Constitutional: Negative for chills and fever. HENT: Negative for congestion and nosebleeds.     Eyes: Negative for blurred vision and pain. Respiratory: Negative for cough, shortness of breath and wheezing. Cardiovascular: Negative for chest pain and leg swelling. Gastrointestinal: Negative for constipation, diarrhea, nausea and vomiting. Genitourinary: Negative for dysuria and frequency. Musculoskeletal: Negative for joint pain and myalgias. Skin: Negative for itching and rash. Neurological: Negative for dizziness, loss of consciousness and headaches. Psychiatric/Behavioral: Negative for depression. The patient is not nervous/anxious and does not have insomnia. Physical Exam  Constitutional:       Appearance: She is obese. She is not ill-appearing or toxic-appearing. HENT:      Head: Normocephalic and atraumatic. Mouth/Throat:      Mouth: Mucous membranes are moist.   Neurological:      Mental Status: She is alert and oriented to person, place, and time. Psychiatric:         Mood and Affect: Mood normal.         Behavior: Behavior normal.         Thought Content: Thought content normal.         Judgment: Judgment normal.         ASSESSMENT and PLAN  Diagnoses and all orders for this visit:    1. COVID-19 virus infection  -     promethazine-codeine (PHENERGAN with CODEINE) 6.25-10 mg/5 mL syrup; Take 5 mL by mouth three (3) times daily as needed for Cough for up to 5 days. Max Daily Amount: 15 mL. -     albuterol (PROVENTIL HFA, VENTOLIN HFA, PROAIR HFA) 90 mcg/actuation inhaler; Take 1 Puff by inhalation every four (4) hours as needed for Wheezing. 2. Acute pharyngitis due to other specified organisms  -     promethazine-codeine (PHENERGAN with CODEINE) 6.25-10 mg/5 mL syrup; Take 5 mL by mouth three (3) times daily as needed for Cough for up to 5 days. Max Daily Amount: 15 mL. -     albuterol (PROVENTIL HFA, VENTOLIN HFA, PROAIR HFA) 90 mcg/actuation inhaler; Take 1 Puff by inhalation every four (4) hours as needed for Wheezing.     3. Advice given about COVID-19 virus infection  - promethazine-codeine (PHENERGAN with CODEINE) 6.25-10 mg/5 mL syrup; Take 5 mL by mouth three (3) times daily as needed for Cough for up to 5 days. Max Daily Amount: 15 mL. -     albuterol (PROVENTIL HFA, VENTOLIN HFA, PROAIR HFA) 90 mcg/actuation inhaler; Take 1 Puff by inhalation every four (4) hours as needed for Wheezing. Concern abdout COVID-19 addressed and detailed, pt was told that the best way to prevent illness is by vaccination and protection, to Wear a facemask , having social distance, and to get tested and re-tested, with contact tracing,     Current treatment is aimed to relieve sxs such as pain relievers no ibuprofen but mostly acetaminophen,   plenty of Rest and a lot of oral hydration. Isolation and Contact tracing at this time     Also was advised to stay in isolation for 5-10 days at this time with cold sxs presentation, Pt was also told if develop dyspnea needs to call 911 or go to er, call for mini advise, pt agreed with today's visit. Pursuant to the emergency declaration under the [address] and [facility] waiver authority and the [employer] and [employer] Act, this Virtual Visit was conducted, with patient's consent, to reduce the patient's risk of exposure to COVID-19 and provide continuity of care for an established patient. Today's recommendations, Services were provided through a Video synchronous discussion virtually to substitute for in-person appointment.

## 2022-06-01 RX ORDER — FLUCONAZOLE 150 MG/1
150 TABLET ORAL DAILY
Qty: 2 TABLET | Refills: 0 | Status: SHIPPED | OUTPATIENT
Start: 2022-06-01 | End: 2022-06-03

## 2022-06-01 RX ORDER — NITROFURANTOIN 25; 75 MG/1; MG/1
100 CAPSULE ORAL 2 TIMES DAILY
Qty: 14 CAPSULE | Refills: 0 | Status: SHIPPED | OUTPATIENT
Start: 2022-06-01 | End: 2022-06-08

## 2022-06-06 DIAGNOSIS — M17.0 BILATERAL PRIMARY OSTEOARTHRITIS OF KNEE: Primary | ICD-10-CM

## 2022-06-23 ENCOUNTER — OFFICE VISIT (OUTPATIENT)
Dept: ORTHOPEDIC SURGERY | Age: 32
End: 2022-06-23
Payer: COMMERCIAL

## 2022-06-23 VITALS
RESPIRATION RATE: 17 BRPM | BODY MASS INDEX: 51.77 KG/M2 | DIASTOLIC BLOOD PRESSURE: 85 MMHG | TEMPERATURE: 98.2 F | WEIGHT: 292.2 LBS | HEART RATE: 92 BPM | OXYGEN SATURATION: 99 % | HEIGHT: 63 IN | SYSTOLIC BLOOD PRESSURE: 135 MMHG

## 2022-06-23 DIAGNOSIS — M76.51 PATELLAR TENDINITIS OF BOTH KNEES: ICD-10-CM

## 2022-06-23 DIAGNOSIS — M76.52 PATELLAR TENDINITIS OF BOTH KNEES: ICD-10-CM

## 2022-06-23 DIAGNOSIS — M16.0 BILATERAL PRIMARY OSTEOARTHRITIS OF HIP: Primary | ICD-10-CM

## 2022-06-23 PROCEDURE — 99213 OFFICE O/P EST LOW 20 MIN: CPT | Performed by: ORTHOPAEDIC SURGERY

## 2022-06-23 RX ORDER — CELECOXIB 200 MG/1
200 CAPSULE ORAL 2 TIMES DAILY
Qty: 180 CAPSULE | Refills: 0 | Status: SHIPPED | OUTPATIENT
Start: 2022-06-23 | End: 2022-10-24

## 2022-06-23 NOTE — PROGRESS NOTES
Alma Kaiser is a 27 y.o. female    Visit Vitals  /85 (BP 1 Location: Left upper arm, BP Patient Position: Sitting, BP Cuff Size: Adult)   Pulse 92   Temp 98.2 °F (36.8 °C) (Oral)   Resp 17   Ht 5' 3\" (1.6 m)   Wt 292 lb 3.2 oz (132.5 kg)   SpO2 99%   BMI 51.76 kg/m²

## 2022-06-23 NOTE — LETTER
6/23/2022    Patient: Augustus Aaron   YOB: 1990   Date of Visit: 6/23/2022     Azalia Ayoub MD  [address] [place] [address]    Dear Azalia Ayoub MD,      Thank you for referring Ms. Niecy Drake to [facility] for evaluation. My notes for this consultation are attached. If you have questions, please do not hesitate to call me. I look forward to following your patient along with you.       Sincerely,    Henri Buenrostro, DO

## 2022-06-23 NOTE — PROGRESS NOTES
6/23/2022      CC: bilateral knee pain    HPI:      This is a 27y.o. year old female who presents for a follow up visit. The patient was last seen and diagnosed with bilateral patellar tendinitis. The patient's treatments since the most recent visit have comprised of PT, nsaids. The patient has had no relief of the chief complaint.         PMH:  Past Medical History:   Diagnosis Date    Anxiety with depression     Arrhythmia     tachycardia    Arthritis     Asthma     [place] hump 1/7/2015    Carpal tunnel syndrome of right wrist 12/20/2016    Diabetes [facility] [address]     ALIYA (generalized anxiety disorder) 4/4/2018    GERD (gastroesophageal reflux disease) 8/13/2014    HTN (hypertension) 7/25/2011    Hyperaldosteronism ([facility] [address] 10/19/2015    Hyperhidrosis 4/4/2018    Morbid obesity ([facility] [address]     Other ill-defined conditions(799.89)     migraines    Palpitation 4/4/2018    Sleep apnea     uses CPAP    Tachycardia 4/4/2018       PSxHx:  Past Surgical History:   Procedure Laterality Date    HX CHOLECYSTECTOMY  11/10/2021    Dr Donaldo Roman  09/08/2020    Gastric sleeve     HX GYN  10/19/2020    Laparoscopic right oopherectomy, left cystectomy    HX HERNIA REPAIR  09/08/2020    HX OVARIAN CYST REMOVAL  10/19/2020    HX TONSILLECTOMY      HX WISDOM TEETH EXTRACTION      [place] REMOVAL OF TONSILS,<13 Y/O         Meds:    Current Outpatient Medications:     albuterol (PROVENTIL HFA, VENTOLIN HFA, PROAIR HFA) 90 mcg/actuation inhaler, Take 1 Puff by inhalation every four (4) hours as needed for Wheezing., Disp: 1 Each, Rfl: 2    methylPREDNISolone (MEDROL DOSEPACK) 4 mg tablet, As directed for 6 days one package, Disp: 1 Dose Pack, Rfl: 0    cetirizine (ZYRTEC) 10 mg tablet, Take 1 Tablet by mouth nightly., Disp: 30 Tablet, Rfl: 2    rimegepant (Nurtec ODT) 75 mg disintegrating tablet, 1 tablet oral every other day, Disp: 16 Tablet, Rfl: 2    OXcarbazepine (TRILEPTAL) 300 mg tablet, Take 300 mg by mouth daily. , Disp: , Rfl:     atorvastatin (LIPITOR) 10 mg tablet, Take 1 Tablet by mouth daily. , Disp: 90 Tablet, Rfl: 2    gabapentin (NEURONTIN) 300 mg capsule, Take 1 Capsule by mouth three (3) times daily. Max Daily Amount: 900 mg., Disp: 270 Capsule, Rfl: 1    nortriptyline (PAMELOR) 25 mg capsule, Take 1 Capsule by mouth nightly., Disp: 30 Capsule, Rfl: 3    Vitamin D2 1,250 mcg (50,000 unit) capsule, TAKE 1 CAPSULE BY MOUTH ONE TIME WEEKLY, Disp: 12 Capsule, Rfl: 3    omeprazole (PRILOSEC) 40 mg capsule, Take 40 mg by mouth two (2) times a day., Disp: , Rfl:     nystatin (MYCOSTATIN) topical cream, Apply topically as needed to affected area twice a day, Disp: , Rfl:     buPROPion XL (Wellbutrin XL) 150 mg tablet, Take 150 mg by mouth daily. , Disp: , Rfl:     Blood-Glucose Meter,Continuous (Dexcom G6 ) misc, Use with Dexcom CGM, Disp: 1 Each, Rfl: 0    DULoxetine (CYMBALTA) 30 mg capsule, Take 30 mg by mouth daily. , Disp: , Rfl:     BD Luer-Greg Syringe 3 mL 22 gauge x 1\" syrg, , Disp: , Rfl:     semaglutide (Ozempic) 1 mg/dose (4 mg/3 mL) pnij, 1 mg by SubCUTAneous route every seven (7) days. , Disp: 12 Each, Rfl: 3    busPIRone (BUSPAR) 5 mg tablet, two (2) times a day., Disp: , Rfl:     Blood-Glucose Transmitter (Dexcom G6 Transmitter) nahid, USE AS DIRECTED, Disp: 1 Each, Rfl: 3    Blood-Glucose Sensor (Dexcom G6 Sensor) nahid, CHANGE EVERY 10 DAYS, Disp: 3 Each, Rfl: 11    metoprolol tartrate (LOPRESSOR) 100 mg IR tablet, TAKE 1 TABLET BY MOUTH 2 TIMES A DAY GENERIC FOR LOPRESSOR (Patient taking differently: Take 50 mg by mouth two (2) times a day.), Disp: 180 Tablet, Rfl: 2    aspirin delayed-release 81 mg tablet, Take 1 Tablet by mouth daily. , Disp: 90 Tablet, Rfl: 3    cyanocobalamin (VITAMIN B12) 1,000 mcg/mL injection, INJECT 1ML INTO THE MUSCLE MONTHLY, Disp: 1 mL, Rfl: 5    norethindrone (MICRONOR) 0.35 mg tab, , Disp: , Rfl:     galcanezumab-gnlm (Emgality Pen) 120 mg/mL injection, 1 mL by SubCUTAneous route every thirty (30) days. , Disp: 1 mL, Rfl: 11    iron,carbonyl-FA-multivit-min (Opurity Multivitamin) 30 mg iron- 800 mcg chew, Take 2 Tabs by mouth daily. , Disp: , Rfl:     calcium carbonate/vitamin D3 (CALCIUM 600 + D,3, PO), Take 1 Tablet by mouth daily. , Disp: , Rfl:     All:  Allergies   Allergen Reactions    Latex Itching       Social Hx:  Social History     Socioeconomic History    Marital status: SINGLE   Occupational History    Occupation: customer ser and student     Comment: [employer]   Tobacco Use    Smoking status: Former Smoker     Packs/day: 0.50     Years: 5.00     Pack years: 2.50     Quit date: 2019     Years since quittin.6    Smokeless tobacco: Never Used   Vaping Use    Vaping Use: Never used   Substance and Sexual Activity    Alcohol use:  Yes     Alcohol/week: 1.0 standard drink     Types: 1 Shots of liquor, 1 Standard drinks or equivalent per week     Comment: socially, Monthly or less    Drug use: No    Sexual activity: Not Currently     Partners: Male     Birth control/protection: Condom       Family Hx:  Family History   Problem Relation Age of Onset    Hypertension Mother     Diabetes Mother         Type II    Anesth Problems Mother         respiratory issues - feels like she cannot breath when coming out of anesthesia   [address] [place] [address] Arthritis-rheumatoid Mother     Gout Mother     Psychiatric Disorder Father     Drug Abuse Father     Cancer Other         prostate    Hypertension Sister     Thyroid Disease Sister         \"I think\"    Breast Cancer Sister     Attention Deficit Hyperactivity Disorder Brother     Hypertension Sister          Review of Systems:       General: Denies headache, lethargy, fever, weight loss  Ears/Nose/Throat: Denies ear discharge, drainage, nosebleeds, hoarse voice, dental problems  Cardiovascular: Denies chest pain, shortness of breath  Lungs: Denies chest pain, breathing problems, wheezing, pneumonia  Stomach: Denies stomach pain, heartburn, constipation, irritable bowel  Skin: Denies rash, sores, open wounds  Musculoskeletal: bilateral knee pain  Genitourinary: Denies dysuria, hematuria, polyuria  Gastrointestinal: Denies constipation, obstipation, diarrhea  Neurological: Denies changes in sight, smell, hearing, taste, seizures. Denies loss of consciousness. Psychiatric: Denies depression, sleep pattern changes, anxiety, change in personality  Endocrine: Denies mood swings, heat or cold intolerance  Hematologic/Lymphatic: Denies anemia, purpura, petechia  Allergic/Immunologic: Denies swelling of throat, pain or swelling at lymph nodes      Physical Examination:    There were no vitals taken for this visit. General: AOX3, no apparent distress  Psychiatric: mood and affect appropriate  Lungs: breathing is symmetric and unlabored bilaterally  Heart: regular rate and rhythm  Abdomen: no guarding  Head: normocephalic, atraumatic  Skin: No significant abnormalities, good turgor  Sensation intact to light touch: C5-T1 dermatomes  Muscular exam: 5/5 strength in all major muscle groups unless noted in specialty exam.    Extremities        Left lower extremity:  No gross deformity. No restriction to range of motion of the hip, knee, ankle. Muscle bulk is appropriate without wasting. Sensation is intact to light touch in the L1-S1 dermatomes. Capillary refill is less than 2 seconds in the fingers. Strength testing is 5/5 at the major muscle groups of the hip, knee, ankle. Right lower extremity: No gross deformity. No restriction to range of motion of the hip, knee, ankle. Muscle bulk is appropriate without wasting. Sensation is intact to light touch in the L1-S1 dermatomes. Capillary refill is less than 2 seconds in the fingers. Strength testing is 5/5 at the major muscle groups of the hip, knee, ankle.             Diagnostics:    Pertinent Diagnostics: Xrays are available of the right knee, they indicate mild osteoarthritis of the knee joint, no significant other findings, no other osseus abnormalities, fractures, or dislocations. Assessment: right knee pain,bilateral knee osteoarthritis  Plan: This patient and I had a long discussion regarding her treatment options, she has had an injection recently, this has not helped her, though her symptoms are more consistent with patellar tendinitis and possible early degenerative changes. We discussed the bracing may be helpful, weight loss is important, she will also start an anti-inflammatory as well, prescribed by myself today. Follow-up in approximately months for repeat clinical check. She will need a custom brace due to her thigh girth. She stated her understanding and satisfaction and she will follow-up with me as noted. Ms. Nia Bragg has a reminder for a \"due or due soon\" health maintenance. I have asked that she contact her primary care provider for follow-up on this health maintenance.

## 2022-07-06 RX ORDER — SEMAGLUTIDE 2.68 MG/ML
2 INJECTION, SOLUTION SUBCUTANEOUS
Qty: 3 PEN | Refills: 3 | Status: SHIPPED | OUTPATIENT
Start: 2022-07-06

## 2022-07-07 ENCOUNTER — TELEPHONE (OUTPATIENT)
Dept: ENDOCRINOLOGY | Age: 32
End: 2022-07-07

## 2022-07-07 ENCOUNTER — TELEPHONE (OUTPATIENT)
Dept: ORTHOPEDIC SURGERY | Age: 32
End: 2022-07-07

## 2022-07-07 NOTE — TELEPHONE ENCOUNTER
LIZETTE: Spoke with Frankey Railing M.,pharmacy tech. Advised OK to dispense 3 pens of Ozempic to equal 3 month supply. She expressed understanding.

## 2022-07-07 NOTE — TELEPHONE ENCOUNTER
Patient came in to the office today for measurements. Order for custom brace sent to [facility].

## 2022-07-07 NOTE — TELEPHONE ENCOUNTER
7/7/2022    [facility] called and left a vm at 9:08am stating they have a prescripiton for Ozempic written for quanity 3 unspecified. They didn't know if it was for 3ml or 3 pens. They would like clarification. They can be reached at [phone].     Thanks, Cisco Aguiar

## 2022-07-07 NOTE — TELEPHONE ENCOUNTER
----- Message from Cat Smith DO sent at 6/23/2022  2:28 PM EDT -----  She will need a custom knee brace for her right knee.

## 2022-07-08 ENCOUNTER — VIRTUAL VISIT (OUTPATIENT)
Dept: FAMILY MEDICINE CLINIC | Age: 32
End: 2022-07-08
Payer: COMMERCIAL

## 2022-07-08 DIAGNOSIS — I10 ESSENTIAL HYPERTENSION: ICD-10-CM

## 2022-07-08 DIAGNOSIS — F51.02 ADJUSTMENT INSOMNIA: Primary | ICD-10-CM

## 2022-07-08 DIAGNOSIS — F41.1 GAD (GENERALIZED ANXIETY DISORDER): ICD-10-CM

## 2022-07-08 PROCEDURE — 99213 OFFICE O/P EST LOW 20 MIN: CPT | Performed by: FAMILY MEDICINE

## 2022-07-08 RX ORDER — METOPROLOL TARTRATE 100 MG/1
TABLET ORAL
Qty: 180 TABLET | Refills: 2 | Status: SHIPPED | OUTPATIENT
Start: 2022-07-08

## 2022-07-08 NOTE — PROGRESS NOTES
Chief Complaint   Patient presents with    Weight Gain     not being able to sleep        1. \"Have you been to the ER, urgent care clinic since your last visit? Hospitalized since your last visit? \" No    2. \"Have you seen or consulted any other health care providers outside of the [address] [place] [address] since your last visit? \" No     3. For patients aged 39-70: Has the patient had a colonoscopy / FIT/ Cologuard? NA - based on age      If the patient is female:    4. For patients aged 41-77: Has the patient had a mammogram within the past 2 years? NA - based on age or sex      11. For patients aged 21-65: Has the patient had a pap smear?  No    Health Maintenance Due   Topic Date Due    Hepatitis C Screening  Never done    Pneumococcal 0-64 years (2 - PCV) 09/27/2014    Cervical cancer screen  Never done    Eye Exam Retinal or Dilated  06/27/2022

## 2022-07-08 NOTE — PROGRESS NOTES
HISTORY OF PRESENT ILLNESS  Kai Richey is a 27 y.o. female, presented with a complaint of lack of sleep going to bed early unable to sleep till 3:57 AM has to get up at 5:30 to go back to work patient is very functional at her job currently seen psychiatrist currently taking Trileptal BuSpar and Wellbutrin, patient is seen psychiatrist and she was put on hydroxyzine 50 mg she has not taken it yet, she denies any suicidal homicidal ideation has no delusion no hallucination she states the psychiatrist told her she may be bipolar or she may not be bipolar her diagnosis is questionable at this time denies any other complaint she also having obstructive sleep apnea for which she is sleep with CPAP machine and has not been helping     Current Outpatient Medications   Medication Sig Dispense Refill    metoprolol tartrate (LOPRESSOR) 100 mg IR tablet TAKE 1 TABLET BY MOUTH 2 TIMES A DAY GENERIC FOR LOPRESSOR 180 Tablet 2    semaglutide (Ozempic) 2 mg/dose (8 mg/3 mL) pnij 2 mg by SubCUTAneous route every seven (7) days. 3 Pen 3    cetirizine (ZYRTEC) 10 mg tablet Take 1 Tablet by mouth nightly. 30 Tablet 2    OXcarbazepine (TRILEPTAL) 300 mg tablet Take 300 mg by mouth daily.  atorvastatin (LIPITOR) 10 mg tablet Take 1 Tablet by mouth daily. 90 Tablet 2    gabapentin (NEURONTIN) 300 mg capsule Take 1 Capsule by mouth three (3) times daily. Max Daily Amount: 900 mg. 270 Capsule 1    Vitamin D2 1,250 mcg (50,000 unit) capsule TAKE 1 CAPSULE BY MOUTH ONE TIME WEEKLY 12 Capsule 3    omeprazole (PRILOSEC) 40 mg capsule Take 40 mg by mouth two (2) times a day.  nystatin (MYCOSTATIN) topical cream Apply topically as needed to affected area twice a day      buPROPion XL (Wellbutrin XL) 150 mg tablet Take 150 mg by mouth daily.  DULoxetine (CYMBALTA) 30 mg capsule Take 30 mg by mouth daily.  BD Luer-Greg Syringe 3 mL 22 gauge x 1\" syrg       busPIRone (BUSPAR) 5 mg tablet two (2) times a day.       Blood-Glucose Transmitter (Dexcom G6 Transmitter) nahid USE AS DIRECTED 1 Each 3    Blood-Glucose Sensor (Dexcom G6 Sensor) nahid CHANGE EVERY 10 DAYS 3 Each 11    aspirin delayed-release 81 mg tablet Take 1 Tablet by mouth daily. 90 Tablet 3    cyanocobalamin (VITAMIN B12) 1,000 mcg/mL injection INJECT 1ML INTO THE MUSCLE MONTHLY 1 mL 5    norethindrone (MICRONOR) 0.35 mg tab       galcanezumab-gnlm (Emgality Pen) 120 mg/mL injection 1 mL by SubCUTAneous route every thirty (30) days. 1 mL 11    iron,carbonyl-FA-multivit-min (Opurity Multivitamin) 30 mg iron- 800 mcg chew Take 2 Tabs by mouth daily.  calcium carbonate/vitamin D3 (CALCIUM 600 + D,3, PO) Take 1 Tablet by mouth daily.  celecoxib (CELEBREX) 200 mg capsule Take 1 Capsule by mouth two (2) times a day. (Patient not taking: Reported on 7/8/2022) 180 Capsule 0    albuterol (PROVENTIL HFA, VENTOLIN HFA, PROAIR HFA) 90 mcg/actuation inhaler Take 1 Puff by inhalation every four (4) hours as needed for Wheezing. (Patient not taking: Reported on 7/8/2022) 1 Each 2    methylPREDNISolone (MEDROL DOSEPACK) 4 mg tablet As directed for 6 days one package 1 Dose Pack 0    rimegepant (Nurtec ODT) 75 mg disintegrating tablet 1 tablet oral every other day 16 Tablet 2    nortriptyline (PAMELOR) 25 mg capsule Take 1 Capsule by mouth nightly.  30 Capsule 3    Blood-Glucose Meter,Continuous (Dexcom G6 ) misc Use with Dexcom CGM 1 Each 0     Allergies   Allergen Reactions    Latex Itching     Past Medical History:   Diagnosis Date    Anxiety with depression     Arrhythmia     tachycardia    Arthritis     Asthma     [place] hump 1/7/2015    Carpal tunnel syndrome of right wrist 12/20/2016    [facility] [address] [address]     ALIYA (generalized anxiety disorder) 4/4/2018    GERD (gastroesophageal reflux disease) 8/13/2014    HTN (hypertension) 7/25/2011    Hyperaldosteronism ([facility] [address] 10/19/2015    Hyperhidrosis 4/4/2018    Morbid obesity ([facility] [address]     Other ill-defined conditions(799.89)     migraines    Palpitation 2018    Sleep apnea     uses CPAP    Tachycardia 2018     Past Surgical History:   Procedure Laterality Date    HX CHOLECYSTECTOMY  11/10/2021    Dr Betzaida Dash     HX GASTRECTOMY  2020    Gastric sleeve     HX GYN  10/19/2020    Laparoscopic right oopherectomy, left cystectomy    HX HERNIA REPAIR  2020    HX OVARIAN CYST REMOVAL  10/19/2020    HX TONSILLECTOMY      HX WISDOM TEETH EXTRACTION      ID REMOVAL OF TONSILS,<13 Y/O       Family History   Problem Relation Age of Onset    Hypertension Mother     Diabetes Mother         Type II    Anesth Problems Mother         respiratory issues - feels like she cannot breath when coming out of anesthesia   [place] Favorite Arthritis-rheumatoid Mother    [place] Favorite Gout Mother     Psychiatric Disorder Father     Drug Abuse Father     Cancer Other         prostate    Hypertension Sister     Thyroid Disease Sister         \"I think\"   [place] Favorite Breast Cancer Sister     Attention Deficit Hyperactivity Disorder Brother     Hypertension Sister      Social History     Tobacco Use    Smoking status: Former Smoker     Packs/day: 0.50     Years: 5.00     Pack years: 2.50     Quit date: 2019     Years since quittin.6    Smokeless tobacco: Never Used   Substance Use Topics    Alcohol use:  Yes     Alcohol/week: 1.0 standard drink     Types: 1 Shots of liquor, 1 Standard drinks or equivalent per week     Comment: socially, Monthly or less      Lab Results   Component Value Date/Time    Hemoglobin A1c 5.5 2021 12:00 AM    Hemoglobin A1c 5.4 10/28/2021 02:29 PM    Hemoglobin A1c 5.9 (H) 2021 12:58 PM    Hemoglobin A1c, External 5.5 2022 12:00 AM    Hemoglobin A1c, External 7.8 2020 12:00 AM    Glucose 127 (H) 2021 12:00 AM    Glucose (POC) 112 11/10/2021 09:58 AM    Microalb/Creat ratio (ug/mg creat.) 4 2021 12:00 AM    LDL, calculated 44 2021 12:00 AM    LDL, calculated 48 03/07/2020 09:44 AM    Creatinine 0.62 11/29/2021 12:00 AM      Lab Results   Component Value Date/Time    Cholesterol, total 106 11/29/2021 12:00 AM    HDL Cholesterol 42 11/29/2021 12:00 AM    LDL, calculated 44 11/29/2021 12:00 AM    LDL, calculated 48 03/07/2020 09:44 AM    LDL-C, External 51.8 04/07/2022 12:00 AM    Triglyceride 110 11/29/2021 12:00 AM        Review of Systems   Constitutional: Negative for chills and fever. HENT: Negative for congestion and nosebleeds. Eyes: Negative for blurred vision and pain. Respiratory: Negative for cough, shortness of breath and wheezing. Cardiovascular: Negative for chest pain and leg swelling. Gastrointestinal: Negative for constipation, diarrhea, nausea and vomiting. Genitourinary: Negative for dysuria and frequency. Musculoskeletal: Negative for joint pain and myalgias. Skin: Negative for itching and rash. Neurological: Negative for dizziness, loss of consciousness and headaches. Psychiatric/Behavioral: Negative for depression. The patient has insomnia. The patient is not nervous/anxious. Physical Exam  Constitutional:       Appearance: She is obese. She is not ill-appearing or toxic-appearing. HENT:      Head: Normocephalic and atraumatic. Mouth/Throat:      Mouth: Mucous membranes are moist.   Neurological:      Mental Status: She is alert and oriented to person, place, and time. Psychiatric:         Mood and Affect: Mood normal.         Behavior: Behavior normal.         Thought Content: Thought content normal.         Judgment: Judgment normal.         ASSESSMENT and PLAN  Diagnoses and all orders for this visit:    1.  Essential hypertension  -     metoprolol tartrate (LOPRESSOR) 100 mg IR tablet; TAKE 1 TABLET BY MOUTH 2 TIMES A DAY GENERIC FOR LOPRESSOR    2. ALIYA (generalized anxiety disorder)  -     metoprolol tartrate (LOPRESSOR) 100 mg IR tablet; TAKE 1 TABLET BY MOUTH 2 TIMES A DAY GENERIC FOR LOPRESSOR    3. Type 2 diabetes with nephropathy (HCC)  -     metoprolol tartrate (LOPRESSOR) 100 mg IR tablet; TAKE 1 TABLET BY MOUTH 2 TIMES A DAY GENERIC FOR LOPRESSOR    4. Well woman exam (no gynecological exam)  -     metoprolol tartrate (LOPRESSOR) 100 mg IR tablet; TAKE 1 TABLET BY MOUTH 2 TIMES A DAY GENERIC FOR LOPRESSOR        The problem of recurrent insomnia is discussed. Avoidance of caffeine sources is strongly encouraged. Sleep hygiene issues are reviewed. The use of sedative hypnotics for temporary relief is appropriate; we discussed the addictive nature of these drugs, and patient advised to follow-up with a psychiatrist was told if condition not better she can go to emergency room.         pt was advised about the covid protection with vaccination, and to Wear a facemask , having social distance, and to get tested if possible,     patient acknowledged understanding and agreed with today's recommendations,

## 2022-07-11 ENCOUNTER — APPOINTMENT (OUTPATIENT)
Dept: INTERNAL MEDICINE CLINIC | Age: 32
End: 2022-07-11

## 2022-07-11 DIAGNOSIS — Z11.59 NEED FOR HEPATITIS B SCREENING TEST: ICD-10-CM

## 2022-07-11 DIAGNOSIS — Z11.59 NEED FOR HEPATITIS B SCREENING TEST: Primary | ICD-10-CM

## 2022-07-12 ENCOUNTER — TELEPHONE (OUTPATIENT)
Dept: INTERNAL MEDICINE CLINIC | Age: 32
End: 2022-07-12

## 2022-07-12 DIAGNOSIS — Z11.59 NEED FOR HEPATITIS B SCREENING TEST: Primary | ICD-10-CM

## 2022-07-12 LAB
HBV SURFACE AG SER QL: <0.1 INDEX
HBV SURFACE AG SER QL: NEGATIVE

## 2022-07-13 LAB
HBV SURFACE AB SER QL: REACTIVE
HBV SURFACE AB SER-ACNC: 10.58 MIU/ML

## 2022-07-14 ENCOUNTER — APPOINTMENT (OUTPATIENT)
Dept: PHYSICAL THERAPY | Age: 32
End: 2022-07-14

## 2022-07-15 ENCOUNTER — OFFICE VISIT (OUTPATIENT)
Dept: SURGERY | Age: 32
End: 2022-07-15
Payer: COMMERCIAL

## 2022-07-15 ENCOUNTER — DOCUMENTATION ONLY (OUTPATIENT)
Dept: SURGERY | Age: 32
End: 2022-07-15

## 2022-07-15 VITALS
SYSTOLIC BLOOD PRESSURE: 135 MMHG | WEIGHT: 289 LBS | TEMPERATURE: 98.4 F | DIASTOLIC BLOOD PRESSURE: 94 MMHG | HEART RATE: 76 BPM | HEIGHT: 63 IN | BODY MASS INDEX: 51.21 KG/M2 | OXYGEN SATURATION: 98 %

## 2022-07-15 DIAGNOSIS — E16.1 HYPERINSULINEMIA: ICD-10-CM

## 2022-07-15 DIAGNOSIS — R63.5 WEIGHT GAIN: ICD-10-CM

## 2022-07-15 DIAGNOSIS — E66.01 MORBID OBESITY WITH BMI OF 50.0-59.9, ADULT (HCC): Primary | ICD-10-CM

## 2022-07-15 DIAGNOSIS — K21.9 GASTROESOPHAGEAL REFLUX DISEASE WITHOUT ESOPHAGITIS: ICD-10-CM

## 2022-07-15 PROCEDURE — 99214 OFFICE O/P EST MOD 30 MIN: CPT | Performed by: NURSE PRACTITIONER

## 2022-07-15 NOTE — PATIENT INSTRUCTIONS
NEXT STEPS:     Surgery type: Possible revision to a gastric bypass   Surgeon:  Fredi Layton MD      Make an appointment with one of the bariatric dietitians to get started with your nutrition classes. Please call the bariatric line at [phone]. Appointments are offered in person and virtual at no charge. [facility] requires a certain number of months of supervised counseling for weight loss, exercise and nutrition before approval for surgery. **    Upper GI xray prior to surgery and central scheduling will contact you to arrange. You can reach them at [phone] if needed. Upper endoscopy - last one was 9/2021 and they will contact you to arrange a date and time       After you have done the above, plan on a meeting with Dr. Siobhan Blue to discuss options.

## 2022-07-15 NOTE — PROGRESS NOTES
1. Have you been to the ER, urgent care clinic since your last visit? Hospitalized since your last visit? No    2. Have you seen or consulted any other health care providers outside of the [address] [place] [address] since your last visit? Include any pap smears or colon screening.  No

## 2022-07-18 ENCOUNTER — PATIENT MESSAGE (OUTPATIENT)
Dept: FAMILY MEDICINE CLINIC | Age: 32
End: 2022-07-18

## 2022-07-18 DIAGNOSIS — E11.40 TYPE 2 DIABETES MELLITUS WITH DIABETIC NEUROPATHY, WITH LONG-TERM CURRENT USE OF INSULIN (HCC): ICD-10-CM

## 2022-07-18 DIAGNOSIS — R00.2 PALPITATION: ICD-10-CM

## 2022-07-18 DIAGNOSIS — Z79.4 TYPE 2 DIABETES MELLITUS WITH DIABETIC NEUROPATHY, WITH LONG-TERM CURRENT USE OF INSULIN (HCC): ICD-10-CM

## 2022-07-18 DIAGNOSIS — E66.01 CLASS 3 SEVERE OBESITY WITH SERIOUS COMORBIDITY AND BODY MASS INDEX (BMI) OF 50.0 TO 59.9 IN ADULT, UNSPECIFIED OBESITY TYPE (HCC): ICD-10-CM

## 2022-07-18 DIAGNOSIS — I10 HYPERTENSION, UNSPECIFIED TYPE: Primary | ICD-10-CM

## 2022-07-28 ENCOUNTER — OFFICE VISIT (OUTPATIENT)
Dept: FAMILY MEDICINE CLINIC | Age: 32
End: 2022-07-28
Payer: COMMERCIAL

## 2022-07-28 ENCOUNTER — CLINICAL SUPPORT (OUTPATIENT)
Dept: SURGERY | Age: 32
End: 2022-07-28

## 2022-07-28 VITALS
RESPIRATION RATE: 18 BRPM | HEART RATE: 87 BPM | OXYGEN SATURATION: 99 % | DIASTOLIC BLOOD PRESSURE: 82 MMHG | HEIGHT: 63 IN | BODY MASS INDEX: 51.84 KG/M2 | WEIGHT: 292.6 LBS | SYSTOLIC BLOOD PRESSURE: 117 MMHG | TEMPERATURE: 99.4 F

## 2022-07-28 DIAGNOSIS — E66.01 MORBID OBESITY WITH BMI OF 50.0-59.9, ADULT (HCC): Primary | ICD-10-CM

## 2022-07-28 DIAGNOSIS — Z00.00 WELL WOMAN EXAM (NO GYNECOLOGICAL EXAM): Primary | ICD-10-CM

## 2022-07-28 PROCEDURE — 99395 PREV VISIT EST AGE 18-39: CPT | Performed by: FAMILY MEDICINE

## 2022-07-28 NOTE — PROGRESS NOTES
Pre-operative Bariatric Nutrition Evaluation - Phone Consult     Date: 2022   Kadie Caro M.D. Name: Karina Todd  :  1990  Age:  27  Gender: Female   Type of Surgery: [x]           Gastric Bypass   []           Sleeve Gastrectomy    ASSESSMENT:      Medications/Supplements:   Prior to Admission medications    Medication Sig Start Date End Date Taking? Authorizing Provider   metoprolol tartrate (LOPRESSOR) 100 mg IR tablet TAKE 1 TABLET BY MOUTH 2 TIMES A DAY GENERIC FOR LOPRESSOR 22   Lissa Staples MD   semaglutide (Ozempic) 2 mg/dose (8 mg/3 mL) pnij 2 mg by SubCUTAneous route every seven (7) days. 22   Parish Liang MD   celecoxib (CELEBREX) 200 mg capsule Take 1 Capsule by mouth two (2) times a day. Patient not taking: Reported on 2022   Ree Patricia DO   albuterol (PROVENTIL HFA, VENTOLIN HFA, PROAIR HFA) 90 mcg/actuation inhaler Take 1 Puff by inhalation every four (4) hours as needed for Wheezing. Patient not taking: Reported on 2022   Lissa Staples MD   cetirizine (ZYRTEC) 10 mg tablet Take 1 Tablet by mouth nightly. 22   Lissa Staples MD   OXcarbazepine (TRILEPTAL) 300 mg tablet Take 300 mg by mouth daily. 22   Provider, Historical   atorvastatin (LIPITOR) 10 mg tablet Take 1 Tablet by mouth daily. 22   Parish Liang MD   gabapentin (NEURONTIN) 300 mg capsule Take 1 Capsule by mouth three (3) times daily. Max Daily Amount: 900 mg. 22   Juan Jose Duron DO   Vitamin D2 1,250 mcg (50,000 unit) capsule TAKE 1 CAPSULE BY MOUTH ONE TIME WEEKLY 22   Marshal Roldan NP   omeprazole (PRILOSEC) 40 mg capsule Take 40 mg by mouth two (2) times a day. Provider, Historical   nystatin (MYCOSTATIN) topical cream Apply topically as needed to affected area twice a day    Provider, Historical   buPROPion XL (Wellbutrin XL) 150 mg tablet Take 150 mg by mouth daily.     Provider, Historical   DULoxetine (CYMBALTA) 30 mg capsule Take 30 mg by mouth daily. 1/24/22   Provider, Historical   BD Luer-Greg Syringe 3 mL 22 gauge x 1\" syrg  12/21/21   Provider, Historical   busPIRone (BUSPAR) 5 mg tablet two (2) times a day. 11/18/21   Provider, Historical   Blood-Glucose Transmitter (Dexcom G6 Transmitter) nahid USE AS DIRECTED 11/23/21   Esme Lerner MD   Blood-Glucose Sensor (Dexcom G6 Sensor) nahid CHANGE EVERY 10 DAYS 11/23/21   Esme Lerner MD   aspirin delayed-release 81 mg tablet Take 1 Tablet by mouth daily. 10/25/21   Minda New MD   cyanocobalamin (VITAMIN B12) 1,000 mcg/mL injection INJECT 1ML INTO THE MUSCLE MONTHLY 9/2/21   Minda New MD   norethindrone (MICRONOR) 0.35 mg tab  8/5/21   Provider, Historical   galcanezumab-gnlm (Emgality Pen) 120 mg/mL injection 1 mL by SubCUTAneous route every thirty (30) days. 3/4/21   Genaro Rosas MD   iron,carbonyl-FA-multivit-min (Opurity Multivitamin) 30 mg iron- 800 mcg chew Take 2 Tabs by mouth daily. 8/25/20   Provider, Historical   calcium carbonate/vitamin D3 (CALCIUM 600 + D,3, PO) Take 1 Tablet by mouth daily. 8/25/20   Provider, Historical       Food Allergies/Intolerances:none reported    Anthropometrics:    Ht:63\"     Reported Wt: 283#  Recent Office Wt: 289#    IBW: 115#    %IBW: 246%     BMI:50    Category: obesity III     Reported wt history: Pt presents today for pre-op nutrition evaluation for wt loss surgery. Pt with previous sleeve gastrectomy in September 2020 and reports initially losing from 331# down to 263#. Reports she stopped losing weight by May of 2021 and eventually regained wt and attributes wt gain d/t poor eating habits and decreased physical activity d/t time constraints. Is now trying to get back on track  with wt loss and recently started the pre-op liver shrinking diet. Is now seeking possible revision to gastric bypass d/t GERD and promote additional wt loss.      Exercise/Physical Activity:recently started boot camp for 30 minutes, 3 times per week     Reported Diet History:h/o sleeve gastrectomy    24 Hour Diet Recall  Breakfast  [employer] protein shake   Lunch 12:30/1 pm Ham/turkey deli meat (no bread), vegetables, small amounts of potatoes   Dinner  Tuna  and 1 cup grapes    Snacks     Beverages  Water; 1 soda per day and is trying to decrease          NUTRITION DIAGNOSIS:  Morbid obesity r/t multiple etiologies evidenced by BMI 51. NUTRITION INTERVENTION:  Pt educated on nutrition recommendations for weight loss surgery, specifically gastric bypass. Instructed on consuming 3 meals per day starting now. Use the balanced plate method to plan meals, include 3 oz of lean source of protein, 1/2 cup whole grains, unlimited non-starchy vegetables, 1/2 cup fruit and 1 serving of low fat dairy. Utilize handouts listing healthy snack and meal ideas to limit restaurant meals. After surgery measure all meals to 1/2 cup. Each meal will contain a 1/4 cup lean protein and 1/4 cup fruit, non-starchy vegetable or starch (limiting to once per day). Aim for 60 g protein per day. Sip on 48-64 oz of sugar free, calorie free, non-carbonated beverages each day. Do not use a straw. Do not consume beverages 30 minutes before, during or 30 minutes after meals. Read all nutrition labels. Demonstrated and emphasized identifying serving size, total fat, sugar and protein content. Defined low fat as </= 3 g per serving. Discussed lean and extra lean sources of protein. Provided list of low fat cooking methods. Avoid foods with sugar listed in the first 3 ingredients and >/15 g sugar per serving. Excess sugar/fat intake may lead to dumping syndrome. Discussed signs and symptoms of dumping syndrome. Practice mindful eating habits; take small bites, chew thoroughly, avoid distractions, utilize hunger/fullness scale. Consume meals over 20-30 minutes.      Attend Bariatric Support Group and increase physical activity (approved per MD) for long term weight maintenance. NUTRITION MONITORING AND EVALUATION:    The following goals were established with patient;  Maintain current eating pattern (1 shake per day and 2 food meals (lean protein and vegetables)). Consider solid textured foods if noticing increased hunger after meals. Maintain current exercise regimen. Increase as tolerated. Maintain current vitamin regimen. Continue to decrease intake of carbonated drinks. Continue to drink between meals. Follow up with RD once next steps in revision process determined. Specific tips and techniques to facilitate compliance with above recommendations were provided and discussed. Nutrition evaluation reveals important lifestyle changes are indicated. Goals set and recommendations made. Will continue to assess. If further details are desired please feel free to contact me at [phone]. This phone number was also provided to the patient for any further questions or concerns.            Selma Palacios RD

## 2022-07-28 NOTE — PROGRESS NOTES
Chief Complaint   Patient presents with    Physical       1. \"Have you been to the ER, urgent care clinic since your last visit? Hospitalized since your last visit? \" No    2. \"Have you seen or consulted any other health care providers outside of the [address] [place] [address] since your last visit? \" Yes When: Rheumatology 2022 Where: 02/2022      3. For patients aged 39-70: Has the patient had a colonoscopy / FIT/ Cologuard? No      If the patient is female:    4. For patients aged 41-77: Has the patient had a mammogram within the past 2 years? Yes due in August 2022      5. For patients aged 21-65: Has the patient had a pap smear?  No    Health Maintenance Due   Topic Date Due    Pneumococcal 0-64 years (2 - PCV) 09/27/2014    Cervical cancer screen  Never done    Eye Exam Retinal or Dilated  06/27/2022

## 2022-07-28 NOTE — PROGRESS NOTES
Subjective:   27 y.o. female for Well Woman Check. Her gyne and breast care is done elsewhere by her Ob-Gyne physician. +fhx of breast cancer does get specialist for breast US, and is uptodate with her pap smear,     Current Outpatient Medications   Medication Sig Dispense Refill    metoprolol tartrate (LOPRESSOR) 100 mg IR tablet TAKE 1 TABLET BY MOUTH 2 TIMES A DAY GENERIC FOR LOPRESSOR (Patient taking differently: 50 mg. TAKE 1 TABLET BY MOUTH 2 TIMES A DAY GENERIC FOR LOPRESSOR) 180 Tablet 2    semaglutide (Ozempic) 2 mg/dose (8 mg/3 mL) pnij 2 mg by SubCUTAneous route every seven (7) days. 3 Pen 3    cetirizine (ZYRTEC) 10 mg tablet Take 1 Tablet by mouth nightly. 30 Tablet 2    atorvastatin (LIPITOR) 10 mg tablet Take 1 Tablet by mouth daily. 90 Tablet 2    gabapentin (NEURONTIN) 300 mg capsule Take 1 Capsule by mouth three (3) times daily. Max Daily Amount: 900 mg. 270 Capsule 1    Vitamin D2 1,250 mcg (50,000 unit) capsule TAKE 1 CAPSULE BY MOUTH ONE TIME WEEKLY 12 Capsule 3    omeprazole (PRILOSEC) 40 mg capsule Take 40 mg by mouth two (2) times a day. nystatin (MYCOSTATIN) topical cream Apply topically as needed to affected area twice a day      DULoxetine (CYMBALTA) 30 mg capsule Take 30 mg by mouth daily. BD Luer-Greg Syringe 3 mL 22 gauge x 1\" syrg       busPIRone (BUSPAR) 5 mg tablet two (2) times a day. Blood-Glucose Transmitter (Dexcom G6 Transmitter) nahid USE AS DIRECTED 1 Each 3    Blood-Glucose Sensor (Dexcom G6 Sensor) nahid CHANGE EVERY 10 DAYS 3 Each 11    aspirin delayed-release 81 mg tablet Take 1 Tablet by mouth daily. 90 Tablet 3    cyanocobalamin (VITAMIN B12) 1,000 mcg/mL injection INJECT 1ML INTO THE MUSCLE MONTHLY 1 mL 5    norethindrone (MICRONOR) 0.35 mg tab       galcanezumab-gnlm (Emgality Pen) 120 mg/mL injection 1 mL by SubCUTAneous route every thirty (30) days.  1 mL 11    iron,carbonyl-FA-multivit-min (Opurity Multivitamin) 30 mg iron- 800 mcg chew Take 2 Tabs by mouth daily. calcium carbonate/vitamin D3 (CALCIUM 600 + D,3, PO) Take 1 Tablet by mouth daily. celecoxib (CELEBREX) 200 mg capsule Take 1 Capsule by mouth two (2) times a day. (Patient not taking: No sig reported) 180 Capsule 0    albuterol (PROVENTIL HFA, VENTOLIN HFA, PROAIR HFA) 90 mcg/actuation inhaler Take 1 Puff by inhalation every four (4) hours as needed for Wheezing. (Patient not taking: No sig reported) 1 Each 2    OXcarbazepine (TRILEPTAL) 300 mg tablet Take 300 mg by mouth daily. (Patient not taking: Reported on 7/28/2022)      buPROPion XL (WELLBUTRIN XL) 150 mg tablet Take 150 mg by mouth daily.  (Patient not taking: Reported on 7/28/2022)       Allergies   Allergen Reactions    Latex Itching     Past Medical History:   Diagnosis Date    Anxiety with depression     Arrhythmia     tachycardia    Arthritis     Asthma     [place] hump 1/7/2015    Carpal tunnel syndrome of right wrist 12/20/2016    Diabetes ([facility]     ALIYA (generalized anxiety disorder) 4/4/2018    GERD (gastroesophageal reflux disease) 8/13/2014    HTN (hypertension) 7/25/2011    Hyperaldosteronism ([address] 10/19/2015    Hyperhidrosis 4/4/2018    Morbid obesity ([address]     Other ill-defined conditions(799.89)     migraines    Palpitation 4/4/2018    Sleep apnea     uses CPAP    Tachycardia 4/4/2018     Past Surgical History:   Procedure Laterality Date    HX CHOLECYSTECTOMY  11/10/2021    Dr Kenneth Hi     HX GASTRECTOMY  09/08/2020    Gastric sleeve     HX GYN  10/19/2020    Laparoscopic right oopherectomy, left cystectomy    HX HERNIA REPAIR  09/08/2020    HX OVARIAN CYST REMOVAL  10/19/2020    HX TONSILLECTOMY      HX WISDOM TEETH EXTRACTION      [place] REMOVAL OF TONSILS,<11 Y/O       Family History   Problem Relation Age of Onset    Hypertension Mother     Diabetes Mother         Type II    Anesth Problems Mother         respiratory issues - feels like she cannot breath when coming out of anesthesia    Arthritis-rheumatoid Mother Gout Mother     Psychiatric Disorder Father     Drug Abuse Father     Cancer Other         prostate    Hypertension Sister     Thyroid Disease Sister         \"I think\"    Breast Cancer Sister     Attention Deficit Hyperactivity Disorder Brother     Hypertension Sister      Social History     Tobacco Use    Smoking status: Former     Packs/day: 0.50     Years: 5.00     Pack years: 2.50     Types: Cigarettes     Quit date: 2019     Years since quittin.7    Smokeless tobacco: Never   Substance Use Topics    Alcohol use: Yes     Alcohol/week: 1.0 standard drink     Types: 1 Shots of liquor, 1 Standard drinks or equivalent per week     Comment: socially, Monthly or less        Lab Results   Component Value Date/Time    Hemoglobin A1c 5.5 2021 12:00 AM    Hemoglobin A1c 5.4 10/28/2021 02:29 PM    Hemoglobin A1c 5.9 (H) 2021 12:58 PM    Hemoglobin A1c, External 5.5 2022 12:00 AM    Hemoglobin A1c, External 7.8 2020 12:00 AM    Glucose 93 2022 04:25 PM    Glucose (POC) 112 11/10/2021 09:58 AM    Microalb/Creat ratio (ug/mg creat.) 4 2021 12:00 AM    LDL, calculated 36.8 2022 04:25 PM    Creatinine 0.66 2022 04:25 PM      Lab Results   Component Value Date/Time    Cholesterol, total 112 2022 04:25 PM    HDL Cholesterol 50 2022 04:25 PM    LDL, calculated 36.8 2022 04:25 PM    LDL-C, External 51.8 2022 12:00 AM    Triglyceride 126 2022 04:25 PM    CHOL/HDL Ratio 2.2 2022 04:25 PM        ROS: Feeling generally well. No TIA's or unusual headaches, no dysphagia. No prolonged cough. No dyspnea or chest pain on exertion. No abdominal pain, change in bowel habits, black or bloody stools. No urinary tract symptoms. No new or unusual musculoskeletal symptoms. Specific concerns today:   . Objective: The patient appears well, alert, oriented x 3, in no distress.   Visit Vitals  /82 (BP 1 Location: Left upper arm, BP Patient Position: Sitting, BP Cuff Size: Thigh)   Pulse 87   Temp 99.4 °F (37.4 °C) (Oral)   Resp 18   Ht 5' 3\" (1.6 m)   Wt 292 lb 9.6 oz (132.7 kg)   LMP 07/23/2022   SpO2 99%   BMI 51.83 kg/m²     ENT normal.  Neck supple. No adenopathy or thyromegaly. BAYRON. Lungs are clear, good air entry, no wheezes, rhonchi or rales. S1 and S2 normal, no murmurs, regular rate and rhythm. Abdomen soft without tenderness, guarding, mass or organomegaly. Extremities show no edema, normal peripheral pulses. Neurological is normal, no focal findings. Breast and Pelvic exams are deferred. Assessment/Plan:   Well Woman  lose weight, increase physical activity, limit alcohol consumption, follow low fat diet, follow low salt diet, continue present plan, routine labs ordered    ICD-10-CM ICD-9-CM    1.  Well woman exam (no gynecological exam)  Z00.00 V70.0     [V70.0]        lab results and schedule of future lab studies reviewed with patient  reviewed diet, exercise and weight control

## 2022-08-04 ENCOUNTER — PATIENT MESSAGE (OUTPATIENT)
Dept: NEUROLOGY | Age: 32
End: 2022-08-04

## 2022-08-29 ENCOUNTER — TELEPHONE (OUTPATIENT)
Dept: PHARMACY | Age: 32
End: 2022-08-29

## 2022-08-29 NOTE — TELEPHONE ENCOUNTER
As of 8/26/22 patient has used $603 of the maximum of $600 a year in waived co pays for specific medications and pharmacy-related supplies through the [employer] for the DM Program.    Patient currently enrolled in the DM Program and has used all of the maximum of $600 a year in waived co pays for specific medications and pharmacy-related supplies through the [employer]. Courtesy call placed to patient to advise them of the above information. Patient unavailable at the time of call. Message left on TAD: Maximum waived co pays for the DM Program has been met and they will begin to be charged their co-payments once again. I left our number: [phone], option #3 if patient has any questions/concerns.       Domenico Link, [address]   Phone: [phone]       For Pharmacy [address] in place: No  Time Spent (min): 10

## 2022-09-09 NOTE — ANESTHESIA POSTPROCEDURE EVALUATION
Post-Anesthesia Evaluation and Assessment    Patient: Geraldo Hermosillo MRN: [id number]  SSN: [id number]    YOB: 1990  Age: 30 y.o. Sex: female      I have evaluated the patient and they are stable and ready for discharge from the PACU. Cardiovascular Function/Vital Signs  Visit Vitals  /70   Pulse 90   Temp 37.1 °C (98.8 °F)   Resp 20   Ht 5' 3\" (1.6 m)   Wt 152 kg (335 lb)   SpO2 100%   Breastfeeding No   BMI 59.34 kg/m²       Patient is status post MAC anesthesia for Procedure(s):  ESOPHAGOGASTRODUODENOSCOPY (EGD)  ESOPHAGOGASTRODUODENAL (EGD) BIOPSY. Nausea/Vomiting: None    Postoperative hydration reviewed and adequate. Pain:  Pain Scale 1: Numeric (0 - 10) (05/19/20 0845)  Pain Intensity 1: 0 (05/19/20 0845)   Managed    Neurological Status: At baseline    Mental Status, Level of Consciousness: Alert and  oriented to person, place, and time    Pulmonary Status:   O2 Device: Room air (05/19/20 0845)   Adequate oxygenation and airway patent    Complications related to anesthesia: None    Post-anesthesia assessment completed.  No concerns    Signed By: Neha Yap MD     May 19, 2020
.

## 2022-09-27 ENCOUNTER — DOCUMENTATION ONLY (OUTPATIENT)
Dept: ENDOCRINOLOGY | Age: 32
End: 2022-09-27

## 2022-09-27 RX ORDER — BLOOD-GLUCOSE SENSOR
EACH MISCELLANEOUS
Qty: 3 EACH | Refills: 11 | Status: SHIPPED | OUTPATIENT
Start: 2022-09-27

## 2022-10-01 DIAGNOSIS — E78.2 MIXED HYPERLIPIDEMIA: ICD-10-CM

## 2022-10-01 DIAGNOSIS — I10 ESSENTIAL HYPERTENSION: ICD-10-CM

## 2022-10-01 DIAGNOSIS — E66.01 CLASS 3 SEVERE OBESITY DUE TO EXCESS CALORIES WITH SERIOUS COMORBIDITY AND BODY MASS INDEX (BMI) OF 60.0 TO 69.9 IN ADULT (HCC): ICD-10-CM

## 2022-10-01 DIAGNOSIS — E11.9 TYPE 2 DIABETES MELLITUS WITHOUT COMPLICATION, WITHOUT LONG-TERM CURRENT USE OF INSULIN (HCC): ICD-10-CM

## 2022-10-12 RX ORDER — SEMAGLUTIDE 1.34 MG/ML
2 INJECTION, SOLUTION SUBCUTANEOUS
Qty: 24 EACH | Refills: 0 | Status: SHIPPED | OUTPATIENT
Start: 2022-10-12 | End: 2022-10-24

## 2022-10-12 NOTE — TELEPHONE ENCOUNTER
----- Message from Missael Mendez Millie Hearing sent at 10/12/2022 12:25 PM EDT -----  Regarding: Ozempic 2 mg  I have another month of 2mg pens left. Im fine with the 1mg pens and doing 2 injections. I used my prescription deductible so just trying to get medication at $0 cost. Thank you Eileen Mendoza.

## 2022-10-24 ENCOUNTER — VIRTUAL VISIT (OUTPATIENT)
Dept: ENDOCRINOLOGY | Age: 32
End: 2022-10-24
Payer: COMMERCIAL

## 2022-10-24 DIAGNOSIS — E78.2 MIXED HYPERLIPIDEMIA: ICD-10-CM

## 2022-10-24 DIAGNOSIS — E66.01 CLASS 3 SEVERE OBESITY DUE TO EXCESS CALORIES WITH SERIOUS COMORBIDITY AND BODY MASS INDEX (BMI) OF 60.0 TO 69.9 IN ADULT (HCC): ICD-10-CM

## 2022-10-24 DIAGNOSIS — E11.9 TYPE 2 DIABETES MELLITUS WITHOUT COMPLICATION, WITHOUT LONG-TERM CURRENT USE OF INSULIN (HCC): Primary | ICD-10-CM

## 2022-10-24 DIAGNOSIS — I10 ESSENTIAL HYPERTENSION: ICD-10-CM

## 2022-10-24 DIAGNOSIS — E55.9 HYPOVITAMINOSIS D: ICD-10-CM

## 2022-10-24 PROCEDURE — 3044F HG A1C LEVEL LT 7.0%: CPT | Performed by: INTERNAL MEDICINE

## 2022-10-24 PROCEDURE — 99214 OFFICE O/P EST MOD 30 MIN: CPT | Performed by: INTERNAL MEDICINE

## 2022-10-24 RX ORDER — DULOXETIN HYDROCHLORIDE 60 MG/1
CAPSULE, DELAYED RELEASE ORAL
COMMUNITY
Start: 2022-09-27

## 2022-10-24 NOTE — PROGRESS NOTES
Chief Complaint   Patient presents with    Diabetes     Pcp and pharmacy verified   [facility]   [phone] (M)             **THIS IS A VIRTUAL VISIT VIA A VIDEO ENCOUNTER. PATIENT AGREED TO HAVE THEIR CARE DELIVERED OVER VIDEO IN PLACE OF THEIR REGULARLY SCHEDULED OFFICE VISIT**         Nancy Resendiz, was evaluated through a synchronous (real-time) audio-video encounter. The patient (or guardian if applicable) is aware that this is a billable service, which includes applicable co-pays. This Virtual Visit was conducted with patient's (and/or legal guardian's) consent. The visit was conducted pursuant to the emergency declaration under the [facility], [address] [place] [address] authority and the [employer] Act. Patient identification was verified, and a caregiver was present when appropriate. The patient was located in a state where the provider was licensed to provide care. History of Present Illness: Melissa Loaiza is a 27 y.o. female here for follow up of diabetes. She was diagnosed with diabetes \"when I was in high school\". Because pt has features concerning for cushing's her PCP checked a 24 hour urine cortisol which was not elevated (42). She also had a TSH drawn which was 2.10. In March 2016 we tested pt for hyperaldosteronism, which was negative. I tested her for evidence of PCOS, her Testosterone was 14, with DHEA-S 109, her 17-OH Prog was not elevated and an overnight dexamethasone suppression test did suppress her ACTH and cortisol. She had the Gastric Sleeve on 9/8/20. Pt had a cholecystectomy on 11/10/21. She has not been having issues of hypoglycemia. Pt is still taking the Ozempic 2.0mg weekly. She notes her PCP lowered her Metoprolol to 50mg BID. She quit taking the Atorvastatin \"a while ago\" she was on the Atovastatin when she had her lipid panel drawn in July 2022.   Pt notes she was having muscle and joint pains, which stopped, when she stopped taking it. She notes her weight is still in the 270s range. She is still monitoring her BGs using the [facility]. Reviewing her Clarity report she is not having low BGs overnight since stopping the Metformin. Pt is not working 730PM-8AM  She waking up around 530-6PM.  - She has her first meal around 630PM, last night she had roast beef, mashed potatoes and regular soda. 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 I need find a new regimen\". She wears a FitBit and gets 5000-10,000 steps per day. No history of vascular disease. No history of neuropathy, or nephropathy. Last eye exam was February 2022, no retinopathy. \"Everything looked good. \"    She is taking Vitamin D 50,000 units weekly. She is taking a MVI and B-12 1000mcg monthly. Current Outpatient Medications   Medication Sig    DULoxetine (CYMBALTA) 60 mg capsule Takes with 60 mg capsule    Blood-Glucose Sensor (Dexcom G6 Sensor) nahid USE AS DIRECTED . CHANGE SENSOR EVERY 10 DAYS    metoprolol tartrate (LOPRESSOR) 100 mg IR tablet TAKE 1 TABLET BY MOUTH 2 TIMES A DAY GENERIC FOR LOPRESSOR (Patient taking differently: 50 mg. TAKE 1 TABLET BY MOUTH 2 TIMES A DAY GENERIC FOR LOPRESSOR)    semaglutide (Ozempic) 2 mg/dose (8 mg/3 mL) pnij 2 mg by SubCUTAneous route every seven (7) days. gabapentin (NEURONTIN) 300 mg capsule Take 1 Capsule by mouth three (3) times daily. Max Daily Amount: 900 mg. Vitamin D2 1,250 mcg (50,000 unit) capsule TAKE 1 CAPSULE BY MOUTH ONE TIME WEEKLY    omeprazole (PRILOSEC) 40 mg capsule Take 40 mg by mouth two (2) times a day.     nystatin (MYCOSTATIN) topical cream Apply topically as needed to affected area twice a day    DULoxetine (CYMBALTA) 30 mg capsule Take 30 mg by mouth nightly. Takes with 60 mg cap at night    BD Luer-Greg Syringe 3 mL 22 gauge x 1\" syrg     busPIRone (BUSPAR) 5 mg tablet two (2) times a day. Blood-Glucose Transmitter (Dexcom G6 Transmitter) nahid USE AS DIRECTED    aspirin delayed-release 81 mg tablet Take 1 Tablet by mouth daily. cyanocobalamin (VITAMIN B12) 1,000 mcg/mL injection INJECT 1ML INTO THE MUSCLE MONTHLY    norethindrone (MICRONOR) 0.35 mg tab     galcanezumab-gnlm (Emgality Pen) 120 mg/mL injection 1 mL by SubCUTAneous route every thirty (30) days. iron,carbonyl-FA-multivit-min (Opurity Multivitamin) 30 mg iron- 800 mcg chew Take 2 Tabs by mouth daily. calcium carbonate/vitamin D3 (CALCIUM 600 + D,3, PO) Take 1 Tablet by mouth daily. semaglutide (Ozempic) 1 mg/dose (4 mg/3 mL) pnij 2 mg by SubCUTAneous route every seven (7) days. (Inject 1 mg as 2 shots every 7 days) until the 2 mg Ozempic is available (Patient not taking: Reported on 10/24/2022)    celecoxib (CELEBREX) 200 mg capsule Take 1 Capsule by mouth two (2) times a day. (Patient not taking: No sig reported)    albuterol (PROVENTIL HFA, VENTOLIN HFA, PROAIR HFA) 90 mcg/actuation inhaler Take 1 Puff by inhalation every four (4) hours as needed for Wheezing. (Patient not taking: No sig reported)    cetirizine (ZYRTEC) 10 mg tablet Take 1 Tablet by mouth nightly. (Patient not taking: Reported on 10/24/2022)    OXcarbazepine (TRILEPTAL) 300 mg tablet Take 300 mg by mouth daily. (Patient not taking: No sig reported)    atorvastatin (LIPITOR) 10 mg tablet Take 1 Tablet by mouth daily. (Patient not taking: Reported on 10/24/2022)    buPROPion XL (WELLBUTRIN XL) 150 mg tablet Take 150 mg by mouth daily. (Patient not taking: No sig reported)     No current facility-administered medications for this visit.      Allergies   Allergen Reactions    Latex Itching     Review of Systems:  - Eyes: no blurry vision or double vision  - Cardiovascular: no chest pain  - Respiratory: no shortness of breath  - Musculoskeletal: no myalgias  - Neurological: no numbness/tingling in extremities    Physical Examination:  There were no vitals taken for this visit. - GENERAL: NCAT, Appears well nourished   - EYES: EOMI, non-icteric, no proptosis   - Ear/Nose/Throat: NCAT, no visible inflammation or masses   - CARDIOVASCULAR: no cyanosis, no visible JVD   - RESPIRATORY: respiratory effort normal without any distress or labored breathing   - MUSCULOSKELETAL: Normal ROM of neck and upper extremities observed   - SKIN: No rash on face   - NEUROLOGIC:  No facial asymmetry (Cranial nerve 7 motor function), No gaze palsy   - PSYCHIATRIC: Normal affect, Normal insight and judgement     Data Reviewed:   Component      Latest Ref Rng & Units 7/20/2022   Sodium      136 - 145 mmol/L 140   Potassium      3.5 - 5.1 mmol/L 4.3   Chloride      97 - 108 mmol/L 107   CO2      21 - 32 mmol/L 27   Anion gap      5 - 15 mmol/L 6   Glucose      65 - 100 mg/dL 93   BUN      6 - 20 MG/DL 13   Creatinine      0.55 - 1.02 MG/DL 0.66   BUN/Creatinine ratio      12 - 20   20   GFR est AA      >60 ml/min/1.73m2 >60   GFR est non-AA      >60 ml/min/1.73m2 >60   Calcium      8.5 - 10.1 MG/DL 8.9   Bilirubin, total      0.2 - 1.0 MG/DL 0.3   ALT      12 - 78 U/L 13   AST      15 - 37 U/L 9 (L)   Alk.  phosphatase      45 - 117 U/L 76   Protein, total      6.4 - 8.2 g/dL 6.6   Albumin      3.5 - 5.0 g/dL 3.2 (L)   Globulin      2.0 - 4.0 g/dL 3.4   A-G Ratio      1.1 - 2.2   0.9 (L)   WBC      3.6 - 11.0 K/uL 7.3   RBC      3.80 - 5.20 M/uL 4.35   HGB      11.5 - 16.0 g/dL 12.3   HCT      35.0 - 47.0 % 39.4   MCV      80.0 - 99.0 FL 90.6   MCH      26.0 - 34.0 PG 28.3   MCHC      30.0 - 36.5 g/dL 31.2   RDW      11.5 - 14.5 % 13.4   PLATELET      767 - 309 K/uL 296   MPV      8.9 - 12.9 FL 11.1   NRBC      0  WBC 0.0   ABSOLUTE NRBC      0.00 - 0.01 K/uL 0.00 Cholesterol, total      <200 MG/   Triglyceride      <150 MG/   HDL Cholesterol      MG/DL 50   LDL, calculated      0 - 100 MG/DL 36.8   VLDL, calculated      MG/DL 25.2   CHOL/HDL Ratio      0.0 - 5.0   2.2       Assessment/Plan:   1) DM > She is not having low BGs. I will order an A1C. For now pt to continue the Ozempic 2.0mg weekly. 2) HTN > Will not make any adjustments to her HTN regimen at this time. She is only taking Metoprolol 50mg BID. 3) HLD > Her lipid panel was at goal in July 2022. She stopped the Atorvastatin because of joint and muscle aches. I will order a lipid panel to see how her levels look off the Atorvastatin. 4) Obesity > Pt is s/p sleeve gastrectomy. She is still working out regularly. Pt encouraged to keep up the good work. See #1. I will order a CBC, CMP and B-12    5) Hypovitaminosis D > Pt to continue her Vitamin D. I will order a Vitamin D level. Pt voices understanding and agreement with the plan. RTC 6 months      Copy sent to:  Mansi Guzman

## 2022-10-24 NOTE — PROGRESS NOTES
Wants lab order for [facility]. Billing Type: Third-Party Bill Bill For Surgical Tray: no Expected Date Of Service: 01/29/2021

## 2022-11-09 ENCOUNTER — HOSPITAL ENCOUNTER (OUTPATIENT)
Dept: GENERAL RADIOLOGY | Age: 32
Discharge: HOME | End: 2022-11-09
Attending: NURSE PRACTITIONER
Payer: COMMERCIAL

## 2022-11-09 DIAGNOSIS — R63.5 WEIGHT GAIN: ICD-10-CM

## 2022-11-09 DIAGNOSIS — E66.01 MORBID OBESITY WITH BMI OF 50.0-59.9, ADULT (HCC): ICD-10-CM

## 2022-11-09 DIAGNOSIS — K21.9 GASTROESOPHAGEAL REFLUX DISEASE WITHOUT ESOPHAGITIS: ICD-10-CM

## 2022-11-09 PROCEDURE — 74240 X-RAY XM UPR GI TRC 1CNTRST: CPT

## 2022-11-14 ENCOUNTER — PATIENT MESSAGE (OUTPATIENT)
Dept: FAMILY MEDICINE CLINIC | Age: 32
End: 2022-11-14

## 2022-11-14 DIAGNOSIS — G43.009 MIGRAINE WITHOUT AURA AND WITHOUT STATUS MIGRAINOSUS, NOT INTRACTABLE: ICD-10-CM

## 2022-11-14 DIAGNOSIS — R20.2 PARESTHESIA: ICD-10-CM

## 2022-11-14 RX ORDER — GABAPENTIN 300 MG/1
300 CAPSULE ORAL 3 TIMES DAILY
Qty: 270 CAPSULE | Refills: 1 | Status: SHIPPED | COMMUNITY
Start: 2022-11-14 | End: 2022-11-21

## 2022-11-14 RX ORDER — OMEPRAZOLE 40 MG/1
40 CAPSULE, DELAYED RELEASE ORAL 2 TIMES DAILY
Qty: 180 CAPSULE | Refills: 3 | Status: SHIPPED | OUTPATIENT
Start: 2022-11-14

## 2022-11-18 ENCOUNTER — TRANSCRIBE ORDER (OUTPATIENT)
Dept: SCHEDULING | Age: 32
End: 2022-11-18

## 2022-11-18 DIAGNOSIS — K21.9 GASTROESOPHAGEAL REFLUX DISEASE: Primary | ICD-10-CM

## 2022-11-18 DIAGNOSIS — K58.1 IRRITABLE BOWEL SYNDROME WITH CONSTIPATION: ICD-10-CM

## 2022-11-18 DIAGNOSIS — R07.9 CHEST PAIN: ICD-10-CM

## 2022-11-18 DIAGNOSIS — Z98.84 BARIATRIC SURGERY STATUS: ICD-10-CM

## 2022-11-18 DIAGNOSIS — M35.05 SJOGREN SYNDROME WITH INFLAMMATORY ARTHRITIS (HCC): ICD-10-CM

## 2022-11-18 NOTE — PROGRESS NOTES
Neurology Note    Patient ID:  Jayesh Fabian  [id number]  68 y.o.  1990      Date of Consultation:  November 21, 2022            Assessment and Plan:    The patient is a 25-year-old female with multiple medical conditions who presents for return to the clinic with her chronic episodic migraines and carpal tunnel syndrome. She was followed by prior neurologist before my initial encounters with her. Chronic intractable episodic migraines:  She will continue with Emgality monthly injections. Her insurance company has denied the concurrent use of Nurtec which she had previously taken. I will increase her gabapentin to 600 mg in the a.m. and 900 mg in the evening. Previously she was taking 300 mg in the morning and 600 mg at bedtime. Listing of medication which she has taken in the past are documented below. For abortive care: I will prescribe Maxalt 10 mg. Sumatriptan caused some relief however did have side effects of nausea and sedation. I did discuss other potential treatment options including botulinum toxin. Risk factors for migraines were reviewed with the patient. These include lifestyle modifications, improving exercise, receiving adequate sleep, and monitoring for food triggers. A patient log of migraine frequency, intensity, and possible triggers should be recorded. Carpal tunnel syndrome:  She does have an EMG performed which reveals bilateral carpal tunnel syndrome. She has been hesitant to see a hand surgeon. I did encourage her to get this scheduled. She states she does not need a referral.    Patient is concerned about ADHD:  Neuropsychological testing is pending. Subjective: I have headaches       History of Present Illness:   Jayesh Fabian is a 27 y.o. female who returns to the neurology clinic at [facility] for an evaluation. She was last seen in the clinic on 2/1/2022.   See my history of present illness, examination, and treatment plan from that day. She was seen by former neurologists before that initial encounter I had with the patient. She was being followed for migraines and was on Emgality. She also did have a history of obstructive sleep apnea, seasonal allergies, and bilateral carpal tunnel syndrome. Her EMG/nerve conduction study from 2020 did reveal a bilateral carpal tunnel syndrome. At her last visit, she was taking Emgality monthly, Nurtec every other day and gabapentin 300 mg pills, 3 times a day. She was also on metoprolol for her blood pressure. Ultimately, her insurance company would no longer cover Nurtec with Emgality and this was stopped in August.    Prior to that things have been going well. Since stopping the Nurtec due to insurance reasons, she has had a mild headache 2 times a week. This is frontal in origin. She will take Tylenol. It is mild and she is able to still work or take classes while this was going on. She has now begun to have severe migraines again. She did have one in the month of October and she is already had 3 migraines this month already. They are typically on one side or the other. They do radiate into her neck. They cause blurring of her vision. It will cause visual obscurations. She does get nausea. She does take an Imitrex which lessens the headache as slight bit and she will need to go lie down and sleep. They are associated with photophobia and sonophobia. These headaches can last up to 24 hours. She has still not wanting to see an orthopedic surgeon for her carpal tunnel syndrome. In regards to migraine hygiene, she does have poor sleep. She gets approximately 5 hours of sleep a night. She does try to make up on that sleep when days she is not working. She is studying to become a nurse. She only has 1 caffeinated beverage a day. She does state well-hydrated with water.   She does not have a dedicated exercise program.  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    Past Medical History:   Diagnosis Date    Anxiety with depression     Arrhythmia     tachycardia    Arthritis     Asthma     [place] hump 1/7/2015    Carpal tunnel syndrome of right wrist 12/20/2016    Diabetes ([facility]     ALIYA (generalized anxiety disorder) 4/4/2018    GERD (gastroesophageal reflux disease) 8/13/2014    HTN (hypertension) 7/25/2011    Hyperaldosteronism ([facility] 10/19/2015    Hyperhidrosis 4/4/2018    Morbid obesity ([facility]     Other ill-defined conditions(799.89)     migraines    Palpitation 4/4/2018    Sleep apnea     uses CPAP    Tachycardia 4/4/2018        Past Surgical History:   Procedure Laterality Date    HX CHOLECYSTECTOMY  11/10/2021    Dr Guillermo Abdullahi     HX GASTRECTOMY  09/08/2020    Gastric sleeve     HX GYN  10/19/2020    Laparoscopic right oopherectomy, left cystectomy    HX HERNIA REPAIR  09/08/2020    HX OVARIAN CYST REMOVAL  10/19/2020    HX TONSILLECTOMY      HX WISDOM TEETH EXTRACTION      NV REMOVAL OF TONSILS,<11 Y/O          Family History   Problem Relation Age of Onset    Hypertension Mother     Diabetes Mother         Type II    Anesth Problems Mother         respiratory issues - feels like she cannot breath when coming out of anesthesia    Arthritis-rheumatoid Mother     Gout Mother     Psychiatric Disorder Father     Drug Abuse Father     Cancer Other         prostate    Hypertension Sister     Thyroid Disease Sister         \"I think\"    Breast Cancer Sister     Attention Deficit Hyperactivity Disorder Brother     Hypertension Sister         Social History     Tobacco Use    Smoking status: Former     Packs/day: 0.50     Years: 5.00     Pack years: 2.50     Types: Cigarettes     Quit date: 11/1/2019     Years since quitting: 3.0    Smokeless tobacco: Never   Substance Use Topics    Alcohol use:  Yes     Alcohol/week: 1.0 standard drink     Types: 1 Shots of liquor, 1 Standard drinks or equivalent per week     Comment: socially, Monthly or less        Allergies Allergen Reactions    Latex Itching        Prior to Admission medications    Medication Sig Start Date End Date Taking? Authorizing Provider   rizatriptan (MAXALT-MLT) 10 mg disintegrating tablet Take 1 Tablet by mouth daily as needed for Migraine (no more than 10 pills in one month. no more than 1 pill a day). 11/21/22  Yes Juan Jose Druon DO   gabapentin (NEURONTIN) 300 mg capsule Take 2 pill in the am and 3 pills at night 11/21/22  Yes Juan Jose Duron DO   omeprazole (PRILOSEC) 40 mg capsule Take 1 Capsule by mouth two (2) times a day. 11/14/22   Alida Pepe MD   DULoxetine (CYMBALTA) 60 mg capsule Takes with 60 mg capsule 9/27/22   Provider, Historical   Blood-Glucose Sensor (Dexcom G6 Sensor) nahid USE AS DIRECTED . CHANGE SENSOR EVERY 10 DAYS 9/27/22   Rene Gomez MD   metoprolol tartrate (LOPRESSOR) 100 mg IR tablet TAKE 1 TABLET BY MOUTH 2 TIMES A DAY GENERIC FOR LOPRESSOR  Patient taking differently: 50 mg. TAKE 1 TABLET BY MOUTH 2 TIMES A DAY GENERIC FOR LOPRESSOR 7/8/22   Alida Pepe MD   semaglutide (Ozempic) 2 mg/dose (8 mg/3 mL) pnij 2 mg by SubCUTAneous route every seven (7) days. 7/6/22   Rene Gomez MD   Vitamin D2 1,250 mcg (50,000 unit) capsule TAKE 1 CAPSULE BY MOUTH ONE TIME WEEKLY 2/18/22   Moises Painter NP   nystatin (MYCOSTATIN) topical cream Apply topically as needed to affected area twice a day    Provider, Historical   DULoxetine (CYMBALTA) 30 mg capsule Take 30 mg by mouth nightly. Takes with 60 mg cap at night 1/24/22   Provider, Historical   BD Luer-Greg Syringe 3 mL 22 gauge x 1\" syrg  12/21/21   Provider, Historical   busPIRone (BUSPAR) 5 mg tablet two (2) times a day. 11/18/21   Provider, Historical   Blood-Glucose Transmitter (Dexcom G6 Transmitter) nahid USE AS DIRECTED 11/23/21   Rene Gomez MD   aspirin delayed-release 81 mg tablet Take 1 Tablet by mouth daily.  10/25/21   Alida Pepe MD   cyanocobalamin (VITAMIN B12) 1,000 mcg/mL injection INJECT 1ML INTO THE MUSCLE MONTHLY 9/2/21   Maryam Galloway MD   [facility]) 0.35 mg tab  8/5/21   Provider, Historical   galcanezumab-gnlm (Emgality Pen) 120 mg/mL injection 1 mL by SubCUTAneous route every thirty (30) days. 3/4/21   Wagner Pacheco MD   iron,carbonyl-FA-multivit-min (Opurity Multivitamin) 30 mg iron- 800 mcg chew Take 2 Tabs by mouth daily. 8/25/20   Provider, Historical   calcium carbonate/vitamin D3 (CALCIUM 600 + D,3, PO) Take 1 Tablet by mouth daily. 8/25/20   Provider, Historical       Review of Systems:    General, constitutional: negative  Eyes, vision: negative  Ears, nose, throat: negative  Cardiovascular, heart: negative  Respiratory: negative  Gastrointestinal: negative  Genitourinary: negative  Musculoskeletal: negative  Skin and integumentary: negative  Psychiatric: negative  Endocrine: negative  Neurological: negative, except for HPI  Hematologic/lymphatic: negative  Allergy/immunology: negative      Objective:     Visit Vitals  BP (!) 140/96 (BP 1 Location: Left upper arm, BP Patient Position: Sitting, BP Cuff Size: Large adult)   Pulse 99   Temp 97.8 °F (36.6 °C) (Temporal)   Resp 18   Ht 5' 3\" (1.6 m)   Wt 296 lb (134.3 kg)   SpO2 98%   BMI 52.43 kg/m²       Physical Exam:    General:  appears well nourished in no acute distress. obese  Neck: no carotid bruits  Lungs: clear to auscultation  Heart:  no murmurs, regular rate  Lower extremity: peripheral pulses palpable and no edema  Skin: intact    Neurological exam:    Awake, alert, oriented to person, place and time  Recent and remote memory were normal  Attention and concentration were intact  Language was intact.   There was no aphasia  Speech: no dysarthria  Fund of knowledge was preserved    Cranial nerves:   II-XII were tested    Perrrla  Visual fields were full  Eomi, no evidence of nystagmus  Facial sensation:  normal and symmetric  Facial motor: normal and symmetric  Hearing intact  SCM strength intact  Tongue: midline without fasciculations    Motor: Tone normal    No evidence of fasciculations    Strength testing:   deltoid triceps biceps Wrist ext. Wrist flex. intrinsics Hip flex. Hip ext. Knee ext. Knee flex Dorsi flex Plantar flex   Right 5 5 5 5 5 5 5 5 5 5 5 5   Left 5 5 5 5 5 5 5 5 5 5 5 5         Sensory:  Upper extremity: Sensory loss in the median distribution bilaterally  Lower extremity: intact to pp, light touch, and vibration > 10 seconds      Reflexes:    Right Left  Biceps  2 2  Triceps   2 2  Brachiorad. 2 2  Patella  2 2  Achilles 2 2    Plantar response:  flexor bilaterally    Cerebellar testing:  no tremor apparent, finger/nose and carissa were intact    Romberg: absent    Gait: steady. Labs:     Lab Results   Component Value Date/Time    Hemoglobin A1c 5.8 (H) 10/25/2022 08:14 AM    Sodium 140 10/25/2022 08:14 AM    Potassium 4.3 10/25/2022 08:14 AM    Chloride 107 10/25/2022 08:14 AM    Glucose 82 10/25/2022 08:14 AM    BUN 9 10/25/2022 08:14 AM    Creatinine 0.66 10/25/2022 08:14 AM    Calcium 8.8 10/25/2022 08:14 AM    WBC 6.8 10/25/2022 08:14 AM    HCT 39.7 10/25/2022 08:14 AM    HGB 11.9 10/25/2022 08:14 AM    PLATELET 986 13/45/6014 08:14 AM    Hemoglobin A1c, External 5.5 04/07/2022 12:00 AM       Imaging:    Results from Hospital Encounter encounter on 05/14/21    MRI KNEE LT WO CONT    Narrative  EXAM: MRI KNEE LT WO CONT    INDICATION: Chronic inflammatory arthritis. Primary osteoarthritis involving  multiple joints. Undifferentiated connective tissue disease. High ESR. Uptake  kidneys on bone scan. Knee pain. COMPARISON: Bone scan 3/31/2021    TECHNIQUE: Axial T2 fat-saturated and proton density fat-saturated; coronal T1  and proton density fat-saturated; and sagittal T2 fat-saturated, proton density  fat-saturated, and gradient echo MRI of the left knee . CONTRAST: None. FINDINGS: Bone marrow: Subchondral reactive bony signal at the lateral trochlear  facet .  Bone signal otherwise normal.    Joint fluid: Small knee effusion. Trace fluid in gastrocnemius semimembranosus  bursa. Collateral ligaments and posterior, lateral corner: Intact. Medial meniscus: Intact. Lateral meniscus: Intact. ACL and PCL: Intact. Tendons: Intact. Muscles: Within normal limits. Patellofemoral alignment: There is an appearance of lateral patellar subluxation  and tilting with lateralization of the lateral margin of the patellar tendon  relative to the lateral and should of the lateral facet of the femoral trochlea. The trochlear groove does not appear hypoplastic. There is patella brenda. TT-TG  distance: 13 mm. . TT-PCL distance: 17 mm. Articular cartilage: There is a 4 mm grade 4 chondral defect of the superior  lateral trochlear facet. Soft tissue mass: None. Impression  1. Patella brenda. Lateral subluxation of patella and patellar tendon. 4 mm sized  grade 4 chondral defect of superior lateral trochlear facet with underlying  subchondral stress reaction. 2. No meniscal or ligamentous derangement demonstrated. 3. Small knee effusion. Results from [place] encounter on 11/12/21    CTA CHEST W OR W WO CONT    Narrative  EXAM:  CTA CHEST W OR W WO CONT    INDICATION: Chest pain, recent surgery    COMPARISON: CTA chest of 9/11/2020    TECHNIQUE: Helical thin section chest CT following uneventful intravenous  administration of nonionic contrast 80 mL of isovue 370 according to  departmental PE protocol. Coronal and sagittal reformats were performed. 3D post  processing was performed. CT dose reduction was achieved through the use of a  standardized protocol tailored for this examination and automatic exposure  control for dose modulation. FINDINGS: This is a good quality study for the evaluation of pulmonary embolism  to the first subsegmental arterial level. There is no pulmonary embolism to this  level. THYROID: No nodule.   MEDIASTINUM: No mass or lymphadenopathy. DUYEN: No mass or lymphadenopathy. THORACIC AORTA: No aneurysm. HEART: Normal in size. ESOPHAGUS: No wall thickening or dilatation. TRACHEA/BRONCHI: Patent. PLEURA: No effusion or pneumothorax. LUNGS: No nodule, mass, or airspace disease. UPPER ABDOMEN: Partially imaged. Cholecystectomy. Gastric sleeve. Soft tissue  gas in the right anterior abdominal wall, likely postsurgical.  BONES: No aggressive bone lesion or fracture. Degenerative changes in the spine.     Impression  No pulmonary embolism nor other acute finding in the chest.             Patient Active Problem List   Diagnosis Code    HTN (hypertension) I10    Obesity, morbid (more than 100 lbs over ideal weight or BMI > 40) ([facility]) E66.01    Fatigue R53.83    Vitamin D deficiency E55.9    AR (allergic rhinitis) J30.9    Obesity E66.9    Increased pulse rate R00.0    Acute laryngitis J04.0    Viral pneumonia, unspecified J12.9    Migraine headache G43.909    GERD (gastroesophageal reflux disease) K21.9    [place] hump E65    Type 2 diabetes mellitus without complication ([facility]) Y07.4    Acute pharyngitis J02.9    Carpal tunnel syndrome of right wrist G56.01    Sleep apnea in adult G47.30    ALIYA (generalized anxiety disorder) F41.1    Hyperhidrosis R61    Palpitation R00.2    Tachycardia R00.0    Type 2 diabetes mellitus with diabetic neuropathy ([facility]) E11.40    Class 3 severe obesity due to excess calories with serious comorbidity and body mass index (BMI) of 60.0 to 69.9 in adult ([facility]) E66.01, Z68.44    Type 2 diabetes with nephropathy ([facility]) E11.21    Dysthymia F34.1    Type 2 diabetes mellitus with chronic kidney disease ([facility] E11.22        The patient should return to clinic in 1 year    Renewed medication: yes    I spent  30  minutes on the day of the encounter preparing the office visit by reviewing medical records, obtaining a history, performing examination, counseling and educating the patient on diagnosis, documenting in the clinical medical record, ordering medication, and coordinating the care for the patient. The patient had the ability to ask questions and all questions were answered.                Signed By:  Av Quintero DO FAAN    November 21, 2022

## 2022-11-21 ENCOUNTER — OFFICE VISIT (OUTPATIENT)
Dept: NEUROLOGY | Age: 32
End: 2022-11-21
Payer: COMMERCIAL

## 2022-11-21 VITALS
HEART RATE: 99 BPM | OXYGEN SATURATION: 98 % | HEIGHT: 63 IN | WEIGHT: 293 LBS | BODY MASS INDEX: 51.91 KG/M2 | TEMPERATURE: 97.8 F | DIASTOLIC BLOOD PRESSURE: 96 MMHG | SYSTOLIC BLOOD PRESSURE: 140 MMHG | RESPIRATION RATE: 18 BRPM

## 2022-11-21 DIAGNOSIS — G56.03 BILATERAL CARPAL TUNNEL SYNDROME: ICD-10-CM

## 2022-11-21 DIAGNOSIS — G43.009 MIGRAINE WITHOUT AURA AND WITHOUT STATUS MIGRAINOSUS, NOT INTRACTABLE: ICD-10-CM

## 2022-11-21 DIAGNOSIS — G47.33 OSA (OBSTRUCTIVE SLEEP APNEA): ICD-10-CM

## 2022-11-21 DIAGNOSIS — G43.719 INTRACTABLE CHRONIC MIGRAINE WITHOUT AURA AND WITHOUT STATUS MIGRAINOSUS: Primary | ICD-10-CM

## 2022-11-21 DIAGNOSIS — R20.2 PARESTHESIA: ICD-10-CM

## 2022-11-21 PROCEDURE — 3078F DIAST BP <80 MM HG: CPT | Performed by: PSYCHIATRY & NEUROLOGY

## 2022-11-21 PROCEDURE — 99214 OFFICE O/P EST MOD 30 MIN: CPT | Performed by: PSYCHIATRY & NEUROLOGY

## 2022-11-21 PROCEDURE — 3074F SYST BP LT 130 MM HG: CPT | Performed by: PSYCHIATRY & NEUROLOGY

## 2022-11-21 RX ORDER — GABAPENTIN 300 MG/1
CAPSULE ORAL
Qty: 540 CAPSULE | Refills: 1 | Status: SHIPPED | OUTPATIENT
Start: 2022-11-21

## 2022-11-21 RX ORDER — RIZATRIPTAN BENZOATE 10 MG/1
10 TABLET, ORALLY DISINTEGRATING ORAL
Qty: 10 TABLET | Refills: 5 | Status: SHIPPED | OUTPATIENT
Start: 2022-11-21

## 2022-11-21 NOTE — LETTER
11/21/2022    Patient: Alice Vidal   YOB: 1990   Date of Visit: 11/21/2022     Dimas Perkins MD  [address] [place] [address]    Dear Dimas Perkins MD,      Thank you for referring Ms. Oleg Chin to [address] [place] [address] for evaluation. My notes for this consultation are attached. If you have questions, please do not hesitate to call me. I look forward to following your patient along with you.       Sincerely,    Juan Jose Duron, DO

## 2022-11-28 ENCOUNTER — TELEPHONE (OUTPATIENT)
Dept: NEUROLOGY | Age: 32
End: 2022-11-28

## 2022-11-28 NOTE — TELEPHONE ENCOUNTER
[place] [address] and spoke to Sukumar Menon and verified 450 tabs will be correct given pt's dosage. Advised the 45 must have gotten switched. She verbalized understanding disp 450 tabs with 1 refill on the Gabapentin.

## 2022-11-28 NOTE — TELEPHONE ENCOUNTER
[facility] has question about the [employer] script qty. 90 day supply is 450.  Please call to discuss [phone]

## 2022-12-22 DIAGNOSIS — G43.719 INTRACTABLE CHRONIC MIGRAINE WITHOUT AURA AND WITHOUT STATUS MIGRAINOSUS: ICD-10-CM

## 2022-12-22 RX ORDER — GALCANEZUMAB 120 MG/ML
120 INJECTION, SOLUTION SUBCUTANEOUS
Qty: 1 ML | Refills: 11 | Status: SHIPPED | OUTPATIENT
Start: 2022-12-22

## 2023-01-03 ENCOUNTER — OFFICE VISIT (OUTPATIENT)
Dept: NEUROLOGY | Age: 33
End: 2023-01-03
Payer: COMMERCIAL

## 2023-01-03 DIAGNOSIS — R41.840 ATTENTION DEFICIT: ICD-10-CM

## 2023-01-03 DIAGNOSIS — F34.1 DYSTHYMIA: Primary | ICD-10-CM

## 2023-01-03 DIAGNOSIS — F41.1 GAD (GENERALIZED ANXIETY DISORDER): ICD-10-CM

## 2023-01-03 PROCEDURE — 90791 PSYCH DIAGNOSTIC EVALUATION: CPT | Performed by: CLINICAL NEUROPSYCHOLOGIST
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
## 2023-01-04 ENCOUNTER — TELEPHONE (OUTPATIENT)
Dept: SURGERY | Age: 33
End: 2023-01-04

## 2023-01-04 ENCOUNTER — TELEPHONE (OUTPATIENT)
Dept: NEUROLOGY | Age: 33
End: 2023-01-04

## 2023-01-04 NOTE — TELEPHONE ENCOUNTER
----- Message from Luz Marina Matias sent at 1/3/2023 12:25 PM EST -----  Regarding: FW: requirements  Hello,    Please call this patient to schedule a follow up appointment with Dr. Ivett Chisholm. She was to complete a dietitian visit and have visit with GI. Both have been complete. The next step is the visit with Dr. Ivett Chisholm to discuss having a revision. Thank you!    ----- Message -----  From: Frandy Jones  Sent: 12/29/2022   8:37 AM EST  To: Radha Arevalo  Subject: requirements                                     Patient called requesting an appointment with Dr. Ivett Chisholm to discuss revision and acid reflux. Informed patient all her requirements need to be completed prior to scheduling. Patient stated she had an appt with dietitian (which per note it says \"will continue to assess\" so I informed her she needs to continue to see her, as well as complete and EGD. Patient had a few more questions I was unable to answer and would like a call back.

## 2023-01-06 ENCOUNTER — HOSPITAL ENCOUNTER (OUTPATIENT)
Dept: NUCLEAR MEDICINE | Age: 33
End: 2023-01-06
Attending: INTERNAL MEDICINE
Payer: COMMERCIAL

## 2023-01-06 DIAGNOSIS — K21.9 GASTROESOPHAGEAL REFLUX DISEASE: ICD-10-CM

## 2023-01-06 DIAGNOSIS — M35.05 SJOGREN SYNDROME WITH INFLAMMATORY ARTHRITIS (HCC): ICD-10-CM

## 2023-01-06 DIAGNOSIS — R07.9 CHEST PAIN: ICD-10-CM

## 2023-01-06 DIAGNOSIS — K58.1 IRRITABLE BOWEL SYNDROME WITH CONSTIPATION: ICD-10-CM

## 2023-01-06 DIAGNOSIS — Z98.84 BARIATRIC SURGERY STATUS: ICD-10-CM

## 2023-01-06 PROCEDURE — 78264 GASTRIC EMPTYING IMG STUDY: CPT

## 2023-01-06 RX ORDER — TECHNETIUM TC 99M SULFUR COLLOID 2 MG
1 KIT MISCELLANEOUS
Status: COMPLETED | OUTPATIENT
Start: 2023-01-06 | End: 2023-01-06

## 2023-01-06 RX ADMIN — TECHNETIUM TC 99M SULFUR COLLOID 1 MILLICURIE: KIT at 09:30

## 2023-01-10 ENCOUNTER — TELEPHONE (OUTPATIENT)
Dept: NEUROLOGY | Age: 33
End: 2023-01-10

## 2023-01-10 NOTE — TELEPHONE ENCOUNTER
Contacted Cordele suresh Cartagena no PA is needed for [id number], [id number], [id number], [id number], [id number].     REF# [id number]

## 2023-01-11 ENCOUNTER — OFFICE VISIT (OUTPATIENT)
Dept: SURGERY | Age: 33
End: 2023-01-11
Payer: COMMERCIAL

## 2023-01-11 VITALS
HEART RATE: 102 BPM | WEIGHT: 293 LBS | RESPIRATION RATE: 18 BRPM | OXYGEN SATURATION: 98 % | HEIGHT: 63 IN | DIASTOLIC BLOOD PRESSURE: 79 MMHG | SYSTOLIC BLOOD PRESSURE: 134 MMHG | BODY MASS INDEX: 51.91 KG/M2 | TEMPERATURE: 99 F

## 2023-01-11 DIAGNOSIS — K21.9 GASTROESOPHAGEAL REFLUX DISEASE WITHOUT ESOPHAGITIS: ICD-10-CM

## 2023-01-11 DIAGNOSIS — R63.5 WEIGHT GAIN: ICD-10-CM

## 2023-01-11 DIAGNOSIS — E66.01 MORBID OBESITY WITH BMI OF 50.0-59.9, ADULT (HCC): Primary | ICD-10-CM

## 2023-01-11 PROCEDURE — 99214 OFFICE O/P EST MOD 30 MIN: CPT | Performed by: SURGERY

## 2023-01-11 PROCEDURE — 3075F SYST BP GE 130 - 139MM HG: CPT | Performed by: SURGERY

## 2023-01-11 PROCEDURE — 3078F DIAST BP <80 MM HG: CPT | Performed by: SURGERY

## 2023-01-11 NOTE — PROGRESS NOTES
[facility] Surgery History and Physical    History of Present Illness:      Eli Rodriguez is a 27 y.o. female who has a history of laparoscopic sleeve gastrectomy about 2 years ago. She initially lost about 60 to 70 pounds with her original surgery. She has gained about 30 or so pounds down from her lowest weight. She is having severe acid reflux and heartburn issues. These were mildly present before surgery but now are significantly worse. She has had a recent endoscopy which showed some esophagitis but no Shelby's esophagus. She is requesting evaluation for conversion to gastric bypass. She has done at least 1 visit with her dietitian. She has had a recent endoscopy and upper GI swallow study. Past Medical History:   Diagnosis Date    Anxiety with depression     Arrhythmia     tachycardia    Arthritis     Asthma     [place] hump 1/7/2015    Carpal tunnel syndrome of right wrist 12/20/2016    Diabetes ([address]     ALIYA (generalized anxiety disorder) 4/4/2018    GERD (gastroesophageal reflux disease) 8/13/2014    HTN (hypertension) 7/25/2011    Hyperaldosteronism ([address] 10/19/2015    Hyperhidrosis 4/4/2018    Morbid obesity ([address]     Other ill-defined conditions(799.89)     migraines    Palpitation 4/4/2018    Sleep apnea     uses CPAP    Tachycardia 4/4/2018       Past Surgical History:   Procedure Laterality Date    HX CHOLECYSTECTOMY  11/10/2021    Dr Irasema Benedict     HX GASTRECTOMY  09/08/2020    Gastric sleeve     HX GYN  10/19/2020    Laparoscopic right oopherectomy, left cystectomy    HX HERNIA REPAIR  09/08/2020    HX OVARIAN CYST REMOVAL  10/19/2020    HX TONSILLECTOMY      HX WISDOM TEETH EXTRACTION      [place] REMOVAL OF TONSILS,<9 Y/O           Current Outpatient Medications:     galcanezumab-gnlm (Emgality Pen) 120 mg/mL injection, 1 mL by SubCUTAneous route every thirty (30) days. , Disp: 1 mL, Rfl: 11    rizatriptan (MAXALT-MLT) 10 mg disintegrating tablet, Take 1 Tablet by mouth daily as needed for Migraine (no more than 10 pills in one month. no more than 1 pill a day). , Disp: 10 Tablet, Rfl: 5    gabapentin (NEURONTIN) 300 mg capsule, Take 2 pill in the am and 3 pills at night, Disp: 540 Capsule, Rfl: 1    omeprazole (PRILOSEC) 40 mg capsule, Take 1 Capsule by mouth two (2) times a day., Disp: 180 Capsule, Rfl: 3    DULoxetine (CYMBALTA) 60 mg capsule, Takes with 60 mg capsule, Disp: , Rfl:     Blood-Glucose Sensor (Dexcom G6 Sensor) nahid, USE AS DIRECTED . CHANGE SENSOR EVERY 10 DAYS, Disp: 3 Each, Rfl: 11    metoprolol tartrate (LOPRESSOR) 100 mg IR tablet, TAKE 1 TABLET BY MOUTH 2 TIMES A DAY GENERIC FOR LOPRESSOR (Patient taking differently: 50 mg. TAKE 1 TABLET BY MOUTH 2 TIMES A DAY GENERIC FOR LOPRESSOR), Disp: 180 Tablet, Rfl: 2    semaglutide (Ozempic) 2 mg/dose (8 mg/3 mL) pnij, 2 mg by SubCUTAneous route every seven (7) days. , Disp: 3 Pen, Rfl: 3    Vitamin D2 1,250 mcg (50,000 unit) capsule, TAKE 1 CAPSULE BY MOUTH ONE TIME WEEKLY, Disp: 12 Capsule, Rfl: 3    DULoxetine (CYMBALTA) 30 mg capsule, Take 30 mg by mouth nightly. Takes with 60 mg cap at night, Disp: , Rfl:     BD Luer-Greg Syringe 3 mL 22 gauge x 1\" syrg, , Disp: , Rfl:     busPIRone (BUSPAR) 5 mg tablet, two (2) times a day., Disp: , Rfl:     Blood-Glucose Transmitter (Dexcom G6 Transmitter) nahid, USE AS DIRECTED, Disp: 1 Each, Rfl: 3    aspirin delayed-release 81 mg tablet, Take 1 Tablet by mouth daily. , Disp: 90 Tablet, Rfl: 3    cyanocobalamin (VITAMIN B12) 1,000 mcg/mL injection, INJECT 1ML INTO THE MUSCLE MONTHLY, Disp: 1 mL, Rfl: 5    norethindrone (MICRONOR) 0.35 mg tab, , Disp: , Rfl:     iron,carbonyl-FA-multivit-min (Opurity Multivitamin) 30 mg iron- 800 mcg chew, Take 2 Tabs by mouth daily. , Disp: , Rfl:     calcium carbonate/vitamin D3 (CALCIUM 600 + D,3, PO), Take 1 Tablet by mouth daily. , Disp: , Rfl:     nystatin (MYCOSTATIN) topical cream, Apply topically as needed to affected area twice a day (Patient not taking: Reported on 1/11/2023), Disp: , Rfl:     Allergies   Allergen Reactions    Latex Itching       Social History     Socioeconomic History    Marital status: SINGLE     Spouse name: Not on file    Number of children: Not on file    Years of education: Not on file    Highest education level: Not on file   Occupational History    Occupation: customer ser and student     Comment: [employer]   Tobacco Use    Smoking status: Former     Packs/day: 0.50     Years: 5.00     Pack years: 2.50     Types: Cigarettes     Quit date: 11/1/2019     Years since quitting: 3.1    Smokeless tobacco: Never   Vaping Use    Vaping Use: Never used   Substance and Sexual Activity    Alcohol use:  Yes     Alcohol/week: 1.0 standard drink     Types: 1 Shots of liquor, 1 Standard drinks or equivalent per week     Comment: socially, Monthly or less    Drug use: No    Sexual activity: Not Currently     Partners: Male     Birth control/protection: Condom   Other Topics Concern     Service Not Asked    Blood Transfusions Not Asked    Caffeine Concern Not Asked    Occupational Exposure Not Asked    Hobby Hazards Not Asked    Sleep Concern Not Asked    Stress Concern Not Asked    Weight Concern Not Asked    Special Diet Not Asked    Back Care Not Asked    Exercise Not Asked    Bike Helmet Not Asked    Seat Belt Not Asked    Self-Exams Not Asked   Social History Narrative    Not on file     Social Determinants of Health     Financial Resource Strain: Not on file   Food Insecurity: Not on file   Transportation Needs: Not on file   Physical Activity: Not on file   Stress: Not on file   Social Connections: Not on file   Intimate Partner Violence: Not on file   Housing Stability: Not on file       Family History   Problem Relation Age of Onset    Hypertension Mother     Diabetes Mother         Type II    Anesth Problems Mother         respiratory issues - feels like she cannot breath when coming out of anesthesia Arthritis-rheumatoid Mother     Gout Mother     Psychiatric Disorder Father     Drug Abuse Father     Cancer Other         prostate    Hypertension Sister     Thyroid Disease Sister         \"I think\"    Breast Cancer Sister     Attention Deficit Hyperactivity Disorder Brother     Hypertension Sister        ROS   Constitutional: negative  Ears, Nose, Mouth, Throat, and Face: negative  Respiratory: negative  Cardiovascular: negative  Gastrointestinal: positive for dyspepsia  Genitourinary:negative  Integument/Breast: negative  Hematologic/Lymphatic: negative  Behavioral/Psychiatric: negative  Allergic/Immunologic: negative      Physical Exam:     Visit Vitals  /79 (BP 1 Location: Right arm, BP Patient Position: Sitting, BP Cuff Size: Large adult)   Pulse (!) 102   Temp 99 °F (37.2 °C) (Oral)   Resp 18   Ht 5' 3\" (1.6 m)   Wt 300 lb 9.6 oz (136.4 kg)   SpO2 98%   BMI 53.25 kg/m²       General - alert and oriented, no apparent distress  HEENT - no jaundice, no hearing imparement  Pulm - CTAB, no C/W/R  CV - RRR, no M/R/G  Abd -soft, nondistended, bowel sounds present, nontender to palpation  Ext - pulses intact in UE and LE bilaterally, no edema  Skin - supple, no rashes  Psychiatric - normal affect, good mood    Labs  Lab Results   Component Value Date/Time    Sodium 140 10/25/2022 08:14 AM    Potassium 4.3 10/25/2022 08:14 AM    Chloride 107 10/25/2022 08:14 AM    CO2 28 10/25/2022 08:14 AM    Anion gap 5 10/25/2022 08:14 AM    Glucose 82 10/25/2022 08:14 AM    BUN 9 10/25/2022 08:14 AM    Creatinine 0.66 10/25/2022 08:14 AM    BUN/Creatinine ratio 14 10/25/2022 08:14 AM    GFR est AA >60 07/20/2022 04:25 PM    GFR est non-AA >60 07/20/2022 04:25 PM    Calcium 8.8 10/25/2022 08:14 AM    Bilirubin, total 0.2 10/25/2022 08:14 AM    Alk.  phosphatase 81 10/25/2022 08:14 AM    Protein, total 6.6 10/25/2022 08:14 AM    Albumin 3.3 (L) 10/25/2022 08:14 AM    Globulin 3.3 10/25/2022 08:14 AM    A-G Ratio 1.0 (L) 10/25/2022 08:14 AM    ALT (SGPT) 14 10/25/2022 08:14 AM    AST (SGOT) 8 (L) 10/25/2022 08:14 AM     Lab Results   Component Value Date/Time    WBC 6.8 10/25/2022 08:14 AM    HGB 11.9 10/25/2022 08:14 AM    HCT 39.7 10/25/2022 08:14 AM    PLATELET 689 59/46/2048 08:14 AM    MCV 93.9 10/25/2022 08:14 AM           Imaging  Upper GI- radiograph of the abdomen is unremarkable. The patient ingested barium for  the purpose of a single contrast upper GI. The esophagus is normal in course and  caliber without evidence of mass lesion or stricture. There is mild reflux to  the level of the thoracic inlet. There is normal primary and secondary  peristalsis. The patient is status post sleeve gastrectomy. There is a normal  postoperative appearance. The stomach emptied readily into the duodenal bulb and  sweep which are normal.     IMPRESSION  Mild reflux. Status post sleeve gastrectomy. Gastric emptying study-EXAM: Nuclear gastric emptying study is performed with imaging of the abdomen  following p.o. administration of 1.0 mCi technetium 99m sulfur colloid in  solids. TECHNIQUE: Planar scintigraphic imaging of the abdomen is performed for 4 hours  with calculation of percent retention of radiotracer in the stomach at 1, 2, and  4 hours post ingestion. FINDINGS:  Results: 44%, 18%, 3%. Normal values: 37-90%; 30-60%; 0-10%. IMPRESSION  No delay of gastric emptying. I have reviewed and agree with all of the pertinent images      EGD 9/21-mild esophagitis, no hiatal hernia, mild gastritis, otherwise normal sleeve appearing stomach, normal duodenum    Assessment:     Jose Paez is a 27 y.o. female with weight regain after sleeve gastrectomy, severe GERD    Recommendations:     1. She does have severe GERD and this seems to be her major complaint. She is also had weight regain after her sleeve gastrectomy. She has not gained all of her weight back but has gained significant portion back.   I do believe she would be best served with conversion to gastric bypass. I do not see any sign of hiatal hernia on her endoscopy or swallow study. Since she has regained weight I would like her to do more nutrition visits and do a psychological evaluation to fill her insurance requirements. I would not have any other additional requirements outside of any additional insurance would have. Once we have completed that she will come back and see me for official approval    Adelia Siemens, MD    Greater than half of the time: 30 minutes was used in counciling the patient about diagnosis and treatment plan    Ms. Rhianna Peacock has a reminder for a \"due or due soon\" health maintenance. I have asked that she contact her primary care provider for follow-up on this health maintenance.

## 2023-01-11 NOTE — PROGRESS NOTES
Identified pt with two pt identifiers (name and ). Reviewed chart in preparation for visit and have obtained necessary documentation. Lianne Santos is a 27 y.o. female  Chief Complaint   Patient presents with    Follow-up     Discuss revision LSG 20     Visit Vitals  /79 (BP 1 Location: Right arm, BP Patient Position: Sitting, BP Cuff Size: Large adult)   Pulse (!) 102   Temp 99 °F (37.2 °C) (Oral)   Resp 18   Ht 5' 3\" (1.6 m)   Wt 300 lb 9.6 oz (136.4 kg)   SpO2 98%   BMI 53.25 kg/m²       1. Have you been to the ER, urgent care clinic since your last visit? Hospitalized since your last visit? Yes      2. Have you seen or consulted any other health care providers outside of the [address] [place] [address] since your last visit? Include any pap smears or colon screening.  No

## 2023-01-11 NOTE — LETTER
1/11/2023    Patient: Forestine Lundborg   YOB: 1990   Date of Visit: 1/11/2023     Karen Cyr MD  [address] [place] [address]    Dear Karen Cyr MD,      Thank you for referring Ms. Shadi Thomas to [facility] [address] [place] [address] for evaluation. My notes for this consultation are attached. If you have questions, please do not hesitate to call me. I look forward to following your patient along with you.       Sincerely,    Mikey Dailey MD

## 2023-01-11 NOTE — Clinical Note
Patient wants to consider conversion of sleeve to gastric bypass. She has met some of her requirements but I do not know if she has any additional nutrition or psychological evaluation requirements with her insurance. It would be good for her to do a few more nutrition visits.

## 2023-01-12 ENCOUNTER — CLINICAL SUPPORT (OUTPATIENT)
Dept: SURGERY | Age: 33
End: 2023-01-12

## 2023-01-12 DIAGNOSIS — E66.01 MORBID OBESITY (HCC): Primary | ICD-10-CM

## 2023-01-12 NOTE — PROGRESS NOTES
Pre-operative Bariatric Nutrition Evaluation    Date: 2023              Session 1 of 6    Insurance: [employer]            Physician/Surgeon: Dr. Miki Garza      Name: Deanne Esparza  :  1990  Age:  27  Gender: Female  Type of Surgery: [x]   Gastric Bypass (revision from sleeve)   []    Sleeve Gastrectomy    ASSESSMENT:    Past Medical History: sleeve gastrectomy - 2020, GERD    Medications/Supplements:   Prior to Admission medications    Medication Sig Start Date End Date Taking? Authorizing Provider   galcanezumab-gnlm (Emgality Pen) 120 mg/mL injection 1 mL by SubCUTAneous route every thirty (30) days. 22   Livier Hirsch,    rizatriptan (MAXALT-MLT) 10 mg disintegrating tablet Take 1 Tablet by mouth daily as needed for Migraine (no more than 10 pills in one month. no more than 1 pill a day). 22   Juan Jose Duron DO   gabapentin (NEURONTIN) 300 mg capsule Take 2 pill in the am and 3 pills at night 22   Juan Jose Duron DO   omeprazole (PRILOSEC) 40 mg capsule Take 1 Capsule by mouth two (2) times a day. 22   Noah Contreras MD   DULoxetine (CYMBALTA) 60 mg capsule Takes with 60 mg capsule 22   Provider, Historical   Blood-Glucose Sensor (Dexcom G6 Sensor) nahid USE AS DIRECTED . CHANGE SENSOR EVERY 10 DAYS 22   Katlin Becerra MD   metoprolol tartrate (LOPRESSOR) 100 mg IR tablet TAKE 1 TABLET BY MOUTH 2 TIMES A DAY GENERIC FOR LOPRESSOR  Patient taking differently: 50 mg. TAKE 1 TABLET BY MOUTH 2 TIMES A DAY GENERIC FOR LOPRESSOR 22   Noah Contreras MD   semaglutide (Ozempic) 2 mg/dose (8 mg/3 mL) pnij 2 mg by SubCUTAneous route every seven (7) days.  22   Katlin Becerra MD   Vitamin D2 1,250 mcg (50,000 unit) capsule TAKE 1 CAPSULE BY MOUTH ONE TIME WEEKLY 22   Natalie Fischer NP   nystatin (MYCOSTATIN) topical cream Apply topically as needed to affected area twice a day  Patient not taking: Reported on 2023    Provider, Historical DULoxetine (CYMBALTA) 30 mg capsule Take 30 mg by mouth nightly. Takes with 60 mg cap at night 1/24/22   Provider, Historical   BD Luer-Greg Syringe 3 mL 22 gauge x 1\" syrg  12/21/21   Provider, Historical   busPIRone (BUSPAR) 5 mg tablet two (2) times a day. 11/18/21   Provider, Historical   Blood-Glucose Transmitter (Dexcom G6 Transmitter) nahid USE AS DIRECTED 11/23/21   Dustin Romano MD   aspirin delayed-release 81 mg tablet Take 1 Tablet by mouth daily. 10/25/21   Bella Barron MD   cyanocobalamin (VITAMIN B12) 1,000 mcg/mL injection INJECT 1ML INTO THE MUSCLE MONTHLY 9/2/21   Bella Barron MD   norethindrone (MICRONOR) 0.35 mg tab  8/5/21   Provider, Historical   iron,carbonyl-FA-multivit-min (Opurity Multivitamin) 30 mg iron- 800 mcg chew Take 2 Tabs by mouth daily. 8/25/20   Provider, Historical   calcium carbonate/vitamin D3 (CALCIUM 600 + D,3, PO) Take 1 Tablet by mouth daily. 8/25/20   Provider, Historical       Smoking: None  Alcohol: None    Food Allergies/Intolerances: pork hard to digest    Anthropometrics:    Ht:63 inches   Wt: 300 lbs   IBW: 115 lbs    %IBW: 260     BMI:53    Category: Obesity class III    Reported wt history: : Pt presents today for pre-op nutrition evaluation for wt loss surgery. Pt with previous sleeve gastrectomy about 2 years ago. Reports initially losing from 328# down to 270#. Has since regained about 60# and has developed reflux. Attributes wt gain r/t poor eating habits. Has been unable to maintain long term or significant wt loss and is now seeking approval for weight loss surgery. Pt will need to complete 6 months of supervised weight loss for insurance requirements.      Exercise/Physical Activity: None; just started going to [employer]    Reported Diet History:sleeve gastrectomy    24 Hour Diet Recall  Breakfast  skips   Snacks     Lunch  sandwich   Snacks  candy   Dinner  Protein, veggies, little starch   Snacks     Beverages  Water- 60 oz, soda-12 oz Out to eat/take out 1-2x week. Following drinking rule. No emotional eating. Likes [employer] or Slimfast Advance protein    Taking Opurity vitamin w/iron; B-12 injections 1x month    Environment/Psychosocial/Support: Pt states family supportive of surgery. 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 NUTRITION INTERVENTION:  Pt educated on nutrition recommendations for weight loss surgery, specifically gastric bypass (revision from sleeve). Instructed on consuming 3 meals per day starting now. Use the balanced plate method to plan meals, include 3 oz of lean source of protein, 1/2 cup whole grains, unlimited non-starchy vegetables, 1/2 cup fruit and 1 serving of low fat dairy. Utilize handouts listing healthy snack and meal ideas to limit restaurant meals. After surgery measure all meals to 1/2 cup. Each meal will contain a 1/4 cup lean protein and 1/4 cup fruit, non-starchy vegetable or starch (limiting to once per day). Aim for 60 g protein per day. Sip on 48-64 oz of sugar free, calorie free, non-carbonated beverages each day. Do not use a straw. Do not consume beverages 30 minutes before, during or 30 minutes after meals. Read all nutrition labels. Demonstrated and emphasized identifying serving size, total fat, sugar and protein content. Defined low fat as </= 3 g per serving. Discussed lean and extra lean sources of protein. Provided list of low fat cooking methods. Avoid foods with sugar listed in the first 3 ingredients and >/15 g sugar per serving. Excess sugar/fat intake may lead to dumping syndrome. Discussed signs and symptoms of dumping syndrome.      Practice mindful eating habits; take small bites, chew thoroughly, avoid distractions, utilize hunger/fullness scale. Consume meals over 20-30 minutes. Attend Bariatric Support Group and increase physical activity (approved per MD) for long term weight maintenance. NUTRITION MONITORING AND EVALUATION:    The following goals were established with patient; Restart exercise at gym 3x week  Snack on healthy items  Start eating bfast before going to bed after work  Start taking calcium daily- vitamin handout reviewed  5. Review nutrition education materials. Follow up next month for continue nutrition education. Specific tips and techniques to facilitate compliance with above recommendations were provided and discussed. Nutrition evaluation reveals important lifestyle changes are indicated. Goals set and recommendations made. Will continue to assess. If further details are desired please feel free to contact me at [phone]. This phone number was also provided to the patient for any further questions or concerns.            Garry Keane RD

## 2023-01-13 ENCOUNTER — OFFICE VISIT (OUTPATIENT)
Dept: FAMILY MEDICINE CLINIC | Age: 33
End: 2023-01-13

## 2023-01-13 ENCOUNTER — OFFICE VISIT (OUTPATIENT)
Dept: NEUROLOGY | Age: 33
End: 2023-01-13
Payer: COMMERCIAL

## 2023-01-13 VITALS
HEART RATE: 93 BPM | WEIGHT: 293 LBS | BODY MASS INDEX: 53.43 KG/M2 | RESPIRATION RATE: 16 BRPM | OXYGEN SATURATION: 98 % | DIASTOLIC BLOOD PRESSURE: 82 MMHG | TEMPERATURE: 98.7 F | SYSTOLIC BLOOD PRESSURE: 130 MMHG

## 2023-01-13 DIAGNOSIS — E11.40 TYPE 2 DIABETES MELLITUS WITH DIABETIC NEUROPATHY, WITH LONG-TERM CURRENT USE OF INSULIN (HCC): Primary | ICD-10-CM

## 2023-01-13 DIAGNOSIS — F32.1 MODERATE MAJOR DEPRESSION (HCC): ICD-10-CM

## 2023-01-13 DIAGNOSIS — I10 ESSENTIAL HYPERTENSION: ICD-10-CM

## 2023-01-13 DIAGNOSIS — F43.10 POSTTRAUMATIC STRESS DISORDER: ICD-10-CM

## 2023-01-13 DIAGNOSIS — F33.41 RECURRENT MAJOR DEPRESSIVE DISORDER, IN PARTIAL REMISSION (HCC): Primary | ICD-10-CM

## 2023-01-13 DIAGNOSIS — F41.1 GAD (GENERALIZED ANXIETY DISORDER): ICD-10-CM

## 2023-01-13 DIAGNOSIS — F41.1 GENERALIZED ANXIETY DISORDER: ICD-10-CM

## 2023-01-13 DIAGNOSIS — Z79.4 TYPE 2 DIABETES MELLITUS WITH DIABETIC NEUROPATHY, WITH LONG-TERM CURRENT USE OF INSULIN (HCC): Primary | ICD-10-CM

## 2023-01-13 PROBLEM — R87.610 ATYPICAL SQUAMOUS CELLS OF UNDETERMINED SIGNIFICANCE (ASCUS) ON PAPANICOLAOU SMEAR OF CERVIX: Status: ACTIVE | Noted: 2019-03-15

## 2023-01-13 PROCEDURE — 96139 PSYCL/NRPSYC TST TECH EA: CPT | Performed by: CLINICAL NEUROPSYCHOLOGIST

## 2023-01-13 PROCEDURE — 96136 PSYCL/NRPSYC TST PHY/QHP 1ST: CPT | Performed by: CLINICAL NEUROPSYCHOLOGIST

## 2023-01-13 PROCEDURE — 96138 PSYCL/NRPSYC TECH 1ST: CPT | Performed by: CLINICAL NEUROPSYCHOLOGIST

## 2023-01-13 RX ORDER — LINACLOTIDE 290 UG/1
CAPSULE, GELATIN COATED ORAL
COMMUNITY
Start: 2022-11-04

## 2023-01-13 RX ORDER — COVID-19 ANTIGEN TEST
KIT MISCELLANEOUS
COMMUNITY
Start: 2022-12-05

## 2023-01-13 RX ORDER — METOPROLOL TARTRATE 50 MG/1
50 TABLET ORAL 2 TIMES DAILY
Qty: 180 TABLET | Refills: 2 | Status: SHIPPED | OUTPATIENT
Start: 2023-01-13

## 2023-01-13 NOTE — PROGRESS NOTES
HISTORY OF PRESENT ILLNESS  Jesse Reinoso is a 27 y.o. female,  present for Bp check , compliant w/ the bp meds, a low salt diet, an  active life style, patient does obtain the bp at home   pt denies Chest Pain, has no legs swelling no lightheadedness, in addition patient also denies any racing heart palpitation at this visit,  the pat has not been feeling anxious, and  Has not been feeling stressed out, otherwise feeling better since the last visit   Depression with the anxiety and panic states of mind    nicley controlled with the current meds,   Patient state that it is getting better: pt states and reports of feeling less anxius, less guilty feeling,  less Hoplessness,ns/nh,ni,nh,       Patient Active Problem List    Diagnosis Date Noted    Moderate major depression ([facility] 01/13/2023    Type 2 diabetes mellitus with chronic kidney disease ([address] 11/17/2021    Dysthymia 04/21/2020    Atypical squamous cells of undetermined significance (ASCUS) on Papanicolaou smear of cervix 03/15/2019    Type 2 diabetes with nephropathy ([address] 01/13/2019    Class 3 severe obesity due to excess calories with serious comorbidity and body mass index (BMI) of 60.0 to 69.9 in adult ([address] 08/09/2018    Type 2 diabetes mellitus with diabetic neuropathy ([address] 04/25/2018    ALIYA (generalized anxiety disorder) 04/04/2018    Hyperhidrosis 04/04/2018    Palpitation 04/04/2018    Tachycardia 04/04/2018    Sleep apnea in adult 03/20/2017    Carpal tunnel syndrome of right wrist 12/20/2016    Acute pharyngitis 06/15/2016    Type 2 diabetes mellitus without complication ([address] 41/07/1809    [place] hum 01/07/2015    GERD (gastroesophageal reflux disease) 08/13/2014    Migraine headache 01/28/2014    Viral pneumonia, unspecified 09/22/2013    Obesity 09/20/2013    Increased pulse rate 09/20/2013    Acute laryngitis 09/20/2013    Vitamin D deficiency 09/28/2012    AR (allergic rhinitis) 09/28/2012    Fatigue 08/27/2012    HTN (hypertension) 07/25/2011    Obesity, morbid (more than 100 lbs over ideal weight or BMI > 40) ([facility]) 07/25/2011     Current Outpatient Medications   Medication Sig Dispense Refill    galcanezumab-gnlm (Emgality Pen) 120 mg/mL injection 1 mL by SubCUTAneous route every thirty (30) days. 1 mL 11    rizatriptan (MAXALT-MLT) 10 mg disintegrating tablet Take 1 Tablet by mouth daily as needed for Migraine (no more than 10 pills in one month. no more than 1 pill a day). 10 Tablet 5    gabapentin (NEURONTIN) 300 mg capsule Take 2 pill in the am and 3 pills at night 540 Capsule 1    omeprazole (PRILOSEC) 40 mg capsule Take 1 Capsule by mouth two (2) times a day. 180 Capsule 3    DULoxetine (CYMBALTA) 60 mg capsule Takes with 60 mg capsule      Blood-Glucose Sensor (Dexcom G6 Sensor) nahid USE AS DIRECTED . CHANGE SENSOR EVERY 10 DAYS 3 Each 11    semaglutide (Ozempic) 2 mg/dose (8 mg/3 mL) pnij 2 mg by SubCUTAneous route every seven (7) days. 3 Pen 3    Vitamin D2 1,250 mcg (50,000 unit) capsule TAKE 1 CAPSULE BY MOUTH ONE TIME WEEKLY 12 Capsule 3    DULoxetine (CYMBALTA) 30 mg capsule Take 30 mg by mouth nightly. Takes with 60 mg cap at night      BD Luer-Greg Syringe 3 mL 22 gauge x 1\" syrg       busPIRone (BUSPAR) 5 mg tablet two (2) times a day. Blood-Glucose Transmitter (Dexcom G6 Transmitter) nahid USE AS DIRECTED 1 Each 3    aspirin delayed-release 81 mg tablet Take 1 Tablet by mouth daily. 90 Tablet 3    cyanocobalamin (VITAMIN B12) 1,000 mcg/mL injection INJECT 1ML INTO THE MUSCLE MONTHLY 1 mL 5    norethindrone (MICRONOR) 0.35 mg tab       iron,carbonyl-FA-multivit-min (Opurity Multivitamin) 30 mg iron- 800 mcg chew Take 2 Tabs by mouth daily. calcium carbonate/vitamin D3 (CALCIUM 600 + D,3, PO) Take 1 Tablet by mouth daily.       Flowflex COVID-19 Ag Home Test kit       Linzess 290 mcg cap capsule       metoprolol tartrate (LOPRESSOR) 100 mg IR tablet TAKE 1 TABLET BY MOUTH 2 TIMES A DAY GENERIC FOR LOPRESSOR (Patient taking differently: 50 mg.  TAKE 1 TABLET BY MOUTH 2 TIMES A DAY GENERIC FOR LOPRESSOR) 180 Tablet 2    nystatin (MYCOSTATIN) topical cream Apply topically as needed to affected area twice a day (Patient not taking: No sig reported)       Allergies   Allergen Reactions    Latex Itching     Past Medical History:   Diagnosis Date    Anxiety with depression     Arrhythmia     tachycardia    Arthritis     Asthma     [place] hump 1/7/2015    Carpal tunnel syndrome of right wrist 12/20/2016    Diabetes ([facility]     ALIYA (generalized anxiety disorder) 4/4/2018    GERD (gastroesophageal reflux disease) 8/13/2014    HTN (hypertension) 7/25/2011    Hyperaldosteronism ([address] 10/19/2015    Hyperhidrosis 4/4/2018    Morbid obesity ([address]     Other ill-defined conditions(799.89)     migraines    Palpitation 4/4/2018    Sleep apnea     uses CPAP    Tachycardia 4/4/2018     Past Surgical History:   Procedure Laterality Date    HX CHOLECYSTECTOMY  11/10/2021    Dr Sean Devries     HX GASTRECTOMY  09/08/2020    Gastric sleeve     HX GYN  10/19/2020    Laparoscopic right oopherectomy, left cystectomy    HX HERNIA REPAIR  09/08/2020    HX OVARIAN CYST REMOVAL  10/19/2020    HX TONSILLECTOMY      HX WISDOM TEETH EXTRACTION      NC TONSILLECTOMY PRIMARY/SECONDARY <AGE 12       Family History   Problem Relation Age of Onset    Hypertension Mother     Diabetes Mother         Type II    Anesth Problems Mother         respiratory issues - feels like she cannot breath when coming out of anesthesia    Arthritis-rheumatoid Mother     Gout Mother     Psychiatric Disorder Father     Drug Abuse Father     Cancer Other         prostate    Hypertension Sister     Thyroid Disease Sister         \"I think\"    Breast Cancer Sister     Attention Deficit Hyperactivity Disorder Brother     Hypertension Sister      Social History     Tobacco Use    Smoking status: Former     Packs/day: 0.50     Years: 5.00     Pack years: 2.50     Types: Cigarettes     Quit date: 11/1/2019     Years since quitting: 3.2    Smokeless tobacco: Never   Substance Use Topics    Alcohol use: Yes     Alcohol/week: 1.0 standard drink     Types: 1 Shots of liquor, 1 Standard drinks or equivalent per week     Comment: socially, Monthly or less      Lab Results   Component Value Date/Time    Hemoglobin A1c 5.8 (H) 10/25/2022 08:14 AM    Hemoglobin A1c 5.5 11/29/2021 12:00 AM    Hemoglobin A1c 5.4 10/28/2021 02:29 PM    Hemoglobin A1c, External 5.5 04/07/2022 12:00 AM    Hemoglobin A1c, External 7.8 07/13/2020 12:00 AM    Glucose 82 10/25/2022 08:14 AM    Glucose (POC) 112 11/10/2021 09:58 AM    Microalbumin/Creat ratio (mg/g creat) 5 10/25/2022 08:14 AM    Microalbumin,urine random 1.70 10/25/2022 08:14 AM    LDL, calculated 91.8 10/25/2022 08:14 AM    Creatinine 0.66 10/25/2022 08:14 AM      Lab Results   Component Value Date/Time    Cholesterol, total 156 10/25/2022 08:14 AM    HDL Cholesterol 44 10/25/2022 08:14 AM    LDL, calculated 91.8 10/25/2022 08:14 AM    LDL-C, External 51.8 04/07/2022 12:00 AM    Triglyceride 101 10/25/2022 08:14 AM    CHOL/HDL Ratio 3.5 10/25/2022 08:14 AM        Review of Systems   Constitutional:  Negative for chills and fever. HENT:  Negative for congestion and nosebleeds. Eyes:  Negative for blurred vision and pain. Respiratory:  Negative for cough, shortness of breath and wheezing. Cardiovascular:  Negative for chest pain and leg swelling. Gastrointestinal:  Negative for constipation, diarrhea, nausea and vomiting. Genitourinary:  Negative for dysuria and frequency. Musculoskeletal:  Negative for joint pain and myalgias. Skin:  Negative for itching and rash. Neurological:  Negative for dizziness, loss of consciousness and headaches. Psychiatric/Behavioral:  Negative for depression. The patient is not nervous/anxious and does not have insomnia. Physical Exam  Vitals and nursing note reviewed.    Constitutional:       Appearance: She is well-developed. HENT:      Head: Normocephalic and atraumatic. Eyes:      Conjunctiva/sclera: Conjunctivae normal.      Pupils: Pupils are equal, round, and reactive to light. Neck:      Thyroid: No thyromegaly. Vascular: No JVD. Cardiovascular:      Rate and Rhythm: Normal rate and regular rhythm. Heart sounds: Normal heart sounds. No murmur heard. No friction rub. No gallop. Pulmonary:      Effort: Pulmonary effort is normal. No respiratory distress. Breath sounds: Normal breath sounds. No stridor. No wheezing or rales. Abdominal:      General: Bowel sounds are normal. There is no distension. Palpations: Abdomen is soft. There is no mass. Tenderness: There is no abdominal tenderness. Musculoskeletal:         General: No tenderness. Normal range of motion. Lymphadenopathy:      Cervical: No cervical adenopathy. Skin:     Findings: No erythema or rash. Neurological:      Mental Status: She is alert and oriented to person, place, and time. Cranial Nerves: No cranial nerve deficit. Deep Tendon Reflexes: Reflexes are normal and symmetric. Psychiatric:         Behavior: Behavior normal.       ASSESSMENT and PLAN    ICD-10-CM ICD-9-CM    1. Type 2 diabetes mellitus with diabetic neuropathy, with long-term current use of insulin ([facility])  E11.40 250.60 REFERRAL TO PODIATRY    Z79.4 357.2      V58.67       2. Essential hypertension  I10 401.9 metoprolol tartrate (LOPRESSOR) 50 mg tablet      3. ALIYA (generalized anxiety disorder)  F41.1 300.02 metoprolol tartrate (LOPRESSOR) 50 mg tablet      4.  Moderate major depression (HCC)  F32.1 296.22 metoprolol tartrate (LOPRESSOR) 50 mg tablet          reviewed diet, exercise and weight control

## 2023-01-13 NOTE — PROGRESS NOTES
Chief Complaint   Patient presents with    Elevated Blood Pressure     C/o systolic bp >801 x 1-2 weeks      1. Have you been to the ER, urgent care clinic since your last visit? Hospitalized since your last visit? Yes When: 1/1/23 Where: pt first  Reason for visit: foot pain    2. Have you seen or consulted any other health care providers outside of the [address] [place] [address] since your last visit? Include any pap smears or colon screening. Yes When: 1/13/23  Where: neurology  Reason for visit: ADHD    Verified patient with two type of identifiers.

## 2023-01-18 ENCOUNTER — TELEPHONE (OUTPATIENT)
Dept: NEUROLOGY | Age: 33
End: 2023-01-18

## 2023-01-20 ENCOUNTER — TELEPHONE (OUTPATIENT)
Dept: PHARMACY | Age: 33
End: 2023-01-20

## 2023-01-20 NOTE — TELEPHONE ENCOUNTER
Specialty Medication Service    Date: 1/20/2023  Patient's Name: Dhara Montes De Oca YOB: 1990            _____________________________________________________________________________________________    Patient called in due to receiving text from [facility] for Emg[facility]. Patient is scheduled for initial PharmD appointment 1/23/2023 at 8:00 am. Office notes are in EHR from Dr. Dhruv Valencia.     [address] [place] [address] Specialty Medication Services  [phone] option 4    For Pharmacy Admin Tracking Only    Program: [facility]  CPA in place: No  Recommendation Provided To: Patient/Caregiver: 1 via Telephone  Intervention Detail: Scheduled Appointment  Intervention Accepted By: Patient/Caregiver: 1  Time Spent (min): 10

## 2023-01-23 ENCOUNTER — VIRTUAL VISIT (OUTPATIENT)
Dept: PHARMACY | Age: 33
End: 2023-01-23

## 2023-01-23 DIAGNOSIS — G43.111 INTRACTABLE MIGRAINE WITH AURA WITH STATUS MIGRAINOSUS: Primary | ICD-10-CM

## 2023-01-23 RX ORDER — HYDROXYZINE 50 MG/1
TABLET, FILM COATED ORAL
COMMUNITY

## 2023-01-23 RX ORDER — MELOXICAM 15 MG/1
15 TABLET ORAL
COMMUNITY
Start: 2023-01-17

## 2023-01-23 RX ORDER — ACETAMINOPHEN 500 MG
1000 TABLET ORAL
COMMUNITY

## 2023-01-23 NOTE — PROGRESS NOTES
Specialty Medication Service    Patient's Name: Jose Smith YOB: 1990      Reason for visit: Jose Smith is a 27 y.o. female presenting today for Specialty Medication Service visit. Patient is prescribed SMS formulary medication, Emgality. Medication list updated. Specialty Medication: Emgality 120 mg/ml autoinjector   Frequency: once monthly  Indication: Chronic migraines  Initially Diagnosed: several years ago  Additional Therapy:   Maxalt 10mg as needed  Previous Therapy:   Nurtec-every other day (worked well, insurance won't pay since this is preventative dosing). Nortriptyline  Amitriptyline  Metoprolol  gabapentin    Specialist:   Joshua Tan MD  [facility]  [address] [facility] [place], [address]  Phone: [phone]    Specialist Progress Note Available: Yes, Epic  Last Specialist Visit: 11/21/2022: Follow-up chronic migraines and carpal tunnel. Patient recently had to stop nurtec due to insurance, which was helping in addition to Emgality to control her migraines. Since then, her migraine frequency has increased. Taking imitrex for abortive care, but causing a lot of drowsiness. Plan: Continue Emgality monthly, switch imitrex to maxalt 10mg, discussed botox as a future option. Follow-up 1 year.      Allergies   Allergen Reactions    Latex Itching        Past Medical History:   Diagnosis Date    Anxiety with depression     Arrhythmia     tachycardia    Arthritis     Asthma     Waverly hump 1/7/2015    Carpal tunnel syndrome of right wrist 12/20/2016    Diabetes ([facility] [address]     ALIYA (generalized anxiety disorder) 4/4/2018    GERD (gastroesophageal reflux disease) 8/13/2014    HTN (hypertension) 7/25/2011    Hyperaldosteronism ([address] 10/19/2015    Hyperhidrosis 4/4/2018    Morbid obesity ([facility] [address]     Other ill-defined conditions(799.89)     migraines    Palpitation 4/4/2018    Sleep apnea     uses CPAP    Tachycardia 4/4/2018      Social History     Tobacco Use    Smoking status: Former     Packs/day: 0.50     Years: 5.00     Pack years: 2.50     Types: Cigarettes     Quit date: 11/1/2019     Years since quitting: 3.2    Smokeless tobacco: Never   Substance Use Topics    Alcohol use: Yes     Alcohol/week: 1.0 standard drink     Types: 1 Shots of liquor, 1 Standard drinks or equivalent per week     Comment: socially, Monthly or less     Family History   Problem Relation Age of Onset    Hypertension Mother     Diabetes Mother         Type II    Anesth Problems Mother         respiratory issues - feels like she cannot breath when coming out of anesthesia    Arthritis-rheumatoid Mother     Gout Mother     Psychiatric Disorder Father     Drug Abuse Father     Cancer Other         prostate    Hypertension Sister     Thyroid Disease Sister         \"I think\"    Breast Cancer Sister     Attention Deficit Hyperactivity Disorder Brother     Hypertension Sister        REVIEW OF CURRENT DISEASE STATE  Migraine Headaches: She has a well established history of recurrent migraines for several years. Description of pain: 10/10 throbbing pain, originating behind left eye and left-side of face. Associated with photophobia, sonophobia, nausea (no vomiting) and last up to 3 days. Baseline frequency: 7-10/month, not able to go to work. Mini daily headaches (pain, 6/10). Triggers: menstrual related (taking birth control). Risk factors: lack of sleep (5 hours per night), lack of exercise. Has tried and failed: nortriptyline, amitriptyline and metoprolol (blood pressure). Started Emgality 4-5 years ago. Had been stable (1 - 2 severe migraines/month) on Emgality monthly with Nurtec every other day, but her insurance recently stopped approval for nurtec as preventative in November. Patient reports she started having severe migraines again, typically on one side or the other, radiating into her neck, blurring vision, sonophobia and nausea. Imitrex does help, but causes sedation.  Migraines last up to 24 hours. At last neurology visit in November, patient was prescribed maxalt 10mg in place of imitrex for abortive care and continued on Emgality monthly. Botox was also discussed as a future option. Of note, patient also seeing neurology for bilateral carpal tunnel syndrome. Taking gabapentin 600mg am and 900mg in pm. Recommended to see a hand surgeon but has been hesitant. Current number of migraines per month: 4/month, but has not had to stop working. 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 Reports maxalt causes less sedation than imitrex and mostly helps but does not completely resolve migraine. Typical duration of migraines still ~24 hours and requires about a day to fully recover from brain fog. Mini headaches only occurring 2/week. Patient would like to get back to 1-2 migraines per month. Started working out again last week at [employer], goal is to go 3 times per week. Patient feels like this is reasonable with her schedule. No questions regarding Emgality today, but did mention it has burned/stung more recently upon injections. Noticed it shortly after she lost weight. Used to give to herself in the stomach, now has mother giving in the back of arm with slightly less pain. Lifestyle factors associated with migraines:   More than 2 drinks per day? No  Dehydration? No  High caffeine intake? 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 Yes: (describe): 5 hours per night, tries to make up on days not working  Skipping meals? Yes: (describe): skips breakast, but is not a trigger  Too much screen time? Yes: (describe): works in [employer]. Keeps cell phone screen in dark mode. · Considering all the ways in which this condition and others affects you, how are you doing (0 = very well, 10 = very poorly)?  5  · How would you rate your pain on average? (0 = no pain, 10 = worst pain imaginable) 3, due to Sjogren's pain   · During the past 4 weeks, have you missed any planned activities of daily living due to condition (work/school/other plans)? No  · During the past 4 weeks, have you had to seek urgent care, ER admission, Unplanned doctor office visit, or hospital admission? Yes, sjogren's outbreak. Left foot. MEDICATIONS  Current Outpatient Medications   Medication Sig Dispense Refill    metoprolol tartrate (LOPRESSOR) 50 mg tablet Take 1 Tablet by mouth two (2) times a day. TAKE 1 TABLET BY MOUTH 2 TIMES A DAY GENERIC FOR LOPRESSOR 180 Tablet 2    Flowflex COVID-19 Ag Home Test kit       Linzess 290 mcg cap capsule       galcanezumab-gnlm (Emgality Pen) 120 mg/mL injection 1 mL by SubCUTAneous route every thirty (30) days. 1 mL 11    rizatriptan (MAXALT-MLT) 10 mg disintegrating tablet Take 1 Tablet by mouth daily as needed for Migraine (no more than 10 pills in one month. no more than 1 pill a day). 10 Tablet 5    gabapentin (NEURONTIN) 300 mg capsule Take 2 pill in the am and 3 pills at night 540 Capsule 1    omeprazole (PRILOSEC) 40 mg capsule Take 1 Capsule by mouth two (2) times a day. 180 Capsule 3    DULoxetine (CYMBALTA) 60 mg capsule Takes with 60 mg capsule      Blood-Glucose Sensor (Dexcom G6 Sensor) nahid USE AS DIRECTED . CHANGE SENSOR EVERY 10 DAYS 3 Each 11    semaglutide (Ozempic) 2 mg/dose (8 mg/3 mL) pnij 2 mg by SubCUTAneous route every seven (7) days. 3 Pen 3    Vitamin D2 1,250 mcg (50,000 unit) capsule TAKE 1 CAPSULE BY MOUTH ONE TIME WEEKLY 12 Capsule 3    nystatin (MYCOSTATIN) topical cream Apply topically as needed to affected area twice a day (Patient not taking: No sig reported)      DULoxetine (CYMBALTA) 30 mg capsule Take 30 mg by mouth nightly. Takes with 60 mg cap at night      BD Luer-Greg Syringe 3 mL 22 gauge x 1\" syrg       busPIRone (BUSPAR) 5 mg tablet two (2) times a day.       Blood-Glucose Transmitter (Dexcom G6 Transmitter) nahid USE AS DIRECTED 1 Each 3    aspirin delayed-release 81 mg tablet Take 1 Tablet by mouth daily. 90 Tablet 3    cyanocobalamin (VITAMIN B12) 1,000 mcg/mL injection INJECT 1ML INTO THE MUSCLE MONTHLY 1 mL 5    norethindrone (MICRONOR) 0.35 mg tab       iron,carbonyl-FA-multivit-min (Opurity Multivitamin) 30 mg iron- 800 mcg chew Take 2 Tabs by mouth daily. calcium carbonate/vitamin D3 (CALCIUM 600 + D,3, PO) Take 1 Tablet by mouth daily. Current Specialty Medication:   Galcanezumab (Emgality)  Cluster headache (prevention): SubQ: 300 mg at the onset of the cluster period and then once monthly until the end of the cluster period. Migraine prophylaxis: SubQ: Initial: 240 mg as a single loading dose, followed by 120 mg once monthly. Warnings/Precautions: Hypersensitivity; Cardiovascular disease (recent history (within 6 months) of stroke, MI, unstable angina, PCI, CABG, DVT or PE were excluded from clinical trials; Peripheral vascular disease (history of stroke, intracranial or carotid aneurysm, intracranial hemorrhage, vasospastic angina or Raynaud disease excluded from trials)  Recommended Monitoring: Number of monthly migraine days or weekly cluster headache attack frequency  Storage: Store refrigerated at 36°F to 46°F (2°C to 8°C) in the original carton to protect from light. Do NOT freeze. Do NOT expose to heat. Do NOT shake. Handling: May be stored out of refrigerator in the original carton at room temperature for up to 7 days. After storing out of the refrigerator, do NOT place back in the refrigerator. Do not store above 86°F. Do not use beyond printed expiration date on label. Patient Reported Side Effects: painful injection, stomach was hurting. Mom doing back of arm. Specialty Medication Start Date: 4-5 years ago  Appropriate Dose: Yes      Describe your medication adherence over the last 4 weeks: Excellent  How many doses have you missed in the last 4 weeks, if any?  1st of month  How confident are you to follow the injection process and the treatment plan? (0-10) 10 Who injects? mother  Does the patient have a current infection of any kind? No  Last refill of abortive medication: 12/20/22  Number of refills on abortive medication in last 3 months: 2    Contraindications to therapy present? No    Drug Interactions: The following clinically significant interactions were identified via Algorithmics Interaction Analysis as category D or higher: Janelle Money Multivitamin + Vitamin D/Calcium+D: vitamin D within normal range (10/25/22) on this combination. Meloxicam + Aspirin: increased risk of bleeding: Patient using meloxicam sparingly and takes both with good. Denies any symptoms of bleeding/bruising. Gabapentin + Hydroxyzine: Potential for increased sedation, however patient is taking hydroxzyine only at night as needed if she has trouble sleeping. Sedation is her intended effect.    _____________________________________________________________________  Renal Dosing:  Creatinine Clearance: C-G LBW: 129.6 ml/min. No renal adjustment needed. LABS    Lab Results   Component Value Date/Time    Vitamin D 25-Hydroxy 32.7 10/25/2022 08:14 AM   - At goal with current Vitamin D supplementation.        Lab Results   Component Value Date/Time    BUN 9 10/25/2022 08:14 AM     Lab Results   Component Value Date/Time    WBC 6.8 10/25/2022 08:14 AM    HGB 11.9 10/25/2022 08:14 AM    HCT 39.7 10/25/2022 08:14 AM    RBC 4.23 10/25/2022 08:14 AM    PLATELET 586 25/54/7940 08:14 AM     Lab Results   Component Value Date/Time    ALT (SGPT) 14 10/25/2022 08:14 AM    AST (SGOT) 8 (L) 10/25/2022 08:14 AM       IMMUNIZATIONS  Immunization History   Administered Date(s) Administered    COVID-19, PFIZER PURPLE top, DILUTE for use, (age 15 y+), IM, 30mcg/0.3mL 12/22/2020, 01/12/2021, 09/29/2021    HPV (Quad) 04/05/2016, 04/05/2016    Hep B Vaccine (Adult) 10/25/2018, 12/03/2021, 01/04/2022    Influenza Vaccine 11/01/2019, 11/02/2020, 10/28/2021, 10/29/2021, 10/04/2022    Influenza Vaccine PF 09/27/2013    Influenza, FLUARIX, FLULAVAL, Dorsie Search (age 10 mo+) AND FARNAZURIA, (age 1 y+), PF, 0.5mL 10/19/2015, 12/20/2016, 01/05/2018, 01/05/2018    Influenza, FLUBLOK, (age 25 y+), PF 01/05/2018    Pneumococcal Polysaccharide (PPSV-23) 09/27/2013    TDAP Vaccine 08/27/2012    Tdap 12/03/2021      Immunization status: up to date and documented, missing doses of Covid booster, patient has no interest in pursuing. No questions. ASSESSMENTS  Fall Risk Assessment, last 12 mths 7/28/2022 7/8/2022 8/12/2021 3/19/2021 3/12/2021 2/15/2021 5/14/2020   Able to walk? Yes Yes Yes Yes Yes Yes Yes   Fall in past 12 months? 0 0 0 0 0 0 No   Do you feel unsteady? 0 0 0 0 0 0 -   Are you worried about falling 0 0 0 0 0 0 -     No flowsheet data found. 3 most recent PHQ Screens 1/23/2023   PHQ Not Done -   Little interest or pleasure in doing things More than half the days   Feeling down, depressed, irritable, or hopeless More than half the days   Total Score PHQ 2 4   Trouble falling or staying asleep, or sleeping too much -   Feeling tired or having little energy -   Poor appetite, weight loss, or overeating -   Feeling bad about yourself - or that you are a failure or have let yourself or your family down -   Trouble concentrating on things such as school, work, reading, or watching TV -   Moving or speaking so slowly that other people could have noticed; or the opposite being so fidgety that others notice -   Thoughts of being better off dead, or hurting yourself in some way -   PHQ 9 Score -   How difficult have these problems made it for you to do your work, take care of your home and get along with others -    - Patient states how she is feeling is her norm and not feeling any worse at this time. Patient recently saw psychologist, who was referred by her neurologist. Will participate in cognitive and emotional functioning evaluation.      [id number] Questions Score   In general, would you say your health is: 4   2. In general, would you say your quality of life is: 2   3. In general, how would you rate your physical health? 2   4. In general, how would you rate your mental health, including mood and your ability to think? 2   5. In general, how would you rate your satisfaction with your social activities and relationships? 3   6. In general, please rate how well you carry out your usual social activities and roles. 3   7. To what extent are you able to carry out your everyday physical activities such as walking, climbing stairs, carrying groceries, or moving a chair? 3   8. In the past 7 days, how often have you been bothered by emotional problems such as feeling anxious, depressed or irritable? 3   9. In the past 7 days, how would you rate your fatigue on average? 3   10. How would you rate your pain on average? 4   Promise Physical Score (Questions: 3, 7, 9, 10) 12   Promise Mental Score (Questions: 2, 4, 5, 8) 10       Migraine Headache  Daysi Gross is a 27 y.o. female being treated for Migraine. Medication reconciliation completed (Patient provided), no clinically significant drug-drug interactions identified. Allergy and diagnosis info reviewed and updated. Daysi Gross has a history remarkable for the following conditions: migraines, carpal tunnel syndrome, anxiety with depression, sjogren's syndrome, diabetes, GERD, HTN, tacchycardia obesity, sleep apnea. The patient has previously been treated with Nurtec (effective, but not covered for prevention with Emgality), nortiptyline, amitriptyline, gabapentin (carpal tunnel), metoprolol (HTN) and imitrex (drowsiness). Current therapy includes: Emgality 120mg monthly + maxalt 10mg as needed. Warnings, precautions, and contraindications reviewed with the patient.  Also reviewed storage, administration, and proper disposal.  Current disease state symptoms include: 4 migraines/month, duration 24 hours, 1-2 mini headaches weekly. Not causing her to miss work, but requires finding a cold, dark place and resting. Maxalt does help  Medication Effectiveness: Patient disease is not well controlled on current therapy. Was previously better controlled with Nurtec + Emgality, but due to insurance she cannot take both as preventatives. Patient wants to be back to 1-2 migraines per month. Reviewed potential side effects/ADEs. Patient reports painful injections since losing weight. Switched her site from stomach to back of arm. Still painful. Keeping out of fridge for 30 minutes, recommended leaving out overnight. Patient agreeable and will try. Follow-up next SMS. No adherence issues identified. Functional and cognitive limitations include: migraines make it hard to function, but patient can tough it out at work. Reviewed copay and advised patient that they will receive a copy of the patient rights and responsibility document with their welcome packet in the mail. Patient is not considered high risk. Based on patient feedback/results of the assessment, the therapy is  still appropriate. No medication-related problem(s) or patient need(s) identified that would require a care plan. Follow up in 180 days     2. Immunization  Immunization status: up to date and documented, missing doses of Covid booster. Patient states not interested at this time. Has no questions or concerns. Follow-up future SMS. 3. Drug Interaction  No clinically significant drug interactions identified at this time. Vitamin D levels are WNL with combination of different vitamin D products, hydroxyzine is helping her sleep on the nights she needs it without additional CNS depression and no reported bruising or bleeding from occasional use of meloxicam with aspirin. No further action needed at this time, follow-up future SMS.      4. Other Identified Potential Issues  Discussed with patient the Pharmacist Collaborative Practice Agreement. Patient provided verbal and/or electronic (ex. CashStarhart) consent to participate in the collaborative practice agreement between the pharmacist and referred patient. This is in lieu of paper consent due to COVID-19 precautions and the use of remote/virtual visits. Migraine control: Patient states migraines have worsened since having to stop Nurtec as preventative. Also using maxalt now for abortive therapy with relief and less drowsiness. Would like to get back to 1-2 migraines per month. Dicussed there are other alternatives that could be explored as well as potentially taking nurtec as needed if interested. Patient states she would like for me to reach out to prescriber to see if she can have nurtec as needed, which is approved by the plan. Will message Dr. Tess Villatoro and pend prescription for Nurtec today. Follow-up future SMS. Mental health: Patient reports feeling down/depressed and lack of interest in doing things half of her days. States this is not new and is her normal. She is currently following up with providers regarding and encouraged to reach out if any worsening or changes. Patient is agreeable. No further action needed at this time. PLAN  Goals of therapy, common side effects, medication storage, and administration reviewed with patient. Continue Emgality 120mg under the skin monthly as prescribed. Recommended monitoring to complete: None at this time  Recommended vaccinations: Covid booster (not interested)  Non pharmacotherapy recommendations: Get enough sleep; Reduce stress; Drink plenty of water; Avoid triggers; Regular physical exercise  Keep all scheduled appointments. Timothy Contreras, who was evaluated through a synchronous (real-time) audio only encounter, and/or her healthcare decision maker, is aware that it is a billable service, which includes applicable co-pays, with coverage as determined by her insurance carrier.  She provided verbal consent to proceed and patient identification was verified. This visit was conducted pursuant to the emergency declaration under the [facility], [address] [place] [address] authority and the [employer] and [employer] [employer] Act. A caregiver was present when appropriate. Ability to conduct physical exam was limited.  The patient was located at: Home: [place] [id number]  The provider was located at: Home: [place]    --Margueritte Goltz, [facility] on 1/23/2023 at 1:03 PM    Margueritte Goltz, PharmGATO [facility]  Ambulatory Clinical Pharmacist  Specialty Medication Services  Phone: [phone] option #4  Fax: [phone]    For Pharmacy Admin Tracking Only    Program: [employer]  CPA in place: No  Recommendation Provided To: Provider: 2 via Note to Provider  and Patient/Caregiver: 3 via Virtual Visit  Intervention Detail: New Rx: 1, reason: Needs Additional Therapy, Refill(s) Provided, Scheduled Appointment, and Vaccine Recommended/Administered  Intervention Accepted By: Patient/Caregiver: 2  Time Spent (min):  120

## 2023-01-23 NOTE — Clinical Note
Mary Oneal. Completed pharmacy review for initial SMS visit. Patient is continuing on Emgality for migraines. States her migraines have gotten worse since stopping nurtec, maxalt has helped abort with less drowsiness. Discussed Nurtec as an option with our plan for rescue would be approved. Plan will allow 18/30 days. Not sure if you wanted to add it back with using as needed? Patient is agreeable if you approve. I have pended Nurtec as well today. No other recommendations for therapy at this time. Patient is recommended for covid booster vaccination. Medication pended for review.   Thank you, Deborrah Sacks, PharmD [facility] Ambulatory Clinical Pharmacist Specialty Medication Services Phone: [phone] option #4 Fax: [phone]

## 2023-01-25 NOTE — PROGRESS NOTES
Initial Specialty Medication Virtual Visit  [facility]  [address]  [place] [address]  Dept: [phone]  Dept Fax: [phone]: [phone]  Date of patient's visit: 1/26/2023  Patient's Name:  Char Staples YOB: 1990            Patient Care Team:  Minda New MD as PCP - General (Family Medicine)  Minda New MD as PCP - [facility] Empaneled Provider  Hansa Velazquez MD as Physician (Sleep Medicine Physician)  Deb Kohli NP (Nurse Practitioner)  Esme Lerner MD as Consulting Provider (Endocrinology Physician)  Consuelo Limon MD (General Surgery)  ================================================================    REASON FOR VISIT/CHIEF COMPLAINT:  Patient has migraines. HISTORY OF PRESENTING ILLNESS:  Char Staples is 27 y.o. is here for initial virtual visit for specialty medication. The patient has had a longstanding history of chronic intractable migraine headaches and has tried multiple medications. I know the patient well through her neurology clinic visits. I do follow her in the neurology clinic and most recently saw her on November 21, 2022. I did call the number on file multiple times for the visit today. There was no answer. I did leave a voicemail. Specialty Medication: emgality, Nurtec  Frequency: monthly, every other day as needed for abortive care of migraines  Indication: Chronic intractable migraine headaches  Initially Diagnosed: Many years ago  Specialist:   Last Specialist Visit: November 21, 2022  Side effects includes: This is a new start  Current symptoms include: Chronic intractable migraine headaches. The patient has taken gabapentin, metoprolol, amitriptyline, nortriptyline and Nurtec every other day. She was also using Maxalt for abortive care.          DIAGNOSTIC FINDINGS:  CBC:  Lab Results   Component Value Date/Time    WBC 6.8 10/25/2022 08:14 AM    HGB 11.9 10/25/2022 08:14 AM    PLATELET 463 17/82/4817 08:14 AM       BMP:    Lab Results   Component Value Date/Time    Sodium 140 10/25/2022 08:14 AM    Potassium 4.3 10/25/2022 08:14 AM    Chloride 107 10/25/2022 08:14 AM    CO2 28 10/25/2022 08:14 AM    BUN 9 10/25/2022 08:14 AM       HEMOGLOBIN A1C: No components found for: LABA1C    FASTING LIPID PANEL:  Lab Results   Component Value Date    CHOL 156 10/25/2022    HDL 44 10/25/2022       No results found for: JULIEN    No results found for: HAV, HBEAG        Patient Active Problem List   Diagnosis Code    HTN (hypertension) I10    Obesity, morbid (more than 100 lbs over ideal weight or BMI > 40) ([facility]) E66.01    Fatigue R53.83    Vitamin D deficiency E55.9    AR (allergic rhinitis) J30.9    Obesity E66.9    Increased pulse rate R00.0    Acute laryngitis J04.0    Viral pneumonia, unspecified J12.9    Migraine headache G43.909    GERD (gastroesophageal reflux disease) K21.9    Fulda hump E65    Type 2 diabetes mellitus without complication ([facility]) E17.4    Acute pharyngitis J02.9    Carpal tunnel syndrome of right wrist G56.01    Sleep apnea in adult G47.30    ALIYA (generalized anxiety disorder) F41.1    Hyperhidrosis R61    Palpitation R00.2    Tachycardia R00.0    Type 2 diabetes mellitus with diabetic neuropathy ([facility]) E11.40    Class 3 severe obesity due to excess calories with serious comorbidity and body mass index (BMI) of 60.0 to 69.9 in adult ([facility] [address] E66.01, Z68.44    Type 2 diabetes with nephropathy ([facility]) E11.21    Dysthymia F34.1    Type 2 diabetes mellitus with chronic kidney disease ([facility] [address] E11.22    Atypical squamous cells of undetermined significance (ASCUS) on Papanicolaou smear of cervix R87.610    Moderate major depression ([facility] [address] F32.1       Health Maintenance Due   Topic Date Due    Eye Exam Retinal or Dilated  06/27/2021    Foot Exam Q1  12/13/2022       Allergies   Allergen Reactions    Latex Itching         Current Outpatient Medications   Medication Sig Dispense Refill meloxicam (MOBIC) 15 mg tablet Take 15 mg by mouth daily as needed. hydrOXYzine HCL (ATARAX) 50 mg tablet Take 1/2 to 1 tablet at bedtime as needed for sleep. acetaminophen (TYLENOL) 500 mg tablet Take 1,000 mg by mouth every six (6) hours as needed for Pain.      metoprolol tartrate (LOPRESSOR) 50 mg tablet Take 1 Tablet by mouth two (2) times a day. TAKE 1 TABLET BY MOUTH 2 TIMES A DAY GENERIC FOR LOPRESSOR 180 Tablet 2    Flowflex COVID-19 Ag Home Test kit       Linzess 290 mcg cap capsule Take 290 mcg by mouth daily. galcanezumab-gnlm (Emgality Pen) 120 mg/mL injection 1 mL by SubCUTAneous route every thirty (30) days. 1 mL 11    rizatriptan (MAXALT-MLT) 10 mg disintegrating tablet Take 1 Tablet by mouth daily as needed for Migraine (no more than 10 pills in one month. no more than 1 pill a day). 10 Tablet 5    gabapentin (NEURONTIN) 300 mg capsule Take 2 pill in the am and 3 pills at night 540 Capsule 1    omeprazole (PRILOSEC) 40 mg capsule Take 1 Capsule by mouth two (2) times a day. 180 Capsule 3    DULoxetine (CYMBALTA) 60 mg capsule Take 60 mg by mouth nightly. Takes with 30mg, total dose 90mg      Blood-Glucose Sensor (Dexcom G6 Sensor) nahid USE AS DIRECTED . CHANGE SENSOR EVERY 10 DAYS 3 Each 11    semaglutide (Ozempic) 2 mg/dose (8 mg/3 mL) pnij 2 mg by SubCUTAneous route every seven (7) days. 3 Pen 3    Vitamin D2 1,250 mcg (50,000 unit) capsule TAKE 1 CAPSULE BY MOUTH ONE TIME WEEKLY 12 Capsule 3    nystatin (MYCOSTATIN) topical cream Apply topically as needed to affected area twice a day (Patient not taking: No sig reported)      DULoxetine (CYMBALTA) 30 mg capsule Take 30 mg by mouth nightly. Takes with 60 mg cap at night      BD Luer-Greg Syringe 3 mL 22 gauge x 1\" syrg       busPIRone (BUSPAR) 5 mg tablet Take 5 mg by mouth two (2) times a day.       Blood-Glucose Transmitter (Dexcom G6 Transmitter) nahid USE AS DIRECTED 1 Each 3    aspirin delayed-release 81 mg tablet Take 1 Tablet by mouth daily. 90 Tablet 3    cyanocobalamin (VITAMIN B12) 1,000 mcg/mL injection INJECT 1ML INTO THE MUSCLE MONTHLY 1 mL 5    norethindrone (MICRONOR) 0.35 mg tab       iron,carbonyl-FA-multivit-min (Opurity Multivitamin) 30 mg iron- 800 mcg chew Take 2 Tabs by mouth daily. calcium carbonate/vitamin D3 (CALCIUM 600 + D,3, PO) Take 1 Tablet by mouth two (2) times a day. (Vitamin D, 1000IU per patient)         Social History     Tobacco Use    Smoking status: Former     Packs/day: 0.50     Years: 5.00     Pack years: 2.50     Types: Cigarettes     Quit date: 11/1/2019     Years since quitting: 3.2    Smokeless tobacco: Never   Vaping Use    Vaping Use: Never used   Substance Use Topics    Alcohol use: Yes     Alcohol/week: 1.0 standard drink     Types: 1 Shots of liquor, 1 Standard drinks or equivalent per week     Comment: socially, Monthly or less    Drug use: No       Family History   Problem Relation Age of Onset    Hypertension Mother     Diabetes Mother         Type II    Anesth Problems Mother         respiratory issues - feels like she cannot breath when coming out of anesthesia    Arthritis-rheumatoid Mother     Gout Mother     Psychiatric Disorder Father     Drug Abuse Father     Cancer Other         prostate    Hypertension Sister     Thyroid Disease Sister         \"I think\"    Breast Cancer Sister     Attention Deficit Hyperactivity Disorder Brother     Hypertension Sister         PHYSICAL EXAM:  There were no vitals filed for this visit. BP Readings from Last 3 Encounters:   01/13/23 130/82   01/11/23 134/79   11/21/22 (!) 140/96          ASSESSMENT AND PLAN:  Diagnoses and all orders for this visit:    1.  Intractable chronic migraine without aura and without status migrainosus    FOLLOW UP AND INSTRUCTIONS:  Given the patient's condition, the patient should continue with the current care provided by the pharmacist.  Follow up with 6 months    Nancy should receive counseling on the following healthy behaviors: Risk factors for migraines were reviewed with the patient. These include lifestyle modifications, improving exercise, receiving adequate sleep, and monitoring for food triggers. A patient log of migraine frequency, intensity, and possible triggers should be recorded. Should discuss the use, benefit, and side effects of prescribed medications. Barriers to medication compliance addressed. Patient to follow up with specialist regularly. All patient questions answered. Pt voiced understanding.         1/26/2023, 4:36 PM

## 2023-01-26 ENCOUNTER — VIRTUAL VISIT (OUTPATIENT)
Dept: PHARMACY | Age: 33
End: 2023-01-26

## 2023-01-26 DIAGNOSIS — G43.719 INTRACTABLE CHRONIC MIGRAINE WITHOUT AURA AND WITHOUT STATUS MIGRAINOSUS: Primary | ICD-10-CM

## 2023-01-26 RX ORDER — GALCANEZUMAB 120 MG/ML
INJECTION, SOLUTION SUBCUTANEOUS
Qty: 1 ML | Refills: 5 | Status: SHIPPED | OUTPATIENT
Start: 2023-01-26

## 2023-01-30 PROBLEM — F43.10 POSTTRAUMATIC STRESS DISORDER: Status: ACTIVE | Noted: 2023-01-30

## 2023-01-30 PROBLEM — F41.1 GENERALIZED ANXIETY DISORDER: Status: ACTIVE | Noted: 2018-04-04
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
## 2023-02-03 ENCOUNTER — TELEPHONE (OUTPATIENT)
Dept: PHARMACY | Age: 33
End: 2023-02-03

## 2023-02-03 NOTE — TELEPHONE ENCOUNTER
Pharmacy Pop Care Documentation:   2023 Annual Pharmacy  Visit     Called patient to complete yearly pharmacy appointment to discuss the 2023 Diabetes Management Program.     No answer. Left VM. Please call back at [phone] Option #3.       [address]  Clinical Pharmacy   Phone: toll free [phone], option 3

## 2023-02-09 ENCOUNTER — OFFICE VISIT (OUTPATIENT)
Dept: NEUROLOGY | Age: 33
End: 2023-02-09
Payer: COMMERCIAL

## 2023-02-09 DIAGNOSIS — F32.1 MODERATE MAJOR DEPRESSION (HCC): Primary | ICD-10-CM

## 2023-02-09 DIAGNOSIS — F43.10 POSTTRAUMATIC STRESS DISORDER: ICD-10-CM

## 2023-02-09 PROCEDURE — 96132 NRPSYC TST EVAL PHYS/QHP 1ST: CPT | Performed by: CLINICAL NEUROPSYCHOLOGIST

## 2023-02-09 PROCEDURE — 96133 NRPSYC TST EVAL PHYS/QHP EA: CPT | Performed by: CLINICAL NEUROPSYCHOLOGIST

## 2023-02-09 NOTE — PROGRESS NOTES
Prior to seeing the patient I reviewed pertinent records, including the previously completed report, the records in [place], and any updated visits from other providers since the patient's last visit. Today, I engaged in a psychoeducational and supportive feedback session with the patient. I provided psychotherapy in the form of psychoeducation and support with respect to the results of the recent Neuropsychological Evaluation, including discussing individual tests as well as patient's areas of neurocognitive strength versus weakness. We discussed, in detail, the following:  Reviewed results from evaluation including the normal scores on attention testing. Also discussed the presence of severe anxiety, symptoms of PTSD, symptoms of depression, and poor sleep. Discussed how these additional factors can mimic symptoms of ADHD and they need better managed before ADHD can be established. Patient indicated she has been working on controlling those conditions for years and they are resistant to treatment. She continues to meet with a therapist on a regular basis. I discussed the clinical nature of an ADHD diagnosis and encouraged her to discuss with her therapist their thoughts on whether her attention difficulties are related to psychopathology or a neurodevelopmental condition since they have more experience with her. I discussed with the patient that I can only go based on what testing shows and testing is not perfectly sensitive or specific to ADHD  Patient indicated she had been tried on Adderall in the past and it made her sleepy. She also reported her PCP prescribed Vyvanse but she was unable to afford it. Education was provided regarding my diagnostic impressions, and we discussed treatment plan/options. I also answered numerous questions related to the clinical findings, including discussing various methods to improve cognition and mood.   Supportive/Cognitive Behavioral/Solution Focused psychotherapy provided. The patient needs to: Follow-up with referring provider, therapist, and psychiatrist for ongoing management    TIME:  Clinical Interview: 0900 - 0925 = 25 minutes  Testing and scoring by provider: 16 minutes  Testing by technician: Raeann Ota - 0930 = 86 minutes  Scoring by technician: 12 minutes  Neuropsychological testing evaluation services*: 138 minutes + 15 minutes feedback = 153 minutes total    BILLING:  04940 x 1 Unit  96136 x 1 Unit  96138 x 1 Unit  96139 x 2 Units  96132 x 1 Unit  96133 x 2 Units    *Neuropsychological testing evaluation services include: Integration of patient data, interpretation of standardized test results and clinical data, clinical decision-making, treatment planning and report, and interactive feedback to the patient, family member(s) or caregiver(s), when performed.

## 2023-02-13 ENCOUNTER — CLINICAL SUPPORT (OUTPATIENT)
Dept: SURGERY | Age: 33
End: 2023-02-13

## 2023-02-13 ENCOUNTER — TELEPHONE (OUTPATIENT)
Dept: NEUROLOGY | Age: 33
End: 2023-02-13

## 2023-02-13 DIAGNOSIS — E66.01 MORBID OBESITY (HCC): Primary | ICD-10-CM

## 2023-02-13 NOTE — PROGRESS NOTES
[facility]  Supervised Weight Loss     Date:   2023    Patient's Name: Phuong Thornton  : 1990    Insurance:         Session: 2 of  6  Surgery:  [x]   Gastric Bypass (revision from sleeve)    Surgeon:  Dr. Daxa Nguyen    Height: 63 inches Weight:    298      Lbs. BMI: 53   Pounds Lost since last month: 2 lbs               Pounds Gained since last month: 0    Starting Weight: 300 lbs   Previous Months Weight: 300 lbs  Overall Pounds Lost: 2 lbs Overall Pounds Gained: 0    Other Pertinent Information: Today's appointment was completed in a virtual setting d/t COVID-19. Smoking Status:  None  Alcohol Intake: None    I have reviewed with pt the guidelines of the supervised wt loss program.  Pt understands the expectations of some wt loss during the program and that wt gain could delay the process. I have also explained that appointments need to be consecutive and missing an appointment may result in starting over. Pt has received this information in writing. Changes that patient has made since last month include:  increased water, taking calcium consistently. Eating Habits and Behaviors  General healthy eating guidelines were also discussed. Pts were instructed that their plate should be made up 1/2 plate coming from non-starchy vegetables, 1/4 coming from lean meat, and 1/4 of their plate coming from carbohydrates, including fruits, starches, or milk. We discussed measuring meals to 1/2 cup total per meal after surgery. Drinking only calorie-free, sugar-free and non-carbonated beverages. We discussed the importance of drinking 64 ounces of fluid per day to prevent dehydration post-operatively. Physical Activity/Exercise  We talked about the importance of increasing daily physical activity and beginning to develop an exercise regimen/routine.  We talked about exercise as being an important part of long term weight loss after surgery. Comments:  During class, I discussed with patient the importance of getting into an exercise routine. Pt is currently not exercising for activity. Pt has been encouraged to start a consistent exercise routine,      Behavior Modification    A behavior modification lesson was provided with an emphasis on developing mindful eating behaviors. We talked about how to eat more mindfully and identify emotional eating triggers. Tips and recommendations for how to make these changes were provided. Pt was encouraged to keep a food journal and record what they were taking in daily. Discussed food labels, protein    Overall Assessment: Nutrition evaluation reveals important lifestyle changes are indicated. Goals set and recommendations made. Will continue to assess. Patient-Set Goals:   1. Nutrition - eat bfast consistently (protein shake)  2. Exercise - go to gym 3x week  3.  Behavior - read food labels for total fat and added sugar    Faheem Muhammad, RD  2/13/2023

## 2023-02-13 NOTE — TELEPHONE ENCOUNTER
Second attempt made to contact patient to complete 2023 yearly pharmacy appointment for Diabetes Management Program.    No answer. Left VM. [employer] message sent to patient.         Nicolina Aase, [address]   Phone: [phone], option #3     For Pharmacy Admin Tracking Only    Program: [address]: No  Gap Closed?: No  Time Spent (min): 10

## 2023-02-13 NOTE — TELEPHONE ENCOUNTER
[employer]-  Leandro Ding # MedImpact: [id number]    01/28/2023 to 07/17/2023  Sent to [address] to [employer].     Forward to [employer]. as FYI    \"The request has been approved. The authorization is effective for a maximum of 1 fills from 01/18/2023 to 02/17/2023, as long as the member is enrolled in their current health plan. The request was reviewed and approved by a licensed clinical pharmacist. This request has been approved for 2mL for a 30 day supply. Prior Authorization [id number] is on file from 02/10/2023 through 07/17/2023 for maintenance dosing. This has been approved for 5 fills of 1mL every 30 days. This drug is required to be filled through [facility]. Please call [address] [place] [address] to transfer prescription: [phone] option 6. A written notification letter will follow with additional details. \"

## 2023-02-15 NOTE — TELEPHONE ENCOUNTER
Patient returned call for 2023 [facility] visit, patient asks to have return call today at 3p, for she will be in class until then. I confirmed correct call back number is [phone]. Will route to [facility].

## 2023-02-15 NOTE — TELEPHONE ENCOUNTER
[facility],4Th Floor Employee Diabetes Program - Be Well With Diabetes  =================================================================  Tracee Kelsey is a 27 y.o. female enrolled in the [address] [place] [address] with patient for yearly visit to discuss enrollment in program. Patient provided writer with verbal consent to remain in the program for this year. Program Requirements to be completed in 2023:  Two office visits in 2023 for diabetes (1st must be completed by 7/1/2023)  Two A1c levels in 2023 (1st must be completed by 7/1/2023)  Yearly lipid panel  Yearly urine microalbumin test  Diabetes education if A1c is over 8%  Taking an ACE/ARB medication if appropriate  Taking a statin medication if appropriate  Pneumonia vaccine up to date. Guidelines changed, talk with provider. Yearly flu shot (for 4051-9880 season)  Medication adherence over 70%. Patients who fall below 70% will be contacted by a pharmacist in the program to discuss any issues. Are you currently using [address] [place] [address] (home delivery pharmacy) to get your prescriptions? YES    Would you like to speak with a pharmacist about the program, your diabetes, and/or your prescriptions? [address] [place] [address] Clinical Pharmacy Team  Phone: toll free [phone], option #3  Fax [phone]  Email: [email or web address]     For Pharmacy Admin Tracking Only    Program: [address] in [address]: No  Gap Closed?: Yes  Time Spent (min): 15

## 2023-03-08 ENCOUNTER — TELEPHONE (OUTPATIENT)
Dept: NEUROLOGY | Age: 33
End: 2023-03-08

## 2023-03-08 RX ORDER — BLOOD-GLUCOSE TRANSMITTER
EACH MISCELLANEOUS
Qty: 1 EACH | Refills: 3 | Status: SHIPPED | OUTPATIENT
Start: 2023-03-08

## 2023-03-08 NOTE — TELEPHONE ENCOUNTER
Patient Care Team:  Bruce S Bernheim, MD as PCP - General (Internal Medicine)  Kirill Kc DPM as Referring Provider (Podiatry)  Ish Magdaleno MD as Referring Provider (Internal Medicine - Pulmonary Disease)  Jatin Ann MD (Colon & Rectal Surgery)  Jatin Rider MD (Urology)    Patient ID: Augusto is a 54 Re: Leonel Rogers,     I only see the maintenance dose in current meds. Please clarify with [facility] if patient was sent the loading dose or if a Rx is needed. Please review carefully - two prior authorizations -  one for loading dose and one for maintenance. Must be filled through [facility] - already escribed to them. The request has been approved. The authorization is effective for a maximum of 1 fills from 01/18/2023 to 02/17/2023, as long as the member is enrolled in their current health plan. The request was reviewed and approved by a licensed clinical pharmacist.     This request has been approved for 2mL for a 30 day supply. Prior Authorization [id number] is on file from 02/10/2023 through 07/17/2023 for maintenance dosing. This has been approved for 5 fills of 1mL every 30 days. This drug is required to be filled through [facility]. Please call [address] [place] [address] to transfer prescription: [phone] option 6. A written notification letter will follow with additional details. Strawberry   (trouble swallowing.    Eyes: Negative for photophobia, pain, discharge and visual disturbance.   Respiratory: Negative for chest tightness and shortness of breath.    Cardiovascular: Negative for chest pain and palpitations.   Gastrointestinal: Negative for abdominal pain, constipation, diarrhea, rectal pain and vomiting.   Genitourinary: Negative for difficulty urinating, frequency, hematuria, penile pain, penile swelling, scrotal swelling and testicular pain.   Musculoskeletal: Negative for arthralgias and myalgias.   Skin: Negative for rash.   Neurological: Negative for tremors, seizures, syncope, numbness and headaches.   Hematological: Negative for adenopathy. Does not bruise/bleed easily.   Psychiatric/Behavioral: Negative for confusion and sleep disturbance.       Health Maintenance Topics with due status: Overdue       Topic Date Due    COVID-19 Vaccine 09/29/2021            Vitals:    11/22/21 1410   BP: (!) 144/86   Pulse: 93   Temp: 97.2 °F (36.2 °C)   Weight: 107.9 kg (237 lb 12.8 oz)   Height: 5' 10\" (1.778 m)   PainSc:  0           Physical Exam  Constitutional:       Appearance: He is well-developed.   HENT:      Head: Normocephalic and atraumatic.      Right Ear: Hearing, tympanic membrane, ear canal and external ear normal.      Left Ear: Hearing, tympanic membrane, ear canal and external ear normal.      Nose: Nose normal. No mucosal edema.      Mouth/Throat:      Pharynx: Uvula midline.   Eyes:      General:         Right eye: No discharge.         Left eye: No discharge.      Extraocular Movements:      Right eye: No nystagmus.      Left eye: No nystagmus.      Conjunctiva/sclera: Conjunctivae normal.      Right eye: No hemorrhage.     Left eye: No hemorrhage.     Pupils: Pupils are equal, round, and reactive to light.   Neck:      Thyroid: No thyromegaly.      Vascular: No carotid bruit.   Cardiovascular:      Rate and Rhythm: Normal rate and regular rhythm.      Heart sounds: Normal heart  sounds, S1 normal and S2 normal. No murmur heard.      Pulmonary:      Effort: Pulmonary effort is normal. No respiratory distress.      Breath sounds: Normal breath sounds. No wheezing or rales.   Chest:      Chest wall: No mass or tenderness.   Breasts:      Right: No inverted nipple, nipple discharge or skin change.      Left: No inverted nipple, nipple discharge or skin change.       Abdominal:      General: Bowel sounds are normal. There is no distension or abdominal bruit.      Palpations: Abdomen is soft. There is no hepatomegaly, splenomegaly or mass.      Tenderness: There is no abdominal tenderness.   Musculoskeletal:         General: Normal range of motion.      Cervical back: Normal range of motion and neck supple. No edema or erythema.   Lymphadenopathy:      Head:      Right side of head: No submental, submandibular, preauricular, posterior auricular or occipital adenopathy.      Left side of head: No submental, submandibular, preauricular, posterior auricular or occipital adenopathy.      Cervical: No cervical adenopathy.   Skin:     Coloration: Skin is not pale.      Findings: No erythema, lesion or rash.   Neurological:      Mental Status: He is alert.      Cranial Nerves: No cranial nerve deficit.      Coordination: Coordination normal.   Psychiatric:         Speech: Speech normal.         Behavior: Behavior normal.         Thought Content: Thought content normal.         Judgment: Judgment normal.               Hospital Outpatient Visit on 10/27/2021   Component Date Value Ref Range Status   • Case Report 10/27/2021    Final                    Value:Surgical Pathology                                Case: [id number]                                  Authorizing Provider:  Jatin Ann MD       Collected:           10/27/2021 1245              Ordering Location:     [facility]  Received:            10/27/2021 1505                                     OUTPT GASTROENTEROLOGY                                                        Pathologist:           Edd Cueto 

## 2023-03-09 NOTE — TELEPHONE ENCOUNTER
Pt has been on [employer] and does not need loading dose of medication. This was sent to the pharmacy in January by Srinivasa Rosario. will close out this encounter.

## 2023-03-13 ENCOUNTER — CLINICAL SUPPORT (OUTPATIENT)
Dept: SURGERY | Age: 33
End: 2023-03-13

## 2023-03-13 ENCOUNTER — OFFICE VISIT (OUTPATIENT)
Dept: URGENT CARE | Age: 33
End: 2023-03-13
Payer: COMMERCIAL

## 2023-03-13 VITALS
DIASTOLIC BLOOD PRESSURE: 87 MMHG | RESPIRATION RATE: 18 BRPM | OXYGEN SATURATION: 97 % | SYSTOLIC BLOOD PRESSURE: 143 MMHG | HEART RATE: 74 BPM | TEMPERATURE: 98.8 F

## 2023-03-13 DIAGNOSIS — J40 BRONCHITIS: ICD-10-CM

## 2023-03-13 DIAGNOSIS — E66.01 MORBID OBESITY (HCC): Primary | ICD-10-CM

## 2023-03-13 DIAGNOSIS — H66.92 LEFT ACUTE OTITIS MEDIA: Primary | ICD-10-CM

## 2023-03-13 DIAGNOSIS — B96.89 ACUTE BACTERIAL SINUSITIS: ICD-10-CM

## 2023-03-13 DIAGNOSIS — T36.95XA ANTIBIOTIC-INDUCED YEAST INFECTION: ICD-10-CM

## 2023-03-13 DIAGNOSIS — B37.9 ANTIBIOTIC-INDUCED YEAST INFECTION: ICD-10-CM

## 2023-03-13 DIAGNOSIS — J01.90 ACUTE BACTERIAL SINUSITIS: ICD-10-CM

## 2023-03-13 PROCEDURE — 99213 OFFICE O/P EST LOW 20 MIN: CPT | Performed by: NURSE PRACTITIONER

## 2023-03-13 PROCEDURE — 3079F DIAST BP 80-89 MM HG: CPT | Performed by: NURSE PRACTITIONER

## 2023-03-13 PROCEDURE — 3077F SYST BP >= 140 MM HG: CPT | Performed by: NURSE PRACTITIONER

## 2023-03-13 RX ORDER — CLONIDINE HYDROCHLORIDE 0.1 MG/1
0.1 TABLET ORAL
COMMUNITY
Start: 2023-02-08

## 2023-03-13 RX ORDER — AMOXICILLIN AND CLAVULANATE POTASSIUM 875; 125 MG/1; MG/1
1 TABLET, FILM COATED ORAL EVERY 12 HOURS
Qty: 20 TABLET | Refills: 0 | Status: SHIPPED | OUTPATIENT
Start: 2023-03-13 | End: 2023-03-23

## 2023-03-13 RX ORDER — ALBUTEROL SULFATE 90 UG/1
1-2 AEROSOL, METERED RESPIRATORY (INHALATION)
Qty: 18 G | Refills: 0 | Status: SHIPPED | OUTPATIENT
Start: 2023-03-13

## 2023-03-13 RX ORDER — PREDNISONE 10 MG/1
10 TABLET ORAL SEE ADMIN INSTRUCTIONS
Qty: 21 TABLET | Refills: 0 | Status: SHIPPED | OUTPATIENT
Start: 2023-03-13

## 2023-03-13 RX ORDER — FLUCONAZOLE 150 MG/1
150 TABLET ORAL DAILY
Qty: 2 TABLET | Refills: 0 | Status: SHIPPED | OUTPATIENT
Start: 2023-03-13 | End: 2023-03-14

## 2023-03-13 NOTE — PROGRESS NOTES
HPI   Pt presents with complaints of head/sinus congestion, coughing, wheezing, and chest tightness for 4 days, gradually worsening. Denies fevers, chills, N/V. Tried alb neb tx (her mother's) last night with mild relief. Home covid test negative.     Past Medical History:   Diagnosis Date    Anxiety with depression     Arrhythmia     tachycardia    Arthritis     Asthma     [place] hump 1/7/2015    Carpal tunnel syndrome of right wrist 12/20/2016    Diabetes ([facility] [address]     ALIYA (generalized anxiety disorder) 4/4/2018    GERD (gastroesophageal reflux disease) 8/13/2014    HTN (hypertension) 7/25/2011    Hyperaldosteronism ([address] 10/19/2015    Hyperhidrosis 4/4/2018    Morbid obesity ([facility] [address]     Other ill-defined conditions(799.89)     migraines    Palpitation 4/4/2018    Sleep apnea     uses CPAP    Tachycardia 4/4/2018        Past Surgical History:   Procedure Laterality Date    HX CHOLECYSTECTOMY  11/10/2021    Dr Castañeda Arrow     HX GASTRECTOMY  09/08/2020    Gastric sleeve     HX GYN  10/19/2020    Laparoscopic right oopherectomy, left cystectomy    HX HERNIA REPAIR  09/08/2020    HX OVARIAN CYST REMOVAL  10/19/2020    HX TONSILLECTOMY      HX WISDOM TEETH EXTRACTION      MD TONSILLECTOMY PRIMARY/SECONDARY <AGE 13           Family History   Problem Relation Age of Onset    Hypertension Mother     Diabetes Mother         Type II    Anesth Problems Mother         respiratory issues - feels like she cannot breath when coming out of anesthesia    Arthritis-rheumatoid Mother     Gout Mother     Psychiatric Disorder Father     Drug Abuse Father     Cancer Other         prostate    Hypertension Sister     Thyroid Disease Sister         \"I think\"    Breast Cancer Sister     Attention Deficit Hyperactivity Disorder Brother     Hypertension Sister         Social History     Socioeconomic History    Marital status: SINGLE     Spouse name: Not on file    Number of children: Not on file    Years of education: Not on file    Highest education level: Not on file   Occupational History    Occupation: customer ser and student     Comment: [employer]   Tobacco Use    Smoking status: Former     Packs/day: 0.50     Years: 5.00     Pack years: 2.50     Types: Cigarettes     Quit date: 11/1/2019     Years since quitting: 3.3    Smokeless tobacco: Never   Vaping Use    Vaping Use: Never used   Substance and Sexual Activity    Alcohol use: Yes     Alcohol/week: 1.0 standard drink     Types: 1 Shots of liquor, 1 Standard drinks or equivalent per week     Comment: socially, Monthly or less    Drug use: No    Sexual activity: Not Currently     Partners: Male     Birth control/protection: Condom   Other Topics Concern     Service Not Asked    Blood Transfusions Not Asked    Caffeine Concern Not Asked    Occupational Exposure Not Asked    Hobby Hazards Not Asked    Sleep Concern Not Asked    Stress Concern Not Asked    Weight Concern Not Asked    Special Diet Not Asked    Back Care Not Asked    Exercise Not Asked    Bike Helmet Not Asked    Seat Belt Not Asked    Self-Exams Not Asked   Social History Narrative    Not on file     Social Determinants of Health     Financial Resource Strain: Not on file   Food Insecurity: Not on file   Transportation Needs: Not on file   Physical Activity: Not on file   Stress: Not on file   Social Connections: Not on file   Intimate Partner Violence: Not on file   Housing Stability: Not on file                ALLERGIES: Latex    Review of Systems   Constitutional:  Positive for fatigue. Negative for chills and fever. HENT:  Positive for congestion, ear pain, sinus pressure and sinus pain. Negative for sore throat. Respiratory:  Positive for cough, chest tightness and wheezing. Negative for shortness of breath. Cardiovascular:  Negative for chest pain. Gastrointestinal:  Negative for abdominal pain, diarrhea, nausea and vomiting. Musculoskeletal:  Negative for myalgias. Neurological:  Positive for headaches. Vitals:    03/13/23 1653   BP: (!) 143/87   Pulse: 74   Resp: 18   Temp: 98.8 °F (37.1 °C)   SpO2: 97%       Physical Exam  Constitutional:       General: She is not in acute distress. Appearance: Normal appearance. She is well-developed. She is not ill-appearing or toxic-appearing. HENT:      Head: Normocephalic and atraumatic. Right Ear: Tympanic membrane, ear canal and external ear normal.      Left Ear: Tympanic membrane, ear canal and external ear normal.      Nose: Congestion present. Mouth/Throat:      Mouth: Mucous membranes are moist.      Pharynx: Oropharynx is clear. Eyes:      Extraocular Movements: Extraocular movements intact. Conjunctiva/sclera: Conjunctivae normal.      Pupils: Pupils are equal, round, and reactive to light. Cardiovascular:      Rate and Rhythm: Normal rate and regular rhythm. Heart sounds: Normal heart sounds. Pulmonary:      Effort: Pulmonary effort is normal.      Breath sounds: Wheezing present. Comments: Few faint wheezes throughout bilaterally  Musculoskeletal:      Cervical back: Normal range of motion and neck supple. Lymphadenopathy:      Cervical: No cervical adenopathy. Skin:     General: Skin is warm and dry. Neurological:      General: No focal deficit present. Mental Status: She is alert and oriented to person, place, and time. ICD-10-CM ICD-9-CM   1. Left acute otitis media  H66.92 382.9   2. Acute bacterial sinusitis  J01.90 461.9    B96.89    3. Bronchitis  J40 490   4. Antibiotic-induced yeast infection  B37.9 112.9    T36.95XA E930.9       Orders Placed This Encounter    amoxicillin-clavulanate (AUGMENTIN) 875-125 mg per tablet     Sig: Take 1 Tablet by mouth every twelve (12) hours for 10 days. Dispense:  20 Tablet     Refill:  0    predniSONE (STERAPRED DS) 10 mg dose pack     Sig: Take 1 Tablet by mouth See Admin Instructions.  See administration instruction per 10mg dose pack     Dispense:  21 Tablet Refill:  0    albuterol (PROVENTIL HFA, VENTOLIN HFA, PROAIR HFA) 90 mcg/actuation inhaler     Sig: Take 1-2 Puffs by inhalation every four (4) hours as needed for Wheezing. Dispense:  18 g     Refill:  0    fluconazole (DIFLUCAN) 150 mg tablet     Sig: Take 1 Tablet by mouth daily for 1 day. Repeat in 3-5 days if needed. Dispense:  2 Tablet     Refill:  0        The patient is to follow up with PCP [facility]. If signs and symptoms become worse the pt is to go to the ER.      Catrachito Britton NP       MDM    Procedures

## 2023-03-13 NOTE — PROGRESS NOTES
[employer] [facility]  Supervised Weight Loss     Date:   3/13/2023    Patient's Name: Timothy Leonard  : 1990    Insurance:         Session: 3 of  6  Surgery:  [x]   Gastric Bypass (revision from sleeve)                Surgeon:  Dr. Chiquita Robles     Height: 63 inches       Weight:  298 Lbs. BMI: 53             Pounds Lost since last month: 0              Pounds Gained since last month: 0     Starting Weight: 300 lbs                   Previous Months Weight: 298 lbs  Overall Pounds Lost: 2 lbs   Overall Pounds Gained: 0     Other Pertinent Information: Today's appointment was completed in a virtual setting d/t COVID-19. Smoking Status:  None  Alcohol Intake: None    I have reviewed with pt the guidelines of the supervised wt loss program.  Pt understands the expectations of some wt loss during the program and that wt gain could delay the process. I have also explained that appointments need to be consecutive and missing an appointment may result in starting over. Pt has received this information in writing. Changes that patient has made since last month include:  reduced soda, reading food labels. Eating Habits and Behaviors  General healthy eating guidelines were discussed. A nutrition lesson was presented on portion control. Patients were instructed implement portion control now using the balanced plate method (1/2 plate non-starchy vegetables, 1/4 plate lean meat, and 1/4 plate whole grains and to include fruit and/or milk at meals or snack). We discussed measuring meals to 1/2 cup total per meal after surgery and appropriate portion progression long term. Patient's current diet habits include: Pt is eating 2 meals per day (B: skips L: 6pm- sandwich w/deli meat/cheese; D: 1am-liver, cabbage; snack-nutrigrain bar). Pt is eating refined carbohydrate foods (bread, pasta, rice, potatoes) occasionally. Pt is eating sweets/desserts occasoinally. Pt is using healthy cooking methods. Pt is eating meals prepared outside of the home 2x week. Pt is drinking water. Pt reports no emotional eating. Physical Activity/Exercise  We talked about the importance of increasing daily physical activity and beginning to develop an exercise regimen/routine. We talked about exercise as being an important part of long term weight loss after surgery. Comments:  During class, I discussed with patient the importance of getting into an exercise routine. Pt is currently going to gym 1x week for activity. Pt has been encouraged to increase frequency of exercise. Behavior Modification       We talked about how to eat more mindfully. Tips and recommendations for how to make these changes were provided. Pt was encouraged to keep a food journal and record what they were taking in daily. Discussed portion sizes after surgery    Overall Assessment: Nutrition evaluation reveals small lifestyle changes are being made. Goals set and recommendations made. Will continue to assess    Patient-Set Goals:   1. Nutrition - add in bfast  2. Exercise - go to gym 3x week  3.  Behavior -continue to read food labels    Deni Sena RD  3/13/2023

## 2023-03-30 NOTE — TELEPHONE ENCOUNTER
From: Johana Oates  To: Marely Guadarrama DO  Sent: 8/4/2022 1:45 PM EDT  Subject: Nurtec    Good Afternoon,     I just spoke with a Nurtec rep. He said in order for me to get Nurtec again, the prescription should go through [facility] like Dr. Ela Hernadez had it. Then he stated Sheyla Candice would know how to do the PA for it to get approved. The rep said even though I am taking Emgality as it denied for that reason. It should not matter anymore. Can you please try has without the Nurtec, I am having 3-4 headache days a month and the last two months, had to call out of work for two days because of having migraines when my period came. Thank you!     Layton Chavarria Bleeding that does not stop/Pain not relieved by Medications/Fever greater than (need to indicate Fahrenheit or Celsius)

## 2023-04-03 ENCOUNTER — OFFICE VISIT (OUTPATIENT)
Dept: PRIMARY CARE CLINIC | Age: 33
End: 2023-04-03
Payer: COMMERCIAL

## 2023-04-03 DIAGNOSIS — M79.672 LEFT FOOT PAIN: Primary | ICD-10-CM

## 2023-04-03 DIAGNOSIS — R03.0 ELEVATED BLOOD PRESSURE READING: ICD-10-CM

## 2023-04-03 PROCEDURE — 99213 OFFICE O/P EST LOW 20 MIN: CPT

## 2023-04-03 NOTE — PROGRESS NOTES
Chief Complaint   Patient presents with    Foot Injury     Left foot. Kicked the wall. Saturday. HISTORY OF PRESENT ILLNESS  Marsha Parker Nelia Cooks is a 27 y.o. female. Patient reports x 2 days ago she was getting a pedicure, stepped down out of the chair into a platform walking into a corner wall where she hit left foot. Since then she has been walking on her heel and unable to place much weight to foot. Reports wrapping it, using foot boot and using tylenol 1000mg as needed with minimal improvement in discomfort. She is diabetic and has hx of arthritis and heel spur to left foot. Review of Systems   Musculoskeletal:         Left foot pain     All other systems reviewed and are negative. Physical Exam  Constitutional:       Appearance: Normal appearance. She is well-developed. She is obese. Musculoskeletal:        Feet:       Comments: Left foot: +2 pedal pulse present with <2 cap refill. No obvious deformities.  Scattered bruising to top of foot, diffuse tenderness over 2nd-4th toes with increased tenderness/ limited ROM to 3rd toe with trace swelling     Past Medical History:   Diagnosis Date    Anxiety with depression     Arrhythmia     tachycardia    Arthritis     Asthma     [place] hump 1/7/2015    Carpal tunnel syndrome of right wrist 12/20/2016    Diabetes ([facility] [address]     ALIYA (generalized anxiety disorder) 4/4/2018    GERD (gastroesophageal reflux disease) 8/13/2014    HTN (hypertension) 7/25/2011    Hyperaldosteronism ([address] 10/19/2015    Hyperhidrosis 4/4/2018    Morbid obesity ([address]     Other ill-defined conditions(799.89)     migraines    Palpitation 4/4/2018    Sleep apnea     uses CPAP    Tachycardia 4/4/2018     Past Surgical History:   Procedure Laterality Date    HX CHOLECYSTECTOMY  11/10/2021    Dr Myrene Aase     HX GASTRECTOMY  09/08/2020    Gastric sleeve     HX GYN  10/19/2020    Laparoscopic right oopherectomy, left cystectomy    HX HERNIA REPAIR  09/08/2020    HX OVARIAN CYST REMOVAL  10/19/2020 HX TONSILLECTOMY      HX WISDOM TEETH EXTRACTION      VT TONSILLECTOMY PRIMARY/SECONDARY <AGE 10       BP (!) 149/100 (BP 1 Location: Right arm, BP Patient Position: Sitting, BP Cuff Size: Adult long)   Pulse 84   Temp 97.9 °F (36.6 °C) (Temporal)   Resp 18   Ht 5' 3\" (1.6 m)   Wt 295 lb (133.8 kg)   SpO2 98%   BMI 52.26 kg/m²     ASSESSMENT and PLAN  1. Left foot pain  -     XR FOOT LT MIN 3 V; Future - reviewed images /final result with patient as there is fracture of third left toe.   -     REFERRAL TO ORTHOPEDICS - patient reports she will follow up today. - Discussed pain meds with patient, patient declined opiates as she has home rx for tramadol. Unable to use NSAIDS due to hx of HTN and gastric sleeve. Recommended tylenol 6-8 hours with elevation/cold therapy. Patient declined post op shoes and crutches, reports she has foot boot at home that she will use. 2. Elevated blood pressure reading        - Follow up with PCP.   Radha Hanna NP

## 2023-04-03 NOTE — LETTER
NOTIFICATION RETURN TO WORK / SCHOOL    4/3/2023 11:09 AM    [facility] [id number]      To Whom It May Concern:    Peter Fu is currently under the care of Marylou. She will return to work once 24 hours free of symptoms. If there are questions or concerns please have the patient contact our office.         Sincerely,      Jatin Whitehead, NP

## 2023-04-03 NOTE — PROGRESS NOTES
Identified pt with two pt identifiers(name and ). Reviewed record in preparation for visit and have obtained necessary documentation. Chief Complaint   Patient presents with    Foot Injury     Left foot. Kicked the wall. Saturday. Vitals:    23 1026   BP: (!) 149/100   Pulse: 84   Resp: 18   Temp: 97.9 °F (36.6 °C)   TempSrc: Temporal   SpO2: 98%   Weight: 295 lb (133.8 kg)   Height: 5' 3\" (1.6 m)   PainSc:   8       Health Maintenance Due   Topic    Varicella Vaccine (1 of 2 - 2-dose childhood series)    Eye Exam Retinal or Dilated     COVID-19 Vaccine (4 - Booster for Grubbs Peter series)    Foot Exam Q1        Coordination of Care Questionnaire:  :   1) Have you been to an emergency room, urgent care, or hospitalized since your last visit? If yes, where when, and reason for visit? no       2. Have seen or consulted any other health care provider since your last visit? If yes, where when, and reason for visit? NO      Patient is accompanied by self I have received verbal consent from David Ghosh to discuss any/all medical information while they are present in the room. An electronic signature was used to authenticate this note.   -- Bassem Harrison LPN

## 2023-04-03 NOTE — PATIENT INSTRUCTIONS
- Take OTC ibuprofen 600- 800 mg every 6-8 hours for pain and discomfort on a scheduled basis. Take with food. - Ice and elevation  - Follow up with ortho.

## 2023-04-24 ENCOUNTER — OFFICE VISIT (OUTPATIENT)
Dept: ENDOCRINOLOGY | Age: 33
End: 2023-04-24
Payer: COMMERCIAL

## 2023-04-24 VITALS
WEIGHT: 293 LBS | HEIGHT: 63 IN | DIASTOLIC BLOOD PRESSURE: 74 MMHG | HEART RATE: 82 BPM | SYSTOLIC BLOOD PRESSURE: 121 MMHG | BODY MASS INDEX: 51.91 KG/M2

## 2023-04-24 DIAGNOSIS — E55.9 HYPOVITAMINOSIS D: ICD-10-CM

## 2023-04-24 DIAGNOSIS — E78.2 MIXED HYPERLIPIDEMIA: ICD-10-CM

## 2023-04-24 DIAGNOSIS — E66.01 CLASS 3 SEVERE OBESITY DUE TO EXCESS CALORIES WITH SERIOUS COMORBIDITY AND BODY MASS INDEX (BMI) OF 60.0 TO 69.9 IN ADULT (HCC): ICD-10-CM

## 2023-04-24 DIAGNOSIS — E11.9 TYPE 2 DIABETES MELLITUS WITHOUT COMPLICATION, WITHOUT LONG-TERM CURRENT USE OF INSULIN (HCC): Primary | ICD-10-CM

## 2023-04-24 DIAGNOSIS — I10 ESSENTIAL HYPERTENSION: ICD-10-CM

## 2023-04-24 LAB — HBA1C MFR BLD HPLC: 6.7 %

## 2023-04-24 PROCEDURE — 83036 HEMOGLOBIN GLYCOSYLATED A1C: CPT | Performed by: INTERNAL MEDICINE

## 2023-04-24 PROCEDURE — 3044F HG A1C LEVEL LT 7.0%: CPT | Performed by: INTERNAL MEDICINE

## 2023-04-24 PROCEDURE — 99215 OFFICE O/P EST HI 40 MIN: CPT | Performed by: INTERNAL MEDICINE

## 2023-04-24 PROCEDURE — 3074F SYST BP LT 130 MM HG: CPT | Performed by: INTERNAL MEDICINE

## 2023-04-24 PROCEDURE — 95251 CONT GLUC MNTR ANALYSIS I&R: CPT | Performed by: INTERNAL MEDICINE

## 2023-04-24 PROCEDURE — 3078F DIAST BP <80 MM HG: CPT | Performed by: INTERNAL MEDICINE

## 2023-04-24 RX ORDER — BUSPIRONE HYDROCHLORIDE 10 MG/1
10 TABLET ORAL 2 TIMES DAILY
COMMUNITY
Start: 2023-03-21

## 2023-04-24 RX ORDER — LEVONORGESTREL 52 MG/1
INTRAUTERINE DEVICE INTRAUTERINE
COMMUNITY

## 2023-04-24 NOTE — PROGRESS NOTES
Chief Complaint   Patient presents with    Diabetes     Pcp and pharmacy verified     History of Present Illness: Sylvia Yoo is a 27 y.o. female here for follow up of diabetes. She was diagnosed with diabetes \"when I was in high school\". Because pt has features concerning for cushing's her PCP checked a 24 hour urine cortisol which was not elevated (42). She also had a TSH drawn which was 2.10. In March 2016 we tested pt for hyperaldosteronism, which was negative. I tested her for evidence of PCOS, her Testosterone was 14, with DHEA-S 109, her 17-OH Prog was not elevated and an overnight dexamethasone suppression test did suppress her ACTH and cortisol. She had the Gastric Sleeve on 9/8/20. Pt had a cholecystectomy on 11/10/21. \"I fractured my toe on 4/1/23, I was walking and kicked a platform, because I was not paying attention. I broke my left middle toe. \"    Pt is still taking the Ozempic 2.0mg weekly. Her A1C was 6.7%. Her weight today was 302 pounds. \"I am working with my gastric surgeon to see what we can do for my reflux and the weight issues. \"    She is still monitoring her BGs using the DexCom Clarity. Reviewing the last 30 days of her Clarity report, she is having low BGs over night. \"I stopped eating af 2AM, pt notes her sensor is not sending her alarms that she is having low BGs. She is not having symptoms of hypoglycemia. \"       Pt is now working 7PM-8AM  She waking up around 6PM.  - She has her first meal around 630PM, last night she had chicken, rice and water. - She seconds meal of the day is around 2AM, she did not work today so was asleep at 2AM.   - She is snacking on chips at work. - She does not eat when she gets home from work. - She will go to bed around 9AM.    She is now in her third semester of nursing school \"I have 3 more semesters after this one. \"    She is no longer wearing her Fit-Bit.     She is not exercising, but \"I am supposed to get back to the boot camp soon, I plan to do 3 days per week. \"    No history of vascular disease. No history of neuropathy, or nephropathy. Last eye exam was February 2023, no retinopathy. \"Everything looked good. \"    She is taking Vitamin D 50,000 units weekly. She is taking a MVI and B-12 1000mcg monthly. Current Outpatient Medications   Medication Sig    levonorgestreL (Mirena) 21 mcg/24 hours (8 yrs) 52 mg IUD Mirena 21 mcg/24 hours (8 yrs) 52 mg intrauterine device   Take 1 device by intrauterine route. busPIRone (BUSPAR) 10 mg tablet Take 1 Tablet by mouth two (2) times a day. albuterol (PROVENTIL HFA, VENTOLIN HFA, PROAIR HFA) 90 mcg/actuation inhaler Take 1-2 Puffs by inhalation every four (4) hours as needed for Wheezing. cloNIDine HCL (CATAPRES) 0.1 mg tablet Take 1 Tablet by mouth daily as needed. Blood-Glucose Transmitter (Dexcom G6 Transmitter) nahid USE AS DIRECTED . CHANGE TRANSMITTER EVERY 90 DAYS    galcanezumab-gnlm (Emgality Pen) 120 mg/mL injection Inject 120 mg (1 pen) under the skin once monthly    meloxicam (MOBIC) 15 mg tablet Take 1 Tablet by mouth daily as needed. acetaminophen (TYLENOL) 500 mg tablet Take 2 Tablets by mouth every six (6) hours as needed for Pain.    metoprolol tartrate (LOPRESSOR) 50 mg tablet Take 1 Tablet by mouth two (2) times a day. TAKE 1 TABLET BY MOUTH 2 TIMES A DAY GENERIC FOR LOPRESSOR    Linzess 290 mcg cap capsule Take 1 Capsule by mouth daily. rizatriptan (MAXALT-MLT) 10 mg disintegrating tablet Take 1 Tablet by mouth daily as needed for Migraine (no more than 10 pills in one month. no more than 1 pill a day). gabapentin (NEURONTIN) 300 mg capsule Take 2 pill in the am and 3 pills at night    omeprazole (PRILOSEC) 40 mg capsule Take 1 Capsule by mouth two (2) times a day. DULoxetine (CYMBALTA) 60 mg capsule Take 1 Capsule by mouth nightly.  Takes with 30mg, total dose 90mg    Blood-Glucose Sensor (Dexcom G6 Sensor) nahid USE AS DIRECTED . CHANGE SENSOR EVERY 10 DAYS    semaglutide (Ozempic) 2 mg/dose (8 mg/3 mL) pnij 2 mg by SubCUTAneous route every seven (7) days. Vitamin D2 1,250 mcg (50,000 unit) capsule TAKE 1 CAPSULE BY MOUTH ONE TIME WEEKLY    nystatin (MYCOSTATIN) topical cream Apply topically as needed to affected area twice a day    DULoxetine (CYMBALTA) 30 mg capsule Take 1 Capsule by mouth nightly. Takes with 60 mg cap at night    BD Luer-Greg Syringe 3 mL 22 gauge x 1\" syrg     aspirin delayed-release 81 mg tablet Take 1 Tablet by mouth daily. cyanocobalamin (VITAMIN B12) 1,000 mcg/mL injection INJECT 1ML INTO THE MUSCLE MONTHLY    iron,carbonyl-FA-multivit-min (Opurity Multivitamin) 30 mg iron- 800 mcg chew Take 2 Tabs by mouth daily. calcium carbonate/vitamin D3 (CALCIUM 600 + D,3, PO) Take 1 Tablet by mouth two (2) times a day. (Vitamin D, 1000IU per patient)     No current facility-administered medications for this visit. Allergies   Allergen Reactions    Latex Itching     Review of Systems:  - Eyes: no blurry vision or double vision  - Cardiovascular: no chest pain  - Respiratory: no shortness of breath  - Musculoskeletal: no myalgias  - Neurological: no numbness/tingling in extremities    Physical Examination:  Blood pressure 121/74, pulse 82, height 5' 3\" (1.6 m), weight 302 lb 3.2 oz (137.1 kg). General: pleasant, no distress, good eye contact   Neck: no carotid bruits  Cardiovascular: regular, normal rate, nl s1 and s2, no m/r/g, 2+ DP pulses   Respiratory: clear bilaterally  Integumentary: no edema, no foot ulcers  Psychiatric: normal mood and affect    Diabetic foot exam:     Left Foot:   Visual Exam: normal    Pulse DP: 2+ (normal)   Filament test: normal sensation    Vibratory sensation: normal      Right Foot:   Visual Exam: normal    Pulse DP: 2+ (normal)   Filament test: normal sensation    Vibratory sensation: normal      Data Reviewed:   Her A1C today was 6.7%.   Assessment/Plan:   1) DM > Her A1C today was 6.7%. \"I have been eating bad. I know I need to make some changes. For now pt to continue the Ozempic 2.0mg weekly. Pt encouraged to stop drinking regular sodas and snacking on chips and sweets. Pt to work on increasing her physical activity as well. 2) HTN > Her BP today was 121/74. Will not make any adjustments to her HTN regimen at this time. She is only taking Metoprolol 50mg BID. 3) HLD > Her lipid panel was at goal in October 2022. She is not taking any lipid lowering medications. She stopped the Atorvastatin because of joint and muscle aches. 4) Obesity > Pt is s/p sleeve gastrectomy. Her weight today was 302 pounds. See #1. 5) Hypovitaminosis D > Her vitamin D level in October 2022 was 32.7. Pt to continue her Vitamin D 50,000 units weekly. Pt voices understanding and agreement with the plan. RTC 4 months      Copy sent to:  Mansi Slaughter and Juan M    I spent 40 minutes on this case and > 50% of the time was spent in counseling on the above issues.

## 2023-05-08 DIAGNOSIS — E55.9 HYPOVITAMINOSIS D: Primary | ICD-10-CM

## 2023-05-08 DIAGNOSIS — E11.9 TYPE 2 DIABETES MELLITUS WITHOUT COMPLICATION, WITHOUT LONG-TERM CURRENT USE OF INSULIN (HCC): Primary | ICD-10-CM

## 2023-05-10 ENCOUNTER — CLINICAL DOCUMENTATION (OUTPATIENT)
Dept: PHARMACY | Facility: CLINIC | Age: 33
End: 2023-05-10

## 2023-05-10 NOTE — PROGRESS NOTES
Pharmacy Pop Care Documentation:      The application for Cedrick Osei for enrollment into the diabetes management program has been reviewed and accepted on 5/10/23.     [address]    Program: [address]:  No  Gap Closed?: Yes   Time Spent (min): 5

## 2023-05-24 RX ORDER — BLOOD-GLUCOSE,RECEIVER,CONT
EACH MISCELLANEOUS
Qty: 1 EACH | Refills: 0 | Status: SHIPPED | OUTPATIENT
Start: 2023-05-24

## 2023-05-24 RX ORDER — BLOOD-GLUCOSE SENSOR
EACH MISCELLANEOUS
Qty: 3 EACH | Refills: 5 | Status: SHIPPED | OUTPATIENT
Start: 2023-05-24

## 2023-05-30 RX ORDER — PROCHLORPERAZINE 25 MG/1
SUPPOSITORY RECTAL
Qty: 3 EACH | Refills: 11 | Status: SHIPPED | OUTPATIENT
Start: 2023-05-30

## 2023-05-30 RX ORDER — PROCHLORPERAZINE 25 MG/1
SUPPOSITORY RECTAL
Qty: 1 EACH | Refills: 3 | Status: SHIPPED | OUTPATIENT
Start: 2023-05-30

## 2023-05-31 ENCOUNTER — OFFICE VISIT (OUTPATIENT)
Age: 33
End: 2023-05-31

## 2023-05-31 DIAGNOSIS — E66.01 MORBID OBESITY (HCC): Primary | ICD-10-CM

## 2023-05-31 NOTE — PROGRESS NOTES
[facility]  Supervised Weight Loss     Date:   2023    Patient's Name: Sandee Osborne  : 1990    Insurance:         Session: 5 of  6  Surgery:  [x]   Gastric Bypass (revision from sleeve)                Surgeon:  Dr. Parish Potts     Height: 63 inches       Weight:  299 Lbs. BMI: 53            Pounds Lost since last month: 0             Pounds Gained since last month: 4 lbs     Starting Weight: 300 lbs                   Previous Months Weight: 295 lbs  Overall Pounds Lost: 1 lb   Overall Pounds Gained: 0     Other Pertinent Information: Today's appointment was completed in a virtual setting d/t COVID[employer]. Smoking Status:  None  Alcohol Intake: None    I have reviewed with pt the guidelines of the supervised wt loss program.  Pt understands the expectations of some wt loss during the program and that wt gain could delay the process. I have also explained that appointments need to be consecutive and missing an appointment may result in starting over. Pt has received this information in writing. Changes that patient has made since last month include:  tried chair exercises, more consistent meals, continued to follow drinking rule, purchased tumbler with times, eliminated sugary drinks. Eating Habits and Behaviors  A nutrition lesson was presented on label reading with specific guidelines provided for limiting added sugars. This information will help increase healthy food choices, promote weight loss and prevent dumping syndrome after gastric bypass. We also reviewed the general nutrition guidelines for bariatric surgery. Patient's current diet habits include: Pt is eating 2-3 meals per day (B: skips occasionally or 2 boiled eggs L: 6pm- sandwich w/deli meat/cheese; D: 1am-liver, cabbage; snack-nutrigrain bar).  Pt is eating refined carbohydrate foods (bread, pasta, rice, potatoes)

## 2023-06-07 ENCOUNTER — TELEPHONE (OUTPATIENT)
Age: 33
End: 2023-06-07

## 2023-06-20 DIAGNOSIS — I10 ESSENTIAL (PRIMARY) HYPERTENSION: ICD-10-CM

## 2023-06-20 DIAGNOSIS — Z11.4 SCREENING FOR HIV WITHOUT PRESENCE OF RISK FACTORS: ICD-10-CM

## 2023-06-21 LAB
ALBUMIN SERPL-MCNC: 3.1 G/DL (ref 3.5–5)
ALBUMIN/GLOB SERPL: 1 (ref 1.1–2.2)
ALP SERPL-CCNC: 82 U/L (ref 45–117)
ALT SERPL-CCNC: 14 U/L (ref 12–78)
ANION GAP SERPL CALC-SCNC: 4 MMOL/L (ref 5–15)
AST SERPL-CCNC: 14 U/L (ref 15–37)
BILIRUB SERPL-MCNC: 0.4 MG/DL (ref 0.2–1)
BUN SERPL-MCNC: 7 MG/DL (ref 6–20)
BUN/CREAT SERPL: 9 (ref 12–20)
CALCIUM SERPL-MCNC: 8.9 MG/DL (ref 8.5–10.1)
CHLORIDE SERPL-SCNC: 107 MMOL/L (ref 97–108)
CHOLEST SERPL-MCNC: 160 MG/DL
CO2 SERPL-SCNC: 27 MMOL/L (ref 21–32)
CREAT SERPL-MCNC: 0.76 MG/DL (ref 0.55–1.02)
ERYTHROCYTE [DISTWIDTH] IN BLOOD BY AUTOMATED COUNT: 13.8 % (ref 11.5–14.5)
GLOBULIN SER CALC-MCNC: 3.2 G/DL (ref 2–4)
GLUCOSE SERPL-MCNC: 123 MG/DL (ref 65–100)
HCT VFR BLD AUTO: 39.8 % (ref 35–47)
HDLC SERPL-MCNC: 36 MG/DL
HDLC SERPL: 4.4 (ref 0–5)
HGB BLD-MCNC: 12 G/DL (ref 11.5–16)
HIV 1+2 AB+HIV1 P24 AG SERPL QL IA: NONREACTIVE
HIV 1/2 RESULT COMMENT: NORMAL
LDLC SERPL CALC-MCNC: 88 MG/DL (ref 0–100)
MCH RBC QN AUTO: 27.1 PG (ref 26–34)
MCHC RBC AUTO-ENTMCNC: 30.2 G/DL (ref 30–36.5)
MCV RBC AUTO: 89.8 FL (ref 80–99)
NRBC # BLD: 0 K/UL (ref 0–0.01)
NRBC BLD-RTO: 0 PER 100 WBC
PLATELET # BLD AUTO: 353 K/UL (ref 150–400)
PMV BLD AUTO: 11.6 FL (ref 8.9–12.9)
POTASSIUM SERPL-SCNC: 4 MMOL/L (ref 3.5–5.1)
PROT SERPL-MCNC: 6.3 G/DL (ref 6.4–8.2)
RBC # BLD AUTO: 4.43 M/UL (ref 3.8–5.2)
SODIUM SERPL-SCNC: 138 MMOL/L (ref 136–145)
TRIGL SERPL-MCNC: 180 MG/DL
VLDLC SERPL CALC-MCNC: 36 MG/DL
WBC # BLD AUTO: 7.3 K/UL (ref 3.6–11)

## 2023-06-23 ENCOUNTER — OFFICE VISIT (OUTPATIENT)
Age: 33
End: 2023-06-23
Payer: COMMERCIAL

## 2023-06-23 VITALS
HEIGHT: 63 IN | RESPIRATION RATE: 16 BRPM | BODY MASS INDEX: 51.91 KG/M2 | WEIGHT: 293 LBS | OXYGEN SATURATION: 97 % | DIASTOLIC BLOOD PRESSURE: 81 MMHG | SYSTOLIC BLOOD PRESSURE: 123 MMHG | TEMPERATURE: 97.9 F | HEART RATE: 79 BPM

## 2023-06-23 DIAGNOSIS — M35.9 SYSTEMIC INVOLVEMENT OF CONNECTIVE TISSUE, UNSPECIFIED (HCC): ICD-10-CM

## 2023-06-23 DIAGNOSIS — Z00.00 ENCOUNTER FOR WELL ADULT EXAM WITHOUT ABNORMAL FINDINGS: Primary | ICD-10-CM

## 2023-06-23 DIAGNOSIS — F41.1 GENERALIZED ANXIETY DISORDER: ICD-10-CM

## 2023-06-23 PROCEDURE — 3079F DIAST BP 80-89 MM HG: CPT | Performed by: FAMILY MEDICINE

## 2023-06-23 PROCEDURE — 99395 PREV VISIT EST AGE 18-39: CPT | Performed by: FAMILY MEDICINE

## 2023-06-23 PROCEDURE — 3074F SYST BP LT 130 MM HG: CPT | Performed by: FAMILY MEDICINE

## 2023-06-23 RX ORDER — DULOXETIN HYDROCHLORIDE 30 MG/1
30 CAPSULE, DELAYED RELEASE ORAL DAILY
Qty: 30 CAPSULE | Refills: 5 | Status: SHIPPED | OUTPATIENT
Start: 2023-06-23

## 2023-06-23 RX ORDER — BUSPIRONE HYDROCHLORIDE 10 MG/1
TABLET ORAL
Qty: 60 TABLET | Refills: 3 | Status: SHIPPED | OUTPATIENT
Start: 2023-06-23

## 2023-06-23 RX ORDER — BUSPIRONE HYDROCHLORIDE 10 MG/1
TABLET ORAL
COMMUNITY
End: 2023-06-23 | Stop reason: SDUPTHER

## 2023-06-23 RX ORDER — CYANOCOBALAMIN 1000 UG/ML
INJECTION, SOLUTION INTRAMUSCULAR; SUBCUTANEOUS
Qty: 3 EACH | Refills: 2 | Status: SHIPPED | OUTPATIENT
Start: 2023-06-23

## 2023-06-23 RX ORDER — BUSPIRONE HYDROCHLORIDE 10 MG/1
TABLET ORAL
Qty: 60 TABLET | Refills: 3 | Status: SHIPPED | OUTPATIENT
Start: 2023-06-23 | End: 2023-06-23 | Stop reason: SDUPTHER

## 2023-06-23 RX ORDER — ASPIRIN 81 MG/1
81 TABLET ORAL DAILY
Qty: 90 TABLET | Refills: 3 | Status: SHIPPED | OUTPATIENT
Start: 2023-06-23

## 2023-06-23 RX ORDER — DULOXETIN HYDROCHLORIDE 60 MG/1
60 CAPSULE, DELAYED RELEASE ORAL DAILY
Qty: 30 CAPSULE | Refills: 5 | Status: SHIPPED | OUTPATIENT
Start: 2023-06-23

## 2023-06-23 NOTE — PATIENT INSTRUCTIONS
and try again before they reach their goals. What are the things that might cause a setback for you? If you have tried to change a habit before, think about what helped you and what got in your way. By thinking about these barriers now, you can plan ahead for how to deal with them if they happen. There will be times when you slip up and don't make your goal for the week. When that happens, don't get mad at yourself. Learn from the experience. Ask yourself what got in the way of reaching your goal. Positive thinking goes a long way when you're making lifestyle changes. How can you get support? Get a partner. It's motivating to know that someone is trying to make the same change that you're making, like being more active or changing your eating habits. You have someone who is counting on you to help them succeed. That person can also remind you how far you've come. Get friends and family involved. They can exercise with you. Or they can encourage you by saying how they admire what you are doing. Family members can join you in your healthy eating efforts. Don't be afraid to tell family and friends that their encouragement makes a big difference to you. Join a class or support group. People in these groups often have some of the same barriers you have. They can give you support when you don't feel like staying with your plan. They can boost your morale when you need a lift. Shardarigoberto Estrada also find a number of online support groups. Encourage yourself. When you feel like giving up, don't waste energy feeling bad about yourself. Remember your reason for wanting to change, think about the progress you've made, and give yourself a pep talk and a pat on the back. Get professional help. A dietitian can help you make your diet healthier while still allowing you to eat foods that you enjoy. A  or physical therapist can help design an exercise program that is fun and easy to stay on.  A counselor, a , or

## 2023-06-23 NOTE — PROGRESS NOTES
Chief Complaint   Patient presents with    Annual Exam       1. Have you been to the ER, urgent care clinic since your last visit? Hospitalized since your last visit? No    2. Have you seen or consulted any other health care providers outside of the [address] [place] [address] since your last visit? Include any pap smears or colon screening.  No     Health Maintenance Due   Topic Date Due    Varicella vaccine (1 of 2 - 2-dose childhood series) Never done    COVID-19 Vaccine (4 - Booster for Pfizer series) 2021    Hepatitis B vaccine (3 of 3 - Risk 3-dose series) 2022    Diabetic retinal exam  06/10/2023      The patient, Susie Peters, identity was verified by name and   Requesting form completed for bariatric surgery- requesting bypass surgery

## 2023-06-23 NOTE — PROGRESS NOTES
Well Adult Note  Name: Barron Pineda Date: 2023   MRN: [id number] Sex: Female   Age: 26 y.o. Ethnicity: Non- / Non    : 1990 Race: Shanice Breen / Candace Carrero is here for well adult exam.  History:  Her gyne and breast care is done elsewhere by her Ob-Gyne physician. Last wellness exam was few years ago  patient is currently up todate w/ all vaccination, last tetanus vaccine was ,  patient has had no hx of fall  not feeling depressed, no blood in the stool no tarry stool,   the depression at this age addressed pt with improvig interests and enjoy to do things, not anxious not depressed,  pt at this visit, is physically functional with nl gait  and nicely independent and walks w/out mobility device ,  mentaly is very functional,  very Alert and oriented, unfortunately,  BMI for pt's age is into obesity state,  the  abnl BMI r/w'd and information given,      No family hx of breast cancer nor ovarian cancer,    Last pap smear result was normal on DEC 2021. Last mammogram : none  Last colon cancer screening : n/a  Last bone density screen :none    no family hx of colon cancer, patient is stating that the pt is sexaully active, uses safe sex ++physically active,     Review of Systems    Constitutional: no chills and fever,  , nad     HENT: no ear pain or nosebleeds. No blurred vision  Respiratory: no shortness of breath, wheezing cough   Cardiovascular: Has no chest pain, ,and racing heart . Gastrointestinal: No constipation, diarrhea, nausea and vomiting. Genitourinary: No frequency. Musculoskeletal: Negative for joint pain. Skin: no itching, no rash. Neurological: Negative for dizziness, no tremors  Psychiatric/Behavioral: Negative for depression, is not nervous/anxious. Allergies   Allergen Reactions    Latex Itching         Prior to Visit Medications    Medication Sig Taking?  Authorizing Provider   Continuous Blood Gluc Sensor (DEXCOM G6

## 2023-06-28 ENCOUNTER — OFFICE VISIT (OUTPATIENT)
Age: 33
End: 2023-06-28

## 2023-06-28 DIAGNOSIS — E66.01 MORBID OBESITY (HCC): Primary | ICD-10-CM

## 2023-07-06 ENCOUNTER — OFFICE VISIT (OUTPATIENT)
Age: 33
End: 2023-07-06
Payer: COMMERCIAL

## 2023-07-06 VITALS
OXYGEN SATURATION: 98 % | WEIGHT: 293 LBS | BODY MASS INDEX: 51.91 KG/M2 | HEART RATE: 86 BPM | DIASTOLIC BLOOD PRESSURE: 80 MMHG | TEMPERATURE: 97.4 F | RESPIRATION RATE: 18 BRPM | HEIGHT: 63 IN | SYSTOLIC BLOOD PRESSURE: 120 MMHG

## 2023-07-06 DIAGNOSIS — G43.719 CHRONIC MIGRAINE WITHOUT AURA, INTRACTABLE, WITHOUT STATUS MIGRAINOSUS: Primary | ICD-10-CM

## 2023-07-06 DIAGNOSIS — G56.01 CARPAL TUNNEL SYNDROME OF RIGHT WRIST: ICD-10-CM

## 2023-07-06 DIAGNOSIS — F41.1 GENERALIZED ANXIETY DISORDER: ICD-10-CM

## 2023-07-06 DIAGNOSIS — M35.00 SJOGREN'S SYNDROME, WITH UNSPECIFIED ORGAN INVOLVEMENT (HCC): ICD-10-CM

## 2023-07-06 PROCEDURE — 3074F SYST BP LT 130 MM HG: CPT

## 2023-07-06 PROCEDURE — 99215 OFFICE O/P EST HI 40 MIN: CPT

## 2023-07-06 PROCEDURE — 3079F DIAST BP 80-89 MM HG: CPT

## 2023-07-06 ASSESSMENT — ENCOUNTER SYMPTOMS
CONSTIPATION: 1
EYES NEGATIVE: 1

## 2023-07-06 NOTE — ASSESSMENT & PLAN NOTE
Stable    She had been followed by rheumatology. She takes Cymbalta 60 mg daily. She denies any side effects. Cymbalta has been managed by rheumatology. Patient is to continue to follow-up with PCP in rheumatology as appropriate.

## 2023-07-06 NOTE — ASSESSMENT & PLAN NOTE
Patient continues to experience paresthesias in both hands. She continues to take gabapentin 300 mg 3 times a day which helped with her symptoms. She continues to wear his splints at bedtime. Patient still deferred carpal tunnel surgery due to the fear procedure may not work. We will continue to monitor patient for this and will refer to orthopedics if she change her wishes about the carpal tunnel surgery.

## 2023-07-06 NOTE — ASSESSMENT & PLAN NOTE
Stable    She continues to take BuSpar 10 mg twice a day without any side effects. Patient is to continue to follow-up with psychiatrist which is Dr. Rebecca Herrera as appropriate for anxiety/depression management.

## 2023-07-06 NOTE — ASSESSMENT & PLAN NOTE
Stable    Patient will experience 1 migraine headache per month. The only issue she has today is that her migraine last several days. She only takes Maxalt once as needed and never know she could repeat in 2 hours. .    The indication of Maxalt were reviewed with patient today and patient was advised to take 1 tablet at the onset of her migraine. Can repeat in 2 hours if needed. Do not take more than 2 tablets in 24 hours. Do not take more than 2 to 3 days a week to prevent rebound headaches. We will not make any changes on her medication at this time. Patient is to continue to take Emgality 120 mg monthly and Maxalt as needed. Nurtec has been ordered in the past but was denied twice by insurance. As we discussed,  The importance of a proper diet including plenty of fruits and vegetables as this can help with headache prevention. The importance of staying hydrated and drinking at least 32 to 64 ounces of water daily as this can be a cause of tension type headaches. The importance of keeping a headache journal or log to identify triggers. The importance of sleep hygiene and attempting to get at least 6 to 8 hours of sleep daily to help with headache prevention.

## 2023-07-10 RX ORDER — BLOOD-GLUCOSE SENSOR
EACH MISCELLANEOUS
Qty: 9 EACH | Refills: 1 | Status: SHIPPED | OUTPATIENT
Start: 2023-07-10

## 2023-07-10 NOTE — TELEPHONE ENCOUNTER
Van Bradley MD,    Patient is a [facility] employee or spouse participating in a diabetes management/wellness program through [facility] employee wellness platform). Your patient is eligible to receive up to a 6 month free trial of the [address]. Prescription order pended for your convenience. Please feel free to reach out with any questions or concerns. Thank you,  MONTSE Ferrer PharmD, [facility]  Department, toll free: [phone]    =======================================================================    [facility]  Program    SIMA Bermudez is a 26 y.o. female currently identified being enrolled in the [address] [place] [address] diabetes  program.  All patients will be connected with a personalized health  through the [employer] and eligible to receive up to 6 months of Dexcom G7 system for free. By participating in the  program, the patient will:  Create daily habits that improve life and flourishing  Have access to personalized content, insights, and their own certified diabetes educator   Earn rewards for feedback    This patient is also eligible to receive up to a 6 month supply of [address] [place] [address] for 0$ with a prescription. Prescription orders will be pended for:  Dexcom G7 Sensors: Qty-9 with 1 refill    Thank you  MONSTE Ferrer PharmD, [facility]  Department, toll free: [phone]      For Pharmacy Admin Tracking Only    Program: Vicky in place:  No  Recommendation Provided To: Provider: 1 via Note to Provider  Intervention Detail: New Rx: 1, reason: Patient Preference  Intervention Accepted By: Provider: 1  Gap Closed?: Yes   Time Spent (min): 10,

## 2023-07-13 ENCOUNTER — TELEPHONE (OUTPATIENT)
Dept: PHARMACY | Facility: CLINIC | Age: 33
End: 2023-07-13

## 2023-07-13 NOTE — TELEPHONE ENCOUNTER
[facility] CLINICAL PHARMACY REVIEW - BE WELL WITH DIABETES: Statin, ACE/ARB Gaps  =================================================================  Alexandrea Jackson is a 26 y.o. female enrolled in Be Well with Diabetes    Identified care gap: Patient with diabetes not currently filling Statin or ACE/ARB therapy    Pertinent medications  Current Outpatient Medications   Medication Sig Dispense Refill    Continuous Blood Gluc Sensor (DEXCOM G7 SENSOR) MISC Use as directed to check BG. Change Sensor every 10 days:  [address] [address]- Pharmacy please ensure proper billing 9 each 1    aspirin 81 MG EC tablet Take 1 tablet by mouth daily 90 tablet 3    cyanocobalamin 1000 MCG/ML injection INJECT 1ML INTO THE MUSCLE MONTHLY 3 each 2    DULoxetine (CYMBALTA) 60 MG extended release capsule Take 1 capsule by mouth daily 30 capsule 5    busPIRone (BUSPAR) 10 MG tablet Take 1 tablet twice a day by oral route. 60 tablet 3    Continuous Blood Gluc Sensor (DEXCOM G6 SENSOR) MISC Change sensor every 10 days 3 each 11    Continuous Blood Gluc Transmit (DEXCOM G6 TRANSMITTER) MISC Change sensor every 10 days.  1 each 3    acetaminophen (TYLENOL) 500 MG tablet Take by mouth every 6 hours as needed      DULoxetine (CYMBALTA) 30 MG extended release capsule Take 1 capsule by mouth daily      ergocalciferol (ERGOCALCIFEROL) 1.25 MG (27694 UT) capsule TAKE 1 CAPSULE BY MOUTH ONE TIME WEEKLY      gabapentin (NEURONTIN) 300 MG capsule Take 2 pill in the am and 3 pills at night      Galcanezumab-gnlm (EMGALITY) 120 MG/ML SOAJ Inject 120 mg (1 pen) under the skin once monthly      linaclotide (LINZESS) 290 MCG CAPS capsule Take by mouth daily      meloxicam (MOBIC) 15 MG tablet Take by mouth daily as needed      metoprolol tartrate (LOPRESSOR) 50 MG tablet Take by mouth 2 times daily      nystatin (MYCOSTATIN) 736612 UNIT/GM cream Apply topically as needed to affected area twice a day      omeprazole (PRILOSEC) 40 MG

## 2023-07-17 ENCOUNTER — PHARMACY VISIT (OUTPATIENT)
Facility: HOSPITAL | Age: 33
End: 2023-07-17

## 2023-07-17 DIAGNOSIS — G43.719 CHRONIC MIGRAINE WITHOUT AURA, INTRACTABLE, WITHOUT STATUS MIGRAINOSUS: Primary | ICD-10-CM

## 2023-07-17 RX ORDER — GINGER ROOT/GINGER ROOT EXT 262.5 MG
1 CAPSULE ORAL 2 TIMES DAILY
COMMUNITY
Start: 2020-08-25

## 2023-07-17 RX ORDER — HYDROXYZINE 50 MG/1
TABLET, FILM COATED ORAL
COMMUNITY

## 2023-07-17 RX ORDER — LEVONORGESTREL 52 MG/1
INTRAUTERINE DEVICE INTRAUTERINE
COMMUNITY
Start: 2023-03-24

## 2023-07-17 RX ORDER — CLONIDINE HYDROCHLORIDE 0.1 MG/1
TABLET ORAL
COMMUNITY
Start: 2023-02-08

## 2023-07-17 RX ORDER — SYRINGE WITH NEEDLE, 1 ML 25GX5/8"
SYRINGE, EMPTY DISPOSABLE MISCELLANEOUS
COMMUNITY
Start: 2023-06-26

## 2023-07-17 ASSESSMENT — PATIENT HEALTH QUESTIONNAIRE - PHQ9
SUM OF ALL RESPONSES TO PHQ QUESTIONS 1-9: 0
1. LITTLE INTEREST OR PLEASURE IN DOING THINGS: 0
2. FEELING DOWN, DEPRESSED OR HOPELESS: 0
SUM OF ALL RESPONSES TO PHQ QUESTIONS 1-9: 0
SUM OF ALL RESPONSES TO PHQ QUESTIONS 1-9: 0
SUM OF ALL RESPONSES TO PHQ9 QUESTIONS 1 & 2: 0
SUM OF ALL RESPONSES TO PHQ QUESTIONS 1-9: 0

## 2023-07-17 NOTE — PROGRESS NOTES
Specialty Medication Service    Patient's Name: Bree Lazo YOB: 1990      Reason for visit: Bree Lazo is a 26 y.o. female presenting today for Specialty Medication Service visit follow up. Patient last seen by [facility] 01/26/23. Patient continues on SMS formulary medication, Emgality. Pharmacy completed Specialty Medication Service visit for medication monitoring and counseling. Medication list updated. Specialty Medication: Emgality 120 mg/ml autoinjector    Frequency: once monthly   Indication: Chronic migraines   Initially Diagnosed: several years ago   Additional Therapy:     Maxalt 10mg as needed   Previous Therapy:     Nurtec-every other day (worked well, insurance won't pay since this is preventative dosing). Nortriptyline    Amitriptyline    Metoprolol    gabapentin      Specialist:    Etienne Petty MD  [facility]   [address] [place] [address]   Phone: [phone]  Specialist Progress Note Available: Yes, Epic  Last Specialist Visit: 07/06/23: Patient migraines are stable, will experience 1 per month. Migraines will last several days though (3-4 days). Only taking maxalt once as needed and wasn't aware could repeat in 2 hours. Patient advised to take at onset and repeat in 2 hours if needed (max 2/24 hours) no more than 2-3 days per week. No changes to medications at this time.       Allergies   Allergen Reactions    Latex Itching       Past Medical History:   Diagnosis Date    Anxiety with depression     Arrhythmia     tachycardia    Arthritis     Asthma     [place] hump 1/7/2015    Carpal tunnel syndrome of right wrist 12/20/2016    Diabetes ([address])     VICTOR M (generalized anxiety disorder) 4/4/2018    GERD (gastroesophageal reflux disease) 8/13/2014    HTN (hypertension) 7/25/2011    Hyperaldosteronism ([address]) 10/19/2015    Hyperhidrosis 4/4/2018    Morbid obesity ([address])     Other ill-defined conditions(799.89)     migraines    Palpitation 4/4/2018    Sleep

## 2023-08-06 DIAGNOSIS — G56.01 CARPAL TUNNEL SYNDROME OF RIGHT WRIST: Primary | ICD-10-CM

## 2023-08-08 RX ORDER — GABAPENTIN 300 MG/1
CAPSULE ORAL
Qty: 450 CAPSULE | Refills: 1 | Status: SHIPPED | OUTPATIENT
Start: 2023-08-08 | End: 2024-02-04

## 2023-09-13 ENCOUNTER — TELEMEDICINE (OUTPATIENT)
Age: 33
End: 2023-09-13

## 2023-09-13 DIAGNOSIS — E78.2 MIXED HYPERLIPIDEMIA: ICD-10-CM

## 2023-09-13 DIAGNOSIS — E55.9 VITAMIN D DEFICIENCY, UNSPECIFIED: ICD-10-CM

## 2023-09-13 DIAGNOSIS — E11.9 TYPE 2 DIABETES MELLITUS WITHOUT COMPLICATION, WITH LONG-TERM CURRENT USE OF INSULIN (HCC): Primary | ICD-10-CM

## 2023-09-13 DIAGNOSIS — I10 ESSENTIAL (PRIMARY) HYPERTENSION: ICD-10-CM

## 2023-09-13 DIAGNOSIS — Z79.4 TYPE 2 DIABETES MELLITUS WITHOUT COMPLICATION, WITH LONG-TERM CURRENT USE OF INSULIN (HCC): Primary | ICD-10-CM

## 2023-09-13 NOTE — PROGRESS NOTES
Chief Complaint   Patient presents with    Diabetes     PCP and pharmacy verified. VIRTUAL   [phone]            **THIS IS A VIRTUAL VISIT VIA A VIDEO ENCOUNTER. PATIENT AGREED TO HAVE THEIR CARE DELIVERED OVER VIDEO IN PLACE OF THEIR REGULARLY SCHEDULED OFFICE VISIT**         Alexandrea Panda, was evaluated through a synchronous (real-time) audio-video encounter. The patient (or guardian if applicable) is aware that this is a billable service, which includes applicable co-pays. This Virtual Visit was conducted with patient's (and/or legal guardian's) consent. The visit was conducted pursuant to the emergency declaration under the [address] and the [employer] and [employer] Act. Patient identification was verified, and a caregiver was present when appropriate. The patient was located in a state where the provider was licensed to provide care. History of Present Illness: Brenda Angel is a 26 y.o. female here for follow up of diabetes. She was diagnosed with diabetes \"when I was in high school\". Because pt has features concerning for cushing's her PCP checked a 24 hour urine cortisol which was not elevated (42). She also had a TSH drawn which was 2.10. In March 2016 we tested pt for hyperaldosteronism, which was negative. I tested her for evidence of PCOS, her Testosterone was 14, with DHEA-S 109, her 17-OH Prog was not elevated and an overnight dexamethasone suppression test did suppress her ACTH and cortisol. She had the Gastric Sleeve on 9/8/20. Pt had a cholecystectomy on 11/10/21. \"I got the G7 through the [employer].\"    Pt notes that she she has been on Meloxicam she has not needed any steroids since our last visit. Pt denies any recent illnesses, injuries or hospitalizations. She notes her left middle toe healed well.       \"I fractured my toe on 4/1/23, I was walking

## 2023-09-20 DIAGNOSIS — M35.9 SYSTEMIC INVOLVEMENT OF CONNECTIVE TISSUE, UNSPECIFIED (HCC): ICD-10-CM

## 2023-09-20 DIAGNOSIS — F41.1 GENERALIZED ANXIETY DISORDER: ICD-10-CM

## 2023-09-21 DIAGNOSIS — E55.9 VITAMIN D DEFICIENCY, UNSPECIFIED: ICD-10-CM

## 2023-09-21 DIAGNOSIS — E11.9 TYPE 2 DIABETES MELLITUS WITHOUT COMPLICATION, WITH LONG-TERM CURRENT USE OF INSULIN (HCC): ICD-10-CM

## 2023-09-21 DIAGNOSIS — E78.2 MIXED HYPERLIPIDEMIA: ICD-10-CM

## 2023-09-21 DIAGNOSIS — Z79.4 TYPE 2 DIABETES MELLITUS WITHOUT COMPLICATION, WITH LONG-TERM CURRENT USE OF INSULIN (HCC): ICD-10-CM

## 2023-09-21 DIAGNOSIS — I10 ESSENTIAL (PRIMARY) HYPERTENSION: ICD-10-CM

## 2023-09-22 ENCOUNTER — TELEMEDICINE (OUTPATIENT)
Age: 33
End: 2023-09-22
Payer: COMMERCIAL

## 2023-09-22 DIAGNOSIS — F41.1 GENERALIZED ANXIETY DISORDER: Primary | ICD-10-CM

## 2023-09-22 DIAGNOSIS — R45.86 LABILE MOOD: ICD-10-CM

## 2023-09-22 DIAGNOSIS — F32.1 MODERATE MAJOR DEPRESSION (HCC): ICD-10-CM

## 2023-09-22 LAB
25(OH)D3 SERPL-MCNC: 23.1 NG/ML (ref 30–100)
ALBUMIN SERPL-MCNC: 3.1 G/DL (ref 3.5–5)
ALBUMIN/GLOB SERPL: 1 (ref 1.1–2.2)
ALP SERPL-CCNC: 70 U/L (ref 45–117)
ALT SERPL-CCNC: 21 U/L (ref 12–78)
ANION GAP SERPL CALC-SCNC: 4 MMOL/L (ref 5–15)
AST SERPL-CCNC: 27 U/L (ref 15–37)
BILIRUB SERPL-MCNC: 0.5 MG/DL (ref 0.2–1)
BUN SERPL-MCNC: 7 MG/DL (ref 6–20)
BUN/CREAT SERPL: 9 (ref 12–20)
CALCIUM SERPL-MCNC: 8.8 MG/DL (ref 8.5–10.1)
CHLORIDE SERPL-SCNC: 108 MMOL/L (ref 97–108)
CHOLEST SERPL-MCNC: 154 MG/DL
CO2 SERPL-SCNC: 28 MMOL/L (ref 21–32)
CREAT SERPL-MCNC: 0.76 MG/DL (ref 0.55–1.02)
CREAT UR-MCNC: 205 MG/DL
EST. AVERAGE GLUCOSE BLD GHB EST-MCNC: 140 MG/DL
GLOBULIN SER CALC-MCNC: 3.2 G/DL (ref 2–4)
GLUCOSE SERPL-MCNC: 115 MG/DL (ref 65–100)
HBA1C MFR BLD: 6.5 % (ref 4–5.6)
HDLC SERPL-MCNC: 41 MG/DL
HDLC SERPL: 3.8 (ref 0–5)
LDLC SERPL CALC-MCNC: 93.4 MG/DL (ref 0–100)
MICROALBUMIN UR-MCNC: 1.23 MG/DL
MICROALBUMIN/CREAT UR-RTO: 6 MG/G (ref 0–30)
POTASSIUM SERPL-SCNC: 4 MMOL/L (ref 3.5–5.1)
PROT SERPL-MCNC: 6.3 G/DL (ref 6.4–8.2)
SODIUM SERPL-SCNC: 140 MMOL/L (ref 136–145)
TRIGL SERPL-MCNC: 98 MG/DL
VLDLC SERPL CALC-MCNC: 19.6 MG/DL

## 2023-09-22 PROCEDURE — 99214 OFFICE O/P EST MOD 30 MIN: CPT | Performed by: FAMILY MEDICINE

## 2023-09-22 RX ORDER — BUSPIRONE HYDROCHLORIDE 10 MG/1
10 TABLET ORAL 3 TIMES DAILY
Qty: 90 TABLET | Refills: 1 | Status: SHIPPED | OUTPATIENT
Start: 2023-09-22

## 2023-09-22 SDOH — ECONOMIC STABILITY: FOOD INSECURITY: WITHIN THE PAST 12 MONTHS, YOU WORRIED THAT YOUR FOOD WOULD RUN OUT BEFORE YOU GOT MONEY TO BUY MORE.: SOMETIMES TRUE

## 2023-09-22 SDOH — ECONOMIC STABILITY: HOUSING INSECURITY
IN THE LAST 12 MONTHS, WAS THERE A TIME WHEN YOU DID NOT HAVE A STEADY PLACE TO SLEEP OR SLEPT IN A SHELTER (INCLUDING NOW)?: NO

## 2023-09-22 SDOH — ECONOMIC STABILITY: FOOD INSECURITY: WITHIN THE PAST 12 MONTHS, THE FOOD YOU BOUGHT JUST DIDN'T LAST AND YOU DIDN'T HAVE MONEY TO GET MORE.: SOMETIMES TRUE

## 2023-09-22 SDOH — ECONOMIC STABILITY: INCOME INSECURITY: HOW HARD IS IT FOR YOU TO PAY FOR THE VERY BASICS LIKE FOOD, HOUSING, MEDICAL CARE, AND HEATING?: SOMEWHAT HARD

## 2023-09-22 ASSESSMENT — PATIENT HEALTH QUESTIONNAIRE - PHQ9
SUM OF ALL RESPONSES TO PHQ QUESTIONS 1-9: 2
SUM OF ALL RESPONSES TO PHQ QUESTIONS 1-9: 2
SUM OF ALL RESPONSES TO PHQ9 QUESTIONS 1 & 2: 2
1. LITTLE INTEREST OR PLEASURE IN DOING THINGS: 1
SUM OF ALL RESPONSES TO PHQ QUESTIONS 1-9: 2
2. FEELING DOWN, DEPRESSED OR HOPELESS: 1
SUM OF ALL RESPONSES TO PHQ QUESTIONS 1-9: 2

## 2023-09-22 NOTE — PROGRESS NOTES
Chief Complaint   Patient presents with    Anxiety     1. Have you been to the ER, urgent care clinic since your last visit? Hospitalized since your last visit? No    2. Have you seen or consulted any other health care providers outside of the [address] [place] [address] Avenue since your last visit? Include any pap smears or colon screening.  No    New /102
Abnormal -     Eyes:   EOM    [x]  Normal    [] Abnormal -   Sclera  [x]  Normal    [] Abnormal -          Discharge [x]  None visible   [] Abnormal -     HENT: [x] Normocephalic, atraumatic  [] Abnormal -   [x] Mouth/Throat: Mucous membranes are moist    External Ears [x] Normal  [] Abnormal -    Neck: [x] No visualized mass [] Abnormal -     Pulmonary/Chest: [x] Respiratory effort normal   [x] No visualized signs of difficulty breathing or respiratory distress        [] Abnormal -      Musculoskeletal:   [x] Normal gait with no signs of ataxia         [x] Normal range of motion of neck        [] Abnormal -     Neurological:        [x] No Facial Asymmetry (Cranial nerve 7 motor function) (limited exam due to video visit)          [x] No gaze palsy        [] Abnormal -          Skin:        [x] No significant exanthematous lesions or discoloration noted on facial skin         [] Abnormal -            Psychiatric:       [x] Normal Affect [] Abnormal -        [x] No Hallucinations    Other pertinent observable physical exam findings:-             --Shalini Umanzor MD

## 2023-09-25 RX ORDER — BUSPIRONE HYDROCHLORIDE 10 MG/1
TABLET ORAL
Qty: 60 TABLET | Refills: 3 | Status: SHIPPED | OUTPATIENT
Start: 2023-09-25

## 2023-09-28 ENCOUNTER — TELEPHONE (OUTPATIENT)
Age: 33
End: 2023-09-28

## 2023-09-28 NOTE — TELEPHONE ENCOUNTER
Called and advised pt she only needed her psych evaluation completed to schedule final review pt stated she was aware and had completed it just needed to pay to pick it up and once she picked it up she would send it over. Pt expressed understanding of all that was discussed.

## 2023-09-28 NOTE — TELEPHONE ENCOUNTER
----- Message from [employer] sent at 9/27/2023 11:52 AM EDT -----  Regarding: REQUIREMENTS  IT LOOKS LIKE THIS PATIENT WE ARE JUST NEEDING PSYCH EVAL

## 2023-10-03 ENCOUNTER — TELEMEDICINE (OUTPATIENT)
Age: 33
End: 2023-10-03
Payer: COMMERCIAL

## 2023-10-03 DIAGNOSIS — M35.9 SYSTEMIC INVOLVEMENT OF CONNECTIVE TISSUE, UNSPECIFIED (HCC): ICD-10-CM

## 2023-10-03 DIAGNOSIS — F41.1 GENERALIZED ANXIETY DISORDER: ICD-10-CM

## 2023-10-03 PROCEDURE — 99214 OFFICE O/P EST MOD 30 MIN: CPT | Performed by: FAMILY MEDICINE

## 2023-10-03 RX ORDER — BUSPIRONE HYDROCHLORIDE 15 MG/1
TABLET ORAL
Qty: 90 TABLET | Refills: 1 | Status: SHIPPED | OUTPATIENT
Start: 2023-10-03

## 2023-10-03 ASSESSMENT — PATIENT HEALTH QUESTIONNAIRE - PHQ9
SUM OF ALL RESPONSES TO PHQ9 QUESTIONS 1 & 2: 2
2. FEELING DOWN, DEPRESSED OR HOPELESS: 1
SUM OF ALL RESPONSES TO PHQ QUESTIONS 1-9: 2
SUM OF ALL RESPONSES TO PHQ QUESTIONS 1-9: 2
1. LITTLE INTEREST OR PLEASURE IN DOING THINGS: 1
SUM OF ALL RESPONSES TO PHQ QUESTIONS 1-9: 2
SUM OF ALL RESPONSES TO PHQ QUESTIONS 1-9: 2

## 2023-10-03 NOTE — PROGRESS NOTES
Inez Corral, was evaluated through a synchronous (real-time) audio-video encounter. The patient (or guardian if applicable) is aware that this is a billable service, which includes applicable co-pays. This Virtual Visit was conducted with patient's (and/or legal guardian's) consent. Patient identification was verified, and a caregiver was present when appropriate. The patient was located at Home: [address] [place] [id number]  Provider was located at [facility] (Appt Dept): [place] [address]      Inez Corral (:  1990) is a Established patient, presenting virtually for evaluation of the following:  Patient's current medical condition is not controlled with medications,    pt states and reports of feeling anxius, no guilty feeling, no Hoplessness, patient at this time has ns/nh,ni,nh, and some trouble with weight gain, less ability of sleep, okay ablitiy  to concentrate at work(patient is able to work 8-12 hrs per week at this time) if she takes her medication 3 times a day unfortunately the medication affects only last 1 to 2 hours and a lot of times she forgets to take it for lunchtime and at home with the current medications, and all together a safe feeling at home and at work       Constitutional: no chills and fever,  , nad     HENT: no ear pain or nosebleeds. No blurred vision  Respiratory: no shortness of breath, wheezing cough   Cardiovascular: Has no chest pain, ,and racing heart . Gastrointestinal: No constipation, diarrhea, nausea and vomiting. Genitourinary: No frequency. Musculoskeletal: Negative for joint pain. Skin: no itching, no rash. Neurological: Negative for dizziness, no tremors  Psychiatric/Behavioral: +++ for depression  ++nervous/anxious. Assessment & Plan   Below is the assessment and plan developed based on review of pertinent history, physical exam, labs, studies, and medications.   1. Systemic involvement of

## 2023-10-03 NOTE — PROGRESS NOTES
Chief Complaint   Patient presents with    Anxiety     Follow up       1. Have you been to the ER, urgent care clinic since your last visit? Hospitalized since your last visit? No    2. Have you seen or consulted any other health care providers outside of the [address] [place] [address] Avenue since your last visit? Include any pap smears or colon screening.  No       Health Maintenance Due   Topic Date Due    Varicella vaccine (1 of 2 - 2-dose childhood series) Never done    Pneumococcal 0-64 years Vaccine (2 - PCV) 2014    HPV vaccine (3 - 3-dose series) 2016    COVID-19 Vaccine (4 - Pfizer series) 2021    Hepatitis B vaccine (3 of 3 - 19+ 3-dose series) 2022    Diabetic retinal exam  06/10/2023    Flu vaccine (1) 2023       The patient, John Flores, identity was verified by name and

## 2023-10-13 ENCOUNTER — TELEMEDICINE (OUTPATIENT)
Age: 33
End: 2023-10-13
Payer: COMMERCIAL

## 2023-10-13 DIAGNOSIS — J01.00 ACUTE NON-RECURRENT MAXILLARY SINUSITIS: Primary | ICD-10-CM

## 2023-10-13 PROCEDURE — 99213 OFFICE O/P EST LOW 20 MIN: CPT | Performed by: FAMILY MEDICINE

## 2023-10-13 RX ORDER — IPRATROPIUM BROMIDE 21 UG/1
2 SPRAY, METERED NASAL EVERY 12 HOURS
Qty: 30 ML | Refills: 3 | Status: SHIPPED | OUTPATIENT
Start: 2023-10-13

## 2023-10-13 RX ORDER — METHYLPREDNISOLONE 4 MG/1
TABLET ORAL
Qty: 21 TABLET | Refills: 0 | Status: SHIPPED | OUTPATIENT
Start: 2023-10-13

## 2023-10-13 RX ORDER — AZITHROMYCIN 250 MG/1
250 TABLET, FILM COATED ORAL SEE ADMIN INSTRUCTIONS
Qty: 6 TABLET | Refills: 0 | Status: SHIPPED | OUTPATIENT
Start: 2023-10-13 | End: 2023-10-18

## 2023-10-13 NOTE — PROGRESS NOTES
Chief Complaint   Patient presents with    Epistaxis       1. Have you been to the ER, urgent care clinic since your last visit? Hospitalized since your last visit? No    2. Have you seen or consulted any other health care providers outside of the [address] [place] [address] Avenue since your last visit? Include any pap smears or colon screening.  No    The patient, Alexandrea Floyds, identity was verified by  name and

## 2023-10-13 NOTE — PROGRESS NOTES
Ervin Bagley, was evaluated through a synchronous (real-time) audio-video encounter. The patient (or guardian if applicable) is aware that this is a billable service, which includes applicable co-pays. This Virtual Visit was conducted with patient's (and/or legal guardian's) consent. Patient identification was verified, and a caregiver was present when appropriate. The patient was located at Home: [address] [place] [phone]  Provider was located at [facility] (Appt Dept): [address] ALEXANDER Ellington (:  1990) is a Established patient, presenting virtually for evaluation of the following: Today's COVID-19 unvaccinated pt main concerns were provided on virtual visit and Video telemed format,  Present on VV for the concern of the current medical conditions,  pt is w/ comorbid history and  the pt does not know if has been exposed to covid-19 individual, and has not been tested yet, currently not working  patient currently have hoarseness having cough mostly dry, Using flonase, does not have shortness of breath and patient is not feeling lightheadedness and Dz,  pt has no low grade fever,  ++congestions, nl smell nl taste, patient also not complaining of some nausea feeling , no vomiting diarrhea, no body ache , and no orbital pain, no rash, patient also states of having a good family support at this time         Constitutional: no chills and fever,  , nad     HENT: no ear pain or nosebleeds. No blurred vision  Respiratory: ++ shortness of breath, ++wheezing +++cough   Cardiovascular: Has no chest pain, ,and racing heart . Gastrointestinal: No constipation, diarrhea, nausea and vomiting. Genitourinary: No frequency. Musculoskeletal: Negative for joint pain. Skin: no itching, no rash. Neurological: Negative for dizziness, no tremors  Psychiatric/Behavioral: Negative for depression   is not nervous/anxious.    and not depressed the patient is

## 2023-10-17 DIAGNOSIS — M35.9 SYSTEMIC INVOLVEMENT OF CONNECTIVE TISSUE, UNSPECIFIED (HCC): ICD-10-CM

## 2023-10-17 DIAGNOSIS — F41.1 GENERALIZED ANXIETY DISORDER: ICD-10-CM

## 2023-10-17 RX ORDER — SEMAGLUTIDE 2.68 MG/ML
INJECTION, SOLUTION SUBCUTANEOUS
Qty: 9 ML | Refills: 3 | Status: SHIPPED | OUTPATIENT
Start: 2023-10-17

## 2023-10-18 RX ORDER — DULOXETIN HYDROCHLORIDE 30 MG/1
30 CAPSULE, DELAYED RELEASE ORAL DAILY
Qty: 90 CAPSULE | Refills: 0 | Status: SHIPPED | OUTPATIENT
Start: 2023-10-18

## 2023-10-18 RX ORDER — DULOXETIN HYDROCHLORIDE 60 MG/1
60 CAPSULE, DELAYED RELEASE ORAL DAILY
Qty: 90 CAPSULE | Refills: 0 | Status: SHIPPED | OUTPATIENT
Start: 2023-10-18

## 2023-10-21 DIAGNOSIS — H60.502 ACUTE OTITIS EXTERNA OF LEFT EAR, UNSPECIFIED TYPE: Primary | ICD-10-CM

## 2023-10-21 RX ORDER — CIPROFLOXACIN AND DEXAMETHASONE 3; 1 MG/ML; MG/ML
4 SUSPENSION/ DROPS AURICULAR (OTIC) 2 TIMES DAILY
Qty: 7.5 ML | Refills: 0 | Status: SHIPPED | OUTPATIENT
Start: 2023-10-21 | End: 2023-10-28

## 2023-10-25 ENCOUNTER — TELEPHONE (OUTPATIENT)
Facility: HOSPITAL | Age: 33
End: 2023-10-25

## 2023-10-25 ENCOUNTER — ENROLLMENT (OUTPATIENT)
Facility: HOSPITAL | Age: 33
End: 2023-10-25

## 2023-10-25 NOTE — TELEPHONE ENCOUNTER
Specialty Medication Service    Date: 10/25/2023  Patient's Name: Amalia St YOB: 1990            _____________________________________________________________________________________________    Encounter opened for administrative purposes only.      Anthony Tucker, PharmD [facility]  Ambulatory Clinical Pharmacist  Specialty Medication Services  Phone: [phone] option #4  Fax: [phone]    For Pharmacy Admin Tracking Only    Program: DIANNA  CPA in place:  Yes    Time Spent (min): 10

## 2023-10-27 ENCOUNTER — TELEPHONE (OUTPATIENT)
Age: 33
End: 2023-10-27

## 2023-10-30 ENCOUNTER — TELEPHONE (OUTPATIENT)
Age: 33
End: 2023-10-30

## 2023-10-30 NOTE — TELEPHONE ENCOUNTER
Faxed Key to [facility] and they will fax office notes (since PA form did not have the option to upload the notes).       [id number] - Case w/ [employer] for [facility]      pending

## 2023-10-31 DIAGNOSIS — G43.719 CHRONIC MIGRAINE WITHOUT AURA, INTRACTABLE, WITHOUT STATUS MIGRAINOSUS: Primary | ICD-10-CM

## 2023-10-31 RX ORDER — METHYLPREDNISOLONE 4 MG/1
TABLET ORAL
Qty: 1 KIT | Refills: 0 | Status: SHIPPED | OUTPATIENT
Start: 2023-10-31

## 2023-10-31 NOTE — PROGRESS NOTES
Patient reach out via portal and verbalized he has been experiencing migraine headaches for several days. Based on patient's description, it is likely these headaches are medication overuse from Tylenol and Excedrin migraine over-the-counter. We will send in Medrol Dosepak to help break this migraine cycle. We will advised patient to stop taking Tylenol and Excedrin Migraine over-the-counter for now to prevent rebound headaches.     Emgality: Still pending

## 2023-11-01 ENCOUNTER — TELEPHONE (OUTPATIENT)
Age: 33
End: 2023-11-01

## 2023-11-02 DIAGNOSIS — G43.719 CHRONIC MIGRAINE WITHOUT AURA, INTRACTABLE, WITHOUT STATUS MIGRAINOSUS: Primary | ICD-10-CM

## 2023-11-02 RX ORDER — GALCANEZUMAB 120 MG/ML
INJECTION, SOLUTION SUBCUTANEOUS
Qty: 1 ADJUSTABLE DOSE PRE-FILLED PEN SYRINGE | Refills: 11 | Status: SHIPPED | OUTPATIENT
Start: 2023-11-02

## 2023-11-06 ENCOUNTER — TELEPHONE (OUTPATIENT)
Age: 33
End: 2023-11-06

## 2023-11-06 NOTE — TELEPHONE ENCOUNTER
Re: Emgality appeal    Sent through [facility] as pt meets criteria (rec'd email from Jerri Gamble, pharmacist requesting appeal.     Marie Douglas) - [id number]    Status pending.

## 2023-11-08 ENCOUNTER — HOSPITAL ENCOUNTER (OUTPATIENT)
Facility: HOSPITAL | Age: 33
Discharge: HOME | End: 2023-11-11
Attending: OBSTETRICS & GYNECOLOGY
Payer: COMMERCIAL

## 2023-11-08 DIAGNOSIS — Z91.89 OTHER SPECIFIED PERSONAL RISK FACTORS, NOT ELSEWHERE CLASSIFIED: ICD-10-CM

## 2023-11-08 PROCEDURE — A9579 GAD-BASE MR CONTRAST NOS,1ML: HCPCS | Performed by: OBSTETRICS & GYNECOLOGY

## 2023-11-08 PROCEDURE — 6360000004 HC RX CONTRAST MEDICATION: Performed by: OBSTETRICS & GYNECOLOGY

## 2023-11-08 PROCEDURE — C8908 MRI W/O FOL W/CONT, BREAST,: HCPCS

## 2023-11-08 PROCEDURE — 2580000003 HC RX 258: Performed by: OBSTETRICS & GYNECOLOGY

## 2023-11-08 RX ORDER — 0.9 % SODIUM CHLORIDE 0.9 %
100 INTRAVENOUS SOLUTION INTRAVENOUS ONCE
Status: COMPLETED | OUTPATIENT
Start: 2023-11-08 | End: 2023-11-08

## 2023-11-08 RX ADMIN — GADOTERIDOL 20 ML: 279.3 INJECTION, SOLUTION INTRAVENOUS at 10:51

## 2023-11-08 RX ADMIN — SODIUM CHLORIDE 100 ML: 9 INJECTION, SOLUTION INTRAVENOUS at 10:51

## 2023-12-06 RX ORDER — METOPROLOL TARTRATE 50 MG/1
50 TABLET, FILM COATED ORAL 2 TIMES DAILY
Qty: 180 TABLET | Refills: 1 | Status: SHIPPED | OUTPATIENT
Start: 2023-12-06

## 2023-12-06 NOTE — TELEPHONE ENCOUNTER
Last appointment: 10/13/23  Next appointment: none  Previous refill encounter(s): 1/13/23 #180 with 2 refills    Requested Prescriptions     Pending Prescriptions Disp Refills    metoprolol tartrate (LOPRESSOR) 50 MG tablet 180 tablet 1     Sig: Take 1 tablet by mouth 2 times daily         For Pharmacy Admin Tracking Only    Program: Medication Refill  CPA in place:    Recommendation Provided To:    Intervention Detail: New Rx: 1, reason: Patient Preference  Intervention Accepted By:   Nayeli Bellamy Closed?:    Time Spent (min): 5

## 2023-12-22 ENCOUNTER — PATIENT MESSAGE (OUTPATIENT)
Age: 33
End: 2023-12-22

## 2023-12-22 NOTE — TELEPHONE ENCOUNTER
Last appointment: 10/13/2023 Virtual visit MD Damien Angel   Next appointment: Nothing scheduled   Previous refill encounter(s):   11/14/2022 Prilosec #180 with 3 refills.      For Pharmacy Admin Tracking Only    Program: Medication Refill  Intervention Detail: New Rx: 1, reason: Patient Preference  Time Spent (min): 5      Requested Prescriptions     Pending Prescriptions Disp Refills    omeprazole (PRILOSEC) 40 MG delayed release capsule 180 capsule 0     Sig: Take 1 capsule by mouth 2 times daily

## 2023-12-27 DIAGNOSIS — J10.1 INFLUENZA B: Primary | ICD-10-CM

## 2023-12-27 RX ORDER — OMEPRAZOLE 40 MG/1
40 CAPSULE, DELAYED RELEASE ORAL 2 TIMES DAILY
Qty: 180 CAPSULE | Refills: 0 | Status: SHIPPED | OUTPATIENT
Start: 2023-12-27

## 2023-12-27 RX ORDER — OSELTAMIVIR PHOSPHATE 75 MG/1
75 CAPSULE ORAL 2 TIMES DAILY
Qty: 10 CAPSULE | Refills: 0 | Status: SHIPPED | OUTPATIENT
Start: 2023-12-27 | End: 2024-01-01

## 2024-01-05 ENCOUNTER — TELEPHONE (OUTPATIENT)
Dept: PHARMACY | Facility: CLINIC | Age: 34
End: 2024-01-05

## 2024-01-05 NOTE — TELEPHONE ENCOUNTER
**Patient is [facility]**     2024 Annual Pharmacist Visit    Called patient to schedule 2024 yearly pharmacist appointment to discuss medications for Diabetes Management Program.    No answer. Left VM.     Please call back at [phone], option #3         Gordo Starkey [facility]  Clinical Pharmacy   Phone: [phone], option #3

## 2024-01-08 RX ORDER — ACYCLOVIR 400 MG/1
TABLET ORAL
Qty: 9 EACH | Refills: 1 | Status: SHIPPED | OUTPATIENT
Start: 2024-01-08

## 2024-01-09 NOTE — TELEPHONE ENCOUNTER
**Patient is BS**   2024 Annual Pharmacist Visit    Patient scheduled appointment for 1.16.24 @ 11:00 AM.    Tg Juarez CPhT  Population Health    [facility] Clinical [facility]   [phone] option 2     For Pharmacy Admin Tracking Only    Program: [employer]  CPA in place:  No  Recommendation Provided To: Patient/Caregiver: 1 via Telephone  Intervention Detail: Scheduled Appointment  Intervention Accepted By: Patient/Caregiver: 1  Gap Closed?: Yes   Time Spent (min): 5

## 2024-01-11 DIAGNOSIS — F41.1 GENERALIZED ANXIETY DISORDER: ICD-10-CM

## 2024-01-11 DIAGNOSIS — M35.9 SYSTEMIC INVOLVEMENT OF CONNECTIVE TISSUE, UNSPECIFIED (HCC): ICD-10-CM

## 2024-01-11 NOTE — TELEPHONE ENCOUNTER
A [employer] message has been sent to the patient advising them to schedule an appt.    Last appointment: 10/3/23  Next appointment: Advised to follow-up 1/3/24  Previous refill encounter(s): 10/18/23 90 d/s    Requested Prescriptions     Pending Prescriptions Disp Refills    DULoxetine (CYMBALTA) 60 MG extended release capsule [Pharmacy Med Name: DULOXETINE HCL 60MG CPEP] 90 capsule 0     Sig: TAKE ONE CAPSULE BY MOUTH ONCE A DAY    DULoxetine (CYMBALTA) 30 MG extended release capsule [Pharmacy Med Name: DULOXETINE HCL 30MG CPEP] 90 capsule 0     Sig: TAKE ONE CAPSULE BY MOUTH ONCE A DAY         For Pharmacy Admin Tracking Only    Program: Medication Refill  CPA in place:    Recommendation Provided To:   Intervention Detail: New Rx: 2, reason: Patient Preference and Scheduled Appointment  Intervention Accepted By:   Gap Closed?:    Time Spent (min): 5

## 2024-01-12 RX ORDER — DULOXETIN HYDROCHLORIDE 30 MG/1
30 CAPSULE, DELAYED RELEASE ORAL DAILY
Qty: 90 CAPSULE | Refills: 0 | Status: SHIPPED | OUTPATIENT
Start: 2024-01-12

## 2024-01-12 RX ORDER — DULOXETIN HYDROCHLORIDE 60 MG/1
60 CAPSULE, DELAYED RELEASE ORAL DAILY
Qty: 90 CAPSULE | Refills: 0 | Status: SHIPPED | OUTPATIENT
Start: 2024-01-12

## 2024-01-15 ENCOUNTER — PHARMACY VISIT (OUTPATIENT)
Facility: HOSPITAL | Age: 34
End: 2024-01-15

## 2024-01-15 ENCOUNTER — TELEPHONE (OUTPATIENT)
Age: 34
End: 2024-01-15

## 2024-01-15 DIAGNOSIS — G43.719 CHRONIC MIGRAINE WITHOUT AURA, INTRACTABLE, WITHOUT STATUS MIGRAINOSUS: Primary | ICD-10-CM

## 2024-01-15 RX ORDER — PROCHLORPERAZINE 25 MG/1
SUPPOSITORY RECTAL
Qty: 3 EACH | Refills: 11 | Status: SHIPPED | OUTPATIENT
Start: 2024-01-15

## 2024-01-15 RX ORDER — GALCANEZUMAB 120 MG/ML
INJECTION, SOLUTION SUBCUTANEOUS
Qty: 1 ADJUSTABLE DOSE PRE-FILLED PEN SYRINGE | Refills: 11 | Status: SHIPPED | OUTPATIENT
Start: 2024-01-15

## 2024-01-15 RX ORDER — FLUCONAZOLE 100 MG/1
TABLET ORAL
COMMUNITY
Start: 2023-10-13

## 2024-01-15 ASSESSMENT — PROMIS GLOBAL HEALTH SCALE
IN GENERAL, HOW WOULD YOU RATE YOUR SATISFACTION WITH YOUR SOCIAL ACTIVITIES AND RELATIONSHIPS [ON A SCALE OF 1 (POOR) TO 5 (EXCELLENT)]?: 3
TO WHAT EXTENT ARE YOU ABLE TO CARRY OUT YOUR EVERYDAY PHYSICAL ACTIVITIES SUCH AS WALKING, CLIMBING STAIRS, CARRYING GROCERIES, OR MOVING A CHAIR [ON A SCALE OF 1 (NOT AT ALL) TO 5 (COMPLETELY)]?: 3
IN GENERAL, WOULD YOU SAY YOUR QUALITY OF LIFE IS...[ON A SCALE OF 1 (POOR) TO 5 (EXCELLENT)]: 2
SUM OF RESPONSES TO QUESTIONS 3, 6, 7, & 8: 15
IN GENERAL, HOW WOULD YOU RATE YOUR MENTAL HEALTH, INCLUDING YOUR MOOD AND YOUR ABILITY TO THINK [ON A SCALE OF 1 (POOR) TO 5 (EXCELLENT)]?: 3
IN THE PAST 7 DAYS, HOW WOULD YOU RATE YOUR FATIGUE ON AVERAGE [ON A SCALE FROM 1 (NONE) TO 5 (VERY SEVERE)]?: 4
IN GENERAL, WOULD YOU SAY YOUR HEALTH IS...[ON A SCALE OF 1 (POOR) TO 5 (EXCELLENT)]: 3
IN GENERAL, HOW WOULD YOU RATE YOUR PHYSICAL HEALTH [ON A SCALE OF 1 (POOR) TO 5 (EXCELLENT)]?: 2
IN GENERAL, PLEASE RATE HOW WELL YOU CARRY OUT YOUR USUAL SOCIAL ACTIVITIES (INCLUDES ACTIVITIES AT HOME, AT WORK, AND IN YOUR COMMUNITY, AND RESPONSIBILITIES AS A PARENT, CHILD, SPOUSE, EMPLOYEE, FRIEND, ETC) [ON A SCALE OF 1 (POOR) TO 5 (EXCELLENT)]?: 3
SUM OF RESPONSES TO QUESTIONS 2, 4, 5, & 10: 11
IN THE PAST 7 DAYS, HOW OFTEN HAVE YOU BEEN BOTHERED BY EMOTIONAL PROBLEMS, SUCH AS FEELING ANXIOUS, DEPRESSED, OR IRRITABLE [ON A SCALE FROM 1 (NEVER) TO 5 (ALWAYS)]?: 3
IN THE PAST 7 DAYS, HOW WOULD YOU RATE YOUR PAIN ON AVERAGE [ON A SCALE FROM 0 (NO PAIN) TO 10 (WORST IMAGINABLE PAIN)]?: 6

## 2024-01-15 ASSESSMENT — PATIENT HEALTH QUESTIONNAIRE - PHQ9
SUM OF ALL RESPONSES TO PHQ QUESTIONS 1-9: 2
SUM OF ALL RESPONSES TO PHQ QUESTIONS 1-9: 2
1. LITTLE INTEREST OR PLEASURE IN DOING THINGS: 1
SUM OF ALL RESPONSES TO PHQ9 QUESTIONS 1 & 2: 2
2. FEELING DOWN, DEPRESSED OR HOPELESS: 1
SUM OF ALL RESPONSES TO PHQ QUESTIONS 1-9: 2
SUM OF ALL RESPONSES TO PHQ QUESTIONS 1-9: 2

## 2024-01-15 NOTE — TELEPHONE ENCOUNTER
Specialty Medication Service    Date: 1/15/2024  Patient's Name: Alexandrea Panda YOB: 1990            _____________________________________________________________________________________________    Jacob Yo DO    Saw mutual [facility] patient for review on Emgality today. Reports Emgality continues to work well, but does have concerns about effectiveness of rescue therapy. Reports experienced a week long migraine a few months ago that did not respond to rizatriptan and resulted in taking oral steroids. Additionally, reports current migraines still lasting 2-3 days despite rizatriptan. Patient states Nurtec did work better in the past to abort migraines, however previous continuation PA was denied. Suggested to patient I would reach out to you to suggest restarting Nurtec and patient was agreeable. Additionally, I will help facilitate the PA process with [facility] insurance authorization team and follow thru on outcome. I have pended order for Nurtec to go to [facility] for your convenience, please approve if appropriate.     Thank you,    Teja Booth, PharmD [facility]  Ambulatory Clinical Pharmacist  Specialty Medication Services  Phone: [phone] option #4  Fax: [phone]    For Pharmacy Admin Tracking Only    Program: [facility]  CPA in place:  Yes  Recommendation Provided To: Provider: 1 via Note to Provider and Patient/Caregiver: 1 via Virtual Visit  Intervention Detail: New Rx: 1, reason: Needs Additional Therapy  Intervention Accepted By: Provider: 1 and Patient/Caregiver: 1    Time Spent (min): 20

## 2024-01-16 ENCOUNTER — TELEPHONE (OUTPATIENT)
Dept: PHARMACY | Facility: CLINIC | Age: 34
End: 2024-01-16

## 2024-01-16 ENCOUNTER — TELEPHONE (OUTPATIENT)
Age: 34
End: 2024-01-16

## 2024-01-16 RX ORDER — RIMEGEPANT SULFATE 75 MG/75MG
TABLET, ORALLY DISINTEGRATING ORAL
Qty: 18 TABLET | Refills: 4 | Status: SHIPPED | OUTPATIENT
Start: 2024-01-16

## 2024-01-16 RX ORDER — ACYCLOVIR 400 MG/1
TABLET ORAL
Qty: 9 EACH | Refills: 3 | Status: SHIPPED | OUTPATIENT
Start: 2024-01-16

## 2024-01-16 NOTE — TELEPHONE ENCOUNTER
Dr. Randolph    Your patient is currently enrolled in the Be Well with Diabetes program. After your patient's recent visit with a [facility] Clinical Pharmacist, the below were identified as opportunities to assist with their diabetes management (if patient is not eligible for below recommendations, please reply with reason/contraindication):   Pt had previously been able to get Dexcom G7 through a program at her work.  That program has ended and she is now interested in getting through her insurance. I have pended an order for the sensors to go to her pharmacy if oyu are ok with it.  It will likely require a PA, and they will initiate that request once the try to fill it      Thank you,  MONTSE Delong, PharmD, BCACP  Ambulatory Care Clinical Pharmacist- [facility] Clinical Pharmacy  Department, toll free: [phone]

## 2024-01-16 NOTE — TELEPHONE ENCOUNTER
G6 order cancelled at [facility].    MONTSE Delong, PharmD, BCACP  Ambulatory Care Clinical Pharmacist- [facility] Clinical Pharmacy  Department, toll free: [phone]    
Patient called back and stated it is ok to cancel Dexcom G6 since pharmacist requesting G7 now. Will route to pharmacist to make aware.       Gordo Starkey [facility]  Clinical Pharmacy   Phone: [phone]   
team will not be able to provide any information on Beto Babcock/Jimenez.  Pt must contact Beto Babcock/Jimenez directly  Beto Babcock [facility] line- [phone]     Diabetes Care:   - Glycemic Goal: <7.0%. Stable and at blood glucose goal. Type 2 DM under excellent control.  - Current symptoms/problems include none  - Home blood sugar records:  Wearing Dexcom g7 - Had been part of [employer] [facility] care program.  This program ended and she requested Dexcom g7 through her insurance. I will request an order for G7 sensors to go through [facility]  - Any episodes of hypoglycemia? no  - Known diabetic complications: none  - Therapy Optimization:   Currently on Ozempic 2mg weekly- tolerating  - Medication compliance: compliant all of the time  - Diet compliance: compliant most of the time  - Current exercise: aerobics- [employer]camp    Other Considerations:  - Blood Pressure Goal:   Patient prescribed a ACEi/ARB:no  BP less than 130/80 mmHg due to history of DM: at goal  Recommendation: n/a- on metoprlol for HR.  BP controlled  - Lipids:  Patient prescribed a statin:no  Recommendation: <41yo and low CV risk  - Smoking status: Former smoker/Vape/Smokeless tobacco user    PLAN:  - Consideration(s) for provider:   Pending G7 sensors  - DM program gaps identified:   Requirements recommended to be completed by 7/1: Provider Visit for DM (1st) and A1c (1st)   Requirements due by 12/31: Provider visit for DM (2nd), A1c (2nd), Lipid panel, Urine microalbumin, and Influenza vaccination for [id number]   - Education to patient: Discussed general issues about diabetes pathophysiology and management., Addressed medication adherence, Overview of Diabetes program- Benefits/Requirements, and Overview of [facility]   - Follow up: Endocrinologist for identified gaps or as scheduled below    - Upcoming appointments:   Future Appointments   Date Time Provider Department Center   1/18/2024  4:30 PM Jacob Yo DO [facility]

## 2024-01-18 ENCOUNTER — PHARMACY VISIT (OUTPATIENT)
Facility: HOSPITAL | Age: 34
End: 2024-01-18

## 2024-01-18 ENCOUNTER — TELEPHONE (OUTPATIENT)
Facility: HOSPITAL | Age: 34
End: 2024-01-18

## 2024-01-18 DIAGNOSIS — G43.111 MIGRAINE WITH AURA, INTRACTABLE, WITH STATUS MIGRAINOSUS: Primary | ICD-10-CM

## 2024-01-18 NOTE — PROGRESS NOTES
ergocalciferol (ERGOCALCIFEROL) 1.25 MG (55638 UT) capsule TAKE 1 CAPSULE BY MOUTH ONE TIME WEEKLY    linaclotide (LINZESS) 290 MCG CAPS capsule Take by mouth daily    meloxicam (MOBIC) 15 MG tablet Take 1 tablet by mouth daily as needed    rizatriptan (MAXALT-MLT) 10 MG disintegrating tablet Take by mouth daily as needed May repeat dose after two hours up to 2 tablets per 24 hours     No current facility-administered medications for this visit.     Active Ambulatory Problems     Diagnosis Date Noted    Type 2 diabetes with nephropathy ([facility]) 01/13/2019    Palpitation 04/04/2018    Fatigue 08/27/2012    AR (allergic rhinitis) 09/28/2012    Tachycardia 04/04/2018    Acute pharyngitis 06/15/2016    Hyperhidrosis 04/04/2018    Type 2 diabetes mellitus without complication ([facility]) 04/25/2016    GERD (gastroesophageal reflux disease) 08/13/2014    Sleep apnea in adult 03/20/2017    HTN (hypertension) 07/25/2011    Migraine headache 01/28/2014    Increased pulse rate 09/20/2013    Acute laryngitis 09/20/2013    Obesity 09/20/2013    Viral pneumonia, unspecified 09/22/2013    Class 3 severe obesity due to excess calories with serious comorbidity and body mass index (BMI) of 60.0 to 69.9 in adult ([facility]) 08/09/2018    Dysthymia 04/21/2020    Obesity, morbid (more than 100 lbs over ideal weight or BMI > 40) ([facility]) 07/25/2011    Vitamin D deficiency 09/28/2012    [place] hump 01/07/2015    Type 2 diabetes mellitus with diabetic neuropathy ([facility]) 04/25/2018    Carpal tunnel syndrome of right wrist 12/20/2016    Type 2 diabetes mellitus with chronic kidney disease ([facility]) 11/17/2021    Atypical squamous cells of undetermined significance (ASCUS) on Papanicolaou smear of cervix 03/15/2019    Moderate major depression ([facility]) 01/13/2023    Generalized anxiety disorder 04/04/2018    Posttraumatic stress disorder 01/30/2023    Systemic involvement of connective tissue, unspecified ([facility]) 06/23/2023    Sjogren's syndrome ([facility]) 07/06/2023

## 2024-01-18 NOTE — TELEPHONE ENCOUNTER
Specialty Medication Service    Date: 1/18/2024  Patient's Name: Alexandrea Panda YOB: 1990            _____________________________________________________________________________________________    Emailed PA  Soheila Diaz prescott to CMM for Nurtec (key: [id number]). Will follow-up in a couple days to check status.     Teja Booth, PharmD [facility]  Ambulatory Clinical Pharmacist  Specialty Medication Services  Phone: [phone] option #4  Fax: [phone]    For Pharmacy Admin Tracking Only    Program: SMS  CPA in place:  Yes  Recommendation Provided To: Provider: 1 via Note to Provider  Intervention Detail: Benefit Assistance  Intervention Accepted By: Provider: 0    Time Spent (min): 15

## 2024-01-24 ENCOUNTER — TELEPHONE (OUTPATIENT)
Age: 34
End: 2024-01-24

## 2024-02-02 NOTE — ANESTHESIA PREPROCEDURE EVALUATION
Anesthetic History   No history of anesthetic complications            Review of Systems / Medical History  Patient summary reviewed, nursing notes reviewed and pertinent labs reviewed    Pulmonary        Sleep apnea: CPAP  Smoker  Asthma     Comments: Former Smoker   Neuro/Psych         Headaches and psychiatric history    Comments: Anxiety  Depression  Migraines Cardiovascular    Hypertension        Dysrhythmias       Exercise tolerance: <4 METS  Comments: Hx Palpitations and Tachycardia            GI/Hepatic/Renal     GERD           Endo/Other    Diabetes    Morbid obesity    Comments: Hx Hyperaldosteronism   Other Findings   Comments: OVARIAN CYST AND PELVIC PAIN  Hx Hyperhidrosis   Vitamin D deficiency   AR (allergic rhinitis)            Physical Exam    Airway  Mallampati: II  TM Distance: 4 - 6 cm  Neck ROM: normal range of motion   Mouth opening: Normal     Cardiovascular    Rhythm: regular  Rate: normal         Dental  No notable dental hx       Pulmonary  Breath sounds clear to auscultation               Abdominal  Abdominal exam normal       Other Findings            Anesthetic Plan    ASA: 3  Anesthesia type: general and total IV anesthesia          Induction: Intravenous  Anesthetic plan and risks discussed with: Patient
Medications & Allergies:   Current Outpatient Medications   Medication Instructions    Biotin 10 MG CAPS Oral    clonazePAM (KLONOPIN) 0.5 mg, Oral, 3 TIMES DAILY    DROPLET PEN NEEDLES 32G X 4 MM MISC use 1 PEN NEEDLE to inject MEDICATION subcutaneously five times a day    gabapentin (NEURONTIN) 400 mg, Oral, 2 TIMES DAILY    gabapentin (NEURONTIN) 400 mg, Oral, 2 TIMES DAILY    glucose blood VI test strips (ASCENSIA AUTODISC VI;ONE TOUCH ULTRA TEST VI) strip One touch ultra test strips. Test up to 4 times a day.    HUMALOG KWIKPEN 100 UNIT/ML SOPN inject INSULIN every morning and at bedtime PER SLIDING SCALE:151...  (REFER TO PRESCRIPTION NOTES).    HUMULIN N KWIKPEN 100 UNIT/ML injection pen INJECT 14 UNITS SUBCUTANEOUSLY ONCE DAILY    HYDROmorphone (DILAUDID) 2 mg, Oral, EVERY 12 HOURS PRN    levothyroxine (SYNTHROID) 137 MCG tablet Oral, DAILY, Take one tab daily (137 mcg)    metoprolol tartrate (LOPRESSOR) 25 MG tablet take 1/2 tablet by mouth twice a day    Multiple Vitamins-Iron (MULTIVITAMIN/IRON PO) Oral, DAILY    mycophenolate (CELLCEPT) 250 MG capsule Indications: Taking 3 tabs BID    ondansetron (ZOFRAN ODT) 4 mg, Oral, EVERY 8 HOURS PRN    pantoprazole (PROTONIX) 40 mg, Oral, DAILY    predniSONE (DELTASONE) 10 mg, Oral, DAILY    Prograf 1 mg, Oral, Take 2 tabs in am and Take 2 tabs in pm    sertraline (ZOLOFT) 100 mg, Oral, DAILY    sulfamethoxazole-trimethoprim (BACTRIM DS;SEPTRA DS) 800-160 MG per tablet TAKE ONE-HALF (1/2) TABLET 5 DAYS A WEEK ( MONDAY THROUGH FRIDAY )    Vitamin D, Cholecalciferol, 400 units CAPS Oral       Allergies   Allergen Reactions    Acetaminophen-Codeine     Codeine Shortness Of Breath    Tylenol With Codeine #3 [Acetaminophen-Codeine] Shortness Of Breath    Vicodin [Hydrocodone-Acetaminophen] Shortness Of Breath     Makes her feel crazy    Ace Inhibitors Other (See Comments)     Cough    Aspirin      Makes her heart race      Azulfidine [Sulfasalazine] Hives    Laura-D

## 2024-02-12 DIAGNOSIS — M35.9 SYSTEMIC INVOLVEMENT OF CONNECTIVE TISSUE, UNSPECIFIED (HCC): ICD-10-CM

## 2024-02-12 DIAGNOSIS — F41.1 GENERALIZED ANXIETY DISORDER: ICD-10-CM

## 2024-02-13 NOTE — TELEPHONE ENCOUNTER
Last appointment: 10/13/23  Next appointment: Advised to follow-up 1/3/24  Previous refill encounter(s): 10/3/23 30 d/s with 1 refill    Requested Prescriptions     Pending Prescriptions Disp Refills    busPIRone (BUSPAR) 15 MG tablet [Pharmacy Med Name: BUSPIRONE HCL 15MG TABS] 90 tablet 0     Sig: Take 15 mg by mouth 3 times daily Patient needs an appointment for further refills         For Pharmacy Admin Tracking Only    Program: Medication Refill  CPA in place:    Recommendation Provided To:   Intervention Detail: New Rx: 1, reason: Patient Preference and Scheduled Appointment  Intervention Accepted By:   Gap Closed?:    Time Spent (min): 5

## 2024-02-14 RX ORDER — BUSPIRONE HYDROCHLORIDE 15 MG/1
15 TABLET ORAL 3 TIMES DAILY
Qty: 90 TABLET | Refills: 0 | Status: SHIPPED | OUTPATIENT
Start: 2024-02-14

## 2024-02-28 DIAGNOSIS — G56.01 CARPAL TUNNEL SYNDROME OF RIGHT WRIST: ICD-10-CM

## 2024-02-28 RX ORDER — GABAPENTIN 300 MG/1
CAPSULE ORAL
Qty: 450 CAPSULE | Refills: 0 | OUTPATIENT
Start: 2024-02-28 | End: 2024-08-26

## 2024-02-28 RX ORDER — GABAPENTIN 300 MG/1
CAPSULE ORAL
Qty: 450 CAPSULE | Refills: 1 | OUTPATIENT
Start: 2024-02-28 | End: 2024-08-26

## 2024-02-29 DIAGNOSIS — G56.01 CARPAL TUNNEL SYNDROME OF RIGHT WRIST: ICD-10-CM

## 2024-02-29 RX ORDER — GABAPENTIN 300 MG/1
CAPSULE ORAL
Qty: 450 CAPSULE | Refills: 0 | OUTPATIENT
Start: 2024-02-29 | End: 2024-08-27

## 2024-03-02 DIAGNOSIS — G56.01 CARPAL TUNNEL SYNDROME OF RIGHT WRIST: ICD-10-CM

## 2024-03-04 ENCOUNTER — TELEPHONE (OUTPATIENT)
Age: 34
End: 2024-03-04

## 2024-03-04 DIAGNOSIS — G56.01 CARPAL TUNNEL SYNDROME OF RIGHT WRIST: ICD-10-CM

## 2024-03-04 RX ORDER — GABAPENTIN 300 MG/1
CAPSULE ORAL
Qty: 450 CAPSULE | Refills: 1 | OUTPATIENT
Start: 2024-03-04 | End: 2024-08-31

## 2024-03-04 NOTE — TELEPHONE ENCOUNTER
Teja Booth, [facility]   to [facility] Clinical Staff   MB      3/4/24  2:06 PM  Good afternoon,    Please see refill request for gabapentin for patient. This has been sent to Dr. Yo under the specialty medication service program, however this was supposed to be sent to his regular clinic. I have forwarded this request previously.    Thank you!    Teja Booth PharmD [facility]  Ambulatory Clinical Pharmacist  Specialty Medication Services  Phone: [phone] option #4  Fax: [phone]

## 2024-03-04 NOTE — TELEPHONE ENCOUNTER
See other encounter-as medication was sent to provider for approval.   Denied as a duplicate request

## 2024-03-04 NOTE — TELEPHONE ENCOUNTER
Patient calling for medication refill of gabapentin. Please send to [facility]. Thank you.

## 2024-03-05 RX ORDER — GABAPENTIN 300 MG/1
CAPSULE ORAL
Qty: 180 CAPSULE | Refills: 4 | Status: SHIPPED | OUTPATIENT
Start: 2024-03-05 | End: 2024-09-01

## 2024-03-12 ENCOUNTER — TELEPHONE (OUTPATIENT)
Age: 34
End: 2024-03-12

## 2024-03-12 NOTE — TELEPHONE ENCOUNTER
LM for pt to call and schedule annual bariatric exam. Letter sent. [facility]

## 2024-03-21 RX ORDER — DULOXETIN HYDROCHLORIDE 30 MG/1
30 CAPSULE, DELAYED RELEASE ORAL DAILY
Qty: 90 CAPSULE | Refills: 0 | Status: SHIPPED | OUTPATIENT
Start: 2024-03-21

## 2024-03-27 ENCOUNTER — TELEMEDICINE (OUTPATIENT)
Age: 34
End: 2024-03-27
Payer: COMMERCIAL

## 2024-03-27 DIAGNOSIS — G43.719 CHRONIC MIGRAINE WITHOUT AURA, INTRACTABLE, WITHOUT STATUS MIGRAINOSUS: Primary | ICD-10-CM

## 2024-03-27 PROCEDURE — 99214 OFFICE O/P EST MOD 30 MIN: CPT | Performed by: NURSE PRACTITIONER

## 2024-03-27 RX ORDER — METHYLPREDNISOLONE 4 MG/1
TABLET ORAL
Qty: 1 KIT | Refills: 1 | Status: SHIPPED | OUTPATIENT
Start: 2024-03-27

## 2024-03-27 NOTE — PROGRESS NOTES
[facility]  [facility]  [address]  [address]  [address]  [facility]  [address]  [phone] (phone)   [phone] (fax)  Tele-health Visit      Date:  24    Name:  OXANA MATAMOROS  :  1990  MRN:  [id number]     PCP:  Amaury Ferrara MD    Oxana Matamoros is a 33 y.o. female who was seen by synchronous (real-time) audio-video technology on 3/27/2024 for Follow-up (migraines)    Assessment & Plan:   1. Chronic migraine without aura, intractable, without status migrainosus  Assessment & Plan:   Patient is seen in follow-up today for worsening, increased frequency of headaches.  She states the last 3 months she has had almost daily constant headache.  She will have a couple days of low grade headaches that then become full blown migraine.     Preventative Medication:   Emgality for preventative  Gabapentin - taking - takes for CTS  Metoprolol - taking - for high blood pressure  Nurtec every other day - denied by insurance  Amitriptyline - ineffective  Nortriptyline - inefffective    -Abortive:   Nurtec - Taking - effective - went through all nurtec - waiting on refill  Maxalt - ineffective - 6 days of Maxalt in the las month  Imitrex - ineffective    Will prescribe Medrol Dosepak with refill x 1 to break the headache cycle.  Patient is a diabetic, but has Dexcom for continuous glucose monitoring.  If she becomes hyperglycemic we can discontinue steroid.  I explained to the patient when she begins to have these low grade headaches that do not bang this may be the beginning of her migraine.  She is to take the Nurtec at the onset of this low-grade headache to prevent the headache from progressing to one of her full-blown migraines.  Will reorder Nurtec as abortive therapy.  Patient has had a change in insurance companies and this may require preauthorization  For the time being we will continue her Maxalt.  If she has taken Nurtec 2 days in a row for

## 2024-03-28 RX ORDER — RIMEGEPANT SULFATE 75 MG/75MG
TABLET, ORALLY DISINTEGRATING ORAL
Qty: 18 TABLET | Refills: 4 | Status: SHIPPED | OUTPATIENT
Start: 2024-03-28

## 2024-03-28 NOTE — ASSESSMENT & PLAN NOTE
Patient is seen in follow-up today for worsening, increased frequency of headaches.  She states the last 3 months she has had almost daily constant headache.  She will have a couple days of low grade headaches that then become full blown migraine.     Preventative Medication:   Emgality for preventative  Gabapentin - taking - takes for CTS  Metoprolol - taking - for high blood pressure  Nurtec every other day - denied by insurance  Amitriptyline - ineffective  Nortriptyline - inefffective    -Abortive:   Nurtec - Taking - effective - went through all nurtec - waiting on refill  Maxalt - ineffective - 6 days of Maxalt in the las month  Imitrex - ineffective    Will prescribe Medrol Dosepak with refill x 1 to break the headache cycle.  Patient is a diabetic, but has Dexcom for continuous glucose monitoring.  If she becomes hyperglycemic we can discontinue steroid.  I explained to the patient when she begins to have these low grade headaches that do not bang this may be the beginning of her migraine.  She is to take the Nurtec at the onset of this low-grade headache to prevent the headache from progressing to one of her full-blown migraines.  Will reorder Nurtec as abortive therapy.  Patient has had a change in insurance companies and this may require preauthorization  For the time being we will continue her Maxalt.  If she has taken Nurtec 2 days in a row for abortive therapy for her migraine and its ineffective she should then take her Maxalt 10 mg and then can repeat dose after 2 hours and up to 3 times in a 24-hour.   Will continue Emgality for preventative for the time being.  Depending clinical response may need to consider switching to alternative preventative  As she is having increasing frequency of headaches, and pending her clinical response to medication interventions, may need to consider ordering neuroimaging at follow-up.

## 2024-03-29 RX ORDER — OMEPRAZOLE 40 MG/1
40 CAPSULE, DELAYED RELEASE ORAL 2 TIMES DAILY
Qty: 180 CAPSULE | Refills: 0 | Status: SHIPPED | OUTPATIENT
Start: 2024-03-29

## 2024-03-29 NOTE — TELEPHONE ENCOUNTER
Patient comment: Can you please refill. I have an appt on April 9th. I only have a couple of days left.     Last appointment: 10/13/23  Next appointment: 4/9/24  Previous refill encounter(s): 12/27/23 #180    Requested Prescriptions     Pending Prescriptions Disp Refills    omeprazole (PRILOSEC) 40 MG delayed release capsule 180 capsule 0     Sig: Take 1 capsule by mouth 2 times daily         For Pharmacy Admin Tracking Only    Program: Medication Refill  CPA in place:    Recommendation Provided To:   Intervention Detail: New Rx: 1, reason: Patient Preference  Intervention Accepted By:   Gap Closed?:    Time Spent (min): 5

## 2024-04-07 DIAGNOSIS — M35.9 SYSTEMIC INVOLVEMENT OF CONNECTIVE TISSUE, UNSPECIFIED (HCC): ICD-10-CM

## 2024-04-07 DIAGNOSIS — F41.1 GENERALIZED ANXIETY DISORDER: ICD-10-CM

## 2024-04-08 ENCOUNTER — CLINICAL DOCUMENTATION (OUTPATIENT)
Dept: PHARMACY | Facility: CLINIC | Age: 34
End: 2024-04-08

## 2024-04-08 NOTE — PROGRESS NOTES
1st Quarterly Reminder sent to patient for the DM Program - See Mychart message or Letter for more information.        [facility] Starkey [facility]  Clinical Pharmacy   Phone: [phone], Option #3       For Pharmacy Admin Tracking Only    Program: [place]  CPA in place:  No  Gap Closed?: Yes   Time Spent (min): 5

## 2024-04-09 ENCOUNTER — OFFICE VISIT (OUTPATIENT)
Age: 34
End: 2024-04-09
Payer: COMMERCIAL

## 2024-04-09 VITALS
DIASTOLIC BLOOD PRESSURE: 94 MMHG | HEART RATE: 85 BPM | WEIGHT: 293 LBS | BODY MASS INDEX: 53.32 KG/M2 | TEMPERATURE: 97.1 F | RESPIRATION RATE: 18 BRPM | OXYGEN SATURATION: 96 % | SYSTOLIC BLOOD PRESSURE: 147 MMHG

## 2024-04-09 DIAGNOSIS — M35.9 SYSTEMIC INVOLVEMENT OF CONNECTIVE TISSUE, UNSPECIFIED (HCC): ICD-10-CM

## 2024-04-09 DIAGNOSIS — E11.40 TYPE 2 DIABETES MELLITUS WITH DIABETIC NEUROPATHY, WITHOUT LONG-TERM CURRENT USE OF INSULIN (HCC): ICD-10-CM

## 2024-04-09 DIAGNOSIS — F99 INSOMNIA DUE TO OTHER MENTAL DISORDER: ICD-10-CM

## 2024-04-09 DIAGNOSIS — F32.1 MODERATE MAJOR DEPRESSION (HCC): ICD-10-CM

## 2024-04-09 DIAGNOSIS — F41.1 GENERALIZED ANXIETY DISORDER: ICD-10-CM

## 2024-04-09 DIAGNOSIS — F39 MOOD DISORDER (HCC): Primary | ICD-10-CM

## 2024-04-09 DIAGNOSIS — F51.05 INSOMNIA DUE TO OTHER MENTAL DISORDER: ICD-10-CM

## 2024-04-09 LAB
ALBUMIN SERPL-MCNC: 3.2 G/DL (ref 3.5–5)
ALBUMIN/GLOB SERPL: 0.9 (ref 1.1–2.2)
ALP SERPL-CCNC: 77 U/L (ref 45–117)
ALT SERPL-CCNC: 14 U/L (ref 12–78)
ANION GAP SERPL CALC-SCNC: 7 MMOL/L (ref 5–15)
APPEARANCE UR: ABNORMAL
AST SERPL-CCNC: 8 U/L (ref 15–37)
BACTERIA URNS QL MICRO: ABNORMAL /HPF
BILIRUB SERPL-MCNC: 0.4 MG/DL (ref 0.2–1)
BILIRUB UR QL: NEGATIVE
BUN SERPL-MCNC: 7 MG/DL (ref 6–20)
BUN/CREAT SERPL: 9 (ref 12–20)
CALCIUM SERPL-MCNC: 9.1 MG/DL (ref 8.5–10.1)
CHLORIDE SERPL-SCNC: 110 MMOL/L (ref 97–108)
CHOLEST SERPL-MCNC: 156 MG/DL
CO2 SERPL-SCNC: 25 MMOL/L (ref 21–32)
COLOR UR: ABNORMAL
CREAT SERPL-MCNC: 0.74 MG/DL (ref 0.55–1.02)
CREAT UR-MCNC: 307 MG/DL
EPITH CASTS URNS QL MICRO: ABNORMAL /LPF
ERYTHROCYTE [DISTWIDTH] IN BLOOD BY AUTOMATED COUNT: 14.5 % (ref 11.5–14.5)
EST. AVERAGE GLUCOSE BLD GHB EST-MCNC: 143 MG/DL
GLOBULIN SER CALC-MCNC: 3.5 G/DL (ref 2–4)
GLUCOSE SERPL-MCNC: 87 MG/DL (ref 65–100)
GLUCOSE UR STRIP.AUTO-MCNC: NEGATIVE MG/DL
HBA1C MFR BLD: 6.6 % (ref 4–5.6)
HCT VFR BLD AUTO: 40.7 % (ref 35–47)
HDLC SERPL-MCNC: 44 MG/DL
HDLC SERPL: 3.5 (ref 0–5)
HGB BLD-MCNC: 12.6 G/DL (ref 11.5–16)
HGB UR QL STRIP: NEGATIVE
HYALINE CASTS URNS QL MICRO: ABNORMAL /LPF (ref 0–5)
KETONES UR QL STRIP.AUTO: NEGATIVE MG/DL
LDLC SERPL CALC-MCNC: 96 MG/DL (ref 0–100)
LEUKOCYTE ESTERASE UR QL STRIP.AUTO: NEGATIVE
MCH RBC QN AUTO: 27.1 PG (ref 26–34)
MCHC RBC AUTO-ENTMCNC: 31 G/DL (ref 30–36.5)
MCV RBC AUTO: 87.5 FL (ref 80–99)
MICROALBUMIN UR-MCNC: 1.87 MG/DL
MICROALBUMIN/CREAT UR-RTO: 6 MG/G (ref 0–30)
NITRITE UR QL STRIP.AUTO: NEGATIVE
NRBC # BLD: 0 K/UL (ref 0–0.01)
NRBC BLD-RTO: 0 PER 100 WBC
PH UR STRIP: 6 (ref 5–8)
PLATELET # BLD AUTO: 364 K/UL (ref 150–400)
PMV BLD AUTO: 10.8 FL (ref 8.9–12.9)
POTASSIUM SERPL-SCNC: 4.2 MMOL/L (ref 3.5–5.1)
PROT SERPL-MCNC: 6.7 G/DL (ref 6.4–8.2)
PROT UR STRIP-MCNC: ABNORMAL MG/DL
RBC # BLD AUTO: 4.65 M/UL (ref 3.8–5.2)
RBC #/AREA URNS HPF: ABNORMAL /HPF (ref 0–5)
SODIUM SERPL-SCNC: 142 MMOL/L (ref 136–145)
SP GR UR REFRACTOMETRY: 1.03 (ref 1–1.03)
SPECIMEN HOLD: NORMAL
TRIGL SERPL-MCNC: 80 MG/DL
TSH SERPL DL<=0.05 MIU/L-ACNC: 1.05 UIU/ML (ref 0.36–3.74)
UROBILINOGEN UR QL STRIP.AUTO: 0.2 EU/DL (ref 0.2–1)
VLDLC SERPL CALC-MCNC: 16 MG/DL
WBC # BLD AUTO: 8.4 K/UL (ref 3.6–11)
WBC URNS QL MICRO: ABNORMAL /HPF (ref 0–4)

## 2024-04-09 PROCEDURE — 99214 OFFICE O/P EST MOD 30 MIN: CPT | Performed by: FAMILY MEDICINE

## 2024-04-09 PROCEDURE — 3044F HG A1C LEVEL LT 7.0%: CPT | Performed by: FAMILY MEDICINE

## 2024-04-09 PROCEDURE — 3077F SYST BP >= 140 MM HG: CPT | Performed by: FAMILY MEDICINE

## 2024-04-09 PROCEDURE — 3079F DIAST BP 80-89 MM HG: CPT | Performed by: FAMILY MEDICINE

## 2024-04-09 RX ORDER — DULOXETIN HYDROCHLORIDE 60 MG/1
60 CAPSULE, DELAYED RELEASE ORAL DAILY
Qty: 90 CAPSULE | Refills: 0 | Status: SHIPPED | OUTPATIENT
Start: 2024-04-09

## 2024-04-09 RX ORDER — DULOXETIN HYDROCHLORIDE 30 MG/1
30 CAPSULE, DELAYED RELEASE ORAL DAILY
Qty: 90 CAPSULE | Refills: 0 | Status: SHIPPED | OUTPATIENT
Start: 2024-04-09

## 2024-04-09 RX ORDER — DULOXETIN HYDROCHLORIDE 60 MG/1
60 CAPSULE, DELAYED RELEASE ORAL DAILY
Qty: 90 CAPSULE | Refills: 1 | OUTPATIENT
Start: 2024-04-09

## 2024-04-09 RX ORDER — BUSPIRONE HYDROCHLORIDE 15 MG/1
15 TABLET ORAL 3 TIMES DAILY
Qty: 90 TABLET | Refills: 0 | Status: SHIPPED | OUTPATIENT
Start: 2024-04-09

## 2024-04-09 RX ORDER — METOPROLOL TARTRATE 50 MG/1
50 TABLET, FILM COATED ORAL 2 TIMES DAILY
Qty: 180 TABLET | Refills: 1 | Status: SHIPPED | OUTPATIENT
Start: 2024-04-09

## 2024-04-09 RX ORDER — ALPRAZOLAM 0.5 MG/1
0.5 TABLET ORAL NIGHTLY PRN
Qty: 20 TABLET | Refills: 0 | Status: SHIPPED | OUTPATIENT
Start: 2024-04-09 | End: 2024-05-09

## 2024-04-09 ASSESSMENT — PATIENT HEALTH QUESTIONNAIRE - PHQ9
SUM OF ALL RESPONSES TO PHQ QUESTIONS 1-9: 2
SUM OF ALL RESPONSES TO PHQ9 QUESTIONS 1 & 2: 2
2. FEELING DOWN, DEPRESSED OR HOPELESS: MORE THAN HALF THE DAYS
5. POOR APPETITE OR OVEREATING: NOT AT ALL
4. FEELING TIRED OR HAVING LITTLE ENERGY: NOT AT ALL
7. TROUBLE CONCENTRATING ON THINGS, SUCH AS READING THE NEWSPAPER OR WATCHING TELEVISION: NOT AT ALL
SUM OF ALL RESPONSES TO PHQ QUESTIONS 1-9: 2
SUM OF ALL RESPONSES TO PHQ QUESTIONS 1-9: 2
6. FEELING BAD ABOUT YOURSELF - OR THAT YOU ARE A FAILURE OR HAVE LET YOURSELF OR YOUR FAMILY DOWN: NOT AT ALL
9. THOUGHTS THAT YOU WOULD BE BETTER OFF DEAD, OR OF HURTING YOURSELF: NOT AT ALL
10. IF YOU CHECKED OFF ANY PROBLEMS, HOW DIFFICULT HAVE THESE PROBLEMS MADE IT FOR YOU TO DO YOUR WORK, TAKE CARE OF THINGS AT HOME, OR GET ALONG WITH OTHER PEOPLE: NOT DIFFICULT AT ALL
SUM OF ALL RESPONSES TO PHQ QUESTIONS 1-9: 2
1. LITTLE INTEREST OR PLEASURE IN DOING THINGS: NOT AT ALL
8. MOVING OR SPEAKING SO SLOWLY THAT OTHER PEOPLE COULD HAVE NOTICED. OR THE OPPOSITE, BEING SO FIGETY OR RESTLESS THAT YOU HAVE BEEN MOVING AROUND A LOT MORE THAN USUAL: NOT AT ALL
3. TROUBLE FALLING OR STAYING ASLEEP: NOT AT ALL

## 2024-04-09 NOTE — PROGRESS NOTES
Chief Complaint   Patient presents with    Follow-up    Hypertension       \"Have you been to the ER, urgent care clinic since your last visit?  Hospitalized since your last visit?\"    NO    “Have you seen or consulted any other health care providers outside of [facility] since your last visit?”    NO        “Have you had a pap smear?”    NO    No cervical cancer screening on file       Vitals:    24 1055 24 1100   BP: (!) 146/85 (!) 147/94   Site: Left Upper Arm Right Upper Arm   Position: Sitting Sitting   Cuff Size: Large Adult Large Adult   Pulse: 85    Resp: 18    Temp: 97.1 °F (36.2 °C)    TempSrc: Infrared    SpO2: 96%    Weight: (!) 136.5 kg (301 lb)         Health Maintenance Due   Topic Date Due    Diabetic retinal exam  06/10/2023    Cervical cancer screen  Never done    Diabetic foot exam  2024        The patient, Alexandrea Panda, identity was verified by name and    
busPIRone (BUSPAR) 15 MG tablet; Take 15 mg by mouth 3 times daily Patient needs an appointment for further refills, Disp-90 tablet, R-0Normal  -     cariprazine hcl (VRAYLAR) 1.5 MG capsule; Take 1 capsule by mouth daily, Disp-30 capsule, R-3Normal  -     CBC; Future  -     Comprehensive Metabolic Panel; Future  -     Lipid Panel; Future  -     TSH; Future  -     Hemoglobin A1C; Future  -     Microalbumin / Creatinine Urine Ratio; Future  -     Urinalysis; Future  -     ALPRAZolam (XANAX) 0.5 MG tablet; Take 1 tablet by mouth nightly as needed for Sleep or Anxiety for up to 30 days. Max Daily Amount: 0.5 mg, Disp-20 tablet, R-0Normal  5. Type 2 diabetes mellitus with diabetic neuropathy, without long-term current use of insulin (HCC)  -     Hemoglobin A1C; Future  -     metoprolol tartrate (LOPRESSOR) 50 MG tablet; Take 1 tablet by mouth 2 times daily, Disp-180 tablet, R-1Normal  -     DULoxetine (CYMBALTA) 60 MG extended release capsule; Take 1 capsule by mouth daily, Disp-90 capsule, R-0Normal  -     DULoxetine (CYMBALTA) 30 MG extended release capsule; Take 1 capsule by mouth daily, Disp-90 capsule, R-0Normal  -     busPIRone (BUSPAR) 15 MG tablet; Take 15 mg by mouth 3 times daily Patient needs an appointment for further refills, Disp-90 tablet, R-0Normal  -     cariprazine hcl (VRAYLAR) 1.5 MG capsule; Take 1 capsule by mouth daily, Disp-30 capsule, R-3Normal  -     CBC; Future  -     Comprehensive Metabolic Panel; Future  -     Lipid Panel; Future  -     TSH; Future  -     Hemoglobin A1C; Future  -     Microalbumin / Creatinine Urine Ratio; Future  -     Urinalysis; Future  -     ALPRAZolam (XANAX) 0.5 MG tablet; Take 1 tablet by mouth nightly as needed for Sleep or Anxiety for up to 30 days. Max Daily Amount: 0.5 mg, Disp-20 tablet, R-0Normal  6. Insomnia due to other mental disorder  -     Hemoglobin A1C; Future  -     metoprolol tartrate (LOPRESSOR) 50 MG tablet; Take 1 tablet by mouth 2 times daily,

## 2024-04-10 ENCOUNTER — CLINICAL DOCUMENTATION (OUTPATIENT)
Age: 34
End: 2024-04-10

## 2024-04-11 ENCOUNTER — TELEPHONE (OUTPATIENT)
Age: 34
End: 2024-04-11

## 2024-04-12 ENCOUNTER — TELEPHONE (OUTPATIENT)
Age: 34
End: 2024-04-12

## 2024-04-15 ENCOUNTER — TELEPHONE (OUTPATIENT)
Age: 34
End: 2024-04-15

## 2024-04-15 NOTE — TELEPHONE ENCOUNTER
Identified patient with two patient identifiers (name and ). Reviewed chart in preparation for encounter and have obtained necessary documentation.     Called and spoke with patient regarding her SWL Insurance Requirements. Patient states she is still interested in SWL. I have informed her she needs nutrition refresher or more, and needs psych eval ASAP. Patient states she had outside psych eval and will have that sent over. Patient stated she will call back to make an appointment with nutrition. Patient understood what was discussed and was thankful.

## 2024-04-25 ENCOUNTER — CLINICAL DOCUMENTATION (OUTPATIENT)
Age: 34
End: 2024-04-25

## 2024-04-30 ENCOUNTER — TELEMEDICINE (OUTPATIENT)
Age: 34
End: 2024-04-30
Payer: COMMERCIAL

## 2024-04-30 DIAGNOSIS — M54.2 CERVICALGIA: ICD-10-CM

## 2024-04-30 DIAGNOSIS — G43.719 CHRONIC MIGRAINE WITHOUT AURA, INTRACTABLE, WITHOUT STATUS MIGRAINOSUS: Primary | ICD-10-CM

## 2024-04-30 PROCEDURE — 99213 OFFICE O/P EST LOW 20 MIN: CPT | Performed by: NURSE PRACTITIONER

## 2024-04-30 NOTE — ASSESSMENT & PLAN NOTE
She is also not noticing as much neck discomfort, but continues to have cervicalgia.  Will refer to physical therapy

## 2024-04-30 NOTE — PROGRESS NOTES
every other day - denied by insurance  Amitriptyline - ineffective  Nortriptyline - inefffective     -Abortive:   Nurtec - Taking - effective  Maxalt - Can be effective - uses if Nurtec not effective  Imitrex - ineffective    VV 04/30/2024: Alexandrea is seen in follow-up today.  Her headaches have improved with decreased frequency of her severe migrainous type headaches.  Although she does continue to have several days a week of low-grade headaches.  She takes Tylenol for those and helps take the edge off.  She feels the Nurtec and if needed Maxalt is effective with regards to aborting her headaches.  She is also not noticing as much neck discomfort, but continues to have cervicalgia.    VV 03/27/2024: Patient is seen in follow-up today for worsening, increased frequency of headaches.  She states the last 3 months she has had almost daily constant headache.  She will have a couple days of low grade headaches that then become full blown migraine.  When she has a full-blown migraine she also has neck pain.  When this started about 3 months ago she was given 1 Medrol Dosepak.  Will symptoms have persisted.  She is used all of her Nurtec for abortive therapy, she is continuing on her Emgality for preventative therapy.  Recommendations: Medrol Dosepak to abort the headache cycle continue Emgality for preventative therapy, Nurtec and Maxalt for abortive therapy  Objective:     Allergies   Allergen Reactions    Latex Itching      Current Outpatient Medications   Medication Instructions    acetaminophen (TYLENOL) 500 MG tablet Oral, EVERY 6 HOURS PRN    ALPRAZolam (XANAX) 0.5 mg, Oral, NIGHTLY PRN    aspirin 81 mg, Oral, DAILY    B-D 3CC LUER-VIOLETA SYR 23GX1\" 23G X 1\" 3 ML MISC No dose, route, or frequency recorded.    calcium carb-cholecalciferol 600-20 MG-MCG TABS 1 tablet, Oral, 2 TIMES DAILY    cariprazine hcl (VRAYLAR) 1.5 mg, Oral, DAILY    Continuous Blood Gluc Sensor (DEXCOM G7 SENSOR) MISC Use to check blood sugar prn.

## 2024-04-30 NOTE — ASSESSMENT & PLAN NOTE
Alexandrea is seen in follow-up today and her headaches have improved with decreased frequency of her severe migrainous type headaches.      Preventative Medication:   Emgality for preventative  Gabapentin - taking - takes for CTS  Metoprolol - taking - for high blood pressure  Nurtec every other day - denied by insurance  Amitriptyline - ineffective  Nortriptyline - ineffective     Abortive:   Nurtec - Taking - effective  Maxalt - Can be effective - uses if Nurtec not effective  Imitrex - ineffective     Continue Emgality for preventative therapy  I explained to the patient when she begins to have these low grade headaches that do not bang this may be the beginning of her migraine.  She is to take the Nurtec at the onset of this low-grade headache to prevent the headache from progressing to one of her full-blown migraines.  Continue her Maxalt.  If she has taken Nurtec 2 days in a row for abortive therapy for her migraine and its ineffective she should then take her Maxalt 10 mg and then can repeat dose after 2 hours and up to 3 times in a 24-hour.   For low-grade headaches, I advise she can continue to take Tylenol for those and helps take the edge off.  I advised that she does not take Tylenol more than 2 days in a 7-day cycle to avoid rebound headache.

## 2024-05-09 ENCOUNTER — TELEPHONE (OUTPATIENT)
Age: 34
End: 2024-05-09

## 2024-05-14 ENCOUNTER — OFFICE VISIT (OUTPATIENT)
Age: 34
End: 2024-05-14
Payer: COMMERCIAL

## 2024-05-14 VITALS
SYSTOLIC BLOOD PRESSURE: 119 MMHG | RESPIRATION RATE: 18 BRPM | BODY MASS INDEX: 51.91 KG/M2 | HEART RATE: 93 BPM | TEMPERATURE: 98.7 F | DIASTOLIC BLOOD PRESSURE: 76 MMHG | HEIGHT: 63 IN | OXYGEN SATURATION: 96 % | WEIGHT: 293 LBS

## 2024-05-14 DIAGNOSIS — F32.1 MODERATE MAJOR DEPRESSION (HCC): ICD-10-CM

## 2024-05-14 DIAGNOSIS — F41.1 GENERALIZED ANXIETY DISORDER: Primary | ICD-10-CM

## 2024-05-14 PROCEDURE — 3078F DIAST BP <80 MM HG: CPT | Performed by: FAMILY MEDICINE

## 2024-05-14 PROCEDURE — 99214 OFFICE O/P EST MOD 30 MIN: CPT | Performed by: FAMILY MEDICINE

## 2024-05-14 PROCEDURE — 3074F SYST BP LT 130 MM HG: CPT | Performed by: FAMILY MEDICINE

## 2024-05-14 ASSESSMENT — PATIENT HEALTH QUESTIONNAIRE - PHQ9
10. IF YOU CHECKED OFF ANY PROBLEMS, HOW DIFFICULT HAVE THESE PROBLEMS MADE IT FOR YOU TO DO YOUR WORK, TAKE CARE OF THINGS AT HOME, OR GET ALONG WITH OTHER PEOPLE: NOT DIFFICULT AT ALL
8. MOVING OR SPEAKING SO SLOWLY THAT OTHER PEOPLE COULD HAVE NOTICED. OR THE OPPOSITE, BEING SO FIGETY OR RESTLESS THAT YOU HAVE BEEN MOVING AROUND A LOT MORE THAN USUAL: NOT AT ALL
SUM OF ALL RESPONSES TO PHQ QUESTIONS 1-9: 5
9. THOUGHTS THAT YOU WOULD BE BETTER OFF DEAD, OR OF HURTING YOURSELF: NOT AT ALL
4. FEELING TIRED OR HAVING LITTLE ENERGY: SEVERAL DAYS
7. TROUBLE CONCENTRATING ON THINGS, SUCH AS READING THE NEWSPAPER OR WATCHING TELEVISION: SEVERAL DAYS
SUM OF ALL RESPONSES TO PHQ QUESTIONS 1-9: 5
SUM OF ALL RESPONSES TO PHQ QUESTIONS 1-9: 5
3. TROUBLE FALLING OR STAYING ASLEEP: SEVERAL DAYS
5. POOR APPETITE OR OVEREATING: NOT AT ALL
SUM OF ALL RESPONSES TO PHQ QUESTIONS 1-9: 5
1. LITTLE INTEREST OR PLEASURE IN DOING THINGS: SEVERAL DAYS
SUM OF ALL RESPONSES TO PHQ9 QUESTIONS 1 & 2: 2
2. FEELING DOWN, DEPRESSED OR HOPELESS: SEVERAL DAYS
6. FEELING BAD ABOUT YOURSELF - OR THAT YOU ARE A FAILURE OR HAVE LET YOURSELF OR YOUR FAMILY DOWN: NOT AT ALL

## 2024-05-14 NOTE — PROGRESS NOTES
No chief complaint on file.      \"Have you been to the ER, urgent care clinic since your last visit?  Hospitalized since your last visit?\"    NO    “Have you seen or consulted any other health care providers outside of [facility] since your last visit?”    NO  Eye exam to be scheduled.      “Have you had a pap smear?”    Appointment Scheduled.    No cervical cancer screening on file          Vitals:    24 0855   BP: 119/76   Pulse: 93   Resp: 18   Temp: 98.7 °F (37.1 °C)   SpO2: 96%      Health Maintenance Due   Topic Date Due    Diabetic retinal exam  06/10/2023    Diabetic foot exam  2024      The patient, Alexandrea Panda, identity was verified by name and .

## 2024-05-14 NOTE — PROGRESS NOTES
Alexandrea Panda (:  1990) is a 33 y.o. female,Established patient, here for evaluation of the following chief complaint(s):  No chief complaint on file.  Form for awv    Depression with the anxiety and panic states of mind    nicley controlled with the current meds, Patient state that it is getting better: pt states and reports of feeling less anxius, less guilty feeling,  less Hoplessness,ns/nh,ni,nh, less trouble with weight gain or loss, less tendency of etoh or illicit drug use, more ability of sleep, more ablitiy  to concentrate at work( he is only able to work 8-12 hrs per week at this time) and at home with the current medications,and all together a safe feeling at home and at work     Constitutional: no chills and fever,  , nad     HENT: no ear pain or nosebleeds. No blurred vision  Respiratory: no shortness of breath, wheezing cough   Cardiovascular: Has no chest pain, ,and racing heart .   Gastrointestinal: No constipation, diarrhea, nausea and vomiting.   Genitourinary: No frequency.   Musculoskeletal: Negative for joint pain.   Skin: no itching, no rash.   Neurological: Negative for dizziness, no tremors  Psychiatric/Behavioral: +++ for depression  ++nervous/anxious.         Constitutional: Well developed, well nourished.  non-toxic in appearance, not diaphoretic.   HEENT: PERRL. EOMI. The left TM is unremarkable. The right TM is unremarkable. No nasal  erythema noted.  THROAT: Posterior pharynx has no erythema, no exudates.    Neck:  no cervical lymphadenopathy. Neck is supple   Cardiovascular: Regular rate and rhythm, no murmurs, rubs, or gallops.   Pulmonary: Clear to auscultation bilaterally. Has no wheezing, rales or rhonchi.,  speaking in full sentences, has no accessory muscle used.  Abdomen: Bowel sounds are normal. Having no distension, no palpable mass. Soft,  No tenderness, rebound or guarding.   Musculoskeletal: No peripheral edema, having normal ROM  Skin:   warm and dry. No

## 2024-05-16 ENCOUNTER — OFFICE VISIT (OUTPATIENT)
Age: 34
End: 2024-05-16

## 2024-05-16 DIAGNOSIS — E66.01 MORBID OBESITY (HCC): Primary | ICD-10-CM

## 2024-05-16 NOTE — PROGRESS NOTES
[facility]  Supervised Weight Loss     Date:   2024    Patient's Name: Alexandrea Panda  : 1990    Insurance:         Session: 7  (refresher visit)  Surgery:  [x]   Gastric Bypass (revision from sleeve)                Surgeon:  Dr. Dwight You     Height: 63 inches       Weight:  304 Lbs (physician office).                                   BMI: 53            Pounds Lost since last month: 0             Pounds Gained since last appt: 4 lbs     Starting Weight: 300 lbs                   Previous Appt Weight: 299 lbs  Overall Pounds Lost: 0   Overall Pounds Gained: 0     Other Pertinent Information: Today's appointment was completed in a virtual setting.         Smoking Status:  None  Alcohol Intake: None    I have reviewed with pt the guidelines of the supervised wt loss program.  Pt understands the expectations of some wt loss during the program and that wt gain could delay the process. I have also explained that appointments need to be consecutive and missing an appointment may result in starting over. Pt has received this information in writing.          Changes that patient has made since last month include:  consistent meals and exercise.      Eating Habits and Behaviors  A nutrition lesson was presented on label reading with specific guidelines provided for limiting added sugars. This information will help increase healthy food choices, promote weight loss and prevent dumping syndrome after gastric bypass. We also reviewed the general nutrition guidelines for bariatric surgery.                        Patient's current diet habits include: Pt is eating 3 meals per day (B: protein shake-[employer]; S: popcorn; L: meat, veggies; D: meat, veggies, starch) . Snack choices include popcorn, quest chips. Pt is eating refined carbohydrate foods (bread, pasta, rice, potatoes) occasionally. Pt is eating sweets/desserts occasionally. Pt is using healthy cooking

## 2024-05-21 ENCOUNTER — TELEMEDICINE (OUTPATIENT)
Age: 34
End: 2024-05-21
Payer: COMMERCIAL

## 2024-05-21 DIAGNOSIS — I10 ESSENTIAL (PRIMARY) HYPERTENSION: ICD-10-CM

## 2024-05-21 DIAGNOSIS — Z79.4 TYPE 2 DIABETES MELLITUS WITHOUT COMPLICATION, WITH LONG-TERM CURRENT USE OF INSULIN (HCC): Primary | ICD-10-CM

## 2024-05-21 DIAGNOSIS — E78.2 MIXED HYPERLIPIDEMIA: ICD-10-CM

## 2024-05-21 DIAGNOSIS — E55.9 VITAMIN D DEFICIENCY, UNSPECIFIED: ICD-10-CM

## 2024-05-21 DIAGNOSIS — E11.9 TYPE 2 DIABETES MELLITUS WITHOUT COMPLICATION, WITH LONG-TERM CURRENT USE OF INSULIN (HCC): Primary | ICD-10-CM

## 2024-05-21 PROCEDURE — 99214 OFFICE O/P EST MOD 30 MIN: CPT | Performed by: INTERNAL MEDICINE

## 2024-05-21 PROCEDURE — 3044F HG A1C LEVEL LT 7.0%: CPT | Performed by: INTERNAL MEDICINE

## 2024-05-21 NOTE — PROGRESS NOTES
Her CGM shows that her BG have been running in an excellent range. Her A1C in April 2024 was 6.6%. Pt to continue the Ozempic 2.0mg weekly. Pt encouraged to stop drinking regular sodas and snacking  on chips and sweets. Pt to work on increasing her physical activity as well.     2) HTN > I will  not make any adjustments to her HTN regimen at this time. She is only taking Metoprolol 50mg BID.      3) HLD > Her lipid panel was at goal in April 2024 She is not taking any lipid lowering medications. She stopped the Atorvastatin because of joint and muscle aches.     4) Obesity > Pt is s/p sleeve gastrectomy. Her weight today was up to to 304 pounds. See #1.     5) Hypovitaminosis D > Pt to continue her Vitamin D 50,000 units weekly.          Pt voices understanding and agreement with the plan.      RTC 6 months        Copy sent to:   Sharon Ferrara and Bhavani Panda, was evaluated through a synchronous (real-time) audio-video encounter. The patient (or guardian if applicable) is aware that this is a billable service, which includes applicable co-pays. This Virtual Visit was conducted with patient's (and/or legal guardian's) consent. Patient identification was verified, and a caregiver was present when appropriate.   The patient was located at Other: Work  Provider was located at Facility (Appt Dept): [address]  [place] [address]      --Brandon Randolph MD on 5/21/2024 at 11:57 AM    An electronic signature was used to authenticate this note.        I spent 40 minutes on this case and > 50% of the time was spent in counseling on the above issues.   There are no Patient Instructions on file for this visit.  No follow-up provider specified.

## 2024-05-22 ENCOUNTER — CLINICAL DOCUMENTATION (OUTPATIENT)
Age: 34
End: 2024-05-22

## 2024-06-03 ENCOUNTER — OFFICE VISIT (OUTPATIENT)
Age: 34
End: 2024-06-03
Payer: COMMERCIAL

## 2024-06-03 VITALS
HEART RATE: 90 BPM | HEIGHT: 63 IN | DIASTOLIC BLOOD PRESSURE: 76 MMHG | OXYGEN SATURATION: 98 % | RESPIRATION RATE: 18 BRPM | SYSTOLIC BLOOD PRESSURE: 124 MMHG | WEIGHT: 293 LBS | BODY MASS INDEX: 51.91 KG/M2

## 2024-06-03 DIAGNOSIS — F41.8 DEPRESSION WITH ANXIETY: ICD-10-CM

## 2024-06-03 DIAGNOSIS — G47.33 OBSTRUCTIVE SLEEP APNEA (ADULT) (PEDIATRIC): ICD-10-CM

## 2024-06-03 DIAGNOSIS — G43.719 CHRONIC MIGRAINE WITHOUT AURA, INTRACTABLE, WITHOUT STATUS MIGRAINOSUS: Primary | ICD-10-CM

## 2024-06-03 DIAGNOSIS — G56.01 CARPAL TUNNEL SYNDROME OF RIGHT WRIST: ICD-10-CM

## 2024-06-03 PROCEDURE — 3074F SYST BP LT 130 MM HG: CPT | Performed by: NURSE PRACTITIONER

## 2024-06-03 PROCEDURE — 99213 OFFICE O/P EST LOW 20 MIN: CPT | Performed by: NURSE PRACTITIONER

## 2024-06-03 PROCEDURE — 3078F DIAST BP <80 MM HG: CPT | Performed by: NURSE PRACTITIONER

## 2024-06-03 RX ORDER — GABAPENTIN 300 MG/1
CAPSULE ORAL
Qty: 450 CAPSULE | Refills: 1 | Status: SHIPPED | OUTPATIENT
Start: 2024-06-03 | End: 2025-06-03

## 2024-06-03 ASSESSMENT — PATIENT HEALTH QUESTIONNAIRE - PHQ9
2. FEELING DOWN, DEPRESSED OR HOPELESS: NOT AT ALL
1. LITTLE INTEREST OR PLEASURE IN DOING THINGS: NOT AT ALL
SUM OF ALL RESPONSES TO PHQ QUESTIONS 1-9: 0
SUM OF ALL RESPONSES TO PHQ QUESTIONS 1-9: 0
SUM OF ALL RESPONSES TO PHQ9 QUESTIONS 1 & 2: 0
SUM OF ALL RESPONSES TO PHQ QUESTIONS 1-9: 0
SUM OF ALL RESPONSES TO PHQ QUESTIONS 1-9: 0

## 2024-06-03 ASSESSMENT — ENCOUNTER SYMPTOMS
VOMITING: 1
NAUSEA: 1
PHOTOPHOBIA: 1

## 2024-06-03 NOTE — PROGRESS NOTES
Chief Complaint   Patient presents with    Migraine     5 week follow up - they are horrible - getting almost daily - nurtec and maxalt helps however the migraines come right back - currently has pain on left side of head      1. Have you been to the ER, urgent care clinic since your last visit?  Hospitalized since your last visit? No     2. Have you seen or consulted any other health care providers outside of the [facility] since your last visit?  Include any pap smears or colon screening.  No     
recent):      PHYSICAL EXAMINATION:    Vitals:    06/03/24 1424   BP: 124/76   Site: Left Upper Arm   Position: Sitting   Cuff Size: Large Adult   Pulse: 90   Resp: 18   SpO2: 98%   Weight: (!) 144.5 kg (318 lb 9.6 oz)   Height: 1.6 m (5' 3\")       General:  Well defined, nourished, and well groomed individual in no acute distress.    Musculoskeletal:  Extremities revealed no edema and had full range of motion of joints.    Psych:  Good mood and bright affect.    NEUROLOGICAL EXAMINATION:     Mental Status:   Alert and oriented to person, place, and time with recent and remote memory intact.  Attention span and concentration are normal. Clear speech. Fund of knowledge preserved.     Cranial Nerves:   PERRLA.  Visual fields were full  EOM: no evidence of nystagmus  Facial sensation:  normal and symmetric  Facial motor: normal and symmetric, no facial droop noted.  Hearing intact  SCM strength intact  Tongue: midline without fasciculations    Motor Examination: Normal tone. 5/5 muscle strength in bilateral upper extremities.  No muscle wasting, no twitching or fasciculation noted.      Sensory exam:  Normal throughout to light touch  in BUE.    Coordination:   Finger to nose and rapid arm movement testing was normal.  No resting or intention tremor.  Negative Romberg, negative pronator drift.      Gait and Station:  Steady while walking on toes, heels, and with tandem walking.  Normal arm swing.      Reflexes:  DTRs 2+ in bilateral biceps, brachioradialis, patella.    ASSESSMENT AND PLAN     Diagnosis Orders   1. Chronic migraine without aura, intractable, without status migrainosus  Onabotulinumtoxin A (BOTOX) 200 units injection      2. Obstructive sleep apnea (adult) (pediatric)        3. Carpal tunnel syndrome of right wrist  gabapentin (NEURONTIN) 300 MG capsule      4. Depression with anxiety          1.  Chronic migraine without aura, intractable without status: Patient at this time would like to pursue Botox

## 2024-06-07 ENCOUNTER — TELEPHONE (OUTPATIENT)
Age: 34
End: 2024-06-07

## 2024-06-17 ENCOUNTER — TELEPHONE (OUTPATIENT)
Facility: HOSPITAL | Age: 34
End: 2024-06-17

## 2024-06-17 DIAGNOSIS — G43.719 CHRONIC MIGRAINE WITHOUT AURA, INTRACTABLE, WITHOUT STATUS MIGRAINOSUS: Primary | ICD-10-CM

## 2024-06-17 NOTE — TELEPHONE ENCOUNTER
Specialty Medication Service    Date: 2024  Patient's Name: Alexandrea Panda YOB: 1990            _____________________________________________________________________________________________    Provided office Cover My Meds Key: [id number] for patient medication Nurtec. PA is due to  24. Will follow-up accordingly and update pharmacy/patient once approved/denied.      Teja Booth, PharmD [facility]  Ambulatory Clinical Pharmacist  Specialty Medication Services  Phone: [phone] option #4  Fax: [phone]    For Pharmacy Admin Tracking Only    Program: SMS  CPA in place:  Yes  Recommendation Provided To: Provider: 1 via Note to Provider  Intervention Detail: Benefit Assistance  Intervention Accepted By: Provider: 0    Time Spent (min): 20

## 2024-06-17 NOTE — TELEPHONE ENCOUNTER
Specialty Medication Service    Date: 6/17/2024  Patient's Name: Alexandrea Panda YOB: 1990            _____________________________________________________________________________________________    Dr. Yo,    Patient has been prescribed a medication currently on the [facility] Specialty Medication Service (SMS) formulary. Patient currently has [facility] pharmacy benefits and is required to enroll in the SMS program. A referral from your office is required to remain compliant with the SMS program. A referral has been pended for your convenience. If you have any questions, please feel free to contact our department at [phone] option 4.    Thank you for your collaboration,    Teja Booth, PharmD [facility]  Ambulatory Clinical Pharmacist  Specialty Medication Services  Phone: [phone] option #4  Fax: [phone]    For Pharmacy Admin Tracking Only    Program: SMS  CPA in place:  Yes  Recommendation Provided To: Provider: 1 via Note to Provider    Intervention Accepted By: Provider: 1    Time Spent (min): 10

## 2024-06-26 ENCOUNTER — CLINICAL DOCUMENTATION (OUTPATIENT)
Dept: PHARMACY | Facility: CLINIC | Age: 34
End: 2024-06-26

## 2024-06-28 ENCOUNTER — TELEPHONE (OUTPATIENT)
Facility: HOSPITAL | Age: 34
End: 2024-06-28

## 2024-06-28 NOTE — TELEPHONE ENCOUNTER
Specialty Medication Service    Date: 6/28/2024  Patient's Name: Alexandrea Panda YOB: 1990            _____________________________________________________________________________________________    Left 3 messages to schedule PharmD follow up appointment for Specialty Medication Services. Please call: [phone] option 4. Will continue to outreach as appropriate.     Rajwinder Martin CPhT  Clinical   Specialty Medication Services  Phone: [phone] option #4  Fax: [phone]    For Pharmacy Admin Tracking Only    Program: SMS  CPA in place:  Yes  Recommendation Provided To: Patient/Caregiver: 1 via Telephone  Intervention Detail: Scheduled Appointment  Intervention Accepted By: Patient/Caregiver: 0   Time Spent (min): 15

## 2024-07-03 ENCOUNTER — OFFICE VISIT (OUTPATIENT)
Age: 34
End: 2024-07-03
Payer: COMMERCIAL

## 2024-07-03 DIAGNOSIS — Z92.29 S/P BOTOX INJECTION: ICD-10-CM

## 2024-07-03 DIAGNOSIS — G43.719 CHRONIC MIGRAINE WITHOUT AURA, INTRACTABLE, WITHOUT STATUS MIGRAINOSUS: Primary | ICD-10-CM

## 2024-07-03 PROCEDURE — 64615 CHEMODENERV MUSC MIGRAINE: CPT | Performed by: NURSE PRACTITIONER

## 2024-07-03 NOTE — PROGRESS NOTES
Botox Injection Note       ICD-10-CM    1. Chronic migraine without aura, intractable, without status migrainosus  G43.719 Onabotulinumtoxin A (BOTOX) injection 200 Units      2. S/P Botox injection  Z92.29            Indication: patient has chronic recurrent migraine.      Procedure:   Botox concentration: 200 units in 4 ml of preservative-free normal saline.       31 sites injections, distribution as follow      Units/site  Sites Sides Subtotal    Procerus 5 1 1 5    5 1 2 10   Frontalis 5 2 2 20   Temporalis 5 4 2 40   Occipitalis 5 3 2 30   Upper cervical paraspinalis 5 2 2 20   Trapezius 5 3 2 30         200 units Botox were reconstituted, 155 units injected as above and the remainder was unavoidably wasted.     Patient tolerated procedure well.     This is the patient's first treatment.     Location: left side of her head, neck  Characteristics: Squeezing pain, not as intense right now.  Frequency: daily. Its just constant, 15+ headaches a month  Triggers: rainy weather, high stress, being tired  Relief: Nurtec, Maxalt, emgality, Tylenol 2 x week.  Aura:  some neck pain.  N/: some nausea and vomiting.   Light and sound sensitive:   Works in Occupational health.  Drinks 1 soda in the morning. Otherwise drinks water.  She has a hard time sleeping, hard time turning her brain off.  Exercises at [employer].        Preventative Medication:   Emgality for preventative care  Gabapentin - taking - takes for CTS  Metoprolol - taking - for high blood pressure  Nurtec every other day - denied by insurance  Amitriptyline - ineffective  Nortriptyline - inefffective     -Abortive:   Nurtec - Taking - effective  Maxalt - Can be effective - uses if Nurtec not effective  Imitrex - ineffective    _____________________________   zeeshan Resendiz APRN - NP

## 2024-07-06 DIAGNOSIS — F39 MOOD DISORDER (HCC): ICD-10-CM

## 2024-07-06 DIAGNOSIS — F51.05 INSOMNIA DUE TO OTHER MENTAL DISORDER: ICD-10-CM

## 2024-07-06 DIAGNOSIS — E11.40 TYPE 2 DIABETES MELLITUS WITH DIABETIC NEUROPATHY, WITHOUT LONG-TERM CURRENT USE OF INSULIN (HCC): ICD-10-CM

## 2024-07-06 DIAGNOSIS — F99 INSOMNIA DUE TO OTHER MENTAL DISORDER: ICD-10-CM

## 2024-07-06 DIAGNOSIS — F41.1 GENERALIZED ANXIETY DISORDER: ICD-10-CM

## 2024-07-06 DIAGNOSIS — F32.1 MODERATE MAJOR DEPRESSION (HCC): ICD-10-CM

## 2024-07-06 DIAGNOSIS — M35.9 SYSTEMIC INVOLVEMENT OF CONNECTIVE TISSUE, UNSPECIFIED (HCC): ICD-10-CM

## 2024-07-08 ENCOUNTER — TELEPHONE (OUTPATIENT)
Age: 34
End: 2024-07-08

## 2024-07-08 RX ORDER — DULOXETIN HYDROCHLORIDE 30 MG/1
30 CAPSULE, DELAYED RELEASE ORAL DAILY
Qty: 90 CAPSULE | Refills: 0 | Status: SHIPPED | OUTPATIENT
Start: 2024-07-08

## 2024-07-08 NOTE — TELEPHONE ENCOUNTER
RE:Nurtec 75 mg tablet prior authorization request sent to [facility] via m    Key: [id number]     PA Case ID: [id number]    Status pending    Nurtec approved     The request has been approved. The authorization is effective from 07/18/2024 to 07/17/2025    Approval letter scanned to media     Sangeetha Booth notified

## 2024-07-09 DIAGNOSIS — E11.40 TYPE 2 DIABETES MELLITUS WITH DIABETIC NEUROPATHY, WITHOUT LONG-TERM CURRENT USE OF INSULIN (HCC): ICD-10-CM

## 2024-07-09 DIAGNOSIS — F51.05 INSOMNIA DUE TO OTHER MENTAL DISORDER: ICD-10-CM

## 2024-07-09 DIAGNOSIS — F41.1 GENERALIZED ANXIETY DISORDER: ICD-10-CM

## 2024-07-09 DIAGNOSIS — F99 INSOMNIA DUE TO OTHER MENTAL DISORDER: ICD-10-CM

## 2024-07-09 DIAGNOSIS — F39 MOOD DISORDER (HCC): ICD-10-CM

## 2024-07-09 DIAGNOSIS — F32.1 MODERATE MAJOR DEPRESSION (HCC): ICD-10-CM

## 2024-07-09 DIAGNOSIS — M35.9 SYSTEMIC INVOLVEMENT OF CONNECTIVE TISSUE, UNSPECIFIED (HCC): ICD-10-CM

## 2024-07-10 RX ORDER — OMEPRAZOLE 40 MG/1
40 CAPSULE, DELAYED RELEASE ORAL 2 TIMES DAILY
Qty: 180 CAPSULE | Refills: 0 | Status: SHIPPED | OUTPATIENT
Start: 2024-07-10

## 2024-07-10 RX ORDER — DULOXETIN HYDROCHLORIDE 60 MG/1
60 CAPSULE, DELAYED RELEASE ORAL DAILY
Qty: 90 CAPSULE | Refills: 0 | Status: SHIPPED | OUTPATIENT
Start: 2024-07-10

## 2024-07-29 ENCOUNTER — TELEPHONE (OUTPATIENT)
Facility: HOSPITAL | Age: 34
End: 2024-07-29

## 2024-07-29 NOTE — TELEPHONE ENCOUNTER
Specialty Medication Service    Date: 7/29/2024  Patient's Name: Alexandrea Panda YOB: 1990            _____________________________________________________________________________________________    Left 3 messages to schedule PharmD follow up appointment for Specialty Medication Services. Please call: [phone] option 4. Will continue to outreach as appropriate.     Rajwinder Martin CPhT  Clinical   Specialty Medication Services  Phone: [phone] option #4  Fax: [phone]    For Pharmacy Admin Tracking Only    Program: SMS  CPA in place:  Yes  Recommendation Provided To: Patient/Caregiver: 1 via Telephone  Intervention Detail: Scheduled Appointment  Intervention Accepted By: Patient/Caregiver: 0   Time Spent (min): 15

## 2024-08-07 ENCOUNTER — TELEPHONE (OUTPATIENT)
Facility: HOSPITAL | Age: 34
End: 2024-08-07

## 2024-08-07 NOTE — PROGRESS NOTES
Specialty Medication Service    Patient's Name: Alexandrea Panda YOB: 1990      Reason for visit: Alexandrea Panda is a 34 y.o. female presenting today for Specialty Medication Service visit follow up. Patient last seen by [facility] 2024. Patient continues on [facility] formulary medication, Emgality. Pharmacy completed Specialty Medication Service visit for medication monitoring and counseling. Medication list updated.    Specialty Medication: Emgality 120 mg/ml autoinjector    Frequency: once monthly   Indication: Chronic migraines   Initially Diagnosed: several years ago   Additional Therapy:     Maxalt 10mg as needed  Nurtec as needed (rescue)  Gabapentin - taking for CTS  Metoprolol - taking for HTN   Previous Therapy:     Nurtec-every other day (worked well, insurance won't pay since this is preventative dosing).     Nortriptyline - ineffective    Amitriptyline - ineffective        Specialist:   SILVIA Zendejas [facility]   [address] [place] [address]   Phone: [phone]  Specialist Progress Note Available: Yes, Epic  Last Specialist Visit: 2024: Botox injection.       Allergies   Allergen Reactions    Latex Itching       Past Medical History:   Diagnosis Date    Anxiety with depression     Arrhythmia     tachycardia    Arthritis     Asthma     [place] hump 2015    Carpal tunnel syndrome of right wrist 2016    Diabetes (HCC)     VICTOR M (generalized anxiety disorder) 2018    GERD (gastroesophageal reflux disease) 2014    HTN (hypertension) 2011    Hyperaldosteronism (HCC) 10/19/2015    Hyperhidrosis 2018    Morbid obesity (HCC)     Other ill-defined conditions(449.24)     migraines    Palpitation 2018    Sleep apnea     uses CPAP    Tachycardia 2018      Social History     Tobacco Use    Smoking status: Former     Current packs/day: 0.00     Types: Cigarettes     Quit date: 2019     Years since quittin.7    Smokeless tobacco:

## 2024-08-07 NOTE — TELEPHONE ENCOUNTER
Specialty Medication Service    Date: 8/7/2024  Patient's Name: Alexandrea Panda YOB: 1990            _____________________________________________________________________________________________    Spoke with patient to schedule PharmD follow up appointment for Specialty Medication Services. Patient scheduled 08/13/24 @[facility]      Rajwinder Martin CPhT  Clinical   Specialty Medication Services  Phone: [phone] option #4  Fax: [phone]      For Pharmacy Admin Tracking Only    Program: [facility]  CPA in place:  Yes  Recommendation Provided To: Patient/Caregiver: 1 via Telephone  Intervention Detail: Scheduled Appointment  Intervention Accepted By: Patient/Caregiver: 1   Time Spent (min): 15

## 2024-08-09 ENCOUNTER — TELEMEDICINE (OUTPATIENT)
Age: 34
End: 2024-08-09
Payer: COMMERCIAL

## 2024-08-09 DIAGNOSIS — F43.10 POSTTRAUMATIC STRESS DISORDER: ICD-10-CM

## 2024-08-09 DIAGNOSIS — F32.1 MAJOR DEPRESSIVE DISORDER, SINGLE EPISODE, MODERATE (HCC): Primary | ICD-10-CM

## 2024-08-09 DIAGNOSIS — F41.1 GENERALIZED ANXIETY DISORDER: ICD-10-CM

## 2024-08-09 PROCEDURE — 99214 OFFICE O/P EST MOD 30 MIN: CPT | Performed by: FAMILY MEDICINE

## 2024-08-09 RX ORDER — BUSPIRONE HYDROCHLORIDE 15 MG/1
15 TABLET ORAL 3 TIMES DAILY
Qty: 90 TABLET | Refills: 1 | Status: SHIPPED | OUTPATIENT
Start: 2024-08-09

## 2024-08-09 NOTE — PROGRESS NOTES
Chief Complaint   Patient presents with    Depression     Follow up        \"Have you been to the ER, urgent care clinic since your last visit?  Hospitalized since your last visit?\"    NO    “Have you seen or consulted any other health care providers outside of [facility] since your last visit?”    NO      The patient, Alexandrea Panda, identity was verified by name and .   
pertinent history, physical exam, labs, studies, and medications.  1. Major depressive disorder, single episode, moderate (HCC)  The following orders have not been finalized:  -     busPIRone (BUSPAR) 15 MG tablet  2. Generalized anxiety disorder  The following orders have not been finalized:  -     busPIRone (BUSPAR) 15 MG tablet  3. Posttraumatic stress disorder  The following orders have not been finalized:  -     busPIRone (BUSPAR) 15 MG tablet    Depression with anxiety in a stable condition, not suicidal, start counseling and pharmacotx for now will reevaluate in 4 w for progression   in addition Counseling , social support, spiritual belonging, inc in physical activity,   compliance advised, should not mix any of current medication with any other illicit drugs, should not drink any alcoholic beverages while on antidepressant medication  patient acknowledged understanding and agreed with today's recommendation and was told to call for any concern    Return in about 3 months (around 11/9/2024), or if symptoms worsen or fail to improve.          Objective   Patient-Reported Vitals  No data recorded     Physical Exam  [INSTRUCTIONS:  \"[x]\" Indicates a positive item  \"[]\" Indicates a negative item  -- DELETE ALL ITEMS NOT EXAMINED]    Constitutional: [x] Appears well-developed and well-nourished [x] No apparent distress      [] Abnormal -     Mental status: [x] Alert and awake  [x] Oriented to person/place/time [x] Able to follow commands    [] Abnormal -     Eyes:   EOM    [x]  Normal    [] Abnormal -   Sclera  [x]  Normal    [] Abnormal -          Discharge [x]  None visible   [] Abnormal -     HENT: [x] Normocephalic, atraumatic  [] Abnormal -   [x] Mouth/Throat: Mucous membranes are moist    External Ears [x] Normal  [] Abnormal -    Neck: [x] No visualized mass [] Abnormal -     Pulmonary/Chest: [x] Respiratory effort normal   [x] No visualized signs of difficulty breathing or respiratory distress        []

## 2024-08-12 DIAGNOSIS — M35.9 SYSTEMIC INVOLVEMENT OF CONNECTIVE TISSUE, UNSPECIFIED (HCC): ICD-10-CM

## 2024-08-12 DIAGNOSIS — F41.1 GENERALIZED ANXIETY DISORDER: ICD-10-CM

## 2024-08-12 RX ORDER — ONDANSETRON 4 MG/1
TABLET, ORALLY DISINTEGRATING ORAL
COMMUNITY
Start: 2024-07-02

## 2024-08-13 ENCOUNTER — PHARMACY VISIT (OUTPATIENT)
Facility: HOSPITAL | Age: 34
End: 2024-08-13

## 2024-08-13 ENCOUNTER — TELEPHONE (OUTPATIENT)
Age: 34
End: 2024-08-13

## 2024-08-13 DIAGNOSIS — G43.719 CHRONIC MIGRAINE WITHOUT AURA, INTRACTABLE, WITHOUT STATUS MIGRAINOSUS: Primary | ICD-10-CM

## 2024-08-13 ASSESSMENT — PATIENT HEALTH QUESTIONNAIRE - PHQ9
SUM OF ALL RESPONSES TO PHQ QUESTIONS 1-9: 0
2. FEELING DOWN, DEPRESSED OR HOPELESS: NOT AT ALL
1. LITTLE INTEREST OR PLEASURE IN DOING THINGS: NOT AT ALL
SUM OF ALL RESPONSES TO PHQ QUESTIONS 1-9: 0
SUM OF ALL RESPONSES TO PHQ QUESTIONS 1-9: 0
SUM OF ALL RESPONSES TO PHQ9 QUESTIONS 1 & 2: 0
SUM OF ALL RESPONSES TO PHQ QUESTIONS 1-9: 0

## 2024-08-13 ASSESSMENT — PROMIS GLOBAL HEALTH SCALE
SUM OF RESPONSES TO QUESTIONS 3, 6, 7, & 8: 19
IN THE PAST 7 DAYS, HOW OFTEN HAVE YOU BEEN BOTHERED BY EMOTIONAL PROBLEMS, SUCH AS FEELING ANXIOUS, DEPRESSED, OR IRRITABLE [ON A SCALE FROM 1 (NEVER) TO 5 (ALWAYS)]?: NEVER
TO WHAT EXTENT ARE YOU ABLE TO CARRY OUT YOUR EVERYDAY PHYSICAL ACTIVITIES SUCH AS WALKING, CLIMBING STAIRS, CARRYING GROCERIES, OR MOVING A CHAIR [ON A SCALE OF 1 (NOT AT ALL) TO 5 (COMPLETELY)]?: COMPLETELY
SUM OF RESPONSES TO QUESTIONS 2, 4, 5, & 10: 13
IN GENERAL, WOULD YOU SAY YOUR HEALTH IS...[ON A SCALE OF 1 (POOR) TO 5 (EXCELLENT)]: FAIR
IN GENERAL, HOW WOULD YOU RATE YOUR PHYSICAL HEALTH [ON A SCALE OF 1 (POOR) TO 5 (EXCELLENT)]?: GOOD
IN GENERAL, HOW WOULD YOU RATE YOUR SATISFACTION WITH YOUR SOCIAL ACTIVITIES AND RELATIONSHIPS [ON A SCALE OF 1 (POOR) TO 5 (EXCELLENT)]?: GOOD
IN GENERAL, WOULD YOU SAY YOUR QUALITY OF LIFE IS...[ON A SCALE OF 1 (POOR) TO 5 (EXCELLENT)]: GOOD
IN THE PAST 7 DAYS, HOW WOULD YOU RATE YOUR FATIGUE ON AVERAGE [ON A SCALE FROM 1 (NONE) TO 5 (VERY SEVERE)]?: MILD
IN GENERAL, PLEASE RATE HOW WELL YOU CARRY OUT YOUR USUAL SOCIAL ACTIVITIES (INCLUDES ACTIVITIES AT HOME, AT WORK, AND IN YOUR COMMUNITY, AND RESPONSIBILITIES AS A PARENT, CHILD, SPOUSE, EMPLOYEE, FRIEND, ETC) [ON A SCALE OF 1 (POOR) TO 5 (EXCELLENT)]?: GOOD
IN GENERAL, HOW WOULD YOU RATE YOUR MENTAL HEALTH, INCLUDING YOUR MOOD AND YOUR ABILITY TO THINK [ON A SCALE OF 1 (POOR) TO 5 (EXCELLENT)]?: FAIR
IN THE PAST 7 DAYS, HOW WOULD YOU RATE YOUR PAIN ON AVERAGE [ON A SCALE FROM 0 (NO PAIN) TO 10 (WORST IMAGINABLE PAIN)]?: 7

## 2024-08-13 NOTE — TELEPHONE ENCOUNTER
Specialty Medication Service    Date: 8/13/2024  Patient's Name: Alexandrea aPnda YOB: 1990            _____________________________________________________________________________________________    MARIVEL Jarrett,     Patient is requesting order for Botox be sent to pharmacy to see if it will be cheaper in long run. States received bill for $1300 from first Botox appointment. She now has a copay card and understands she can use this to pay for medical claims, however prefers to obtain medication from pharmacy to be sent to office. She is hoping to utilize copay card upfront to cover medication cost and then will submit claim for administration to  separately for future orders.     I have pended an order for Botox to go to [facility] for your convenience. Please approve if appropriate.     Thank you,    Teja Booth, PharmD [facility]  Ambulatory Clinical Pharmacist  Specialty Medication Services  Phone: [phone] option #4  Fax: [phone]    For Pharmacy Admin Tracking Only    Program: [place]  CPA in place:  Yes  Recommendation Provided To: Provider: 1 via Note to Provider  Intervention Detail: New Rx: 1, reason: Cost/Formulary Change  Intervention Accepted By: Provider: 1  Time Spent (min): 20

## 2024-08-13 NOTE — TELEPHONE ENCOUNTER
Last appointment: 8/9/24  Next appointment: Advised to follow-up 11/9/24  Previous refill encounter(s): 6/23/23 & 6/26/23    Requested Prescriptions     Pending Prescriptions Disp Refills    cyanocobalamin 1000 MCG/ML injection [Pharmacy Med Name: CYANOCOBALAMIN 1000MCG/ML SOLN] 3 mL 2     Sig: INJECT 1ML INTO THE MUSCLE EVERY MONTH    SYRINGE-NEEDLE, DISP, 3 ML (B-D 3CC LUER-VIOLETA SYR 23GX1\") 23G X 1\" 3 ML MISC [Pharmacy Med Name: 3ML BD LUER-VIOLETA 23G X 1\" SYRINGE] 3 each 2     Sig: INJECT 1ML INTRAMUSCULARLY MONTHLY         For Pharmacy Admin Tracking Only    Program: Medication Refill  CPA in place:    Recommendation Provided To:   Intervention Detail: New Rx: 2, reason: Patient Preference  Intervention Accepted By:   Gap Closed?:    Time Spent (min): 5

## 2024-08-15 ENCOUNTER — TELEPHONE (OUTPATIENT)
Facility: HOSPITAL | Age: 34
End: 2024-08-15

## 2024-08-15 RX ORDER — SYRINGE WITH NEEDLE, 1 ML 25GX5/8"
SYRINGE, EMPTY DISPOSABLE MISCELLANEOUS
Qty: 3 EACH | Refills: 2 | Status: SHIPPED | OUTPATIENT
Start: 2024-08-15

## 2024-08-15 RX ORDER — CYANOCOBALAMIN 1000 UG/ML
INJECTION, SOLUTION INTRAMUSCULAR; SUBCUTANEOUS
Qty: 3 ML | Refills: 2 | Status: SHIPPED | OUTPATIENT
Start: 2024-08-15

## 2024-08-15 NOTE — TELEPHONE ENCOUNTER
Specialty Medication Service    Date: 8/15/2024  Patient's Name: Alexandrea Pnada YOB: 1990            _____________________________________________________________________________________________    Provided office Cover My Meds Key: [id number] for patient medication Botox. PA is for a new start. Will follow-up accordingly and update pharmacy/patient once approved/denied.      Teja Booth, PharmD [facility]  Ambulatory Clinical Pharmacist  Specialty Medication Services  Phone: [phone] option #4  Fax: [phone]    For Pharmacy Admin Tracking Only    Program: SMS  CPA in place:  Yes  Recommendation Provided To: Provider: 1 via Note to Provider  Intervention Detail: Benefit Assistance  Intervention Accepted By: Provider: 0    Time Spent (min): 30

## 2024-08-19 ENCOUNTER — TELEPHONE (OUTPATIENT)
Facility: HOSPITAL | Age: 34
End: 2024-08-19

## 2024-08-19 NOTE — TELEPHONE ENCOUNTER
Specialty Medication Service    Date: 8/19/2024  Patient's Name: Alexandrea Panda YOB: 1990            _____________________________________________________________________________________________    Following last SMS visit with patient, I contacted provider to recommend Qulipta. Patient states injections are too painful and no longer working well. Provider has agreed to switch medications and will now need a PA.     Provided office Cover My Meds Key: [id number] for patient medication Qulipta. PA is new start. Will be stopping Emgality. Will follow-up accordingly and update pharmacy/patient once approved/denied.      Update: PA approved thru 02/16/2025    Teja Booth, PharmD [facility]  Ambulatory Clinical Pharmacist  Specialty Medication Services  Phone: [phone] option #4  Fax: [phone]    For Pharmacy Admin Tracking Only    Program: SMS  CPA in place:  Yes  Recommendation Provided To: Provider: 3 via Note to Provider and Fax sent to office  Intervention Detail: Benefit Assistance, Discontinued Rx: 1, reason: JUAN MIGUEL, Ineffective Therapy, and New Rx: 1, reason: Needs Additional Therapy  Intervention Accepted By: Provider: 3    Time Spent (min): 45

## 2024-08-20 ENCOUNTER — TELEPHONE (OUTPATIENT)
Age: 34
End: 2024-08-20

## 2024-08-20 NOTE — TELEPHONE ENCOUNTER
RE:Qulipta 60 mg tablet prior authorization request sent to [employer] via [facility]    (Key: [id number])    PA Case ID #: [id number]    Approved today by [employer]    The request has been approved. The authorization is effective from 08/20/2024 to 02/16/2025, as long as the member is enrolled in their current health plan.     The request was approved as submitted. This request has been approved with a quantity limit of 1 tablet per day.    Approval letter scanned to media     FYI to Teja Booth [facility]

## 2024-08-21 ENCOUNTER — TELEPHONE (OUTPATIENT)
Facility: HOSPITAL | Age: 34
End: 2024-08-21

## 2024-08-21 NOTE — TELEPHONE ENCOUNTER
Specialty Medication Service    Date: 8/21/2024  Patient's Name: Alexandrea Panda YOB: 1990            _____________________________________________________________________________________________    Spoke with patient to schedule PharmD follow up appointment for Specialty Medication Services. Patient scheduled 08/22 @12      Rajwinder Martin CPhT  Clinical   Specialty Medication Services  Phone: [phone] option #4  Fax: [phone]  For Pharmacy Admin Tracking Only    Program: [facility]  CPA in place:  Yes  Recommendation Provided To: Patient/Caregiver: 1 via Telephone  Intervention Detail: Scheduled Appointment  Intervention Accepted By: Patient/Caregiver: 1   Time Spent (min): 15

## 2024-08-22 ENCOUNTER — PHARMACY VISIT (OUTPATIENT)
Facility: HOSPITAL | Age: 34
End: 2024-08-22

## 2024-08-22 ENCOUNTER — TELEMEDICINE (OUTPATIENT)
Age: 34
End: 2024-08-22
Payer: COMMERCIAL

## 2024-08-22 DIAGNOSIS — M35.05 SJOGREN SYNDROME WITH INFLAMMATORY ARTHRITIS (HCC): Primary | ICD-10-CM

## 2024-08-22 DIAGNOSIS — G43.111 MIGRAINE WITH AURA, INTRACTABLE, WITH STATUS MIGRAINOSUS: ICD-10-CM

## 2024-08-22 DIAGNOSIS — G43.719 CHRONIC MIGRAINE WITHOUT AURA, INTRACTABLE, WITHOUT STATUS MIGRAINOSUS: Primary | ICD-10-CM

## 2024-08-22 PROCEDURE — 99213 OFFICE O/P EST LOW 20 MIN: CPT | Performed by: FAMILY MEDICINE

## 2024-08-22 RX ORDER — METHYLPREDNISOLONE 4 MG/1
TABLET ORAL
Qty: 21 TABLET | Refills: 0 | Status: SHIPPED | OUTPATIENT
Start: 2024-08-22

## 2024-08-22 RX ORDER — ATOGEPANT 60 MG/1
60 TABLET ORAL DAILY
Qty: 90 TABLET | Refills: 0 | Status: SHIPPED | OUTPATIENT
Start: 2024-08-22 | End: 2024-08-22

## 2024-08-22 RX ORDER — ATOGEPANT 60 MG/1
TABLET ORAL
Qty: 90 TABLET | Refills: 0 | Status: SHIPPED | OUTPATIENT
Start: 2024-08-22

## 2024-08-22 ASSESSMENT — PATIENT HEALTH QUESTIONNAIRE - PHQ9
5. POOR APPETITE OR OVEREATING: NOT AT ALL
6. FEELING BAD ABOUT YOURSELF - OR THAT YOU ARE A FAILURE OR HAVE LET YOURSELF OR YOUR FAMILY DOWN: NOT AT ALL
SUM OF ALL RESPONSES TO PHQ QUESTIONS 1-9: 0
1. LITTLE INTEREST OR PLEASURE IN DOING THINGS: NOT AT ALL
SUM OF ALL RESPONSES TO PHQ QUESTIONS 1-9: 0
8. MOVING OR SPEAKING SO SLOWLY THAT OTHER PEOPLE COULD HAVE NOTICED. OR THE OPPOSITE, BEING SO FIGETY OR RESTLESS THAT YOU HAVE BEEN MOVING AROUND A LOT MORE THAN USUAL: NOT AT ALL
SUM OF ALL RESPONSES TO PHQ QUESTIONS 1-9: 0
SUM OF ALL RESPONSES TO PHQ QUESTIONS 1-9: 0
2. FEELING DOWN, DEPRESSED OR HOPELESS: NOT AT ALL
9. THOUGHTS THAT YOU WOULD BE BETTER OFF DEAD, OR OF HURTING YOURSELF: NOT AT ALL
SUM OF ALL RESPONSES TO PHQ QUESTIONS 1-9: 0
4. FEELING TIRED OR HAVING LITTLE ENERGY: NOT AT ALL
7. TROUBLE CONCENTRATING ON THINGS, SUCH AS READING THE NEWSPAPER OR WATCHING TELEVISION: NOT AT ALL
SUM OF ALL RESPONSES TO PHQ QUESTIONS 1-9: 0
SUM OF ALL RESPONSES TO PHQ QUESTIONS 1-9: 0
2. FEELING DOWN, DEPRESSED OR HOPELESS: NOT AT ALL
1. LITTLE INTEREST OR PLEASURE IN DOING THINGS: NOT AT ALL
SUM OF ALL RESPONSES TO PHQ9 QUESTIONS 1 & 2: 0
SUM OF ALL RESPONSES TO PHQ QUESTIONS 1-9: 0
3. TROUBLE FALLING OR STAYING ASLEEP: NOT AT ALL
10. IF YOU CHECKED OFF ANY PROBLEMS, HOW DIFFICULT HAVE THESE PROBLEMS MADE IT FOR YOU TO DO YOUR WORK, TAKE CARE OF THINGS AT HOME, OR GET ALONG WITH OTHER PEOPLE: NOT DIFFICULT AT ALL
SUM OF ALL RESPONSES TO PHQ9 QUESTIONS 1 & 2: 0

## 2024-08-22 NOTE — PROGRESS NOTES
physician. Needs to be Jacob Yo MD. Spoke with pharmacy to change and sent a new order to reflect.     PLAN  Goals of therapy, common side effects, medication storage, and administration reviewed with patient.  Initiate Qulipta 60 mg daily   Recommended lab monitoring: None for Qulipta.   Recommended vaccinations: Flu shot (will obtain)  Non pharmacotherapy recommendations: Get enough sleep; Reduce stress; Drink plenty of water; Avoid triggers; Regular physical exercise  Keep all scheduled appointments.       Alexandrea Panda was evaluated through a synchronous (real-time) audio encounter. Patient identification was verified at the start of the visit. She (or guardian if applicable) is aware that this is a billable service, which includes applicable co-pays. This visit was conducted with the patient's (and/or legal guardian's) verbal consent. She has not had a related appointment within my department in the past 7 days or scheduled within the next 24 hours.   The patient was located at Home: [address] [place] [id number].  The provider was located at Home (Appt Dept State): [place].    Note: not billable if this call serves to triage the patient into an appointment for the relevant concern  Yes, I confirm.   Alexandrea Panda is a 34 y.o. female evaluated via telephone on 8/22/2024 for No chief complaint on file.  .

## 2024-08-22 NOTE — PROGRESS NOTES
Chief Complaint   Patient presents with    Pain       \"Have you been to the ER, urgent care clinic since your last visit?  Hospitalized since your last visit?\"    NO    “Have you seen or consulted any other health care providers outside of [facility] since your last visit?”    NO           The patient, Alexandrea Panda, identity was verified by name and .   
[x] No Hallucinations    Other pertinent observable physical exam findings:-             --Amaury Ferrara MD

## 2024-09-05 ENCOUNTER — TELEPHONE (OUTPATIENT)
Age: 34
End: 2024-09-05

## 2024-09-05 NOTE — TELEPHONE ENCOUNTER
Pt has been feeling like food is sitting on top of her sleeve (discomfort, food not properly digesting) since 9/3/24. Pt felt nauseous as of 9/2/24. Advised nurse would return call.

## 2024-09-17 RX ORDER — SEMAGLUTIDE 2.68 MG/ML
INJECTION, SOLUTION SUBCUTANEOUS
Qty: 9 ML | Refills: 1 | Status: SHIPPED | OUTPATIENT
Start: 2024-09-17

## 2024-10-01 ENCOUNTER — TELEPHONE (OUTPATIENT)
Age: 34
End: 2024-10-01

## 2024-10-01 NOTE — TELEPHONE ENCOUNTER
Patient would like to have a call back to reschedule her Botox apptmt for 10/2. She can be reached at [phone]

## 2024-10-01 NOTE — TELEPHONE ENCOUNTER
Verified patient with 2 identifiers   Patient will be getting new insurance as she went PRN at her job. She will call back to reschedule when she gets her cards in.

## 2024-10-02 ENCOUNTER — CLINICAL DOCUMENTATION (OUTPATIENT)
Dept: PHARMACY | Facility: CLINIC | Age: 34
End: 2024-10-02

## 2024-10-02 NOTE — PROGRESS NOTES
[facility] Employee Diabetes Program - Be Well With Diabetes    Quarterly Check-in  Quarterly Reminder sent to patient for the DM Program - See Petra message or Letter for more information  Sent Petra Smith Kirsty  Population Health Clinical   [facility] Clinical Pharmacy  Department, toll free: [phone], option 3      For Pharmacy Admin Tracking Only    Program: [employer]  CPA in place:  No  Gap Closed?: No   Time Spent (min): 5     =======================================================    Mychart/Letter for participant:    Thanks so much for taking the first step towards better health.    Please review the information below for requirements that need to be completed for the remainder of the year.    To ensure participants are taking steps to control their condition ongoing requirements need to be completed. To receive the Be Well With Diabetes Program benefit for 2025, the following actions must be taken:     Requirements to be completed by 12/31/24  Second A1C    Requirement due by 12/31/24 if A1C is greater than 8 percent:  Engage with a [facility] Associate Care Managers (ACM) or Clinical pharmacy specialists by phone at least 2 times, or complete some form of diabetes education through your provider, BeWell, etc.    *Documentation of any requirements completed or reviewed outside of the [facility] electronic medical record will need to be faxed or emailed (fax/email info below).*    If the missing requirements(s) are not met by the date listed above, you will be disqualified from the program and will not receive program benefits in 2025. If any patient is dis-enrolled from the program then they must wait a full calendar year to re-enroll in the program.   Please reach out to our team ASAP if there are any questions or concerns.      [facility]   Phone:

## 2024-10-09 ENCOUNTER — CLINICAL DOCUMENTATION (OUTPATIENT)
Dept: PHARMACY | Facility: CLINIC | Age: 34
End: 2024-10-09

## 2024-10-09 NOTE — PROGRESS NOTES
Pharmacy Pop Care Documentation:     Alexandrea Panda is being removed from the diabetes management program for the following reason(s):  DC from DM Program: Loss of Benefits: [facility]; Per [employer] Benefits Ended: 9/30/24    Fany Jaffe    For Pharmacy Admin Tracking Only    Program: Pop Health  CPA in place:  No  Gap Closed?: Yes   Time Spent (min): 5

## 2024-10-11 ENCOUNTER — TELEPHONE (OUTPATIENT)
Facility: HOSPITAL | Age: 34
End: 2024-10-11

## 2024-10-11 ENCOUNTER — TELEPHONE (OUTPATIENT)
Age: 34
End: 2024-10-11

## 2024-10-11 NOTE — TELEPHONE ENCOUNTER
Mahesh April    Patient has new insurance - she uploaded via My chart- see encounter for 10/10/24. She will need a prior auth for botox. She is not a new start. Scheduled her next appt for 12/4/25

## 2024-10-11 NOTE — TELEPHONE ENCOUNTER
Specialty Medication Service    Date: 10/11/2024  Patient's Name: Alexandrea Panda YOB: 1990            _____________________________________________________________________________________________    Patient no longer has [employer] benefits (termed 09/30/24). Patient is no longer enrolled in SMS program. Left message to explain they will no longer be eligible for SMS services and that we will be discharging them from the program.  Informed patient future SMS appointments will be canceled and no further outreach planned.  Patient instructed to contact [facility] ([phone]) to provide updated insurance information for continuity in care if possible.     For Pharmacy Admin Tracking Only    Program: SMS    Time Spent (min): 10

## 2024-11-01 DIAGNOSIS — F51.05 INSOMNIA DUE TO OTHER MENTAL DISORDER: ICD-10-CM

## 2024-11-01 DIAGNOSIS — F32.1 MODERATE MAJOR DEPRESSION (HCC): ICD-10-CM

## 2024-11-01 DIAGNOSIS — F39 MOOD DISORDER (HCC): ICD-10-CM

## 2024-11-01 DIAGNOSIS — F32.1 MAJOR DEPRESSIVE DISORDER, SINGLE EPISODE, MODERATE (HCC): ICD-10-CM

## 2024-11-01 DIAGNOSIS — F43.10 POSTTRAUMATIC STRESS DISORDER: ICD-10-CM

## 2024-11-01 DIAGNOSIS — M35.9 SYSTEMIC INVOLVEMENT OF CONNECTIVE TISSUE, UNSPECIFIED (HCC): ICD-10-CM

## 2024-11-01 DIAGNOSIS — G56.01 CARPAL TUNNEL SYNDROME OF RIGHT WRIST: ICD-10-CM

## 2024-11-01 DIAGNOSIS — F41.1 GENERALIZED ANXIETY DISORDER: ICD-10-CM

## 2024-11-01 DIAGNOSIS — F99 INSOMNIA DUE TO OTHER MENTAL DISORDER: ICD-10-CM

## 2024-11-01 DIAGNOSIS — G43.719 CHRONIC MIGRAINE WITHOUT AURA, INTRACTABLE, WITHOUT STATUS MIGRAINOSUS: ICD-10-CM

## 2024-11-01 DIAGNOSIS — E11.40 TYPE 2 DIABETES MELLITUS WITH DIABETIC NEUROPATHY, WITHOUT LONG-TERM CURRENT USE OF INSULIN (HCC): ICD-10-CM

## 2024-11-01 RX ORDER — SEMAGLUTIDE 2.68 MG/ML
INJECTION, SOLUTION SUBCUTANEOUS
Qty: 9 ML | Refills: 1 | Status: SHIPPED | OUTPATIENT
Start: 2024-11-01

## 2024-11-01 RX ORDER — DULOXETIN HYDROCHLORIDE 60 MG/1
60 CAPSULE, DELAYED RELEASE ORAL DAILY
Qty: 90 CAPSULE | Refills: 0 | Status: CANCELLED | OUTPATIENT
Start: 2024-11-01

## 2024-11-01 RX ORDER — ACYCLOVIR 400 MG/1
TABLET ORAL
Qty: 9 EACH | Refills: 1 | Status: SHIPPED | OUTPATIENT
Start: 2024-11-01

## 2024-11-01 NOTE — TELEPHONE ENCOUNTER
Last office visit 6/3/2024 with astrid  Next office visit 12/4/2024 for botox with Astrid  Last med refill 3/28/2024

## 2024-11-06 ENCOUNTER — TELEPHONE (OUTPATIENT)
Age: 34
End: 2024-11-06

## 2024-11-06 RX ORDER — RIMEGEPANT SULFATE 75 MG/75MG
TABLET, ORALLY DISINTEGRATING ORAL
Qty: 18 TABLET | Refills: 4 | Status: SHIPPED | OUTPATIENT
Start: 2024-11-06

## 2024-11-06 RX ORDER — GABAPENTIN 300 MG/1
CAPSULE ORAL
Qty: 450 CAPSULE | Refills: 1 | Status: SHIPPED | OUTPATIENT
Start: 2024-11-06 | End: 2025-11-01

## 2024-11-06 RX ORDER — ATOGEPANT 60 MG/1
TABLET ORAL
Qty: 90 TABLET | Refills: 0 | Status: SHIPPED | OUTPATIENT
Start: 2024-11-06

## 2024-11-06 NOTE — TELEPHONE ENCOUNTER
Botox Drug Acquisition: BUY AND BILL  Drug: BOTOX 200 units Dx: G43.719  Insurance: [place]  Submission Type: Availity  [facility]:   CPT: [id number]  Availity Request Tracking ID: [id number]  Reference #: [id number]  Approval Range: *PENDING*

## 2024-11-07 RX ORDER — ASPIRIN 81 MG/1
81 TABLET ORAL DAILY
Qty: 90 TABLET | Refills: 3 | Status: SHIPPED | OUTPATIENT
Start: 2024-11-07

## 2024-11-07 RX ORDER — DULOXETIN HYDROCHLORIDE 30 MG/1
30 CAPSULE, DELAYED RELEASE ORAL DAILY
Qty: 90 CAPSULE | Refills: 0 | Status: SHIPPED | OUTPATIENT
Start: 2024-11-07

## 2024-11-07 RX ORDER — OMEPRAZOLE 40 MG/1
40 CAPSULE, DELAYED RELEASE ORAL 2 TIMES DAILY
Qty: 180 CAPSULE | Refills: 0 | Status: SHIPPED | OUTPATIENT
Start: 2024-11-07

## 2024-11-07 RX ORDER — BUSPIRONE HYDROCHLORIDE 15 MG/1
15 TABLET ORAL 3 TIMES DAILY
Qty: 90 TABLET | Refills: 1 | Status: SHIPPED | OUTPATIENT
Start: 2024-11-07

## 2024-11-07 RX ORDER — METOPROLOL TARTRATE 50 MG
50 TABLET ORAL 2 TIMES DAILY
Qty: 180 TABLET | Refills: 1 | Status: SHIPPED | OUTPATIENT
Start: 2024-11-07

## 2024-11-14 ENCOUNTER — TELEPHONE (OUTPATIENT)
Age: 34
End: 2024-11-14

## 2024-11-14 NOTE — TELEPHONE ENCOUNTER
Re: BOTOX BUY AND BILL    POS 19, entered pt into botox 1 portal.    11/25/24: [facility] indicating PA is on file. As of 11/15/24 mngmnt advised if there is a current auth to leave it as is until term date.

## 2024-11-27 ENCOUNTER — TELEPHONE (OUTPATIENT)
Age: 34
End: 2024-11-27

## 2024-11-27 ENCOUNTER — OFFICE VISIT (OUTPATIENT)
Age: 34
End: 2024-11-27
Payer: COMMERCIAL

## 2024-11-27 DIAGNOSIS — Z92.29 S/P BOTOX INJECTION: ICD-10-CM

## 2024-11-27 DIAGNOSIS — G43.719 CHRONIC MIGRAINE WITHOUT AURA, INTRACTABLE, WITHOUT STATUS MIGRAINOSUS: Primary | ICD-10-CM

## 2024-11-27 PROCEDURE — 64615 CHEMODENERV MUSC MIGRAINE: CPT | Performed by: NURSE PRACTITIONER

## 2024-11-27 NOTE — PROGRESS NOTES
Botox Injection Note       ICD-10-CM    1. Chronic migraine without aura, intractable, without status migrainosus  G43.719 Onabotulinumtoxin A (BOTOX) injection 200 Units      2. S/P Botox injection  Z92.29            Indication: patient has chronic recurrent migraine.      Procedure:   Botox concentration: 200 units in 4 ml of preservative-free normal saline.       31 sites injections, distribution as follow      Units/site  Sites Sides Subtotal    Procerus 5 1 1 5    5 1 2 10   Frontalis 5 2 2 20   Temporalis 5 4 2 40   Occipitalis 5 3 2 30   Upper cervical paraspinalis 5 2 2 20   Trapezius 5 3 2 30         200 units Botox were reconstituted, 155 units injected as above and the remainder was unavoidably wasted.     Patient tolerated procedure well.     Location: left side of her head, neck  Characteristics: Squeezing pain, not as intense right now.  Frequency:  15+ headaches a month, but she is behind on her injections, first treatment with botox was in July.  Triggers: rainy weather, high stress, being tired  Relief: Nurtec, Maxalt, emgality, Tylenol 2 x week.  Aura:  some neck pain.  N/: some nausea and vomiting.   Light and sound sensitive:   Works in Occupational health.  Drinks 1 soda in the morning. Otherwise drinks water.  She has a hard time sleeping, hard time turning her brain off.  Exercises at [employer].        Preventative Medication:   Emgality for preventative care  Gabapentin - taking - takes for CTS  Metoprolol - taking - for high blood pressure  Nurtec every other day - denied by insurance  Amitriptyline - ineffective  Nortriptyline - inefffective     -Abortive:   Nurtec - Taking - effective  Maxalt - Can be effective - uses if Nurtec not effective  Imitrex - ineffective         _____________________________   zeeshan Resendiz APRN - NP

## 2024-12-02 DIAGNOSIS — F41.1 GENERALIZED ANXIETY DISORDER: ICD-10-CM

## 2024-12-02 DIAGNOSIS — F32.1 MAJOR DEPRESSIVE DISORDER, SINGLE EPISODE, MODERATE (HCC): ICD-10-CM

## 2024-12-02 DIAGNOSIS — F43.10 POSTTRAUMATIC STRESS DISORDER: ICD-10-CM

## 2024-12-03 DIAGNOSIS — F39 MOOD DISORDER (HCC): ICD-10-CM

## 2024-12-03 DIAGNOSIS — E11.40 TYPE 2 DIABETES MELLITUS WITH DIABETIC NEUROPATHY, WITHOUT LONG-TERM CURRENT USE OF INSULIN (HCC): ICD-10-CM

## 2024-12-03 DIAGNOSIS — M35.9 SYSTEMIC INVOLVEMENT OF CONNECTIVE TISSUE, UNSPECIFIED (HCC): ICD-10-CM

## 2024-12-03 DIAGNOSIS — F99 INSOMNIA DUE TO OTHER MENTAL DISORDER: ICD-10-CM

## 2024-12-03 DIAGNOSIS — F51.05 INSOMNIA DUE TO OTHER MENTAL DISORDER: ICD-10-CM

## 2024-12-03 DIAGNOSIS — F32.1 MODERATE MAJOR DEPRESSION (HCC): ICD-10-CM

## 2024-12-03 DIAGNOSIS — F41.1 GENERALIZED ANXIETY DISORDER: ICD-10-CM

## 2024-12-03 NOTE — TELEPHONE ENCOUNTER
Pharmacy is requesting a 90 d/s    Last appointment: 8/22/24  Next appointment: Advised to follow-up 11/9/24  Previous refill encounter(s): 11/7/24    Requested Prescriptions     Pending Prescriptions Disp Refills    busPIRone (BUSPAR) 15 MG tablet [Pharmacy Med Name: BUSPIRONE HCL 15 MG TABLET] 270 tablet 1     Sig: TAKE 1 TABLET BY MOUTH 3 TIMES A DAY         For Pharmacy Admin Tracking Only    Program: Medication Refill  CPA in place:    Recommendation Provided To:   Intervention Detail: New Rx: 1, reason: Improve Adherence  Intervention Accepted By:   Gap Closed?:    Time Spent (min): 5

## 2024-12-04 ENCOUNTER — PATIENT MESSAGE (OUTPATIENT)
Age: 34
End: 2024-12-04

## 2024-12-05 ENCOUNTER — HOSPITAL ENCOUNTER (EMERGENCY)
Facility: HOSPITAL | Age: 34
Discharge: HOME | End: 2024-12-06
Attending: EMERGENCY MEDICINE
Payer: COMMERCIAL

## 2024-12-05 VITALS
SYSTOLIC BLOOD PRESSURE: 160 MMHG | HEART RATE: 88 BPM | RESPIRATION RATE: 14 BRPM | TEMPERATURE: 98.7 F | HEIGHT: 63 IN | OXYGEN SATURATION: 100 % | WEIGHT: 293 LBS | DIASTOLIC BLOOD PRESSURE: 91 MMHG | BODY MASS INDEX: 51.91 KG/M2

## 2024-12-05 DIAGNOSIS — G43.719 INTRACTABLE CHRONIC MIGRAINE WITHOUT AURA AND WITHOUT STATUS MIGRAINOSUS: Primary | ICD-10-CM

## 2024-12-05 PROCEDURE — 6360000002 HC RX W HCPCS: Performed by: EMERGENCY MEDICINE

## 2024-12-05 PROCEDURE — 99284 EMERGENCY DEPT VISIT MOD MDM: CPT

## 2024-12-05 PROCEDURE — 96375 TX/PRO/DX INJ NEW DRUG ADDON: CPT

## 2024-12-05 PROCEDURE — 96365 THER/PROPH/DIAG IV INF INIT: CPT

## 2024-12-05 RX ORDER — BUSPIRONE HYDROCHLORIDE 15 MG/1
15 TABLET ORAL 3 TIMES DAILY
Qty: 270 TABLET | Refills: 1 | Status: SHIPPED | OUTPATIENT
Start: 2024-12-05

## 2024-12-05 RX ORDER — MAGNESIUM SULFATE 1 G/100ML
1000 INJECTION INTRAVENOUS
Status: COMPLETED | OUTPATIENT
Start: 2024-12-05 | End: 2024-12-05

## 2024-12-05 RX ORDER — KETOROLAC TROMETHAMINE 30 MG/ML
30 INJECTION, SOLUTION INTRAMUSCULAR; INTRAVENOUS
Status: COMPLETED | OUTPATIENT
Start: 2024-12-05 | End: 2024-12-05

## 2024-12-05 RX ORDER — DEXAMETHASONE SODIUM PHOSPHATE 10 MG/ML
10 INJECTION, SOLUTION INTRAMUSCULAR; INTRAVENOUS ONCE
Status: COMPLETED | OUTPATIENT
Start: 2024-12-05 | End: 2024-12-05

## 2024-12-05 RX ORDER — PROCHLORPERAZINE EDISYLATE 5 MG/ML
10 INJECTION INTRAMUSCULAR; INTRAVENOUS EVERY 6 HOURS PRN
Status: DISCONTINUED | OUTPATIENT
Start: 2024-12-05 | End: 2024-12-06 | Stop reason: HOSPADM

## 2024-12-05 RX ORDER — BUTALBITAL, ACETAMINOPHEN AND CAFFEINE 300; 40; 50 MG/1; MG/1; MG/1
1 CAPSULE ORAL EVERY 4 HOURS PRN
Qty: 20 CAPSULE | Refills: 0 | Status: SHIPPED | OUTPATIENT
Start: 2024-12-05

## 2024-12-05 RX ORDER — DULOXETIN HYDROCHLORIDE 60 MG/1
60 CAPSULE, DELAYED RELEASE ORAL DAILY
Qty: 90 CAPSULE | Refills: 1 | Status: SHIPPED | OUTPATIENT
Start: 2024-12-05

## 2024-12-05 RX ORDER — DIPHENHYDRAMINE HYDROCHLORIDE 50 MG/ML
50 INJECTION INTRAMUSCULAR; INTRAVENOUS
Status: COMPLETED | OUTPATIENT
Start: 2024-12-05 | End: 2024-12-05

## 2024-12-05 RX ADMIN — DEXAMETHASONE SODIUM PHOSPHATE 10 MG: 10 INJECTION, SOLUTION INTRAMUSCULAR; INTRAVENOUS at 22:55

## 2024-12-05 RX ADMIN — KETOROLAC TROMETHAMINE 30 MG: 30 INJECTION, SOLUTION INTRAMUSCULAR at 22:55

## 2024-12-05 RX ADMIN — PROCHLORPERAZINE EDISYLATE 10 MG: 5 INJECTION INTRAMUSCULAR; INTRAVENOUS at 22:56

## 2024-12-05 RX ADMIN — MAGNESIUM SULFATE HEPTAHYDRATE 1000 MG: 1 INJECTION, SOLUTION INTRAVENOUS at 22:59

## 2024-12-05 RX ADMIN — DIPHENHYDRAMINE HYDROCHLORIDE 50 MG: 50 INJECTION INTRAMUSCULAR; INTRAVENOUS at 22:55

## 2024-12-05 ASSESSMENT — PAIN DESCRIPTION - LOCATION: LOCATION: HEAD

## 2024-12-05 ASSESSMENT — PAIN SCALES - GENERAL: PAINLEVEL_OUTOF10: 8

## 2024-12-05 ASSESSMENT — PAIN - FUNCTIONAL ASSESSMENT: PAIN_FUNCTIONAL_ASSESSMENT: 0-10

## 2024-12-05 ASSESSMENT — LIFESTYLE VARIABLES
HOW OFTEN DO YOU HAVE A DRINK CONTAINING ALCOHOL: NEVER
HOW MANY STANDARD DRINKS CONTAINING ALCOHOL DO YOU HAVE ON A TYPICAL DAY: PATIENT DOES NOT DRINK

## 2024-12-05 ASSESSMENT — PAIN DESCRIPTION - DESCRIPTORS: DESCRIPTORS: THROBBING;SHARP

## 2024-12-06 NOTE — DISCHARGE INSTRUCTIONS
It was a pleasure taking care of you in our Emergency Department today.  We know that when you come to [facility], you are entrusting us with your health, comfort, and safety.  Our physicians and nurses honor that trust, and truly appreciate the opportunity to care for you and your loved ones.    We also value your feedback.  If you receive a survey about your Emergency Department experience today, please fill it out.  We care about our patients' feedback, and we listen to what you have to say.  Thank you!      Dr. Dee Olivares MD

## 2024-12-06 NOTE — ED NOTES
Pt presents ambulatory to ED with mother complaining of a consistent migraine since 11/30. Pt reports this a chronic issue of hers, in which she takes Nurtex, Maxalt, Qulipta at night and get's botox for. Pt reports getting last dose of botox last week. Pt reports migraine is affecting primarily the left side, and is affecting her left eye. Pt reports symptoms are not relieved with medications. Pt is alert and oriented x 4, RR even and unlabored, skin is warm and dry. Assesment completed and pt updated on plan of care.       Emergency Department Nursing Plan of Care       The Nursing Plan of Care is developed from the Nursing assessment and Emergency Department Attending provider initial evaluation.  The plan of care may be reviewed in the “ED Provider note”.    The Plan of Care was developed with the following considerations:   Patient / Family readiness to learn indicated by:verbalized understanding  Persons(s) to be included in education: patient  Barriers to Learning/Limitations:None    Signed     Marli Curtis RN    12/5/2024   9:08 PM

## 2024-12-06 NOTE — ED NOTES
Discharge instructions were given to the patient by Marli MCFADDEN.     The patient left the Emergency Department alert and oriented and in no acute distress with 1 prescription. The patient was encouraged to call or return to the ED for worsening issues or problems and was encouraged to schedule a follow up appointment for continuing care.     Ambulation assessment completed before discharge.  Pt left Emergency Department ambulating at baseline with no ortho devices  Ortho device education: none    The patient verbalized understanding of discharge instructions and prescriptions, all questions were answered. The patient has no further concerns at this time.

## 2024-12-06 NOTE — ED TRIAGE NOTES
Pt came in the Ed with complaints of migraine headache started to get worse Saturday. Pt reports having migraine headache for a year and been taking Qulipta and Botox for the pain.

## 2024-12-06 NOTE — ED NOTES
Pt requesting magnesium drip be stopped. Pt reported feeling off and anxious after being given the compazine and starting the magnesium drip. Elise KILPATRICK aware and requested mag drip be stopped.

## 2024-12-16 ASSESSMENT — ENCOUNTER SYMPTOMS
BACK PAIN: 0
COLOR CHANGE: 0
SHORTNESS OF BREATH: 0
DIARRHEA: 0
ABDOMINAL PAIN: 0
COUGH: 0
VOMITING: 0
NAUSEA: 0

## 2024-12-16 NOTE — ED PROVIDER NOTES
by mouth 2 times daily     Onabotulinumtoxin A 200 units injection  Commonly known as: BOTOX  Inject 155 units into 31 FDA approved sites into head and neck for chronic migraine.     ondansetron 4 MG disintegrating tablet  Commonly known as: ZOFRAN-ODT     Opurity Tabs     Ozempic (2 MG/DOSE) 8 MG/3ML Sopn sc injection  Generic drug: semaglutide (2 MG/DOSE)  INJECT 2MG UNDER THE SKIN ONCE WEEKLY. E11.65. Her insurance has approved the Ozempic     Qulipta 60 MG Tabs  Generic drug: Atogepant  Take 1 tablet by mouth daily     rizatriptan 10 MG disintegrating tablet  Commonly known as: MAXALT-MLT           * This list has 2 medication(s) that are the same as other medications prescribed for you. Read the directions carefully, and ask your doctor or other care provider to review them with you.                   Where to Get Your Medications        These medications were sent to [facility]/pharmacy #[id number] - [place], [place] - [address] - P [phone] - F [phone]  [address] [facility] [address]      Hours: 24-hours Phone: [phone]   butalbital-APAP-caffeine -40 MG Caps per capsule       2.   Your neurology clinic    Call today  please follow up with your neurology clinic this week so that your headaches can be addressed    3.   Return to ED if worse       I am the Primary Clinician of Record.   Dee Olivares MD (electronically signed)    (Please note that parts of this dictation were completed with voice recognition software. Quite often unanticipated grammatical, syntax, homophones, and other interpretive errors are inadvertently transcribed by the computer software. Please disregards these errors. Please excuse any errors that have escaped final proofreading.)           Dee Olivares MD  12/16/24 [id number]       Dee Olivares MD  12/16/24 [id number]

## 2024-12-24 ENCOUNTER — OFFICE VISIT (OUTPATIENT)
Age: 34
End: 2024-12-24
Payer: COMMERCIAL

## 2024-12-24 VITALS
DIASTOLIC BLOOD PRESSURE: 86 MMHG | RESPIRATION RATE: 18 BRPM | WEIGHT: 293 LBS | TEMPERATURE: 97.8 F | OXYGEN SATURATION: 96 % | BODY MASS INDEX: 54.38 KG/M2 | HEART RATE: 94 BPM | SYSTOLIC BLOOD PRESSURE: 136 MMHG

## 2024-12-24 DIAGNOSIS — K59.04 CHRONIC IDIOPATHIC CONSTIPATION: ICD-10-CM

## 2024-12-24 DIAGNOSIS — E11.40 TYPE 2 DIABETES MELLITUS WITH DIABETIC NEUROPATHY, WITHOUT LONG-TERM CURRENT USE OF INSULIN (HCC): Primary | ICD-10-CM

## 2024-12-24 LAB — HBA1C MFR BLD: 6.4 %

## 2024-12-24 PROCEDURE — 99214 OFFICE O/P EST MOD 30 MIN: CPT | Performed by: FAMILY MEDICINE

## 2024-12-24 PROCEDURE — 3079F DIAST BP 80-89 MM HG: CPT | Performed by: FAMILY MEDICINE

## 2024-12-24 PROCEDURE — 3075F SYST BP GE 130 - 139MM HG: CPT | Performed by: FAMILY MEDICINE

## 2024-12-24 PROCEDURE — 83036 HEMOGLOBIN GLYCOSYLATED A1C: CPT | Performed by: FAMILY MEDICINE

## 2024-12-24 PROCEDURE — 3044F HG A1C LEVEL LT 7.0%: CPT | Performed by: FAMILY MEDICINE

## 2024-12-24 SDOH — ECONOMIC STABILITY: FOOD INSECURITY: WITHIN THE PAST 12 MONTHS, THE FOOD YOU BOUGHT JUST DIDN'T LAST AND YOU DIDN'T HAVE MONEY TO GET MORE.: NEVER TRUE

## 2024-12-24 SDOH — ECONOMIC STABILITY: FOOD INSECURITY: WITHIN THE PAST 12 MONTHS, YOU WORRIED THAT YOUR FOOD WOULD RUN OUT BEFORE YOU GOT MONEY TO BUY MORE.: NEVER TRUE

## 2024-12-24 SDOH — ECONOMIC STABILITY: INCOME INSECURITY: HOW HARD IS IT FOR YOU TO PAY FOR THE VERY BASICS LIKE FOOD, HOUSING, MEDICAL CARE, AND HEATING?: NOT HARD AT ALL

## 2024-12-24 ASSESSMENT — PATIENT HEALTH QUESTIONNAIRE - PHQ9
6. FEELING BAD ABOUT YOURSELF - OR THAT YOU ARE A FAILURE OR HAVE LET YOURSELF OR YOUR FAMILY DOWN: NOT AT ALL
SUM OF ALL RESPONSES TO PHQ9 QUESTIONS 1 & 2: 2
SUM OF ALL RESPONSES TO PHQ QUESTIONS 1-9: 2
8. MOVING OR SPEAKING SO SLOWLY THAT OTHER PEOPLE COULD HAVE NOTICED. OR THE OPPOSITE, BEING SO FIGETY OR RESTLESS THAT YOU HAVE BEEN MOVING AROUND A LOT MORE THAN USUAL: NOT AT ALL
10. IF YOU CHECKED OFF ANY PROBLEMS, HOW DIFFICULT HAVE THESE PROBLEMS MADE IT FOR YOU TO DO YOUR WORK, TAKE CARE OF THINGS AT HOME, OR GET ALONG WITH OTHER PEOPLE: NOT DIFFICULT AT ALL
9. THOUGHTS THAT YOU WOULD BE BETTER OFF DEAD, OR OF HURTING YOURSELF: NOT AT ALL
2. FEELING DOWN, DEPRESSED OR HOPELESS: SEVERAL DAYS
3. TROUBLE FALLING OR STAYING ASLEEP: NOT AT ALL
4. FEELING TIRED OR HAVING LITTLE ENERGY: NOT AT ALL
SUM OF ALL RESPONSES TO PHQ QUESTIONS 1-9: 2
SUM OF ALL RESPONSES TO PHQ QUESTIONS 1-9: 2
1. LITTLE INTEREST OR PLEASURE IN DOING THINGS: SEVERAL DAYS
SUM OF ALL RESPONSES TO PHQ QUESTIONS 1-9: 2
7. TROUBLE CONCENTRATING ON THINGS, SUCH AS READING THE NEWSPAPER OR WATCHING TELEVISION: NOT AT ALL
5. POOR APPETITE OR OVEREATING: NOT AT ALL

## 2024-12-24 NOTE — PROGRESS NOTES
Chief Complaint   Patient presents with    Weight Management    Discuss Medications     linzess    Diabetes     Follow up        \"Have you been to the ER, urgent care clinic since your last visit?  Hospitalized since your last visit?\"    YES [facility] 24 headaches    “Have you seen or consulted any other health care providers outside of [facility] since your last visit?”    NO            Click Here for Release of Records Request     No results found for this visit on 24.   Vitals:    24 1519   BP: 136/86   Pulse: 94   Resp: 18   Temp: 97.8 °F (36.6 °C)   TempSrc: Infrared   SpO2: 96%   Weight: (!) 139.3 kg (307 lb)          The patient, Alexandrea Panda, identity was verified by name and .

## 2024-12-26 NOTE — ASSESSMENT & PLAN NOTE
Chronic, at goal (stable), continue current treatment plan, medication adherence emphasized, and lifestyle modifications recommended    Orders:    Diabetic Foot Exam    AMB POC HEMOGLOBIN A1C    naltrexone-buPROPion (CONTRAVE) 8-90 MG per extended release tablet; Take 1 tablet by mouth 2 times daily

## 2024-12-26 NOTE — PROGRESS NOTES
Alexandrea Panda (:  1990) is a 34 y.o. female,Established patient, here for evaluation of the following chief complaint(s):  Weight Management, Discuss Medications (linzess), and Diabetes (Follow up )    .  Constipation  Hard stool, every 3 days last bm this am, and has not noticed any rectal bleeding , no tarry stool, no abdominal pain, eats varieties of foods, no hc of UC, nor of Crohn's Dz,   pt has not been compliant with any type of stool softner, currently on no constipation inducing meds, has not been active, no fhx of colon cancer denies any nausea vomiting,       Obesity  Pt present also for BMI of >54  has tried multiple weight loss available medications has decreased calorie intake has had meal plan has increased routine activity and has had behavioral intervention to lose weight, patient also has used otc laxative for wt loss,  has tried different available diets, in addition to over-the-counter medications has been trying to be active, and avoiding fast food as much as possible.  Does not have an habit of eating too much , ,  patient does not do binge drinking no self induced vomiting  being on the heavy side for a few yrs, current weight creates a bad body image, and is currently very unhappy for the serious condition      who present for f/u regarding the diabetic state, and bp check,  patient has been compliant with diabetic meds since last visit and is trying to have a diabetic diet, no Rf needed for today, patient currently denies tingling sensation, has no polyurea and polydipsia, last a1c was not at target,    Constitutional: no fever, nl energy levels, nl sleep patterns, nl appetite, and nl weight fluctuations,  - exercise habits,  nad     HENT: no ear pain or nosebleeds. No blurred vision  Respiratory: no shortness of breath, wheezing cough   Cardiovascular: Has no chest pain, ,and racing heart .   Gastrointestinal: + constipation,no diarrhea, nausea and vomiting.   Genitourinary: No

## 2025-01-02 ENCOUNTER — HOSPITAL ENCOUNTER (OUTPATIENT)
Facility: HOSPITAL | Age: 35
Discharge: HOME | End: 2025-01-02
Payer: COMMERCIAL

## 2025-01-02 DIAGNOSIS — Z91.89 OTHER SPECIFIED PERSONAL RISK FACTORS, NOT ELSEWHERE CLASSIFIED: ICD-10-CM

## 2025-01-02 PROCEDURE — 6360000004 HC RX CONTRAST MEDICATION: Performed by: NURSE PRACTITIONER

## 2025-01-02 PROCEDURE — C8908 MRI W/O FOL W/CONT, BREAST,: HCPCS

## 2025-01-02 PROCEDURE — A9585 GADOBUTROL INJECTION: HCPCS | Performed by: NURSE PRACTITIONER

## 2025-01-02 RX ORDER — GADOBUTROL 604.72 MG/ML
14 INJECTION INTRAVENOUS
Status: COMPLETED | OUTPATIENT
Start: 2025-01-02 | End: 2025-01-02

## 2025-01-02 RX ADMIN — GADOBUTROL 14 ML: 604.72 INJECTION INTRAVENOUS at 10:36

## 2025-01-08 ENCOUNTER — TELEPHONE (OUTPATIENT)
Age: 35
End: 2025-01-08

## 2025-01-08 NOTE — TELEPHONE ENCOUNTER
LM for pt to call and schedule annual bariatric exam.  [employer] message also sent. Letter sent. [facility]

## 2025-01-16 ENCOUNTER — CLINICAL DOCUMENTATION (OUTPATIENT)
Age: 35
End: 2025-01-16

## 2025-02-12 ENCOUNTER — TELEPHONE (OUTPATIENT)
Age: 35
End: 2025-02-12

## 2025-02-12 NOTE — TELEPHONE ENCOUNTER
Called patient to inform her Patient Communication Release Of Information form must be completed prior to 2/13/25 11 AM appt or we will need to reschedule. Patient stated she would complete the from. Advised patient I would follow up tomorrow.

## 2025-02-13 ENCOUNTER — TELEMEDICINE (OUTPATIENT)
Age: 35
End: 2025-02-13
Payer: COMMERCIAL

## 2025-02-13 VITALS — WEIGHT: 293 LBS | BODY MASS INDEX: 54.38 KG/M2

## 2025-02-13 DIAGNOSIS — E66.01 MORBID OBESITY WITH BMI OF 50.0-59.9, ADULT: Primary | ICD-10-CM

## 2025-02-13 DIAGNOSIS — K21.9 CHRONIC GERD: ICD-10-CM

## 2025-02-13 DIAGNOSIS — R63.5 WEIGHT GAIN: ICD-10-CM

## 2025-02-13 DIAGNOSIS — Z90.3 S/P GASTRIC SLEEVE PROCEDURE: ICD-10-CM

## 2025-02-13 PROCEDURE — 99213 OFFICE O/P EST LOW 20 MIN: CPT | Performed by: NURSE PRACTITIONER

## 2025-02-13 ASSESSMENT — PATIENT HEALTH QUESTIONNAIRE - PHQ9
SUM OF ALL RESPONSES TO PHQ QUESTIONS 1-9: 0
4. FEELING TIRED OR HAVING LITTLE ENERGY: NOT AT ALL
SUM OF ALL RESPONSES TO PHQ QUESTIONS 1-9: 0
6. FEELING BAD ABOUT YOURSELF - OR THAT YOU ARE A FAILURE OR HAVE LET YOURSELF OR YOUR FAMILY DOWN: NOT AT ALL
1. LITTLE INTEREST OR PLEASURE IN DOING THINGS: NOT AT ALL
1. LITTLE INTEREST OR PLEASURE IN DOING THINGS: NOT AT ALL
SUM OF ALL RESPONSES TO PHQ QUESTIONS 1-9: 0
9. THOUGHTS THAT YOU WOULD BE BETTER OFF DEAD, OR OF HURTING YOURSELF: NOT AT ALL
SUM OF ALL RESPONSES TO PHQ QUESTIONS 1-9: 0
2. FEELING DOWN, DEPRESSED OR HOPELESS: NOT AT ALL
5. POOR APPETITE OR OVEREATING: NOT AT ALL
3. TROUBLE FALLING OR STAYING ASLEEP: NOT AT ALL
SUM OF ALL RESPONSES TO PHQ QUESTIONS 1-9: 0
SUM OF ALL RESPONSES TO PHQ9 QUESTIONS 1 & 2: 0
8. MOVING OR SPEAKING SO SLOWLY THAT OTHER PEOPLE COULD HAVE NOTICED. OR THE OPPOSITE, BEING SO FIGETY OR RESTLESS THAT YOU HAVE BEEN MOVING AROUND A LOT MORE THAN USUAL: NOT AT ALL
SUM OF ALL RESPONSES TO PHQ9 QUESTIONS 1 & 2: 0
2. FEELING DOWN, DEPRESSED OR HOPELESS: NOT AT ALL
SUM OF ALL RESPONSES TO PHQ QUESTIONS 1-9: 0
7. TROUBLE CONCENTRATING ON THINGS, SUCH AS READING THE NEWSPAPER OR WATCHING TELEVISION: NOT AT ALL

## 2025-02-13 NOTE — PROGRESS NOTES
Identified patient with two patient identifiers (name and ). Reviewed chart in preparation for visit and have obtained necessary documentation.    Alexandrea Panda is a 34 y.o. female  Chief Complaint   Patient presents with    Follow-up     Annual Bariatric Exam     Wt (!) 139.3 kg (307 lb)   BMI 54.38 kg/m²     1. Have you been to the ER, urgent care clinic since your last visit?  Hospitalized since your last visit?no    2. Have you seen or consulted any other health care providers outside of the [facility] since your last visit?  Include any pap smears or colon screening. no

## 2025-02-14 SDOH — ECONOMIC STABILITY: INCOME INSECURITY: IN THE LAST 12 MONTHS, WAS THERE A TIME WHEN YOU WERE NOT ABLE TO PAY THE MORTGAGE OR RENT ON TIME?: NO

## 2025-02-14 SDOH — ECONOMIC STABILITY: FOOD INSECURITY: WITHIN THE PAST 12 MONTHS, YOU WORRIED THAT YOUR FOOD WOULD RUN OUT BEFORE YOU GOT MONEY TO BUY MORE.: NEVER TRUE

## 2025-02-14 SDOH — ECONOMIC STABILITY: FOOD INSECURITY: WITHIN THE PAST 12 MONTHS, THE FOOD YOU BOUGHT JUST DIDN'T LAST AND YOU DIDN'T HAVE MONEY TO GET MORE.: NEVER TRUE

## 2025-02-14 SDOH — ECONOMIC STABILITY: TRANSPORTATION INSECURITY
IN THE PAST 12 MONTHS, HAS THE LACK OF TRANSPORTATION KEPT YOU FROM MEDICAL APPOINTMENTS OR FROM GETTING MEDICATIONS?: NO

## 2025-02-14 SDOH — ECONOMIC STABILITY: TRANSPORTATION INSECURITY
IN THE PAST 12 MONTHS, HAS LACK OF TRANSPORTATION KEPT YOU FROM MEETINGS, WORK, OR FROM GETTING THINGS NEEDED FOR DAILY LIVING?: NO

## 2025-02-17 ENCOUNTER — OFFICE VISIT (OUTPATIENT)
Age: 35
End: 2025-02-17

## 2025-02-17 VITALS
RESPIRATION RATE: 16 BRPM | TEMPERATURE: 98.2 F | HEIGHT: 63 IN | OXYGEN SATURATION: 97 % | BODY MASS INDEX: 51.91 KG/M2 | DIASTOLIC BLOOD PRESSURE: 80 MMHG | SYSTOLIC BLOOD PRESSURE: 136 MMHG | WEIGHT: 293 LBS | HEART RATE: 78 BPM

## 2025-02-17 DIAGNOSIS — Z00.00 ENCOUNTER FOR WELL ADULT EXAM WITHOUT ABNORMAL FINDINGS: Primary | ICD-10-CM

## 2025-02-17 DIAGNOSIS — Z02.89 ENCOUNTER FOR COMPLETION OF FORM WITH PATIENT: ICD-10-CM

## 2025-02-17 DIAGNOSIS — E11.40 TYPE 2 DIABETES MELLITUS WITH DIABETIC NEUROPATHY, WITHOUT LONG-TERM CURRENT USE OF INSULIN (HCC): ICD-10-CM

## 2025-02-17 DIAGNOSIS — F32.1 MAJOR DEPRESSIVE DISORDER, SINGLE EPISODE, MODERATE (HCC): ICD-10-CM

## 2025-02-17 DIAGNOSIS — F41.1 GENERALIZED ANXIETY DISORDER: ICD-10-CM

## 2025-02-17 LAB
ALBUMIN SERPL-MCNC: 3.6 G/DL (ref 3.5–5)
ALBUMIN/GLOB SERPL: 1 (ref 1.1–2.2)
ALP SERPL-CCNC: 86 U/L (ref 45–117)
ALT SERPL-CCNC: 19 U/L (ref 12–78)
ANION GAP SERPL CALC-SCNC: 4 MMOL/L (ref 2–12)
APPEARANCE UR: CLEAR
AST SERPL-CCNC: 13 U/L (ref 15–37)
BACTERIA URNS QL MICRO: ABNORMAL /HPF
BILIRUB SERPL-MCNC: 0.6 MG/DL (ref 0.2–1)
BILIRUB UR QL: NEGATIVE
BUN SERPL-MCNC: 10 MG/DL (ref 6–20)
BUN/CREAT SERPL: 14 (ref 12–20)
CALCIUM SERPL-MCNC: 9.5 MG/DL (ref 8.5–10.1)
CHLORIDE SERPL-SCNC: 105 MMOL/L (ref 97–108)
CHOLEST SERPL-MCNC: 178 MG/DL
CO2 SERPL-SCNC: 27 MMOL/L (ref 21–32)
COLOR UR: ABNORMAL
CREAT SERPL-MCNC: 0.7 MG/DL (ref 0.55–1.02)
EPITH CASTS URNS QL MICRO: ABNORMAL /LPF
ERYTHROCYTE [DISTWIDTH] IN BLOOD BY AUTOMATED COUNT: 14.2 % (ref 11.5–14.5)
EST. AVERAGE GLUCOSE BLD GHB EST-MCNC: 157 MG/DL
GLOBULIN SER CALC-MCNC: 3.7 G/DL (ref 2–4)
GLUCOSE SERPL-MCNC: 159 MG/DL (ref 65–100)
GLUCOSE UR STRIP.AUTO-MCNC: NEGATIVE MG/DL
HBA1C MFR BLD: 7.1 % (ref 4–5.6)
HCT VFR BLD AUTO: 42.5 % (ref 35–47)
HDLC SERPL-MCNC: 40 MG/DL
HDLC SERPL: 4.5 (ref 0–5)
HGB BLD-MCNC: 12.9 G/DL (ref 11.5–16)
HGB UR QL STRIP: NEGATIVE
HYALINE CASTS URNS QL MICRO: ABNORMAL /LPF (ref 0–5)
KETONES UR QL STRIP.AUTO: NEGATIVE MG/DL
LDLC SERPL CALC-MCNC: 109 MG/DL (ref 0–100)
LEUKOCYTE ESTERASE UR QL STRIP.AUTO: NEGATIVE
MCH RBC QN AUTO: 26.5 PG (ref 26–34)
MCHC RBC AUTO-ENTMCNC: 30.4 G/DL (ref 30–36.5)
MCV RBC AUTO: 87.4 FL (ref 80–99)
NITRITE UR QL STRIP.AUTO: NEGATIVE
NRBC # BLD: 0 K/UL (ref 0–0.01)
NRBC BLD-RTO: 0 PER 100 WBC
PH UR STRIP: 5.5 (ref 5–8)
PLATELET # BLD AUTO: 360 K/UL (ref 150–400)
PMV BLD AUTO: 10.9 FL (ref 8.9–12.9)
POTASSIUM SERPL-SCNC: 4.5 MMOL/L (ref 3.5–5.1)
PROT SERPL-MCNC: 7.3 G/DL (ref 6.4–8.2)
PROT UR STRIP-MCNC: NEGATIVE MG/DL
RBC # BLD AUTO: 4.86 M/UL (ref 3.8–5.2)
RBC #/AREA URNS HPF: ABNORMAL /HPF (ref 0–5)
SODIUM SERPL-SCNC: 136 MMOL/L (ref 136–145)
SP GR UR REFRACTOMETRY: 1.01 (ref 1–1.03)
T4 FREE SERPL-MCNC: 1.1 NG/DL (ref 0.8–1.5)
TRIGL SERPL-MCNC: 145 MG/DL
TSH SERPL DL<=0.05 MIU/L-ACNC: 1.1 UIU/ML (ref 0.36–3.74)
URINE CULTURE IF INDICATED: ABNORMAL
UROBILINOGEN UR QL STRIP.AUTO: 0.2 EU/DL (ref 0.2–1)
VLDLC SERPL CALC-MCNC: 29 MG/DL
WBC # BLD AUTO: 6.8 K/UL (ref 3.6–11)
WBC URNS QL MICRO: ABNORMAL /HPF (ref 0–4)

## 2025-02-17 SDOH — ECONOMIC STABILITY: FOOD INSECURITY: WITHIN THE PAST 12 MONTHS, THE FOOD YOU BOUGHT JUST DIDN'T LAST AND YOU DIDN'T HAVE MONEY TO GET MORE.: NEVER TRUE

## 2025-02-17 SDOH — ECONOMIC STABILITY: FOOD INSECURITY: WITHIN THE PAST 12 MONTHS, YOU WORRIED THAT YOUR FOOD WOULD RUN OUT BEFORE YOU GOT MONEY TO BUY MORE.: NEVER TRUE

## 2025-02-17 NOTE — PATIENT INSTRUCTIONS
hands, brush your teeth twice a day, and wear a seat belt in the car.   Where can you learn more?  Go to [email or web address].net/patientEd and enter P072 to learn more about \"Well Visit, Ages 18 to 65: Care Instructions.\"  Current as of: April 30, 2024  Content Version: 14.3  © 2024 [employer].   Care instructions adapted under license by [employer]. If you have questions about a medical condition or this instruction, always ask your healthcare professional. [employer] disclaims any warranty or liability for your use of this information.

## 2025-02-17 NOTE — PROGRESS NOTES
Chief Complaint   Patient presents with    Annual Exam     Physical for School      \"Have you been to the ER, urgent care clinic since your last visit?  Hospitalized since your last visit?\"    NO    “Have you seen or consulted any other health care providers outside our system since your last visit?”    NO      “Have you had a diabetic eye exam?”    NO     Date of last diabetic eye exam: 6/27/2020           
pain, ,and racing heart .   Gastrointestinal: No constipation, diarrhea, nausea and vomiting.   Genitourinary: No frequency.   Musculoskeletal: Negative for joint pain.   Skin: no itching, no rash.   Neurological: Negative for dizziness, no tremors  Psychiatric/Behavioral: no for depression  no nervous/anxious, nl stress levels, and overall emotional well-being .      Allergies   Allergen Reactions    Latex Itching     Prior to Visit Medications    Medication Sig Taking? Authorizing Provider   linaclotide (LINZESS) 290 MCG CAPS capsule Take 1 capsule by mouth every morning (before breakfast) Yes Amaury Ferrara MD   busPIRone (BUSPAR) 15 MG tablet TAKE 1 TABLET BY MOUTH 3 TIMES A DAY Yes Amaury Ferrara MD   DULoxetine (CYMBALTA) 60 MG extended release capsule Take 1 capsule by mouth daily Yes Amaury Ferrara MD   butalbital-APAP-caffeine -40 MG CAPS per capsule Take 1 capsule by mouth every 4 hours as needed for Headaches Yes Dee Olivares MD   aspirin 81 MG EC tablet Take 1 tablet by mouth daily Yes Amaury Ferrara MD   metoprolol tartrate (LOPRESSOR) 50 MG tablet Take 1 tablet by mouth 2 times daily Yes Amaury Ferrara MD   DULoxetine (CYMBALTA) 30 MG extended release capsule Take 1 capsule by mouth daily Yes Amaury Ferrara MD   omeprazole (PRILOSEC) 40 MG delayed release capsule Take 1 capsule by mouth 2 times daily Yes Amaury Ferrara MD   gabapentin (NEURONTIN) 300 MG capsule TAKE TWO CAPSULES BY MOUTH EVERY MORNING AND TAKE THREE CAPSULES BY MOUTH NIGHTLY Yes Melida Resendiz APRN - NP   rimegepant sulfate (NURTEC) 75 MG TBDP Place one tablet by mouth one time for migraine rescue as needed. Max 1 dose per 24 hours. Yes Melida Resendiz APRN - NP   Continuous Glucose Sensor (DEXCOM G7 SENSOR) MISC Use to check blood sugar prn. Change sensor every 10 days Yes Brandon Randolph MD   semaglutide, 2 MG/DOSE, (OZEMPIC, 2 MG/DOSE,) 8 MG/3ML SOPN sc injection INJECT 2MG UNDER THE SKIN ONCE

## 2025-02-18 NOTE — PROGRESS NOTES
Chief Complaint   Patient presents with    Follow-up     Annual Bariatric Exam       HPI: Alexandrea Panda history of laparoscopic sleeve gastrectomy 4+ years ago. Saw Dr. You about 2 years ago to consider conversion to gastric bypass due to obesity and GERD.   Initially lost about 60 to 70 pounds with her original surgery. Experienced significant weight regain.   GERD present before surgery but now are significantly worse.   Endoscopy about 2 years ago showed some esophagitis without Olivia's esophagus. Had upper GI swallow study.   On Ozempic for DM Type 2, A1C 6.2%, but no weight loss   PCP prescribed Contrave and it is pending authorization.     Currently home sick with [facility] in [facility]     4+ years status post: [] Gastric bypass for treatment of morbid obesity   [x] Sleeve gastrectomy for treatment of morbid obesity   [] Conversion sleeve to gastric bypass   [] Conversion lap band to gastric bypass   [] SAD-I  [] DS  [] Revision      Presents today for [x] Obesity management   [x] Weight regain   [] Abdominal pain   [] Medication management   [x] Discuss conversion to RNY     Pain:  [x] Yes, Location joint pain      [] No      No Meeting protein goals (60 grams minimum per day)   [] Shakes [] Bars [x] Other maladaptive eating due to GERD   No Meeting hydration goals (64 oz minimum daily) [x] Water [] Juices [] Sugar-free add-ins [] Electrolyte drink/mix   No Tracking intake      Bowels moving  [x] Most days   [] Few times per week        Constipated   Yes    Using laxatives  Yes  Linzess    Nausea   Yes    Regurgitation   Yes  taking PPI and Pepcid and \"still have reflux\"     Vitamin compliance  Yes      [x] Bariatric MVI 45 mg iron  [] Other vitamin regimen   [x] Calcium Citrate 1200 -1500 mg daily in divided doses   [] B12 injections 1000 mcg/ml   [] Prescription D2 50,000 iu  [] Weekly       [] Twice weekly       [] Three times weekly       [] Every 14 days       [] Monthly      Activity

## 2025-02-18 NOTE — PATIENT INSTRUCTIONS
Plan on a dietician evaluation and then a visit with Dwight You MD     Make an appointment with one of the bariatric dietitians, please call the bariatric line at [phone].  Appointments are offered in person and virtual at no charge.    Stay on your vitamins    Plan on resuming the process for gastric bypass

## 2025-02-19 ENCOUNTER — TELEPHONE (OUTPATIENT)
Age: 35
End: 2025-02-19

## 2025-02-20 DIAGNOSIS — E11.40 TYPE 2 DIABETES MELLITUS WITH DIABETIC NEUROPATHY, WITHOUT LONG-TERM CURRENT USE OF INSULIN (HCC): ICD-10-CM

## 2025-02-20 DIAGNOSIS — F39 MOOD DISORDER: ICD-10-CM

## 2025-02-20 DIAGNOSIS — F51.05 INSOMNIA DUE TO OTHER MENTAL DISORDER: ICD-10-CM

## 2025-02-20 DIAGNOSIS — F41.1 GENERALIZED ANXIETY DISORDER: ICD-10-CM

## 2025-02-20 DIAGNOSIS — F32.1 MODERATE MAJOR DEPRESSION (HCC): ICD-10-CM

## 2025-02-20 DIAGNOSIS — F99 INSOMNIA DUE TO OTHER MENTAL DISORDER: ICD-10-CM

## 2025-02-20 DIAGNOSIS — M35.9 SYSTEMIC INVOLVEMENT OF CONNECTIVE TISSUE, UNSPECIFIED: ICD-10-CM

## 2025-02-21 RX ORDER — OMEPRAZOLE 40 MG/1
40 CAPSULE, DELAYED RELEASE ORAL 2 TIMES DAILY
Qty: 180 CAPSULE | Refills: 0 | Status: SHIPPED | OUTPATIENT
Start: 2025-02-21

## 2025-02-21 RX ORDER — DULOXETIN HYDROCHLORIDE 30 MG/1
CAPSULE, DELAYED RELEASE ORAL DAILY
Qty: 90 CAPSULE | Refills: 0 | Status: SHIPPED | OUTPATIENT
Start: 2025-02-21

## 2025-02-21 NOTE — TELEPHONE ENCOUNTER
Last appointment: 2/17/25  Next appointment: none  Previous refill encounter(s): 11/7/24 90 d/s    Requested Prescriptions     Pending Prescriptions Disp Refills    omeprazole (PRILOSEC) 40 MG delayed release capsule [Pharmacy Med Name: OMEPRAZOLE DR 40 MG CAPSULE] 60 capsule 0     Sig: TAKE 1 CAPSULE BY MOUTH TWICE A DAY    DULoxetine (CYMBALTA) 30 MG extended release capsule [Pharmacy Med Name: DULOXETINE HCL DR 30 MG CAP] 30 capsule 0     Sig: TAKE 1 CAPSULE BY MOUTH EVERY DAY         For Pharmacy Admin Tracking Only    Program: Medication Refill  CPA in place:    Recommendation Provided To:   Intervention Detail: New Rx: 2, reason: Patient Preference  Intervention Accepted By:   Gap Closed?:    Time Spent (min): 5

## 2025-02-23 LAB
M TB IFN-G BLD-IMP: NEGATIVE
M TB IFN-G CD4+ T-CELLS BLD-ACNC: 0.47 IU/ML
M TBIFN-G CD4+ CD8+T-CELLS BLD-ACNC: 0.09 IU/ML
QUANTIFERON CRITERIA: NORMAL
QUANTIFERON MITOGEN VALUE: >10 IU/ML
QUANTIFERON NIL VALUE: 0.2 IU/ML

## 2025-02-24 ENCOUNTER — OFFICE VISIT (OUTPATIENT)
Age: 35
End: 2025-02-24

## 2025-02-24 ENCOUNTER — TELEPHONE (OUTPATIENT)
Age: 35
End: 2025-02-24

## 2025-02-24 DIAGNOSIS — E66.01 MORBID OBESITY: Primary | ICD-10-CM

## 2025-02-24 NOTE — TELEPHONE ENCOUNTER
Identified patient with two patient identifiers (name and ). Reviewed chart in preparation for encounter and have obtained necessary documentation.    Called and spoke with patient to schedule her with mike for a follow up on 25 at 1PM.

## 2025-02-24 NOTE — PROGRESS NOTES
Pre-operative Bariatric Nutrition Evaluation - Phone Consult    Date: 2025   Physician/Surgeon:Dwight You M.D.   Name: Alexandrea Padna  :  1990  Age:  34  Gender: Female   Type of Surgery: [x]           Gastric Bypass   []           Sleeve Gastrectomy    Alexandrea Panda was evaluated through a synchronous (real-time) audio encounter. Patient identification was verified at the start of the visit.   The patient was located at Other: work .  The provider was located at Facility (Appt Dept): [address] [place] [address].  Confirm you are appropriately licensed, registered, or certified to deliver care in the state where the patient is located as indicated above. If you are not or unsure, please re-schedule the visit: Yes, I confirm.       ASSESSMENT:      Medications/Supplements:   Prior to Admission medications    Medication Sig Start Date End Date Taking? Authorizing Provider   omeprazole (PRILOSEC) 40 MG delayed release capsule TAKE 1 CAPSULE BY MOUTH TWICE A DAY 25   Leigh Barnes DO   DULoxetine (CYMBALTA) 30 MG extended release capsule TAKE 1 CAPSULE BY MOUTH EVERY DAY 25   Leigh Barnes DO   linaclotide (LINZESS) 290 MCG CAPS capsule Take 1 capsule by mouth every morning (before breakfast) 24   Amaury Ferrara MD   busPIRone (BUSPAR) 15 MG tablet TAKE 1 TABLET BY MOUTH 3 TIMES A DAY 24   Amaury Ferrara MD   DULoxetine (CYMBALTA) 60 MG extended release capsule Take 1 capsule by mouth daily 24   Amaury Ferrara MD   butalbital-APAP-caffeine -40 MG CAPS per capsule Take 1 capsule by mouth every 4 hours as needed for Headaches 24   Dee Olivares MD   aspirin 81 MG EC tablet Take 1 tablet by mouth daily 24   Amaury Ferrara MD   metoprolol tartrate (LOPRESSOR) 50 MG tablet Take 1 tablet by mouth 2 times daily 24   Amaury Ferrara MD   Atogepant (QULIPTA) 60 MG TABS Take 1 tablet by mouth daily  Patient not taking:

## 2025-03-26 ENCOUNTER — OFFICE VISIT (OUTPATIENT)
Age: 35
End: 2025-03-26
Payer: COMMERCIAL

## 2025-03-26 VITALS
DIASTOLIC BLOOD PRESSURE: 71 MMHG | BODY MASS INDEX: 51.91 KG/M2 | SYSTOLIC BLOOD PRESSURE: 139 MMHG | WEIGHT: 293 LBS | HEIGHT: 63 IN | HEART RATE: 88 BPM

## 2025-03-26 DIAGNOSIS — I10 ESSENTIAL (PRIMARY) HYPERTENSION: ICD-10-CM

## 2025-03-26 DIAGNOSIS — E78.2 MIXED HYPERLIPIDEMIA: ICD-10-CM

## 2025-03-26 DIAGNOSIS — Z79.4 TYPE 2 DIABETES MELLITUS WITHOUT COMPLICATION, WITH LONG-TERM CURRENT USE OF INSULIN: Primary | ICD-10-CM

## 2025-03-26 DIAGNOSIS — E11.9 TYPE 2 DIABETES MELLITUS WITHOUT COMPLICATION, WITH LONG-TERM CURRENT USE OF INSULIN: Primary | ICD-10-CM

## 2025-03-26 DIAGNOSIS — E55.9 VITAMIN D DEFICIENCY, UNSPECIFIED: ICD-10-CM

## 2025-03-26 PROCEDURE — 3078F DIAST BP <80 MM HG: CPT | Performed by: INTERNAL MEDICINE

## 2025-03-26 PROCEDURE — 99214 OFFICE O/P EST MOD 30 MIN: CPT | Performed by: INTERNAL MEDICINE

## 2025-03-26 PROCEDURE — 3075F SYST BP GE 130 - 139MM HG: CPT | Performed by: INTERNAL MEDICINE

## 2025-03-26 PROCEDURE — 3051F HG A1C>EQUAL 7.0%<8.0%: CPT | Performed by: INTERNAL MEDICINE

## 2025-03-26 NOTE — PROGRESS NOTES
Chief Complaint   Patient presents with    Diabetes    Vitamin D deficiency     History of Present Illness: Alexandrea Panda is a 34 y.o. female here for follow up of diabetes.  She was diagnosed with diabetes \"when I was in high school\".    Because pt has features concerning for cushing's her PCP checked a 24 hour urine cortisol which was not elevated (42). She also had a TSH drawn which was 2.10. In March 2016 we tested pt for hyperaldosteronism, which was negative.    I tested her for evidence of PCOS, her Testosterone was 14, with DHEA-S 109, her 17-OH Prog was not elevated and an overnight dexamethasone suppression test did suppress her ACTH and cortisol.      She had the Gastric Sleeve on 9/8/20.      Pt had a cholecystectomy on 11/10/21.    Pt has not been back to see me since May 2024.    Pt denies any recent illnesses, injuries or hospitalizations.    Pt is still taking the Ozempic 2.0mg every Sunday.    Her weight today was up from 304 pounds in May 2024 to 322 pounds.     \"I am working with my gastric surgeon to see what we can do for my reflux and the weight issues. I see him next week.\"     She is still monitoring her BGs using the DexCom G7.   She notes she has not been using the DexCom \"in awhile\".     Pt is now working day shifts  - She waking up around 530AM.  - She does not eat breakfast.   - She denies having a mid-morning snack.  - She has her first meal around 11AM, yesterday she had a pork chop, cabbage and regular soda.     - She will have a mid-afternoon snack of a PB&J.  - She has dinner around 7-8PM, last night she did not have dinner.   - She denies having an evening snack.    - She will go to bed around MN.     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 I have not been to the gym in 2 months.\"      No history of vascular disease.        No

## 2025-04-03 ENCOUNTER — OFFICE VISIT (OUTPATIENT)
Age: 35
End: 2025-04-03
Payer: COMMERCIAL

## 2025-04-03 ENCOUNTER — TELEPHONE (OUTPATIENT)
Age: 35
End: 2025-04-03

## 2025-04-03 ENCOUNTER — PREP FOR PROCEDURE (OUTPATIENT)
Age: 35
End: 2025-04-03

## 2025-04-03 VITALS
BODY MASS INDEX: 51.91 KG/M2 | TEMPERATURE: 97 F | RESPIRATION RATE: 20 BRPM | DIASTOLIC BLOOD PRESSURE: 78 MMHG | WEIGHT: 293 LBS | HEART RATE: 83 BPM | SYSTOLIC BLOOD PRESSURE: 138 MMHG | OXYGEN SATURATION: 97 % | HEIGHT: 63 IN

## 2025-04-03 DIAGNOSIS — R63.5 ABNORMAL WEIGHT GAIN: ICD-10-CM

## 2025-04-03 DIAGNOSIS — E66.01 MORBID OBESITY WITH BMI OF 50.0-59.9, ADULT: ICD-10-CM

## 2025-04-03 DIAGNOSIS — E53.8 VITAMIN B12 DEFICIENCY: ICD-10-CM

## 2025-04-03 DIAGNOSIS — E55.9 VITAMIN D DEFICIENCY: Primary | ICD-10-CM

## 2025-04-03 DIAGNOSIS — D64.9 ANEMIA, UNSPECIFIED TYPE: ICD-10-CM

## 2025-04-03 DIAGNOSIS — K21.9 CHRONIC GERD: ICD-10-CM

## 2025-04-03 DIAGNOSIS — Z98.84 BARIATRIC SURGERY STATUS: ICD-10-CM

## 2025-04-03 DIAGNOSIS — Z00.00 ENCOUNTER FOR PREVENTIVE CARE: ICD-10-CM

## 2025-04-03 DIAGNOSIS — Z90.3 S/P GASTRIC SLEEVE PROCEDURE: ICD-10-CM

## 2025-04-03 PROCEDURE — 3075F SYST BP GE 130 - 139MM HG: CPT | Performed by: SURGERY

## 2025-04-03 PROCEDURE — 3078F DIAST BP <80 MM HG: CPT | Performed by: SURGERY

## 2025-04-03 PROCEDURE — 99215 OFFICE O/P EST HI 40 MIN: CPT | Performed by: SURGERY

## 2025-04-03 ASSESSMENT — PATIENT HEALTH QUESTIONNAIRE - PHQ9
SUM OF ALL RESPONSES TO PHQ QUESTIONS 1-9: 0
2. FEELING DOWN, DEPRESSED OR HOPELESS: NOT AT ALL
1. LITTLE INTEREST OR PLEASURE IN DOING THINGS: NOT AT ALL

## 2025-04-03 NOTE — TELEPHONE ENCOUNTER
Spoke to patient to schedule her EGD with Dr You at [facility].  I offered 5/2 @ 1:30pm with arrival time of 12pm and she declined due to being a  she requested June.  So I offered 6/6 @ 7:30am with arrival time of 6am and she accepted.    I let the patient know they are required to bring someone with them that will be responsible to take them home along with their photo ID and insurance card.      If you are taking any weight lose injectable medication, you need to stop one week before procedure                            Trulicity (dulaglutide)                          Wegovy (Semaglutide)                          Ozempic (Semaglutide)                          Rybelsus (tirzepatide)                          Mounjaro (tirzepatide) told her not to take the week before                            Zepbound (tirzepatide)        I let them know once this is scheduled I will put the information in a letter along with where they need to go and pre-procedure instructions.   This letter will post to their my chart and go out in the mail.  she acknowledged thank you

## 2025-04-03 NOTE — PROGRESS NOTES
Identified patient with two patient identifiers (name and ). Reviewed chart in preparation for visit and have obtained necessary documentation.    Alexandrea Panda is a 34 y.o. female  Chief Complaint   Patient presents with    Weight Management     Discuss revision     /78 (BP Site: Left Upper Arm, Patient Position: Sitting, BP Cuff Size: Large Adult)   Pulse 83   Temp 97 °F (36.1 °C) (Oral)   Resp 20   Ht 1.6 m (5' 3\")   Wt (!) 144.6 kg (318 lb 12.8 oz)   SpO2 97%   BMI 56.47 kg/m²     1. Have you been to the ER, urgent care clinic since your last visit?  Hospitalized since your last visit?no    2. Have you seen or consulted any other health care providers [facility] since your last visit?  Include any pap smears or colon screening. no

## 2025-04-03 NOTE — TELEPHONE ENCOUNTER
Identified patient with two patient identifiers (name and ). Reviewed chart in preparation for encounter and have obtained necessary documentation.    Called and spoke with patient regarding [employer] insurance requirements, patient previously had psych done last may, I informed patient I would look into this to see if it can be used as one of her requirements and will give her a call back. Patient understood what was discussed and thankful for the call.

## 2025-04-03 NOTE — PROGRESS NOTES
Bariatric Surgery Consult    Alexandrea Panda is a 34 y.o. female with a history of morbid obesity. Her Height: 160 cm (5' 3\"), Weight - Scale: (!) 144.6 kg (318 lb 12.8 oz). Body mass index is 56.47 kg/m². She reports that she has been trying to lose weight for 10 years. Her maximum weight was 340 pounds. She has attended our bariatric surgery information seminar. Alexandrea wants to consider conversion of sleeve to laparoscopic gastric bypass surgery.     Pt is self-referred.      Dietary History:   The patient says that in the past, physician supervised, unsupervised diets, and Sleeve gastrectomy  have not resulted in real success. When asked why she was not able to achieve or maintain significant weight loss she replied, \" she lost about 70 to 80 pounds with sleeve gastrectomy but gained almost all of it back\".    She said she had a lot of depression and anxiety a few years after surgery and gained a lot of it back due to bad habits    Comorbidities:     Bariatric comorbidities present: hypertension, non-insulin dependent diabetes, GERD, chronic back problems, and obstructive sleep apnea    Ambulatory status: independent    The patient's reported level of exercise: not active.      Patient Active Problem List    Diagnosis Date Noted    Cervicalgia 04/30/2024    Sjogren's syndrome 07/06/2023    Chronic migraine without aura, intractable, without status migrainosus 07/06/2023    Systemic involvement of connective tissue, unspecified 06/23/2023    Posttraumatic stress disorder 01/30/2023    Moderate major depression (HCC) 01/13/2023    Type 2 diabetes mellitus with chronic kidney disease (HCC) 11/17/2021    Dysthymia 04/21/2020    Atypical squamous cells of undetermined significance (ASCUS) on Papanicolaou smear of cervix 03/15/2019    Type 2 diabetes with nephropathy (HCC) 01/13/2019    Class 3 severe obesity due to excess calories with serious comorbidity and body mass index (BMI) of 60.0 to 69.9 in adult 08/09/2018

## 2025-04-07 ENCOUNTER — TELEPHONE (OUTPATIENT)
Age: 35
End: 2025-04-07

## 2025-04-07 NOTE — TELEPHONE ENCOUNTER
Identified patient with two patient identifiers (name and ). Reviewed chart in preparation for encounter and have obtained necessary documentation.    Called and spoke with patient regarding psych eval, informed her psych evals are on good for a year so it will need redone. Informed her I would send her info through [employer].Patient understood what was discussed and thankful for the update call.

## 2025-04-14 ENCOUNTER — TELEPHONE (OUTPATIENT)
Age: 35
End: 2025-04-14

## 2025-04-16 ENCOUNTER — TELEPHONE (OUTPATIENT)
Age: 35
End: 2025-04-16

## 2025-04-21 ENCOUNTER — OFFICE VISIT (OUTPATIENT)
Age: 35
End: 2025-04-21
Payer: COMMERCIAL

## 2025-04-21 DIAGNOSIS — G43.719 CHRONIC MIGRAINE WITHOUT AURA, INTRACTABLE, WITHOUT STATUS MIGRAINOSUS: Primary | ICD-10-CM

## 2025-04-21 DIAGNOSIS — Z92.29 S/P BOTOX INJECTION: ICD-10-CM

## 2025-04-21 PROCEDURE — 64615 CHEMODENERV MUSC MIGRAINE: CPT | Performed by: NURSE PRACTITIONER

## 2025-04-21 NOTE — PROGRESS NOTES
Botox Injection Note       ICD-10-CM    1. Chronic migraine without aura, intractable, without status migrainosus  G43.719 Onabotulinumtoxin A (BOTOX) injection 200 Units      2. S/P Botox injection  Z92.29            Indication: patient has chronic recurrent migraine.      Procedure:   Botox concentration: 200 units in 4 ml of preservative-free normal saline.       31 sites injections, distribution as follow      Units/site  Sites Sides Subtotal    Procerus 5 1 1 5    5 1 2 10   Frontalis 5 2 2 20   Temporalis 5 4 2 40   Occipitalis 5 3 2 30   Upper cervical paraspinalis 5 2 2 20   Trapezius 5 3 2 30         200 units Botox were reconstituted, 155 units injected as above and the remainder was unavoidably wasted.     Patient tolerated procedure well.     Location: left side of her head, neck  Characteristics: Squeezing pain, not as intense right now.  Frequency:   6 a month  Triggers: rainy weather, high stress, being tired  Relief: Nurtec, Maxalt, emgality, Tylenol 2 x week.  Aura:  some neck pain.  N/: some nausea and vomiting.   Light and sound sensitive:   Works in Occupational health.  Drinks 1 soda in the morning. Otherwise drinks water.  She has a hard time sleeping, hard time turning her brain off.  Exercises at [employer].        Preventative Medication:   Emgality for preventative care  Gabapentin - taking - takes for CTS  Metoprolol - taking - for high blood pressure  Nurtec every other day - denied by insurance  Amitriptyline - ineffective  Nortriptyline - inefffective     -Abortive:   Nurtec - Taking - effective  Maxalt - Can be effective - uses if Nurtec not effective  Imitrex - ineffective         _____________________________   zeeshan Resendiz APRN - NP

## 2025-05-08 ENCOUNTER — TELEPHONE (OUTPATIENT)
Age: 35
End: 2025-05-08

## 2025-05-23 DIAGNOSIS — F51.05 INSOMNIA DUE TO OTHER MENTAL DISORDER: ICD-10-CM

## 2025-05-23 DIAGNOSIS — F39 MOOD DISORDER: ICD-10-CM

## 2025-05-23 DIAGNOSIS — M35.9 SYSTEMIC INVOLVEMENT OF CONNECTIVE TISSUE, UNSPECIFIED: ICD-10-CM

## 2025-05-23 DIAGNOSIS — G56.01 CARPAL TUNNEL SYNDROME OF RIGHT WRIST: ICD-10-CM

## 2025-05-23 DIAGNOSIS — E11.40 TYPE 2 DIABETES MELLITUS WITH DIABETIC NEUROPATHY, WITHOUT LONG-TERM CURRENT USE OF INSULIN (HCC): ICD-10-CM

## 2025-05-23 DIAGNOSIS — F32.1 MODERATE MAJOR DEPRESSION (HCC): ICD-10-CM

## 2025-05-23 DIAGNOSIS — F99 INSOMNIA DUE TO OTHER MENTAL DISORDER: ICD-10-CM

## 2025-05-23 DIAGNOSIS — F41.1 GENERALIZED ANXIETY DISORDER: ICD-10-CM

## 2025-05-27 RX ORDER — GABAPENTIN 300 MG/1
CAPSULE ORAL
Qty: 450 CAPSULE | Refills: 1 | Status: SHIPPED | OUTPATIENT
Start: 2025-05-27 | End: 2026-05-18

## 2025-05-27 NOTE — TELEPHONE ENCOUNTER
Last appointment: 2/17/25  Next appointment: none  Previous refill encounter(s): 2/21/25 #90    Requested Prescriptions     Pending Prescriptions Disp Refills    DULoxetine (CYMBALTA) 30 MG extended release capsule 90 capsule 0     Sig: Take 1 capsule by mouth daily         For Pharmacy Admin Tracking Only    Program: Medication Refill  CPA in place:    Recommendation Provided To:   Intervention Detail: New Rx: 1, reason: Patient Preference  Intervention Accepted By:   Gap Closed?:    Time Spent (min): 5

## 2025-05-27 NOTE — TELEPHONE ENCOUNTER
Last appointment: 4/21/25  Next appointment: none  Previous refill encounter(s): 11/6/24 #450 with 1 refull    Requested Prescriptions     Pending Prescriptions Disp Refills    gabapentin (NEURONTIN) 300 MG capsule 450 capsule 1     Sig: TAKE TWO CAPSULES BY MOUTH EVERY MORNING AND TAKE THREE CAPSULES BY MOUTH NIGHTLY         For Pharmacy Admin Tracking Only    Program: Medication Refill  CPA in place:    Recommendation Provided To:   Intervention Detail: New Rx: 1, reason: Patient Preference  Intervention Accepted By:   Gap Closed?:    Time Spent (min): 5

## 2025-05-28 RX ORDER — DULOXETIN HYDROCHLORIDE 30 MG/1
30 CAPSULE, DELAYED RELEASE ORAL DAILY
Qty: 90 CAPSULE | Refills: 0 | Status: SHIPPED | OUTPATIENT
Start: 2025-05-28

## 2025-05-29 ENCOUNTER — OFFICE VISIT (OUTPATIENT)
Age: 35
End: 2025-05-29

## 2025-05-29 DIAGNOSIS — E66.01 MORBID OBESITY (HCC): Primary | ICD-10-CM

## 2025-05-29 NOTE — PROGRESS NOTES
[facility]  Supervised Weight Loss     Date:   2025    Patient's Name: Alexandrea Panda  : 1990    Insurance:  [place]            Session:   Surgery: Gastric Bypass  Surgeon:  Dwight You M.D.     Height: 63\"  Weight:    318      Lbs.   BMI: 56   Pounds Lost since last month: 0               Pounds Gained since last month: 0    Starting Weight: 318#   Previous Month’s Weight: 318#  Overall Pounds Lost: 0  Overall Pounds Gained: 0    Other Pertinent Information: Today's appointment was completed over the phone. Pt with previous sleeve gastrectomy seeking conversion to gastric bypass and completed initial nutrition evaluation 2025. Pt has since been approved to move forward with revision process. Pt recommended to lose 10-15#. Pt has been Mounjaro for 1.5 months.     Smoking Status:  none  Alcohol Intake: none    I have reviewed with pt the guidelines of the supervised wt loss program.  Pt understands the expectations of some wt loss during the program and that wt gain could delay the process. I have also explained that appointments need to be consecutive and missing an appointment may result in starting over. Pt has received this information in writing. This appointment was complimentary/non-billable as part of the bariatric surgery program.         Changes that patient has made since last month include:  reduced intake of soda, more mindful food choices.      Eating Habits and Behaviors  A nutrition lesson was presented on label reading with specific guidelines provided for limiting added sugars. This information will help increase healthy food choices, promote weight loss and prevent dumping syndrome after gastric bypass. We also reviewed the general nutrition guidelines for bariatric surgery.                        Patient's current diet habits include: Pt is eating 2 meals per day. Still skipping breakfast d/t lack of hunger (h/o d/t lack of

## 2025-06-05 RX ORDER — SODIUM CHLORIDE 0.9 % (FLUSH) 0.9 %
5-40 SYRINGE (ML) INJECTION PRN
Status: CANCELLED | OUTPATIENT
Start: 2025-06-05

## 2025-06-05 RX ORDER — SODIUM CHLORIDE 0.9 % (FLUSH) 0.9 %
5-40 SYRINGE (ML) INJECTION EVERY 12 HOURS SCHEDULED
Status: CANCELLED | OUTPATIENT
Start: 2025-06-05

## 2025-06-05 RX ORDER — SODIUM CHLORIDE 9 MG/ML
25 INJECTION, SOLUTION INTRAVENOUS PRN
Status: CANCELLED | OUTPATIENT
Start: 2025-06-05

## 2025-06-06 ENCOUNTER — HOSPITAL ENCOUNTER (OUTPATIENT)
Facility: HOSPITAL | Age: 35
Setting detail: OUTPATIENT SURGERY
Discharge: HOME | End: 2025-06-06
Attending: SURGERY | Admitting: SURGERY
Payer: COMMERCIAL

## 2025-06-06 ENCOUNTER — ANESTHESIA (OUTPATIENT)
Facility: HOSPITAL | Age: 35
End: 2025-06-06
Payer: COMMERCIAL

## 2025-06-06 ENCOUNTER — ANESTHESIA EVENT (OUTPATIENT)
Facility: HOSPITAL | Age: 35
End: 2025-06-06
Payer: COMMERCIAL

## 2025-06-06 VITALS
BODY MASS INDEX: 51.91 KG/M2 | DIASTOLIC BLOOD PRESSURE: 86 MMHG | HEART RATE: 94 BPM | SYSTOLIC BLOOD PRESSURE: 138 MMHG | OXYGEN SATURATION: 100 % | RESPIRATION RATE: 18 BRPM | HEIGHT: 63 IN | TEMPERATURE: 97.6 F | WEIGHT: 293 LBS

## 2025-06-06 LAB
GLUCOSE BLD STRIP.AUTO-MCNC: 137 MG/DL (ref 65–117)
SERVICE CMNT-IMP: ABNORMAL

## 2025-06-06 PROCEDURE — 6360000002 HC RX W HCPCS: Performed by: NURSE ANESTHETIST, CERTIFIED REGISTERED

## 2025-06-06 PROCEDURE — 2709999900 HC NON-CHARGEABLE SUPPLY: Performed by: SURGERY

## 2025-06-06 PROCEDURE — 3700000000 HC ANESTHESIA ATTENDED CARE: Performed by: SURGERY

## 2025-06-06 PROCEDURE — 7100000000 HC PACU RECOVERY - FIRST 15 MIN: Performed by: SURGERY

## 2025-06-06 PROCEDURE — 3700000001 HC ADD 15 MINUTES (ANESTHESIA): Performed by: SURGERY

## 2025-06-06 PROCEDURE — 2580000003 HC RX 258: Performed by: NURSE ANESTHETIST, CERTIFIED REGISTERED

## 2025-06-06 PROCEDURE — 3600000012 HC SURGERY LEVEL 2 ADDTL 15MIN: Performed by: SURGERY

## 2025-06-06 PROCEDURE — 82962 GLUCOSE BLOOD TEST: CPT

## 2025-06-06 PROCEDURE — 3600000002 HC SURGERY LEVEL 2 BASE: Performed by: SURGERY

## 2025-06-06 PROCEDURE — 88305 TISSUE EXAM BY PATHOLOGIST: CPT

## 2025-06-06 PROCEDURE — 43239 EGD BIOPSY SINGLE/MULTIPLE: CPT | Performed by: SURGERY

## 2025-06-06 PROCEDURE — 7100000001 HC PACU RECOVERY - ADDTL 15 MIN: Performed by: SURGERY

## 2025-06-06 RX ORDER — SODIUM CHLORIDE, SODIUM LACTATE, POTASSIUM CHLORIDE, CALCIUM CHLORIDE 600; 310; 30; 20 MG/100ML; MG/100ML; MG/100ML; MG/100ML
INJECTION, SOLUTION INTRAVENOUS
Status: DISCONTINUED | OUTPATIENT
Start: 2025-06-06 | End: 2025-06-06 | Stop reason: SDUPTHER

## 2025-06-06 RX ORDER — GLYCOPYRROLATE 0.2 MG/ML
INJECTION, SOLUTION INTRAMUSCULAR; INTRAVENOUS
Status: DISCONTINUED | OUTPATIENT
Start: 2025-06-06 | End: 2025-06-06 | Stop reason: SDUPTHER

## 2025-06-06 RX ORDER — LIDOCAINE HYDROCHLORIDE 20 MG/ML
INJECTION, SOLUTION EPIDURAL; INFILTRATION; INTRACAUDAL; PERINEURAL
Status: DISCONTINUED | OUTPATIENT
Start: 2025-06-06 | End: 2025-06-06 | Stop reason: SDUPTHER

## 2025-06-06 RX ORDER — PROPOFOL 10 MG/ML
INJECTION, EMULSION INTRAVENOUS
Status: DISCONTINUED | OUTPATIENT
Start: 2025-06-06 | End: 2025-06-06 | Stop reason: SDUPTHER

## 2025-06-06 RX ADMIN — PROPOFOL 50 MG: 10 INJECTION, EMULSION INTRAVENOUS at 07:37

## 2025-06-06 RX ADMIN — PROPOFOL 50 MG: 10 INJECTION, EMULSION INTRAVENOUS at 07:33

## 2025-06-06 RX ADMIN — PROPOFOL 150 MG: 10 INJECTION, EMULSION INTRAVENOUS at 07:32

## 2025-06-06 RX ADMIN — GLYCOPYRROLATE 0.3 MG: 0.2 INJECTION, SOLUTION INTRAMUSCULAR; INTRAVENOUS at 07:31

## 2025-06-06 RX ADMIN — PROPOFOL 50 MG: 10 INJECTION, EMULSION INTRAVENOUS at 07:35

## 2025-06-06 RX ADMIN — LIDOCAINE HYDROCHLORIDE 100 MG: 20 INJECTION, SOLUTION EPIDURAL; INFILTRATION; INTRACAUDAL; PERINEURAL at 07:32

## 2025-06-06 RX ADMIN — SODIUM CHLORIDE, POTASSIUM CHLORIDE, SODIUM LACTATE AND CALCIUM CHLORIDE: 600; 310; 30; 20 INJECTION, SOLUTION INTRAVENOUS at 07:11

## 2025-06-06 ASSESSMENT — PAIN SCALES - GENERAL: PAINLEVEL_OUTOF10: 0

## 2025-06-06 ASSESSMENT — PAIN - FUNCTIONAL ASSESSMENT: PAIN_FUNCTIONAL_ASSESSMENT: 0-10

## 2025-06-06 NOTE — OP NOTE
Operative Note      Patient: Alexandrea Panda  YOB: 1990  MRN: [id number]    Date of Procedure: 6/6/2025    Pre-Op Diagnosis Codes:      * Abnormal weight gain [R63.5]     * Bariatric surgery status [Z98.84]     * Esophageal reflux [K21.9]    Post-Op Diagnosis: Same       Procedure(s):  ESOPHAGOGASTRODUODENOSCOPY WITH BIOPSY    Surgeon(s):  Dwight You MD    Assistant:   * No surgical staff found *    Anesthesia: Monitor Anesthesia Care    Estimated Blood Loss (mL): Minimal    Complications: None    Specimens:   ID Type Source Tests Collected by Time Destination   1 : GASTRIC ANTRUM Tissue Gastric SURGICAL PATHOLOGY Dwight You MD 6/6/2025 0737        Implants:  * No implants in log *      Drains: * No LDAs found *        Findings:    Esophagus: Normal, no esophagitis   Stomach: Small hiatal hernia present about 1 to 2 cm sliding, slightly dilated sleeve gastrectomy stomach, distal gastritis present and biopsied, normal pylorus   Duodenum: Normal            Detailed Description of Procedure:   The patient was in the endoscopy suite consent was signed all risk and benefits were explained to the patient he was taken back to the endoscopy room timeout was called sedation was given I then introduced the endoscope down the mouth to the esophagus and down looking over the entire esophagus from upper middle and lower appearing to be normal without any sign of esophagitis, a 1 to 2 cm hiatal hernia was present sliding in nature.  The sleeve gastrectomy stomach was a bit dilated as expected.  The distal stomach appeared to be normal other than some gastritis which was biopsied in the antrum.  The pylorus was normal.  I introduced the scope through the pylorus into the duodenum which the 1st and 2nd portions appeared to be normal without any issues.  I then slowly withdrew the scope confirming my findings and completed the procedure.    Electronically signed by Dwight You MD on 6/6/2025 at 7:42 AM

## 2025-06-06 NOTE — ANESTHESIA PRE PROCEDURE
Department of Anesthesiology  Preprocedure Note       Name:  Alexandrea Panda   Age:  35 y.o.  :  1990                                          MRN:  [id number]         Date:  2025      Surgeon: Surgeon(s):  Dwight You MD    Procedure: Procedure(s):  ESOPHAGOGASTRODUODENOSCOPY    Medications prior to admission:   Prior to Admission medications    Medication Sig Start Date End Date Taking? Authorizing Provider   DULoxetine (CYMBALTA) 30 MG extended release capsule Take 1 capsule by mouth daily 25  Yes Amaury Ferrara MD   gabapentin (NEURONTIN) 300 MG capsule TAKE TWO CAPSULES BY MOUTH EVERY MORNING AND TAKE THREE CAPSULES BY MOUTH NIGHTLY 25 Yes Melida Resendiz APRN - NP   Tirzepatide 7.5 MG/0.5ML SOAJ Inject 7.5 mg into the skin every 7 days E11.65. Her insurance has approved the Mounjaro 25  Yes Brandon Randolph MD   omeprazole (PRILOSEC) 40 MG delayed release capsule TAKE 1 CAPSULE BY MOUTH TWICE A DAY 25  Yes Leigh Barnes DO   linaclotide (LINZESS) 290 MCG CAPS capsule Take 1 capsule by mouth every morning (before breakfast) 24  Yes Amaury Ferrara MD   busPIRone (BUSPAR) 15 MG tablet TAKE 1 TABLET BY MOUTH 3 TIMES A DAY 24  Yes Amaury Ferrara MD   DULoxetine (CYMBALTA) 60 MG extended release capsule Take 1 capsule by mouth daily 24  Yes Amaury Ferrara MD   butalbital-APAP-caffeine -40 MG CAPS per capsule Take 1 capsule by mouth every 4 hours as needed for Headaches 24  Yes Dee Olivares MD   aspirin 81 MG EC tablet Take 1 tablet by mouth daily 24  Yes Amaury Ferrara MD   metoprolol tartrate (LOPRESSOR) 50 MG tablet Take 1 tablet by mouth 2 times daily 24  Yes Amaury Ferraar MD   rimegepant sulfate (NURTEC) 75 MG TBDP Place one tablet by mouth one time for migraine rescue as needed. Max 1 dose per 24 hours. 24  Yes Melida Resendiz, APRN - NP   cyanocobalamin 1000 MCG/ML injection INJECT 1ML INTO THE MUSCLE

## 2025-06-06 NOTE — H&P
Bariatric Surgery Consult     Alexandrea Panda is a 34 y.o. female with a history of morbid obesity. Her Height: 160 cm (5' 3\"), Weight - Scale: (!) 144.6 kg (318 lb 12.8 oz). Body mass index is 56.47 kg/m². She reports that she has been trying to lose weight for 10 years. Her maximum weight was 340 pounds. She has attended our bariatric surgery information seminar. Alexandrea wants to consider conversion of sleeve to laparoscopic gastric bypass surgery.      Pt is self-referred.       Dietary History:   The patient says that in the past, physician supervised, unsupervised diets, and Sleeve gastrectomy  have not resulted in real success. When asked why she was not able to achieve or maintain significant weight loss she replied, \" she lost about 70 to 80 pounds with sleeve gastrectomy but gained almost all of it back\".     She said she had a lot of depression and anxiety a few years after surgery and gained a lot of it back due to bad habits     Comorbidities:      Bariatric comorbidities present: hypertension, non-insulin dependent diabetes, GERD, chronic back problems, and obstructive sleep apnea     Ambulatory status: independent     The patient's reported level of exercise: not active.             Patient Active Problem List     Diagnosis Date Noted    Cervicalgia 04/30/2024    Sjogren's syndrome 07/06/2023    Chronic migraine without aura, intractable, without status migrainosus 07/06/2023    Systemic involvement of connective tissue, unspecified 06/23/2023    Posttraumatic stress disorder 01/30/2023    Moderate major depression (HCC) 01/13/2023    Type 2 diabetes mellitus with chronic kidney disease (HCC) 11/17/2021    Dysthymia 04/21/2020    Atypical squamous cells of undetermined significance (ASCUS) on Papanicolaou smear of cervix 03/15/2019    Type 2 diabetes with nephropathy (HCC) 01/13/2019    Class 3 severe obesity due to excess calories with serious comorbidity and body mass index (BMI) of 60.0 to 69.9 in  nutrition evaluation and we may also possibly need to do redo her psychological evaluation.     Signed By: Dwight You MD      April 3, 2025

## 2025-06-06 NOTE — ANESTHESIA POSTPROCEDURE EVALUATION
Department of Anesthesiology  Postprocedure Note    Patient: Alexandrea Panda  MRN: [id number]  YOB: 1990  Date of evaluation: 6/6/2025    Procedure Summary       Date: 06/06/25 Room / Location: [facility] ASU A1 / [facility] AMBULATORY OR    Anesthesia Start: 0727 Anesthesia Stop: 0742    Procedure: ESOPHAGOGASTRODUODENOSCOPY (Upper GI Region) Diagnosis:       Abnormal weight gain      Bariatric surgery status      Esophageal reflux      (Abnormal weight gain [R63.5])      (Bariatric surgery status [Z98.84])      (Esophageal reflux [K21.9])    Surgeons: Dwight You MD Responsible Provider: Julio César Mcnally MD    Anesthesia Type: MAC ASA Status: 3            Anesthesia Type: MAC    Fartun Phase I: Fartun Score: 10    Fartun Phase II:      Anesthesia Post Evaluation    Patient location during evaluation: PACU  Patient participation: complete - patient participated  Level of consciousness: awake and alert  Airway patency: patent  Nausea & Vomiting: no nausea  Cardiovascular status: hemodynamically stable  Respiratory status: acceptable  Hydration status: stable  Pain management: adequate    No notable events documented.

## 2025-06-11 ENCOUNTER — TELEPHONE (OUTPATIENT)
Age: 35
End: 2025-06-11

## 2025-06-14 ENCOUNTER — OFFICE VISIT (OUTPATIENT)
Age: 35
End: 2025-06-14

## 2025-06-14 VITALS
OXYGEN SATURATION: 100 % | TEMPERATURE: 98.3 F | BODY MASS INDEX: 55.27 KG/M2 | RESPIRATION RATE: 18 BRPM | SYSTOLIC BLOOD PRESSURE: 117 MMHG | DIASTOLIC BLOOD PRESSURE: 75 MMHG | HEART RATE: 74 BPM | WEIGHT: 293 LBS

## 2025-06-14 DIAGNOSIS — H65.191 OTHER NON-RECURRENT ACUTE NONSUPPURATIVE OTITIS MEDIA OF RIGHT EAR: ICD-10-CM

## 2025-06-14 DIAGNOSIS — N30.01 ACUTE CYSTITIS WITH HEMATURIA: Primary | ICD-10-CM

## 2025-06-14 DIAGNOSIS — T36.95XA ANTIBIOTIC-INDUCED YEAST INFECTION: ICD-10-CM

## 2025-06-14 DIAGNOSIS — B37.9 ANTIBIOTIC-INDUCED YEAST INFECTION: ICD-10-CM

## 2025-06-14 PROBLEM — Z91.89 AT HIGH RISK FOR BREAST CANCER: Status: ACTIVE | Noted: 2021-02-11

## 2025-06-14 LAB
BILIRUBIN, URINE, POC: NEGATIVE
BLOOD URINE, POC: NORMAL
GLUCOSE URINE, POC: NEGATIVE
KETONES, URINE, POC: NEGATIVE
LEUKOCYTE ESTERASE, URINE, POC: NORMAL
NITRITE, URINE, POC: NEGATIVE
PH, URINE, POC: 6.5 (ref 4.6–8)
PROTEIN,URINE, POC: NORMAL
SPECIFIC GRAVITY, URINE, POC: 1.02 (ref 1–1.03)
URINALYSIS CLARITY, POC: NORMAL
URINALYSIS COLOR, POC: YELLOW
UROBILINOGEN, POC: NORMAL MG/DL

## 2025-06-14 RX ORDER — FLUCONAZOLE 150 MG/1
150 TABLET ORAL DAILY
Qty: 2 TABLET | Refills: 0 | Status: SHIPPED | OUTPATIENT
Start: 2025-06-14

## 2025-06-14 RX ORDER — AMOXICILLIN 500 MG/1
500 CAPSULE ORAL 2 TIMES DAILY
Qty: 14 CAPSULE | Refills: 0 | Status: SHIPPED | OUTPATIENT
Start: 2025-06-14 | End: 2025-06-21

## 2025-06-14 NOTE — PATIENT INSTRUCTIONS
Thank you for visiting [facility] today.    -Increase fluid intake.  Some people say cranberry juice helps with discomfort.  -Don't hold your urine.  Urinate when you feel like you need to.  -Wipe from front to back after bowel movements.  -If sexually active, urinate after intercourse and use enough lubrication.   -Avoid taking bubble baths.  -Wear loose fitting clothing.  -Decrease caffeine or carbonated drinks  -Repeat urine screen in two weeks  -Take antibiotic with food and consider probiotic    A survey will be sent shortly to your phone/email.  Please complete this so we may know how to better serve you in the future.      Please go to the Emergency Department immediately for symptoms of a urinary tract infection along with any of the following:  fever with severe and sudden shaking (rigors), nausea, vomiting and the inability to keep down clear fluids or medications.

## 2025-06-14 NOTE — PROGRESS NOTES
Subjective     Chief Complaint   Patient presents with    Dysuria     Dysuria, right ear pain, sinus pressure.   X ear pain 1 week   X sinus pressure 2 days        Patient is a 35-year-old female presenting with urinary burning, sinus congestion and right ear pain.  Symptoms began 1 week ago.  Denies fever, abdominal or back pain.  Takes Zyrtec on a daily basis.      Dysuria         Past Medical History:   Diagnosis Date    Anxiety with depression     Arrhythmia     tachycardia    Arthritis     Asthma     Bradley hump 1/7/2015    Carpal tunnel syndrome of right wrist 12/20/2016    Diabetes (HCC)     VICTOR M (generalized anxiety disorder) 4/4/2018    GERD (gastroesophageal reflux disease) 8/13/2014    Headache     HTN (hypertension) 7/25/2011    Hyperaldosteronism 10/19/2015    Hyperhidrosis 4/4/2018    Morbid obesity (HCC)     Other ill-defined conditions(799.89)     migraines    Palpitation 4/4/2018    Sleep apnea     uses CPAP    Tachycardia 4/4/2018       Past Surgical History:   Procedure Laterality Date    CHOLECYSTECTOMY  11/10/2021    Dr damaris You     GASTRECTOMY  09/08/2020    Gastric sleeve     GYN  10/19/2020    Laparoscopic right oopherectomy, left cystectomy    HERNIA REPAIR  09/08/2020    OVARIAN CYST REMOVAL  10/19/2020    REMOVAL OF TONSILS,<13 Y/O      TONSILLECTOMY      UPPER GASTROINTESTINAL ENDOSCOPY N/A 6/6/2025    ESOPHAGOGASTRODUODENOSCOPY performed by Damaris You MD at [facility] AMBULATORY OR    WISDOM TOOTH EXTRACTION         Family History   Problem Relation Age of Onset    Rheum Arthritis Mother     Gout Mother     Anesth Problems Mother         respiratory issues - feels like she cannot breath when coming out of anesthesia    Hypertension Mother     Diabetes Mother         Type II    Psychiatric Disorder Father     Drug Abuse Father     Cancer Other         prostate    Hypertension Sister     Breast Cancer Sister     Thyroid Disease Sister         \"I think\"    ADHD Brother     Hypertension

## 2025-06-15 ENCOUNTER — RESULTS FOLLOW-UP (OUTPATIENT)
Age: 35
End: 2025-06-15

## 2025-06-15 LAB
BACTERIA SPEC CULT: ABNORMAL
CC UR VC: ABNORMAL
SERVICE CMNT-IMP: ABNORMAL

## 2025-06-16 LAB
BACTERIA SPEC CULT: ABNORMAL
CC UR VC: ABNORMAL
SERVICE CMNT-IMP: ABNORMAL

## 2025-06-17 ENCOUNTER — OFFICE VISIT (OUTPATIENT)
Age: 35
End: 2025-06-17

## 2025-06-17 VITALS
WEIGHT: 293 LBS | HEART RATE: 78 BPM | RESPIRATION RATE: 18 BRPM | OXYGEN SATURATION: 98 % | SYSTOLIC BLOOD PRESSURE: 139 MMHG | DIASTOLIC BLOOD PRESSURE: 81 MMHG | BODY MASS INDEX: 56.15 KG/M2 | TEMPERATURE: 98.5 F

## 2025-06-17 DIAGNOSIS — B37.9 ANTIBIOTIC-INDUCED YEAST INFECTION: ICD-10-CM

## 2025-06-17 DIAGNOSIS — T36.95XA ANTIBIOTIC-INDUCED YEAST INFECTION: ICD-10-CM

## 2025-06-17 DIAGNOSIS — J06.9 VIRAL UPPER RESPIRATORY INFECTION: ICD-10-CM

## 2025-06-17 DIAGNOSIS — J04.0 ACUTE LARYNGITIS: ICD-10-CM

## 2025-06-17 DIAGNOSIS — N30.01 ACUTE CYSTITIS WITH HEMATURIA: Primary | ICD-10-CM

## 2025-06-17 RX ORDER — FLUCONAZOLE 150 MG/1
150 TABLET ORAL ONCE
Qty: 1 TABLET | Refills: 0 | Status: SHIPPED | OUTPATIENT
Start: 2025-06-17 | End: 2025-06-17

## 2025-06-17 RX ORDER — NITROFURANTOIN 25; 75 MG/1; MG/1
100 CAPSULE ORAL 2 TIMES DAILY
Qty: 14 CAPSULE | Refills: 0 | Status: SHIPPED | OUTPATIENT
Start: 2025-06-17 | End: 2025-06-24

## 2025-06-17 RX ORDER — PREDNISONE 20 MG/1
40 TABLET ORAL DAILY
Qty: 10 TABLET | Refills: 0 | Status: SHIPPED | OUTPATIENT
Start: 2025-06-17 | End: 2025-06-22

## 2025-06-17 NOTE — PATIENT INSTRUCTIONS
Exam and history consistent with UTI, Viral upper respiratory infection, acute laryngitis, and history of antibiotic-induced UTI.   Exam and history consistent with viral upper respiratory infection with cough.  -Macrobid twice a day for 7 days   -Prednisone 40 mg x 5 days   -Take Fluconazole 150 mg once only if you develop a yeast infection  -Increase fluid intake to maintain hydration and to help fight infection. Please drink at least 64 ounces of fluid a day  -Ibuprofen 600 mg every 6 hours as needed and Tylenol 500 mg every 6 hours as needed for fever/pain  -Mucinex Fastmax, Tylenol severe cold and flu, or DayQuil for symptomatic relief and decongestion  -1 tablespoon of honey or mixed with hot tea for help with cough.  Recommend taking 30 minutes before sleep to help with cough at night  -Steam inhalation, warm compresses, humidifier, and warm soup can also help  -Please follow-up with your PCP in the next 2 to 4 weeks    If symptoms persist or worsen, please contact PCP and/or return to urgent care.

## 2025-06-17 NOTE — PROGRESS NOTES
2025   Alexandrea Panda (: 1990) is a 35 y.o. female, Established patient, here for evaluation of the following chief complaint(s):  Pharyngitis (C/o ear pain and sore throat. )     ASSESSMENT/PLAN:  Below is the assessment and plan developed based on review of pertinent history, physical exam, labs, studies, and medications.  Assessment & Plan  Acute cystitis with hematuria       Orders:    nitrofurantoin, macrocrystal-monohydrate, (MACROBID) 100 MG capsule; Take 1 capsule by mouth 2 times daily for 7 days  Exam and history consistent with UTI, Viral upper respiratory infection, acute laryngitis, and history of antibiotic-induced UTI.   Exam and history consistent with viral upper respiratory infection with cough.  -Macrobid twice a day for 7 days   -Prednisone 40 mg x 5 days   -Take Fluconazole 150 mg once only if you develop a yeast infection  -Increase fluid intake to maintain hydration and to help fight infection. Please drink at least 64 ounces of fluid a day  -Ibuprofen 600 mg every 6 hours as needed and Tylenol 500 mg every 6 hours as needed for fever/pain  -Mucinex Fastmax, Tylenol severe cold and flu, or DayQuil for symptomatic relief and decongestion  -1 tablespoon of honey or mixed with hot tea for help with cough.  Recommend taking 30 minutes before sleep to help with cough at night  -Steam inhalation, warm compresses, humidifier, and warm soup can also help  -Please follow-up with your PCP in the next 2 to 4 weeks    If symptoms persist or worsen, please contact PCP and/or return to urgent care.    Viral upper respiratory infection       Orders:    predniSONE (DELTASONE) 20 MG tablet; Take 2 tablets by mouth daily for 5 days    Acute laryngitis            Antibiotic-induced yeast infection       Orders:    fluconazole (DIFLUCAN) 150 MG tablet; Take 1 tablet by mouth once for 1 dose          Handout given with care instructions  2. OTC for symptom management. Increase fluid intake, ensure

## 2025-06-25 ENCOUNTER — OFFICE VISIT (OUTPATIENT)
Age: 35
End: 2025-06-25

## 2025-06-25 DIAGNOSIS — E66.01 MORBID OBESITY (HCC): Primary | ICD-10-CM

## 2025-06-25 NOTE — PROGRESS NOTES
[facility]  Supervised Weight Loss     Date:   2025    Patient's Name: Alexandrea Panda  : 1990    Insurance:  [place]          Session: 2 of  6  Surgery: Gastric bypass (conversion from sleeve)  Surgeon:  Dr. Dwight You    Height: 63 inches Weight:    312      Lbs.   BMI: 55   Pounds Lost since last month: 6 lbs               Pounds Gained since last month: 0    Starting Weight: 318 lbs   Previous Month’s Weight: 318 lbs  Overall Pounds Lost: 6 lbs Overall Pounds Gained: 0    Other Pertinent Information: Today's appointment was completed in a virtual class setting. Surgeon recommending 10-15 lb weight loss before surgery.     Smoking Status:  None  Alcohol Intake: 3-4, 1x month    I have reviewed with pt the guidelines of the supervised wt loss program.  Pt understands the expectations of some wt loss during the program and that wt gain could delay the process. I have also explained that classes need to be consecutive.  Missing a class may result in starting over. Pt has received this information in writing.          Changes that patient has made since last month include: drinking more water, having a protein shake at bfast.      Eating Habits and Behaviors  General healthy eating guidelines were discussed. A nutrition lesson specific to the importance of protein intake after surgery was provided. We discussed food sources of protein, protein supplements and multiple reasons as to why protein is important after bariatric surgery.  Pts were instructed to focus on including protein at every meal and practice eating protein first at the meal. Pts were encouraged to sample a protein shake for tolerance. Patients were also instructed to use the balanced plate method for help with portion control and general healthy eating prior to surgery. We discussed measuring meals to 1/2 cup total per meal after surgery. Drinking only calorie-free, sugar-free and non-carbonated

## 2025-07-01 DIAGNOSIS — Z79.4 TYPE 2 DIABETES MELLITUS WITHOUT COMPLICATION, WITH LONG-TERM CURRENT USE OF INSULIN (HCC): ICD-10-CM

## 2025-07-01 DIAGNOSIS — E55.9 VITAMIN D DEFICIENCY, UNSPECIFIED: ICD-10-CM

## 2025-07-01 DIAGNOSIS — E11.9 TYPE 2 DIABETES MELLITUS WITHOUT COMPLICATION, WITH LONG-TERM CURRENT USE OF INSULIN (HCC): ICD-10-CM

## 2025-07-01 RX ORDER — OMEPRAZOLE 40 MG/1
40 CAPSULE, DELAYED RELEASE ORAL 2 TIMES DAILY
Qty: 180 CAPSULE | Refills: 1 | Status: SHIPPED | OUTPATIENT
Start: 2025-07-01

## 2025-07-01 NOTE — TELEPHONE ENCOUNTER
Last appointment: 2/17/25  Next appointment: none  Previous refill encounter(s): 2/21/25 #180    Requested Prescriptions     Pending Prescriptions Disp Refills    omeprazole (PRILOSEC) 40 MG delayed release capsule [Pharmacy Med Name: OMEPRAZOLE DR 40 MG CAPSULE] 180 capsule 1     Sig: TAKE 1 CAPSULE BY MOUTH TWICE A DAY         For Pharmacy Admin Tracking Only    Program: Medication Refill  CPA in place:    Recommendation Provided To:   Intervention Detail: New Rx: 1, reason: Patient Preference  Intervention Accepted By:   Gap Closed?:    Time Spent (min): 5

## 2025-07-21 ENCOUNTER — PATIENT MESSAGE (OUTPATIENT)
Age: 35
End: 2025-07-21

## 2025-07-22 NOTE — TELEPHONE ENCOUNTER
Verified patient with 2 identifiers   Patient scheduled at 12 Noon on 7/30/25. Double booked-OK per Melida

## 2025-07-23 ENCOUNTER — OFFICE VISIT (OUTPATIENT)
Age: 35
End: 2025-07-23

## 2025-07-23 DIAGNOSIS — E66.01 MORBID OBESITY (HCC): Primary | ICD-10-CM

## 2025-07-23 NOTE — PROGRESS NOTES
[facility] at [facility]  Supervised Weight Loss     Date:   2025    Patient's Name: Alexandrea Panda  : 1990    Insurance:  [place]             Session: 3 of  6  Surgery: Gastric bypass                      Surgeon:  Dwight You M.D.      Height: 63\"       Reported Weight:    311      Lbs.                                   BMI: 55             Pounds Lost since last month: 1#            Pounds Gained since last month: 0     Starting Weight: 318#                   Previous Month’s Weight: 312#  Overall Pounds Lost: 7#           Overall Pounds Gained: 0     Other Pertinent Information: Today's appointment was completed in a virtual class setting. Pt with previous sleeve gastrectomy seeking conversion to gastric bypass. Surgeon recommending 10-15 lb weight loss before surgery.     Smoking Status:  none  Alcohol Intake: 3-4 drinks, socially    I have reviewed with pt the guidelines of the supervised wt loss program.  Pt understands the expectations of some wt loss during the program and that wt gain could delay the process. I have also explained that appointments need to be consecutive and missing an appointment may result in starting over. Pt has received this information in writing. This appointment was complimentary/non-billable as part of the bariatric surgery program.          Changes that patient has made since last month include:  drinking more water, less soda, walking for exercise.      Eating Habits and Behaviors  A nutrition lesson specific to vitamins was provided. We discussed the various reasons for needing vitamins and different types and doses. General healthy eating guidelines were also discussed. Pts were instructed that their plate should be made up 1/2 plate coming from non-starchy vegetables, 1/4 coming from lean meat, and 1/4 of their plate coming from carbohydrates, including fruits, starches, or milk.  We discussed measuring meals to 1/2 cup total per meal

## 2025-07-24 ENCOUNTER — PATIENT MESSAGE (OUTPATIENT)
Age: 35
End: 2025-07-24

## 2025-07-24 DIAGNOSIS — F32.1 MODERATE MAJOR DEPRESSION (HCC): ICD-10-CM

## 2025-07-24 DIAGNOSIS — F99 INSOMNIA DUE TO OTHER MENTAL DISORDER: ICD-10-CM

## 2025-07-24 DIAGNOSIS — F41.1 GENERALIZED ANXIETY DISORDER: ICD-10-CM

## 2025-07-24 DIAGNOSIS — M35.9 SYSTEMIC INVOLVEMENT OF CONNECTIVE TISSUE, UNSPECIFIED: ICD-10-CM

## 2025-07-24 DIAGNOSIS — F39 MOOD DISORDER: ICD-10-CM

## 2025-07-24 DIAGNOSIS — E11.40 TYPE 2 DIABETES MELLITUS WITH DIABETIC NEUROPATHY, WITHOUT LONG-TERM CURRENT USE OF INSULIN (HCC): ICD-10-CM

## 2025-07-24 DIAGNOSIS — F51.05 INSOMNIA DUE TO OTHER MENTAL DISORDER: ICD-10-CM

## 2025-07-25 RX ORDER — DULOXETIN HYDROCHLORIDE 60 MG/1
60 CAPSULE, DELAYED RELEASE ORAL DAILY
Qty: 90 CAPSULE | Refills: 1 | OUTPATIENT
Start: 2025-07-25

## 2025-07-25 RX ORDER — DULOXETIN HYDROCHLORIDE 60 MG/1
CAPSULE, DELAYED RELEASE ORAL DAILY
Qty: 30 CAPSULE | Refills: 0 | Status: SHIPPED | OUTPATIENT
Start: 2025-07-25

## 2025-07-25 NOTE — TELEPHONE ENCOUNTER
Patient comment: I am conpletely out. Please refill as soon as possible     Last appointment: 2/17/25  Next appointment: none  Previous refill encounter(s): 5/28/25 #90    Requested Prescriptions     Pending Prescriptions Disp Refills    DULoxetine (CYMBALTA) 60 MG extended release capsule [Pharmacy Med Name: DULOXETINE HCL DR 60 MG CAP] 30 capsule 0     Sig: TAKE 1 CAPSULE BY MOUTH EVERY DAY         For Pharmacy Admin Tracking Only    Program: Medication Refill  CPA in place:    Recommendation Provided To:   Intervention Detail: New Rx: 1, reason: Patient Preference  Intervention Accepted By:   Gap Closed?:    Time Spent (min): 5

## 2025-07-28 DIAGNOSIS — E11.40 TYPE 2 DIABETES MELLITUS WITH DIABETIC NEUROPATHY, WITHOUT LONG-TERM CURRENT USE OF INSULIN (HCC): ICD-10-CM

## 2025-07-28 DIAGNOSIS — F51.05 INSOMNIA DUE TO OTHER MENTAL DISORDER: ICD-10-CM

## 2025-07-28 DIAGNOSIS — M35.9 SYSTEMIC INVOLVEMENT OF CONNECTIVE TISSUE, UNSPECIFIED: ICD-10-CM

## 2025-07-28 DIAGNOSIS — F39 MOOD DISORDER: ICD-10-CM

## 2025-07-28 DIAGNOSIS — F99 INSOMNIA DUE TO OTHER MENTAL DISORDER: ICD-10-CM

## 2025-07-28 DIAGNOSIS — F32.1 MODERATE MAJOR DEPRESSION (HCC): ICD-10-CM

## 2025-07-28 DIAGNOSIS — F41.1 GENERALIZED ANXIETY DISORDER: ICD-10-CM

## 2025-07-30 ENCOUNTER — HOSPITAL ENCOUNTER (OUTPATIENT)
Facility: HOSPITAL | Age: 35
Discharge: HOME | End: 2025-08-02

## 2025-07-30 ENCOUNTER — OFFICE VISIT (OUTPATIENT)
Age: 35
End: 2025-07-30
Payer: COMMERCIAL

## 2025-07-30 VITALS
BODY MASS INDEX: 51.91 KG/M2 | DIASTOLIC BLOOD PRESSURE: 86 MMHG | SYSTOLIC BLOOD PRESSURE: 126 MMHG | WEIGHT: 293 LBS | HEIGHT: 63 IN | HEART RATE: 68 BPM

## 2025-07-30 DIAGNOSIS — Z92.29 S/P BOTOX INJECTION: ICD-10-CM

## 2025-07-30 DIAGNOSIS — Z90.3 S/P GASTRIC SLEEVE PROCEDURE: ICD-10-CM

## 2025-07-30 DIAGNOSIS — E55.9 VITAMIN D DEFICIENCY: ICD-10-CM

## 2025-07-30 DIAGNOSIS — D64.9 ANEMIA, UNSPECIFIED TYPE: ICD-10-CM

## 2025-07-30 DIAGNOSIS — E55.9 VITAMIN D DEFICIENCY, UNSPECIFIED: ICD-10-CM

## 2025-07-30 DIAGNOSIS — I10 ESSENTIAL (PRIMARY) HYPERTENSION: ICD-10-CM

## 2025-07-30 DIAGNOSIS — K21.9 CHRONIC GERD: ICD-10-CM

## 2025-07-30 DIAGNOSIS — G43.719 CHRONIC MIGRAINE WITHOUT AURA, INTRACTABLE, WITHOUT STATUS MIGRAINOSUS: Primary | ICD-10-CM

## 2025-07-30 DIAGNOSIS — Z79.4 TYPE 2 DIABETES MELLITUS WITHOUT COMPLICATION, WITH LONG-TERM CURRENT USE OF INSULIN (HCC): Primary | ICD-10-CM

## 2025-07-30 DIAGNOSIS — E11.9 TYPE 2 DIABETES MELLITUS WITHOUT COMPLICATION, WITH LONG-TERM CURRENT USE OF INSULIN (HCC): Primary | ICD-10-CM

## 2025-07-30 DIAGNOSIS — E66.01 MORBID OBESITY WITH BMI OF 50.0-59.9, ADULT (HCC): ICD-10-CM

## 2025-07-30 DIAGNOSIS — Z00.00 ENCOUNTER FOR PREVENTIVE CARE: ICD-10-CM

## 2025-07-30 DIAGNOSIS — E78.2 MIXED HYPERLIPIDEMIA: ICD-10-CM

## 2025-07-30 DIAGNOSIS — E53.8 VITAMIN B12 DEFICIENCY: ICD-10-CM

## 2025-07-30 LAB — HBA1C MFR BLD: 7.9 %

## 2025-07-30 PROCEDURE — 3074F SYST BP LT 130 MM HG: CPT | Performed by: INTERNAL MEDICINE

## 2025-07-30 PROCEDURE — 83036 HEMOGLOBIN GLYCOSYLATED A1C: CPT | Performed by: INTERNAL MEDICINE

## 2025-07-30 PROCEDURE — 99214 OFFICE O/P EST MOD 30 MIN: CPT | Performed by: INTERNAL MEDICINE

## 2025-07-30 PROCEDURE — 3051F HG A1C>EQUAL 7.0%<8.0%: CPT | Performed by: INTERNAL MEDICINE

## 2025-07-30 PROCEDURE — 64615 CHEMODENERV MUSC MIGRAINE: CPT | Performed by: NURSE PRACTITIONER

## 2025-07-30 PROCEDURE — 3079F DIAST BP 80-89 MM HG: CPT | Performed by: INTERNAL MEDICINE

## 2025-07-30 RX ORDER — CELECOXIB 200 MG/1
200 CAPSULE ORAL DAILY
COMMUNITY
Start: 2025-07-09

## 2025-07-30 RX ORDER — PRAZOSIN HYDROCHLORIDE 2 MG/1
2 CAPSULE ORAL NIGHTLY
COMMUNITY
Start: 2025-07-14

## 2025-07-30 RX ORDER — LAMOTRIGINE 100 MG/1
100 TABLET, EXTENDED RELEASE ORAL DAILY
COMMUNITY
Start: 2025-07-14

## 2025-07-30 NOTE — PROGRESS NOTES
Chief Complaint   Patient presents with    Diabetes     Pcp and pharmacy verified     History of Present Illness: Alexandrea Panda is a 35 y.o. female here for follow up of diabetes.  She was diagnosed with diabetes \"when I was in high school\".    Because pt has features concerning for cushing's her PCP checked a 24 hour urine cortisol which was not elevated (42). She also had a TSH drawn which was 2.10. In March 2016 we tested pt for hyperaldosteronism, which was negative.   I tested her for evidence of PCOS, her Testosterone was 14, with DHEA-S 109, her 17-OH Prog was not elevated and an overnight dexamethasone suppression test did suppress her ACTH and cortisol.    She had the Gastric Sleeve on 9/8/20.     Pt had a cholecystectomy on 11/10/21.    At our last visit in March 2025 her A1C was 7.1%.  Her weight was 322 pounds.  We agreed to try her on Mounjaro 5mg weekly to replace the Ozempic.  Pt encouraged to stop drinking regular sodas and snacking  on chips and sweets. Pt to work on increasing her physical activity as well.    \"I started the boot-camp again. I am still drinking a lot of sodas I know I need to stop them.\"    Pt notes she has right foot Achilles tendonitis.       Her A1C today was 7.9%.  Her weight today was down from 322 pounds in March 2025 to 307 pounds.      Pt is taking Mounjaro 10mg every Sunday. \"I have been on the Mounjaro 10mg for 3 weeks.\"   She is tolerating the Mounjaro 10mg weekly.     \"I am working with my gastric surgeon to see what we can do for my reflux and the weight issues. I see him next week.\"     She has not been using her DexCom.      Pt is now working day shifts  - She waking up around 530AM.  - She does not eat breakfast.   - She denies having a mid-morning snack.  - She has her first meal around Noon, yesterday she had a chicken leg and regular soda.      - She will have a mid-afternoon snack of a PB&J.  - She has dinner around 530-6PM, last night she did not have dinner.

## 2025-07-30 NOTE — PROGRESS NOTES
Botox Injection Note       ICD-10-CM    1. Chronic migraine without aura, intractable, without status migrainosus  G43.719 Onabotulinumtoxin A (BOTOX) injection 200 Units      2. S/P Botox injection  Z92.29            Indication: patient has chronic recurrent migraine.      Procedure:   Botox concentration: 200 units in 4 ml of preservative-free normal saline.       31 sites injections, distribution as follow      Units/site  Sites Sides Subtotal    Procerus 5 1 1 5    5 1 2 10   Frontalis 5 2 2 20   Temporalis 5 4 2 40   Occipitalis 5 3 2 30   Upper cervical paraspinalis 5 2 2 20   Trapezius 5 3 2 30         200 units Botox were reconstituted, 155 units injected as above and the remainder was unavoidably wasted.     Patient tolerated procedure well.     With Treatment: current relief is: 6 a month   Prior to Botox:  15+ a month    Location: left side of her head, neck  Characteristics: Squeezing pain, not as intense right now.  Frequency:   6 a month  Triggers: rainy weather, high stress, being tired  Relief: Nurtec, Maxalt, emgality, Tylenol 2 x week.  Aura:  some neck pain.  N/: some nausea and vomiting.   Light and sound sensitive:   Works in Occupational health.  Drinks 1 soda in the morning. Otherwise drinks water.  She has a hard time sleeping, hard time turning her brain off.  Exercises at [employer].        Preventative Medication:   Emgality for preventative care  Gabapentin - taking - takes for CTS  Metoprolol - taking - for high blood pressure  Nurtec every other day - denied by insurance  Amitriptyline - ineffective  Nortriptyline - inefffective     -Abortive:   Nurtec - Taking - effective  Maxalt - Can be effective - uses if Nurtec not effective  Imitrex - ineffective      _____________________________   zeeshan Resendiz APRN - NP

## 2025-07-31 LAB
25(OH)D3 SERPL-MCNC: 21 NG/ML (ref 30–100)
ALBUMIN SERPL-MCNC: 3.4 G/DL (ref 3.5–5.2)
ALBUMIN/GLOB SERPL: 1.1 (ref 1.1–2.2)
ALP SERPL-CCNC: 75 U/L (ref 35–104)
ALT SERPL-CCNC: 15 U/L (ref 10–35)
ANION GAP SERPL CALC-SCNC: 14 MMOL/L (ref 2–14)
AST SERPL-CCNC: 18 U/L (ref 10–35)
BILIRUB SERPL-MCNC: 0.3 MG/DL (ref 0–1.2)
BUN SERPL-MCNC: 7 MG/DL (ref 6–20)
BUN/CREAT SERPL: 8 (ref 12–20)
CALCIUM SERPL-MCNC: 9.3 MG/DL (ref 8.6–10)
CHLORIDE SERPL-SCNC: 104 MMOL/L (ref 98–107)
CO2 SERPL-SCNC: 20 MMOL/L (ref 20–29)
COMMENT:: NORMAL
CREAT SERPL-MCNC: 0.84 MG/DL (ref 0.6–1)
ERYTHROCYTE [DISTWIDTH] IN BLOOD BY AUTOMATED COUNT: 15.2 % (ref 11.5–14.5)
FOLATE SERPL-MCNC: 4.2 NG/ML (ref 4.8–24.2)
GLOBULIN SER CALC-MCNC: 3 G/DL (ref 2–4)
GLUCOSE SERPL-MCNC: 107 MG/DL (ref 65–100)
HCT VFR BLD AUTO: 39.6 % (ref 35–47)
HGB BLD-MCNC: 12.1 G/DL (ref 11.5–16)
IRON SATN MFR SERPL: 14 %
IRON SERPL-MCNC: 43 UG/DL (ref 37–145)
MCH RBC QN AUTO: 26.2 PG (ref 26–34)
MCHC RBC AUTO-ENTMCNC: 30.6 G/DL (ref 30–36.5)
MCV RBC AUTO: 85.9 FL (ref 80–99)
NRBC # BLD: 0 K/UL (ref 0–0.01)
NRBC BLD-RTO: 0 PER 100 WBC
PLATELET # BLD AUTO: 387 K/UL (ref 150–400)
PMV BLD AUTO: 11.3 FL (ref 8.9–12.9)
POTASSIUM SERPL-SCNC: 4.4 MMOL/L (ref 3.5–5.1)
PROT SERPL-MCNC: 6.4 G/DL (ref 6.4–8.3)
RBC # BLD AUTO: 4.61 M/UL (ref 3.8–5.2)
SODIUM SERPL-SCNC: 138 MMOL/L (ref 136–145)
SPECIMEN HOLD: NORMAL
TIBC SERPL-MCNC: 298 UG/DL (ref 250–450)
TSH, 3RD GENERATION: 1.57 UIU/ML (ref 0.27–4.2)
UIBC SERPL-MCNC: 255 UG/DL (ref 112–347)
VIT B12 SERPL-MCNC: 630 PG/ML (ref 232–1245)
WBC # BLD AUTO: 5.6 K/UL (ref 3.6–11)

## 2025-08-04 RX ORDER — DULOXETIN HYDROCHLORIDE 60 MG/1
60 CAPSULE, DELAYED RELEASE ORAL DAILY
Qty: 90 CAPSULE | Refills: 1 | Status: SHIPPED | OUTPATIENT
Start: 2025-08-04

## 2025-08-07 ENCOUNTER — OFFICE VISIT (OUTPATIENT)
Age: 35
End: 2025-08-07

## 2025-08-07 VITALS
OXYGEN SATURATION: 100 % | HEART RATE: 79 BPM | WEIGHT: 293 LBS | RESPIRATION RATE: 20 BRPM | BODY MASS INDEX: 54.38 KG/M2 | DIASTOLIC BLOOD PRESSURE: 82 MMHG | SYSTOLIC BLOOD PRESSURE: 133 MMHG | TEMPERATURE: 98.1 F

## 2025-08-07 DIAGNOSIS — M54.42 ACUTE LEFT-SIDED LOW BACK PAIN WITH LEFT-SIDED SCIATICA: Primary | ICD-10-CM

## 2025-08-07 RX ORDER — KETOROLAC TROMETHAMINE 30 MG/ML
30 INJECTION, SOLUTION INTRAMUSCULAR; INTRAVENOUS ONCE
Status: COMPLETED | OUTPATIENT
Start: 2025-08-07 | End: 2025-08-07

## 2025-08-07 RX ORDER — KETOROLAC TROMETHAMINE 10 MG/1
10 TABLET, FILM COATED ORAL EVERY 6 HOURS PRN
Qty: 20 TABLET | Refills: 0 | Status: SHIPPED | OUTPATIENT
Start: 2025-08-07 | End: 2026-08-07

## 2025-08-07 RX ORDER — CYCLOBENZAPRINE HCL 10 MG
10 TABLET ORAL NIGHTLY PRN
Qty: 10 TABLET | Refills: 0 | Status: SHIPPED | OUTPATIENT
Start: 2025-08-07 | End: 2025-08-17

## 2025-08-07 RX ADMIN — KETOROLAC TROMETHAMINE 30 MG: 30 INJECTION, SOLUTION INTRAMUSCULAR; INTRAVENOUS at 16:34

## 2025-08-07 ASSESSMENT — ENCOUNTER SYMPTOMS: BACK PAIN: 1

## 2025-08-20 ENCOUNTER — OFFICE VISIT (OUTPATIENT)
Age: 35
End: 2025-08-20

## 2025-08-20 DIAGNOSIS — E66.01 MORBID OBESITY (HCC): Primary | ICD-10-CM

## 2025-08-20 DIAGNOSIS — F41.1 GENERALIZED ANXIETY DISORDER: ICD-10-CM

## 2025-08-20 DIAGNOSIS — F51.05 INSOMNIA DUE TO OTHER MENTAL DISORDER: ICD-10-CM

## 2025-08-20 DIAGNOSIS — F39 MOOD DISORDER: ICD-10-CM

## 2025-08-20 DIAGNOSIS — F32.1 MODERATE MAJOR DEPRESSION (HCC): ICD-10-CM

## 2025-08-20 DIAGNOSIS — M35.9 SYSTEMIC INVOLVEMENT OF CONNECTIVE TISSUE, UNSPECIFIED: ICD-10-CM

## 2025-08-20 DIAGNOSIS — F99 INSOMNIA DUE TO OTHER MENTAL DISORDER: ICD-10-CM

## 2025-08-20 DIAGNOSIS — E11.40 TYPE 2 DIABETES MELLITUS WITH DIABETIC NEUROPATHY, WITHOUT LONG-TERM CURRENT USE OF INSULIN (HCC): ICD-10-CM

## 2025-08-22 RX ORDER — DULOXETIN HYDROCHLORIDE 30 MG/1
30 CAPSULE, DELAYED RELEASE ORAL DAILY
Qty: 90 CAPSULE | Refills: 1 | Status: SHIPPED | OUTPATIENT
Start: 2025-08-22

## 2025-08-29 ENCOUNTER — OFFICE VISIT (OUTPATIENT)
Age: 35
End: 2025-08-29
Payer: COMMERCIAL

## 2025-08-29 VITALS
OXYGEN SATURATION: 95 % | HEIGHT: 63 IN | RESPIRATION RATE: 14 BRPM | SYSTOLIC BLOOD PRESSURE: 90 MMHG | WEIGHT: 293 LBS | HEART RATE: 88 BPM | BODY MASS INDEX: 51.91 KG/M2 | TEMPERATURE: 98.5 F | DIASTOLIC BLOOD PRESSURE: 60 MMHG

## 2025-08-29 DIAGNOSIS — E66.01 MORBID OBESITY WITH BMI OF 50.0-59.9, ADULT (HCC): Primary | ICD-10-CM

## 2025-08-29 DIAGNOSIS — K21.9 GASTROESOPHAGEAL REFLUX DISEASE WITHOUT ESOPHAGITIS: ICD-10-CM

## 2025-08-29 DIAGNOSIS — Z90.3 S/P GASTRIC SLEEVE PROCEDURE: ICD-10-CM

## 2025-08-29 DIAGNOSIS — M35.9 SYSTEMIC INVOLVEMENT OF CONNECTIVE TISSUE, UNSPECIFIED: ICD-10-CM

## 2025-08-29 DIAGNOSIS — E55.9 VITAMIN D DEFICIENCY: ICD-10-CM

## 2025-08-29 DIAGNOSIS — G47.30 SLEEP APNEA IN ADULT: ICD-10-CM

## 2025-08-29 DIAGNOSIS — E53.8 FOLATE DEFICIENCY: ICD-10-CM

## 2025-08-29 DIAGNOSIS — I10 PRIMARY HYPERTENSION: ICD-10-CM

## 2025-08-29 DIAGNOSIS — E11.40 TYPE 2 DIABETES MELLITUS WITH DIABETIC NEUROPATHY, WITHOUT LONG-TERM CURRENT USE OF INSULIN (HCC): ICD-10-CM

## 2025-08-29 PROCEDURE — 3074F SYST BP LT 130 MM HG: CPT | Performed by: NURSE PRACTITIONER

## 2025-08-29 PROCEDURE — 3051F HG A1C>EQUAL 7.0%<8.0%: CPT | Performed by: NURSE PRACTITIONER

## 2025-08-29 PROCEDURE — 3078F DIAST BP <80 MM HG: CPT | Performed by: NURSE PRACTITIONER

## 2025-08-29 PROCEDURE — 99213 OFFICE O/P EST LOW 20 MIN: CPT | Performed by: NURSE PRACTITIONER

## 2025-08-29 RX ORDER — FOLIC ACID 1 MG/1
1 TABLET ORAL DAILY
Qty: 90 TABLET | Refills: 3 | Status: SHIPPED | OUTPATIENT
Start: 2025-08-29

## 2025-08-29 RX ORDER — ERGOCALCIFEROL 1.25 MG/1
50000 CAPSULE ORAL WEEKLY
Qty: 4 CAPSULE | Refills: 2 | Status: SHIPPED | OUTPATIENT
Start: 2025-08-29

## 2025-08-29 ASSESSMENT — PATIENT HEALTH QUESTIONNAIRE - PHQ9
SUM OF ALL RESPONSES TO PHQ QUESTIONS 1-9: 4
2. FEELING DOWN, DEPRESSED OR HOPELESS: SEVERAL DAYS
8. MOVING OR SPEAKING SO SLOWLY THAT OTHER PEOPLE COULD HAVE NOTICED. OR THE OPPOSITE, BEING SO FIGETY OR RESTLESS THAT YOU HAVE BEEN MOVING AROUND A LOT MORE THAN USUAL: NOT AT ALL
SUM OF ALL RESPONSES TO PHQ QUESTIONS 1-9: 4
4. FEELING TIRED OR HAVING LITTLE ENERGY: NEARLY EVERY DAY
9. THOUGHTS THAT YOU WOULD BE BETTER OFF DEAD, OR OF HURTING YOURSELF: NOT AT ALL
6. FEELING BAD ABOUT YOURSELF - OR THAT YOU ARE A FAILURE OR HAVE LET YOURSELF OR YOUR FAMILY DOWN: NOT AT ALL
3. TROUBLE FALLING OR STAYING ASLEEP: NOT AT ALL
SUM OF ALL RESPONSES TO PHQ QUESTIONS 1-9: 4
1. LITTLE INTEREST OR PLEASURE IN DOING THINGS: NOT AT ALL
5. POOR APPETITE OR OVEREATING: NOT AT ALL
10. IF YOU CHECKED OFF ANY PROBLEMS, HOW DIFFICULT HAVE THESE PROBLEMS MADE IT FOR YOU TO DO YOUR WORK, TAKE CARE OF THINGS AT HOME, OR GET ALONG WITH OTHER PEOPLE: NOT DIFFICULT AT ALL
SUM OF ALL RESPONSES TO PHQ QUESTIONS 1-9: 4
7. TROUBLE CONCENTRATING ON THINGS, SUCH AS READING THE NEWSPAPER OR WATCHING TELEVISION: NOT AT ALL

## (undated) DEVICE — SYR 20ML LL STRL LF --

## (undated) DEVICE — FILTER SMK EVAC FLO CLR MEGADYNE

## (undated) DEVICE — GOWN,SIRUS,FABRNF,XL,20/CS: Brand: MEDLINE

## (undated) DEVICE — GLOVE SURG SZ 8 L12IN FNGR THK79MIL GRN LTX FREE

## (undated) DEVICE — SET CHOLANGIOGRAPHY 4FR L60CM W/ ARW KARLAN BLLN CATH CRV

## (undated) DEVICE — NEEDLE HYPO 22GA L1.5IN BLK S STL HUB POLYPR SHLD REG BVL

## (undated) DEVICE — ENDOSCOPIC KIT 1.1+ OP4 CA DE 2 GWN AAMI LEVEL 3

## (undated) DEVICE — INFECTION CONTROL KIT SYS

## (undated) DEVICE — SOLUTION IV 1000ML 0.9% SOD CHL

## (undated) DEVICE — TRAY PREP DRY W/ PREM GLV 2 APPL 6 SPNG 2 UNDPD 1 OVERWRAP

## (undated) DEVICE — VISIGI 3D®  CALIBRATION SYSTEM  SIZE 40FR STD W/ BULB: Brand: BOEHRINGER® VISIGI 3D™ SLEEVE GASTRECTOMY CALIBRATION SYSTEM, SIZE 40FR W/BULB

## (undated) DEVICE — AIR SHEET,LAT,COMFORT GLIDE, BLEND 40X80: Brand: MEDLINE

## (undated) DEVICE — C-ARM: Brand: UNBRANDED

## (undated) DEVICE — TROCAR SITE CLOSURE DEVICE: Brand: ENDO CLOSE

## (undated) DEVICE — SURGICAL PROCEDURE KIT GEN LAPAROSCOPY LF

## (undated) DEVICE — STRAP,POSITIONING,KNEE/BODY,FOAM,4X60": Brand: MEDLINE

## (undated) DEVICE — Device

## (undated) DEVICE — AGENT HEMSTAT 5GM ARISTA AH

## (undated) DEVICE — COVER LT HNDL PLAS RIG 1 PER PK

## (undated) DEVICE — SYR 3ML LL TIP 1/10ML GRAD --

## (undated) DEVICE — GLOVE ORANGE PI 7 1/2   MSG9075

## (undated) DEVICE — TUBING HYDR IRR --

## (undated) DEVICE — STERILE POLYISOPRENE POWDER-FREE SURGICAL GLOVES: Brand: PROTEXIS

## (undated) DEVICE — BASIN EMSIS 16OZ GRAPHITE PLAS KID SHP MOLD GRAD FOR ORAL

## (undated) DEVICE — CONTAINER SPEC 20 ML LID NEUT BUFF FORMALIN 10 % POLYPR STS

## (undated) DEVICE — TROCAR: Brand: KII® OPTICAL ACCESS SYSTEM

## (undated) DEVICE — FORCEPS BX 240CM 2.4MM L NDL RAD JAW 4 M00513334

## (undated) DEVICE — CLICKLINE SCISSORS INSERT: Brand: CLICKLINE

## (undated) DEVICE — SOLUTION IRRIG 1000ML 09% SOD CHL USP PIC PLAS CONTAINER

## (undated) DEVICE — 4-PORT MANIFOLD: Brand: NEPTUNE 2

## (undated) DEVICE — NEEDLE HYPO 25GA L1.5IN BVL ORIENTED ECLIPSE

## (undated) DEVICE — DERMABOND SKIN ADH 0.7ML -- DERMABOND ADVANCED 12/BX

## (undated) DEVICE — SUTURE VCRL SZ 3-0 L27IN ABSRB UD L26MM SH 1/2 CIR J416H

## (undated) DEVICE — PREP SKN CHLRAPRP APL 26ML STR --

## (undated) DEVICE — ELECTRODE,RADIOTRANSLUCENT,FOAM,5PK: Brand: MEDLINE

## (undated) DEVICE — TOWEL 4 PLY TISS 19X30 SUE WHT

## (undated) DEVICE — POWER SHELL: Brand: SIGNIA

## (undated) DEVICE — SYRINGE MED 50ML LUERLOCK TIP

## (undated) DEVICE — BAG SPEC BIOHZRD 10 X 10 IN --

## (undated) DEVICE — REM POLYHESIVE ADULT PATIENT RETURN ELECTRODE: Brand: VALLEYLAB

## (undated) DEVICE — SUTURE MCRYL SZ 4-0 L27IN ABSRB UD L19MM PS-2 1/2 CIR PRIM Y426H

## (undated) DEVICE — STERILE POLYISOPRENE POWDER-FREE SURGICAL GLOVES WITH EMOLLIENT COATING: Brand: PROTEXIS

## (undated) DEVICE — SYR 10ML LUER LOK 1/5ML GRAD --

## (undated) DEVICE — SINGLE USE AIR/WATER, SUCTION AND BIOPSY VALVE SET WITH WATER JET CONNECTOR: Brand: ORCAPOD™

## (undated) DEVICE — Z INACTIVE USE 2240337 DRAPE SURG PT TRANSFER TRAWAY SHT

## (undated) DEVICE — TROCAR: Brand: KII SLEEVE

## (undated) DEVICE — BAG SPEC REM 224ML W4XL6IN DIA10MM 1 HND GYN DISP ENDOPCH

## (undated) DEVICE — GENERAL LAPAROSCOPY - SMH: Brand: MEDLINE INDUSTRIES, INC.

## (undated) DEVICE — CATH IV AUTOGRD BC PNK 20GA 25 -- INSYTE

## (undated) DEVICE — SOLIDIFIER FLD 2OZ 1500CC N DISINF IN BTL DISP SAFESORB

## (undated) DEVICE — TROCAR: Brand: KII FIOS FIRST ENTRY

## (undated) DEVICE — NEONATAL-ADULT SPO2 SENSOR: Brand: NELLCOR

## (undated) DEVICE — LAPAROSCOPIC TROCAR SLEEVE/SINGLE USE: Brand: KII® OPTICAL ACCESS SYSTEM

## (undated) DEVICE — FORCEPS BX L160CM DIA8MM GRSP DISECT CUP TIP NONLOCKING ROT

## (undated) DEVICE — GARMENT,MEDLINE,DVT,INT,CALF,MED, GEN2: Brand: MEDLINE

## (undated) DEVICE — SUTURE SZ 0 27IN 5/8 CIR UR-6  TAPER PT VIOLET ABSRB VICRYL J603H

## (undated) DEVICE — ELECTRODE ES 36CM LAP FLAT L HK COAT DISP CLEANCOAT

## (undated) DEVICE — BLOCK BITE ENDOSCP AD 21 MM W/ DIL BLU LF DISP

## (undated) DEVICE — BNDG ADH FABRIC 2X4IN ST LF --

## (undated) DEVICE — BLACK REINFORCED INTELLIGENT RELOAD, FOR USE WITH SIGNIA STAPLING SYSTEM: Brand: TRI-STAPLE 2.0

## (undated) DEVICE — REINFORCED INTELLIGENT RELOAD, FOR USE WITH SIGNIA STAPLING SYSTEM: Brand: TRI-STAPLE 2.0

## (undated) DEVICE — APPLIER CLP M/L SHFT DIA5MM 15 LIG LIGAMAX 5

## (undated) DEVICE — ENDO CARRY-ON PROCEDURE KIT INCLUDES ENZYMATIC SPONGE, GAUZE, BIOHAZARD LABEL, TRAY, LUBRICANT, DIRTY SCOPE LABEL, WATER LABEL, TRAY, DRAWSTRING PAD, AND DEFENDO 4-PIECE KIT.: Brand: ENDO CARRY-ON PROCEDURE KIT

## (undated) DEVICE — 1200 GUARD II KIT W/5MM TUBE W/O VAC TUBE: Brand: GUARDIAN

## (undated) DEVICE — DRAPE,UTILTY,TAPE,15X26, 4EA/PK: Brand: MEDLINE

## (undated) DEVICE — TISSUE RETRIEVAL SYSTEM: Brand: INZII RETRIEVAL SYSTEM

## (undated) DEVICE — TROCAR: Brand: KII® SLEEVE

## (undated) DEVICE — TUBING, SUCTION, 1/4" X 12', STRAIGHT: Brand: MEDLINE

## (undated) DEVICE — Z DISCONTINUED PER MEDLINE LINE GAS SAMPLING O2/CO2 LNG AD 13 FT NSL W/ TBNG FILTERLINE

## (undated) DEVICE — TROCARS: Brand: KII® OPTICAL ACCESS SYSTEM

## (undated) DEVICE — SURGICAL PROCEDURE PACK GYN LAPAROSCOPY CUST SMH LF

## (undated) DEVICE — TROCARS: Brand: KII® BALLOON BLUNT TIP SYSTEM

## (undated) DEVICE — NEEDLE HYPO 18GA L1.5IN PNK S STL HUB POLYPR SHLD REG BVL

## (undated) DEVICE — FORCEPS BX L240CM JAW DIA2.8MM L CAP W/ NDL MIC MESH TOOTH

## (undated) DEVICE — SUTURE MCRYL SZ 4-0 L27IN ABSRB UD L24MM PS-1 3/8 CIR PRIM Y935H

## (undated) DEVICE — SET ADMIN 16ML TBNG L100IN 2 Y INJ SITE IV PIGGY BK DISP

## (undated) DEVICE — TOTAL TRAY, 16FR 10ML SIL FOLEY, URN: Brand: MEDLINE

## (undated) DEVICE — DISSECTOR CRV JAW 48CM CRDLS -- SONICISION

## (undated) DEVICE — YANKAUER,TAPERED BULBOUS TIP,W/O VENT: Brand: MEDLINE

## (undated) DEVICE — SUTURE DEV SZ 2-0 WND CLSR ABSRB GS-22 VLOC COVIDIEN VLOCM2145

## (undated) DEVICE — VISUALIZATION SYSTEM: Brand: CLEARIFY

## (undated) DEVICE — SHEARS ENDOSCP L36CM DIA5MM ULTRASONIC CRV TIP HARM

## (undated) DEVICE — TUBING INSUF 0.3UM FLTR W/ LUERLOCK CONN

## (undated) DEVICE — SYR 50ML LR LCK 1ML GRAD NSAF --